# Patient Record
Sex: MALE | Race: WHITE | Employment: OTHER | URBAN - NONMETROPOLITAN AREA
[De-identification: names, ages, dates, MRNs, and addresses within clinical notes are randomized per-mention and may not be internally consistent; named-entity substitution may affect disease eponyms.]

---

## 2017-01-11 ENCOUNTER — TELEPHONE (OUTPATIENT)
Dept: FAMILY MEDICINE CLINIC | Age: 57
End: 2017-01-11

## 2017-01-11 DIAGNOSIS — E55.9 VITAMIN D DEFICIENCY: Primary | ICD-10-CM

## 2017-01-11 RX ORDER — ALLOPURINOL 300 MG/1
300 TABLET ORAL DAILY
Qty: 90 TABLET | Refills: 0 | Status: SHIPPED | OUTPATIENT
Start: 2017-01-11 | End: 2017-04-24 | Stop reason: SDUPTHER

## 2017-04-04 ENCOUNTER — CARE COORDINATION (OUTPATIENT)
Dept: CARE COORDINATION | Age: 57
End: 2017-04-04

## 2017-04-13 ENCOUNTER — CARE COORDINATION (OUTPATIENT)
Dept: CARE COORDINATION | Age: 57
End: 2017-04-13

## 2017-04-24 ENCOUNTER — OFFICE VISIT (OUTPATIENT)
Dept: FAMILY MEDICINE CLINIC | Age: 57
End: 2017-04-24
Payer: MEDICARE

## 2017-04-24 VITALS
HEART RATE: 58 BPM | SYSTOLIC BLOOD PRESSURE: 132 MMHG | OXYGEN SATURATION: 96 % | TEMPERATURE: 98.5 F | RESPIRATION RATE: 16 BRPM | DIASTOLIC BLOOD PRESSURE: 70 MMHG

## 2017-04-24 DIAGNOSIS — E66.01 MORBID OBESITY WITH BMI OF 70 AND OVER, ADULT (HCC): ICD-10-CM

## 2017-04-24 DIAGNOSIS — E55.9 VITAMIN D INSUFFICIENCY: ICD-10-CM

## 2017-04-24 DIAGNOSIS — E53.8 B12 DEFICIENCY: ICD-10-CM

## 2017-04-24 DIAGNOSIS — Z12.5 PROSTATE CANCER SCREENING: ICD-10-CM

## 2017-04-24 DIAGNOSIS — I10 ESSENTIAL HYPERTENSION: ICD-10-CM

## 2017-04-24 DIAGNOSIS — Z23 NEED FOR PROPHYLACTIC VACCINATION AGAINST STREPTOCOCCUS PNEUMONIAE (PNEUMOCOCCUS): ICD-10-CM

## 2017-04-24 DIAGNOSIS — R60.0 BILATERAL LOWER EXTREMITY EDEMA: ICD-10-CM

## 2017-04-24 DIAGNOSIS — E79.0 ELEVATED URIC ACID IN BLOOD: ICD-10-CM

## 2017-04-24 DIAGNOSIS — I10 ESSENTIAL HYPERTENSION: Primary | ICD-10-CM

## 2017-04-24 DIAGNOSIS — Z79.4 TYPE 2 DIABETES MELLITUS WITHOUT COMPLICATION, WITH LONG-TERM CURRENT USE OF INSULIN (HCC): ICD-10-CM

## 2017-04-24 DIAGNOSIS — E11.9 TYPE 2 DIABETES MELLITUS WITHOUT COMPLICATION, WITH LONG-TERM CURRENT USE OF INSULIN (HCC): ICD-10-CM

## 2017-04-24 DIAGNOSIS — K21.9 GASTROESOPHAGEAL REFLUX DISEASE WITHOUT ESOPHAGITIS: ICD-10-CM

## 2017-04-24 DIAGNOSIS — E87.6 HYPOKALEMIA: ICD-10-CM

## 2017-04-24 LAB
ALBUMIN SERPL-MCNC: 3.4 G/DL (ref 3.5–5.2)
ALP BLD-CCNC: 84 U/L (ref 40–130)
ALT SERPL-CCNC: 9 U/L (ref 5–41)
ANION GAP SERPL CALCULATED.3IONS-SCNC: 13 MMOL/L (ref 7–19)
AST SERPL-CCNC: 13 U/L (ref 5–40)
BASOPHILS ABSOLUTE: 0.1 K/UL (ref 0–0.2)
BASOPHILS RELATIVE PERCENT: 0.5 % (ref 0–1)
BILIRUB SERPL-MCNC: 0.5 MG/DL (ref 0.2–1.2)
BILIRUBIN URINE: NEGATIVE
BLOOD, URINE: NEGATIVE
BUN BLDV-MCNC: 25 MG/DL (ref 6–20)
CALCIUM SERPL-MCNC: 8.8 MG/DL (ref 8.6–10)
CHLORIDE BLD-SCNC: 99 MMOL/L (ref 98–111)
CHOLESTEROL, TOTAL: 131 MG/DL (ref 160–199)
CLARITY: CLEAR
CO2: 28 MMOL/L (ref 22–29)
COLOR: YELLOW
CREAT SERPL-MCNC: 1 MG/DL (ref 0.5–1.2)
CREATININE URINE: 109.9 MG/DL (ref 4.2–622)
EOSINOPHILS ABSOLUTE: 0.3 K/UL (ref 0–0.6)
EOSINOPHILS RELATIVE PERCENT: 3.1 % (ref 0–5)
GFR NON-AFRICAN AMERICAN: >60
GLOBULIN: 3.1 G/DL
GLUCOSE BLD-MCNC: 145 MG/DL (ref 74–109)
GLUCOSE URINE: NEGATIVE MG/DL
HBA1C MFR BLD: 7 %
HCT VFR BLD CALC: 44.8 % (ref 42–52)
HDLC SERPL-MCNC: 43 MG/DL (ref 55–121)
HEMOGLOBIN: 13.8 G/DL (ref 14–18)
KETONES, URINE: NEGATIVE MG/DL
LDL CHOLESTEROL CALCULATED: 78 MG/DL
LEUKOCYTE ESTERASE, URINE: NEGATIVE
LYMPHOCYTES ABSOLUTE: 1.1 K/UL (ref 1.1–4.5)
LYMPHOCYTES RELATIVE PERCENT: 11.6 % (ref 20–40)
MCH RBC QN AUTO: 29.1 PG (ref 27–31)
MCHC RBC AUTO-ENTMCNC: 30.8 G/DL (ref 33–37)
MCV RBC AUTO: 94.5 FL (ref 80–94)
MICROALBUMIN UR-MCNC: <1.2 MG/DL (ref 0–19)
MICROALBUMIN/CREAT UR-RTO: NORMAL MG/G
MONOCYTES ABSOLUTE: 0.8 K/UL (ref 0–0.9)
MONOCYTES RELATIVE PERCENT: 7.8 % (ref 0–10)
NEUTROPHILS ABSOLUTE: 7.5 K/UL (ref 1.5–7.5)
NEUTROPHILS RELATIVE PERCENT: 76.3 % (ref 50–65)
NITRITE, URINE: NEGATIVE
PDW BLD-RTO: 14.7 % (ref 11.5–14.5)
PH UA: 5.5
PLATELET # BLD: 209 K/UL (ref 130–400)
PMV BLD AUTO: 11.5 FL (ref 7.4–10.4)
POTASSIUM SERPL-SCNC: 4 MMOL/L (ref 3.5–5)
PROSTATE SPECIFIC ANTIGEN: 4.48 NG/ML (ref 0–4)
PROTEIN UA: NEGATIVE MG/DL
RBC # BLD: 4.74 M/UL (ref 4.7–6.1)
SODIUM BLD-SCNC: 140 MMOL/L (ref 136–145)
SPECIFIC GRAVITY UA: 1.01
T4 FREE: 1.5 NG/ML (ref 0.9–1.7)
TOTAL PROTEIN: 6.5 G/DL (ref 6.6–8.7)
TRIGL SERPL-MCNC: 52 MG/DL (ref 150–199)
TSH SERPL DL<=0.05 MIU/L-ACNC: 3.96 UIU/ML (ref 0.27–4.2)
UROBILINOGEN, URINE: 0.2 E.U./DL
VITAMIN B-12: >2000 PG/ML (ref 211–946)
VITAMIN D 25-HYDROXY: 23.2 NG/ML
WBC # BLD: 9.8 K/UL (ref 4.8–10.8)

## 2017-04-24 PROCEDURE — 99214 OFFICE O/P EST MOD 30 MIN: CPT | Performed by: NURSE PRACTITIONER

## 2017-04-24 RX ORDER — GREEN TEA/HOODIA GORDONII 315-12.5MG
CAPSULE ORAL
Qty: 90 TABLET | Refills: 1 | Status: SHIPPED | OUTPATIENT
Start: 2017-04-24 | End: 2017-08-03 | Stop reason: ALTCHOICE

## 2017-04-24 RX ORDER — PRAVASTATIN SODIUM 20 MG
TABLET ORAL
Qty: 90 TABLET | Refills: 1 | Status: SHIPPED | OUTPATIENT
Start: 2017-04-24 | End: 2017-10-26 | Stop reason: SDUPTHER

## 2017-04-24 RX ORDER — LOSARTAN POTASSIUM 50 MG/1
50 TABLET ORAL DAILY
Qty: 90 TABLET | Refills: 1 | Status: SHIPPED | OUTPATIENT
Start: 2017-04-24 | End: 2017-10-26 | Stop reason: SDUPTHER

## 2017-04-24 RX ORDER — BUMETANIDE 1 MG/1
TABLET ORAL
Qty: 180 TABLET | Refills: 1 | Status: SHIPPED | OUTPATIENT
Start: 2017-04-24 | End: 2017-10-26 | Stop reason: SDUPTHER

## 2017-04-24 RX ORDER — CHOLECALCIFEROL (VITAMIN D3) 125 MCG
500 CAPSULE ORAL DAILY
Qty: 30 TABLET | Refills: 3 | Status: CANCELLED | OUTPATIENT
Start: 2017-04-24

## 2017-04-24 RX ORDER — NEBIVOLOL 10 MG/1
10 TABLET ORAL DAILY
Qty: 90 TABLET | Refills: 1 | Status: SHIPPED | OUTPATIENT
Start: 2017-04-24 | End: 2017-10-26 | Stop reason: SDUPTHER

## 2017-04-24 RX ORDER — OMEPRAZOLE 20 MG/1
20 CAPSULE, DELAYED RELEASE ORAL DAILY
Qty: 90 CAPSULE | Refills: 1 | Status: SHIPPED | OUTPATIENT
Start: 2017-04-24 | End: 2017-10-26 | Stop reason: SDUPTHER

## 2017-04-24 RX ORDER — ALLOPURINOL 300 MG/1
300 TABLET ORAL DAILY
Qty: 90 TABLET | Refills: 1 | Status: SHIPPED | OUTPATIENT
Start: 2017-04-24 | End: 2017-10-26 | Stop reason: SDUPTHER

## 2017-04-24 RX ORDER — POTASSIUM CHLORIDE 20 MEQ/1
TABLET, EXTENDED RELEASE ORAL
Qty: 90 TABLET | Refills: 1 | Status: SHIPPED | OUTPATIENT
Start: 2017-04-24 | End: 2017-10-26 | Stop reason: SDUPTHER

## 2017-04-25 ENCOUNTER — APPOINTMENT (OUTPATIENT)
Dept: WOUND CARE | Facility: HOSPITAL | Age: 57
End: 2017-04-25

## 2017-04-25 PROCEDURE — G0463 HOSPITAL OUTPT CLINIC VISIT: HCPCS

## 2017-04-25 ASSESSMENT — ENCOUNTER SYMPTOMS
SHORTNESS OF BREATH: 0
BLURRED VISION: 0

## 2017-05-02 ENCOUNTER — APPOINTMENT (OUTPATIENT)
Dept: WOUND CARE | Facility: HOSPITAL | Age: 57
End: 2017-05-02

## 2017-05-02 DIAGNOSIS — R97.20 ELEVATED PSA: Primary | ICD-10-CM

## 2017-05-03 DIAGNOSIS — E55.9 VITAMIN D DEFICIENCY: Primary | ICD-10-CM

## 2017-05-03 RX ORDER — ACETAMINOPHEN 160 MG
2000 TABLET,DISINTEGRATING ORAL DAILY
COMMUNITY
Start: 2017-05-03 | End: 2017-08-03 | Stop reason: DRUGHIGH

## 2017-05-09 ENCOUNTER — APPOINTMENT (OUTPATIENT)
Dept: WOUND CARE | Facility: HOSPITAL | Age: 57
End: 2017-05-09

## 2017-05-09 ENCOUNTER — OFFICE VISIT (OUTPATIENT)
Dept: UROLOGY | Age: 57
End: 2017-05-09
Payer: MEDICARE

## 2017-05-09 VITALS
BODY MASS INDEX: 50.62 KG/M2 | HEIGHT: 66 IN | TEMPERATURE: 98.2 F | DIASTOLIC BLOOD PRESSURE: 74 MMHG | WEIGHT: 315 LBS | OXYGEN SATURATION: 90 % | HEART RATE: 94 BPM | SYSTOLIC BLOOD PRESSURE: 132 MMHG

## 2017-05-09 DIAGNOSIS — R97.20 ELEVATED PSA: Primary | ICD-10-CM

## 2017-05-09 LAB
APPEARANCE FLUID: CLEAR
BILIRUBIN, POC: NORMAL
BLOOD URINE, POC: NORMAL
CLARITY, POC: NORMAL
COLOR, POC: NORMAL
GLUCOSE URINE, POC: NORMAL
KETONES, POC: NORMAL
LEUKOCYTE EST, POC: NORMAL
NITRITE, POC: NORMAL
PH, POC: 5.5
PROTEIN, POC: NORMAL
SPECIFIC GRAVITY, POC: 1.01
UROBILINOGEN, POC: 0.2

## 2017-05-09 PROCEDURE — 99213 OFFICE O/P EST LOW 20 MIN: CPT | Performed by: PHYSICIAN ASSISTANT

## 2017-05-09 PROCEDURE — 81002 URINALYSIS NONAUTO W/O SCOPE: CPT | Performed by: PHYSICIAN ASSISTANT

## 2017-05-09 ASSESSMENT — ENCOUNTER SYMPTOMS
EYE REDNESS: 0
WHEEZING: 0
EYE DISCHARGE: 0
NAUSEA: 0
SHORTNESS OF BREATH: 0
HEARTBURN: 0

## 2017-05-10 ENCOUNTER — APPOINTMENT (OUTPATIENT)
Dept: WOUND CARE | Facility: HOSPITAL | Age: 57
End: 2017-05-10

## 2017-05-17 ENCOUNTER — APPOINTMENT (OUTPATIENT)
Dept: WOUND CARE | Facility: HOSPITAL | Age: 57
End: 2017-05-17

## 2017-05-23 ENCOUNTER — APPOINTMENT (OUTPATIENT)
Dept: WOUND CARE | Facility: HOSPITAL | Age: 57
End: 2017-05-23

## 2017-06-09 ENCOUNTER — APPOINTMENT (OUTPATIENT)
Dept: WOUND CARE | Facility: HOSPITAL | Age: 57
End: 2017-06-09

## 2017-06-16 ENCOUNTER — APPOINTMENT (OUTPATIENT)
Dept: WOUND CARE | Facility: HOSPITAL | Age: 57
End: 2017-06-16

## 2017-06-21 ENCOUNTER — APPOINTMENT (OUTPATIENT)
Dept: WOUND CARE | Facility: HOSPITAL | Age: 57
End: 2017-06-21

## 2017-06-27 ENCOUNTER — APPOINTMENT (OUTPATIENT)
Dept: WOUND CARE | Facility: HOSPITAL | Age: 57
End: 2017-06-27

## 2017-06-27 ENCOUNTER — OFFICE VISIT (OUTPATIENT)
Dept: VASCULAR SURGERY | Facility: CLINIC | Age: 57
End: 2017-06-27

## 2017-06-27 VITALS
DIASTOLIC BLOOD PRESSURE: 60 MMHG | SYSTOLIC BLOOD PRESSURE: 120 MMHG | OXYGEN SATURATION: 92 % | BODY MASS INDEX: 46.65 KG/M2 | WEIGHT: 315 LBS | HEIGHT: 69 IN | HEART RATE: 71 BPM

## 2017-06-27 DIAGNOSIS — E11.51 TYPE 2 DIABETES MELLITUS WITH DIABETIC PERIPHERAL ANGIOPATHY WITHOUT GANGRENE, WITH LONG-TERM CURRENT USE OF INSULIN (HCC): ICD-10-CM

## 2017-06-27 DIAGNOSIS — Z79.4 TYPE 2 DIABETES MELLITUS WITH DIABETIC PERIPHERAL ANGIOPATHY WITHOUT GANGRENE, WITH LONG-TERM CURRENT USE OF INSULIN (HCC): ICD-10-CM

## 2017-06-27 DIAGNOSIS — L97.929 VENOUS ULCER OF LEFT LEG (HCC): ICD-10-CM

## 2017-06-27 DIAGNOSIS — I87.2 VENOUS (PERIPHERAL) INSUFFICIENCY: Primary | ICD-10-CM

## 2017-06-27 DIAGNOSIS — I10 ESSENTIAL HYPERTENSION: ICD-10-CM

## 2017-06-27 DIAGNOSIS — I83.029 VENOUS ULCER OF LEFT LEG (HCC): ICD-10-CM

## 2017-06-27 PROCEDURE — 99204 OFFICE O/P NEW MOD 45 MIN: CPT | Performed by: SURGERY

## 2017-06-27 NOTE — PROGRESS NOTES
06/27/2017      Aldo Newton MD  [address] [address]    Jose Landeros  1960    Chief Complaint   Patient presents with   • Establish Care     Arterial and venous def       Dear Aldo Newton MD:      HPI  I had the pleasure of seeing your patient Jose Landeros in the office today.  Thank you kindly for this consultation.  As you recall, Jose Landeros is a 57 y.o.  male who you are currently following for Wounds to his left lower extremity and left toe.  He has had an Unna boot to his left lower extremity and swelling seems to be decreased.  He does have a small wound to his left lateral lower leg.  He also has a small ulceration to his left second toe.  Dr. Newton is following at wound care and wanted him evaluated for any arterial venous disease.    Past Medical History:   Diagnosis Date   • COPD (chronic obstructive pulmonary disease)    • Depression    • Diabetes mellitus    • Hypertension    • Venous insufficiency    • Venous ulcer of right leg        Past Surgical History:   Procedure Laterality Date   • APPENDECTOMY     • FOOT SURGERY     • TESTICLE SURGERY     • TONSILLECTOMY     • TRACHEOSTOMY         Family History   Problem Relation Age of Onset   • Diabetes Mother    • Heart disease Mother    • Hypertension Mother    • Diabetes Father    • Heart disease Father    • Hypertension Father        Social History     Social History   • Marital status: Single     Spouse name: N/A   • Number of children: N/A   • Years of education: N/A     Occupational History   • Not on file.     Social History Main Topics   • Smoking status: Never Smoker   • Smokeless tobacco: Never Used   • Alcohol use No   • Drug use: No   • Sexual activity: Defer     Other Topics Concern   • Not on file     Social History Narrative       Allergies   Allergen Reactions   • Cephalosporins Other (See Comments)     Per d/c summary of 12/17/15, pt possibly had cross-reactivity to a  cephalosporin during his hospitalization   • Neosporin  [Neomycin-Bacitracin Zn-Polymyx]    • Penicillamine    • Silver Sulfadiazine        Prior to Admission medications    Medication Sig Start Date End Date Taking? Authorizing Provider   ACCU-CHEK SOFTCLIX LANCETS lancets For qid testing 11/16/16  Yes Historical Provider, MD   allopurinol (ZYLOPRIM) 300 MG tablet Take 1 tablet by mouth Daily. 11/16/16  Yes Historical Provider, MD   aspirin 81 MG EC tablet Take 1 tablet by mouth Daily. 1/27/16  Yes Historical Provider, MD   bumetanide (BUMEX) 1 MG tablet Take 1-2 tablets by mouth Daily. 11/16/16  Yes Historical Provider, MD   diphenhydrAMINE (BENADRYL) 25 mg capsule Take 1 capsule by mouth Daily. prn 1/27/16  Yes Historical Provider, MD KIRBY RAE-CHROMIUM PO Take 1 tablet by mouth 2 (Two) Times a Day.   Yes Historical Provider, MD   insulin NPH (NOVOLIN N RELION) 100 UNIT/ML injection Inject 30 Units under the skin Daily. After dinner  6/6/16  Yes Historical Provider, MD   insulin regular (NOVOLIN R RELION) 100 UNIT/ML injection Inject 30 Units under the skin. 6/3/16  Yes Historical Provider, MD   losartan (COZAAR) 50 MG tablet Take 1 tablet by mouth Daily. 11/16/16  Yes Historical Provider, MD   nebivolol (BYSTOLIC) 10 MG tablet Take 1 tablet by mouth Daily. 11/16/16  Yes Historical Provider, MD   O2 (OXYGEN) Inhale 2 L Every Night.   Yes Historical Provider, MD   potassium chloride (KLOR-CON) 20 MEQ CR tablet Take 1 tablet by mouth Daily. 11/16/16  Yes Historical Provider, MD   pravastatin (PRAVACHOL) 20 MG tablet Take 1 tablet by mouth Every Night. 11/16/16  Yes Historical Provider, MD   vitamin B-12 (CYANOCOBALAMIN) 500 MCG tablet Take 1 tablet by mouth Every Morning. 1/27/16  Yes Historical Provider, MD   vitamin D (ERGOCALCIFEROL) 05796 UNITS capsule capsule Take 1 capsule by mouth 1 (One) Time Per Week. 7/6/16  Yes Historical Provider, MD   albuterol (PROVENTIL) (2.5 MG/3ML) 0.083% nebulizer  "solution 3 mL Every 6 (Six) Hours. PRN 1/27/16   Historical Provider, MD   albuterol (VENTOLIN HFA) 108 (90 BASE) MCG/ACT inhaler Inhale 2 puffs Every 6 (Six) Hours. prn 1/27/16   Historical Provider, MD   azelastine (ASTELIN) 0.1 % nasal spray 2 sprays into each nostril 2 (Two) Times a Day As Needed. 11/16/16   Historical Provider, MD   fluticasone (FLONASE) 50 MCG/ACT nasal spray 1 spray into each nostril 2 (Two) Times a Day As Needed. 11/16/16   Historical Provider, MD   glucose blood (ACCU-CHEK COMPACT PLUS) test strip 1 applicator. One strip four times a day 11/16/16   Historical Provider, MD   insulin NPH (NOVOLIN N) 100 UNIT/ML injection Inject 20 Units under the skin Every Morning. 4/7/16   Historical Provider, MD       Review of Systems   Constitutional: Negative.    HENT: Negative.    Eyes: Negative.    Respiratory: Negative.    Cardiovascular: Positive for leg swelling.        Wound to left lower leg and left 2nd great toe   Gastrointestinal: Negative.    Endocrine: Negative.    Genitourinary: Negative.    Musculoskeletal: Negative.    Skin: Negative.    Allergic/Immunologic: Negative.    Neurological: Negative.    Hematological: Negative.    Psychiatric/Behavioral: Negative.    All other systems reviewed and are negative.      /60  Pulse 71  Ht 69\" (175.3 cm)  Wt (!) 450 lb (204 kg)  SpO2 92%  BMI 66.45 kg/m2  Physical Exam   Constitutional: He is oriented to person, place, and time. He appears well-developed and well-nourished.   obese   HENT:   Head: Normocephalic and atraumatic.   Eyes: Pupils are equal, round, and reactive to light.   Neck: Normal range of motion. Neck supple.   Cardiovascular: Normal rate, regular rhythm and normal heart sounds.    Pulses:       Dorsalis pedis pulses are 2+ on the left side.   Left: doppler PT  Venous stasis   Pulmonary/Chest: Effort normal and breath sounds normal.   Abdominal: Soft.   Morbidly obese   Musculoskeletal:        Legs:  Motorized wheelchair "        Neurological: He is alert and oriented to person, place, and time.   Skin: Skin is warm and dry.   Psychiatric: He has a normal mood and affect. His behavior is normal. Judgment and thought content normal.   Nursing note and vitals reviewed.      No results found.    Patient Active Problem List   Diagnosis   • Venous insufficiency   • Hypertension   • Diabetes mellitus         ICD-10-CM ICD-9-CM   1. Venous (peripheral) insufficiency I87.2 459.81   2. Venous ulcer of left leg I83.029 454.0   3. Essential hypertension I10 401.9   4. Type 2 diabetes mellitus with diabetic peripheral angiopathy without gangrene, with long-term current use of insulin E11.51 250.70    Z79.4 443.81     V58.67       Plan: After thoroughly evaluating Jose Landeros, I believe the best course of action is to proceed with further testing for venous insufficiency.  I will order a venous valvular insufficiency study and see him back next week.  He does have a palpable dorsalis pedis on the left and dopplerable PT.  He overall his arterial supply seems to be pretty normal.  He likely has some distal small vessel disease related to his diabetes.  He has previously been in Unna boot and will be going to wound care immediately following this appointment.  The patient can continue taking her current medication regimen as previously planned.  This was all discussed in full with complete understanding.    Thank you for allowing me to participate in the care of your patient.  Please do not hesitate with any questions or concerns.  I will keep you aware of any further encounters with Jose Landeros.        Sincerely yours,         NOAH Ramirez MD

## 2017-07-10 ENCOUNTER — APPOINTMENT (OUTPATIENT)
Dept: WOUND CARE | Facility: HOSPITAL | Age: 57
End: 2017-07-10

## 2017-07-10 DIAGNOSIS — E11.9 CONTROLLED TYPE 2 DIABETES MELLITUS WITHOUT COMPLICATION, WITH LONG-TERM CURRENT USE OF INSULIN (HCC): ICD-10-CM

## 2017-07-10 DIAGNOSIS — Z79.4 CONTROLLED TYPE 2 DIABETES MELLITUS WITHOUT COMPLICATION, WITH LONG-TERM CURRENT USE OF INSULIN (HCC): ICD-10-CM

## 2017-07-11 RX ORDER — HUMAN INSULIN 100 [IU]/ML
INJECTION, SOLUTION SUBCUTANEOUS
Qty: 1 VIAL | Refills: 2 | Status: SHIPPED | OUTPATIENT
Start: 2017-07-11 | End: 2019-02-20 | Stop reason: SDUPTHER

## 2017-07-11 RX ORDER — HUMAN INSULIN 100 [IU]/ML
INJECTION, SUSPENSION SUBCUTANEOUS
Qty: 1 VIAL | Refills: 2 | Status: SHIPPED | OUTPATIENT
Start: 2017-07-11 | End: 2019-02-20 | Stop reason: SDUPTHER

## 2017-07-13 ENCOUNTER — APPOINTMENT (OUTPATIENT)
Dept: ULTRASOUND IMAGING | Facility: HOSPITAL | Age: 57
End: 2017-07-13

## 2017-07-14 ENCOUNTER — OFFICE VISIT (OUTPATIENT)
Dept: PODIATRY | Facility: CLINIC | Age: 57
End: 2017-07-14

## 2017-07-14 VITALS
SYSTOLIC BLOOD PRESSURE: 124 MMHG | HEIGHT: 70 IN | HEART RATE: 63 BPM | OXYGEN SATURATION: 96 % | DIASTOLIC BLOOD PRESSURE: 92 MMHG | WEIGHT: 315 LBS | BODY MASS INDEX: 45.1 KG/M2

## 2017-07-14 DIAGNOSIS — M79.672 FOOT PAIN, BILATERAL: ICD-10-CM

## 2017-07-14 DIAGNOSIS — I87.2 VENOUS INSUFFICIENCY: ICD-10-CM

## 2017-07-14 DIAGNOSIS — E11.49 DIABETES MELLITUS TYPE 2 WITH NEUROLOGICAL MANIFESTATIONS (HCC): ICD-10-CM

## 2017-07-14 DIAGNOSIS — E11.51 TYPE 2 DIABETES MELLITUS WITH DIABETIC PERIPHERAL ANGIOPATHY WITHOUT GANGRENE, WITH LONG-TERM CURRENT USE OF INSULIN (HCC): ICD-10-CM

## 2017-07-14 DIAGNOSIS — B35.1 ONYCHOMYCOSIS: Primary | ICD-10-CM

## 2017-07-14 DIAGNOSIS — Z79.4 TYPE 2 DIABETES MELLITUS WITH DIABETIC PERIPHERAL ANGIOPATHY WITHOUT GANGRENE, WITH LONG-TERM CURRENT USE OF INSULIN (HCC): ICD-10-CM

## 2017-07-14 DIAGNOSIS — L97.509 NON-PRESSURE ULCER OF TOE (HCC): ICD-10-CM

## 2017-07-14 DIAGNOSIS — M79.671 FOOT PAIN, BILATERAL: ICD-10-CM

## 2017-07-14 PROCEDURE — 99203 OFFICE O/P NEW LOW 30 MIN: CPT | Performed by: PODIATRIST

## 2017-07-14 PROCEDURE — 11721 DEBRIDE NAIL 6 OR MORE: CPT | Performed by: PODIATRIST

## 2017-07-14 NOTE — PROGRESS NOTES
[facility] - PODIATRY    Today's Date: 07/14/17    Patient Name: Jose Landeros  MRN: [id number]  CSN: [id number]  PCP: Cong Steve MD  Referring Provider: No ref. provider found    SUBJECTIVE     Chief Complaint   Patient presents with   • Diabetes     Patient states he is here for a diabetic foot exam.      HPI: Jose Landeros, a 57 y.o.male, comes to clinic as a(n) new patient presenting for diabetic foot exam and complaining of painful toenails. Patient has h/o COPD,Morbid Obesity, Depression, DM2 with Neuropathy, Venous Insufficiency, Venous leg ulcer, Foot Ulcer, HLD, HTN. He was referred by [facility] for debridement of elongated, thickened toenails. Pt relates he is unable to reach his feet to trim his own nails Patient is IDDM with last stated BG level of 200mg/dl. Admits pain at 3/10 level and described as burning, aching and numbness. Denies any constitutional symptoms. No other pedal complaints at this time.    Past Medical History:   Diagnosis Date   • COPD (chronic obstructive pulmonary disease)    • Depression    • Diabetes mellitus    • Hyperlipidemia    • Hypertension    • Venous insufficiency    • Venous ulcer of right leg      Past Surgical History:   Procedure Laterality Date   • APPENDECTOMY     • FOOT SURGERY     • TESTICLE SURGERY     • TRACHEOSTOMY       Family History   Problem Relation Age of Onset   • Diabetes Mother    • Heart disease Mother    • Hypertension Mother    • Diabetes Father    • Heart disease Father    • Hypertension Father      Social History     Social History   • Marital status: Single     Spouse name: N/A   • Number of children: N/A   • Years of education: N/A     Occupational History   • Not on file.     Social History Main Topics   • Smoking status: Never Smoker   • Smokeless tobacco: Never Used   • Alcohol use No   • Drug use: No   • Sexual activity: Defer     Other Topics Concern   • Not on file     Social History Narrative      Allergies   Allergen Reactions   • Cephalosporins Other (See Comments)     Per d/c summary of 12/17/15, pt possibly had cross-reactivity to a cephalosporin during his hospitalization   • Neosporin  [Neomycin-Bacitracin Zn-Polymyx]    • Penicillamine    • Silver Sulfadiazine      Current Outpatient Prescriptions   Medication Sig Dispense Refill   • ACCU-CHEK SOFTCLIX LANCETS lancets For qid testing     • albuterol (PROVENTIL) (2.5 MG/3ML) 0.083% nebulizer solution 3 mL Every 6 (Six) Hours. PRN     • albuterol (VENTOLIN HFA) 108 (90 BASE) MCG/ACT inhaler Inhale 2 puffs Every 6 (Six) Hours. prn     • allopurinol (ZYLOPRIM) 300 MG tablet Take 1 tablet by mouth Daily.     • aspirin 81 MG EC tablet Take 1 tablet by mouth Daily.     • azelastine (ASTELIN) 0.1 % nasal spray 2 sprays into each nostril 2 (Two) Times a Day As Needed.     • bumetanide (BUMEX) 1 MG tablet Take 1-2 tablets by mouth Daily.     • diphenhydrAMINE (BENADRYL) 25 mg capsule Take 1 capsule by mouth Daily. prn     • fluticasone (FLONASE) 50 MCG/ACT nasal spray 1 spray into each nostril 2 (Two) Times a Day As Needed.     • GARCINIA CAMBOGIA-CHROMIUM PO Take 1 tablet by mouth 2 (Two) Times a Day.     • glucose blood (ACCU-CHEK COMPACT PLUS) test strip 1 applicator. One strip four times a day     • insulin NPH (NOVOLIN N RELION) 100 UNIT/ML injection Inject 30 Units under the skin Daily. After dinner      • insulin NPH (NOVOLIN N) 100 UNIT/ML injection Inject 20 Units under the skin Every Morning.     • insulin regular (NOVOLIN R RELION) 100 UNIT/ML injection Inject 30 Units under the skin.     • losartan (COZAAR) 50 MG tablet Take 1 tablet by mouth Daily.     • nebivolol (BYSTOLIC) 10 MG tablet Take 1 tablet by mouth Daily.     • O2 (OXYGEN) Inhale 2 L As Needed.     • potassium chloride (KLOR-CON) 20 MEQ CR tablet Take 1 tablet by mouth Daily.     • pravastatin (PRAVACHOL) 20 MG tablet Take 1 tablet by mouth Every Night.     • vitamin B-12  (CYANOCOBALAMIN) 500 MCG tablet Take 1 tablet by mouth Every Morning.     • vitamin D (ERGOCALCIFEROL) 51481 UNITS capsule capsule Take 1 capsule by mouth 1 (One) Time Per Week.       No current facility-administered medications for this visit.      Review of Systems   Constitutional: Negative for chills and fever.   HENT: Negative for congestion.    Respiratory: Negative for shortness of breath.    Cardiovascular: Positive for leg swelling. Negative for chest pain.   Gastrointestinal: Negative for constipation, diarrhea, nausea and vomiting.   Skin: Positive for wound.   Neurological: Positive for numbness.       OBJECTIVE     Vitals:    07/14/17 1126   BP: 124/92   Pulse: 63   SpO2: 96%       PHYSICAL EXAM  GEN:   A&Ox3, NAD. Pt presents to clinic in motorized scooter and wearing Casual Shoes. LLE unna boot applied     NEURO:   Protective sensation intact to 10/10 sites Right foot, 10/10 site Left foot using [place] monofilament  Light touch sensation diminished  No Tinel's or [place] sign.    VASC:  Skin temperature Warm to Warm proximal to distal murphy  DP pulses 1/4 Right, 1/4 Left  PT pulses 1/4 Right, 1/4 Left  CFT 4 sec murphy  Pedal hair growth absent  1+ pitting edema noted murphy    MUSC/SKEL:  Muscle Strength Right foot Dorsiflexors 5/5, Plantarflexors 5/5, Evertors 5/5, Invertors 5/5  Muscle Strength Left foot Dorsiflexors 5/5, Plantarflexors 5/5, Evertors 5/5, Invertors 5/5  ROM of the 1st MTP is full without pain or crepitus  ROM of the MTJ is full without pain or crepitus    ROM of the STJ is full without pain or crepitus    ROM of the ankle joint is full without pain or crepitus    POP of nail plates  Rectus foot type   Gait pattern: Normal  No gross pedal musculoskeletal deformities noted.     DERM:  Pedal nails x10 are thickened, elongated and discolored with presence of subunugual debris  Webspaces are Clean, Dry, and Intact  Skin is well hydrated and shiny   Ulcer noted to distal aspect of left  2nd digit- bandaged      RADIOLOGY/NUCLEAR:  No results found.    LABORATORY/CULTURE RESULTS:      PATHOLOGY RESULTS:       ASSESSMENT/PLAN     Jose was seen today for diabetes.    Diagnoses and all orders for this visit:    Onychomycosis    Venous insufficiency    Type 2 diabetes mellitus with diabetic peripheral angiopathy without gangrene, with long-term current use of insulin    Diabetes mellitus type 2 with neurological manifestations    Foot pain, bilateral    Non-pressure ulcer of toe      Comprehensive lower extremity examination and evaluation was performed.  Discussed findings and treatment plan including risks, benefits, and treatment options with patient in detail. Patient agreed with treatment plan.  After verbal consent obtained, nail(s) x10 debrided of length and thickness with nail nipper without incidence, Patient may maintain nails and calluses at home utilizing [facility] of pumice stone between visits as needed, Reviewed at home diabetic foot care   Continue treatment of wounds at [facility]  An After Visit Summary was printed and given to the patient at discharge, including (if requested) any available informative/educational handouts regarding diagnosis, treatment, or medications. All questions were answered to patient/family satisfaction. Should symptoms fail to improve or worsen they agree to call or return to clinic or to go to the Emergency Department. Discussed the importance of following up with any needed screening tests/labs/specialist appointments and any requested follow-up recommended by me today. Importance of maintaining follow-up discussed and patient accepts that missed appointments can delay diagnosis and potentially lead to worsening of conditions.  Return in about 3 months (around 10/14/2017)., or sooner if acute issues arise.    Lab Frequency Next Occurrence   US Venous Doppler Lower Extremity Bilateral (duplex) Once 7/13/2017       This document has been electronically  signed by Vicente Garza DPM on July 14, 2017 12:09 PM

## 2017-07-17 ENCOUNTER — HOSPITAL ENCOUNTER (EMERGENCY)
Facility: HOSPITAL | Age: 57
Discharge: HOME | End: 2017-07-17
Attending: EMERGENCY MEDICINE | Admitting: EMERGENCY MEDICINE

## 2017-07-17 ENCOUNTER — APPOINTMENT (OUTPATIENT)
Dept: WOUND CARE | Facility: HOSPITAL | Age: 57
End: 2017-07-17

## 2017-07-17 ENCOUNTER — APPOINTMENT (OUTPATIENT)
Dept: ULTRASOUND IMAGING | Facility: HOSPITAL | Age: 57
End: 2017-07-17

## 2017-07-17 VITALS
HEART RATE: 61 BPM | DIASTOLIC BLOOD PRESSURE: 58 MMHG | RESPIRATION RATE: 16 BRPM | BODY MASS INDEX: 45.1 KG/M2 | HEIGHT: 70 IN | WEIGHT: 315 LBS | SYSTOLIC BLOOD PRESSURE: 112 MMHG | OXYGEN SATURATION: 96 % | TEMPERATURE: 98.6 F

## 2017-07-17 DIAGNOSIS — I87.2 VENOUS INSUFFICIENCY: Primary | ICD-10-CM

## 2017-07-17 DIAGNOSIS — L03.116 CELLULITIS OF LEFT LEG: ICD-10-CM

## 2017-07-17 LAB
ALBUMIN SERPL-MCNC: 3.7 G/DL (ref 3.5–5)
ALBUMIN/GLOB SERPL: 1.2 G/DL (ref 1.1–2.5)
ALP SERPL-CCNC: 82 U/L (ref 24–120)
ALT SERPL W P-5'-P-CCNC: 29 U/L (ref 0–54)
ANION GAP SERPL CALCULATED.3IONS-SCNC: 9 MMOL/L (ref 4–13)
AST SERPL-CCNC: 19 U/L (ref 7–45)
BASOPHILS # BLD AUTO: 0.04 10*3/MM3 (ref 0–0.2)
BASOPHILS NFR BLD AUTO: 0.3 % (ref 0–2)
BILIRUB SERPL-MCNC: 0.5 MG/DL (ref 0.1–1)
BUN BLD-MCNC: 23 MG/DL (ref 5–21)
BUN/CREAT SERPL: 22.3 (ref 7–25)
CALCIUM SPEC-SCNC: 8.8 MG/DL (ref 8.4–10.4)
CHLORIDE SERPL-SCNC: 102 MMOL/L (ref 98–110)
CO2 SERPL-SCNC: 28 MMOL/L (ref 24–31)
CREAT BLD-MCNC: 1.03 MG/DL (ref 0.5–1.4)
D DIMER PPP FEU-MCNC: 2.34 MG/L (FEU) (ref 0–0.5)
DEPRECATED RDW RBC AUTO: 50 FL (ref 40–54)
EOSINOPHIL # BLD AUTO: 0.31 10*3/MM3 (ref 0–0.7)
EOSINOPHIL NFR BLD AUTO: 2.6 % (ref 0–4)
ERYTHROCYTE [DISTWIDTH] IN BLOOD BY AUTOMATED COUNT: 15 % (ref 12–15)
GFR SERPL CREATININE-BSD FRML MDRD: 74 ML/MIN/1.73
GLOBULIN UR ELPH-MCNC: 3.2 GM/DL
GLUCOSE BLD-MCNC: 225 MG/DL (ref 70–100)
GLUCOSE BLDC GLUCOMTR-MCNC: 202 MG/DL (ref 70–130)
HCT VFR BLD AUTO: 43.9 % (ref 40–52)
HGB BLD-MCNC: 13.6 G/DL (ref 14–18)
IMM GRANULOCYTES # BLD: 0.07 10*3/MM3 (ref 0–0.03)
IMM GRANULOCYTES NFR BLD: 0.6 % (ref 0–5)
LYMPHOCYTES # BLD AUTO: 1.13 10*3/MM3 (ref 0.72–4.86)
LYMPHOCYTES NFR BLD AUTO: 9.4 % (ref 15–45)
MCH RBC QN AUTO: 28.3 PG (ref 28–32)
MCHC RBC AUTO-ENTMCNC: 31 G/DL (ref 33–36)
MCV RBC AUTO: 91.3 FL (ref 82–95)
MONOCYTES # BLD AUTO: 0.85 10*3/MM3 (ref 0.19–1.3)
MONOCYTES NFR BLD AUTO: 7.1 % (ref 4–12)
NEUTROPHILS # BLD AUTO: 9.56 10*3/MM3 (ref 1.87–8.4)
NEUTROPHILS NFR BLD AUTO: 80 % (ref 39–78)
PLATELET # BLD AUTO: 252 10*3/MM3 (ref 130–400)
PMV BLD AUTO: 11.4 FL (ref 6–12)
POTASSIUM BLD-SCNC: 4.3 MMOL/L (ref 3.5–5.3)
PROT SERPL-MCNC: 6.9 G/DL (ref 6.3–8.7)
RBC # BLD AUTO: 4.81 10*6/MM3 (ref 4.8–5.9)
SODIUM BLD-SCNC: 139 MMOL/L (ref 135–145)
WBC NRBC COR # BLD: 11.96 10*3/MM3 (ref 4.8–10.8)

## 2017-07-17 PROCEDURE — 85379 FIBRIN DEGRADATION QUANT: CPT | Performed by: EMERGENCY MEDICINE

## 2017-07-17 PROCEDURE — 99283 EMERGENCY DEPT VISIT LOW MDM: CPT

## 2017-07-17 PROCEDURE — 63710000001 INSULIN REGULAR HUMAN PER 5 UNITS: Performed by: EMERGENCY MEDICINE

## 2017-07-17 PROCEDURE — 85025 COMPLETE CBC W/AUTO DIFF WBC: CPT | Performed by: EMERGENCY MEDICINE

## 2017-07-17 PROCEDURE — 93971 EXTREMITY STUDY: CPT | Performed by: SURGERY

## 2017-07-17 PROCEDURE — G0463 HOSPITAL OUTPT CLINIC VISIT: HCPCS

## 2017-07-17 PROCEDURE — 93971 EXTREMITY STUDY: CPT

## 2017-07-17 PROCEDURE — 80053 COMPREHEN METABOLIC PANEL: CPT | Performed by: EMERGENCY MEDICINE

## 2017-07-17 PROCEDURE — 87040 BLOOD CULTURE FOR BACTERIA: CPT | Performed by: EMERGENCY MEDICINE

## 2017-07-17 PROCEDURE — 96365 THER/PROPH/DIAG IV INF INIT: CPT

## 2017-07-17 PROCEDURE — 25010000002 VANCOMYCIN PER 500 MG: Performed by: EMERGENCY MEDICINE

## 2017-07-17 PROCEDURE — 82962 GLUCOSE BLOOD TEST: CPT

## 2017-07-17 RX ORDER — CIPROFLOXACIN 500 MG/1
500 TABLET, FILM COATED ORAL 2 TIMES DAILY
Qty: 20 TABLET | Refills: 0 | Status: SHIPPED | OUTPATIENT
Start: 2017-07-17 | End: 2018-01-27 | Stop reason: HOSPADM

## 2017-07-17 RX ADMIN — VANCOMYCIN HYDROCHLORIDE 1000 MG: 1 INJECTION, POWDER, LYOPHILIZED, FOR SOLUTION INTRAVENOUS at 17:26

## 2017-07-17 RX ADMIN — INSULIN HUMAN 20 UNITS: 100 INJECTION, SOLUTION PARENTERAL at 18:44

## 2017-07-17 NOTE — ED PROVIDER NOTES
Subjective   Patient is a 57 y.o. male presenting with lower extremity pain.   Lower Extremity Issue   Location:  Leg  Leg location:  L leg and L lower leg  Pain details:     Quality:  Dull    Radiates to:  Does not radiate    Onset quality:  Gradual    Timing:  Constant    Progression:  Worsening  Chronicity:  New  Dislocation: no    Foreign body present:  No foreign bodies  Relieved by:  Nothing  Associated symptoms: no back pain, no decreased ROM, no fatigue, no fever, no itching, no muscle weakness, no numbness, no stiffness and no tingling    Risk factors: no concern for non-accidental trauma and no frequent fractures        Review of Systems   Constitutional: Negative.  Negative for chills, fatigue and fever.   HENT: Negative.  Negative for congestion.    Respiratory: Negative.  Negative for cough, chest tightness and stridor.    Cardiovascular: Negative.  Negative for chest pain.   Gastrointestinal: Negative.  Negative for abdominal distention, abdominal pain, nausea and vomiting.   Endocrine: Negative.    Genitourinary: Negative.  Negative for difficulty urinating and flank pain.   Musculoskeletal: Negative.  Negative for back pain and stiffness.   Skin: Negative.  Negative for color change and itching.   Neurological: Negative.  Negative for dizziness and headaches.   All other systems reviewed and are negative.      Past Medical History:   Diagnosis Date   • COPD (chronic obstructive pulmonary disease)    • Depression    • Diabetes mellitus    • Hyperlipidemia    • Hypertension    • Venous insufficiency    • Venous ulcer of right leg        Allergies   Allergen Reactions   • Cephalosporins Other (See Comments)     Per d/c summary of 12/17/15, pt possibly had cross-reactivity to a cephalosporin during his hospitalization   • Neosporin  [Neomycin-Bacitracin Zn-Polymyx]    • Penicillamine    • Penicillins    • Silver Sulfadiazine        Past Surgical History:   Procedure Laterality Date   • APPENDECTOMY     •  FOOT SURGERY     • TESTICLE SURGERY     • TRACHEOSTOMY         Family History   Problem Relation Age of Onset   • Diabetes Mother    • Heart disease Mother    • Hypertension Mother    • Diabetes Father    • Heart disease Father    • Hypertension Father        Social History     Social History   • Marital status: Single     Spouse name: N/A   • Number of children: N/A   • Years of education: N/A     Social History Main Topics   • Smoking status: Never Smoker   • Smokeless tobacco: Never Used   • Alcohol use No   • Drug use: No   • Sexual activity: Defer     Other Topics Concern   • None     Social History Narrative           Objective   Physical Exam   Constitutional: He is oriented to person, place, and time. He appears well-developed and well-nourished.   HENT:   Head: Normocephalic and atraumatic.   Eyes: Conjunctivae and EOM are normal. Pupils are equal, round, and reactive to light.   Neck: Normal range of motion. Neck supple.   Cardiovascular: Normal rate, regular rhythm, normal heart sounds and intact distal pulses.    Pulmonary/Chest: Effort normal and breath sounds normal.   Abdominal: Soft. Bowel sounds are normal.   Musculoskeletal: Normal range of motion.   lympedema swelling cellulitis and two ulcers on the leg    Neurological: He is alert and oriented to person, place, and time. He has normal reflexes.   Skin: Skin is warm and dry.   Psychiatric: He has a normal mood and affect.   Nursing note and vitals reviewed.      Procedures         ED Course  ED Course   Comment By Time   Turned over to Terry Larson MD 07/17 1819                  [facility]    Final diagnoses:   None            Cameron Larson MD  07/17/17 1819

## 2017-07-22 LAB
BACTERIA SPEC AEROBE CULT: NORMAL
BACTERIA SPEC AEROBE CULT: NORMAL

## 2017-07-25 ENCOUNTER — CARE COORDINATION (OUTPATIENT)
Dept: CARE COORDINATION | Age: 57
End: 2017-07-25

## 2017-07-25 RX ORDER — CIPROFLOXACIN 500 MG/1
500 TABLET, FILM COATED ORAL 2 TIMES DAILY
COMMUNITY
Start: 2017-07-19 | End: 2017-07-28

## 2017-07-31 ENCOUNTER — OFFICE VISIT (OUTPATIENT)
Dept: FAMILY MEDICINE CLINIC | Age: 57
End: 2017-07-31
Payer: MEDICARE

## 2017-07-31 VITALS
OXYGEN SATURATION: 93 % | DIASTOLIC BLOOD PRESSURE: 72 MMHG | HEART RATE: 59 BPM | TEMPERATURE: 98.4 F | RESPIRATION RATE: 16 BRPM | SYSTOLIC BLOOD PRESSURE: 108 MMHG

## 2017-07-31 DIAGNOSIS — R97.20 ELEVATED PSA: ICD-10-CM

## 2017-07-31 DIAGNOSIS — L97.921 ULCER OF LEFT LOWER LEG, LIMITED TO BREAKDOWN OF SKIN (HCC): ICD-10-CM

## 2017-07-31 DIAGNOSIS — F41.9 ANXIETY: ICD-10-CM

## 2017-07-31 DIAGNOSIS — E55.9 VITAMIN D DEFICIENCY: ICD-10-CM

## 2017-07-31 DIAGNOSIS — E11.622: ICD-10-CM

## 2017-07-31 DIAGNOSIS — L97.921 ULCER OF LEFT LOWER LEG, LIMITED TO BREAKDOWN OF SKIN (HCC): Primary | ICD-10-CM

## 2017-07-31 LAB
ALBUMIN SERPL-MCNC: 3.2 G/DL (ref 3.5–5.2)
ALP BLD-CCNC: 75 U/L (ref 40–130)
ALT SERPL-CCNC: 6 U/L (ref 5–41)
ANION GAP SERPL CALCULATED.3IONS-SCNC: 12 MMOL/L (ref 7–19)
AST SERPL-CCNC: 12 U/L (ref 5–40)
BASOPHILS ABSOLUTE: 0.1 K/UL (ref 0–0.2)
BASOPHILS RELATIVE PERCENT: 0.6 % (ref 0–1)
BILIRUB SERPL-MCNC: 0.4 MG/DL (ref 0.2–1.2)
BUN BLDV-MCNC: 24 MG/DL (ref 6–20)
CALCIUM SERPL-MCNC: 9.1 MG/DL (ref 8.6–10)
CHLORIDE BLD-SCNC: 100 MMOL/L (ref 98–111)
CHOLESTEROL, TOTAL: 123 MG/DL (ref 160–199)
CO2: 28 MMOL/L (ref 22–29)
CREAT SERPL-MCNC: 0.9 MG/DL (ref 0.5–1.2)
CREATININE URINE: 112.3 MG/DL (ref 4.2–622)
EOSINOPHILS ABSOLUTE: 0.3 K/UL (ref 0–0.6)
EOSINOPHILS RELATIVE PERCENT: 2.5 % (ref 0–5)
GFR NON-AFRICAN AMERICAN: >60
GLUCOSE BLD-MCNC: 142 MG/DL (ref 74–109)
HBA1C MFR BLD: 8.7 %
HCT VFR BLD CALC: 43.9 % (ref 42–52)
HDLC SERPL-MCNC: 43 MG/DL (ref 55–121)
HEMOGLOBIN: 13.3 G/DL (ref 14–18)
LDL CHOLESTEROL CALCULATED: 71 MG/DL
LYMPHOCYTES ABSOLUTE: 1.2 K/UL (ref 1.1–4.5)
LYMPHOCYTES RELATIVE PERCENT: 12 % (ref 20–40)
MCH RBC QN AUTO: 28.8 PG (ref 27–31)
MCHC RBC AUTO-ENTMCNC: 30.3 G/DL (ref 33–37)
MCV RBC AUTO: 95 FL (ref 80–94)
MICROALBUMIN UR-MCNC: <1.2 MG/DL (ref 0–19)
MICROALBUMIN/CREAT UR-RTO: NORMAL MG/G
MONOCYTES ABSOLUTE: 0.7 K/UL (ref 0–0.9)
MONOCYTES RELATIVE PERCENT: 6.8 % (ref 0–10)
NEUTROPHILS ABSOLUTE: 8.1 K/UL (ref 1.5–7.5)
NEUTROPHILS RELATIVE PERCENT: 77.6 % (ref 50–65)
PDW BLD-RTO: 15.1 % (ref 11.5–14.5)
PLATELET # BLD: 222 K/UL (ref 130–400)
PMV BLD AUTO: 11.6 FL (ref 9.4–12.4)
POTASSIUM SERPL-SCNC: 4.9 MMOL/L (ref 3.5–5)
PROSTATE SPECIFIC ANTIGEN: 5.85 NG/ML (ref 0–4)
RBC # BLD: 4.62 M/UL (ref 4.7–6.1)
SODIUM BLD-SCNC: 140 MMOL/L (ref 136–145)
T4 FREE: 1.4 NG/ML (ref 0.9–1.7)
TOTAL PROTEIN: 6.9 G/DL (ref 6.6–8.7)
TRIGL SERPL-MCNC: 43 MG/DL (ref 150–199)
TSH SERPL DL<=0.05 MIU/L-ACNC: 2.73 UIU/ML (ref 0.27–4.2)
VITAMIN D 25-HYDROXY: 25.3 NG/ML
WBC # BLD: 10.4 K/UL (ref 4.8–10.8)

## 2017-07-31 PROCEDURE — 99214 OFFICE O/P EST MOD 30 MIN: CPT | Performed by: NURSE PRACTITIONER

## 2017-07-31 RX ORDER — DOXYCYCLINE HYCLATE 100 MG
100 TABLET ORAL 2 TIMES DAILY
Qty: 20 TABLET | Refills: 0 | Status: SHIPPED | OUTPATIENT
Start: 2017-07-31 | End: 2017-08-10

## 2017-07-31 RX ORDER — HYDROCODONE BITARTRATE AND ACETAMINOPHEN 7.5; 325 MG/1; MG/1
1 TABLET ORAL EVERY 6 HOURS PRN
Qty: 30 TABLET | Refills: 0 | Status: SHIPPED | OUTPATIENT
Start: 2017-07-31 | End: 2017-08-07

## 2017-08-01 RX ORDER — ERGOCALCIFEROL (VITAMIN D2) 1250 MCG
50000 CAPSULE ORAL WEEKLY
Qty: 4 CAPSULE | Refills: 2 | Status: SHIPPED | OUTPATIENT
Start: 2017-08-01 | End: 2018-01-08 | Stop reason: ALTCHOICE

## 2017-08-03 ENCOUNTER — HOSPITAL ENCOUNTER (OUTPATIENT)
Dept: WOUND CARE | Age: 57
Discharge: HOME | End: 2017-08-03
Payer: MEDICARE

## 2017-08-03 VITALS
TEMPERATURE: 98.1 F | WEIGHT: 315 LBS | HEART RATE: 68 BPM | RESPIRATION RATE: 18 BRPM | HEIGHT: 70 IN | BODY MASS INDEX: 45.1 KG/M2

## 2017-08-03 DIAGNOSIS — L97.222 NON-PRESSURE CHRONIC ULCER OF LEFT CALF WITH FAT LAYER EXPOSED (HCC): Chronic | ICD-10-CM

## 2017-08-03 DIAGNOSIS — I89.0 CHRONIC ACQUIRED LYMPHEDEMA: Chronic | ICD-10-CM

## 2017-08-03 PROCEDURE — 99213 OFFICE O/P EST LOW 20 MIN: CPT

## 2017-08-03 PROCEDURE — 97597 DBRDMT OPN WND 1ST 20 CM/<: CPT | Performed by: SURGERY

## 2017-08-03 PROCEDURE — 97597 DBRDMT OPN WND 1ST 20 CM/<: CPT

## 2017-08-03 PROCEDURE — G0463 HOSPITAL OUTPT CLINIC VISIT: HCPCS

## 2017-08-03 PROCEDURE — 99203 OFFICE O/P NEW LOW 30 MIN: CPT | Performed by: SURGERY

## 2017-08-03 RX ORDER — ACETAMINOPHEN 500 MG
1000 TABLET ORAL EVERY 6 HOURS PRN
COMMUNITY

## 2017-08-03 ASSESSMENT — PAIN DESCRIPTION - PAIN TYPE: TYPE: ACUTE PAIN

## 2017-08-03 ASSESSMENT — PAIN DESCRIPTION - DESCRIPTORS: DESCRIPTORS: DULL

## 2017-08-03 ASSESSMENT — PAIN DESCRIPTION - LOCATION: LOCATION: LEG

## 2017-08-03 ASSESSMENT — PAIN DESCRIPTION - ONSET: ONSET: ON-GOING

## 2017-08-03 ASSESSMENT — PAIN DESCRIPTION - ORIENTATION: ORIENTATION: LEFT

## 2017-08-03 ASSESSMENT — PAIN DESCRIPTION - PROGRESSION: CLINICAL_PROGRESSION: NOT CHANGED

## 2017-08-03 ASSESSMENT — PAIN SCALES - GENERAL: PAINLEVEL_OUTOF10: 2

## 2017-08-03 ASSESSMENT — PAIN DESCRIPTION - FREQUENCY: FREQUENCY: INTERMITTENT

## 2017-08-06 LAB
PROSTATE SPECIFIC ANTIGEN FREE: 0.7 NG/ML
PROSTATE SPECIFIC ANTIGEN PERCENT FREE: 12 %
PROSTATE SPECIFIC ANTIGEN: 5.6 NG/ML (ref 0–4)

## 2017-08-08 ENCOUNTER — HOSPITAL ENCOUNTER (OUTPATIENT)
Dept: WOUND CARE | Age: 57
Discharge: HOME | End: 2017-08-08
Payer: MEDICARE

## 2017-08-08 VITALS
DIASTOLIC BLOOD PRESSURE: 77 MMHG | HEART RATE: 70 BPM | TEMPERATURE: 97.4 F | WEIGHT: 315 LBS | BODY MASS INDEX: 45.1 KG/M2 | HEIGHT: 70 IN | SYSTOLIC BLOOD PRESSURE: 155 MMHG | RESPIRATION RATE: 16 BRPM

## 2017-08-08 DIAGNOSIS — L97.222 NON-PRESSURE CHRONIC ULCER OF LEFT CALF WITH FAT LAYER EXPOSED (HCC): ICD-10-CM

## 2017-08-08 DIAGNOSIS — I89.0 CHRONIC ACQUIRED LYMPHEDEMA: ICD-10-CM

## 2017-08-08 PROCEDURE — 29580 STRAPPING UNNA BOOT: CPT

## 2017-08-08 ASSESSMENT — PAIN SCALES - GENERAL: PAINLEVEL_OUTOF10: 0

## 2017-08-10 ENCOUNTER — HOSPITAL ENCOUNTER (OUTPATIENT)
Dept: WOUND CARE | Age: 57
Discharge: HOME | End: 2017-08-10
Payer: MEDICARE

## 2017-08-10 VITALS
RESPIRATION RATE: 24 BRPM | HEART RATE: 65 BPM | TEMPERATURE: 98.4 F | SYSTOLIC BLOOD PRESSURE: 129 MMHG | DIASTOLIC BLOOD PRESSURE: 73 MMHG | WEIGHT: 315 LBS | BODY MASS INDEX: 45.1 KG/M2 | HEIGHT: 70 IN

## 2017-08-10 DIAGNOSIS — I89.0 CHRONIC ACQUIRED LYMPHEDEMA: ICD-10-CM

## 2017-08-10 DIAGNOSIS — L97.222 NON-PRESSURE CHRONIC ULCER OF LEFT CALF WITH FAT LAYER EXPOSED (HCC): ICD-10-CM

## 2017-08-10 PROCEDURE — 97597 DBRDMT OPN WND 1ST 20 CM/<: CPT | Performed by: SURGERY

## 2017-08-10 PROCEDURE — 97597 DBRDMT OPN WND 1ST 20 CM/<: CPT

## 2017-08-10 ASSESSMENT — PAIN DESCRIPTION - ONSET: ONSET: ON-GOING

## 2017-08-10 ASSESSMENT — PAIN DESCRIPTION - PAIN TYPE: TYPE: ACUTE PAIN

## 2017-08-10 ASSESSMENT — PAIN DESCRIPTION - DESCRIPTORS: DESCRIPTORS: THROBBING

## 2017-08-10 ASSESSMENT — PAIN DESCRIPTION - LOCATION: LOCATION: LEG

## 2017-08-10 ASSESSMENT — PAIN DESCRIPTION - FREQUENCY: FREQUENCY: INTERMITTENT

## 2017-08-10 ASSESSMENT — PAIN DESCRIPTION - PROGRESSION: CLINICAL_PROGRESSION: NOT CHANGED

## 2017-08-10 ASSESSMENT — PAIN DESCRIPTION - ORIENTATION: ORIENTATION: LEFT

## 2017-08-10 ASSESSMENT — PAIN SCALES - GENERAL: PAINLEVEL_OUTOF10: 6

## 2017-08-11 ENCOUNTER — TELEPHONE (OUTPATIENT)
Dept: FAMILY MEDICINE CLINIC | Age: 57
End: 2017-08-11

## 2017-08-11 DIAGNOSIS — E11.9 CONTROLLED TYPE 2 DIABETES MELLITUS WITHOUT COMPLICATION, WITH LONG-TERM CURRENT USE OF INSULIN (HCC): ICD-10-CM

## 2017-08-11 DIAGNOSIS — Z79.4 CONTROLLED TYPE 2 DIABETES MELLITUS WITHOUT COMPLICATION, WITH LONG-TERM CURRENT USE OF INSULIN (HCC): ICD-10-CM

## 2017-08-11 RX ORDER — LANCETS
EACH MISCELLANEOUS
Qty: 100 EACH | Refills: 3 | Status: SHIPPED | OUTPATIENT
Start: 2017-08-11 | End: 2017-08-14 | Stop reason: SDUPTHER

## 2017-08-14 RX ORDER — LANCETS
EACH MISCELLANEOUS
Qty: 100 EACH | Refills: 5 | Status: SHIPPED | OUTPATIENT
Start: 2017-08-14 | End: 2017-08-16

## 2017-08-16 ENCOUNTER — OFFICE VISIT (OUTPATIENT)
Dept: UROLOGY | Age: 57
End: 2017-08-16
Payer: MEDICARE

## 2017-08-16 VITALS
DIASTOLIC BLOOD PRESSURE: 88 MMHG | OXYGEN SATURATION: 92 % | TEMPERATURE: 98.1 F | WEIGHT: 315 LBS | BODY MASS INDEX: 46.65 KG/M2 | SYSTOLIC BLOOD PRESSURE: 138 MMHG | HEIGHT: 69 IN | HEART RATE: 76 BPM

## 2017-08-16 DIAGNOSIS — R97.20 ELEVATED PSA: Primary | ICD-10-CM

## 2017-08-16 LAB
BILIRUBIN, POC: NORMAL
BLOOD URINE, POC: NORMAL
CLARITY, POC: CLEAR
COLOR, POC: YELLOW
GLUCOSE URINE, POC: NORMAL
KETONES, POC: NORMAL
LEUKOCYTE EST, POC: NORMAL
NITRITE, POC: NORMAL
PH, POC: 6
PROTEIN, POC: NORMAL
SPECIFIC GRAVITY, POC: 1.02
UROBILINOGEN, POC: 0.2

## 2017-08-16 PROCEDURE — 81002 URINALYSIS NONAUTO W/O SCOPE: CPT | Performed by: PHYSICIAN ASSISTANT

## 2017-08-16 PROCEDURE — 99213 OFFICE O/P EST LOW 20 MIN: CPT | Performed by: PHYSICIAN ASSISTANT

## 2017-08-16 RX ORDER — CIPROFLOXACIN 500 MG/1
500 TABLET, FILM COATED ORAL 2 TIMES DAILY
Qty: 8 TABLET | Refills: 0 | Status: SHIPPED | OUTPATIENT
Start: 2017-08-16 | End: 2017-08-20

## 2017-08-16 ASSESSMENT — ENCOUNTER SYMPTOMS
NAUSEA: 0
HEARTBURN: 0
WHEEZING: 0
SHORTNESS OF BREATH: 0
EYE REDNESS: 0
EYE DISCHARGE: 0

## 2017-08-17 ENCOUNTER — HOSPITAL ENCOUNTER (OUTPATIENT)
Dept: WOUND CARE | Age: 57
Discharge: HOME | End: 2017-08-17
Payer: MEDICARE

## 2017-08-17 VITALS
WEIGHT: 315 LBS | RESPIRATION RATE: 24 BRPM | DIASTOLIC BLOOD PRESSURE: 69 MMHG | HEART RATE: 66 BPM | TEMPERATURE: 98.9 F | BODY MASS INDEX: 46.65 KG/M2 | SYSTOLIC BLOOD PRESSURE: 131 MMHG | HEIGHT: 69 IN

## 2017-08-17 DIAGNOSIS — L97.222 NON-PRESSURE CHRONIC ULCER OF LEFT CALF WITH FAT LAYER EXPOSED (HCC): ICD-10-CM

## 2017-08-17 DIAGNOSIS — I89.0 CHRONIC ACQUIRED LYMPHEDEMA: ICD-10-CM

## 2017-08-17 PROCEDURE — 97597 DBRDMT OPN WND 1ST 20 CM/<: CPT | Performed by: SURGERY

## 2017-08-17 PROCEDURE — 97597 DBRDMT OPN WND 1ST 20 CM/<: CPT

## 2017-08-17 ASSESSMENT — PAIN DESCRIPTION - PROGRESSION: CLINICAL_PROGRESSION: NOT CHANGED

## 2017-08-17 ASSESSMENT — PAIN SCALES - GENERAL: PAINLEVEL_OUTOF10: 0

## 2017-08-17 ASSESSMENT — PAIN DESCRIPTION - ORIENTATION: ORIENTATION: RIGHT;LEFT

## 2017-08-17 ASSESSMENT — PAIN DESCRIPTION - ONSET: ONSET: ON-GOING

## 2017-08-17 ASSESSMENT — PAIN DESCRIPTION - PAIN TYPE: TYPE: ACUTE PAIN

## 2017-08-17 ASSESSMENT — PAIN DESCRIPTION - LOCATION: LOCATION: LEG

## 2017-08-17 ASSESSMENT — PAIN DESCRIPTION - FREQUENCY: FREQUENCY: INTERMITTENT

## 2017-09-07 ENCOUNTER — HOSPITAL ENCOUNTER (OUTPATIENT)
Dept: WOUND CARE | Age: 57
Discharge: HOME | End: 2017-09-07
Payer: MEDICARE

## 2017-09-07 VITALS
WEIGHT: 315 LBS | DIASTOLIC BLOOD PRESSURE: 73 MMHG | HEIGHT: 69 IN | RESPIRATION RATE: 22 BRPM | HEART RATE: 78 BPM | SYSTOLIC BLOOD PRESSURE: 132 MMHG | BODY MASS INDEX: 46.65 KG/M2 | TEMPERATURE: 97.5 F

## 2017-09-07 DIAGNOSIS — L97.222 NON-PRESSURE CHRONIC ULCER OF LEFT CALF WITH FAT LAYER EXPOSED (HCC): ICD-10-CM

## 2017-09-07 DIAGNOSIS — I89.0 CHRONIC ACQUIRED LYMPHEDEMA: ICD-10-CM

## 2017-09-07 DIAGNOSIS — E66.01 MORBID OBESITY, UNSPECIFIED OBESITY TYPE (HCC): Primary | ICD-10-CM

## 2017-09-07 PROCEDURE — 97597 DBRDMT OPN WND 1ST 20 CM/<: CPT

## 2017-09-07 PROCEDURE — 97597 DBRDMT OPN WND 1ST 20 CM/<: CPT | Performed by: SURGERY

## 2017-09-07 RX ORDER — MINOCYCLINE HYDROCHLORIDE 100 MG/1
100 CAPSULE ORAL 2 TIMES DAILY
Qty: 20 CAPSULE | Refills: 1 | Status: SHIPPED | OUTPATIENT
Start: 2017-09-07 | End: 2017-09-17

## 2017-09-07 ASSESSMENT — PAIN DESCRIPTION - ONSET: ONSET: ON-GOING

## 2017-09-07 ASSESSMENT — PAIN DESCRIPTION - FREQUENCY: FREQUENCY: INTERMITTENT

## 2017-09-07 ASSESSMENT — PAIN DESCRIPTION - ORIENTATION: ORIENTATION: LEFT

## 2017-09-07 ASSESSMENT — PAIN DESCRIPTION - LOCATION: LOCATION: LEG

## 2017-09-07 ASSESSMENT — PAIN DESCRIPTION - PROGRESSION: CLINICAL_PROGRESSION: NOT CHANGED

## 2017-09-07 ASSESSMENT — PAIN DESCRIPTION - PAIN TYPE: TYPE: ACUTE PAIN

## 2017-09-07 ASSESSMENT — PAIN SCALES - GENERAL: PAINLEVEL_OUTOF10: 5

## 2017-09-08 ENCOUNTER — CARE COORDINATION (OUTPATIENT)
Dept: CARE COORDINATION | Age: 57
End: 2017-09-08

## 2017-09-11 ENCOUNTER — TELEPHONE (OUTPATIENT)
Dept: UROLOGY | Age: 57
End: 2017-09-11

## 2017-09-11 NOTE — TELEPHONE ENCOUNTER
Called patient since he no showed his biopsy and he has something else going on and other test that he wants to have done first and  doesn't want the biopsy done yet and does not want to reschedule at this time.

## 2017-09-13 ENCOUNTER — HOSPITAL ENCOUNTER (OUTPATIENT)
Dept: WOUND CARE | Age: 57
Discharge: HOME | End: 2017-09-13
Payer: MEDICARE

## 2017-10-18 ENCOUNTER — HOSPITAL ENCOUNTER (OUTPATIENT)
Dept: WOUND CARE | Age: 57
Discharge: HOME | End: 2017-10-18
Payer: MEDICARE

## 2017-10-20 ENCOUNTER — CARE COORDINATION (OUTPATIENT)
Dept: CARE COORDINATION | Age: 57
End: 2017-10-20

## 2017-10-20 ENCOUNTER — TELEPHONE (OUTPATIENT)
Dept: FAMILY MEDICINE CLINIC | Age: 57
End: 2017-10-20

## 2017-10-20 NOTE — CARE COORDINATION
[facility] received message from LUC Potter that pt was evaluated by home health nurse today. Pt has had URI with fever. He missed his appt on Wed with PCP also. PCP recommended pt come in for evaluation. [facility] contacted pt's sister, Jose J Negron. Notified her of pt's current illness and asked her if she and other sister available to bring pt to appt as his vehicle is not running currently. Jose J Negron agreed they can transport pt. [facility] contacted Mickey Ramachandran - he reports he took Tylenol as instructed by [address] [facility] nurse and his temp is now 97. Pt was able to talk without shortness of air and denied having any SOA. He stated the homecare nurse checked his oxygen and it was 93% today. He denied productive cough and stated he is drinking a lot of water. Also reported his leg bandages are being changed by home nurse several times per week. [facility] instructed pt that he needs to see his PCP for follow up and that his sisters will bring him to appt. He verbalized understanding and agreed to make appt. Future Appointments  Date Time Provider Alon Sharpe   10/25/2017 2:00 PM Sharon Chery, [address] [facility]     [facility] instructed pt to continue his Tylenol as directed and to also take Mucinex, which he has at home. Pt voiced understanding. [facility] called [facility] - requested weekend nurse to make home visit to patient for evaluation secondary to his URI and fever today. Reception voiced understanding, stated they will put Mickey Ramachandran on Sunday visit schedule. [facility] notified Mickey Ramachandran of [address] [facility] [facility] [address] [facility] [address] visit planned for Sunday. Also notified his sister, Hannah Gutierrez that he will have home visit on Sunday and informed her of pt's doctor appt on Wednesday. She stated they will get Mickey Ramachandran to his appt. [facility] notified pt's PCP face to face of all above information. She voiced understanding.

## 2017-10-20 NOTE — TELEPHONE ENCOUNTER
Thu with [facility] called. She went out to see patient today, states he has severe URI, has been running 101-102 fever, diminished lung sounds in lower lobes, and SOA. She is requesting antibiotic for patient.   (He missed appt here Wednesday, and it looks like he had cancelled several appts with wound care.)

## 2017-10-20 NOTE — TELEPHONE ENCOUNTER
With fever and reported SOA, I feel pt needs to be seen asap. Urgent care is an option for today as no one here has any appts available.

## 2017-10-23 ENCOUNTER — CARE COORDINATION (OUTPATIENT)
Dept: CARE COORDINATION | Age: 57
End: 2017-10-23

## 2017-10-25 ENCOUNTER — OFFICE VISIT (OUTPATIENT)
Dept: FAMILY MEDICINE CLINIC | Age: 57
End: 2017-10-25
Payer: MEDICARE

## 2017-10-25 VITALS
RESPIRATION RATE: 20 BRPM | TEMPERATURE: 97.9 F | OXYGEN SATURATION: 96 % | DIASTOLIC BLOOD PRESSURE: 84 MMHG | SYSTOLIC BLOOD PRESSURE: 122 MMHG | HEART RATE: 54 BPM

## 2017-10-25 DIAGNOSIS — Z74.1 ASSISTANCE NEEDED FOR MOBILITY: Primary | ICD-10-CM

## 2017-10-25 DIAGNOSIS — R60.0 BILATERAL LOWER EXTREMITY EDEMA: ICD-10-CM

## 2017-10-25 DIAGNOSIS — E11.9 TYPE 2 DIABETES MELLITUS TREATED WITH INSULIN (HCC): ICD-10-CM

## 2017-10-25 DIAGNOSIS — Z78.9 POOR TOLERANCE FOR AMBULATION: ICD-10-CM

## 2017-10-25 DIAGNOSIS — E11.9 TYPE 2 DIABETES MELLITUS WITHOUT COMPLICATION, WITH LONG-TERM CURRENT USE OF INSULIN (HCC): ICD-10-CM

## 2017-10-25 DIAGNOSIS — Z79.4 TYPE 2 DIABETES MELLITUS WITHOUT COMPLICATION, WITH LONG-TERM CURRENT USE OF INSULIN (HCC): ICD-10-CM

## 2017-10-25 DIAGNOSIS — Z79.4 TYPE 2 DIABETES MELLITUS TREATED WITH INSULIN (HCC): ICD-10-CM

## 2017-10-25 DIAGNOSIS — Z23 NEED FOR INFLUENZA VACCINATION: ICD-10-CM

## 2017-10-25 DIAGNOSIS — E66.01 MORBID OBESITY (HCC): ICD-10-CM

## 2017-10-25 DIAGNOSIS — R50.9 FEVER, UNSPECIFIED FEVER CAUSE: ICD-10-CM

## 2017-10-25 DIAGNOSIS — I10 ESSENTIAL HYPERTENSION: ICD-10-CM

## 2017-10-25 DIAGNOSIS — K21.9 GASTROESOPHAGEAL REFLUX DISEASE WITHOUT ESOPHAGITIS: ICD-10-CM

## 2017-10-25 DIAGNOSIS — E87.6 HYPOKALEMIA: ICD-10-CM

## 2017-10-25 DIAGNOSIS — E79.0 ELEVATED URIC ACID IN BLOOD: ICD-10-CM

## 2017-10-25 DIAGNOSIS — Z23 NEED FOR PNEUMOCOCCAL VACCINATION: ICD-10-CM

## 2017-10-25 LAB
ALBUMIN SERPL-MCNC: 3.4 G/DL (ref 3.5–5.2)
ALP BLD-CCNC: 81 U/L (ref 40–130)
ALT SERPL-CCNC: 8 U/L (ref 5–41)
ANION GAP SERPL CALCULATED.3IONS-SCNC: 12 MMOL/L (ref 7–19)
AST SERPL-CCNC: 13 U/L (ref 5–40)
BACTERIA: ABNORMAL /HPF
BASOPHILS ABSOLUTE: 0.1 K/UL (ref 0–0.2)
BASOPHILS RELATIVE PERCENT: 0.6 % (ref 0–1)
BILIRUB SERPL-MCNC: 0.5 MG/DL (ref 0.2–1.2)
BILIRUBIN URINE: NEGATIVE
BLOOD, URINE: NEGATIVE
BUN BLDV-MCNC: 25 MG/DL (ref 6–20)
CALCIUM SERPL-MCNC: 9.5 MG/DL (ref 8.6–10)
CHLORIDE BLD-SCNC: 100 MMOL/L (ref 98–111)
CHOLESTEROL, TOTAL: 128 MG/DL (ref 160–199)
CLARITY: CLEAR
CO2: 31 MMOL/L (ref 22–29)
COLOR: ABNORMAL
CREAT SERPL-MCNC: 1.1 MG/DL (ref 0.5–1.2)
EOSINOPHILS ABSOLUTE: 0.4 K/UL (ref 0–0.6)
EOSINOPHILS RELATIVE PERCENT: 3.7 % (ref 0–5)
EPITHELIAL CELLS, UA: 0 /HPF (ref 0–5)
GFR NON-AFRICAN AMERICAN: >60
GLUCOSE BLD-MCNC: 107 MG/DL (ref 74–109)
GLUCOSE URINE: NEGATIVE MG/DL
HBA1C MFR BLD: 8.3 %
HCT VFR BLD CALC: 45.5 % (ref 42–52)
HDLC SERPL-MCNC: 34 MG/DL (ref 55–121)
HEMOGLOBIN: 13.9 G/DL (ref 14–18)
HYALINE CASTS: 0 /HPF (ref 0–8)
KETONES, URINE: NEGATIVE MG/DL
LDL CHOLESTEROL CALCULATED: 82 MG/DL
LEUKOCYTE ESTERASE, URINE: ABNORMAL
LYMPHOCYTES ABSOLUTE: 1.3 K/UL (ref 1.1–4.5)
LYMPHOCYTES RELATIVE PERCENT: 11.5 % (ref 20–40)
MCH RBC QN AUTO: 28.7 PG (ref 27–31)
MCHC RBC AUTO-ENTMCNC: 30.5 G/DL (ref 33–37)
MCV RBC AUTO: 93.8 FL (ref 80–94)
MONOCYTES ABSOLUTE: 0.7 K/UL (ref 0–0.9)
MONOCYTES RELATIVE PERCENT: 6.3 % (ref 0–10)
NEUTROPHILS ABSOLUTE: 8.7 K/UL (ref 1.5–7.5)
NEUTROPHILS RELATIVE PERCENT: 76.7 % (ref 50–65)
NITRITE, URINE: NEGATIVE
PDW BLD-RTO: 15.1 % (ref 11.5–14.5)
PH UA: 6
PLATELET # BLD: 216 K/UL (ref 130–400)
PMV BLD AUTO: 11.6 FL (ref 9.4–12.4)
POTASSIUM SERPL-SCNC: 4.8 MMOL/L (ref 3.5–5)
PROTEIN UA: NEGATIVE MG/DL
RBC # BLD: 4.85 M/UL (ref 4.7–6.1)
RBC UA: 1 /HPF (ref 0–4)
S PYO AG THROAT QL: NEGATIVE
SODIUM BLD-SCNC: 143 MMOL/L (ref 136–145)
SPECIFIC GRAVITY UA: 1.01
TOTAL PROTEIN: 6.9 G/DL (ref 6.6–8.7)
TRIGL SERPL-MCNC: 61 MG/DL (ref 0–149)
TSH SERPL DL<=0.05 MIU/L-ACNC: 3.05 UIU/ML (ref 0.27–4.2)
URINE TYPE: ABNORMAL
UROBILINOGEN, URINE: 0.2 E.U./DL
WBC # BLD: 11.4 K/UL (ref 4.8–10.8)
WBC UA: 45 /HPF (ref 0–5)

## 2017-10-25 PROCEDURE — 3045F PR MOST RECENT HEMOGLOBIN A1C LEVEL 7.0-9.0%: CPT | Performed by: NURSE PRACTITIONER

## 2017-10-25 PROCEDURE — 99214 OFFICE O/P EST MOD 30 MIN: CPT | Performed by: NURSE PRACTITIONER

## 2017-10-25 PROCEDURE — 90688 IIV4 VACCINE SPLT 0.5 ML IM: CPT | Performed by: NURSE PRACTITIONER

## 2017-10-25 PROCEDURE — 1036F TOBACCO NON-USER: CPT | Performed by: NURSE PRACTITIONER

## 2017-10-25 PROCEDURE — G8484 FLU IMMUNIZE NO ADMIN: HCPCS | Performed by: NURSE PRACTITIONER

## 2017-10-25 PROCEDURE — 90732 PPSV23 VACC 2 YRS+ SUBQ/IM: CPT | Performed by: NURSE PRACTITIONER

## 2017-10-25 PROCEDURE — 3017F COLORECTAL CA SCREEN DOC REV: CPT | Performed by: NURSE PRACTITIONER

## 2017-10-25 PROCEDURE — G8427 DOCREV CUR MEDS BY ELIG CLIN: HCPCS | Performed by: NURSE PRACTITIONER

## 2017-10-25 PROCEDURE — G0009 ADMIN PNEUMOCOCCAL VACCINE: HCPCS | Performed by: NURSE PRACTITIONER

## 2017-10-25 PROCEDURE — G0008 ADMIN INFLUENZA VIRUS VAC: HCPCS | Performed by: NURSE PRACTITIONER

## 2017-10-25 PROCEDURE — G8417 CALC BMI ABV UP PARAM F/U: HCPCS | Performed by: NURSE PRACTITIONER

## 2017-10-25 ASSESSMENT — ENCOUNTER SYMPTOMS
SHORTNESS OF BREATH: 0
SINUS PAIN: 0
SORE THROAT: 0
TROUBLE SWALLOWING: 0

## 2017-10-25 NOTE — PROGRESS NOTES
Subjective:      Patient ID: Willie Najera is a 59 y.o. male. Chief Complaint   Patient presents with    Fever     x2 days - patient reports having fever last week and home health nurse wanted him to see his pcp    Other     pt needs new power chair       HPI      Pt is here today as his home health nurse has recommended. Pt was running elevated temp at home and states he is now feeling better. Patient denies any coughing. Patient states that today he is feeling better. Patient states that 3 days ago he was having fever. At that time home health did recommend that he have office visit for evaluation. Since that time patient reports symptoms are improving. Pt is seeing urology. He is supposed to be going for bx of prostate. Pt is concerned that he should have further blood testing instead of biopsy. I have reassured pt that protocol is being followed. Patient also reports that he is here today to get a new power chair. We did receive information from his power chair company stating that an exam needs to be done regarding his mobility. Patient is primarily using his motorized scooter within his home. Patient states that he has been walking less and less within the home. He does contribute this to recent illness and leg ulceration. Review of Systems   Constitutional: Negative for fever. HENT: Positive for ear discharge (occasional draining). Negative for sinus pain, sore throat and trouble swallowing. Respiratory: Negative for shortness of breath. Cardiovascular: Positive for leg swelling. Genitourinary: Negative for difficulty urinating.      Past Medical History:   Diagnosis Date    Abrasion     Anxiety     Asthma     Bell's palsy     Cataracts, bilateral     Cellulitis     Diabetes ([address]     Edema extremities     GERD (gastroesophageal reflux disease)     Gout     H/O tracheostomy     2015    H/O urinary retention     Hernia, hiatal     History of panniculitis     History of shingles     Hyperlipemia     Hypertension     [facility]     Non-ST elevated myocardial infarction ([facility]     11/2015    Obesity     Paraphimosis     tx w circumcision 2015    Pulmonary hypertension     Renal failure     Respiratory failure ([facility]     2015    Shoulder pain     Sleep apnea     Testosterone deficiency     Ulcer (HCC)     Ulcer of calf (HCC)     Venous stasis of both lower extremities      Current Outpatient Prescriptions on File Prior to Visit   Medication Sig Dispense Refill    insulin NPH (NOVOLIN N RELION) 100 UNIT/ML injection vial Continue with current dose of 30units in am and 15 units in evening 3 vial 1    Multiple Vitamins-Minerals (SENIOR MULTIVITAMIN PLUS PO) Take 1 tablet by mouth daily      Cholecalciferol (VITAMIN D3) 5000 units CAPS Take 1 capsule by mouth daily      acetaminophen (TYLENOL) 500 MG tablet Take 1,000 mg by mouth every 6 hours as needed for Pain      ergocalciferol (DRISDOL) 73997 units capsule Take 1 capsule by mouth once a week 4 capsule 2    NOVOLIN N RELION 100 UNIT/ML injection vial INJECT 30 UNITS SUB-Q IN THE MORNING 1 vial 2    NOVOLIN R RELION 100 UNIT/ML injection USE AS DIRECTED WITH MEALS (Patient taking differently: USE AS DIRECTED WITH MEALS, BS-100/20=DOSE) 1 vial 2    insulin regular (NOVOLIN R RELION) 100 UNIT/ML injection USE AS DIRECTED WITH MEALS - 30 units at supper time 1 vial 2    azelastine (ASTELIN) 0.1 % nasal spray 2 sprays by Nasal route 2 times daily Use in each nostril as directed 3 Bottle 1    fluticasone (FLONASE) 50 MCG/ACT nasal spray 1 spray by Nasal route daily 3 Bottle 1    Accu-Chek Softclix Lancets MISC For qid testing 100 each 3    OXYGEN Inhale 2 L into the lungs nightly      BiPAP Machine MISC by Does not apply route Currently not using per instructions of Dr. Wale Ivory, [place].       diphenhydrAMINE (BENADRYL) 25 MG capsule Take 1 capsule by mouth nightly as needed for Itching (Patient taking differently: Take 25 mg by mouth every 8 hours ) 30 capsule 3    albuterol sulfate HFA (VENTOLIN HFA) 108 (90 BASE) MCG/ACT inhaler Inhale 2 puffs into the lungs every 6 hours as needed for Wheezing 1 Inhaler 3    albuterol (PROVENTIL) (2.5 MG/3ML) 0.083% nebulizer solution Take 3 mLs by nebulization every 6 hours as needed for Wheezing 120 each 3    Insulin Syringe-Needle U-100 (EASY TOUCH INSULIN SYRINGE) 31G X 5/16\" 1 ML MISC Inject 1 each as directed 4 times daily as needed 100 each 3    aspirin EC 81 MG EC tablet Take 1 tablet by mouth daily 30 tablet 3    vitamin B-12 (CYANOCOBALAMIN) 500 MCG tablet Take 1 tablet by mouth daily (Patient taking differently: Take 5,000 mcg by mouth 2 times daily Pt states he takes 2 tabs twice daily) 30 tablet [address] by Does not apply route Bariatric for home 1 each 0    Misc. Devices (WALKER WHEELS) MISC 1 each by Does not apply route once for 1 dose. HEAVY DUTY walker wheels x2 1 each 0     No current facility-administered medications on file prior to visit. Allergies   Allergen Reactions    Penicillamine Rash    Silvadene [Silver Sulfadiazine] Rash and Other (See Comments)     burning    Aerohist [Chlorpheniramine-Methscop Er]     Cephalosporins Other (See Comments)     Per d/c summary of 12/17/15, pt possibly had cross-reactivity to a cephalosporin during his hospitalization    Neosporin [Neomycin-Polymyxin-Gramicidin]     Penicillins        Objective:   Physical Exam   Constitutional: He is oriented to person, place, and time. He appears well-developed and well-nourished. No distress. Morbid obesity   HENT:   Head: Normocephalic and atraumatic. Right Ear: External ear normal.   Left Ear: External ear normal.   Nose: Nose normal.   Mouth/Throat: Uvula is midline, oropharynx is clear and moist and mucous membranes are normal. No oropharyngeal exudate.    Eyes: Conjunctivae and EOM are normal. Pupils are equal,

## 2017-10-26 RX ORDER — NEBIVOLOL 10 MG/1
10 TABLET ORAL DAILY
Qty: 90 TABLET | Refills: 1 | Status: SHIPPED | OUTPATIENT
Start: 2017-10-26 | End: 2017-11-02 | Stop reason: SDUPTHER

## 2017-10-26 RX ORDER — OMEPRAZOLE 20 MG/1
20 CAPSULE, DELAYED RELEASE ORAL DAILY
Qty: 90 CAPSULE | Refills: 1 | Status: SHIPPED | OUTPATIENT
Start: 2017-10-26 | End: 2018-03-29 | Stop reason: SDUPTHER

## 2017-10-26 RX ORDER — POTASSIUM CHLORIDE 20 MEQ/1
TABLET, EXTENDED RELEASE ORAL
Qty: 90 TABLET | Refills: 1 | Status: SHIPPED | OUTPATIENT
Start: 2017-10-26 | End: 2017-11-02 | Stop reason: SDUPTHER

## 2017-10-26 RX ORDER — BUMETANIDE 1 MG/1
TABLET ORAL
Qty: 180 TABLET | Refills: 1 | Status: SHIPPED | OUTPATIENT
Start: 2017-10-26 | End: 2018-03-29 | Stop reason: SDUPTHER

## 2017-10-26 RX ORDER — ALLOPURINOL 300 MG/1
300 TABLET ORAL DAILY
Qty: 90 TABLET | Refills: 1 | Status: SHIPPED | OUTPATIENT
Start: 2017-10-26 | End: 2018-03-29 | Stop reason: SDUPTHER

## 2017-10-26 RX ORDER — LOSARTAN POTASSIUM 50 MG/1
50 TABLET ORAL DAILY
Qty: 90 TABLET | Refills: 1 | Status: SHIPPED | OUTPATIENT
Start: 2017-10-26 | End: 2018-03-29 | Stop reason: SDUPTHER

## 2017-10-26 RX ORDER — PRAVASTATIN SODIUM 20 MG
TABLET ORAL
Qty: 90 TABLET | Refills: 1 | Status: SHIPPED | OUTPATIENT
Start: 2017-10-26 | End: 2018-03-29 | Stop reason: SDUPTHER

## 2017-10-27 LAB
ORGANISM: ABNORMAL
S PYO THROAT QL CULT: NORMAL
URINE CULTURE, ROUTINE: ABNORMAL
URINE CULTURE, ROUTINE: ABNORMAL

## 2017-11-01 DIAGNOSIS — I10 ESSENTIAL HYPERTENSION: ICD-10-CM

## 2017-11-01 DIAGNOSIS — R60.0 BILATERAL LOWER EXTREMITY EDEMA: ICD-10-CM

## 2017-11-01 DIAGNOSIS — E87.6 HYPOKALEMIA: ICD-10-CM

## 2017-11-02 DIAGNOSIS — I10 ESSENTIAL HYPERTENSION: ICD-10-CM

## 2017-11-02 DIAGNOSIS — R60.0 BILATERAL LOWER EXTREMITY EDEMA: ICD-10-CM

## 2017-11-02 DIAGNOSIS — E11.9 TYPE 2 DIABETES MELLITUS WITHOUT COMPLICATION, WITH LONG-TERM CURRENT USE OF INSULIN (HCC): ICD-10-CM

## 2017-11-02 DIAGNOSIS — Z79.4 TYPE 2 DIABETES MELLITUS WITHOUT COMPLICATION, WITH LONG-TERM CURRENT USE OF INSULIN (HCC): ICD-10-CM

## 2017-11-02 DIAGNOSIS — E87.6 HYPOKALEMIA: ICD-10-CM

## 2017-11-02 RX ORDER — HUMAN INSULIN 100 [IU]/ML
INJECTION, SOLUTION SUBCUTANEOUS
Qty: 1 VIAL | Refills: 2 | Status: SHIPPED | OUTPATIENT
Start: 2017-11-02 | End: 2017-11-06 | Stop reason: SDUPTHER

## 2017-11-02 RX ORDER — NEBIVOLOL 10 MG/1
10 TABLET ORAL DAILY
Qty: 90 TABLET | Refills: 1 | Status: SHIPPED | OUTPATIENT
Start: 2017-11-02 | End: 2018-04-16 | Stop reason: SDUPTHER

## 2017-11-02 RX ORDER — POTASSIUM CHLORIDE 20 MEQ/1
TABLET, EXTENDED RELEASE ORAL
Qty: 90 TABLET | Refills: 1 | Status: SHIPPED | OUTPATIENT
Start: 2017-11-02 | End: 2018-04-16 | Stop reason: SDUPTHER

## 2017-11-03 RX ORDER — POTASSIUM CHLORIDE 20 MEQ/1
TABLET, EXTENDED RELEASE ORAL
Qty: 90 TABLET | Refills: 1 | Status: SHIPPED | OUTPATIENT
Start: 2017-11-03 | End: 2018-03-29 | Stop reason: SDUPTHER

## 2017-11-03 RX ORDER — NEBIVOLOL HYDROCHLORIDE 10 MG/1
TABLET ORAL
Qty: 90 TABLET | Refills: 1 | Status: SHIPPED | OUTPATIENT
Start: 2017-11-03 | End: 2018-07-19 | Stop reason: SDUPTHER

## 2017-11-04 RX ORDER — AZTREONAM 1 G/1
500 INJECTION, POWDER, LYOPHILIZED, FOR SOLUTION INTRAMUSCULAR; INTRAVENOUS EVERY 12 HOURS
Qty: 14 G | Refills: 0 | Status: SHIPPED | OUTPATIENT
Start: 2017-11-04 | End: 2017-12-06 | Stop reason: ALTCHOICE

## 2017-11-06 ENCOUNTER — TELEPHONE (OUTPATIENT)
Dept: FAMILY MEDICINE CLINIC | Age: 57
End: 2017-11-06

## 2017-11-06 DIAGNOSIS — Z79.4 TYPE 2 DIABETES MELLITUS WITHOUT COMPLICATION, WITH LONG-TERM CURRENT USE OF INSULIN (HCC): ICD-10-CM

## 2017-11-06 DIAGNOSIS — E11.9 TYPE 2 DIABETES MELLITUS WITHOUT COMPLICATION, WITH LONG-TERM CURRENT USE OF INSULIN (HCC): ICD-10-CM

## 2017-11-06 LAB
BACTERIA: NEGATIVE /HPF
BILIRUBIN URINE: NEGATIVE
BLOOD, URINE: NEGATIVE
CLARITY: CLEAR
COLOR: YELLOW
EPITHELIAL CELLS, UA: 1 /HPF (ref 0–5)
GLUCOSE URINE: NEGATIVE MG/DL
HYALINE CASTS: 0 /HPF (ref 0–8)
KETONES, URINE: NEGATIVE MG/DL
LEUKOCYTE ESTERASE, URINE: ABNORMAL
NITRITE, URINE: NEGATIVE
PH UA: 5.5
PROTEIN UA: NEGATIVE MG/DL
RBC UA: 0 /HPF (ref 0–4)
SPECIFIC GRAVITY UA: 1.02
UROBILINOGEN, URINE: 0.2 E.U./DL
WBC UA: 6 /HPF (ref 0–5)

## 2017-11-08 ENCOUNTER — TELEPHONE (OUTPATIENT)
Dept: FAMILY MEDICINE CLINIC | Age: 57
End: 2017-11-08

## 2017-11-08 LAB
ORGANISM: ABNORMAL
URINE CULTURE, ROUTINE: ABNORMAL
URINE CULTURE, ROUTINE: ABNORMAL

## 2017-11-09 ENCOUNTER — TELEPHONE (OUTPATIENT)
Dept: FAMILY MEDICINE CLINIC | Age: 57
End: 2017-11-09

## 2017-11-09 NOTE — TELEPHONE ENCOUNTER
[facility] called, patient's copay on Azactam is $134.44. I called patient to verify that he was willing to pay this. He said that he was. They are going to deliver it this afternoon. I have notified his sister, she is going to bring him in tomorrow for first injection, as home health will not administer the first injection in patient's home because he has never had this medication before. I have spoken with Gerry Puri at [facility] regarding further orders for subsequent injections. She expressed understanding.

## 2017-11-09 NOTE — TELEPHONE ENCOUNTER
Left very detailed voice mail for patient, explaining that he needed treatment according to prior c & s, and that sister was willing to provide transportation to the office for first injection, even though he had been adamant that he did not have a way to get here. I also explained that the prescription had been sent to [facility] for home care and once they got it to clear his insurance it would be delivered to him, and they were to call us to set up 1st injection here in the office. He is to call with any questions.

## 2017-11-10 ENCOUNTER — NURSE ONLY (OUTPATIENT)
Dept: FAMILY MEDICINE CLINIC | Age: 57
End: 2017-11-10
Payer: MEDICARE

## 2017-11-10 ENCOUNTER — TELEPHONE (OUTPATIENT)
Dept: FAMILY MEDICINE CLINIC | Age: 57
End: 2017-11-10

## 2017-11-10 VITALS
RESPIRATION RATE: 16 BRPM | SYSTOLIC BLOOD PRESSURE: 134 MMHG | HEART RATE: 80 BPM | OXYGEN SATURATION: 98 % | DIASTOLIC BLOOD PRESSURE: 82 MMHG

## 2017-11-10 DIAGNOSIS — N39.0 URINARY TRACT INFECTION WITHOUT HEMATURIA, SITE UNSPECIFIED: Primary | ICD-10-CM

## 2017-11-10 PROCEDURE — 96372 THER/PROPH/DIAG INJ SC/IM: CPT | Performed by: FAMILY MEDICINE

## 2017-11-10 NOTE — PROGRESS NOTES
Patient given first injection at 2:03 pm in right upper quad gluteus. Rx #[id number] (patient provided medication). I have gone thru the box of supplies that patient received and showed everything to him. Explained that medication needs to be refrigerated. Patient verbalized understanding. Patient given water bottle at his request.    Checked on patient at 2:12 pm and patient reports feeling \"drowsy\". I went to get Ramsey Core and then started vitals. Ash Armando [id number] notified. Cem Barbosa also in room. Patient reported to Ash Armando 81Veronique and I that he took insulin before coming but had not eaten anything. Radha Core checked patient's sugar (182). Patient given pb crackers. 2:26 pm Dayton PERKINS in room and asking patient how he feels. Patient states \"I feel fine but a little loopy like I've had a couple of drinks\". Patient tells Cem Barbosa that on scale of 1-10 his dizziness is 2. Cem Barbosa discussing with patient how process will work with home health. Patient reports to Cem Barbosa and myself that he usually takes Benadryl before getting injections due to his allergies however he slept til almost time to be here and forgot to take it. Patient encouraged to take Benadryl prior to home health coming for injection tomorrow. Patient made aware that if he has any soa, sob, cp he is to go to the er or call 911. Cem Barbosa will call report to [facility]. Patient's sister, Rachel Chavez, is aware of above. 2:43 patient reports improvement on sx and ready to go home. Patient left with sister.

## 2017-11-10 NOTE — TELEPHONE ENCOUNTER
Patient came in today for administration of first azactam injection in office. He did complain of mild dizziness, but had just woken before coming into office and had taken insulin and not eaten anything. Ortega Alvarez checked BG and it was 182. Patient stated on a scale of 0-10 (with 10 being worse dizziness) he was only at a 2. I cautioned patient to make sure he uses walker when out of his scooter for trips to restroom, and I have also reported this to [facility]. They are to stay with patient 30-45 minutes post injection, and will start them at 8 am tomorrow morning - patient would not come in early enough today for them to administer another injection tonight. He stated he usually takes benadryl prior to taking any antibiotic, and he will do this for future injections.

## 2017-11-10 NOTE — TELEPHONE ENCOUNTER
Patient called at 4 pm, 2 hours post injection. States he is doing fine, no dizziness, able to ambulate with walker.

## 2017-11-16 ENCOUNTER — TELEPHONE (OUTPATIENT)
Dept: FAMILY MEDICINE CLINIC | Age: 57
End: 2017-11-16

## 2017-11-16 NOTE — TELEPHONE ENCOUNTER
Pt needs to f/u asap or I will order stool studies if diarrhea persists. I also need a repeat clean catch ua within a couple days of finishing abx.   (home health should be able to assist and provide cleaning wipes for use prior to obtaining sample)

## 2017-11-20 ENCOUNTER — PRE-PROCEDURE TELEPHONE (OUTPATIENT)
Dept: WOUND CARE | Age: 57
End: 2017-11-20

## 2017-11-20 DIAGNOSIS — I89.0 CHRONIC ACQUIRED LYMPHEDEMA: ICD-10-CM

## 2017-11-20 DIAGNOSIS — L97.222 NON-PRESSURE CHRONIC ULCER OF LEFT CALF WITH FAT LAYER EXPOSED (HCC): ICD-10-CM

## 2017-11-24 LAB
BACTERIA: NEGATIVE /HPF
BILIRUBIN URINE: NEGATIVE
BLOOD, URINE: NEGATIVE
CLARITY: CLEAR
COLOR: YELLOW
EPITHELIAL CELLS, UA: 1 /HPF (ref 0–5)
GLUCOSE URINE: NEGATIVE MG/DL
HYALINE CASTS: 1 /HPF (ref 0–8)
KETONES, URINE: NEGATIVE MG/DL
LEUKOCYTE ESTERASE, URINE: ABNORMAL
NITRITE, URINE: NEGATIVE
PH UA: 6
PROTEIN UA: NEGATIVE MG/DL
RBC UA: 1 /HPF (ref 0–4)
SPECIFIC GRAVITY UA: 1.02
UROBILINOGEN, URINE: 0.2 E.U./DL
WBC UA: 5 /HPF (ref 0–5)

## 2017-11-26 LAB — URINE CULTURE, ROUTINE: NORMAL

## 2017-12-06 ENCOUNTER — OFFICE VISIT (OUTPATIENT)
Dept: FAMILY MEDICINE CLINIC | Age: 57
End: 2017-12-06
Payer: MEDICARE

## 2017-12-06 VITALS
OXYGEN SATURATION: 94 % | TEMPERATURE: 97.9 F | HEART RATE: 62 BPM | SYSTOLIC BLOOD PRESSURE: 120 MMHG | DIASTOLIC BLOOD PRESSURE: 64 MMHG | RESPIRATION RATE: 16 BRPM

## 2017-12-06 DIAGNOSIS — E11.9 CONTROLLED TYPE 2 DIABETES MELLITUS WITHOUT COMPLICATION, WITH LONG-TERM CURRENT USE OF INSULIN (HCC): ICD-10-CM

## 2017-12-06 DIAGNOSIS — Z12.5 ENCOUNTER FOR SCREENING FOR MALIGNANT NEOPLASM OF PROSTATE: ICD-10-CM

## 2017-12-06 DIAGNOSIS — Z79.4 CONTROLLED TYPE 2 DIABETES MELLITUS WITHOUT COMPLICATION, WITH LONG-TERM CURRENT USE OF INSULIN (HCC): ICD-10-CM

## 2017-12-06 DIAGNOSIS — L89.90: Primary | ICD-10-CM

## 2017-12-06 DIAGNOSIS — R97.20 ELEVATED PSA: ICD-10-CM

## 2017-12-06 LAB
ANION GAP SERPL CALCULATED.3IONS-SCNC: 11 MMOL/L (ref 7–19)
BUN BLDV-MCNC: 33 MG/DL (ref 6–20)
CALCIUM SERPL-MCNC: 9.1 MG/DL (ref 8.6–10)
CHLORIDE BLD-SCNC: 98 MMOL/L (ref 98–111)
CO2: 31 MMOL/L (ref 22–29)
CREAT SERPL-MCNC: 1 MG/DL (ref 0.5–1.2)
GFR NON-AFRICAN AMERICAN: >60
GLUCOSE BLD-MCNC: 238 MG/DL (ref 74–109)
POTASSIUM SERPL-SCNC: 4.5 MMOL/L (ref 3.5–5)
PROSTATE SPECIFIC ANTIGEN: 4.98 NG/ML (ref 0–4)
SODIUM BLD-SCNC: 140 MMOL/L (ref 136–145)

## 2017-12-06 PROCEDURE — G8484 FLU IMMUNIZE NO ADMIN: HCPCS | Performed by: NURSE PRACTITIONER

## 2017-12-06 PROCEDURE — 3045F PR MOST RECENT HEMOGLOBIN A1C LEVEL 7.0-9.0%: CPT | Performed by: NURSE PRACTITIONER

## 2017-12-06 PROCEDURE — 1036F TOBACCO NON-USER: CPT | Performed by: NURSE PRACTITIONER

## 2017-12-06 PROCEDURE — G8427 DOCREV CUR MEDS BY ELIG CLIN: HCPCS | Performed by: NURSE PRACTITIONER

## 2017-12-06 PROCEDURE — 3017F COLORECTAL CA SCREEN DOC REV: CPT | Performed by: NURSE PRACTITIONER

## 2017-12-06 PROCEDURE — G8417 CALC BMI ABV UP PARAM F/U: HCPCS | Performed by: NURSE PRACTITIONER

## 2017-12-06 PROCEDURE — 99213 OFFICE O/P EST LOW 20 MIN: CPT | Performed by: NURSE PRACTITIONER

## 2017-12-06 RX ORDER — PETROLATUM 42 G/100G
OINTMENT TOPICAL
Qty: 100 G | Refills: 5 | Status: SHIPPED | OUTPATIENT
Start: 2017-12-06 | End: 2018-01-08 | Stop reason: ALTCHOICE

## 2017-12-06 RX ORDER — PETROLATUM 42 G/100G
OINTMENT TOPICAL
Qty: 100 G | Refills: 5 | Status: SHIPPED | OUTPATIENT
Start: 2017-12-06 | End: 2017-12-06 | Stop reason: CLARIF

## 2017-12-06 RX ORDER — LANCETS
EACH MISCELLANEOUS
Qty: 100 EACH | Refills: 3 | Status: SHIPPED | OUTPATIENT
Start: 2017-12-06 | End: 2018-06-01 | Stop reason: SDUPTHER

## 2017-12-06 ASSESSMENT — ENCOUNTER SYMPTOMS: COUGH: 1

## 2017-12-06 NOTE — PROGRESS NOTES
Subjective:      Patient ID: Mariann Scheuermann is a 59 y.o. male. Chief Complaint   Patient presents with    Follow-up     Patient is here today to review his glucose log. HPI    Diabetes Mellitus  Patient presents for follow up of diabetes. Current symptoms include: leg ulcerations. Symptoms have worsened, beginning 3 months ago. Patient denies hypoglycemia . Evaluation to date has included: hemoglobin A1C. Home sugars: BGs range between 200 and 300. Current treatment: Novolin R, Novolin N, . Last dilated eye exam: approx 1year ago. Pt had been seeing Dr. Charlie Burch but no endo visit in well over 1yr    Pt is currently taking Novolin N 30 in am and at lunch pt takes Novolin R 30 and then at evening pt is using Novolin N 30      Pt has had ulceration on right anterior leg. Pt has had wound care and stopped when transportation became difficult. Pt has had home health doing dressing at least twice a week. Pt currently has leg wrapped and foam protector on skin. While here, pt is asking to recheck PSA level that has recently been elevated. He is supposed to be f/u with urology and states he will be soon. Review of Systems   Constitutional: Negative for fever. Respiratory: Positive for cough (intermittent). Skin: Positive for wound (healing RLE).      Past Medical History:   Diagnosis Date    Abrasion     Anxiety     Asthma     Bell's palsy     Cataracts, bilateral     Cellulitis     Diabetes ([address]     Edema extremities     GERD (gastroesophageal reflux disease)     Gout     H/O tracheostomy     2015    H/O urinary retention     Hernia, hiatal     History of panniculitis     History of shingles     Hyperlipemia     Hypertension     Morbid obesity ([address]     Non-ST elevated myocardial infarction ([address]     11/2015    Obesity     Paraphimosis     tx w circumcision 2015    Pulmonary hypertension     Renal failure     Respiratory failure ([address]     2015    Shoulder pain  Sleep apnea     Testosterone deficiency     Ulcer (HCC)     Ulcer of calf (HCC)     Venous stasis of both lower extremities      Current Outpatient Prescriptions on File Prior to Visit   Medication Sig Dispense Refill    insulin regular (NOVOLIN R RELION) 100 UNIT/ML injection Novolin R  30units sub q every evening with dinner 30 mL 1    insulin NPH (NOVOLIN N RELION) 100 UNIT/ML injection vial Continue with current dose of 30units in am and 15 units in evening 30 mL 1    potassium chloride (KLOR-CON M) 20 MEQ extended release tablet TAKE ONE TABLET BY MOUTH ONCE DAILY WHEN TAKING BUMEX 90 tablet 1    BYSTOLIC 10 MG tablet TAKE ONE TABLET BY MOUTH ONCE DAILY IN THE EVENING 90 tablet 1    nebivolol (BYSTOLIC) 10 MG tablet Take 1 tablet by mouth daily At night 90 tablet 1    potassium chloride (KLOR-CON M20) 20 MEQ extended release tablet TAKE ONE TABLET BY MOUTH ONCE DAILY when taking Bumex 90 tablet 1    allopurinol (ZYLOPRIM) 300 MG tablet Take 1 tablet by mouth daily 90 tablet 1    pravastatin (PRAVACHOL) 20 MG tablet TAKE ONE TABLET BY MOUTH ONCE nightly 90 tablet 1    bumetanide (BUMEX) 1 MG tablet 1-2 po every morning as directed 180 tablet 1    losartan (COZAAR) 50 MG tablet Take 1 tablet by mouth daily TAKE ONE TABLET BY MOUTH ONCE DAILY 90 tablet 1    omeprazole (PRILOSEC) 20 MG delayed release capsule Take 1 capsule by mouth Daily 90 capsule 1    camphor-menthol (SARNA) 0.5-0.5 % lotion Apply topically as needed.  1 Bottle 4    Multiple Vitamins-Minerals (SENIOR MULTIVITAMIN PLUS PO) Take 1 tablet by mouth daily      Cholecalciferol (VITAMIN D3) 5000 units CAPS Take 1 capsule by mouth daily      acetaminophen (TYLENOL) 500 MG tablet Take 1,000 mg by mouth every 6 hours as needed for Pain      ergocalciferol (DRISDOL) 75676 units capsule Take 1 capsule by mouth once a week 4 capsule 2    NOVOLIN N RELION 100 UNIT/ML injection vial INJECT 30 UNITS SUB-Q IN THE MORNING 1 vial 2    NOVOLIN R RELION 100 UNIT/ML injection USE AS DIRECTED WITH MEALS (Patient taking differently: USE AS DIRECTED WITH MEALS, BS-100/20=DOSE) 1 vial 2    azelastine (ASTELIN) 0.1 % nasal spray 2 sprays by Nasal route 2 times daily Use in each nostril as directed 3 Bottle 1    fluticasone (FLONASE) 50 MCG/ACT nasal spray 1 spray by Nasal route daily 3 Bottle 1    OXYGEN Inhale 2 L into the lungs nightly      BiPAP Machine MISC by Does not apply route Currently not using per instructions of Dr. Dashawn Harmon [place].  diphenhydrAMINE (BENADRYL) 25 MG capsule Take 1 capsule by mouth nightly as needed for Itching (Patient taking differently: Take 25 mg by mouth every 8 hours ) 30 capsule 3    albuterol sulfate HFA (VENTOLIN HFA) 108 (90 BASE) MCG/ACT inhaler Inhale 2 puffs into the lungs every 6 hours as needed for Wheezing 1 Inhaler 3    albuterol (PROVENTIL) (2.5 MG/3ML) 0.083% nebulizer solution Take 3 mLs by nebulization every 6 hours as needed for Wheezing 120 each 3    Insulin Syringe-Needle U-100 (EASY TOUCH INSULIN SYRINGE) 31G X 5/16\" 1 ML MISC Inject 1 each as directed 4 times daily as needed 100 each 3    aspirin EC 81 MG EC tablet Take 1 tablet by mouth daily 30 tablet 3    vitamin B-12 (CYANOCOBALAMIN) 500 MCG tablet Take 1 tablet by mouth daily (Patient taking differently: Take 5,000 mcg by mouth 2 times daily Pt states he takes 2 tabs twice daily) 30 tablet [address] by Does not apply route Bariatric for home 1 each 0    Misc. Devices (WALKER WHEELS) MISC 1 each by Does not apply route once for 1 dose. HEAVY DUTY walker wheels x2 1 each 0     No current facility-administered medications on file prior to visit.       Allergies   Allergen Reactions    Penicillamine Rash    Silvadene [Silver Sulfadiazine] Rash and Other (See Comments)     burning    Aerohist [Chlorpheniramine-Methscop Er]     Cephalosporins Other (See Comments)     Per d/c summary of 12/17/15, pt possibly had cross-reactivity to a cephalosporin during his hospitalization    Neosporin [Neomycin-Polymyxin-Gramicidin]     Penicillins        Objective:   Physical Exam   Constitutional: He is oriented to person, place, and time. He appears well-developed and well-nourished. Morbid obesity   HENT:   Head: Normocephalic and atraumatic. Eyes: Conjunctivae and EOM are normal. Pupils are equal, round, and reactive to light. Neck: Normal range of motion. Neck supple. No tracheal deviation present. Cardiovascular: Normal rate, regular rhythm and normal heart sounds. Pulmonary/Chest: Effort normal and breath sounds normal.   Neurological: He is alert and oriented to person, place, and time. Skin: Skin is warm and dry. Lesion noted. Psychiatric: He has a normal mood and affect. His speech is normal and behavior is normal. Judgment and thought content normal. His mood appears not anxious. His affect is not angry. Cognition and memory are normal. He does not exhibit a depressed mood. Nursing note and vitals reviewed. /64 (Site: Right Arm, Position: Sitting, Cuff Size: Large Adult)   Pulse 62   Temp 97.9 °F (36.6 °C) (Temporal)   Resp 16   SpO2 94%     Assessment:      1. Healing decubitus ulcer, unspecified ulcer stage  mineral oil-hydrophilic petrolatum (HYDROPHOR) ointment   2. Controlled type 2 diabetes mellitus without complication, with long-term current use of insulin ([facility])  ACCU-CHEK SOFTCLIX LANCETS MISC    Basic Metabolic Panel   3. Elevated PSA  PSA Screening   4. Encounter for screening for malignant neoplasm of prostate   PSA Screening           Plan:    Pt is reporting that he will be working with a friend to become more mobile and temporarily wants to hold hold off on referral to therapy.   Pt is to increase Novolin N to 35 units bid (have bedtime snack with this) and continue mealtime insulin   Pt is to begin checking glucose every am and then immediately prior to meals and then

## 2017-12-08 ENCOUNTER — TELEPHONE (OUTPATIENT)
Dept: FAMILY MEDICINE CLINIC | Age: 57
End: 2017-12-08

## 2017-12-20 ENCOUNTER — OFFICE VISIT (OUTPATIENT)
Dept: FAMILY MEDICINE CLINIC | Age: 57
End: 2017-12-20
Payer: MEDICARE

## 2017-12-20 DIAGNOSIS — I87.8 VENOUS STASIS OF BOTH LOWER EXTREMITIES: ICD-10-CM

## 2017-12-20 DIAGNOSIS — R60.0 EXTREMITY EDEMA: ICD-10-CM

## 2017-12-20 DIAGNOSIS — E66.01 MORBID OBESITY (HCC): Primary | ICD-10-CM

## 2017-12-20 PROCEDURE — G8427 DOCREV CUR MEDS BY ELIG CLIN: HCPCS | Performed by: NURSE PRACTITIONER

## 2017-12-20 PROCEDURE — G8484 FLU IMMUNIZE NO ADMIN: HCPCS | Performed by: NURSE PRACTITIONER

## 2017-12-20 PROCEDURE — 99213 OFFICE O/P EST LOW 20 MIN: CPT | Performed by: NURSE PRACTITIONER

## 2017-12-20 PROCEDURE — G8417 CALC BMI ABV UP PARAM F/U: HCPCS | Performed by: NURSE PRACTITIONER

## 2017-12-20 PROCEDURE — 1036F TOBACCO NON-USER: CPT | Performed by: NURSE PRACTITIONER

## 2017-12-20 PROCEDURE — 3017F COLORECTAL CA SCREEN DOC REV: CPT | Performed by: NURSE PRACTITIONER

## 2017-12-20 ASSESSMENT — ENCOUNTER SYMPTOMS: SHORTNESS OF BREATH: 1

## 2017-12-20 NOTE — PROGRESS NOTES
Testosterone deficiency     Ulcer [facility]     Ulcer of calf (HCC)     Venous stasis of both lower extremities      Current Outpatient Prescriptions on File Prior to Visit   Medication Sig Dispense Refill    mineral oil-hydrophilic petrolatum (HYDROPHOR) ointment Apply topically bid  as needed to soften skin 100 g 5    ACCU-CHEK SOFTCLIX LANCETS MISC For qid testing 100 each 3    insulin regular (NOVOLIN R RELION) 100 UNIT/ML injection Novolin R  30units sub q every evening with dinner 30 mL 1    insulin NPH (NOVOLIN N RELION) 100 UNIT/ML injection vial Continue with current dose of 30units in am and 15 units in evening 30 mL 1    potassium chloride (KLOR-CON M) 20 MEQ extended release tablet TAKE ONE TABLET BY MOUTH ONCE DAILY WHEN TAKING BUMEX 90 tablet 1    BYSTOLIC 10 MG tablet TAKE ONE TABLET BY MOUTH ONCE DAILY IN THE EVENING 90 tablet 1    nebivolol (BYSTOLIC) 10 MG tablet Take 1 tablet by mouth daily At night 90 tablet 1    potassium chloride (KLOR-CON M20) 20 MEQ extended release tablet TAKE ONE TABLET BY MOUTH ONCE DAILY when taking Bumex 90 tablet 1    allopurinol (ZYLOPRIM) 300 MG tablet Take 1 tablet by mouth daily 90 tablet 1    pravastatin (PRAVACHOL) 20 MG tablet TAKE ONE TABLET BY MOUTH ONCE nightly 90 tablet 1    bumetanide (BUMEX) 1 MG tablet 1-2 po every morning as directed 180 tablet 1    losartan (COZAAR) 50 MG tablet Take 1 tablet by mouth daily TAKE ONE TABLET BY MOUTH ONCE DAILY 90 tablet 1    omeprazole (PRILOSEC) 20 MG delayed release capsule Take 1 capsule by mouth Daily 90 capsule 1    camphor-menthol (SARNA) 0.5-0.5 % lotion Apply topically as needed.  1 Bottle 4    Multiple Vitamins-Minerals (SENIOR MULTIVITAMIN PLUS PO) Take 1 tablet by mouth daily      Cholecalciferol (VITAMIN D3) 5000 units CAPS Take 1 capsule by mouth daily      acetaminophen (TYLENOL) 500 MG tablet Take 1,000 mg by mouth every 6 hours as needed for Pain      ergocalciferol (DRISDOL) 52393 units capsule Take 1 capsule by mouth once a week 4 capsule 2    NOVOLIN N RELION 100 UNIT/ML injection vial INJECT 30 UNITS SUB-Q IN THE MORNING 1 vial 2    NOVOLIN R RELION 100 UNIT/ML injection USE AS DIRECTED WITH MEALS (Patient taking differently: USE AS DIRECTED WITH MEALS, BS-100/20=DOSE) 1 vial 2    azelastine (ASTELIN) 0.1 % nasal spray 2 sprays by Nasal route 2 times daily Use in each nostril as directed 3 Bottle 1    fluticasone (FLONASE) 50 MCG/ACT nasal spray 1 spray by Nasal route daily 3 Bottle 1    OXYGEN Inhale 2 L into the lungs nightly      BiPAP Machine MISC by Does not apply route Currently not using per instructions of Dr. Germaine Bull.  diphenhydrAMINE (BENADRYL) 25 MG capsule Take 1 capsule by mouth nightly as needed for Itching (Patient taking differently: Take 25 mg by mouth every 8 hours ) 30 capsule 3    albuterol sulfate HFA (VENTOLIN HFA) 108 (90 BASE) MCG/ACT inhaler Inhale 2 puffs into the lungs every 6 hours as needed for Wheezing 1 Inhaler 3    albuterol (PROVENTIL) (2.5 MG/3ML) 0.083% nebulizer solution Take 3 mLs by nebulization every 6 hours as needed for Wheezing 120 each 3    Insulin Syringe-Needle U-100 (EASY TOUCH INSULIN SYRINGE) 31G X 5/16\" 1 ML MISC Inject 1 each as directed 4 times daily as needed 100 each 3    aspirin EC 81 MG EC tablet Take 1 tablet by mouth daily 30 tablet 3    vitamin B-12 (CYANOCOBALAMIN) 500 MCG tablet Take 1 tablet by mouth daily (Patient taking differently: Take 5,000 mcg by mouth 2 times daily Pt states he takes 2 tabs twice daily) 30 tablet [address] by Does not apply route Bariatric for home 1 each 0    Misc. Devices (WALKER WHEELS) MISC 1 each by Does not apply route once for 1 dose. HEAVY DUTY walker wheels x2 1 each 0     No current facility-administered medications on file prior to visit.       Allergies   Allergen Reactions    Penicillamine Rash    Silvadene [Silver Sulfadiazine] Rash and Other (See Comments)     burning    Aerohist [Chlorpheniramine-Methscop Er]     Cephalosporins Other (See Comments)     Per d/c summary of 12/17/15, pt possibly had cross-reactivity to a cephalosporin during his hospitalization    Neosporin [Neomycin-Polymyxin-Gramicidin]     Penicillins        Objective:   Physical Exam   Constitutional: He is oriented to person, place, and time. He appears well-developed and well-nourished. Morbidly obese   HENT:   Head: Normocephalic and atraumatic. Eyes: Conjunctivae and EOM are normal. Pupils are equal, round, and reactive to light. Neck: Normal range of motion. Neck supple. No tracheal deviation present. Cardiovascular: Normal rate, regular rhythm and normal heart sounds. Pulmonary/Chest: Effort normal and breath sounds normal.   Neurological: He is alert and oriented to person, place, and time. RLE 4/5 strength   Skin: Skin is warm and dry. Leg dressings in place---home health did today   Psychiatric: He has a normal mood and affect. His speech is normal and behavior is normal. Judgment and thought content normal. His mood appears not anxious. His affect is not angry. Cognition and memory are normal. He does not exhibit a depressed mood. Nursing note and vitals reviewed. /74 (Site: Right Arm, Position: Sitting, Cuff Size: Large Adult)   Pulse 52   Temp 97.9 °F (36.6 °C) (Temporal)   Resp 20   Ht 5' 10.5\" (1.791 m)   Wt (!) 524 lb (237.7 kg) Comment: patient unable to weigh  SpO2 97%   BMI 74.12 kg/m²     Assessment:      1. Morbid obesity ([address]  DME Order for Hospital Bed as OP    UNABLE TO FIND   2. Venous stasis of both lower extremities  DME Order for Hospital Bed as OP    UNABLE TO FIND   3.  Extremity edema  DME Order for Hospital Bed as OP    UNABLE TO FIND           Plan:      Fax paper/notes  F/u prn

## 2017-12-21 ENCOUNTER — TELEPHONE (OUTPATIENT)
Dept: FAMILY MEDICINE CLINIC | Age: 57
End: 2017-12-21

## 2017-12-21 VITALS
OXYGEN SATURATION: 97 % | HEART RATE: 52 BPM | HEIGHT: 71 IN | RESPIRATION RATE: 20 BRPM | DIASTOLIC BLOOD PRESSURE: 74 MMHG | TEMPERATURE: 97.9 F | SYSTOLIC BLOOD PRESSURE: 120 MMHG | WEIGHT: 315 LBS | BODY MASS INDEX: 44.1 KG/M2

## 2017-12-21 NOTE — TELEPHONE ENCOUNTER
Form, script for electric power chair, and requested records have been faxed to [facility].

## 2017-12-26 ENCOUNTER — TELEPHONE (OUTPATIENT)
Dept: FAMILY MEDICINE CLINIC | Age: 57
End: 2017-12-26

## 2017-12-26 NOTE — TELEPHONE ENCOUNTER
Patient recalls getting his current bed from [facility] in [place]". I found an order in media that was sent to [facility]. Order for new hospital bed has been faxed to [facility].

## 2017-12-29 DIAGNOSIS — E11.9 TYPE 2 DIABETES MELLITUS WITHOUT COMPLICATION, WITH LONG-TERM CURRENT USE OF INSULIN (HCC): ICD-10-CM

## 2017-12-29 DIAGNOSIS — Z79.4 TYPE 2 DIABETES MELLITUS WITHOUT COMPLICATION, WITH LONG-TERM CURRENT USE OF INSULIN (HCC): ICD-10-CM

## 2017-12-29 RX ORDER — PRAVASTATIN SODIUM 20 MG
TABLET ORAL
Qty: 90 TABLET | Refills: 1 | Status: SHIPPED | OUTPATIENT
Start: 2017-12-29 | End: 2018-01-08 | Stop reason: SDUPTHER

## 2018-01-08 ENCOUNTER — HOSPITAL ENCOUNTER (OUTPATIENT)
Dept: WOUND CARE | Age: 58
Discharge: HOME | End: 2018-01-08
Payer: MEDICARE

## 2018-01-08 VITALS
TEMPERATURE: 97.9 F | HEIGHT: 71 IN | RESPIRATION RATE: 22 BRPM | BODY MASS INDEX: 44.1 KG/M2 | SYSTOLIC BLOOD PRESSURE: 124 MMHG | HEART RATE: 65 BPM | WEIGHT: 315 LBS | DIASTOLIC BLOOD PRESSURE: 67 MMHG

## 2018-01-08 DIAGNOSIS — I87.8 VENOUS STASIS OF BOTH LOWER EXTREMITIES: ICD-10-CM

## 2018-01-08 DIAGNOSIS — L97.222 NON-PRESSURE CHRONIC ULCER OF LEFT CALF WITH FAT LAYER EXPOSED (HCC): Chronic | ICD-10-CM

## 2018-01-08 DIAGNOSIS — L97.222 NON-PRESSURE CHRONIC ULCER OF LEFT CALF WITH FAT LAYER EXPOSED (HCC): Primary | Chronic | ICD-10-CM

## 2018-01-08 DIAGNOSIS — I89.0 CHRONIC ACQUIRED LYMPHEDEMA: Chronic | ICD-10-CM

## 2018-01-08 DIAGNOSIS — I89.8 LYMPHORRHEA: Chronic | ICD-10-CM

## 2018-01-08 PROCEDURE — 97597 DBRDMT OPN WND 1ST 20 CM/<: CPT

## 2018-01-08 PROCEDURE — 97597 DBRDMT OPN WND 1ST 20 CM/<: CPT | Performed by: SURGERY

## 2018-01-08 PROCEDURE — 99213 OFFICE O/P EST LOW 20 MIN: CPT | Performed by: SURGERY

## 2018-01-08 PROCEDURE — 99213 OFFICE O/P EST LOW 20 MIN: CPT

## 2018-01-08 NOTE — PROGRESS NOTES
Ca-wound Assessment Excoriated 1/8/2018  3:52 PM   Non-staged Wound Description Full thickness 1/8/2018  3:52 PM   [place]%Wound Bed 30 1/8/2018  3:52 PM   Red%Wound Bed 60 1/8/2018  3:52 PM   Yellow%Wound Bed 10 1/8/2018  3:52 PM   Black%Wound Bed 0 1/8/2018  3:52 PM   Purple%Wound Bed 0 1/8/2018  3:52 PM   Other%Wound Bed 0 1/8/2018  3:52 PM   Debridement per physician Full thickness 1/8/2018  4:33 PM   Time out Yes 1/8/2018  4:33 PM   Procedural Pain 0 1/8/2018  4:33 PM   Post procedural Pain 0 1/8/2018  4:33 PM   Number of days: 0       Wound 01/08/18 Venous ulcer Leg Right; Lower; Anterior Wound 13, Venous, R. Lower Anterior Leg (Active)   Wound Image    1/8/2018  3:52 PM   Wound Type Wound 1/8/2018  3:52 PM   Wound Venous 1/8/2018  3:52 PM   Wound Cleansed Rinsed/Irrigated with saline 1/8/2018  3:52 PM   Wound Length (cm) 0.9 cm 1/8/2018  4:33 PM   Wound Width (cm) 0.8 cm 1/8/2018  4:33 PM   Wound Depth (cm)  . 1 1/8/2018  4:33 PM   Calculated Wound Size (cm^2) (l*w) 0.72 cm^2 1/8/2018  4:33 PM   Change in Wound Size % (l*w) 0 1/8/2018  4:33 PM   Distance Tunneling (cm) 0 cm 1/8/2018  3:52 PM   Tunneling Position ___ O'Clock 0 1/8/2018  3:52 PM   Undermining Starts ___ O'Clock 0 1/8/2018  3:52 PM   Undermining Ends___ O'Clock 0 1/8/2018  3:52 PM   Undermining Maxium Distance (cm) 0 1/8/2018  3:52 PM   Wound Assessment Red;Slough; Yellow;Drainage 1/8/2018  3:52 PM   Drainage Amount Small 1/8/2018  3:52 PM   Drainage Description Serosanguinous 1/8/2018  3:52 PM   Odor None 1/8/2018  3:52 PM   Margins Attached edges 1/8/2018  3:52 PM   Ca-wound Assessment Hyperpigmented 1/8/2018  3:52 PM   Non-staged Wound Description Full thickness 1/8/2018  3:52 PM   [place]%Wound Bed 0 1/8/2018  3:52 PM   Red%Wound Bed 80 1/8/2018  3:52 PM   Yellow%Wound Bed 20 1/8/2018  3:52 PM   Black%Wound Bed 0 1/8/2018  3:52 PM   Purple%Wound Bed 0 1/8/2018  3:52 PM   Other%Wound Bed 0 1/8/2018  3:52 PM   Debridement per physician Partial

## 2018-01-12 LAB
ANAEROBIC CULTURE: ABNORMAL
GRAM STAIN RESULT: ABNORMAL
ORGANISM: ABNORMAL
ORGANISM: ABNORMAL
WOUND/ABSCESS: ABNORMAL
WOUND/ABSCESS: ABNORMAL

## 2018-01-15 ENCOUNTER — PRE-PROCEDURE TELEPHONE (OUTPATIENT)
Dept: WOUND CARE | Age: 58
End: 2018-01-15

## 2018-01-15 RX ORDER — LEVOFLOXACIN 750 MG/1
750 TABLET ORAL DAILY
Qty: 10 TABLET | Refills: 0 | Status: SHIPPED | OUTPATIENT
Start: 2018-01-15 | End: 2018-01-25

## 2018-01-24 ENCOUNTER — APPOINTMENT (OUTPATIENT)
Dept: GENERAL RADIOLOGY | Facility: HOSPITAL | Age: 58
End: 2018-01-24

## 2018-01-24 ENCOUNTER — HOSPITAL ENCOUNTER (INPATIENT)
Facility: HOSPITAL | Age: 58
LOS: 3 days | Discharge: HOME HEALTH | End: 2018-01-27
Attending: EMERGENCY MEDICINE | Admitting: FAMILY MEDICINE

## 2018-01-24 ENCOUNTER — APPOINTMENT (OUTPATIENT)
Dept: ULTRASOUND IMAGING | Facility: HOSPITAL | Age: 58
End: 2018-01-24

## 2018-01-24 DIAGNOSIS — E11.59 TYPE 2 DIABETES MELLITUS WITH OTHER CIRCULATORY COMPLICATION, WITH LONG-TERM CURRENT USE OF INSULIN (HCC): ICD-10-CM

## 2018-01-24 DIAGNOSIS — I87.2 VENOUS INSUFFICIENCY: ICD-10-CM

## 2018-01-24 DIAGNOSIS — L03.116 CELLULITIS OF LEFT LOWER EXTREMITY: Primary | ICD-10-CM

## 2018-01-24 DIAGNOSIS — I10 ESSENTIAL HYPERTENSION: ICD-10-CM

## 2018-01-24 DIAGNOSIS — Z79.4 TYPE 2 DIABETES MELLITUS WITH OTHER CIRCULATORY COMPLICATION, WITH LONG-TERM CURRENT USE OF INSULIN (HCC): ICD-10-CM

## 2018-01-24 PROBLEM — J44.9 COPD (CHRONIC OBSTRUCTIVE PULMONARY DISEASE) (HCC): Status: ACTIVE | Noted: 2018-01-24

## 2018-01-24 LAB
ALBUMIN SERPL-MCNC: 3.8 G/DL (ref 3.5–5)
ALBUMIN/GLOB SERPL: 1.3 G/DL (ref 1.1–2.5)
ALP SERPL-CCNC: 73 U/L (ref 24–120)
ALT SERPL W P-5'-P-CCNC: 32 U/L (ref 0–54)
ANION GAP SERPL CALCULATED.3IONS-SCNC: 8 MMOL/L (ref 4–13)
APTT PPP: 33.2 SECONDS (ref 24.1–34.8)
AST SERPL-CCNC: 22 U/L (ref 7–45)
BASOPHILS # BLD AUTO: 0.04 10*3/MM3 (ref 0–0.2)
BASOPHILS NFR BLD AUTO: 0.4 % (ref 0–2)
BILIRUB SERPL-MCNC: 0.5 MG/DL (ref 0.1–1)
BUN BLD-MCNC: 29 MG/DL (ref 5–21)
BUN/CREAT SERPL: 23.2 (ref 7–25)
CALCIUM SPEC-SCNC: 8.9 MG/DL (ref 8.4–10.4)
CHLORIDE SERPL-SCNC: 100 MMOL/L (ref 98–110)
CO2 SERPL-SCNC: 35 MMOL/L (ref 24–31)
CREAT BLD-MCNC: 1.25 MG/DL (ref 0.5–1.4)
CRP SERPL-MCNC: 4.37 MG/DL (ref 0–0.99)
D-LACTATE SERPL-SCNC: 1.1 MMOL/L (ref 0.5–2)
DEPRECATED RDW RBC AUTO: 50.2 FL (ref 40–54)
EOSINOPHIL # BLD AUTO: 0.52 10*3/MM3 (ref 0–0.7)
EOSINOPHIL NFR BLD AUTO: 5.4 % (ref 0–4)
ERYTHROCYTE [DISTWIDTH] IN BLOOD BY AUTOMATED COUNT: 15.2 % (ref 12–15)
ERYTHROCYTE [SEDIMENTATION RATE] IN BLOOD: 30 MM/HR (ref 0–15)
GFR SERPL CREATININE-BSD FRML MDRD: 59 ML/MIN/1.73
GLOBULIN UR ELPH-MCNC: 2.9 GM/DL
GLUCOSE BLD-MCNC: 127 MG/DL (ref 70–100)
HCT VFR BLD AUTO: 43.8 % (ref 40–52)
HGB BLD-MCNC: 13.5 G/DL (ref 14–18)
HOLD SPECIMEN: NORMAL
HOLD SPECIMEN: NORMAL
IMM GRANULOCYTES # BLD: 0.09 10*3/MM3 (ref 0–0.03)
IMM GRANULOCYTES NFR BLD: 0.9 % (ref 0–5)
INR PPP: 0.97 (ref 0.91–1.09)
LYMPHOCYTES # BLD AUTO: 0.98 10*3/MM3 (ref 0.72–4.86)
LYMPHOCYTES NFR BLD AUTO: 10.2 % (ref 15–45)
MCH RBC QN AUTO: 28.1 PG (ref 28–32)
MCHC RBC AUTO-ENTMCNC: 30.8 G/DL (ref 33–36)
MCV RBC AUTO: 91.1 FL (ref 82–95)
MONOCYTES # BLD AUTO: 0.71 10*3/MM3 (ref 0.19–1.3)
MONOCYTES NFR BLD AUTO: 7.4 % (ref 4–12)
NEUTROPHILS # BLD AUTO: 7.26 10*3/MM3 (ref 1.87–8.4)
NEUTROPHILS NFR BLD AUTO: 75.7 % (ref 39–78)
NRBC BLD MANUAL-RTO: 0 /100 WBC (ref 0–0)
PLATELET # BLD AUTO: 198 10*3/MM3 (ref 130–400)
PMV BLD AUTO: 11.6 FL (ref 6–12)
POTASSIUM BLD-SCNC: 4.4 MMOL/L (ref 3.5–5.3)
PROT SERPL-MCNC: 6.7 G/DL (ref 6.3–8.7)
PROTHROMBIN TIME: 13.2 SECONDS (ref 11.9–14.6)
RBC # BLD AUTO: 4.81 10*6/MM3 (ref 4.8–5.9)
SODIUM BLD-SCNC: 143 MMOL/L (ref 135–145)
WBC NRBC COR # BLD: 9.6 10*3/MM3 (ref 4.8–10.8)
WHOLE BLOOD HOLD SPECIMEN: NORMAL
WHOLE BLOOD HOLD SPECIMEN: NORMAL

## 2018-01-24 PROCEDURE — 85730 THROMBOPLASTIN TIME PARTIAL: CPT | Performed by: EMERGENCY MEDICINE

## 2018-01-24 PROCEDURE — 80053 COMPREHEN METABOLIC PANEL: CPT | Performed by: EMERGENCY MEDICINE

## 2018-01-24 PROCEDURE — 86140 C-REACTIVE PROTEIN: CPT | Performed by: EMERGENCY MEDICINE

## 2018-01-24 PROCEDURE — 93971 EXTREMITY STUDY: CPT

## 2018-01-24 PROCEDURE — 73590 X-RAY EXAM OF LOWER LEG: CPT

## 2018-01-24 PROCEDURE — 85610 PROTHROMBIN TIME: CPT | Performed by: EMERGENCY MEDICINE

## 2018-01-24 PROCEDURE — 93971 EXTREMITY STUDY: CPT | Performed by: SURGERY

## 2018-01-24 PROCEDURE — 87077 CULTURE AEROBIC IDENTIFY: CPT | Performed by: EMERGENCY MEDICINE

## 2018-01-24 PROCEDURE — 83036 HEMOGLOBIN GLYCOSYLATED A1C: CPT | Performed by: FAMILY MEDICINE

## 2018-01-24 PROCEDURE — 87040 BLOOD CULTURE FOR BACTERIA: CPT | Performed by: EMERGENCY MEDICINE

## 2018-01-24 PROCEDURE — 83605 ASSAY OF LACTIC ACID: CPT | Performed by: EMERGENCY MEDICINE

## 2018-01-24 PROCEDURE — 85025 COMPLETE CBC W/AUTO DIFF WBC: CPT | Performed by: EMERGENCY MEDICINE

## 2018-01-24 PROCEDURE — 99285 EMERGENCY DEPT VISIT HI MDM: CPT

## 2018-01-24 PROCEDURE — 87185 SC STD ENZYME DETCJ PER NZM: CPT | Performed by: EMERGENCY MEDICINE

## 2018-01-24 PROCEDURE — 87186 SC STD MICRODIL/AGAR DIL: CPT | Performed by: EMERGENCY MEDICINE

## 2018-01-24 PROCEDURE — 87070 CULTURE OTHR SPECIMN AEROBIC: CPT | Performed by: EMERGENCY MEDICINE

## 2018-01-24 PROCEDURE — 25010000002 VANCOMYCIN PER 500 MG: Performed by: EMERGENCY MEDICINE

## 2018-01-24 PROCEDURE — 82962 GLUCOSE BLOOD TEST: CPT

## 2018-01-24 PROCEDURE — 25010000002 MORPHINE PER 10 MG: Performed by: EMERGENCY MEDICINE

## 2018-01-24 PROCEDURE — 87205 SMEAR GRAM STAIN: CPT | Performed by: EMERGENCY MEDICINE

## 2018-01-24 PROCEDURE — 85651 RBC SED RATE NONAUTOMATED: CPT | Performed by: EMERGENCY MEDICINE

## 2018-01-24 RX ORDER — ACETAMINOPHEN 325 MG/1
650 TABLET ORAL EVERY 4 HOURS PRN
Status: DISCONTINUED | OUTPATIENT
Start: 2018-01-24 | End: 2018-01-27 | Stop reason: HOSPADM

## 2018-01-24 RX ORDER — SODIUM CHLORIDE 0.9 % (FLUSH) 0.9 %
1-10 SYRINGE (ML) INJECTION AS NEEDED
Status: DISCONTINUED | OUTPATIENT
Start: 2018-01-24 | End: 2018-01-27 | Stop reason: HOSPADM

## 2018-01-24 RX ORDER — ALLOPURINOL 300 MG/1
300 TABLET ORAL DAILY
Status: DISCONTINUED | OUTPATIENT
Start: 2018-01-25 | End: 2018-01-27 | Stop reason: HOSPADM

## 2018-01-24 RX ORDER — DEXTROSE MONOHYDRATE 25 G/50ML
25 INJECTION, SOLUTION INTRAVENOUS
Status: DISCONTINUED | OUTPATIENT
Start: 2018-01-24 | End: 2018-01-27 | Stop reason: HOSPADM

## 2018-01-24 RX ORDER — BUMETANIDE 1 MG/1
1 TABLET ORAL DAILY
Status: DISCONTINUED | OUTPATIENT
Start: 2018-01-25 | End: 2018-01-27 | Stop reason: HOSPADM

## 2018-01-24 RX ORDER — CLINDAMYCIN PHOSPHATE 600 MG/50ML
600 INJECTION INTRAVENOUS EVERY 8 HOURS
Status: DISCONTINUED | OUTPATIENT
Start: 2018-01-24 | End: 2018-01-27 | Stop reason: HOSPADM

## 2018-01-24 RX ORDER — ASPIRIN 81 MG/1
81 TABLET ORAL DAILY
Status: DISCONTINUED | OUTPATIENT
Start: 2018-01-25 | End: 2018-01-27 | Stop reason: HOSPADM

## 2018-01-24 RX ORDER — ALBUTEROL SULFATE 2.5 MG/3ML
2.5 SOLUTION RESPIRATORY (INHALATION) EVERY 6 HOURS PRN
Status: DISCONTINUED | OUTPATIENT
Start: 2018-01-24 | End: 2018-01-27 | Stop reason: HOSPADM

## 2018-01-24 RX ORDER — BISACODYL 5 MG/1
5 TABLET, DELAYED RELEASE ORAL DAILY PRN
Status: DISCONTINUED | OUTPATIENT
Start: 2018-01-24 | End: 2018-01-27 | Stop reason: HOSPADM

## 2018-01-24 RX ORDER — ALUMINA, MAGNESIA, AND SIMETHICONE 2400; 2400; 240 MG/30ML; MG/30ML; MG/30ML
15 SUSPENSION ORAL EVERY 6 HOURS PRN
Status: DISCONTINUED | OUTPATIENT
Start: 2018-01-24 | End: 2018-01-27 | Stop reason: HOSPADM

## 2018-01-24 RX ORDER — MORPHINE SULFATE 4 MG/ML
4 INJECTION, SOLUTION INTRAMUSCULAR; INTRAVENOUS ONCE
Status: COMPLETED | OUTPATIENT
Start: 2018-01-24 | End: 2018-01-24

## 2018-01-24 RX ORDER — HYDROCODONE BITARTRATE AND ACETAMINOPHEN 5; 325 MG/1; MG/1
1 TABLET ORAL EVERY 4 HOURS PRN
Status: DISCONTINUED | OUTPATIENT
Start: 2018-01-24 | End: 2018-01-27 | Stop reason: HOSPADM

## 2018-01-24 RX ORDER — NICOTINE POLACRILEX 4 MG
15 LOZENGE BUCCAL
Status: DISCONTINUED | OUTPATIENT
Start: 2018-01-24 | End: 2018-01-27 | Stop reason: HOSPADM

## 2018-01-24 RX ORDER — ONDANSETRON 2 MG/ML
4 INJECTION INTRAMUSCULAR; INTRAVENOUS EVERY 6 HOURS PRN
Status: DISCONTINUED | OUTPATIENT
Start: 2018-01-24 | End: 2018-01-27 | Stop reason: HOSPADM

## 2018-01-24 RX ORDER — ATORVASTATIN CALCIUM 10 MG/1
10 TABLET, FILM COATED ORAL DAILY
Status: DISCONTINUED | OUTPATIENT
Start: 2018-01-25 | End: 2018-01-27 | Stop reason: HOSPADM

## 2018-01-24 RX ORDER — NEBIVOLOL 5 MG/1
10 TABLET ORAL DAILY
Status: DISCONTINUED | OUTPATIENT
Start: 2018-01-25 | End: 2018-01-27 | Stop reason: HOSPADM

## 2018-01-24 RX ORDER — POTASSIUM CHLORIDE 750 MG/1
20 CAPSULE, EXTENDED RELEASE ORAL DAILY
Status: DISCONTINUED | OUTPATIENT
Start: 2018-01-25 | End: 2018-01-27 | Stop reason: HOSPADM

## 2018-01-24 RX ORDER — BETAMETHASONE DIPROPIONATE 0.05 %
OINTMENT (GRAM) TOPICAL EVERY 12 HOURS SCHEDULED
Status: COMPLETED | OUTPATIENT
Start: 2018-01-24 | End: 2018-01-26

## 2018-01-24 RX ORDER — HYDROCODONE BITARTRATE AND ACETAMINOPHEN 10; 325 MG/1; MG/1
1 TABLET ORAL EVERY 4 HOURS PRN
Status: DISCONTINUED | OUTPATIENT
Start: 2018-01-24 | End: 2018-01-27 | Stop reason: HOSPADM

## 2018-01-24 RX ORDER — LOSARTAN POTASSIUM 50 MG/1
50 TABLET ORAL DAILY
Status: DISCONTINUED | OUTPATIENT
Start: 2018-01-25 | End: 2018-01-27 | Stop reason: HOSPADM

## 2018-01-24 RX ADMIN — ACETAMINOPHEN 650 MG: 325 TABLET, FILM COATED ORAL at 23:36

## 2018-01-24 RX ADMIN — MORPHINE SULFATE 2 MG: 4 INJECTION INTRAVENOUS at 17:39

## 2018-01-24 RX ADMIN — VANCOMYCIN HYDROCHLORIDE 2000 MG: 1 INJECTION, POWDER, LYOPHILIZED, FOR SOLUTION INTRAVENOUS at 16:24

## 2018-01-25 ENCOUNTER — PRE-PROCEDURE TELEPHONE (OUTPATIENT)
Dept: WOUND CARE | Age: 58
End: 2018-01-25

## 2018-01-25 LAB
ANION GAP SERPL CALCULATED.3IONS-SCNC: 7 MMOL/L (ref 4–13)
BUN BLD-MCNC: 26 MG/DL (ref 5–21)
BUN/CREAT SERPL: 21.7 (ref 7–25)
CALCIUM SPEC-SCNC: 8.3 MG/DL (ref 8.4–10.4)
CHLORIDE SERPL-SCNC: 104 MMOL/L (ref 98–110)
CO2 SERPL-SCNC: 32 MMOL/L (ref 24–31)
CREAT BLD-MCNC: 1.2 MG/DL (ref 0.5–1.4)
DEPRECATED RDW RBC AUTO: 49.3 FL (ref 40–54)
ERYTHROCYTE [DISTWIDTH] IN BLOOD BY AUTOMATED COUNT: 15.1 % (ref 12–15)
GFR SERPL CREATININE-BSD FRML MDRD: 62 ML/MIN/1.73
GLUCOSE BLD-MCNC: 156 MG/DL (ref 70–100)
GLUCOSE BLDC GLUCOMTR-MCNC: 130 MG/DL (ref 70–130)
GLUCOSE BLDC GLUCOMTR-MCNC: 148 MG/DL (ref 70–130)
GLUCOSE BLDC GLUCOMTR-MCNC: 184 MG/DL (ref 70–130)
GLUCOSE BLDC GLUCOMTR-MCNC: 185 MG/DL (ref 70–130)
GLUCOSE BLDC GLUCOMTR-MCNC: 195 MG/DL (ref 70–130)
GLUCOSE BLDC GLUCOMTR-MCNC: 197 MG/DL (ref 70–130)
HBA1C MFR BLD: 8.1 %
HCT VFR BLD AUTO: 40.8 % (ref 40–52)
HGB BLD-MCNC: 12.7 G/DL (ref 14–18)
MCH RBC QN AUTO: 27.9 PG (ref 28–32)
MCHC RBC AUTO-ENTMCNC: 31.1 G/DL (ref 33–36)
MCV RBC AUTO: 89.7 FL (ref 82–95)
PLATELET # BLD AUTO: 187 10*3/MM3 (ref 130–400)
PMV BLD AUTO: 11.7 FL (ref 6–12)
POTASSIUM BLD-SCNC: 4.1 MMOL/L (ref 3.5–5.3)
RBC # BLD AUTO: 4.55 10*6/MM3 (ref 4.8–5.9)
SODIUM BLD-SCNC: 143 MMOL/L (ref 135–145)
WBC NRBC COR # BLD: 10.01 10*3/MM3 (ref 4.8–10.8)

## 2018-01-25 PROCEDURE — 25010000002 ENOXAPARIN PER 10 MG: Performed by: FAMILY MEDICINE

## 2018-01-25 PROCEDURE — 87205 SMEAR GRAM STAIN: CPT | Performed by: NURSE PRACTITIONER

## 2018-01-25 PROCEDURE — 82962 GLUCOSE BLOOD TEST: CPT

## 2018-01-25 PROCEDURE — 87070 CULTURE OTHR SPECIMN AEROBIC: CPT | Performed by: NURSE PRACTITIONER

## 2018-01-25 PROCEDURE — 80048 BASIC METABOLIC PNL TOTAL CA: CPT | Performed by: FAMILY MEDICINE

## 2018-01-25 PROCEDURE — 63710000001 INSULIN LISPRO (HUMAN) PER 5 UNITS: Performed by: FAMILY MEDICINE

## 2018-01-25 PROCEDURE — 87186 SC STD MICRODIL/AGAR DIL: CPT | Performed by: NURSE PRACTITIONER

## 2018-01-25 PROCEDURE — 85027 COMPLETE CBC AUTOMATED: CPT | Performed by: FAMILY MEDICINE

## 2018-01-25 PROCEDURE — 94799 UNLISTED PULMONARY SVC/PX: CPT

## 2018-01-25 PROCEDURE — 63710000001 INSULIN LISPRO (HUMAN) PER 5 UNITS: Performed by: INTERNAL MEDICINE

## 2018-01-25 PROCEDURE — 87077 CULTURE AEROBIC IDENTIFY: CPT | Performed by: NURSE PRACTITIONER

## 2018-01-25 PROCEDURE — 63710000001 INSULIN ISOPHANE HUMAN PER 5 UNITS: Performed by: FAMILY MEDICINE

## 2018-01-25 PROCEDURE — 94660 CPAP INITIATION&MGMT: CPT

## 2018-01-25 RX ADMIN — BETAMETHASONE DIPROPIONATE: 0.5 OINTMENT TOPICAL at 01:53

## 2018-01-25 RX ADMIN — ALLOPURINOL 300 MG: 300 TABLET ORAL at 08:46

## 2018-01-25 RX ADMIN — BETAMETHASONE DIPROPIONATE: 0.5 OINTMENT TOPICAL at 21:30

## 2018-01-25 RX ADMIN — INSULIN LISPRO 4 UNITS: 100 INJECTION, SOLUTION INTRAVENOUS; SUBCUTANEOUS at 01:52

## 2018-01-25 RX ADMIN — ATORVASTATIN CALCIUM 10 MG: 10 TABLET, FILM COATED ORAL at 08:45

## 2018-01-25 RX ADMIN — ACETAMINOPHEN 650 MG: 325 TABLET, FILM COATED ORAL at 21:30

## 2018-01-25 RX ADMIN — ACETAMINOPHEN 650 MG: 325 TABLET, FILM COATED ORAL at 08:58

## 2018-01-25 RX ADMIN — INSULIN LISPRO 4 UNITS: 100 INJECTION, SOLUTION INTRAVENOUS; SUBCUTANEOUS at 21:36

## 2018-01-25 RX ADMIN — ACETAMINOPHEN 650 MG: 325 TABLET, FILM COATED ORAL at 17:27

## 2018-01-25 RX ADMIN — CLINDAMYCIN PHOSPHATE 600 MG: 12 INJECTION, SOLUTION INTRAVENOUS at 17:27

## 2018-01-25 RX ADMIN — NEBIVOLOL HYDROCHLORIDE 10 MG: 5 TABLET ORAL at 08:45

## 2018-01-25 RX ADMIN — INSULIN HUMAN 30 UNITS: 100 INJECTION, SUSPENSION SUBCUTANEOUS at 17:27

## 2018-01-25 RX ADMIN — INSULIN LISPRO 4 UNITS: 100 INJECTION, SOLUTION INTRAVENOUS; SUBCUTANEOUS at 11:53

## 2018-01-25 RX ADMIN — LOSARTAN POTASSIUM 50 MG: 50 TABLET ORAL at 08:45

## 2018-01-25 RX ADMIN — BETAMETHASONE DIPROPIONATE: 0.5 OINTMENT TOPICAL at 08:46

## 2018-01-25 RX ADMIN — CLINDAMYCIN PHOSPHATE 600 MG: 12 INJECTION, SOLUTION INTRAVENOUS at 01:15

## 2018-01-25 RX ADMIN — Medication 81 MG: at 08:44

## 2018-01-25 RX ADMIN — POTASSIUM CHLORIDE 20 MEQ: 750 CAPSULE, EXTENDED RELEASE ORAL at 08:44

## 2018-01-25 RX ADMIN — BUMETANIDE 1 MG: 1 TABLET ORAL at 08:45

## 2018-01-25 RX ADMIN — CLINDAMYCIN PHOSPHATE 600 MG: 12 INJECTION, SOLUTION INTRAVENOUS at 08:46

## 2018-01-25 RX ADMIN — INSULIN HUMAN 20 UNITS: 100 INJECTION, SUSPENSION SUBCUTANEOUS at 07:58

## 2018-01-26 LAB
ANION GAP SERPL CALCULATED.3IONS-SCNC: 5 MMOL/L (ref 4–13)
BACTERIA SPEC AEROBE CULT: ABNORMAL
BACTERIA SPEC AEROBE CULT: ABNORMAL
BUN BLD-MCNC: 22 MG/DL (ref 5–21)
BUN/CREAT SERPL: 19.8 (ref 7–25)
CALCIUM SPEC-SCNC: 8.9 MG/DL (ref 8.4–10.4)
CHLORIDE SERPL-SCNC: 102 MMOL/L (ref 98–110)
CO2 SERPL-SCNC: 36 MMOL/L (ref 24–31)
CREAT BLD-MCNC: 1.11 MG/DL (ref 0.5–1.4)
DEPRECATED RDW RBC AUTO: 50.4 FL (ref 40–54)
ERYTHROCYTE [DISTWIDTH] IN BLOOD BY AUTOMATED COUNT: 15 % (ref 12–15)
GFR SERPL CREATININE-BSD FRML MDRD: 68 ML/MIN/1.73
GLUCOSE BLD-MCNC: 168 MG/DL (ref 70–100)
GLUCOSE BLDC GLUCOMTR-MCNC: 122 MG/DL (ref 70–130)
GLUCOSE BLDC GLUCOMTR-MCNC: 155 MG/DL (ref 70–130)
GLUCOSE BLDC GLUCOMTR-MCNC: 160 MG/DL (ref 70–130)
GLUCOSE BLDC GLUCOMTR-MCNC: 201 MG/DL (ref 70–130)
GRAM STN SPEC: ABNORMAL
GRAM STN SPEC: ABNORMAL
HCT VFR BLD AUTO: 42.2 % (ref 40–52)
HGB BLD-MCNC: 12.8 G/DL (ref 14–18)
MCH RBC QN AUTO: 27.9 PG (ref 28–32)
MCHC RBC AUTO-ENTMCNC: 30.3 G/DL (ref 33–36)
MCV RBC AUTO: 92.1 FL (ref 82–95)
PLATELET # BLD AUTO: 184 10*3/MM3 (ref 130–400)
PMV BLD AUTO: 11.2 FL (ref 6–12)
POTASSIUM BLD-SCNC: 4.4 MMOL/L (ref 3.5–5.3)
RBC # BLD AUTO: 4.58 10*6/MM3 (ref 4.8–5.9)
SODIUM BLD-SCNC: 143 MMOL/L (ref 135–145)
WBC NRBC COR # BLD: 9.17 10*3/MM3 (ref 4.8–10.8)

## 2018-01-26 PROCEDURE — 82962 GLUCOSE BLOOD TEST: CPT

## 2018-01-26 PROCEDURE — 97161 PT EVAL LOW COMPLEX 20 MIN: CPT

## 2018-01-26 PROCEDURE — 80048 BASIC METABOLIC PNL TOTAL CA: CPT | Performed by: NURSE PRACTITIONER

## 2018-01-26 PROCEDURE — 63710000001 INSULIN LISPRO (HUMAN) PER 5 UNITS: Performed by: FAMILY MEDICINE

## 2018-01-26 PROCEDURE — G8978 MOBILITY CURRENT STATUS: HCPCS | Performed by: PHYSICAL THERAPIST

## 2018-01-26 PROCEDURE — 85027 COMPLETE CBC AUTOMATED: CPT | Performed by: NURSE PRACTITIONER

## 2018-01-26 PROCEDURE — G8979 MOBILITY GOAL STATUS: HCPCS | Performed by: PHYSICAL THERAPIST

## 2018-01-26 PROCEDURE — G8980 MOBILITY D/C STATUS: HCPCS | Performed by: PHYSICAL THERAPIST

## 2018-01-26 RX ADMIN — POTASSIUM CHLORIDE 20 MEQ: 750 CAPSULE, EXTENDED RELEASE ORAL at 08:49

## 2018-01-26 RX ADMIN — Medication 81 MG: at 08:49

## 2018-01-26 RX ADMIN — CLINDAMYCIN PHOSPHATE 600 MG: 12 INJECTION, SOLUTION INTRAVENOUS at 00:20

## 2018-01-26 RX ADMIN — INSULIN LISPRO 8 UNITS: 100 INJECTION, SOLUTION INTRAVENOUS; SUBCUTANEOUS at 20:53

## 2018-01-26 RX ADMIN — ALLOPURINOL 300 MG: 300 TABLET ORAL at 08:49

## 2018-01-26 RX ADMIN — CLINDAMYCIN PHOSPHATE 600 MG: 12 INJECTION, SOLUTION INTRAVENOUS at 17:39

## 2018-01-26 RX ADMIN — BUMETANIDE 1 MG: 1 TABLET ORAL at 08:49

## 2018-01-26 RX ADMIN — INSULIN LISPRO 4 UNITS: 100 INJECTION, SOLUTION INTRAVENOUS; SUBCUTANEOUS at 11:39

## 2018-01-26 RX ADMIN — INSULIN HUMAN 20 UNITS: 100 INJECTION, SUSPENSION SUBCUTANEOUS at 07:51

## 2018-01-26 RX ADMIN — NEBIVOLOL HYDROCHLORIDE 10 MG: 5 TABLET ORAL at 08:49

## 2018-01-26 RX ADMIN — INSULIN HUMAN 30 UNITS: 100 INJECTION, SUSPENSION SUBCUTANEOUS at 17:30

## 2018-01-26 RX ADMIN — ATORVASTATIN CALCIUM 10 MG: 10 TABLET, FILM COATED ORAL at 08:49

## 2018-01-26 RX ADMIN — LOSARTAN POTASSIUM 50 MG: 50 TABLET ORAL at 08:49

## 2018-01-26 RX ADMIN — INSULIN LISPRO 4 UNITS: 100 INJECTION, SOLUTION INTRAVENOUS; SUBCUTANEOUS at 17:35

## 2018-01-26 RX ADMIN — BETAMETHASONE DIPROPIONATE: 0.5 OINTMENT TOPICAL at 08:49

## 2018-01-26 RX ADMIN — CLINDAMYCIN PHOSPHATE 600 MG: 12 INJECTION, SOLUTION INTRAVENOUS at 08:49

## 2018-01-26 RX ADMIN — ACETAMINOPHEN 650 MG: 325 TABLET, FILM COATED ORAL at 22:35

## 2018-01-27 VITALS
DIASTOLIC BLOOD PRESSURE: 70 MMHG | HEART RATE: 70 BPM | TEMPERATURE: 96.9 F | BODY MASS INDEX: 46.65 KG/M2 | HEIGHT: 69 IN | OXYGEN SATURATION: 92 % | SYSTOLIC BLOOD PRESSURE: 124 MMHG | RESPIRATION RATE: 18 BRPM | WEIGHT: 315 LBS

## 2018-01-27 LAB
ANION GAP SERPL CALCULATED.3IONS-SCNC: 9 MMOL/L (ref 4–13)
BACTERIA SPEC AEROBE CULT: ABNORMAL
BACTERIA SPEC AEROBE CULT: ABNORMAL
BASOPHILS # BLD AUTO: 0.04 10*3/MM3 (ref 0–0.2)
BASOPHILS NFR BLD AUTO: 0.4 % (ref 0–2)
BUN BLD-MCNC: 23 MG/DL (ref 5–21)
BUN/CREAT SERPL: 21.7 (ref 7–25)
CALCIUM SPEC-SCNC: 8.6 MG/DL (ref 8.4–10.4)
CHLORIDE SERPL-SCNC: 100 MMOL/L (ref 98–110)
CO2 SERPL-SCNC: 35 MMOL/L (ref 24–31)
CREAT BLD-MCNC: 1.06 MG/DL (ref 0.5–1.4)
DEPRECATED RDW RBC AUTO: 50.8 FL (ref 40–54)
EOSINOPHIL # BLD AUTO: 0.57 10*3/MM3 (ref 0–0.7)
EOSINOPHIL NFR BLD AUTO: 6 % (ref 0–4)
ERYTHROCYTE [DISTWIDTH] IN BLOOD BY AUTOMATED COUNT: 14.9 % (ref 12–15)
GFR SERPL CREATININE-BSD FRML MDRD: 72 ML/MIN/1.73
GLUCOSE BLD-MCNC: 175 MG/DL (ref 70–100)
GLUCOSE BLDC GLUCOMTR-MCNC: 145 MG/DL (ref 70–130)
GLUCOSE BLDC GLUCOMTR-MCNC: 156 MG/DL (ref 70–130)
GLUCOSE BLDC GLUCOMTR-MCNC: 171 MG/DL (ref 70–130)
GRAM STN SPEC: ABNORMAL
HCT VFR BLD AUTO: 42.3 % (ref 40–52)
HGB BLD-MCNC: 12.7 G/DL (ref 14–18)
IMM GRANULOCYTES # BLD: 0.03 10*3/MM3 (ref 0–0.03)
IMM GRANULOCYTES NFR BLD: 0.3 % (ref 0–5)
LYMPHOCYTES # BLD AUTO: 1.29 10*3/MM3 (ref 0.72–4.86)
LYMPHOCYTES NFR BLD AUTO: 13.6 % (ref 15–45)
MCH RBC QN AUTO: 27.9 PG (ref 28–32)
MCHC RBC AUTO-ENTMCNC: 30 G/DL (ref 33–36)
MCV RBC AUTO: 92.8 FL (ref 82–95)
MONOCYTES # BLD AUTO: 0.73 10*3/MM3 (ref 0.19–1.3)
MONOCYTES NFR BLD AUTO: 7.7 % (ref 4–12)
NEUTROPHILS # BLD AUTO: 6.82 10*3/MM3 (ref 1.87–8.4)
NEUTROPHILS NFR BLD AUTO: 72 % (ref 39–78)
NRBC BLD MANUAL-RTO: 0 /100 WBC (ref 0–0)
PLATELET # BLD AUTO: 197 10*3/MM3 (ref 130–400)
PMV BLD AUTO: 11.3 FL (ref 6–12)
POTASSIUM BLD-SCNC: 4.5 MMOL/L (ref 3.5–5.3)
RBC # BLD AUTO: 4.56 10*6/MM3 (ref 4.8–5.9)
SODIUM BLD-SCNC: 144 MMOL/L (ref 135–145)
WBC NRBC COR # BLD: 9.48 10*3/MM3 (ref 4.8–10.8)

## 2018-01-27 PROCEDURE — 82962 GLUCOSE BLOOD TEST: CPT

## 2018-01-27 PROCEDURE — 25010000002 ENOXAPARIN PER 10 MG: Performed by: FAMILY MEDICINE

## 2018-01-27 PROCEDURE — 80048 BASIC METABOLIC PNL TOTAL CA: CPT | Performed by: NURSE PRACTITIONER

## 2018-01-27 PROCEDURE — 63710000001 INSULIN LISPRO (HUMAN) PER 5 UNITS: Performed by: FAMILY MEDICINE

## 2018-01-27 PROCEDURE — 85025 COMPLETE CBC W/AUTO DIFF WBC: CPT | Performed by: NURSE PRACTITIONER

## 2018-01-27 RX ADMIN — ACETAMINOPHEN 650 MG: 325 TABLET, FILM COATED ORAL at 08:49

## 2018-01-27 RX ADMIN — INSULIN HUMAN 20 UNITS: 100 INJECTION, SUSPENSION SUBCUTANEOUS at 08:36

## 2018-01-27 RX ADMIN — NEBIVOLOL HYDROCHLORIDE 10 MG: 5 TABLET ORAL at 08:42

## 2018-01-27 RX ADMIN — Medication 81 MG: at 08:36

## 2018-01-27 RX ADMIN — CLINDAMYCIN PHOSPHATE 600 MG: 12 INJECTION, SOLUTION INTRAVENOUS at 08:37

## 2018-01-27 RX ADMIN — BUMETANIDE 1 MG: 1 TABLET ORAL at 08:36

## 2018-01-27 RX ADMIN — CLINDAMYCIN PHOSPHATE 600 MG: 12 INJECTION, SOLUTION INTRAVENOUS at 00:20

## 2018-01-27 RX ADMIN — ALLOPURINOL 300 MG: 300 TABLET ORAL at 08:36

## 2018-01-27 RX ADMIN — ENOXAPARIN SODIUM 40 MG: 40 INJECTION SUBCUTANEOUS at 08:36

## 2018-01-27 RX ADMIN — INSULIN LISPRO 4 UNITS: 100 INJECTION, SOLUTION INTRAVENOUS; SUBCUTANEOUS at 17:16

## 2018-01-27 RX ADMIN — ATORVASTATIN CALCIUM 10 MG: 10 TABLET, FILM COATED ORAL at 08:36

## 2018-01-27 RX ADMIN — POTASSIUM CHLORIDE 20 MEQ: 750 CAPSULE, EXTENDED RELEASE ORAL at 08:36

## 2018-01-27 RX ADMIN — CLINDAMYCIN PHOSPHATE 600 MG: 12 INJECTION, SOLUTION INTRAVENOUS at 16:33

## 2018-01-27 RX ADMIN — ACETAMINOPHEN 650 MG: 325 TABLET, FILM COATED ORAL at 03:44

## 2018-01-27 RX ADMIN — LOSARTAN POTASSIUM 50 MG: 50 TABLET ORAL at 08:42

## 2018-01-27 RX ADMIN — INSULIN HUMAN 30 UNITS: 100 INJECTION, SUSPENSION SUBCUTANEOUS at 17:16

## 2018-01-27 RX ADMIN — INSULIN LISPRO 4 UNITS: 100 INJECTION, SOLUTION INTRAVENOUS; SUBCUTANEOUS at 12:16

## 2018-01-29 LAB
BACTERIA SPEC AEROBE CULT: NORMAL
BACTERIA SPEC AEROBE CULT: NORMAL

## 2018-02-08 ENCOUNTER — TELEPHONE (OUTPATIENT)
Dept: FAMILY MEDICINE CLINIC | Age: 58
End: 2018-02-08

## 2018-02-08 NOTE — TELEPHONE ENCOUNTER
I was out of office yesterday and received a text that I read late last night. It is a personal text from Eliu Muhammad with [facility] stating the following-\"I'm concerned regarding pt's legs again. They are back to being red and weeping and tender to touch. Clindamycin in hospital worked wonders. Can we get that ordered for him again. He only had 4 days of it in the hospital. I think a longer dose would hopefully get him over this. \"    Please contact Thu and find out if pt is still seeing wound care. He needs them. I can certainly approve Clindamycin but he ultimately needs wound care and to adhere to f/u and treatment plans they set forth. Thanks.
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
## 2018-02-09 RX ORDER — CLINDAMYCIN HYDROCHLORIDE 300 MG/1
300 CAPSULE ORAL 3 TIMES DAILY
Qty: 30 CAPSULE | Refills: 0 | OUTPATIENT
Start: 2018-02-09 | End: 2018-02-09 | Stop reason: SDUPTHER

## 2018-02-09 RX ORDER — CLINDAMYCIN HYDROCHLORIDE 300 MG/1
300 CAPSULE ORAL 3 TIMES DAILY
Qty: 30 CAPSULE | Refills: 0 | Status: SHIPPED | OUTPATIENT
Start: 2018-02-09 | End: 2018-03-14 | Stop reason: SDUPTHER

## 2018-03-13 ENCOUNTER — TELEPHONE (OUTPATIENT)
Dept: FAMILY MEDICINE CLINIC | Age: 58
End: 2018-03-13

## 2018-03-14 RX ORDER — CLINDAMYCIN HYDROCHLORIDE 300 MG/1
300 CAPSULE ORAL 3 TIMES DAILY
Qty: 30 CAPSULE | Refills: 0 | Status: SHIPPED | OUTPATIENT
Start: 2018-03-14 | End: 2018-03-24

## 2018-03-29 ENCOUNTER — TELEPHONE (OUTPATIENT)
Dept: FAMILY MEDICINE CLINIC | Age: 58
End: 2018-03-29

## 2018-03-29 DIAGNOSIS — I10 ESSENTIAL HYPERTENSION: ICD-10-CM

## 2018-03-29 DIAGNOSIS — R60.0 BILATERAL LOWER EXTREMITY EDEMA: ICD-10-CM

## 2018-03-29 DIAGNOSIS — Z79.4 TYPE 2 DIABETES MELLITUS WITHOUT COMPLICATION, WITH LONG-TERM CURRENT USE OF INSULIN (HCC): ICD-10-CM

## 2018-03-29 DIAGNOSIS — E79.0 ELEVATED URIC ACID IN BLOOD: ICD-10-CM

## 2018-03-29 DIAGNOSIS — E87.6 HYPOKALEMIA: ICD-10-CM

## 2018-03-29 DIAGNOSIS — K21.9 GASTROESOPHAGEAL REFLUX DISEASE WITHOUT ESOPHAGITIS: ICD-10-CM

## 2018-03-29 DIAGNOSIS — E11.9 TYPE 2 DIABETES MELLITUS WITHOUT COMPLICATION, WITH LONG-TERM CURRENT USE OF INSULIN (HCC): ICD-10-CM

## 2018-03-29 RX ORDER — BUMETANIDE 1 MG/1
TABLET ORAL
Qty: 180 TABLET | Refills: 0 | Status: SHIPPED | OUTPATIENT
Start: 2018-03-29 | End: 2018-07-19 | Stop reason: SDUPTHER

## 2018-03-29 RX ORDER — OMEPRAZOLE 20 MG/1
20 CAPSULE, DELAYED RELEASE ORAL DAILY
Qty: 90 CAPSULE | Refills: 0 | Status: SHIPPED | OUTPATIENT
Start: 2018-03-29 | End: 2018-07-19 | Stop reason: SDUPTHER

## 2018-03-29 RX ORDER — PRAVASTATIN SODIUM 20 MG
TABLET ORAL
Qty: 90 TABLET | Refills: 0 | Status: SHIPPED | OUTPATIENT
Start: 2018-03-29 | End: 2018-07-19 | Stop reason: SDUPTHER

## 2018-03-29 RX ORDER — LOSARTAN POTASSIUM 50 MG/1
50 TABLET ORAL DAILY
Qty: 90 TABLET | Refills: 0 | Status: SHIPPED | OUTPATIENT
Start: 2018-03-29 | End: 2018-07-19 | Stop reason: SDUPTHER

## 2018-03-29 RX ORDER — POTASSIUM CHLORIDE 20 MEQ/1
20 TABLET, EXTENDED RELEASE ORAL DAILY
Qty: 90 TABLET | Refills: 0 | Status: SHIPPED | OUTPATIENT
Start: 2018-03-29 | End: 2018-07-19 | Stop reason: SDUPTHER

## 2018-03-29 RX ORDER — ALLOPURINOL 300 MG/1
300 TABLET ORAL DAILY
Qty: 90 TABLET | Refills: 0 | Status: SHIPPED | OUTPATIENT
Start: 2018-03-29 | End: 2018-07-19 | Stop reason: SDUPTHER

## 2018-04-15 LAB
GRAM STAIN RESULT: ABNORMAL
ORGANISM: ABNORMAL
WOUND/ABSCESS: ABNORMAL

## 2018-04-16 ENCOUNTER — OFFICE VISIT (OUTPATIENT)
Dept: FAMILY MEDICINE CLINIC | Age: 58
End: 2018-04-16
Payer: MEDICARE

## 2018-04-16 VITALS
DIASTOLIC BLOOD PRESSURE: 72 MMHG | HEART RATE: 63 BPM | OXYGEN SATURATION: 94 % | TEMPERATURE: 97.5 F | RESPIRATION RATE: 18 BRPM | SYSTOLIC BLOOD PRESSURE: 130 MMHG

## 2018-04-16 DIAGNOSIS — Z12.5 SCREENING PSA (PROSTATE SPECIFIC ANTIGEN): ICD-10-CM

## 2018-04-16 DIAGNOSIS — Z79.4 TYPE 2 DIABETES MELLITUS TREATED WITH INSULIN (HCC): ICD-10-CM

## 2018-04-16 DIAGNOSIS — L97.521 CHRONIC ULCER OF LEFT FOOT LIMITED TO BREAKDOWN OF SKIN (HCC): ICD-10-CM

## 2018-04-16 DIAGNOSIS — E11.9 TYPE 2 DIABETES MELLITUS TREATED WITH INSULIN (HCC): ICD-10-CM

## 2018-04-16 DIAGNOSIS — E66.01 OBESITY, MORBID (MORE THAN 100 LBS OVER IDEAL WEIGHT OR BMI > 40) (HCC): Chronic | ICD-10-CM

## 2018-04-16 DIAGNOSIS — L97.922 CHRONIC ULCER OF LEFT LEG WITH FAT LAYER EXPOSED (HCC): Primary | ICD-10-CM

## 2018-04-16 DIAGNOSIS — R97.20 ELEVATED PSA: ICD-10-CM

## 2018-04-16 LAB
ALBUMIN SERPL-MCNC: 3.8 G/DL (ref 3.5–5.2)
ALP BLD-CCNC: 85 U/L (ref 40–130)
ALT SERPL-CCNC: 9 U/L (ref 5–41)
ANION GAP SERPL CALCULATED.3IONS-SCNC: 14 MMOL/L (ref 7–19)
AST SERPL-CCNC: 11 U/L (ref 5–40)
BASOPHILS ABSOLUTE: 0.1 K/UL (ref 0–0.2)
BASOPHILS RELATIVE PERCENT: 0.6 % (ref 0–1)
BILIRUB SERPL-MCNC: 0.5 MG/DL (ref 0.2–1.2)
BILIRUBIN URINE: NEGATIVE
BLOOD, URINE: NEGATIVE
BUN BLDV-MCNC: 25 MG/DL (ref 6–20)
CALCIUM SERPL-MCNC: 9.6 MG/DL (ref 8.6–10)
CHLORIDE BLD-SCNC: 99 MMOL/L (ref 98–111)
CHOLESTEROL, TOTAL: 130 MG/DL (ref 160–199)
CLARITY: CLEAR
CO2: 31 MMOL/L (ref 22–29)
COLOR: YELLOW
CREAT SERPL-MCNC: 1 MG/DL (ref 0.5–1.2)
EOSINOPHILS ABSOLUTE: 0.3 K/UL (ref 0–0.6)
EOSINOPHILS RELATIVE PERCENT: 3.1 % (ref 0–5)
GFR NON-AFRICAN AMERICAN: >60
GLUCOSE BLD-MCNC: 197 MG/DL (ref 74–109)
GLUCOSE URINE: NEGATIVE MG/DL
HBA1C MFR BLD: 7.9 %
HCT VFR BLD CALC: 47.1 % (ref 42–52)
HDLC SERPL-MCNC: 49 MG/DL (ref 55–121)
HEMOGLOBIN: 14.2 G/DL (ref 14–18)
KETONES, URINE: NEGATIVE MG/DL
LDL CHOLESTEROL CALCULATED: 70 MG/DL
LEUKOCYTE ESTERASE, URINE: NEGATIVE
LYMPHOCYTES ABSOLUTE: 1.3 K/UL (ref 1.1–4.5)
LYMPHOCYTES RELATIVE PERCENT: 13.3 % (ref 20–40)
MCH RBC QN AUTO: 28.1 PG (ref 27–31)
MCHC RBC AUTO-ENTMCNC: 30.1 G/DL (ref 33–37)
MCV RBC AUTO: 93.1 FL (ref 80–94)
MONOCYTES ABSOLUTE: 0.6 K/UL (ref 0–0.9)
MONOCYTES RELATIVE PERCENT: 6.1 % (ref 0–10)
NEUTROPHILS ABSOLUTE: 7.3 K/UL (ref 1.5–7.5)
NEUTROPHILS RELATIVE PERCENT: 76.2 % (ref 50–65)
NITRITE, URINE: NEGATIVE
PDW BLD-RTO: 15.7 % (ref 11.5–14.5)
PH UA: 7
PLATELET # BLD: 205 K/UL (ref 130–400)
PMV BLD AUTO: 12 FL (ref 9.4–12.4)
POTASSIUM SERPL-SCNC: 5.3 MMOL/L (ref 3.5–5)
PROSTATE SPECIFIC ANTIGEN: 4.86 NG/ML (ref 0–4)
PROTEIN UA: NEGATIVE MG/DL
RBC # BLD: 5.06 M/UL (ref 4.7–6.1)
SODIUM BLD-SCNC: 144 MMOL/L (ref 136–145)
SPECIFIC GRAVITY UA: 1.02
TOTAL PROTEIN: 6.9 G/DL (ref 6.6–8.7)
TRIGL SERPL-MCNC: 54 MG/DL (ref 0–149)
URINE REFLEX TO CULTURE: NORMAL
UROBILINOGEN, URINE: 0.2 E.U./DL
WBC # BLD: 9.6 K/UL (ref 4.8–10.8)

## 2018-04-16 PROCEDURE — 3045F PR MOST RECENT HEMOGLOBIN A1C LEVEL 7.0-9.0%: CPT | Performed by: NURSE PRACTITIONER

## 2018-04-16 PROCEDURE — 99214 OFFICE O/P EST MOD 30 MIN: CPT | Performed by: NURSE PRACTITIONER

## 2018-04-16 PROCEDURE — 3017F COLORECTAL CA SCREEN DOC REV: CPT | Performed by: NURSE PRACTITIONER

## 2018-04-16 PROCEDURE — 1036F TOBACCO NON-USER: CPT | Performed by: NURSE PRACTITIONER

## 2018-04-16 PROCEDURE — G8417 CALC BMI ABV UP PARAM F/U: HCPCS | Performed by: NURSE PRACTITIONER

## 2018-04-16 PROCEDURE — G8427 DOCREV CUR MEDS BY ELIG CLIN: HCPCS | Performed by: NURSE PRACTITIONER

## 2018-04-16 PROCEDURE — 2022F DILAT RTA XM EVC RTNOPTHY: CPT | Performed by: NURSE PRACTITIONER

## 2018-04-16 RX ORDER — DOXYCYCLINE 100 MG/1
CAPSULE ORAL
Qty: 28 CAPSULE | Refills: 0 | Status: SHIPPED | OUTPATIENT
Start: 2018-04-16 | End: 2019-02-20 | Stop reason: ALTCHOICE

## 2018-04-16 RX ORDER — CLINDAMYCIN HYDROCHLORIDE 300 MG/1
300 CAPSULE ORAL 3 TIMES DAILY
Qty: 30 CAPSULE | Refills: 0 | Status: SHIPPED | OUTPATIENT
Start: 2018-04-16 | End: 2018-04-26

## 2018-04-17 ENCOUNTER — TELEPHONE (OUTPATIENT)
Dept: FAMILY MEDICINE CLINIC | Age: 58
End: 2018-04-17

## 2018-04-19 ENCOUNTER — HOSPITAL ENCOUNTER (OUTPATIENT)
Dept: GENERAL RADIOLOGY | Age: 58
Discharge: HOME | End: 2018-04-19
Payer: MEDICARE

## 2018-04-19 ENCOUNTER — HOSPITAL ENCOUNTER (OUTPATIENT)
Dept: WOUND CARE | Age: 58
Discharge: HOME | End: 2018-04-19
Payer: MEDICARE

## 2018-04-19 VITALS
HEIGHT: 69 IN | RESPIRATION RATE: 18 BRPM | HEART RATE: 78 BPM | TEMPERATURE: 97.5 F | WEIGHT: 315 LBS | DIASTOLIC BLOOD PRESSURE: 78 MMHG | SYSTOLIC BLOOD PRESSURE: 112 MMHG | BODY MASS INDEX: 46.65 KG/M2

## 2018-04-19 DIAGNOSIS — L97.222 NON-PRESSURE CHRONIC ULCER OF LEFT CALF WITH FAT LAYER EXPOSED (HCC): Chronic | ICD-10-CM

## 2018-04-19 DIAGNOSIS — Z79.4 TYPE 2 DIABETES MELLITUS WITHOUT COMPLICATION, WITH LONG-TERM CURRENT USE OF INSULIN (HCC): Primary | ICD-10-CM

## 2018-04-19 DIAGNOSIS — L98.499 TYPE 2 DIABETES MELLITUS WITH OTHER SKIN ULCER, UNSPECIFIED LONG TERM INSULIN USE STATUS: ICD-10-CM

## 2018-04-19 DIAGNOSIS — E11.9 TYPE 2 DIABETES MELLITUS WITHOUT COMPLICATION, WITH LONG-TERM CURRENT USE OF INSULIN (HCC): Primary | ICD-10-CM

## 2018-04-19 DIAGNOSIS — E11.622 TYPE 2 DIABETES MELLITUS WITH OTHER SKIN ULCER, UNSPECIFIED LONG TERM INSULIN USE STATUS: ICD-10-CM

## 2018-04-19 PROCEDURE — 99214 OFFICE O/P EST MOD 30 MIN: CPT | Performed by: NURSE PRACTITIONER

## 2018-04-19 PROCEDURE — 99214 OFFICE O/P EST MOD 30 MIN: CPT

## 2018-04-19 PROCEDURE — 73620 X-RAY EXAM OF FOOT: CPT

## 2018-04-19 ASSESSMENT — ENCOUNTER SYMPTOMS: COUGH: 0

## 2018-04-25 DIAGNOSIS — R97.20 ELEVATED PSA: Primary | ICD-10-CM

## 2018-04-30 LAB
ANION GAP SERPL CALCULATED.3IONS-SCNC: 12 MMOL/L (ref 7–19)
BUN BLDV-MCNC: 29 MG/DL (ref 6–20)
CALCIUM SERPL-MCNC: 9.1 MG/DL (ref 8.6–10)
CHLORIDE BLD-SCNC: 102 MMOL/L (ref 98–111)
CO2: 32 MMOL/L (ref 22–29)
CREAT SERPL-MCNC: 1.1 MG/DL (ref 0.5–1.2)
GFR NON-AFRICAN AMERICAN: >60
GLUCOSE BLD-MCNC: 161 MG/DL (ref 74–109)
POTASSIUM SERPL-SCNC: 5.1 MMOL/L (ref 3.5–5)
SODIUM BLD-SCNC: 146 MMOL/L (ref 136–145)

## 2018-05-01 ENCOUNTER — TELEPHONE (OUTPATIENT)
Dept: FAMILY MEDICINE CLINIC | Age: 58
End: 2018-05-01

## 2018-05-04 ENCOUNTER — TELEPHONE (OUTPATIENT)
Dept: FAMILY MEDICINE CLINIC | Age: 58
End: 2018-05-04

## 2018-05-10 ENCOUNTER — HOSPITAL ENCOUNTER (OUTPATIENT)
Dept: WOUND CARE | Age: 58
Discharge: HOME | End: 2018-05-10
Payer: MEDICARE

## 2018-05-10 ENCOUNTER — HOSPITAL ENCOUNTER (OUTPATIENT)
Dept: NON INVASIVE DIAGNOSTICS | Age: 58
Discharge: HOME | End: 2018-05-10
Payer: MEDICARE

## 2018-05-10 ENCOUNTER — TELEPHONE (OUTPATIENT)
Dept: FAMILY MEDICINE CLINIC | Age: 58
End: 2018-05-10

## 2018-05-10 VITALS
WEIGHT: 315 LBS | DIASTOLIC BLOOD PRESSURE: 73 MMHG | RESPIRATION RATE: 16 BRPM | HEART RATE: 62 BPM | TEMPERATURE: 97.8 F | SYSTOLIC BLOOD PRESSURE: 159 MMHG | BODY MASS INDEX: 46.65 KG/M2 | HEIGHT: 69 IN

## 2018-05-10 DIAGNOSIS — L02.416 CELLULITIS AND ABSCESS OF LEFT LOWER EXTREMITY: ICD-10-CM

## 2018-05-10 DIAGNOSIS — L03.116 CELLULITIS AND ABSCESS OF LEFT LOWER EXTREMITY: ICD-10-CM

## 2018-05-10 PROCEDURE — 93923 UPR/LXTR ART STDY 3+ LVLS: CPT

## 2018-05-10 PROCEDURE — 99214 OFFICE O/P EST MOD 30 MIN: CPT

## 2018-05-10 PROCEDURE — 99214 OFFICE O/P EST MOD 30 MIN: CPT | Performed by: NURSE PRACTITIONER

## 2018-05-15 NOTE — PROGRESS NOTES
Mr. Landeros is 57 y.o. male    CHIEF COMPLAINT: I am here for elevated PSA. My PSA is     HPI  This is a 57-year-old white male that is sent to me via [facility] for an elevated PSA.  I am asked to see him with a presumed prostate abnormality.  He has an elevated PSA with the severity of 4.6 ng/mL.  This is slightly decreased from 4.98 in December 2017.  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     The following portions of the patient's history were reviewed and updated as appropriate: allergies, current medications, past family history, past medical history, past social history, past surgical history and problem list.      Review of Systems   Constitutional: Negative for appetite change and fever.   HENT: Negative for hearing loss and sore throat.    Eyes: Negative for pain and redness.   Respiratory: Negative for cough and shortness of breath.    Cardiovascular: Negative for chest pain and leg swelling.   Gastrointestinal: Negative for anal bleeding, nausea and vomiting.   Endocrine: Negative for cold intolerance and heat intolerance.   Genitourinary: Negative for difficulty urinating, dysuria, flank pain, frequency, hematuria and urgency.   Musculoskeletal: Negative for joint swelling and myalgias.   Skin: Negative for color change and rash.   Allergic/Immunologic: Negative for immunocompromised state.   Neurological: Negative for dizziness and speech difficulty.   Hematological: Negative for adenopathy. Does not bruise/bleed easily.   Psychiatric/Behavioral: Negative for dysphoric mood and suicidal ideas.           Current Outpatient Prescriptions:   •  ACCU-CHEK SOFTCLIX LANCETS lancets, For qid testing, Disp: , Rfl:   •  albuterol (VENTOLIN HFA) 108 (90 BASE) MCG/ACT inhaler, Inhale 2 puffs Every 6 (Six) Hours. prn, Disp: , Rfl:   •   allopurinol (ZYLOPRIM) 300 MG tablet, Take 1 tablet by mouth Daily., Disp: , Rfl:   •  APPLE CIDER VINEGAR PO, Take  by mouth., Disp: , Rfl:   •  aspirin 81 MG EC tablet, Take 1 tablet by mouth Daily., Disp: , Rfl:   •  bumetanide (BUMEX) 1 MG tablet, Take 1-2 tablets by mouth Daily., Disp: , Rfl:   •  diphenhydrAMINE (BENADRYL) 25 mg capsule, Take 1 capsule by mouth 2 (Two) Times a Day. prn, Disp: , Rfl:   •  GARCINIA CAMBOGIA-CHROMIUM PO, Take 1 tablet by mouth 2 (Two) Times a Day., Disp: , Rfl:   •  glucose blood (ACCU-CHEK COMPACT PLUS) test strip, 1 applicator. One strip four times a day, Disp: , Rfl:   •  insulin NPH (NOVOLIN N RELION) 100 UNIT/ML injection, Inject 30 Units under the skin Daily. After dinner , Disp: , Rfl:   •  insulin NPH (NOVOLIN N) 100 UNIT/ML injection, Inject 20 Units under the skin Every Morning., Disp: , Rfl:   •  insulin regular (NOVOLIN R RELION) 100 UNIT/ML injection, Inject 30 Units under the skin., Disp: , Rfl:   •  losartan (COZAAR) 50 MG tablet, Take 1 tablet by mouth Daily., Disp: , Rfl:   •  nebivolol (BYSTOLIC) 10 MG tablet, Take 1 tablet by mouth Daily., Disp: , Rfl:   •  potassium chloride (KLOR-CON) 20 MEQ CR tablet, Take 1 tablet by mouth Daily., Disp: , Rfl:   •  pravastatin (PRAVACHOL) 20 MG tablet, Take 1 tablet by mouth Every Night., Disp: , Rfl:   •  vitamin B-12 (CYANOCOBALAMIN) 500 MCG tablet, Take 1 tablet by mouth 2 (Two) Times a Day., Disp: , Rfl:   •  vitamin D (ERGOCALCIFEROL) 42033 UNITS capsule capsule, Take 1 capsule by mouth Daily., Disp: , Rfl:   •  albuterol (PROVENTIL) (2.5 MG/3ML) 0.083% nebulizer solution, 3 mL Every 6 (Six) Hours. PRN, Disp: , Rfl:   •  azelastine (ASTELIN) 0.1 % nasal spray, 2 sprays into each nostril 2 (Two) Times a Day As Needed., Disp: , Rfl:   •  fluticasone (FLONASE) 50 MCG/ACT nasal spray, 1 spray into each nostril 2 (Two) Times a Day As Needed., Disp: , Rfl:   •  O2 (OXYGEN), Inhale 2 L As Needed., Disp: , Rfl:     Past  "Medical History:   Diagnosis Date   • COPD (chronic obstructive pulmonary disease)    • Depression    • Diabetes mellitus    • Hyperlipidemia    • Hypertension    • Venous insufficiency    • Venous ulcer of right leg        Past Surgical History:   Procedure Laterality Date   • APPENDECTOMY     • FOOT SURGERY     • TESTICLE SURGERY     • TRACHEOSTOMY         Social History     Social History   • Marital status: Single     Social History Main Topics   • Smoking status: Never Smoker   • Smokeless tobacco: Never Used   • Alcohol use No   • Drug use: No   • Sexual activity: Defer     Other Topics Concern   • Not on file       Family History   Problem Relation Age of Onset   • Diabetes Mother    • Heart disease Mother    • Hypertension Mother    • Hyperlipidemia Mother    • COPD Mother    • Diabetes Father    • Heart disease Father    • Hypertension Father    • No Known Problems Sister    • No Known Problems Brother    • No Known Problems Sister    • No Known Problems Sister    • No Known Problems Sister          Ht 175.3 cm (69\")   Wt (!) 227 kg (500 lb)   BMI 73.84 kg/m²       Physical Exam  Constitutional: Morbidly obese; No apparent distress; Vital reviewed as above  Psychiatric: Appropriate affect; Alert and oriented  Eyes: Unremarkable  Musculoskeletal: He is unable to walk but apparently can stand.   GI: Abdomen is essentially impossible to examine due to his pannus.   Respiratory: Labored breathing just from moving around in the room.   Skin: No pallor or diaphoresis  Lymphatic: No adenopathy neck    Data  Results for orders placed or performed in visit on 05/17/18   POC Urinalysis Dipstick, Automated   Result Value Ref Range    Color Yellow Yellow, Straw, Dark Yellow, Elyssa    Clarity, UA Clear Clear    Glucose, UA Negative Negative, 1000 mg/dL (3+) mg/dL    Bilirubin Negative Negative    Ketones, UA Negative Negative    Specific Gravity  1.020 1.005 - 1.030    Blood, UA Negative Negative    pH, Urine 6.0 5.0 " - 8.0    Protein, POC Negative Negative mg/dL    Urobilinogen, UA Normal Normal    Leukocytes Negative Negative    Nitrite, UA Negative Negative         Assessment and Plan  Diagnoses and all orders for this visit:    Elevated prostate specific antigen (PSA)  -     POC Urinalysis Dipstick, Automated    Morbid obesity with BMI of 70 and over, adult    #1.  This is a very challenging situation.  I do not think I have an ultrasound probe that could reach his prostate based on trying to examine him today.  Furthermore, it is highly doubtful that a 57-year-old with a BMI of 70 has a 10 year life expectancy.  I was very osbaldo with him about this.  He would not be a surgical candidate.  He be at high risk for radiation complications.  If he desires further evaluation of this after our discussion today, I would suggest that he be referred to [place] or [facility] in [place]. I am unable to provide him local evaluation or management of this issue.           Killian Orellana MD  05/17/18  11:54 AM

## 2018-05-16 ENCOUNTER — TELEPHONE (OUTPATIENT)
Dept: FAMILY MEDICINE CLINIC | Age: 58
End: 2018-05-16

## 2018-05-16 DIAGNOSIS — I87.8 VENOUS STASIS OF BOTH LOWER EXTREMITIES: Chronic | ICD-10-CM

## 2018-05-16 DIAGNOSIS — E66.01 OBESITY, MORBID (MORE THAN 100 LBS OVER IDEAL WEIGHT OR BMI > 40) (HCC): Primary | Chronic | ICD-10-CM

## 2018-05-17 ENCOUNTER — OFFICE VISIT (OUTPATIENT)
Dept: UROLOGY | Facility: CLINIC | Age: 58
End: 2018-05-17

## 2018-05-17 VITALS — BODY MASS INDEX: 46.65 KG/M2 | WEIGHT: 315 LBS | HEIGHT: 69 IN

## 2018-05-17 DIAGNOSIS — R97.20 ELEVATED PROSTATE SPECIFIC ANTIGEN (PSA): Primary | ICD-10-CM

## 2018-05-17 DIAGNOSIS — E66.01 MORBID OBESITY WITH BMI OF 70 AND OVER, ADULT (HCC): ICD-10-CM

## 2018-05-17 LAB
BILIRUB BLD-MCNC: NEGATIVE MG/DL
CLARITY, POC: CLEAR
COLOR UR: YELLOW
GLUCOSE UR STRIP-MCNC: NEGATIVE MG/DL
KETONES UR QL: NEGATIVE
LEUKOCYTE EST, POC: NEGATIVE
NITRITE UR-MCNC: NEGATIVE MG/ML
PH UR: 6 [PH] (ref 5–8)
PROT UR STRIP-MCNC: NEGATIVE MG/DL
RBC # UR STRIP: NEGATIVE /UL
SP GR UR: 1.02 (ref 1–1.03)
UROBILINOGEN UR QL: NORMAL

## 2018-05-17 PROCEDURE — 99203 OFFICE O/P NEW LOW 30 MIN: CPT | Performed by: UROLOGY

## 2018-05-17 PROCEDURE — 81003 URINALYSIS AUTO W/O SCOPE: CPT | Performed by: UROLOGY

## 2018-05-17 NOTE — PATIENT INSTRUCTIONS

## 2018-05-22 ENCOUNTER — TELEPHONE (OUTPATIENT)
Dept: FAMILY MEDICINE CLINIC | Age: 58
End: 2018-05-22

## 2018-06-01 DIAGNOSIS — Z79.4 CONTROLLED TYPE 2 DIABETES MELLITUS WITHOUT COMPLICATION, WITH LONG-TERM CURRENT USE OF INSULIN (HCC): ICD-10-CM

## 2018-06-01 DIAGNOSIS — E11.9 CONTROLLED TYPE 2 DIABETES MELLITUS WITHOUT COMPLICATION, WITH LONG-TERM CURRENT USE OF INSULIN (HCC): ICD-10-CM

## 2018-06-01 DIAGNOSIS — Z79.4 TYPE 2 DIABETES MELLITUS WITHOUT COMPLICATION, WITH LONG-TERM CURRENT USE OF INSULIN (HCC): ICD-10-CM

## 2018-06-01 DIAGNOSIS — E11.9 TYPE 2 DIABETES MELLITUS WITHOUT COMPLICATION, WITH LONG-TERM CURRENT USE OF INSULIN (HCC): ICD-10-CM

## 2018-06-01 RX ORDER — LANCETS
EACH MISCELLANEOUS
Qty: 100 EACH | Refills: 3 | Status: SHIPPED | OUTPATIENT
Start: 2018-06-01 | End: 2018-07-19 | Stop reason: SDUPTHER

## 2018-06-01 RX ORDER — BLOOD SUGAR DIAGNOSTIC, DRUM
STRIP MISCELLANEOUS
Qty: 102 STRIP | Refills: 5 | Status: SHIPPED | OUTPATIENT
Start: 2018-06-01 | End: 2018-07-19 | Stop reason: SDUPTHER

## 2018-06-01 RX ORDER — HUMAN INSULIN 100 [IU]/ML
INJECTION, SOLUTION SUBCUTANEOUS
Qty: 1 VIAL | Refills: 1 | Status: SHIPPED | OUTPATIENT
Start: 2018-06-01 | End: 2018-07-19 | Stop reason: SDUPTHER

## 2018-07-19 ENCOUNTER — OFFICE VISIT (OUTPATIENT)
Dept: FAMILY MEDICINE CLINIC | Age: 58
End: 2018-07-19
Payer: MEDICARE

## 2018-07-19 VITALS
WEIGHT: 315 LBS | TEMPERATURE: 98.4 F | BODY MASS INDEX: 45.1 KG/M2 | OXYGEN SATURATION: 96 % | RESPIRATION RATE: 16 BRPM | SYSTOLIC BLOOD PRESSURE: 138 MMHG | HEART RATE: 80 BPM | DIASTOLIC BLOOD PRESSURE: 82 MMHG | HEIGHT: 70 IN

## 2018-07-19 DIAGNOSIS — S81.802D WOUND OF LEFT LOWER EXTREMITY, SUBSEQUENT ENCOUNTER: ICD-10-CM

## 2018-07-19 DIAGNOSIS — E87.6 HYPOKALEMIA: ICD-10-CM

## 2018-07-19 DIAGNOSIS — Z79.4 TYPE 2 DIABETES MELLITUS TREATED WITH INSULIN (HCC): ICD-10-CM

## 2018-07-19 DIAGNOSIS — E79.0 ELEVATED URIC ACID IN BLOOD: ICD-10-CM

## 2018-07-19 DIAGNOSIS — Z12.5 SCREENING PSA (PROSTATE SPECIFIC ANTIGEN): ICD-10-CM

## 2018-07-19 DIAGNOSIS — R97.20 ELEVATED PSA: ICD-10-CM

## 2018-07-19 DIAGNOSIS — K21.9 GASTROESOPHAGEAL REFLUX DISEASE WITHOUT ESOPHAGITIS: ICD-10-CM

## 2018-07-19 DIAGNOSIS — E11.9 TYPE 2 DIABETES MELLITUS TREATED WITH INSULIN (HCC): ICD-10-CM

## 2018-07-19 DIAGNOSIS — R60.0 BILATERAL LOWER EXTREMITY EDEMA: ICD-10-CM

## 2018-07-19 DIAGNOSIS — I10 ESSENTIAL HYPERTENSION: ICD-10-CM

## 2018-07-19 DIAGNOSIS — Z79.4 TYPE 2 DIABETES MELLITUS TREATED WITH INSULIN (HCC): Primary | ICD-10-CM

## 2018-07-19 DIAGNOSIS — E11.9 TYPE 2 DIABETES MELLITUS TREATED WITH INSULIN (HCC): Primary | ICD-10-CM

## 2018-07-19 LAB
ALBUMIN SERPL-MCNC: 3.6 G/DL (ref 3.5–5.2)
ALP BLD-CCNC: 74 U/L (ref 40–130)
ALT SERPL-CCNC: 10 U/L (ref 5–41)
ANION GAP SERPL CALCULATED.3IONS-SCNC: 11 MMOL/L (ref 7–19)
AST SERPL-CCNC: 11 U/L (ref 5–40)
BILIRUB SERPL-MCNC: 0.5 MG/DL (ref 0.2–1.2)
BUN BLDV-MCNC: 20 MG/DL (ref 8–23)
CALCIUM SERPL-MCNC: 9.2 MG/DL (ref 8.8–10.2)
CHLORIDE BLD-SCNC: 102 MMOL/L (ref 98–111)
CO2: 31 MMOL/L (ref 22–29)
CREAT SERPL-MCNC: 1 MG/DL (ref 0.5–1.2)
GFR NON-AFRICAN AMERICAN: >60
GLUCOSE BLD-MCNC: 170 MG/DL (ref 74–109)
HBA1C MFR BLD: 8.2 % (ref 4–6)
POTASSIUM SERPL-SCNC: 4.3 MMOL/L (ref 3.5–5)
PROSTATE SPECIFIC ANTIGEN: 4.92 NG/ML (ref 0–4)
SODIUM BLD-SCNC: 144 MMOL/L (ref 136–145)
TOTAL PROTEIN: 6.7 G/DL (ref 6.6–8.7)

## 2018-07-19 PROCEDURE — G8427 DOCREV CUR MEDS BY ELIG CLIN: HCPCS | Performed by: NURSE PRACTITIONER

## 2018-07-19 PROCEDURE — G8417 CALC BMI ABV UP PARAM F/U: HCPCS | Performed by: NURSE PRACTITIONER

## 2018-07-19 PROCEDURE — 1036F TOBACCO NON-USER: CPT | Performed by: NURSE PRACTITIONER

## 2018-07-19 PROCEDURE — 3045F PR MOST RECENT HEMOGLOBIN A1C LEVEL 7.0-9.0%: CPT | Performed by: NURSE PRACTITIONER

## 2018-07-19 PROCEDURE — 99213 OFFICE O/P EST LOW 20 MIN: CPT | Performed by: NURSE PRACTITIONER

## 2018-07-19 PROCEDURE — 3017F COLORECTAL CA SCREEN DOC REV: CPT | Performed by: NURSE PRACTITIONER

## 2018-07-19 PROCEDURE — 2022F DILAT RTA XM EVC RTNOPTHY: CPT | Performed by: NURSE PRACTITIONER

## 2018-07-19 RX ORDER — ALLOPURINOL 300 MG/1
300 TABLET ORAL DAILY
Qty: 90 TABLET | Refills: 1 | Status: SHIPPED | OUTPATIENT
Start: 2018-07-19 | End: 2019-02-20 | Stop reason: SDUPTHER

## 2018-07-19 RX ORDER — OMEPRAZOLE 20 MG/1
20 CAPSULE, DELAYED RELEASE ORAL DAILY
Qty: 90 CAPSULE | Refills: 1 | Status: SHIPPED | OUTPATIENT
Start: 2018-07-19 | End: 2019-02-20 | Stop reason: SDUPTHER

## 2018-07-19 RX ORDER — PRAVASTATIN SODIUM 20 MG
TABLET ORAL
Qty: 90 TABLET | Refills: 3 | Status: SHIPPED | OUTPATIENT
Start: 2018-07-19 | End: 2019-01-28 | Stop reason: SDUPTHER

## 2018-07-19 RX ORDER — BUMETANIDE 1 MG/1
TABLET ORAL
Qty: 270 TABLET | Refills: 0 | Status: SHIPPED | OUTPATIENT
Start: 2018-07-19 | End: 2019-02-20

## 2018-07-19 RX ORDER — NEBIVOLOL 10 MG/1
10 TABLET ORAL DAILY
Qty: 90 TABLET | Refills: 1 | Status: SHIPPED | OUTPATIENT
Start: 2018-07-19 | End: 2019-02-20 | Stop reason: SDUPTHER

## 2018-07-19 RX ORDER — POTASSIUM CHLORIDE 20 MEQ/1
20 TABLET, EXTENDED RELEASE ORAL DAILY
Qty: 90 TABLET | Refills: 0 | Status: SHIPPED | OUTPATIENT
Start: 2018-07-19 | End: 2019-01-28 | Stop reason: SDUPTHER

## 2018-07-19 RX ORDER — LANCETS
EACH MISCELLANEOUS
Qty: 100 EACH | Refills: 3 | Status: SHIPPED | OUTPATIENT
Start: 2018-07-19 | End: 2019-02-20 | Stop reason: SDUPTHER

## 2018-07-19 RX ORDER — LOSARTAN POTASSIUM 50 MG/1
50 TABLET ORAL DAILY
Qty: 90 TABLET | Refills: 1 | Status: SHIPPED | OUTPATIENT
Start: 2018-07-19 | End: 2019-02-20 | Stop reason: SDUPTHER

## 2018-07-19 ASSESSMENT — PATIENT HEALTH QUESTIONNAIRE - PHQ9
1. LITTLE INTEREST OR PLEASURE IN DOING THINGS: 1
SUM OF ALL RESPONSES TO PHQ9 QUESTIONS 1 & 2: 1
2. FEELING DOWN, DEPRESSED OR HOPELESS: 0
SUM OF ALL RESPONSES TO PHQ QUESTIONS 1-9: 1

## 2018-07-19 NOTE — PROGRESS NOTES
SUBJECTIVE:    Patient ID: Kerri Pearce is a 59 y.o. male. Kerri Pearce is here today for Diabetes (needs refills); Hyperlipidemia; and Leg Swelling (States bumex is not helping and wants it changed)  . HPI:   HPI         A1c 7.9% in April 2018. Pt is hoping levels are improved on next lab test (ordered)  Fasting 130-180s and some 230 has been the highest depending upon food. No low glucose episodes. In questioning if pt is continuing to see wound care, pt reports that he is not seeing wound care and will not be seeing wound care. Pt is aware that I have stated I cannot continue to take care of his leg wound, he has been referred to wound care. Pt states that he has been continuing dry wrap at home. He refused wound care f/u but today is saying that he will see someone with wound care again. Fluid to legs  Onset years ago but over the last several months pt feels that the Bumex 2mg daily is not helping fluid any longer. Pt does use power chair and legs are dependent most of time. Patient is also stating that he did see urology as recommended, based upon elevated PSA. However, patient states that at his last evaluation he was in his words told he was too fat to worry about the elevated PSA level that that should be the least of his worries and that he would have to go to a larger urology clinic to be taking care of. Follow-up patient is in office today he is requesting to have any and all medication that is coming today for refill taking care of.     Past Medical History:   Diagnosis Date    Abrasion     Anxiety     Asthma     Bell's palsy     Cataracts, bilateral     Cellulitis     Diabetes ([address]     Edema extremities     GERD (gastroesophageal reflux disease)     Gout     H/O tracheostomy     2015    H/O urinary retention     Hernia, hiatal     History of panniculitis     History of shingles     Hyperlipemia     Hypertension     Morbid obesity (HCC)     Non-ST elevated myocardial infarction [facility])     11/2015    Obesity     Paraphimosis     tx w circumcision 2015    Pulmonary hypertension     Renal failure     Respiratory failure ([facility] [address]     2015    Shoulder pain     Sleep apnea     Testosterone deficiency     Ulcer (HCC)     Ulcer of calf (HCC)     Venous stasis of both lower extremities      Prior to Visit Medications    Medication Sig Taking?  Authorizing Provider   insulin regular (NOVOLIN R RELION) 100 UNIT/ML injection 30 units every evening with dinner (sub-q) Yes DURAN Kwan   ACCU-CHEK SOFTCLIX LANCETS MISC Use one lancet to check glucose 4 times daily  Dx: E11.9 Yes DURAN Kwan   blood glucose test strips (ACCU-CHEK COMPACT PLUS) strip USE ONE STRIP TO CHECK GLUCOSE 4 TIMES DAILY Yes DURAN Kwan   allopurinol (ZYLOPRIM) 300 MG tablet Take 1 tablet by mouth daily Yes DURAN Kwan   losartan (COZAAR) 50 MG tablet Take 1 tablet by mouth daily TAKE ONE TABLET BY MOUTH ONCE DAILY Yes DURAN Kwan   omeprazole (PRILOSEC) 20 MG delayed release capsule Take 1 capsule by mouth Daily Yes DURAN Kwan   potassium chloride (KLOR-CON M) 20 MEQ extended release tablet Take 1 tablet by mouth daily When taking bumex Yes DURAN Kwan   pravastatin (PRAVACHOL) 20 MG tablet TAKE ONE TABLET BY MOUTH ONCE nightly Yes DURAN Kwan   nebivolol (BYSTOLIC) 10 MG tablet Take 1 tablet by mouth daily Yes DURAN Kwan   bumetanide (BUMEX) 1 MG tablet 2-3 po every am for fluid Yes Raifa Brink, [address] by Does not apply route Hospital bed sized to fit pt height and wt  Pt is requesting bed 6inches longer than previously supplied Yes DURAN Kwan   doxycycline monohydrate (MONODOX) 100 MG capsule 1 po bid x 14d Yes DURAN Kwan   APPLE CIDER VINEGAR PO Take 15 mLs by mouth daily Pt mixes 1 tablespoon in glass orangejuicedaily Yes Historical Provider, MD   UNABLE TO FIND Take 2,500 mg by mouth 2 times daily  Yes Historical Provider, MD   UNABLE TO FIND Hoveround electric power chair  Dx-morbid obesity, venous stasis, extremity edema Yes DURAN Kwan   Multiple Vitamins-Minerals (SENIOR MULTIVITAMIN PLUS PO) Take 1 tablet by mouth daily Yes Historical Provider, MD   Cholecalciferol (VITAMIN D3) 5000 units CAPS Take 1 capsule by mouth daily Yes Historical Provider, MD   acetaminophen (TYLENOL) 500 MG tablet Take 1,000 mg by mouth every 6 hours as needed for Pain Yes Historical Provider, MD   NOVOLIN N RELION 100 UNIT/ML injection vial INJECT 30 UNITS SUB-Q IN THE MORNING  Patient taking differently: INJECT 15 UNITS SUB-Q IN THE lunch Yes DURAN Kwan   NOVOLIN R RELION 100 UNIT/ML injection USE AS DIRECTED WITH MEALS  Patient taking differently: USE AS DIRECTED WITH MEALS, BS-100/20=DOSE Yes DURAN Kwan   azelastine (ASTELIN) 0.1 % nasal spray 2 sprays by Nasal route 2 times daily Use in each nostril as directed Yes DURAN Kwan   fluticasone (FLONASE) 50 MCG/ACT nasal spray 1 spray by Nasal route daily Yes DURAN Kwan   OXYGEN Inhale 2 L into the lungs nightly Yes Historical Provider, MD   BiPAP Machine MISC by Does not apply route Currently not using per instructions of Dr. Yue Ortiz, [place].  Yes Historical Provider, MD   diphenhydrAMINE (BENADRYL) 25 MG capsule Take 1 capsule by mouth nightly as needed for Itching  Patient taking differently: Take 25 mg by mouth every 8 hours  Yes DURAN Kwan   albuterol sulfate HFA (VENTOLIN HFA) 108 (90 BASE) MCG/ACT inhaler Inhale 2 puffs into the lungs every 6 hours as needed for Wheezing Yes DURAN Kwan   albuterol (PROVENTIL) (2.5 MG/3ML) 0.083% nebulizer solution Take 3 mLs by nebulization every 6 hours as needed for Wheezing Yes DURAN Kwan   Insulin Syringe-Needle U-100 (EASY TOUCH INSULIN SYRINGE) 31G X 5/16\" 1 ML MISC Inject 1 each as directed 4 times daily as needed Yes DURAN Kwan   aspirin EC 81 MG EC tablet Take 1 tablet by mouth daily Yes DURAN Kwan   vitamin B-12 (CYANOCOBALAMIN) 500 MCG tablet Take 1 tablet by mouth daily  Patient taking differently: Take 5,000 mcg by mouth 2 times daily Pt states he takes 2 tabs twice daily Yes Rafia Brink, [address] by Does not apply route Bariatric for home Yes DURAN Small   Misc. Devices (WALKER WHEELS) MISC 1 each by Does not apply route once for 1 dose. HEAVY DUTY walker wheels x2  DURAN Kwan     Allergies   Allergen Reactions    Penicillamine Rash    Silvadene [Silver Sulfadiazine] Rash and Other (See Comments)     burning    Aerohist [Chlorpheniramine-Methscop Er]     Cephalosporins Other (See Comments)     Per d/c summary of 12/17/15, pt possibly had cross-reactivity to a cephalosporin during his hospitalization    Neosporin [Neomycin-Polymyxin-Gramicidin]     Penicillins      Past Surgical History:   Procedure Laterality Date    APPENDECTOMY      PT THINKS AS A CHILD, REMOVED     COLONOSCOPY      ENDOSCOPY, COLON, DIAGNOSTIC      FOOT SURGERY Left LONG AGO 1980'S    REMOVED FOREIGN BODY AND CLOSED WOUND    FOOT SURGERY Right     REMOVED FOREIGN BODY NEEDLE    MOUTH SURGERY      REMOVE TEETH    TESTICLE SURGERY  10/1970    TO HELP TESTICLE DROP,     TRACHEOSTOMY TUBE PLACEMENT      AND REMOVA;     Family History   Problem Relation Age of Onset    Asthma Mother     Diabetes Mother     Hypertension Mother     Heart Disease Mother     Diabetes Father     Hypertension Father     Heart Disease Father      Social History     Social History    Marital status: Single     Spouse name: N/A    Number of children: N/A    Years of education: N/A     Occupational History    Not on file.      Social History Main Topics    Smoking status: Never Smoker    Smokeless tobacco: Never Used    Alcohol use No    Drug use: No    Sexual activity: Not Currently     Other Topics Concern    Not on file     Social History Narrative    No narrative on file       Review of

## 2018-07-20 ASSESSMENT — ENCOUNTER SYMPTOMS
CONSTIPATION: 0
DIARRHEA: 0

## 2018-07-27 ENCOUNTER — TELEPHONE (OUTPATIENT)
Dept: FAMILY MEDICINE CLINIC | Age: 58
End: 2018-07-27

## 2019-01-17 DIAGNOSIS — R60.0 BILATERAL LOWER EXTREMITY EDEMA: ICD-10-CM

## 2019-01-23 RX ORDER — BUMETANIDE 1 MG/1
TABLET ORAL
Qty: 60 TABLET | Refills: 0 | Status: SHIPPED | OUTPATIENT
Start: 2019-01-23 | End: 2019-02-20

## 2019-01-25 DIAGNOSIS — E87.6 HYPOKALEMIA: ICD-10-CM

## 2019-01-25 DIAGNOSIS — E11.9 TYPE 2 DIABETES MELLITUS TREATED WITH INSULIN (HCC): ICD-10-CM

## 2019-01-25 DIAGNOSIS — Z79.4 TYPE 2 DIABETES MELLITUS TREATED WITH INSULIN (HCC): ICD-10-CM

## 2019-01-25 DIAGNOSIS — E79.0 ELEVATED URIC ACID IN BLOOD: ICD-10-CM

## 2019-01-25 DIAGNOSIS — I10 ESSENTIAL HYPERTENSION: ICD-10-CM

## 2019-01-25 DIAGNOSIS — R60.0 BILATERAL LOWER EXTREMITY EDEMA: ICD-10-CM

## 2019-01-28 RX ORDER — PRAVASTATIN SODIUM 20 MG
TABLET ORAL
Qty: 30 TABLET | Refills: 0 | Status: SHIPPED | OUTPATIENT
Start: 2019-01-28 | End: 2019-02-20 | Stop reason: SDUPTHER

## 2019-01-28 RX ORDER — LOSARTAN POTASSIUM 50 MG/1
TABLET ORAL
Qty: 30 TABLET | Refills: 0 | Status: SHIPPED | OUTPATIENT
Start: 2019-01-28 | End: 2019-02-20

## 2019-01-28 RX ORDER — ALLOPURINOL 300 MG/1
TABLET ORAL
Qty: 30 TABLET | Refills: 0 | Status: SHIPPED | OUTPATIENT
Start: 2019-01-28 | End: 2019-02-20

## 2019-01-28 RX ORDER — POTASSIUM CHLORIDE 20 MEQ/1
20 TABLET, EXTENDED RELEASE ORAL DAILY
Qty: 90 TABLET | Refills: 0 | Status: SHIPPED | OUTPATIENT
Start: 2019-01-28 | End: 2019-02-20 | Stop reason: SDUPTHER

## 2019-01-29 ENCOUNTER — TELEPHONE (OUTPATIENT)
Dept: FAMILY MEDICINE CLINIC | Age: 59
End: 2019-01-29

## 2019-01-29 DIAGNOSIS — Z79.4 TYPE 2 DIABETES MELLITUS TREATED WITH INSULIN (HCC): ICD-10-CM

## 2019-01-29 DIAGNOSIS — E55.9 VITAMIN D DEFICIENCY: ICD-10-CM

## 2019-01-29 DIAGNOSIS — F41.9 ANXIETY: Primary | ICD-10-CM

## 2019-01-29 DIAGNOSIS — R97.20 ELEVATED PSA: ICD-10-CM

## 2019-01-29 DIAGNOSIS — I10 ESSENTIAL HYPERTENSION: ICD-10-CM

## 2019-01-29 DIAGNOSIS — Z12.5 PROSTATE CANCER SCREENING: ICD-10-CM

## 2019-01-29 DIAGNOSIS — E11.9 TYPE 2 DIABETES MELLITUS TREATED WITH INSULIN (HCC): ICD-10-CM

## 2019-02-12 ENCOUNTER — TELEPHONE (OUTPATIENT)
Dept: FAMILY MEDICINE CLINIC | Age: 59
End: 2019-02-12

## 2019-02-20 ENCOUNTER — OFFICE VISIT (OUTPATIENT)
Dept: FAMILY MEDICINE CLINIC | Age: 59
End: 2019-02-20
Payer: MEDICARE

## 2019-02-20 VITALS
OXYGEN SATURATION: 99 % | WEIGHT: 315 LBS | HEIGHT: 70 IN | DIASTOLIC BLOOD PRESSURE: 68 MMHG | TEMPERATURE: 98.9 F | HEART RATE: 60 BPM | RESPIRATION RATE: 20 BRPM | SYSTOLIC BLOOD PRESSURE: 116 MMHG | BODY MASS INDEX: 45.1 KG/M2

## 2019-02-20 DIAGNOSIS — R60.0 BILATERAL LOWER EXTREMITY EDEMA: ICD-10-CM

## 2019-02-20 DIAGNOSIS — E55.9 VITAMIN D DEFICIENCY: ICD-10-CM

## 2019-02-20 DIAGNOSIS — Z79.4 TYPE 2 DIABETES MELLITUS TREATED WITH INSULIN (HCC): ICD-10-CM

## 2019-02-20 DIAGNOSIS — E66.01 MORBID OBESITY (HCC): ICD-10-CM

## 2019-02-20 DIAGNOSIS — K21.9 GASTROESOPHAGEAL REFLUX DISEASE WITHOUT ESOPHAGITIS: ICD-10-CM

## 2019-02-20 DIAGNOSIS — E11.9 TYPE 2 DIABETES MELLITUS TREATED WITH INSULIN (HCC): ICD-10-CM

## 2019-02-20 DIAGNOSIS — Z79.4 TYPE 2 DIABETES MELLITUS TREATED WITH INSULIN (HCC): Primary | ICD-10-CM

## 2019-02-20 DIAGNOSIS — I10 ESSENTIAL HYPERTENSION: ICD-10-CM

## 2019-02-20 DIAGNOSIS — E11.9 TYPE 2 DIABETES MELLITUS TREATED WITH INSULIN (HCC): Primary | ICD-10-CM

## 2019-02-20 DIAGNOSIS — Z12.5 PROSTATE CANCER SCREENING: ICD-10-CM

## 2019-02-20 DIAGNOSIS — E79.0 ELEVATED URIC ACID IN BLOOD: ICD-10-CM

## 2019-02-20 DIAGNOSIS — E87.6 HYPOKALEMIA: ICD-10-CM

## 2019-02-20 DIAGNOSIS — F41.9 ANXIETY: ICD-10-CM

## 2019-02-20 LAB
ALBUMIN SERPL-MCNC: 3.7 G/DL (ref 3.5–5.2)
ALP BLD-CCNC: 80 U/L (ref 40–130)
ALT SERPL-CCNC: 12 U/L (ref 5–41)
ANION GAP SERPL CALCULATED.3IONS-SCNC: 12 MMOL/L (ref 7–19)
AST SERPL-CCNC: 11 U/L (ref 5–40)
BASOPHILS ABSOLUTE: 0.1 K/UL (ref 0–0.2)
BASOPHILS RELATIVE PERCENT: 0.6 % (ref 0–1)
BILIRUB SERPL-MCNC: 0.3 MG/DL (ref 0.2–1.2)
BILIRUBIN URINE: NEGATIVE
BLOOD, URINE: NEGATIVE
BUN BLDV-MCNC: 34 MG/DL (ref 8–23)
CALCIUM SERPL-MCNC: 9 MG/DL (ref 8.8–10.2)
CHLORIDE BLD-SCNC: 100 MMOL/L (ref 98–111)
CHOLESTEROL, TOTAL: 106 MG/DL (ref 160–199)
CLARITY: CLEAR
CO2: 29 MMOL/L (ref 22–29)
COLOR: YELLOW
CREAT SERPL-MCNC: 1.1 MG/DL (ref 0.5–1.2)
CREATININE URINE: 86.5 MG/DL (ref 4.2–622)
EOSINOPHILS ABSOLUTE: 0.3 K/UL (ref 0–0.6)
EOSINOPHILS RELATIVE PERCENT: 3.2 % (ref 0–5)
GFR NON-AFRICAN AMERICAN: >60
GLUCOSE BLD-MCNC: 187 MG/DL (ref 74–109)
GLUCOSE URINE: NEGATIVE MG/DL
HBA1C MFR BLD: 8 % (ref 4–6)
HCT VFR BLD CALC: 48.1 % (ref 42–52)
HDLC SERPL-MCNC: 42 MG/DL (ref 55–121)
HEMOGLOBIN: 13.9 G/DL (ref 14–18)
KETONES, URINE: NEGATIVE MG/DL
LDL CHOLESTEROL CALCULATED: 55 MG/DL
LEUKOCYTE ESTERASE, URINE: NEGATIVE
LYMPHOCYTES ABSOLUTE: 1 K/UL (ref 1.1–4.5)
LYMPHOCYTES RELATIVE PERCENT: 11 % (ref 20–40)
MCH RBC QN AUTO: 27.3 PG (ref 27–31)
MCHC RBC AUTO-ENTMCNC: 28.9 G/DL (ref 33–37)
MCV RBC AUTO: 94.5 FL (ref 80–94)
MICROALBUMIN UR-MCNC: <1.2 MG/DL (ref 0–19)
MICROALBUMIN/CREAT UR-RTO: NORMAL MG/G
MONOCYTES ABSOLUTE: 0.7 K/UL (ref 0–0.9)
MONOCYTES RELATIVE PERCENT: 7.5 % (ref 0–10)
NEUTROPHILS ABSOLUTE: 6.8 K/UL (ref 1.5–7.5)
NEUTROPHILS RELATIVE PERCENT: 77.4 % (ref 50–65)
NITRITE, URINE: NEGATIVE
PDW BLD-RTO: 16.4 % (ref 11.5–14.5)
PH UA: 6
PLATELET # BLD: 200 K/UL (ref 130–400)
PMV BLD AUTO: 11.7 FL (ref 9.4–12.4)
POTASSIUM SERPL-SCNC: 5 MMOL/L (ref 3.5–5)
PROSTATE SPECIFIC ANTIGEN: 5.14 NG/ML (ref 0–4)
PROTEIN UA: NEGATIVE MG/DL
RBC # BLD: 5.09 M/UL (ref 4.7–6.1)
SODIUM BLD-SCNC: 141 MMOL/L (ref 136–145)
SPECIFIC GRAVITY UA: 1.02
T4 FREE: 1.4 NG/DL (ref 0.9–1.7)
TOTAL PROTEIN: 6.8 G/DL (ref 6.6–8.7)
TRIGL SERPL-MCNC: 47 MG/DL (ref 0–149)
TSH SERPL DL<=0.05 MIU/L-ACNC: 4.87 UIU/ML (ref 0.27–4.2)
URINE REFLEX TO CULTURE: NORMAL
UROBILINOGEN, URINE: 0.2 E.U./DL
VITAMIN D 25-HYDROXY: 60.1 NG/ML
WBC # BLD: 8.8 K/UL (ref 4.8–10.8)

## 2019-02-20 PROCEDURE — 3045F PR MOST RECENT HEMOGLOBIN A1C LEVEL 7.0-9.0%: CPT | Performed by: NURSE PRACTITIONER

## 2019-02-20 PROCEDURE — 3017F COLORECTAL CA SCREEN DOC REV: CPT | Performed by: NURSE PRACTITIONER

## 2019-02-20 PROCEDURE — 2022F DILAT RTA XM EVC RTNOPTHY: CPT | Performed by: NURSE PRACTITIONER

## 2019-02-20 PROCEDURE — G8484 FLU IMMUNIZE NO ADMIN: HCPCS | Performed by: NURSE PRACTITIONER

## 2019-02-20 PROCEDURE — 99214 OFFICE O/P EST MOD 30 MIN: CPT | Performed by: NURSE PRACTITIONER

## 2019-02-20 PROCEDURE — 1036F TOBACCO NON-USER: CPT | Performed by: NURSE PRACTITIONER

## 2019-02-20 PROCEDURE — G8417 CALC BMI ABV UP PARAM F/U: HCPCS | Performed by: NURSE PRACTITIONER

## 2019-02-20 PROCEDURE — G8427 DOCREV CUR MEDS BY ELIG CLIN: HCPCS | Performed by: NURSE PRACTITIONER

## 2019-02-20 RX ORDER — OMEPRAZOLE 20 MG/1
20 CAPSULE, DELAYED RELEASE ORAL DAILY
Qty: 90 CAPSULE | Refills: 1 | Status: SHIPPED | OUTPATIENT
Start: 2019-02-20

## 2019-02-20 RX ORDER — NEBIVOLOL 10 MG/1
10 TABLET ORAL DAILY
Qty: 90 TABLET | Refills: 1 | Status: SHIPPED | OUTPATIENT
Start: 2019-02-20

## 2019-02-20 RX ORDER — POTASSIUM CHLORIDE 20 MEQ/1
20 TABLET, EXTENDED RELEASE ORAL DAILY
Qty: 90 TABLET | Refills: 1 | Status: SHIPPED | OUTPATIENT
Start: 2019-02-20

## 2019-02-20 RX ORDER — LANCETS
EACH MISCELLANEOUS
Qty: 100 EACH | Refills: 3 | Status: SHIPPED | OUTPATIENT
Start: 2019-02-20

## 2019-02-20 RX ORDER — PRAVASTATIN SODIUM 20 MG
TABLET ORAL
Qty: 90 TABLET | Refills: 1 | Status: SHIPPED | OUTPATIENT
Start: 2019-02-20

## 2019-02-20 RX ORDER — LOSARTAN POTASSIUM 50 MG/1
50 TABLET ORAL DAILY
Qty: 90 TABLET | Refills: 1 | Status: SHIPPED | OUTPATIENT
Start: 2019-02-20 | End: 2019-03-19

## 2019-02-20 RX ORDER — ALLOPURINOL 300 MG/1
300 TABLET ORAL DAILY
Qty: 90 TABLET | Refills: 1 | Status: SHIPPED | OUTPATIENT
Start: 2019-02-20

## 2019-02-22 ENCOUNTER — TELEPHONE (OUTPATIENT)
Dept: FAMILY MEDICINE CLINIC | Age: 59
End: 2019-02-22

## 2019-02-25 ENCOUNTER — TELEPHONE (OUTPATIENT)
Dept: FAMILY MEDICINE CLINIC | Age: 59
End: 2019-02-25

## 2019-02-25 ASSESSMENT — ENCOUNTER SYMPTOMS: COUGH: 0

## 2019-02-26 ENCOUNTER — TELEPHONE (OUTPATIENT)
Dept: FAMILY MEDICINE CLINIC | Age: 59
End: 2019-02-26

## 2019-03-06 ENCOUNTER — TELEPHONE (OUTPATIENT)
Dept: FAMILY MEDICINE CLINIC | Age: 59
End: 2019-03-06

## 2019-03-08 DIAGNOSIS — R60.0 BILATERAL LOWER EXTREMITY EDEMA: ICD-10-CM

## 2019-03-08 RX ORDER — BUMETANIDE 1 MG/1
TABLET ORAL
Qty: 180 TABLET | Refills: 1 | Status: SHIPPED | OUTPATIENT
Start: 2019-03-08

## 2019-03-11 ENCOUNTER — TELEPHONE (OUTPATIENT)
Dept: FAMILY MEDICINE CLINIC | Age: 59
End: 2019-03-11

## 2019-03-12 ENCOUNTER — TELEPHONE (OUTPATIENT)
Dept: FAMILY MEDICINE CLINIC | Age: 59
End: 2019-03-12

## 2019-03-12 RX ORDER — DOXYCYCLINE HYCLATE 100 MG
100 TABLET ORAL 2 TIMES DAILY
Qty: 28 TABLET | Refills: 0 | Status: SHIPPED | OUTPATIENT
Start: 2019-03-12 | End: 2019-03-26

## 2019-03-15 LAB
GRAM STAIN RESULT: ABNORMAL
ORGANISM: ABNORMAL
WOUND/ABSCESS: ABNORMAL

## 2019-03-18 ENCOUNTER — TELEPHONE (OUTPATIENT)
Dept: FAMILY MEDICINE CLINIC | Age: 59
End: 2019-03-18

## 2019-03-19 RX ORDER — OLMESARTAN MEDOXOMIL 20 MG/1
20 TABLET ORAL DAILY
Qty: 30 TABLET | Refills: 1 | Status: SHIPPED | OUTPATIENT
Start: 2019-03-19

## 2019-03-20 ENCOUNTER — TELEPHONE (OUTPATIENT)
Dept: FAMILY MEDICINE CLINIC | Age: 59
End: 2019-03-20

## 2019-03-20 NOTE — TELEPHONE ENCOUNTER
Belen Márquez went out to check patient's BP today after receiving orders to check 3 x weekly with new Benicar. Patient has not even started the Benicar, it is still at the pharmacy, after he called upset wanting BP medication changed. They have discontinued orders to check BP 3 times a week. She said the drainage is still terrible from his leg and has a very foul odor. She advised him to see his PCP.

## 2019-03-21 NOTE — TELEPHONE ENCOUNTER
Patient is still refusing wound care and states that reason is that they made it worse every time he has gone. Patient states the only thing that he is willing to do is to go to [place] [id number] and than they send him to [facility] wound care for follow up appointments.

## 2019-03-21 NOTE — TELEPHONE ENCOUNTER
Is he continuing to refuse wound care? I have greatly enjoyed caring for this pt, but if he will no be evaluated with wound care, his risk continues to increase for further complications. I cannot continue to provide holistic care but neglect his legs. I am at a loss on this. Please place call to pt and see if he is now willing to be evaluated by wound care--any surrounding counties or out of town. Anywhere he will consent to go to wound care, I will enter order.

## 2019-03-22 NOTE — TELEPHONE ENCOUNTER
With the reports that I am hearing from home health, this needs to be a priority consideration for pt if this is the only plan he will consider.

## 2019-03-25 ENCOUNTER — TELEPHONE (OUTPATIENT)
Dept: FAMILY MEDICINE CLINIC | Age: 59
End: 2019-03-25

## 2019-03-25 NOTE — TELEPHONE ENCOUNTER
Patient advised to go to [facility] ER for evaluation of legs as per Rafia's recommendation. Patient states that he will call his family and get a ride. He said he was taking the antibiotics and doubted that it was taking care of his infection even though he said that his legs looked better. Patient states that he was unhappy with his care since he never got the results of the wound culture and than said he better shut up and the conversation ended. Patient voiced no further questions and understanding.  CORIN HARTLEY

## 2019-03-25 NOTE — TELEPHONE ENCOUNTER
Clarisse with [facility] called, patient's initial orders for wound care had lapsed, she has recertified it for 3 X weekly as long as insurance will allow.

## 2019-03-25 NOTE — TELEPHONE ENCOUNTER
Clarisse with [facility] went out for visit today. She states patient has started Benicar, BP was 128/84 this am before meds. He is still non compliant with wound dressing changes unless they are there to do it, and they can only go 3 days a week. She said there is still a lot of drainage and it needs to be cared for daily. She said there is a very foul odor coming from his leg, she does not believe doxycycline it working at all. She has, once again, advised patient to see PCP, he has transportation information now because  supplied him with it. She said the neglect on the days they are not there is not helping situation at all.

## 2019-03-25 NOTE — TELEPHONE ENCOUNTER
Deonte Hughes,  with [facility] [place]. went out and gave patient multiple resources on transportation. He told her he did not need further services so she saw him once and discharged him.

## 2019-03-27 NOTE — TELEPHONE ENCOUNTER
Did you inquire of who did the culture he is speaking of? I did not order this at his last visit. However, now that I know he had it, I have found it in his chart. He has grown multiple bacteria and is allergic to several treatments. Ultimately, pt needs wound care. I could have pt admitted to obtain IV meds---or with home health--but pt would be receiving inadequate care. He needs wound care asap. Please let me know who ordered the culture. Bernarda, once you review this, you may want to speak with Ramin Montes De Oca (before both of you call and make things more confusing) as she has been in communication with pt/home health.

## 2019-03-29 NOTE — TELEPHONE ENCOUNTER
I have notified May Lutz. She will need you to sign off on discharge letter once you are back in the office.

## 2019-04-04 NOTE — TELEPHONE ENCOUNTER
I am hoping pt has gone to [facility]--the only location he was accepting to be seen and the location he mentioned himself. I have spoken to  regarding pt's safety and need for a different provider to see pt as I feel I am no longer effectively communicating with pt.

## 2019-04-05 NOTE — TELEPHONE ENCOUNTER
I do not know if he has gone or not, home health has not called in a few days, so he may have ran out of visits with him. When I talked to Patricia Conn about it in your absence, I believe she had a plan, so you may want to talk to her about it.

## 2019-04-10 ENCOUNTER — TELEPHONE (OUTPATIENT)
Dept: FAMILY MEDICINE CLINIC | Age: 59
End: 2019-04-10

## 2019-04-12 NOTE — TELEPHONE ENCOUNTER
I have spoken with Altaf Small regarding change of provider who may be able to communicate more effectively. Pt has been advised that he must see wound care (repeatedly) and has most recently agreed to see Joseline Wu and needs to do this as he said this was only place he would go. I will be happy to enter Joseline Wu wound care referral but my understanding was that he would go to [facility].

## 2019-04-12 NOTE — TELEPHONE ENCOUNTER
Phone call placed to patient regarding below. Patient states that he never went to [facility] ER after last phone call advising him of the need to go due to condition of leg wounds. Patient states that transportation is an issue and that he asked his brother and other family members to take him and he wasn't able to get a ride. Patient states that his wounds look better than what they have been and that the nurse tells him that his legs look better. Patient was urged to go to [facility] and he agreed again. Patient was told that I would call him on Monday to check if he had gone to [facility].

## 2019-04-15 ENCOUNTER — TELEPHONE (OUTPATIENT)
Dept: FAMILY MEDICINE CLINIC | Age: 59
End: 2019-04-15

## 2019-04-15 NOTE — TELEPHONE ENCOUNTER
Pt has not adhered to recommended plan. Last telephone encounter is noted to show pt spoke with Jos Dasilva and Lukas [place] was discussed again. Pt will not see any local wound care and and must be evaluated by wound care. I cannot recert home health as they have reported non-compliance with dressing changes as well. I have spoken with Eliseo Bryan, , regarding need for transfer of care/or discharge as pt is non-adherent to plan and is risking his life doing so. He may respond better to a different provider, as I am failing to make progress with/for pt.

## 2019-04-15 NOTE — TELEPHONE ENCOUNTER
Clarisse with [facility] called, it is time for them to discharge patient or recert patient.

## 2019-05-09 LAB — HBA1C MFR BLD: 7.7 % (ref 4–6)

## 2019-08-23 LAB — POTASSIUM SERPL-SCNC: 5 MMOL/L (ref 3.5–5)

## 2019-08-28 LAB — POTASSIUM SERPL-SCNC: 4.9 MMOL/L (ref 3.5–5)

## 2019-08-30 LAB
HBA1C MFR BLD: 7.6 % (ref 4–6)
POTASSIUM SERPL-SCNC: 4.6 MMOL/L (ref 3.5–5)

## 2019-10-01 LAB
ANAEROBIC CULTURE: ABNORMAL
GRAM STAIN RESULT: ABNORMAL
ORGANISM: ABNORMAL
WOUND/ABSCESS: ABNORMAL

## 2020-01-27 LAB
ALBUMIN SERPL-MCNC: 3.9 G/DL (ref 3.5–5.2)
ALP BLD-CCNC: 98 U/L (ref 40–130)
ALT SERPL-CCNC: 12 U/L (ref 5–41)
ANION GAP SERPL CALCULATED.3IONS-SCNC: 15 MMOL/L (ref 7–19)
AST SERPL-CCNC: 15 U/L (ref 5–40)
BILIRUB SERPL-MCNC: 0.5 MG/DL (ref 0.2–1.2)
BUN BLDV-MCNC: 25 MG/DL (ref 8–23)
CALCIUM SERPL-MCNC: 9.3 MG/DL (ref 8.8–10.2)
CHLORIDE BLD-SCNC: 98 MMOL/L (ref 98–111)
CO2: 28 MMOL/L (ref 22–29)
CREAT SERPL-MCNC: 1.1 MG/DL (ref 0.5–1.2)
GFR NON-AFRICAN AMERICAN: >60
GLUCOSE BLD-MCNC: 157 MG/DL (ref 74–109)
POTASSIUM SERPL-SCNC: 5.4 MMOL/L (ref 3.5–5)
SODIUM BLD-SCNC: 141 MMOL/L (ref 136–145)
TOTAL PROTEIN: 6.9 G/DL (ref 6.6–8.7)

## 2020-01-30 LAB
ALBUMIN SERPL-MCNC: 3.5 G/DL (ref 3.5–5.2)
ALP BLD-CCNC: 96 U/L (ref 40–130)
ALT SERPL-CCNC: 11 U/L (ref 5–41)
ANION GAP SERPL CALCULATED.3IONS-SCNC: 15 MMOL/L (ref 7–19)
AST SERPL-CCNC: 16 U/L (ref 5–40)
BILIRUB SERPL-MCNC: 0.3 MG/DL (ref 0.2–1.2)
BUN BLDV-MCNC: 22 MG/DL (ref 8–23)
CALCIUM SERPL-MCNC: 9.3 MG/DL (ref 8.8–10.2)
CHLORIDE BLD-SCNC: 97 MMOL/L (ref 98–111)
CO2: 27 MMOL/L (ref 22–29)
CREAT SERPL-MCNC: 1 MG/DL (ref 0.5–1.2)
GFR NON-AFRICAN AMERICAN: >60
GLUCOSE BLD-MCNC: 202 MG/DL (ref 74–109)
POTASSIUM SERPL-SCNC: 4.8 MMOL/L (ref 3.5–5)
SODIUM BLD-SCNC: 139 MMOL/L (ref 136–145)
TOTAL PROTEIN: 7.3 G/DL (ref 6.6–8.7)

## 2020-02-02 LAB
ANAEROBIC CULTURE: ABNORMAL
GRAM STAIN RESULT: ABNORMAL
ORGANISM: ABNORMAL
WOUND/ABSCESS: ABNORMAL

## 2020-05-24 LAB
ANAEROBIC CULTURE: ABNORMAL
GRAM STAIN RESULT: ABNORMAL
ORGANISM: ABNORMAL
WOUND/ABSCESS: ABNORMAL

## 2020-06-08 LAB — HBA1C MFR BLD: 7.9 % (ref 4–6)

## 2020-10-05 LAB
ALBUMIN SERPL-MCNC: 3.3 G/DL (ref 3.5–5.2)
ALP BLD-CCNC: 91 U/L (ref 40–130)
ALT SERPL-CCNC: 10 U/L (ref 5–41)
ANION GAP SERPL CALCULATED.3IONS-SCNC: 10 MMOL/L (ref 7–19)
AST SERPL-CCNC: 15 U/L (ref 5–40)
BASOPHILS ABSOLUTE: 0.1 K/UL (ref 0–0.2)
BASOPHILS RELATIVE PERCENT: 0.8 % (ref 0–1)
BILIRUB SERPL-MCNC: 0.4 MG/DL (ref 0.2–1.2)
BUN BLDV-MCNC: 27 MG/DL (ref 8–23)
CALCIUM SERPL-MCNC: 9.2 MG/DL (ref 8.8–10.2)
CHLORIDE BLD-SCNC: 102 MMOL/L (ref 98–111)
CHOLESTEROL, TOTAL: 124 MG/DL (ref 160–199)
CO2: 28 MMOL/L (ref 22–29)
CREAT SERPL-MCNC: 1.1 MG/DL (ref 0.5–1.2)
EOSINOPHILS ABSOLUTE: 0.3 K/UL (ref 0–0.6)
EOSINOPHILS RELATIVE PERCENT: 3.6 % (ref 0–5)
GFR AFRICAN AMERICAN: >59
GFR NON-AFRICAN AMERICAN: >60
GLUCOSE BLD-MCNC: 124 MG/DL (ref 74–109)
HBA1C MFR BLD: 7.9 % (ref 4–6)
HCT VFR BLD CALC: 49.6 % (ref 42–52)
HDLC SERPL-MCNC: 39 MG/DL (ref 55–121)
HEMOGLOBIN: 14.7 G/DL (ref 14–18)
IMMATURE GRANULOCYTES #: 0 K/UL
LDL CHOLESTEROL CALCULATED: 72 MG/DL
LYMPHOCYTES ABSOLUTE: 1.5 K/UL (ref 1.1–4.5)
LYMPHOCYTES RELATIVE PERCENT: 16 % (ref 20–40)
MCH RBC QN AUTO: 28.7 PG (ref 27–31)
MCHC RBC AUTO-ENTMCNC: 29.6 G/DL (ref 33–37)
MCV RBC AUTO: 96.9 FL (ref 80–94)
MONOCYTES ABSOLUTE: 0.8 K/UL (ref 0–0.9)
MONOCYTES RELATIVE PERCENT: 8 % (ref 0–10)
NEUTROPHILS ABSOLUTE: 6.8 K/UL (ref 1.5–7.5)
NEUTROPHILS RELATIVE PERCENT: 71.3 % (ref 50–65)
PDW BLD-RTO: 14.8 % (ref 11.5–14.5)
PLATELET # BLD: 193 K/UL (ref 130–400)
PMV BLD AUTO: 12 FL (ref 9.4–12.4)
POTASSIUM SERPL-SCNC: 4.9 MMOL/L (ref 3.5–5)
RBC # BLD: 5.12 M/UL (ref 4.7–6.1)
SODIUM BLD-SCNC: 140 MMOL/L (ref 136–145)
TOTAL PROTEIN: 6.8 G/DL (ref 6.6–8.7)
TRIGL SERPL-MCNC: 67 MG/DL (ref 0–149)
TSH SERPL DL<=0.05 MIU/L-ACNC: 4.96 UIU/ML (ref 0.27–4.2)
WBC # BLD: 9.6 K/UL (ref 4.8–10.8)

## 2020-10-16 LAB
BACTERIA: ABNORMAL /HPF
BILIRUBIN URINE: NEGATIVE
BLOOD, URINE: ABNORMAL
CLARITY: ABNORMAL
COLOR: YELLOW
EPITHELIAL CELLS, UA: 1 /HPF (ref 0–5)
GLUCOSE URINE: NEGATIVE MG/DL
HYALINE CASTS: 3 /HPF (ref 0–8)
KETONES, URINE: ABNORMAL MG/DL
LEUKOCYTE ESTERASE, URINE: ABNORMAL
NITRITE, URINE: NEGATIVE
PH UA: 7 (ref 5–8)
PROTEIN UA: 30 MG/DL
RBC UA: 9 /HPF (ref 0–4)
SPECIFIC GRAVITY UA: 1.02 (ref 1–1.03)
UROBILINOGEN, URINE: 1 E.U./DL
WBC UA: 537 /HPF (ref 0–5)

## 2020-10-19 LAB
ORGANISM: ABNORMAL
URINE CULTURE, ROUTINE: ABNORMAL
URINE CULTURE, ROUTINE: ABNORMAL

## 2020-10-23 LAB
ANAEROBIC CULTURE: ABNORMAL
GRAM STAIN RESULT: ABNORMAL
ORGANISM: ABNORMAL
WOUND/ABSCESS: ABNORMAL

## 2021-10-27 ENCOUNTER — APPOINTMENT (OUTPATIENT)
Dept: GENERAL RADIOLOGY | Facility: HOSPITAL | Age: 61
End: 2021-10-27

## 2021-10-27 ENCOUNTER — HOSPITAL ENCOUNTER (INPATIENT)
Facility: HOSPITAL | Age: 61
LOS: 7 days | Discharge: HOME HEALTH | End: 2021-11-03
Attending: EMERGENCY MEDICINE | Admitting: FAMILY MEDICINE

## 2021-10-27 DIAGNOSIS — Z74.09 IMPAIRED MOBILITY: ICD-10-CM

## 2021-10-27 DIAGNOSIS — G47.33 OBSTRUCTIVE SLEEP APNEA: ICD-10-CM

## 2021-10-27 DIAGNOSIS — E66.01 OBESITY, MORBID, BMI 50 OR HIGHER (HCC): ICD-10-CM

## 2021-10-27 DIAGNOSIS — E66.2 OBESITY HYPOVENTILATION SYNDROME (HCC): ICD-10-CM

## 2021-10-27 DIAGNOSIS — R09.02 HYPOXIA: ICD-10-CM

## 2021-10-27 DIAGNOSIS — L03.119 CELLULITIS OF LOWER EXTREMITY, UNSPECIFIED LATERALITY: Primary | ICD-10-CM

## 2021-10-27 DIAGNOSIS — Z78.9 DECREASED ACTIVITIES OF DAILY LIVING (ADL): ICD-10-CM

## 2021-10-27 LAB
ALBUMIN SERPL-MCNC: 3.3 G/DL (ref 3.5–5.2)
ALBUMIN/GLOB SERPL: 0.9 G/DL
ALP SERPL-CCNC: 84 U/L (ref 39–117)
ALT SERPL W P-5'-P-CCNC: 33 U/L (ref 1–41)
ANION GAP SERPL CALCULATED.3IONS-SCNC: 8 MMOL/L (ref 5–15)
APTT PPP: 30.9 SECONDS (ref 24.1–35)
ARTERIAL PATENCY WRIST A: POSITIVE
AST SERPL-CCNC: 31 U/L (ref 1–40)
ATMOSPHERIC PRESS: 742 MMHG
BASE EXCESS BLDA CALC-SCNC: 1.1 MMOL/L (ref 0–2)
BASOPHILS # BLD AUTO: 0.05 10*3/MM3 (ref 0–0.2)
BASOPHILS NFR BLD AUTO: 0.4 % (ref 0–1.5)
BDY SITE: ABNORMAL
BILIRUB SERPL-MCNC: 0.3 MG/DL (ref 0–1.2)
BODY TEMPERATURE: 37 C
BUN SERPL-MCNC: 26 MG/DL (ref 8–23)
BUN/CREAT SERPL: 24.1 (ref 7–25)
CALCIUM SPEC-SCNC: 9.1 MG/DL (ref 8.6–10.5)
CHLORIDE SERPL-SCNC: 102 MMOL/L (ref 98–107)
CK SERPL-CCNC: 140 U/L (ref 20–200)
CO2 SERPL-SCNC: 30 MMOL/L (ref 22–29)
CREAT SERPL-MCNC: 1.08 MG/DL (ref 0.76–1.27)
CRP SERPL-MCNC: 12.34 MG/DL (ref 0–0.5)
D-LACTATE SERPL-SCNC: 2.6 MMOL/L (ref 0.5–2)
DEPRECATED RDW RBC AUTO: 48.8 FL (ref 37–54)
EOSINOPHIL # BLD AUTO: 0.17 10*3/MM3 (ref 0–0.4)
EOSINOPHIL NFR BLD AUTO: 1.4 % (ref 0.3–6.2)
ERYTHROCYTE [DISTWIDTH] IN BLOOD BY AUTOMATED COUNT: 14.8 % (ref 12.3–15.4)
ERYTHROCYTE [SEDIMENTATION RATE] IN BLOOD: 65 MM/HR (ref 0–20)
GAS FLOW AIRWAY: 3 LPM
GFR SERPL CREATININE-BSD FRML MDRD: 69 ML/MIN/1.73
GLOBULIN UR ELPH-MCNC: 3.6 GM/DL
GLUCOSE SERPL-MCNC: 209 MG/DL (ref 65–99)
HCO3 BLDA-SCNC: 26.8 MMOL/L (ref 20–26)
HCT VFR BLD AUTO: 47.6 % (ref 37.5–51)
HGB BLD-MCNC: 14.8 G/DL (ref 13–17.7)
IMM GRANULOCYTES # BLD AUTO: 0.18 10*3/MM3 (ref 0–0.05)
IMM GRANULOCYTES NFR BLD AUTO: 1.5 % (ref 0–0.5)
INR PPP: 1.04 (ref 0.91–1.09)
LIPASE SERPL-CCNC: 28 U/L (ref 13–60)
LYMPHOCYTES # BLD AUTO: 0.75 10*3/MM3 (ref 0.7–3.1)
LYMPHOCYTES NFR BLD AUTO: 6.2 % (ref 19.6–45.3)
Lab: ABNORMAL
MCH RBC QN AUTO: 27.9 PG (ref 26.6–33)
MCHC RBC AUTO-ENTMCNC: 31.1 G/DL (ref 31.5–35.7)
MCV RBC AUTO: 89.8 FL (ref 79–97)
MODALITY: ABNORMAL
MONOCYTES # BLD AUTO: 0.91 10*3/MM3 (ref 0.1–0.9)
MONOCYTES NFR BLD AUTO: 7.5 % (ref 5–12)
NEUTROPHILS NFR BLD AUTO: 10.01 10*3/MM3 (ref 1.7–7)
NEUTROPHILS NFR BLD AUTO: 83 % (ref 42.7–76)
NRBC BLD AUTO-RTO: 0 /100 WBC (ref 0–0.2)
NT-PROBNP SERPL-MCNC: 620.8 PG/ML (ref 0–900)
PCO2 BLDA: 45.8 MM HG (ref 35–45)
PCO2 TEMP ADJ BLD: 45.8 MM HG (ref 35–45)
PH BLDA: 7.38 PH UNITS (ref 7.35–7.45)
PH, TEMP CORRECTED: 7.38 PH UNITS (ref 7.35–7.45)
PLATELET # BLD AUTO: 230 10*3/MM3 (ref 140–450)
PMV BLD AUTO: 12 FL (ref 6–12)
PO2 BLDA: 119 MM HG (ref 83–108)
PO2 TEMP ADJ BLD: 119 MM HG (ref 83–108)
POTASSIUM SERPL-SCNC: 4.4 MMOL/L (ref 3.5–5.2)
PROCALCITONIN SERPL-MCNC: 0.42 NG/ML (ref 0–0.25)
PROT SERPL-MCNC: 6.9 G/DL (ref 6–8.5)
PROTHROMBIN TIME: 13.2 SECONDS (ref 11.9–14.6)
RBC # BLD AUTO: 5.3 10*6/MM3 (ref 4.14–5.8)
SAO2 % BLDCOA: 98.8 % (ref 94–99)
SARS-COV-2 RNA PNL SPEC NAA+PROBE: NOT DETECTED
SODIUM SERPL-SCNC: 140 MMOL/L (ref 136–145)
TROPONIN T SERPL-MCNC: <0.01 NG/ML (ref 0–0.03)
VENTILATOR MODE: ABNORMAL
WBC # BLD AUTO: 12.07 10*3/MM3 (ref 3.4–10.8)

## 2021-10-27 PROCEDURE — 87635 SARS-COV-2 COVID-19 AMP PRB: CPT | Performed by: EMERGENCY MEDICINE

## 2021-10-27 PROCEDURE — 85730 THROMBOPLASTIN TIME PARTIAL: CPT | Performed by: EMERGENCY MEDICINE

## 2021-10-27 PROCEDURE — 83735 ASSAY OF MAGNESIUM: CPT | Performed by: INTERNAL MEDICINE

## 2021-10-27 PROCEDURE — 71045 X-RAY EXAM CHEST 1 VIEW: CPT

## 2021-10-27 PROCEDURE — 84145 PROCALCITONIN (PCT): CPT | Performed by: EMERGENCY MEDICINE

## 2021-10-27 PROCEDURE — 25010000002 VANCOMYCIN 1 G RECONSTITUTED SOLUTION 1 EACH VIAL: Performed by: EMERGENCY MEDICINE

## 2021-10-27 PROCEDURE — 83690 ASSAY OF LIPASE: CPT | Performed by: EMERGENCY MEDICINE

## 2021-10-27 PROCEDURE — 82550 ASSAY OF CK (CPK): CPT | Performed by: EMERGENCY MEDICINE

## 2021-10-27 PROCEDURE — 80053 COMPREHEN METABOLIC PANEL: CPT | Performed by: EMERGENCY MEDICINE

## 2021-10-27 PROCEDURE — 83605 ASSAY OF LACTIC ACID: CPT | Performed by: EMERGENCY MEDICINE

## 2021-10-27 PROCEDURE — 85025 COMPLETE CBC W/AUTO DIFF WBC: CPT | Performed by: EMERGENCY MEDICINE

## 2021-10-27 PROCEDURE — 93010 ELECTROCARDIOGRAM REPORT: CPT | Performed by: INTERNAL MEDICINE

## 2021-10-27 PROCEDURE — 86140 C-REACTIVE PROTEIN: CPT | Performed by: EMERGENCY MEDICINE

## 2021-10-27 PROCEDURE — 85652 RBC SED RATE AUTOMATED: CPT | Performed by: EMERGENCY MEDICINE

## 2021-10-27 PROCEDURE — 84484 ASSAY OF TROPONIN QUANT: CPT | Performed by: EMERGENCY MEDICINE

## 2021-10-27 PROCEDURE — 83036 HEMOGLOBIN GLYCOSYLATED A1C: CPT | Performed by: INTERNAL MEDICINE

## 2021-10-27 PROCEDURE — 83880 ASSAY OF NATRIURETIC PEPTIDE: CPT | Performed by: EMERGENCY MEDICINE

## 2021-10-27 PROCEDURE — 87040 BLOOD CULTURE FOR BACTERIA: CPT | Performed by: EMERGENCY MEDICINE

## 2021-10-27 PROCEDURE — 93005 ELECTROCARDIOGRAM TRACING: CPT | Performed by: EMERGENCY MEDICINE

## 2021-10-27 PROCEDURE — 73590 X-RAY EXAM OF LOWER LEG: CPT

## 2021-10-27 PROCEDURE — 85610 PROTHROMBIN TIME: CPT | Performed by: EMERGENCY MEDICINE

## 2021-10-27 PROCEDURE — 99285 EMERGENCY DEPT VISIT HI MDM: CPT

## 2021-10-27 PROCEDURE — 36600 WITHDRAWAL OF ARTERIAL BLOOD: CPT

## 2021-10-27 PROCEDURE — 82803 BLOOD GASES ANY COMBINATION: CPT

## 2021-10-27 RX ADMIN — VANCOMYCIN HYDROCHLORIDE 3000 MG: 1 INJECTION, POWDER, LYOPHILIZED, FOR SOLUTION INTRAVENOUS at 22:27

## 2021-10-27 RX ADMIN — SODIUM CHLORIDE 2 G: 9 INJECTION, SOLUTION INTRAVENOUS at 22:27

## 2021-10-27 RX ADMIN — METRONIDAZOLE 500 MG: 500 INJECTION, SOLUTION INTRAVENOUS at 22:26

## 2021-10-28 PROBLEM — N39.0 UTI (URINARY TRACT INFECTION), BACTERIAL: Status: ACTIVE | Noted: 2021-10-28

## 2021-10-28 PROBLEM — E66.01 OBESITY, MORBID, BMI 50 OR HIGHER: Status: ACTIVE | Noted: 2021-10-28

## 2021-10-28 PROBLEM — A49.9 UTI (URINARY TRACT INFECTION), BACTERIAL: Status: ACTIVE | Noted: 2021-10-28

## 2021-10-28 LAB
ALBUMIN SERPL-MCNC: 3.2 G/DL (ref 3.5–5.2)
ALBUMIN/GLOB SERPL: 1 G/DL
ALP SERPL-CCNC: 84 U/L (ref 39–117)
ALT SERPL W P-5'-P-CCNC: 29 U/L (ref 1–41)
ANION GAP SERPL CALCULATED.3IONS-SCNC: 12 MMOL/L (ref 5–15)
AST SERPL-CCNC: 29 U/L (ref 1–40)
BACTERIA UR QL AUTO: ABNORMAL /HPF
BASOPHILS # BLD AUTO: 0.06 10*3/MM3 (ref 0–0.2)
BASOPHILS NFR BLD AUTO: 0.5 % (ref 0–1.5)
BILIRUB SERPL-MCNC: 0.3 MG/DL (ref 0–1.2)
BILIRUB UR QL STRIP: ABNORMAL
BUN SERPL-MCNC: 28 MG/DL (ref 8–23)
BUN/CREAT SERPL: 26.9 (ref 7–25)
CALCIUM SPEC-SCNC: 8.6 MG/DL (ref 8.6–10.5)
CHLORIDE SERPL-SCNC: 102 MMOL/L (ref 98–107)
CLARITY UR: ABNORMAL
CO2 SERPL-SCNC: 26 MMOL/L (ref 22–29)
COLOR UR: ABNORMAL
CREAT SERPL-MCNC: 1.04 MG/DL (ref 0.76–1.27)
CRP SERPL-MCNC: 12.9 MG/DL (ref 0–0.5)
D-LACTATE SERPL-SCNC: 1.3 MMOL/L (ref 0.5–2)
DEPRECATED RDW RBC AUTO: 49.1 FL (ref 37–54)
EOSINOPHIL # BLD AUTO: 0.18 10*3/MM3 (ref 0–0.4)
EOSINOPHIL NFR BLD AUTO: 1.4 % (ref 0.3–6.2)
ERYTHROCYTE [DISTWIDTH] IN BLOOD BY AUTOMATED COUNT: 14.9 % (ref 12.3–15.4)
ERYTHROCYTE [SEDIMENTATION RATE] IN BLOOD: 75 MM/HR (ref 0–20)
GFR SERPL CREATININE-BSD FRML MDRD: 72 ML/MIN/1.73
GLOBULIN UR ELPH-MCNC: 3.2 GM/DL
GLUCOSE BLDC GLUCOMTR-MCNC: 138 MG/DL (ref 70–130)
GLUCOSE BLDC GLUCOMTR-MCNC: 167 MG/DL (ref 70–130)
GLUCOSE BLDC GLUCOMTR-MCNC: 174 MG/DL (ref 70–130)
GLUCOSE BLDC GLUCOMTR-MCNC: 175 MG/DL (ref 70–130)
GLUCOSE SERPL-MCNC: 190 MG/DL (ref 65–99)
GLUCOSE UR STRIP-MCNC: NEGATIVE MG/DL
HBA1C MFR BLD: 8.2 % (ref 4.8–5.6)
HCT VFR BLD AUTO: 44.5 % (ref 37.5–51)
HGB BLD-MCNC: 13.7 G/DL (ref 13–17.7)
HGB UR QL STRIP.AUTO: NEGATIVE
HYALINE CASTS UR QL AUTO: ABNORMAL /LPF
IMM GRANULOCYTES # BLD AUTO: 0.19 10*3/MM3 (ref 0–0.05)
IMM GRANULOCYTES NFR BLD AUTO: 1.5 % (ref 0–0.5)
KETONES UR QL STRIP: ABNORMAL
LEUKOCYTE ESTERASE UR QL STRIP.AUTO: ABNORMAL
LYMPHOCYTES # BLD AUTO: 0.8 10*3/MM3 (ref 0.7–3.1)
LYMPHOCYTES NFR BLD AUTO: 6.2 % (ref 19.6–45.3)
MAGNESIUM SERPL-MCNC: 2.2 MG/DL (ref 1.6–2.4)
MCH RBC QN AUTO: 27.7 PG (ref 26.6–33)
MCHC RBC AUTO-ENTMCNC: 30.8 G/DL (ref 31.5–35.7)
MCV RBC AUTO: 90.1 FL (ref 79–97)
MONOCYTES # BLD AUTO: 1.25 10*3/MM3 (ref 0.1–0.9)
MONOCYTES NFR BLD AUTO: 9.7 % (ref 5–12)
NEUTROPHILS NFR BLD AUTO: 10.47 10*3/MM3 (ref 1.7–7)
NEUTROPHILS NFR BLD AUTO: 80.7 % (ref 42.7–76)
NITRITE UR QL STRIP: POSITIVE
NRBC BLD AUTO-RTO: 0 /100 WBC (ref 0–0.2)
PH UR STRIP.AUTO: <=5 [PH] (ref 5–8)
PLATELET # BLD AUTO: 234 10*3/MM3 (ref 140–450)
PMV BLD AUTO: 11.6 FL (ref 6–12)
POTASSIUM SERPL-SCNC: 3.8 MMOL/L (ref 3.5–5.2)
PROT SERPL-MCNC: 6.4 G/DL (ref 6–8.5)
PROT UR QL STRIP: ABNORMAL
QT INTERVAL: 404 MS
QTC INTERVAL: 439 MS
RBC # BLD AUTO: 4.94 10*6/MM3 (ref 4.14–5.8)
RBC # UR: ABNORMAL /HPF
REF LAB TEST METHOD: ABNORMAL
SODIUM SERPL-SCNC: 140 MMOL/L (ref 136–145)
SP GR UR STRIP: 1.03 (ref 1–1.03)
SQUAMOUS #/AREA URNS HPF: ABNORMAL /HPF
UROBILINOGEN UR QL STRIP: ABNORMAL
WBC # BLD AUTO: 12.95 10*3/MM3 (ref 3.4–10.8)
WBC UR QL AUTO: ABNORMAL /HPF

## 2021-10-28 PROCEDURE — 87086 URINE CULTURE/COLONY COUNT: CPT | Performed by: EMERGENCY MEDICINE

## 2021-10-28 PROCEDURE — 0097U HC BIOFIRE FILMARRAY GI PANEL: CPT | Performed by: INTERNAL MEDICINE

## 2021-10-28 PROCEDURE — 82962 GLUCOSE BLOOD TEST: CPT

## 2021-10-28 PROCEDURE — 25010000002 ENOXAPARIN PER 10 MG: Performed by: INTERNAL MEDICINE

## 2021-10-28 PROCEDURE — 81001 URINALYSIS AUTO W/SCOPE: CPT | Performed by: EMERGENCY MEDICINE

## 2021-10-28 PROCEDURE — 86140 C-REACTIVE PROTEIN: CPT | Performed by: INTERNAL MEDICINE

## 2021-10-28 PROCEDURE — 63710000001 INSULIN REGULAR HUMAN PER 5 UNITS: Performed by: INTERNAL MEDICINE

## 2021-10-28 PROCEDURE — 83605 ASSAY OF LACTIC ACID: CPT | Performed by: EMERGENCY MEDICINE

## 2021-10-28 PROCEDURE — 85652 RBC SED RATE AUTOMATED: CPT | Performed by: INTERNAL MEDICINE

## 2021-10-28 PROCEDURE — 63710000001 DIPHENHYDRAMINE PER 50 MG: Performed by: INTERNAL MEDICINE

## 2021-10-28 PROCEDURE — 63710000001 INSULIN LISPRO (HUMAN) PER 5 UNITS: Performed by: INTERNAL MEDICINE

## 2021-10-28 PROCEDURE — 63710000001 DIPHENHYDRAMINE PER 50 MG: Performed by: NURSE PRACTITIONER

## 2021-10-28 PROCEDURE — 25010000002 VANCOMYCIN 10 G RECONSTITUTED SOLUTION: Performed by: INTERNAL MEDICINE

## 2021-10-28 PROCEDURE — 80053 COMPREHEN METABOLIC PANEL: CPT | Performed by: INTERNAL MEDICINE

## 2021-10-28 PROCEDURE — 63710000001 INSULIN ISOPHANE HUMAN PER 5 UNITS: Performed by: INTERNAL MEDICINE

## 2021-10-28 PROCEDURE — 36415 COLL VENOUS BLD VENIPUNCTURE: CPT | Performed by: EMERGENCY MEDICINE

## 2021-10-28 PROCEDURE — 85025 COMPLETE CBC W/AUTO DIFF WBC: CPT | Performed by: INTERNAL MEDICINE

## 2021-10-28 RX ORDER — DIPHENHYDRAMINE HCL 25 MG
25 CAPSULE ORAL 2 TIMES DAILY
Status: DISCONTINUED | OUTPATIENT
Start: 2021-10-28 | End: 2021-10-28

## 2021-10-28 RX ORDER — NICOTINE POLACRILEX 4 MG
15 LOZENGE BUCCAL
Status: DISCONTINUED | OUTPATIENT
Start: 2021-10-28 | End: 2021-11-03 | Stop reason: HOSPADM

## 2021-10-28 RX ORDER — LOSARTAN POTASSIUM 50 MG/1
50 TABLET ORAL DAILY
Status: DISCONTINUED | OUTPATIENT
Start: 2021-10-28 | End: 2021-10-28

## 2021-10-28 RX ORDER — NEBIVOLOL 5 MG/1
5 TABLET ORAL DAILY
Status: DISCONTINUED | OUTPATIENT
Start: 2021-10-29 | End: 2021-11-03 | Stop reason: HOSPADM

## 2021-10-28 RX ORDER — CHOLECALCIFEROL (VITAMIN D3) 125 MCG
500 CAPSULE ORAL 2 TIMES DAILY
Status: DISCONTINUED | OUTPATIENT
Start: 2021-10-28 | End: 2021-11-03 | Stop reason: HOSPADM

## 2021-10-28 RX ORDER — ACETAMINOPHEN 650 MG/1
650 SUPPOSITORY RECTAL EVERY 4 HOURS PRN
Status: DISCONTINUED | OUTPATIENT
Start: 2021-10-28 | End: 2021-11-03 | Stop reason: HOSPADM

## 2021-10-28 RX ORDER — ONDANSETRON 2 MG/ML
4 INJECTION INTRAMUSCULAR; INTRAVENOUS EVERY 6 HOURS PRN
Status: DISCONTINUED | OUTPATIENT
Start: 2021-10-28 | End: 2021-11-03 | Stop reason: HOSPADM

## 2021-10-28 RX ORDER — SACCHAROMYCES BOULARDII 250 MG
250 CAPSULE ORAL 2 TIMES DAILY
Status: DISCONTINUED | OUTPATIENT
Start: 2021-10-28 | End: 2021-10-29

## 2021-10-28 RX ORDER — POTASSIUM CHLORIDE 750 MG/1
20 CAPSULE, EXTENDED RELEASE ORAL DAILY
Status: DISCONTINUED | OUTPATIENT
Start: 2021-10-28 | End: 2021-11-03 | Stop reason: HOSPADM

## 2021-10-28 RX ORDER — ASPIRIN 81 MG/1
81 TABLET ORAL DAILY
Status: DISCONTINUED | OUTPATIENT
Start: 2021-10-28 | End: 2021-11-03 | Stop reason: HOSPADM

## 2021-10-28 RX ORDER — ALBUTEROL SULFATE 2.5 MG/3ML
2.5 SOLUTION RESPIRATORY (INHALATION) EVERY 6 HOURS PRN
Status: DISCONTINUED | OUTPATIENT
Start: 2021-10-28 | End: 2021-11-03 | Stop reason: HOSPADM

## 2021-10-28 RX ORDER — AZELASTINE 1 MG/ML
2 SPRAY, METERED NASAL 2 TIMES DAILY PRN
Status: DISCONTINUED | OUTPATIENT
Start: 2021-10-28 | End: 2021-10-29

## 2021-10-28 RX ORDER — DIPHENHYDRAMINE HCL 25 MG
25 CAPSULE ORAL 2 TIMES DAILY PRN
Status: DISCONTINUED | OUTPATIENT
Start: 2021-10-28 | End: 2021-11-03 | Stop reason: HOSPADM

## 2021-10-28 RX ORDER — NYSTATIN 100000 U/G
1 CREAM TOPICAL EVERY 12 HOURS SCHEDULED
Status: DISCONTINUED | OUTPATIENT
Start: 2021-10-28 | End: 2021-11-03 | Stop reason: HOSPADM

## 2021-10-28 RX ORDER — FLUTICASONE PROPIONATE 50 MCG
1 SPRAY, SUSPENSION (ML) NASAL 2 TIMES DAILY PRN
Status: DISCONTINUED | OUTPATIENT
Start: 2021-10-28 | End: 2021-11-01

## 2021-10-28 RX ORDER — NEBIVOLOL 5 MG/1
10 TABLET ORAL DAILY
Status: DISCONTINUED | OUTPATIENT
Start: 2021-10-28 | End: 2021-10-28

## 2021-10-28 RX ORDER — ACETAMINOPHEN 325 MG/1
650 TABLET ORAL EVERY 4 HOURS PRN
Status: DISCONTINUED | OUTPATIENT
Start: 2021-10-28 | End: 2021-11-03 | Stop reason: HOSPADM

## 2021-10-28 RX ORDER — SODIUM CHLORIDE 0.9 % (FLUSH) 0.9 %
10 SYRINGE (ML) INJECTION AS NEEDED
Status: DISCONTINUED | OUTPATIENT
Start: 2021-10-28 | End: 2021-11-03 | Stop reason: HOSPADM

## 2021-10-28 RX ORDER — BUMETANIDE 1 MG/1
2 TABLET ORAL DAILY
Status: DISCONTINUED | OUTPATIENT
Start: 2021-10-28 | End: 2021-11-03 | Stop reason: HOSPADM

## 2021-10-28 RX ORDER — ACETAMINOPHEN 160 MG/5ML
650 SOLUTION ORAL EVERY 4 HOURS PRN
Status: DISCONTINUED | OUTPATIENT
Start: 2021-10-28 | End: 2021-11-03 | Stop reason: HOSPADM

## 2021-10-28 RX ORDER — PRAVASTATIN SODIUM 20 MG
20 TABLET ORAL NIGHTLY
Status: DISCONTINUED | OUTPATIENT
Start: 2021-10-28 | End: 2021-11-03 | Stop reason: HOSPADM

## 2021-10-28 RX ORDER — ALLOPURINOL 300 MG/1
300 TABLET ORAL DAILY
Status: DISCONTINUED | OUTPATIENT
Start: 2021-10-28 | End: 2021-11-03 | Stop reason: HOSPADM

## 2021-10-28 RX ORDER — ONDANSETRON 4 MG/1
4 TABLET, FILM COATED ORAL EVERY 6 HOURS PRN
Status: DISCONTINUED | OUTPATIENT
Start: 2021-10-28 | End: 2021-11-03 | Stop reason: HOSPADM

## 2021-10-28 RX ORDER — LOSARTAN POTASSIUM 50 MG/1
25 TABLET ORAL DAILY
Status: DISCONTINUED | OUTPATIENT
Start: 2021-10-29 | End: 2021-11-03 | Stop reason: HOSPADM

## 2021-10-28 RX ORDER — DEXTROSE MONOHYDRATE 25 G/50ML
25 INJECTION, SOLUTION INTRAVENOUS
Status: DISCONTINUED | OUTPATIENT
Start: 2021-10-28 | End: 2021-11-03 | Stop reason: HOSPADM

## 2021-10-28 RX ORDER — SODIUM CHLORIDE 0.9 % (FLUSH) 0.9 %
10 SYRINGE (ML) INJECTION EVERY 12 HOURS SCHEDULED
Status: DISCONTINUED | OUTPATIENT
Start: 2021-10-28 | End: 2021-11-03 | Stop reason: HOSPADM

## 2021-10-28 RX ADMIN — DIPHENHYDRAMINE HYDROCHLORIDE 25 MG: 25 CAPSULE ORAL at 22:17

## 2021-10-28 RX ADMIN — SODIUM CHLORIDE 1 G: 9 INJECTION, SOLUTION INTRAVENOUS at 22:17

## 2021-10-28 RX ADMIN — Medication 10 ML: at 22:18

## 2021-10-28 RX ADMIN — Medication 250 MG: at 22:17

## 2021-10-28 RX ADMIN — INSULIN LISPRO 4 UNITS: 100 INJECTION, SOLUTION INTRAVENOUS; SUBCUTANEOUS at 08:38

## 2021-10-28 RX ADMIN — Medication 10 ML: at 08:35

## 2021-10-28 RX ADMIN — Medication 500 MCG: at 22:17

## 2021-10-28 RX ADMIN — Medication 500 MCG: at 08:35

## 2021-10-28 RX ADMIN — ASPIRIN 81 MG: 81 TABLET, COATED ORAL at 08:35

## 2021-10-28 RX ADMIN — METRONIDAZOLE 500 MG: 500 INJECTION, SOLUTION INTRAVENOUS at 06:11

## 2021-10-28 RX ADMIN — SODIUM CHLORIDE 1 G: 9 INJECTION, SOLUTION INTRAVENOUS at 15:48

## 2021-10-28 RX ADMIN — DIPHENHYDRAMINE HYDROCHLORIDE 25 MG: 25 CAPSULE ORAL at 08:35

## 2021-10-28 RX ADMIN — VANCOMYCIN HYDROCHLORIDE 1500 MG: 10 INJECTION, POWDER, LYOPHILIZED, FOR SOLUTION INTRAVENOUS at 23:09

## 2021-10-28 RX ADMIN — INSULIN LISPRO 4 UNITS: 100 INJECTION, SOLUTION INTRAVENOUS; SUBCUTANEOUS at 11:10

## 2021-10-28 RX ADMIN — BUMETANIDE 2 MG: 1 TABLET ORAL at 08:35

## 2021-10-28 RX ADMIN — ALLOPURINOL 300 MG: 300 TABLET ORAL at 08:35

## 2021-10-28 RX ADMIN — VANCOMYCIN HYDROCHLORIDE 1500 MG: 10 INJECTION, POWDER, LYOPHILIZED, FOR SOLUTION INTRAVENOUS at 11:09

## 2021-10-28 RX ADMIN — ENOXAPARIN SODIUM 40 MG: 40 INJECTION SUBCUTANEOUS at 08:35

## 2021-10-28 RX ADMIN — ENOXAPARIN SODIUM 40 MG: 40 INJECTION SUBCUTANEOUS at 22:17

## 2021-10-28 RX ADMIN — INSULIN HUMAN 20 UNITS: 100 INJECTION, SOLUTION PARENTERAL at 17:45

## 2021-10-28 RX ADMIN — SODIUM CHLORIDE 1 G: 9 INJECTION, SOLUTION INTRAVENOUS at 05:28

## 2021-10-28 RX ADMIN — PRAVASTATIN SODIUM 20 MG: 20 TABLET ORAL at 22:17

## 2021-10-28 RX ADMIN — NYSTATIN 1 APPLICATION: 100000 CREAM TOPICAL at 15:49

## 2021-10-28 RX ADMIN — NYSTATIN 1 APPLICATION: 100000 CREAM TOPICAL at 22:16

## 2021-10-28 RX ADMIN — POTASSIUM CHLORIDE 20 MEQ: 750 CAPSULE, EXTENDED RELEASE ORAL at 08:35

## 2021-10-28 RX ADMIN — HUMAN INSULIN 30 UNITS: 100 INJECTION, SUSPENSION SUBCUTANEOUS at 11:10

## 2021-10-28 RX ADMIN — LOSARTAN POTASSIUM 50 MG: 50 TABLET, FILM COATED ORAL at 08:35

## 2021-10-28 RX ADMIN — INSULIN HUMAN 20 UNITS: 100 INJECTION, SOLUTION PARENTERAL at 09:09

## 2021-10-28 RX ADMIN — NEBIVOLOL HYDROCHLORIDE 10 MG: 5 TABLET ORAL at 08:35

## 2021-10-29 LAB
ANION GAP SERPL CALCULATED.3IONS-SCNC: 11 MMOL/L (ref 5–15)
BACTERIA SPEC AEROBE CULT: NORMAL
BUN SERPL-MCNC: 29 MG/DL (ref 8–23)
BUN/CREAT SERPL: 30.2 (ref 7–25)
CALCIUM SPEC-SCNC: 8.6 MG/DL (ref 8.6–10.5)
CHLORIDE SERPL-SCNC: 107 MMOL/L (ref 98–107)
CO2 SERPL-SCNC: 25 MMOL/L (ref 22–29)
CREAT SERPL-MCNC: 0.96 MG/DL (ref 0.76–1.27)
DEPRECATED RDW RBC AUTO: 50.3 FL (ref 37–54)
ERYTHROCYTE [DISTWIDTH] IN BLOOD BY AUTOMATED COUNT: 14.8 % (ref 12.3–15.4)
GFR SERPL CREATININE-BSD FRML MDRD: 79 ML/MIN/1.73
GLUCOSE BLDC GLUCOMTR-MCNC: 158 MG/DL (ref 70–130)
GLUCOSE BLDC GLUCOMTR-MCNC: 160 MG/DL (ref 70–130)
GLUCOSE BLDC GLUCOMTR-MCNC: 171 MG/DL (ref 70–130)
GLUCOSE BLDC GLUCOMTR-MCNC: 190 MG/DL (ref 70–130)
GLUCOSE SERPL-MCNC: 163 MG/DL (ref 65–99)
HCT VFR BLD AUTO: 41.6 % (ref 37.5–51)
HGB BLD-MCNC: 12.9 G/DL (ref 13–17.7)
MCH RBC QN AUTO: 28.8 PG (ref 26.6–33)
MCHC RBC AUTO-ENTMCNC: 31 G/DL (ref 31.5–35.7)
MCV RBC AUTO: 92.9 FL (ref 79–97)
PLATELET # BLD AUTO: 222 10*3/MM3 (ref 140–450)
PMV BLD AUTO: 11.4 FL (ref 6–12)
POTASSIUM SERPL-SCNC: 4.3 MMOL/L (ref 3.5–5.2)
RBC # BLD AUTO: 4.48 10*6/MM3 (ref 4.14–5.8)
SODIUM SERPL-SCNC: 143 MMOL/L (ref 136–145)
VANCOMYCIN TROUGH SERPL-MCNC: 18.4 MCG/ML (ref 5–20)
VANCOMYCIN TROUGH SERPL-MCNC: 25.1 MCG/ML (ref 5–20)
WBC # BLD AUTO: 9.23 10*3/MM3 (ref 3.4–10.8)

## 2021-10-29 PROCEDURE — 94799 UNLISTED PULMONARY SVC/PX: CPT

## 2021-10-29 PROCEDURE — 80202 ASSAY OF VANCOMYCIN: CPT | Performed by: INTERNAL MEDICINE

## 2021-10-29 PROCEDURE — 63710000001 INSULIN REGULAR HUMAN PER 5 UNITS: Performed by: INTERNAL MEDICINE

## 2021-10-29 PROCEDURE — 97167 OT EVAL HIGH COMPLEX 60 MIN: CPT

## 2021-10-29 PROCEDURE — 25010000002 ENOXAPARIN PER 10 MG: Performed by: INTERNAL MEDICINE

## 2021-10-29 PROCEDURE — 82962 GLUCOSE BLOOD TEST: CPT

## 2021-10-29 PROCEDURE — 80048 BASIC METABOLIC PNL TOTAL CA: CPT | Performed by: NURSE PRACTITIONER

## 2021-10-29 PROCEDURE — 97161 PT EVAL LOW COMPLEX 20 MIN: CPT | Performed by: PHYSICAL THERAPIST

## 2021-10-29 PROCEDURE — 63710000001 INSULIN ISOPHANE HUMAN PER 5 UNITS: Performed by: INTERNAL MEDICINE

## 2021-10-29 PROCEDURE — 85027 COMPLETE CBC AUTOMATED: CPT | Performed by: NURSE PRACTITIONER

## 2021-10-29 PROCEDURE — 94640 AIRWAY INHALATION TREATMENT: CPT

## 2021-10-29 PROCEDURE — 97535 SELF CARE MNGMENT TRAINING: CPT

## 2021-10-29 PROCEDURE — 63710000001 INSULIN LISPRO (HUMAN) PER 5 UNITS: Performed by: INTERNAL MEDICINE

## 2021-10-29 RX ORDER — GUAIFENESIN 600 MG/1
1200 TABLET, EXTENDED RELEASE ORAL EVERY 12 HOURS SCHEDULED
Status: DISCONTINUED | OUTPATIENT
Start: 2021-10-29 | End: 2021-11-03 | Stop reason: HOSPADM

## 2021-10-29 RX ORDER — OLMESARTAN MEDOXOMIL 20 MG/1
20 TABLET ORAL DAILY
COMMUNITY
End: 2022-04-17 | Stop reason: SINTOL

## 2021-10-29 RX ORDER — SACCHAROMYCES BOULARDII 250 MG
500 CAPSULE ORAL 2 TIMES DAILY
Status: DISCONTINUED | OUTPATIENT
Start: 2021-10-29 | End: 2021-11-03 | Stop reason: HOSPADM

## 2021-10-29 RX ORDER — IPRATROPIUM BROMIDE AND ALBUTEROL SULFATE 2.5; .5 MG/3ML; MG/3ML
3 SOLUTION RESPIRATORY (INHALATION)
Status: DISCONTINUED | OUTPATIENT
Start: 2021-10-29 | End: 2021-11-03 | Stop reason: HOSPADM

## 2021-10-29 RX ADMIN — ENOXAPARIN SODIUM 40 MG: 40 INJECTION SUBCUTANEOUS at 09:24

## 2021-10-29 RX ADMIN — PRAVASTATIN SODIUM 20 MG: 20 TABLET ORAL at 22:02

## 2021-10-29 RX ADMIN — ALLOPURINOL 300 MG: 300 TABLET ORAL at 09:23

## 2021-10-29 RX ADMIN — INSULIN HUMAN 20 UNITS: 100 INJECTION, SOLUTION PARENTERAL at 18:17

## 2021-10-29 RX ADMIN — SODIUM CHLORIDE 1 G: 9 INJECTION, SOLUTION INTRAVENOUS at 22:02

## 2021-10-29 RX ADMIN — SODIUM CHLORIDE 1 G: 9 INJECTION, SOLUTION INTRAVENOUS at 05:47

## 2021-10-29 RX ADMIN — IPRATROPIUM BROMIDE AND ALBUTEROL SULFATE 3 ML: 2.5; .5 SOLUTION RESPIRATORY (INHALATION) at 20:02

## 2021-10-29 RX ADMIN — INSULIN HUMAN 20 UNITS: 100 INJECTION, SOLUTION PARENTERAL at 09:34

## 2021-10-29 RX ADMIN — NYSTATIN 1 APPLICATION: 100000 CREAM TOPICAL at 22:58

## 2021-10-29 RX ADMIN — NYSTATIN 1 APPLICATION: 100000 CREAM TOPICAL at 09:28

## 2021-10-29 RX ADMIN — POTASSIUM CHLORIDE 20 MEQ: 750 CAPSULE, EXTENDED RELEASE ORAL at 09:22

## 2021-10-29 RX ADMIN — Medication 10 ML: at 09:24

## 2021-10-29 RX ADMIN — ASPIRIN 81 MG: 81 TABLET, COATED ORAL at 09:23

## 2021-10-29 RX ADMIN — Medication 500 MG: at 22:01

## 2021-10-29 RX ADMIN — Medication 500 MCG: at 22:02

## 2021-10-29 RX ADMIN — ENOXAPARIN SODIUM 40 MG: 40 INJECTION SUBCUTANEOUS at 22:58

## 2021-10-29 RX ADMIN — Medication 500 MCG: at 09:23

## 2021-10-29 RX ADMIN — LOSARTAN POTASSIUM 25 MG: 50 TABLET, FILM COATED ORAL at 09:23

## 2021-10-29 RX ADMIN — INSULIN LISPRO 4 UNITS: 100 INJECTION, SOLUTION INTRAVENOUS; SUBCUTANEOUS at 09:34

## 2021-10-29 RX ADMIN — HUMAN INSULIN 30 UNITS: 100 INJECTION, SUSPENSION SUBCUTANEOUS at 13:18

## 2021-10-29 RX ADMIN — NEBIVOLOL HYDROCHLORIDE 5 MG: 5 TABLET ORAL at 09:23

## 2021-10-29 RX ADMIN — Medication 500 MG: at 09:23

## 2021-10-29 RX ADMIN — SODIUM CHLORIDE 1 G: 9 INJECTION, SOLUTION INTRAVENOUS at 15:47

## 2021-10-29 RX ADMIN — INSULIN LISPRO 4 UNITS: 100 INJECTION, SOLUTION INTRAVENOUS; SUBCUTANEOUS at 13:28

## 2021-10-29 RX ADMIN — GUAIFENESIN 1200 MG: 600 TABLET, EXTENDED RELEASE ORAL at 13:19

## 2021-10-29 RX ADMIN — GUAIFENESIN 1200 MG: 600 TABLET, EXTENDED RELEASE ORAL at 22:02

## 2021-10-29 RX ADMIN — IPRATROPIUM BROMIDE AND ALBUTEROL SULFATE 3 ML: 2.5; .5 SOLUTION RESPIRATORY (INHALATION) at 15:54

## 2021-10-29 RX ADMIN — BUMETANIDE 2 MG: 1 TABLET ORAL at 09:23

## 2021-10-29 RX ADMIN — INSULIN LISPRO 4 UNITS: 100 INJECTION, SOLUTION INTRAVENOUS; SUBCUTANEOUS at 18:17

## 2021-10-29 RX ADMIN — Medication 10 ML: at 22:03

## 2021-10-30 LAB
ANION GAP SERPL CALCULATED.3IONS-SCNC: 5 MMOL/L (ref 5–15)
BUN SERPL-MCNC: 25 MG/DL (ref 8–23)
BUN/CREAT SERPL: 27.8 (ref 7–25)
CALCIUM SPEC-SCNC: 8.9 MG/DL (ref 8.6–10.5)
CHLORIDE SERPL-SCNC: 108 MMOL/L (ref 98–107)
CO2 SERPL-SCNC: 30 MMOL/L (ref 22–29)
CREAT SERPL-MCNC: 0.9 MG/DL (ref 0.76–1.27)
DEPRECATED RDW RBC AUTO: 51.2 FL (ref 37–54)
ERYTHROCYTE [DISTWIDTH] IN BLOOD BY AUTOMATED COUNT: 14.9 % (ref 12.3–15.4)
GFR SERPL CREATININE-BSD FRML MDRD: 85 ML/MIN/1.73
GLUCOSE BLDC GLUCOMTR-MCNC: 156 MG/DL (ref 70–130)
GLUCOSE BLDC GLUCOMTR-MCNC: 185 MG/DL (ref 70–130)
GLUCOSE BLDC GLUCOMTR-MCNC: 190 MG/DL (ref 70–130)
GLUCOSE SERPL-MCNC: 166 MG/DL (ref 65–99)
HCT VFR BLD AUTO: 44 % (ref 37.5–51)
HGB BLD-MCNC: 12.9 G/DL (ref 13–17.7)
MCH RBC QN AUTO: 27.7 PG (ref 26.6–33)
MCHC RBC AUTO-ENTMCNC: 29.3 G/DL (ref 31.5–35.7)
MCV RBC AUTO: 94.4 FL (ref 79–97)
PLATELET # BLD AUTO: 252 10*3/MM3 (ref 140–450)
PMV BLD AUTO: 11.2 FL (ref 6–12)
POTASSIUM SERPL-SCNC: 4.1 MMOL/L (ref 3.5–5.2)
RBC # BLD AUTO: 4.66 10*6/MM3 (ref 4.14–5.8)
SODIUM SERPL-SCNC: 143 MMOL/L (ref 136–145)
VANCOMYCIN SERPL-MCNC: 13.6 MCG/ML (ref 5–40)
WBC # BLD AUTO: 7.76 10*3/MM3 (ref 3.4–10.8)

## 2021-10-30 PROCEDURE — 63710000001 DIPHENHYDRAMINE PER 50 MG: Performed by: NURSE PRACTITIONER

## 2021-10-30 PROCEDURE — 25010000002 ENOXAPARIN PER 10 MG: Performed by: INTERNAL MEDICINE

## 2021-10-30 PROCEDURE — 80202 ASSAY OF VANCOMYCIN: CPT | Performed by: FAMILY MEDICINE

## 2021-10-30 PROCEDURE — 80048 BASIC METABOLIC PNL TOTAL CA: CPT | Performed by: NURSE PRACTITIONER

## 2021-10-30 PROCEDURE — 63710000001 INSULIN LISPRO (HUMAN) PER 5 UNITS: Performed by: INTERNAL MEDICINE

## 2021-10-30 PROCEDURE — 94799 UNLISTED PULMONARY SVC/PX: CPT

## 2021-10-30 PROCEDURE — 85027 COMPLETE CBC AUTOMATED: CPT | Performed by: NURSE PRACTITIONER

## 2021-10-30 PROCEDURE — 97530 THERAPEUTIC ACTIVITIES: CPT

## 2021-10-30 PROCEDURE — 63710000001 INSULIN ISOPHANE HUMAN PER 5 UNITS: Performed by: INTERNAL MEDICINE

## 2021-10-30 PROCEDURE — 25010000002 VANCOMYCIN 1 G RECONSTITUTED SOLUTION 1 EACH VIAL: Performed by: FAMILY MEDICINE

## 2021-10-30 PROCEDURE — 63710000001 INSULIN REGULAR HUMAN PER 5 UNITS: Performed by: INTERNAL MEDICINE

## 2021-10-30 PROCEDURE — 82962 GLUCOSE BLOOD TEST: CPT

## 2021-10-30 PROCEDURE — 97116 GAIT TRAINING THERAPY: CPT

## 2021-10-30 RX ADMIN — IPRATROPIUM BROMIDE AND ALBUTEROL SULFATE 3 ML: 2.5; .5 SOLUTION RESPIRATORY (INHALATION) at 06:53

## 2021-10-30 RX ADMIN — LOSARTAN POTASSIUM 25 MG: 50 TABLET, FILM COATED ORAL at 10:47

## 2021-10-30 RX ADMIN — BUMETANIDE 2 MG: 1 TABLET ORAL at 10:47

## 2021-10-30 RX ADMIN — Medication 500 MG: at 20:20

## 2021-10-30 RX ADMIN — VANCOMYCIN HYDROCHLORIDE 1000 MG: 1 INJECTION, POWDER, LYOPHILIZED, FOR SOLUTION INTRAVENOUS at 10:52

## 2021-10-30 RX ADMIN — INSULIN LISPRO 4 UNITS: 100 INJECTION, SOLUTION INTRAVENOUS; SUBCUTANEOUS at 12:07

## 2021-10-30 RX ADMIN — IPRATROPIUM BROMIDE AND ALBUTEROL SULFATE 3 ML: 2.5; .5 SOLUTION RESPIRATORY (INHALATION) at 19:06

## 2021-10-30 RX ADMIN — DIPHENHYDRAMINE HYDROCHLORIDE 25 MG: 25 CAPSULE ORAL at 20:20

## 2021-10-30 RX ADMIN — NEBIVOLOL HYDROCHLORIDE 5 MG: 5 TABLET ORAL at 10:46

## 2021-10-30 RX ADMIN — SODIUM CHLORIDE 1 G: 9 INJECTION, SOLUTION INTRAVENOUS at 15:31

## 2021-10-30 RX ADMIN — IPRATROPIUM BROMIDE AND ALBUTEROL SULFATE 3 ML: 2.5; .5 SOLUTION RESPIRATORY (INHALATION) at 10:12

## 2021-10-30 RX ADMIN — FLUTICASONE PROPIONATE 1 SPRAY: 50 SPRAY, METERED NASAL at 21:57

## 2021-10-30 RX ADMIN — SODIUM CHLORIDE 1 G: 9 INJECTION, SOLUTION INTRAVENOUS at 05:41

## 2021-10-30 RX ADMIN — ENOXAPARIN SODIUM 40 MG: 40 INJECTION SUBCUTANEOUS at 10:45

## 2021-10-30 RX ADMIN — NYSTATIN 1 APPLICATION: 100000 CREAM TOPICAL at 20:20

## 2021-10-30 RX ADMIN — Medication 500 MG: at 10:46

## 2021-10-30 RX ADMIN — ENOXAPARIN SODIUM 40 MG: 40 INJECTION SUBCUTANEOUS at 20:21

## 2021-10-30 RX ADMIN — GUAIFENESIN 1200 MG: 600 TABLET, EXTENDED RELEASE ORAL at 20:20

## 2021-10-30 RX ADMIN — ASPIRIN 81 MG: 81 TABLET, COATED ORAL at 10:46

## 2021-10-30 RX ADMIN — INSULIN LISPRO 4 UNITS: 100 INJECTION, SOLUTION INTRAVENOUS; SUBCUTANEOUS at 10:45

## 2021-10-30 RX ADMIN — NYSTATIN 1 APPLICATION: 100000 CREAM TOPICAL at 10:47

## 2021-10-30 RX ADMIN — IPRATROPIUM BROMIDE AND ALBUTEROL SULFATE 3 ML: 2.5; .5 SOLUTION RESPIRATORY (INHALATION) at 14:27

## 2021-10-30 RX ADMIN — ALLOPURINOL 300 MG: 300 TABLET ORAL at 10:46

## 2021-10-30 RX ADMIN — POTASSIUM CHLORIDE 20 MEQ: 750 CAPSULE, EXTENDED RELEASE ORAL at 10:47

## 2021-10-30 RX ADMIN — ALBUTEROL SULFATE 2.5 MG: 2.5 SOLUTION RESPIRATORY (INHALATION) at 22:57

## 2021-10-30 RX ADMIN — Medication 500 MCG: at 20:20

## 2021-10-30 RX ADMIN — INSULIN LISPRO 4 UNITS: 100 INJECTION, SOLUTION INTRAVENOUS; SUBCUTANEOUS at 17:36

## 2021-10-30 RX ADMIN — GUAIFENESIN 1200 MG: 600 TABLET, EXTENDED RELEASE ORAL at 10:46

## 2021-10-30 RX ADMIN — Medication 500 MCG: at 10:46

## 2021-10-30 RX ADMIN — INSULIN HUMAN 20 UNITS: 100 INJECTION, SOLUTION PARENTERAL at 10:47

## 2021-10-30 RX ADMIN — HUMAN INSULIN 30 UNITS: 100 INJECTION, SUSPENSION SUBCUTANEOUS at 12:07

## 2021-10-30 RX ADMIN — Medication 10 ML: at 10:45

## 2021-10-30 RX ADMIN — SODIUM CHLORIDE 1 G: 9 INJECTION, SOLUTION INTRAVENOUS at 21:57

## 2021-10-30 RX ADMIN — PRAVASTATIN SODIUM 20 MG: 20 TABLET ORAL at 20:20

## 2021-10-31 LAB
ALBUMIN SERPL-MCNC: 3 G/DL (ref 3.5–5.2)
ALBUMIN/GLOB SERPL: 1 G/DL
ALP SERPL-CCNC: 83 U/L (ref 39–117)
ALT SERPL W P-5'-P-CCNC: 20 U/L (ref 1–41)
ANION GAP SERPL CALCULATED.3IONS-SCNC: 8 MMOL/L (ref 5–15)
AST SERPL-CCNC: 21 U/L (ref 1–40)
BASOPHILS # BLD AUTO: 0.08 10*3/MM3 (ref 0–0.2)
BASOPHILS NFR BLD AUTO: 1 % (ref 0–1.5)
BILIRUB SERPL-MCNC: 0.3 MG/DL (ref 0–1.2)
BUN SERPL-MCNC: 25 MG/DL (ref 8–23)
BUN/CREAT SERPL: 28.1 (ref 7–25)
CALCIUM SPEC-SCNC: 8.9 MG/DL (ref 8.6–10.5)
CHLORIDE SERPL-SCNC: 104 MMOL/L (ref 98–107)
CO2 SERPL-SCNC: 29 MMOL/L (ref 22–29)
CREAT SERPL-MCNC: 0.89 MG/DL (ref 0.76–1.27)
DEPRECATED RDW RBC AUTO: 50.4 FL (ref 37–54)
EOSINOPHIL # BLD AUTO: 0.44 10*3/MM3 (ref 0–0.4)
EOSINOPHIL NFR BLD AUTO: 5.3 % (ref 0.3–6.2)
ERYTHROCYTE [DISTWIDTH] IN BLOOD BY AUTOMATED COUNT: 14.7 % (ref 12.3–15.4)
GFR SERPL CREATININE-BSD FRML MDRD: 86 ML/MIN/1.73
GLOBULIN UR ELPH-MCNC: 3.1 GM/DL
GLUCOSE BLDC GLUCOMTR-MCNC: 158 MG/DL (ref 70–130)
GLUCOSE BLDC GLUCOMTR-MCNC: 160 MG/DL (ref 70–130)
GLUCOSE BLDC GLUCOMTR-MCNC: 166 MG/DL (ref 70–130)
GLUCOSE BLDC GLUCOMTR-MCNC: 182 MG/DL (ref 70–130)
GLUCOSE SERPL-MCNC: 181 MG/DL (ref 65–99)
HCT VFR BLD AUTO: 42.7 % (ref 37.5–51)
HGB BLD-MCNC: 12.7 G/DL (ref 13–17.7)
IMM GRANULOCYTES # BLD AUTO: 0.22 10*3/MM3 (ref 0–0.05)
IMM GRANULOCYTES NFR BLD AUTO: 2.6 % (ref 0–0.5)
LYMPHOCYTES # BLD AUTO: 1.03 10*3/MM3 (ref 0.7–3.1)
LYMPHOCYTES NFR BLD AUTO: 12.3 % (ref 19.6–45.3)
MCH RBC QN AUTO: 27.6 PG (ref 26.6–33)
MCHC RBC AUTO-ENTMCNC: 29.7 G/DL (ref 31.5–35.7)
MCV RBC AUTO: 92.8 FL (ref 79–97)
MONOCYTES # BLD AUTO: 0.76 10*3/MM3 (ref 0.1–0.9)
MONOCYTES NFR BLD AUTO: 9.1 % (ref 5–12)
NEUTROPHILS NFR BLD AUTO: 5.85 10*3/MM3 (ref 1.7–7)
NEUTROPHILS NFR BLD AUTO: 69.7 % (ref 42.7–76)
NRBC BLD AUTO-RTO: 0 /100 WBC (ref 0–0.2)
PLATELET # BLD AUTO: 273 10*3/MM3 (ref 140–450)
PMV BLD AUTO: 11 FL (ref 6–12)
POTASSIUM SERPL-SCNC: 4.1 MMOL/L (ref 3.5–5.2)
PROT SERPL-MCNC: 6.1 G/DL (ref 6–8.5)
RBC # BLD AUTO: 4.6 10*6/MM3 (ref 4.14–5.8)
SODIUM SERPL-SCNC: 141 MMOL/L (ref 136–145)
WBC # BLD AUTO: 8.38 10*3/MM3 (ref 3.4–10.8)

## 2021-10-31 PROCEDURE — 94799 UNLISTED PULMONARY SVC/PX: CPT

## 2021-10-31 PROCEDURE — 80053 COMPREHEN METABOLIC PANEL: CPT | Performed by: FAMILY MEDICINE

## 2021-10-31 PROCEDURE — 63710000001 INSULIN ISOPHANE HUMAN PER 5 UNITS: Performed by: INTERNAL MEDICINE

## 2021-10-31 PROCEDURE — 63710000001 INSULIN LISPRO (HUMAN) PER 5 UNITS: Performed by: INTERNAL MEDICINE

## 2021-10-31 PROCEDURE — 97116 GAIT TRAINING THERAPY: CPT

## 2021-10-31 PROCEDURE — 82962 GLUCOSE BLOOD TEST: CPT

## 2021-10-31 PROCEDURE — 63710000001 DIPHENHYDRAMINE PER 50 MG: Performed by: NURSE PRACTITIONER

## 2021-10-31 PROCEDURE — 85025 COMPLETE CBC W/AUTO DIFF WBC: CPT | Performed by: FAMILY MEDICINE

## 2021-10-31 PROCEDURE — 97535 SELF CARE MNGMENT TRAINING: CPT

## 2021-10-31 PROCEDURE — 63710000001 INSULIN REGULAR HUMAN PER 5 UNITS: Performed by: INTERNAL MEDICINE

## 2021-10-31 PROCEDURE — 25010000002 ENOXAPARIN PER 10 MG: Performed by: INTERNAL MEDICINE

## 2021-10-31 RX ADMIN — Medication 500 MCG: at 08:50

## 2021-10-31 RX ADMIN — IPRATROPIUM BROMIDE AND ALBUTEROL SULFATE 3 ML: 2.5; .5 SOLUTION RESPIRATORY (INHALATION) at 06:27

## 2021-10-31 RX ADMIN — NYSTATIN 1 APPLICATION: 100000 CREAM TOPICAL at 20:03

## 2021-10-31 RX ADMIN — NYSTATIN 1 APPLICATION: 100000 CREAM TOPICAL at 08:50

## 2021-10-31 RX ADMIN — PRAVASTATIN SODIUM 20 MG: 20 TABLET ORAL at 20:03

## 2021-10-31 RX ADMIN — FLUTICASONE PROPIONATE 1 SPRAY: 50 SPRAY, METERED NASAL at 08:50

## 2021-10-31 RX ADMIN — ASPIRIN 81 MG: 81 TABLET, COATED ORAL at 08:50

## 2021-10-31 RX ADMIN — GUAIFENESIN 1200 MG: 600 TABLET, EXTENDED RELEASE ORAL at 08:49

## 2021-10-31 RX ADMIN — INSULIN LISPRO 4 UNITS: 100 INJECTION, SOLUTION INTRAVENOUS; SUBCUTANEOUS at 17:31

## 2021-10-31 RX ADMIN — LOSARTAN POTASSIUM 25 MG: 50 TABLET, FILM COATED ORAL at 08:49

## 2021-10-31 RX ADMIN — IPRATROPIUM BROMIDE AND ALBUTEROL SULFATE 3 ML: 2.5; .5 SOLUTION RESPIRATORY (INHALATION) at 20:10

## 2021-10-31 RX ADMIN — SODIUM CHLORIDE 1 G: 9 INJECTION, SOLUTION INTRAVENOUS at 23:43

## 2021-10-31 RX ADMIN — SODIUM CHLORIDE 1 G: 9 INJECTION, SOLUTION INTRAVENOUS at 14:39

## 2021-10-31 RX ADMIN — HUMAN INSULIN 30 UNITS: 100 INJECTION, SUSPENSION SUBCUTANEOUS at 11:51

## 2021-10-31 RX ADMIN — Medication 500 MCG: at 20:03

## 2021-10-31 RX ADMIN — POTASSIUM CHLORIDE 20 MEQ: 750 CAPSULE, EXTENDED RELEASE ORAL at 08:50

## 2021-10-31 RX ADMIN — Medication 500 MG: at 20:02

## 2021-10-31 RX ADMIN — IPRATROPIUM BROMIDE AND ALBUTEROL SULFATE 3 ML: 2.5; .5 SOLUTION RESPIRATORY (INHALATION) at 10:25

## 2021-10-31 RX ADMIN — NEBIVOLOL HYDROCHLORIDE 5 MG: 5 TABLET ORAL at 08:50

## 2021-10-31 RX ADMIN — ENOXAPARIN SODIUM 40 MG: 40 INJECTION SUBCUTANEOUS at 08:50

## 2021-10-31 RX ADMIN — INSULIN HUMAN 20 UNITS: 100 INJECTION, SOLUTION PARENTERAL at 09:00

## 2021-10-31 RX ADMIN — BUMETANIDE 2 MG: 1 TABLET ORAL at 08:50

## 2021-10-31 RX ADMIN — SODIUM CHLORIDE 1 G: 9 INJECTION, SOLUTION INTRAVENOUS at 06:31

## 2021-10-31 RX ADMIN — ENOXAPARIN SODIUM 40 MG: 40 INJECTION SUBCUTANEOUS at 20:03

## 2021-10-31 RX ADMIN — INSULIN LISPRO 4 UNITS: 100 INJECTION, SOLUTION INTRAVENOUS; SUBCUTANEOUS at 11:51

## 2021-10-31 RX ADMIN — DIPHENHYDRAMINE HYDROCHLORIDE 25 MG: 25 CAPSULE ORAL at 20:03

## 2021-10-31 RX ADMIN — Medication 10 ML: at 08:50

## 2021-10-31 RX ADMIN — GUAIFENESIN 1200 MG: 600 TABLET, EXTENDED RELEASE ORAL at 20:02

## 2021-10-31 RX ADMIN — Medication 10 ML: at 20:03

## 2021-10-31 RX ADMIN — Medication 500 MG: at 08:50

## 2021-10-31 RX ADMIN — IPRATROPIUM BROMIDE AND ALBUTEROL SULFATE 3 ML: 2.5; .5 SOLUTION RESPIRATORY (INHALATION) at 14:27

## 2021-10-31 RX ADMIN — FLUTICASONE PROPIONATE 1 SPRAY: 50 SPRAY, METERED NASAL at 20:04

## 2021-10-31 RX ADMIN — INSULIN LISPRO 4 UNITS: 100 INJECTION, SOLUTION INTRAVENOUS; SUBCUTANEOUS at 09:00

## 2021-10-31 RX ADMIN — INSULIN HUMAN 20 UNITS: 100 INJECTION, SOLUTION PARENTERAL at 17:31

## 2021-10-31 RX ADMIN — ALLOPURINOL 300 MG: 300 TABLET ORAL at 08:50

## 2021-11-01 LAB
ANION GAP SERPL CALCULATED.3IONS-SCNC: 7 MMOL/L (ref 5–15)
BACTERIA SPEC AEROBE CULT: NORMAL
BACTERIA SPEC AEROBE CULT: NORMAL
BUN SERPL-MCNC: 24 MG/DL (ref 8–23)
BUN/CREAT SERPL: 25.5 (ref 7–25)
CALCIUM SPEC-SCNC: 9.2 MG/DL (ref 8.6–10.5)
CHLORIDE SERPL-SCNC: 105 MMOL/L (ref 98–107)
CO2 SERPL-SCNC: 32 MMOL/L (ref 22–29)
CREAT SERPL-MCNC: 0.94 MG/DL (ref 0.76–1.27)
DEPRECATED RDW RBC AUTO: 50.1 FL (ref 37–54)
ERYTHROCYTE [DISTWIDTH] IN BLOOD BY AUTOMATED COUNT: 14.9 % (ref 12.3–15.4)
GFR SERPL CREATININE-BSD FRML MDRD: 81 ML/MIN/1.73
GLUCOSE BLDC GLUCOMTR-MCNC: 153 MG/DL (ref 70–130)
GLUCOSE BLDC GLUCOMTR-MCNC: 159 MG/DL (ref 70–130)
GLUCOSE BLDC GLUCOMTR-MCNC: 165 MG/DL (ref 70–130)
GLUCOSE BLDC GLUCOMTR-MCNC: 178 MG/DL (ref 70–130)
GLUCOSE SERPL-MCNC: 195 MG/DL (ref 65–99)
HCT VFR BLD AUTO: 41.8 % (ref 37.5–51)
HGB BLD-MCNC: 13.7 G/DL (ref 13–17.7)
MCH RBC QN AUTO: 30.4 PG (ref 26.6–33)
MCHC RBC AUTO-ENTMCNC: 32.8 G/DL (ref 31.5–35.7)
MCV RBC AUTO: 92.7 FL (ref 79–97)
PLATELET # BLD AUTO: 258 10*3/MM3 (ref 140–450)
PMV BLD AUTO: 11.2 FL (ref 6–12)
POTASSIUM SERPL-SCNC: 4.1 MMOL/L (ref 3.5–5.2)
RBC # BLD AUTO: 4.51 10*6/MM3 (ref 4.14–5.8)
SODIUM SERPL-SCNC: 144 MMOL/L (ref 136–145)
WBC # BLD AUTO: 7.03 10*3/MM3 (ref 3.4–10.8)

## 2021-11-01 PROCEDURE — 99221 1ST HOSP IP/OBS SF/LOW 40: CPT | Performed by: NURSE PRACTITIONER

## 2021-11-01 PROCEDURE — 97535 SELF CARE MNGMENT TRAINING: CPT

## 2021-11-01 PROCEDURE — 63710000001 INSULIN LISPRO (HUMAN) PER 5 UNITS: Performed by: INTERNAL MEDICINE

## 2021-11-01 PROCEDURE — 94799 UNLISTED PULMONARY SVC/PX: CPT

## 2021-11-01 PROCEDURE — 80048 BASIC METABOLIC PNL TOTAL CA: CPT | Performed by: FAMILY MEDICINE

## 2021-11-01 PROCEDURE — 97116 GAIT TRAINING THERAPY: CPT

## 2021-11-01 PROCEDURE — 85027 COMPLETE CBC AUTOMATED: CPT | Performed by: FAMILY MEDICINE

## 2021-11-01 PROCEDURE — 63710000001 INSULIN REGULAR HUMAN PER 5 UNITS: Performed by: INTERNAL MEDICINE

## 2021-11-01 PROCEDURE — 82962 GLUCOSE BLOOD TEST: CPT

## 2021-11-01 PROCEDURE — 97530 THERAPEUTIC ACTIVITIES: CPT

## 2021-11-01 PROCEDURE — 25010000002 ENOXAPARIN PER 10 MG: Performed by: INTERNAL MEDICINE

## 2021-11-01 PROCEDURE — 63710000001 INSULIN ISOPHANE HUMAN PER 5 UNITS: Performed by: INTERNAL MEDICINE

## 2021-11-01 PROCEDURE — 94762 N-INVAS EAR/PLS OXIMTRY CONT: CPT

## 2021-11-01 RX ORDER — DOXYCYCLINE 100 MG/1
100 CAPSULE ORAL 2 TIMES DAILY
Qty: 10 CAPSULE | Refills: 0 | Status: CANCELLED | OUTPATIENT
Start: 2021-11-01 | End: 2021-11-06

## 2021-11-01 RX ORDER — AMMONIUM LACTATE 12 G/100G
1 CREAM TOPICAL EVERY 12 HOURS
Status: DISCONTINUED | OUTPATIENT
Start: 2021-11-01 | End: 2021-11-02

## 2021-11-01 RX ORDER — ECHINACEA PURPUREA EXTRACT 125 MG
2 TABLET ORAL AS NEEDED
Status: DISCONTINUED | OUTPATIENT
Start: 2021-11-01 | End: 2021-11-03 | Stop reason: HOSPADM

## 2021-11-01 RX ORDER — FLUTICASONE PROPIONATE 50 MCG
1 SPRAY, SUSPENSION (ML) NASAL 2 TIMES DAILY
Status: DISCONTINUED | OUTPATIENT
Start: 2021-11-01 | End: 2021-11-03 | Stop reason: HOSPADM

## 2021-11-01 RX ORDER — DOXYCYCLINE 100 MG/1
100 TABLET ORAL EVERY 12 HOURS SCHEDULED
Status: DISCONTINUED | OUTPATIENT
Start: 2021-11-01 | End: 2021-11-03 | Stop reason: HOSPADM

## 2021-11-01 RX ORDER — CETIRIZINE HYDROCHLORIDE 10 MG/1
5 TABLET ORAL DAILY
Status: DISCONTINUED | OUTPATIENT
Start: 2021-11-01 | End: 2021-11-03 | Stop reason: HOSPADM

## 2021-11-01 RX ADMIN — INSULIN LISPRO 4 UNITS: 100 INJECTION, SOLUTION INTRAVENOUS; SUBCUTANEOUS at 18:29

## 2021-11-01 RX ADMIN — FLUTICASONE PROPIONATE 1 SPRAY: 50 SPRAY, METERED NASAL at 08:52

## 2021-11-01 RX ADMIN — INSULIN LISPRO 4 UNITS: 100 INJECTION, SOLUTION INTRAVENOUS; SUBCUTANEOUS at 08:52

## 2021-11-01 RX ADMIN — Medication 1 SPRAY: at 22:15

## 2021-11-01 RX ADMIN — INSULIN LISPRO 4 UNITS: 100 INJECTION, SOLUTION INTRAVENOUS; SUBCUTANEOUS at 11:37

## 2021-11-01 RX ADMIN — IPRATROPIUM BROMIDE AND ALBUTEROL SULFATE 3 ML: 2.5; .5 SOLUTION RESPIRATORY (INHALATION) at 21:26

## 2021-11-01 RX ADMIN — IPRATROPIUM BROMIDE AND ALBUTEROL SULFATE 3 ML: 2.5; .5 SOLUTION RESPIRATORY (INHALATION) at 10:52

## 2021-11-01 RX ADMIN — ALLOPURINOL 300 MG: 300 TABLET ORAL at 08:51

## 2021-11-01 RX ADMIN — IPRATROPIUM BROMIDE AND ALBUTEROL SULFATE 3 ML: 2.5; .5 SOLUTION RESPIRATORY (INHALATION) at 07:25

## 2021-11-01 RX ADMIN — BUMETANIDE 2 MG: 1 TABLET ORAL at 08:51

## 2021-11-01 RX ADMIN — ASPIRIN 81 MG: 81 TABLET, COATED ORAL at 08:51

## 2021-11-01 RX ADMIN — GUAIFENESIN 1200 MG: 600 TABLET, EXTENDED RELEASE ORAL at 22:14

## 2021-11-01 RX ADMIN — DOXYCYCLINE 100 MG: 100 TABLET, FILM COATED ORAL at 22:14

## 2021-11-01 RX ADMIN — CETIRIZINE HYDROCHLORIDE 5 MG: 10 TABLET ORAL at 14:36

## 2021-11-01 RX ADMIN — GUAIFENESIN 1200 MG: 600 TABLET, EXTENDED RELEASE ORAL at 08:52

## 2021-11-01 RX ADMIN — Medication 500 MG: at 08:51

## 2021-11-01 RX ADMIN — POTASSIUM CHLORIDE 20 MEQ: 750 CAPSULE, EXTENDED RELEASE ORAL at 08:51

## 2021-11-01 RX ADMIN — DOXYCYCLINE 100 MG: 100 TABLET, FILM COATED ORAL at 14:36

## 2021-11-01 RX ADMIN — NEBIVOLOL HYDROCHLORIDE 5 MG: 5 TABLET ORAL at 08:51

## 2021-11-01 RX ADMIN — Medication 500 MCG: at 08:51

## 2021-11-01 RX ADMIN — ENOXAPARIN SODIUM 40 MG: 40 INJECTION SUBCUTANEOUS at 22:13

## 2021-11-01 RX ADMIN — LOSARTAN POTASSIUM 25 MG: 50 TABLET, FILM COATED ORAL at 08:51

## 2021-11-01 RX ADMIN — HUMAN INSULIN 30 UNITS: 100 INJECTION, SUSPENSION SUBCUTANEOUS at 11:37

## 2021-11-01 RX ADMIN — NYSTATIN 1 APPLICATION: 100000 CREAM TOPICAL at 08:52

## 2021-11-01 RX ADMIN — Medication 10 ML: at 22:14

## 2021-11-01 RX ADMIN — INSULIN HUMAN 20 UNITS: 100 INJECTION, SOLUTION PARENTERAL at 08:52

## 2021-11-01 RX ADMIN — Medication 10 ML: at 08:52

## 2021-11-01 RX ADMIN — AMMONIUM LACTATE 1 APPLICATION: 17 CREAM TOPICAL at 14:36

## 2021-11-01 RX ADMIN — Medication 500 MG: at 22:13

## 2021-11-01 RX ADMIN — NYSTATIN 1 APPLICATION: 100000 CREAM TOPICAL at 22:14

## 2021-11-01 RX ADMIN — PRAVASTATIN SODIUM 20 MG: 20 TABLET ORAL at 22:13

## 2021-11-01 RX ADMIN — IPRATROPIUM BROMIDE AND ALBUTEROL SULFATE 3 ML: 2.5; .5 SOLUTION RESPIRATORY (INHALATION) at 14:42

## 2021-11-01 RX ADMIN — Medication 500 MCG: at 22:13

## 2021-11-01 RX ADMIN — INSULIN HUMAN 20 UNITS: 100 INJECTION, SOLUTION PARENTERAL at 18:29

## 2021-11-01 RX ADMIN — SODIUM CHLORIDE 1 G: 9 INJECTION, SOLUTION INTRAVENOUS at 06:07

## 2021-11-01 RX ADMIN — ENOXAPARIN SODIUM 40 MG: 40 INJECTION SUBCUTANEOUS at 08:52

## 2021-11-01 NOTE — CONSULTS
[facility]  INPATIENT WOUND CONSULTATION    Today's Date: 11/01/21    Patient Name: Jose Landeros  MRN: [id number]  [facility]: [id number]  PCP: Cong Steve MD  Referring Provider: Justin Lerma MD  Attending Provider: Ravi Hardy MD  Length of Stay: 5    SUBJECTIVE   Chief Complaint: Wounds of bilateral lower extremities and left buttock     HPI: Jose Landeros, a 61 y.o.male, presents with a past medical history of diabetes mellitus, COPD and venous insufficiency.  A full past medical history is listed below.  Patient presented to the emergency department on 10/27 with a 2-day history of diarrhea.  Patient had been laying in bed having diarrhea multiple times a day.  Patient explained he had gout and was unable to get out of bed so he was using diapers at that time.  In the ED, his legs were wrapped with dressings adhered to his legs.  Patient stated the dressings were applied a week before.  Inpatient wound care is consulted due to wounds of bilateral lower extremities and left buttock.  Patient has venous stasis ulcer of the right lower leg and a stage 3 pressure injury of the left buttock.        Visit Dx:    ICD-10-CM ICD-9-CM   1. Cellulitis of lower extremity, unspecified laterality  L03.119 682.6   2. Obesity hypoventilation syndrome (HCC)  E66.2 278.03   3. Hypoxia  R09.02 799.02   4. Impaired mobility  Z74.09 799.89   5. Decreased activities of daily living (ADL)  Z78.9 V49.89     Patient Active Problem List   Diagnosis   • Venous insufficiency   • Hypertension   • Diabetes mellitus (HCC)   • Cellulitis of left lower extremity   • COPD (chronic obstructive pulmonary disease) ([facility])   • Cellulitis of lower extremity   • UTI (urinary tract infection), bacterial   • Obesity, morbid, BMI 50 or higher ([facility])       History:   Past Medical History:   Diagnosis Date   • COPD (chronic obstructive pulmonary disease) ([facility])    • Depression    • Diabetes mellitus ([facility])    •  Hyperlipidemia    • Hypertension    • Venous insufficiency    • Venous ulcer of right leg (HCC)      Past Surgical History:   Procedure Laterality Date   • APPENDECTOMY     • FOOT SURGERY     • TESTICLE SURGERY     • TRACHEOSTOMY       Social History     Socioeconomic History   • Marital status: Single   Tobacco Use   • Smoking status: Never Smoker   • Smokeless tobacco: Never Used   Substance and Sexual Activity   • Alcohol use: No   • Drug use: No   • Sexual activity: Defer     Family History   Problem Relation Age of Onset   • Diabetes Mother    • Heart disease Mother    • Hypertension Mother    • Hyperlipidemia Mother    • COPD Mother    • Diabetes Father    • Heart disease Father    • Hypertension Father    • No Known Problems Sister    • No Known Problems Brother    • No Known Problems Sister    • No Known Problems Sister    • No Known Problems Sister        Allergies:  Allergies   Allergen Reactions   • Penicillins Hives and Itching   • Cephalosporins Other (See Comments)     Per d/c summary of 12/17/15, pt possibly had cross-reactivity to a cephalosporin during his hospitalization   • Hemp Seed Oil Hives   • Neosporin  [Neomycin-Bacitracin Zn-Polymyx]    • Penicillamine    • Silver Sulfadiazine        Medications:    Current Facility-Administered Medications:   •  acetaminophen (TYLENOL) tablet 650 mg, 650 mg, Oral, Q4H PRN **OR** acetaminophen (TYLENOL) 160 MG/5ML solution 650 mg, 650 mg, Oral, Q4H PRN **OR** acetaminophen (TYLENOL) suppository 650 mg, 650 mg, Rectal, Q4H PRN, Justin Lerma MD  •  albuterol (PROVENTIL) nebulizer solution 0.083% 2.5 mg/3mL, 2.5 mg, Nebulization, Q6H PRN, Justin Lerma MD, 2.5 mg at 10/30/21 5667  •  allopurinol (ZYLOPRIM) tablet 300 mg, 300 mg, Oral, Daily, Justin Lerma MD, 300 mg at 11/01/21 0851  •  aspirin EC tablet 81 mg, 81 mg, Oral, Daily, Justin Lerma MD, 81 mg at 11/01/21 0851  •  aztreonam (AZACTAM) 1 g in sodium chloride 0.9 % 100 mL IVPB-VTB,  1 g, Intravenous, Q8H, Justin Lerma MD, 1 g at 11/01/21 0607  •  bumetanide (BUMEX) tablet 2 mg, 2 mg, Oral, Daily, Justin Lerma MD, 2 mg at 11/01/21 0851  •  dextrose (D50W) (25 g/50 mL) IV injection 25 g, 25 g, Intravenous, Q15 Min PRN, Justin Lerma MD  •  dextrose (GLUTOSE) oral gel 15 g, 15 g, Oral, Q15 Min PRN, Justin Lerma MD  •  diphenhydrAMINE (BENADRYL) capsule 25 mg, 25 mg, Oral, BID PRN, Ethel Gayle APRN, 25 mg at 10/31/21 2003  •  enoxaparin (LOVENOX) syringe 40 mg, 40 mg, Subcutaneous, Q12H, Justin Lerma MD, 40 mg at 11/01/21 0852  •  fluticasone (FLONASE) 50 MCG/ACT nasal spray 1 spray, 1 spray, Nasal, BID PRN, Justin Lerma MD, 1 spray at 11/01/21 0852  •  glucagon (human recombinant) (GLUCAGEN DIAGNOSTIC) injection 1 mg, 1 mg, Subcutaneous, Q15 Min PRN, Justin Lerma MD  •  guaiFENesin (MUCINEX) 12 hr tablet 1,200 mg, 1,200 mg, Oral, Q12H, Ethel Gayle APRN, 1,200 mg at 11/01/21 0852  •  insulin lispro (humaLOG) injection 0-24 Units, 0-24 Units, Subcutaneous, TID AC, Justin Lerma MD, 4 Units at 11/01/21 1137  •  insulin NPH (humuLIN N,novoLIN N) injection 30 Units, 30 Units, Subcutaneous, Daily With Lunch, Justin Lerma MD, 30 Units at 11/01/21 1137  •  insulin regular (humuLIN R,novoLIN R) injection 20 Units - SEE INSTRUCTIONS, 20 Units, Subcutaneous, BID With Meals, Justin Lerma MD, 20 Units at 11/01/21 0852  •  ipratropium-albuterol (DUO-NEB) nebulizer solution 3 mL, 3 mL, Nebulization, 4x Daily - RT, Woeltz, Ethel K, APRN, 3 mL at 11/01/21 1052  •  losartan (COZAAR) tablet 25 mg, 25 mg, Oral, Daily, Woeltz, Ethel K, APRN, 25 mg at 11/01/21 0851  •  nebivolol (BYSTOLIC) tablet 5 mg, 5 mg, Oral, Daily, Woeltz, Ethel K, APRN, 5 mg at 11/01/21 0851  •  nystatin (MYCOSTATIN) 437480 UNIT/GM cream 1 application, 1 application, Topical, Q12H, Woeltz, Ethel K, APRN, 1 application at 11/01/21 0852  •  O2 (OXYGEN), 2 L/min,  Inhalation, PRN, Justin Lerma MD  •  ondansetron (ZOFRAN) tablet 4 mg, 4 mg, Oral, Q6H PRN **OR** ondansetron (ZOFRAN) injection 4 mg, 4 mg, Intravenous, Q6H PRN, Justin Lerma MD  •  potassium chloride (MICRO-K) CR capsule 20 mEq, 20 mEq, Oral, Daily, Justin Lerma MD, 20 mEq at 11/01/21 0851  •  pravastatin (PRAVACHOL) tablet 20 mg, 20 mg, Oral, Nightly, Justin Lerma MD, 20 mg at 10/31/21 2003  •  saccharomyces boulardii (FLORASTOR) capsule 500 mg, 500 mg, Oral, BID, Ethel Gayle APRN, 500 mg at 11/01/21 0851  •  sodium chloride 0.9 % flush 10 mL, 10 mL, Intravenous, Q12H, Justin Lerma MD, 10 mL at 11/01/21 0852  •  sodium chloride 0.9 % flush 10 mL, 10 mL, Intravenous, PRN, Justin Lerma MD  •  vitamin B-12 (CYANOCOBALAMIN) tablet 500 mcg, 500 mcg, Oral, BID, Justin Lerma MD, 500 mcg at 11/01/21 0851    Review of Systems:  Review of Systems   Constitutional: Positive for fatigue. Negative for chills and fever.   HENT: Negative for rhinorrhea and sore throat.    Respiratory: Negative for cough and shortness of breath.    Cardiovascular: Negative for chest pain and palpitations.   Gastrointestinal: Negative for diarrhea, nausea and vomiting.   Genitourinary: Negative for frequency and urgency.   Musculoskeletal: Positive for gait problem and myalgias.   Skin: Positive for color change and wound.   Neurological: Positive for weakness. Negative for dizziness.   Psychiatric/Behavioral: Negative for agitation, behavioral problems and confusion.         OBJECTIVE     Vitals:    11/01/21 1113   BP: 145/59   Pulse: 64   Resp: 20   Temp: 99.5 °F (37.5 °C)   SpO2: 92%       PHYSICAL EXAM  Physical Exam  Vitals and nursing note reviewed.   Constitutional:       General: He is awake.      Appearance: He is morbidly obese.      Comments: Body mass index is 84.19 kg/m².    HENT:      Head: Normocephalic and atraumatic.   Eyes:      General: Lids are normal. Gaze aligned  appropriately.   Cardiovascular:      Rate and Rhythm: Normal rate and regular rhythm.   Pulmonary:      Effort: Pulmonary effort is normal. No respiratory distress.   Abdominal:      General: Abdomen is protuberant. There is no distension.   Musculoskeletal:      Cervical back: No edema or erythema.   Skin:     General: Skin is warm and dry.      Findings: Erythema and wound present.      Comments: Venous stasis ulcers present of bilateral lower extremities.  Wound of right leg measures 4.3cm x 2.2cm x 0.1cm.  The wound base is red and moist with edges irregular and attached to wound bed.  The periwound area is erythemic and edematous.    Wound of the right foot measures 2.7cm x 3.8cm x 0.1cm.  The wound base is red and moist with irregular edges attached to the wound base.  Periwound area is red and edematous.  There is a moderate amount of serosanguinous drainage present of both wounds.    Patient also has a skin tear at the thoracic spine.    Stage 3 pressure injury present of the right buttock.  Wound base is red and moist with slough present.  Edges are irregular and attached to wound base.  Periwound area is erythemic and there is a small amount of serosanguinous drainage from this wound.    Neurological:      Mental Status: He is alert and oriented to person, place, and time.   Psychiatric:         Attention and Perception: Attention normal.         Behavior: Behavior is cooperative.         Pictures taken by nursing staff                           Results Review:  Lab Results (last 48 hours)     Procedure Component Value Units Date/Time    POC Glucose Once [[id number]]  (Abnormal) Collected: 11/01/21 1059    Specimen: Blood Updated: 11/01/21 1110     Glucose 178 mg/dL      Comment: : [id number] Yumiko CalleyMeter ID: [id number]       Basic Metabolic Panel [[id number]]  (Abnormal) Collected: 11/01/21 0902    Specimen: Blood Updated: 11/01/21 0939     Glucose 195 mg/dL      BUN 24 mg/dL      Creatinine 0.94  mg/dL      Sodium 144 mmol/L      Potassium 4.1 mmol/L      Chloride 105 mmol/L      CO2 32.0 mmol/L      Calcium 9.2 mg/dL      eGFR Non African Amer 81 mL/min/1.73      BUN/Creatinine Ratio 25.5     Anion Gap 7.0 mmol/L     Narrative:      GFR Normal >60  Chronic Kidney Disease <60  Kidney Failure <15      CBC (No Diff) [[id number]]  (Normal) Collected: 11/01/21 0902    Specimen: Blood Updated: 11/01/21 0927     WBC 7.03 10*3/mm3      RBC 4.51 10*6/mm3      Hemoglobin 13.7 g/dL      Hematocrit 41.8 %      MCV 92.7 fL      MCH 30.4 pg      MCHC 32.8 g/dL      RDW 14.9 %      RDW-SD 50.1 fl      MPV 11.2 fL      Platelets 258 10*3/mm3     POC Glucose Once [[id number]]  (Abnormal) Collected: 11/01/21 0815    Specimen: Blood Updated: 11/01/21 0826     Glucose 165 mg/dL      Comment: : [id number] Yumiko CalleyMeter ID: [id number]       Blood Culture - Blood, Arm, Right [[id number]]  (Normal) Collected: 10/27/21 2140    Specimen: Blood from Arm, Right Updated: 10/31/21 2215     Blood Culture No growth at 4 days    Blood Culture - Blood, Arm, Right [[id number]]  (Normal) Collected: 10/27/21 2135    Specimen: Blood from Arm, Right Updated: 10/31/21 2215     Blood Culture No growth at 4 days    POC Glucose Once [[id number]]  (Abnormal) Collected: 10/31/21 1953    Specimen: Blood Updated: 10/31/21 2007     Glucose 182 mg/dL      Comment: : [id number] Tri LatoshaMeter ID: [id number]       POC Glucose Once [[id number]]  (Abnormal) Collected: 10/31/21 1647    Specimen: Blood Updated: 10/31/21 1658     Glucose 166 mg/dL      Comment: : [id number] Yumiko CalleyMeter ID: [id number]       POC Glucose Once [[id number]]  (Abnormal) Collected: 10/31/21 1135    Specimen: Blood Updated: 10/31/21 1146     Glucose 158 mg/dL      Comment: : [id number] Stephan CharidaMeter ID: [id number]       POC Glucose Once [[id number]]  (Abnormal) Collected: 10/31/21 0851    Specimen: Blood Updated: 10/31/21 0902     Glucose 160 mg/dL       Comment: : [id number] Yumiko PickettMeter ID: [id number]       Comprehensive Metabolic Panel [[id number]]  (Abnormal) Collected: 10/31/21 0627    Specimen: Blood Updated: 10/31/21 0712     Glucose 181 mg/dL      BUN 25 mg/dL      Creatinine 0.89 mg/dL      Sodium 141 mmol/L      Potassium 4.1 mmol/L      Chloride 104 mmol/L      CO2 29.0 mmol/L      Calcium 8.9 mg/dL      Total Protein 6.1 g/dL      Albumin 3.00 g/dL      ALT (SGPT) 20 U/L      AST (SGOT) 21 U/L      Alkaline Phosphatase 83 U/L      Total Bilirubin 0.3 mg/dL      eGFR Non African Amer 86 mL/min/1.73      Globulin 3.1 gm/dL      A/G Ratio 1.0 g/dL      BUN/Creatinine Ratio 28.1     Anion Gap 8.0 mmol/L     Narrative:      GFR Normal >60  Chronic Kidney Disease <60  Kidney Failure <15      CBC & Differential [[id number]]  (Abnormal) Collected: 10/31/21 0627    Specimen: Blood Updated: 10/31/21 0703    Narrative:      The following orders were created for panel order CBC & Differential.  Procedure                               Abnormality         Status                     ---------                               -----------         ------                     CBC Auto Differential[[id number]]        Abnormal            Final result                 Please view results for these tests on the individual orders.    CBC Auto Differential [[id number]]  (Abnormal) Collected: 10/31/21 0627    Specimen: Blood Updated: 10/31/21 0703     WBC 8.38 10*3/mm3      RBC 4.60 10*6/mm3      Hemoglobin 12.7 g/dL      Hematocrit 42.7 %      MCV 92.8 fL      MCH 27.6 pg      MCHC 29.7 g/dL      RDW 14.7 %      RDW-SD 50.4 fl      MPV 11.0 fL      Platelets 273 10*3/mm3      Neutrophil % 69.7 %      Lymphocyte % 12.3 %      Monocyte % 9.1 %      Eosinophil % 5.3 %      Basophil % 1.0 %      Immature Grans % 2.6 %      Neutrophils, Absolute 5.85 10*3/mm3      Lymphocytes, Absolute 1.03 10*3/mm3      Monocytes, Absolute 0.76 10*3/mm3      Eosinophils, Absolute 0.44 10*3/mm3       Basophils, Absolute 0.08 10*3/mm3      Immature Grans, Absolute 0.22 10*3/mm3      nRBC 0.0 /100 WBC     Narrative:      ckd    POC Glucose Once [[id number]]  (Abnormal) Collected: 10/30/21 1919    Specimen: Blood Updated: 10/30/21 1943     Glucose 190 mg/dL      Comment: : [id number] Vaughn KristaMeter ID: [id number]       POC Glucose Once [[id number]]  (Abnormal) Collected: 10/30/21 1658    Specimen: Blood Updated: 10/30/21 1709     Glucose 156 mg/dL      Comment: : [id number] John SebastianMeter ID: [id number]           Imaging Results (Last 72 Hours)     ** No results found for the last 72 hours. **             ASSESSMENT/PLAN       Examination and evaluation of wound(s) was performed.    DIAGNOSIS:   Venous stasis ulcer of calf of right lower leg with fat layer exposed  Venous stasis ulcer of dorsal aspect of right foot with fat layer exposed  Venous stasis dermatitis of bilateral lower extremities  Diabetes mellitus  Venous insufficiency   Lichenification of bilateral lower extremities  Impaired mobility and ADLs  Pressure injury of left buttock, stage 3  Morbid obesity - Body mass index is 84.19 kg/m².       PLAN:     Start     Ordered    11/01/21 1257  Wound Care  Every 12 Hours        Question Answer Comment   Wound Locations Bilateral lower legs    Wound Care Instructions Clean wounds with NS.  Apply therahoney and Vaseline gauze.  Wrap with Kerlix and secure with Ace wrap.  Ace wrap with light compression from toes to knee.  Change dressing every 12 hours.  May add borderless optifoam over wounds if drainage increases.    Cleanse Normal Saline    Intervention Thera Honey    Intervention Vaseline Gauze    Dressing: Silicone Border Dressing    Securement Ace Wrap        11/01/21 1256    11/01/21 1257  Elevate Extremity  Continuous        Comments: Elevate bilateral lower extremities above heart level when possible   Question:  Elevate Extremity  Answer:  BLE    11/01/21 1257    Unscheduled  Wound  Care  As Needed      Question Answer Comment   Wound Locations Left buttock    Wound Care Instructions Clean wounds with NS.  Apply therahoney to wound and cover with silicone border dressing.  Change dressing every 12 hours.    Cleanse Normal Saline    Intervention Thera Honey    Dressing: Silicone Border Dressing        11/01/21 1257         ammonium lactate (AMLACTIN) cream 1 application   [[id number]]  Order Details  Ordered Dose: 1 application Route: Topical Frequency: Every 12 Hours   Admin Dose: 1 application      Scheduled Start Date/Time: 11/01/21 1445 End Date/Time: --       Admin Instructions:   Apply to Left lower leg every 12 hours.                   Discussed findings and treatment plan including risks, benefits, and treatment options with patient in detail. Patient agreed with treatment plan.      This document has been electronically signed by NOAH Mann on 11/1/2021 12:42 CDT

## 2021-11-01 NOTE — PLAN OF CARE
Goal Outcome Evaluation:              Outcome Summary: RD nutrition follow-up completed.  Pt's po intake unknown at this time, however flowsheet indicates that he is consuming his supplements.  Will continue to monitor po intake, and follow for further d/c needs

## 2021-11-01 NOTE — CASE MANAGEMENT/SOCIAL WORK
Continued Stay Note   [place]     Patient Name: Jose Landeros  MRN: [id number]  Today's Date: 11/1/2021    Admit Date: 10/27/2021     Discharge Plan     Row Name 11/01/21 1259       Plan    Plan Comments Pt is not wanting any referrals made to [facility]. Pt prefers to go home with HH when medically stable. Will continue to follow.               Discharge Codes    No documentation.                     ELISSA Velasquez

## 2021-11-01 NOTE — PLAN OF CARE
Goal Outcome Evaluation:              Outcome Summary: Aox4. VSS. Cpap with 3L in use while sleeping. Pt coughed up a small amount of blood this shift, MD notified. Pt has been ambulating to the bathroom with RW and standby assist, tolerating well.Encouraged ambulation. Pt has been assisting in turning and repositioning himself with wedges. Dressing to BLE changed and CDI. Coccyx is red blanchable with small open area, awaiting wound care. Tele NSR/sinus layo. Lovenox. Appears to be resting well this shift. Safety maintained. Call light within reach.

## 2021-11-01 NOTE — PROGRESS NOTES
[facility] Medicine Services  INPATIENT PROGRESS NOTE    Length of Stay: 5  Date of Admission: 10/27/2021  Primary Care Physician: Cong Steve MD    Subjective   Chief Complaint: Follow-up  HPI   Patient sitting up in a chair.  He states he feels okay today.  He continues to refuse rehab placement.  He states he is waiting on a battery for his power chair, before he will be ready to be discharged home.  He denies any shortness of breath or chest pain.  He is currently on room air at the time of my evaluation with oxygen ranging from 90 to 94%.  He has complained of nasal congestion and states he is coughing up bloody sputum.  He denies abdominal pain, nausea, or vomiting.    Review of Systems   All pertinent negatives and positives are as above. All other systems have been reviewed and are negative unless otherwise stated.     Objective    Temp:  [97.8 °F (36.6 °C)-99.5 °F (37.5 °C)] 99.5 °F (37.5 °C)  Heart Rate:  [59-74] 64  Resp:  [16-20] 20  BP: (141-155)/(56-79) 145/59  Physical Exam  Vitals and nursing note reviewed.   Constitutional:       General: He is not in acute distress.     Appearance: He is obese. He is not ill-appearing (chronically).   HENT:      Head: Normocephalic and atraumatic.   Cardiovascular:      Rate and Rhythm: Normal rate and regular rhythm.      Pulses: Normal pulses.      Heart sounds: Normal heart sounds.      Comments: Sinus bradycardia-sinus 59-73  Pulmonary:      Effort: Pulmonary effort is normal.      Breath sounds: No wheezing, rhonchi or rales.      Comments: Diminished breath sounds, congested cough.  Abdominal:      General: Bowel sounds are normal. There is no distension.      Palpations: Abdomen is soft.      Tenderness: There is no abdominal tenderness.   Musculoskeletal:         General: Tenderness present.      Cervical back: Normal range of motion and neck supple. No tenderness.   Skin:     General: Skin is warm.       Findings: Erythema present.     Comments: Bilateral lower extremities with chronic venous skin changes.  Wound to the right anterior calf.  Severe excoriation to the pannus.   Neurological:      General: No focal deficit present.      Mental Status: He is alert and oriented to person, place, and time.   Psychiatric:         Mood and Affect: Mood normal.         Behavior: Behavior normal.         Thought Content: Thought content normal.         Judgment: Judgment normal.     Results Review:  I have reviewed the labs, radiology results, and diagnostic studies.    Laboratory Data:   Results from last 7 days   Lab Units 11/01/21  0902 10/31/21  0627 10/30/21  0639   WBC 10*3/mm3 7.03 8.38 7.76   HEMOGLOBIN g/dL 13.7 12.7* 12.9*   HEMATOCRIT % 41.8 42.7 44.0   PLATELETS 10*3/mm3 258 273 252     Results from last 7 days   Lab Units 11/01/21  0902 10/31/21  0627 10/30/21  0639 10/29/21  0710 10/28/21  0444 10/27/21  2222 10/27/21  2222   SODIUM mmol/L 144 141 143   < > 140   < > 140   POTASSIUM mmol/L 4.1 4.1 4.1   < > 3.8   < > 4.4   CHLORIDE mmol/L 105 104 108*   < > 102   < > 102   CO2 mmol/L 32.0* 29.0 30.0*   < > 26.0   < > 30.0*   BUN mg/dL 24* 25* 25*   < > 28*   < > 26*   CREATININE mg/dL 0.94 0.89 0.90   < > 1.04   < > 1.08   CALCIUM mg/dL 9.2 8.9 8.9   < > 8.6   < > 9.1   BILIRUBIN mg/dL  --  0.3  --   --  0.3  --  0.3   ALK PHOS U/L  --  83  --   --  84  --  84   ALT (SGPT) U/L  --  20  --   --  29  --  33   AST (SGOT) U/L  --  21  --   --  29  --  31   GLUCOSE mg/dL 195* 181* 166*   < > 190*   < > 209*    < > = values in this interval not displayed.     I have reviewed the patient current medications.     Assessment/Plan     Active Hospital Problems    Diagnosis    • UTI (urinary tract infection), bacterial    • Obesity, morbid, BMI 50 or higher (HCC)    • Cellulitis of lower extremity    • COPD (chronic obstructive pulmonary disease) (HCC)    • Hypertension    • Diabetes mellitus (HCC)    • Venous  insufficiency      Plan:  1.  Patient presented to the emergency department 10/27/2021 with complaints of diarrhea.  The patient was found to have dressings on his lower extremities on arrival to the emergency department which were adhered to his legs.  Upon removal, he was found to have several wounds with erythema and drainage.  2.  The patient's GI panel returned positive for Salmonella.  He has been monitored off of Flagyl which was started on admission.  Symptoms have much improved.  Continue probiotic.  3.  Patient received vancomycin and Azactam for bilateral lower extremity cellulitis, now on Azactam monotherapy.  Blood culture show no growth thus far.  Bilateral tibia/fibula x-rays negative.  Will switch to oral doxycycline to complete 10 days of therapy.  4.  Wound care APRN has evaluated.  Patient states he will not be able to tolerate Unna boots as he was told he was allergic in the past.  Orders for Thera honey application.  5.  Patient was on room air during the time of my evaluation, does not typically wear oxygen at home.  He has been using 3 L on CPAP nightly only while hospitalized, does not typically use this at home.  Will conduct overnight pulse oximetry study on room air with CPAP to assess for nocturnal oxygen needs at home.  6.  Continue DuoNebs and Mucinex.  Add Flonase and Zyrtec for complaints of nasal congestion and hemoptysis.  May also use [place] nasal spray as needed.  H&H is stable.  Encourage use of flutter valve.  7.  Continue PT/OT.  Patient refuses skilled nursing facility placement, will order home health at time of discharge.   following.  I do have concerns about the patient being able to care for himself at home.  8.  Lab holiday in a.m.  9.  Lovenox for DVT prophylaxis.    Discharge Planning: I expect the patient to be discharged to home with home health in 1-2 days.    Electronically signed by NOAH Jimenez, 11/1/2021, 13:19 CDT.

## 2021-11-01 NOTE — THERAPY TREATMENT NOTE
Acute Care - Occupational Therapy Treatment Note  [facility]     Patient Name: Jose Landeros  : 1960  MRN: [id number]  Today's Date: 2021  Onset of Illness/Injury or Date of Surgery: 10/28/21  Date of Referral to OT: 10/28/21  Referring Physician: Kranthi Horn MD    Admit Date: 10/27/2021       ICD-10-CM ICD-9-CM   1. Cellulitis of lower extremity, unspecified laterality  L03.119 682.6   2. Obesity hypoventilation syndrome (HCC)  E66.2 278.03   3. Hypoxia  R09.02 799.02   4. Impaired mobility  Z74.09 799.89   5. Decreased activities of daily living (ADL)  Z78.9 V49.89     Patient Active Problem List   Diagnosis   • Venous insufficiency   • Hypertension   • Diabetes mellitus (HCC)   • Cellulitis of left lower extremity   • COPD (chronic obstructive pulmonary disease) (HCC)   • Cellulitis of lower extremity   • UTI (urinary tract infection), bacterial   • Obesity, morbid, BMI 50 or higher (HCC)     Past Medical History:   Diagnosis Date   • COPD (chronic obstructive pulmonary disease) (HCC)    • Depression    • Diabetes mellitus (HCC)    • Hyperlipidemia    • Hypertension    • Venous insufficiency    • Venous ulcer of right leg (HCC)      Past Surgical History:   Procedure Laterality Date   • APPENDECTOMY     • FOOT SURGERY     • TESTICLE SURGERY     • TRACHEOSTOMY           OT ASSESSMENT FLOWSHEET (last 12 hours)     OT Evaluation and Treatment     Row Name 21 1308                   OT Time and Intention    Document Type therapy note (daily note)  -AC (r) PF (t) AC (c)        Mode of Treatment occupational therapy  -AC (r) PF (t) AC (c)        Patient Effort good  -AC (r) PF (t) AC (c)                  General Information    Existing Precautions/Restrictions fall; oxygen therapy device and L/min  3 L O2  -AC (r) PF (t) AC (c)                  Cognition    Orientation Status (Cognition) oriented x 4  -AC (r) PF (t) AC (c)                  Pain Assessment    Additional Documentation Pain Scale:  Numbers Pre/Post-Treatment (Group)  -AC (r) PF (t) AC (c)                  Pain Scale: Numbers Pre/Post-Treatment    Pretreatment Pain Rating 0/10 - no pain  -AC (r) PF (t) AC (c)        Posttreatment Pain Rating 0/10 - no pain  -AC (r) PF (t) AC (c)                  Bed Mobility    Bed Mobility rolling left; rolling right; sit-supine  -AC (r) PF (t) AC (c)        Rolling Left [place] (Bed Mobility) contact guard; verbal cues  -AC (r) PF (t) AC (c)        Rolling Right [place] (Bed Mobility) contact guard; verbal cues  -AC (r) PF (t) AC (c)        Sit-Supine [place] (Bed Mobility) contact guard; verbal cues  -AC (r) PF (t) AC (c)        Assistive Device (Bed Mobility) bed rails; draw sheet; head of bed elevated; overhead trapeze  -AC (r) PF (t) AC (c)                  Functional Mobility    Functional Mobility- Ind. Level standby assist  -AC (r) PF (t) AC (c)        Functional Mobility- Device rolling walker  front wheeled walker  -AC (r) PF (t) AC (c)        Functional Mobility- Comment Pt ambulated to the BR with SBA and with assistance from front wheeled walker  -AC (r) PF (t) AC (c)                  Transfer Assessment/Treatment    Transfers sit-stand transfer; stand-sit transfer; toilet transfer  -AC (r) PF (t) AC (c)                  Transfers    Sit-Stand [place] (Transfers) verbal cues; standby assist  -AC (r) PF (t) AC (c)        Stand-Sit [place] (Transfers) verbal cues; standby assist  -AC (r) PF (t) AC (c)        [place] Level (Toilet Transfer) standby assist  -AC (r) PF (t) AC (c)        Assistive Device (Toilet Transfer) commode, bedside with drop arms; grab bars/safety frame; walker, front-wheeled  -AC (r) PF (t) AC (c)                  Sit-Stand Transfer    Assistive Device (Sit-Stand Transfers) walker, front-wheeled  -AC (r) PF (t) AC (c)                  Stand-Sit Transfer    Assistive Device (Stand-Sit Transfers) walker, front-wheeled  -AC (r) PF (t) AC (c)                   Toilet Transfer    Type (Toilet Transfer) stand-sit; sit-stand  -AC (r) PF (t) AC (c)                  Activities of Daily Living    BADL Assessment/Intervention bathing; upper body dressing; lower body dressing; grooming; toileting  -AC (r) PF (t) AC (c)                  Bathing Assessment/Intervention    [place] Level (Bathing) maximum assist (25% patient effort); perineal area; distal lower extremities/feet; dependent (less than 25% patient effort)  Dependent for washing back  -AC (r) PF (t) AC (c)        Assistive Devices (Bathing) bariatric equipment  -AC (r) PF (t) AC (c)        Position (Bathing) supported sitting  -AC (r) PF (t) AC (c)        Comment (Bathing) OTS assisted in cleaning senthil area and panus. Powder applied to panus areas  -AC (r) PF (t) AC (c)                  Upper Body Dressing Assessment/Training    [place] Level (Upper Body Dressing) upper body dressing skills; doff; don; front opening garment; minimum assist (75% patient effort)  -AC (r) PF (t) AC (c)        Position (Upper Body Dressing) supported sitting  -AC (r) PF (t) AC (c)                  Lower Body Dressing Assessment/Training    [place] Level (Lower Body Dressing) lower body dressing skills; doff; don; socks; dependent (less than 25% patient effort)  -AC (r) PF (t) AC (c)        Position (Lower Body Dressing) supported sitting  -AC (r) PF (t) AC (c)                  Grooming Assessment/Training    [place] Level (Grooming) grooming skills; hair care, combing/brushing; oral care regimen; set up; standby assist  -AC (r) PF (t) AC (c)        Assistive Devices (Grooming) electric/power toothbrush  -AC (r) PF (t) AC (c)        Position (Grooming) supported sitting  -AC (r) PF (t) AC (c)                  Toileting Assessment/Training    [place] Level (Toileting) toileting skills; perform perineal hygiene; dependent (less than 25% patient effort)  -AC (r) PF (t) AC (c)        Assistive Devices (Toileting)  commode; grab bar/safety frame  -AC (r) PF (t) AC (c)        Position (Toileting) supported sitting  -AC (r) PF (t) AC (c)                  Wound 10/28/21 0208 Left anterior greater trochanter    Wound - Properties Group Placement Date: 10/28/21  -LF Placement Time: 0208  -LF Side: Left  -LF Orientation: anterior  -LF Location: greater trochanter  -LF        Retired Wound - Properties Group Date first assessed: 10/28/21  -LF Time first assessed: 0208  -LF Side: Left  -LF Location: greater trochanter  -LF                  Wound 10/28/21 0233 Right lower leg    Wound - Properties Group Placement Date: 10/28/21  -LF Placement Time: 0233  -LF Side: Right  -LF Orientation: lower  -LF Location: leg  -LF        Retired Wound - Properties Group Date first assessed: 10/28/21  -LF Time first assessed: 0233  -LF Side: Right  -LF Location: leg  -LF                  Wound 10/28/21 0234 Left lower leg    Wound - Properties Group Placement Date: 10/28/21  -LF Placement Time: 0234  -LF Side: Left  -LF Orientation: lower  -LF Location: leg  -LF        Retired Wound - Properties Group Date first assessed: 10/28/21  -LF Time first assessed: 0234  -LF Side: Left  -LF Location: leg  -LF                  Wound 10/28/21 0100 gluteal    Wound - Properties Group Placement Date: 10/28/21  -ST Placement Time: 0100  -ST Present on Hospital Admission: Y  -ST Location: gluteal  -ST        Retired Wound - Properties Group Date first assessed: 10/28/21  -ST Time first assessed: 0100  -ST Present on Hospital Admission: Y  -ST Location: gluteal  -ST                  Wound 10/28/21 1452 thoracic spine Skin Tear    Wound - Properties Group Placement Date: 10/28/21  -CW Placement Time: 1452  -CW Location: thoracic spine  -CW Primary Wound Type: Skin tear  -CW        Retired Wound - Properties Group Date first assessed: 10/28/21  -CW Time first assessed: 1452  -CW Location: thoracic spine  -CW Primary Wound Type: Skin tear  -CW                  Plan of  Care Review    Plan of Care Reviewed With patient  -AC (r) PF (t) AC (c)        Progress improving  -AC (r) PF (t) AC (c)        Outcome Summary OT tx completed. Pt sitting up in chair upon arrival. Pt oriented x4. Pt c/o 0/10 pain.  Pt currently on 3L O2 nc with O2 sats remaining in the high 90's. Pt performed sit to stand with SBA and with assistance of front wheeled walker. Pt ambulated to the  from chair with CGA. Pt performed toileting while on BSC and was dependent for senthil care. Pt perfromed UB bathing with Min A to wash back. Pt was dependent for LB bathing due to body habitus. Pt performed grooming skills (brushing hair) and oral care with set up and SBA while supported sitting. Pt performed all bed mobility with CGA. Pt demos increased transfer ability and ADL task tolerance. Continue OT POC.  -AC (r) PF (t) AC (c)                  Vital Signs    Pre SpO2 (%) 96  -AC (r) PF (t) AC (c)        O2 Delivery Pre Treatment nasal cannula  3 L O2 nc  -AC (r) PF (t) AC (c)        Intra SpO2 (%) 89  -AC (r) PF (t) AC (c)        O2 Delivery Intra Treatment nasal cannula  3 L O2 nc  -AC (r) PF (t) AC (c)        Post SpO2 (%) 96  -AC (r) PF (t) AC (c)        O2 Delivery Post Treatment nasal cannula  3 L O2 nc  -AC (r) PF (t) AC (c)                  Bed Mobility Goal 1 (OT)    Activity/Assistive Device (Bed Mobility Goal 1, OT) bed mobility activities, all; rolling to left; rolling to right; sit to supine; supine to sit  -AC (r) PF (t) AC (c)        [place] Level/Cues Needed (Bed Mobility Goal 1, OT) moderate assist (50-74% patient effort)  -AC (r) PF (t) AC (c)        Time Frame (Bed Mobility Goal 1, OT) long term goal (LTG); 10 days  -AC (r) PF (t) AC (c)        Progress/Outcomes (Bed Mobility Goal 1, OT) goal met  -AC (r) PF (t) AC (c)                  Transfer Goal 1 (OT)    Activity/Assistive Device (Transfer Goal 1, OT) sit-to-stand/stand-to-sit; toilet; shower chair  -AC (r) PF (t) AC (c)         [place] Level/Cues Needed (Transfer Goal 1, OT) moderate assist (50-74% patient effort)  -AC (r) PF (t) AC (c)        Time Frame (Transfer Goal 1, OT) long term goal (LTG); 10 days  -AC (r) PF (t) AC (c)        Progress/Outcome (Transfer Goal 1, OT) goal met  -AC (r) PF (t) AC (c)                  Positioning and Restraints    Pre-Treatment Position sitting in chair/recliner  -AC (r) PF (t) AC (c)        Post Treatment Position bed  -AC (r) PF (t) AC (c)        In Bed notified nsg; call light within reach; encouraged to call for assist; side rails up x3  -AC (r) PF (t) AC (c)                  Therapy Plan Review/Discharge Plan (OT)    Therapy Plan Review (OT) patient  -AC (r) PF (t) AC (c)        Anticipated Discharge Disposition (OT) sub acute care setting  -AC (r) PF (t) AC (c)              User Key  (r) = Recorded By, (t) = Taken By, (c) = Cosigned By    Initials Name Effective Dates    Patricio Billingsley, OTR/L, CNT 04/09/19 -     Xuan Bullock RN 06/16/21 -     Sally Naranjo RN 07/28/20 -     Nieves Maya RN 07/30/21 -     PF Dejuan Wen Jr., OT Student 08/04/21 -                  Occupational Therapy Education                 Title: PT OT SLP Therapies (In Progress)     Topic: Occupational Therapy (Done)     Point: ADL training (Done)     Description:   Instruct learner(s) on proper safety adaptation and remediation techniques during self care or transfers.   Instruct in proper use of assistive devices.              Learning Progress Summary           Patient Acceptance, E,TB, VU by PF at 11/1/2021 1507    Comment: OT POC, transfer training, Bed mobility, D/C planning    Acceptance, E,TB, VU by PF at 10/29/2021 1400    Comment: OT POC, bed mobility, safety awareness, d/c planning                   Point: Precautions (Done)     Description:   Instruct learner(s) on prescribed precautions during self-care and functional transfers.              Learning Progress Summary           Patient  Acceptance, E,TB, VU by PF at 11/1/2021 1507    Comment: OT POC, transfer training, Bed mobility, D/C planning    Acceptance, E,TB, VU by PF at 10/29/2021 1400    Comment: OT POC, bed mobility, safety awareness, d/c planning                   Point: Body mechanics (Done)     Description:   Instruct learner(s) on proper positioning and spine alignment during self-care, functional mobility activities and/or exercises.              Learning Progress Summary           Patient Acceptance, E,TB, VU by PF at 11/1/2021 1507    Comment: OT POC, transfer training, Bed mobility, D/C planning    Acceptance, E,TB, VU by PF at 10/29/2021 1400    Comment: OT POC, bed mobility, safety awareness, d/c planning                               User Key     Initials Effective Dates Name Provider Type Discipline     08/04/21 -  Dejuan Wen Jr., OT Student OT Student OT                  OT Recommendation and Plan  Planned Therapy Interventions (OT): activity tolerance training, adaptive equipment training, BADL retraining, functional balance retraining, occupation/activity based interventions, patient/caregiver education/training, transfer/mobility retraining  Therapy Frequency (OT): 5 times/wk  Plan of Care Review  Plan of Care Reviewed With: patient  Progress: improving  Outcome Summary: OT tx completed. Pt sitting up in chair upon arrival. Pt oriented x4. Pt c/o 0/10 pain.  Pt currently on 3L O2 nc with O2 sats remaining in the high 90's. Pt performed sit to stand with SBA and with assistance of front wheeled walker. Pt ambulated to the BR from chair with CGA. Pt performed toileting while on BSC and was dependent for senthil care. Pt perfromed UB bathing with Min A to wash back. Pt was dependent for LB bathing due to body habitus. Pt performed grooming skills (brushing hair) and oral care with set up and SBA while supported sitting. Pt performed all bed mobility with CGA. Pt demos increased transfer ability and ADL task tolerance.  Continue OT POC.  Plan of Care Reviewed With: patient  Outcome Summary: OT tx completed. Pt sitting up in chair upon arrival. Pt oriented x4. Pt c/o 0/10 pain.  Pt currently on 3L O2 nc with O2 sats remaining in the high 90's. Pt performed sit to stand with SBA and with assistance of front wheeled walker. Pt ambulated to the  from chair with CGA. Pt performed toileting while on BSC and was dependent for senthil care. Pt perfromed UB bathing with Min A to wash back. Pt was dependent for LB bathing due to body habitus. Pt performed grooming skills (brushing hair) and oral care with set up and SBA while supported sitting. Pt performed all bed mobility with CGA. Pt demos increased transfer ability and ADL task tolerance. Continue OT POC.     Outcome Measures     Row Name 11/01/21 1308 10/31/21 1200          How much help from another is currently needed...    Putting on and taking off regular lower body clothing? 1  -AC (r) PF (t) AC (c) 2  -TS     Bathing (including washing, rinsing, and drying) 2  -AC (r) PF (t) AC (c) 2  -TS     Toileting (which includes using toilet bed pan or urinal) 3  -AC (r) PF (t) AC (c) 3  -TS     Putting on and taking off regular upper body clothing 2  -AC (r) PF (t) AC (c) 2  -TS     Taking care of personal grooming (such as brushing teeth) 3  -AC (r) PF (t) AC (c) 3  -TS     Eating meals 4  -AC (r) PF (t) AC (c) 4  -TS     AM-PAC 6 Clicks Score (OT) 15  -AC (r) PF (t) 16  -TS            Functional Assessment    Outcome Measure Options AM-PAC 6 Clicks Daily Activity (OT)  -AC (r) PF (t) AC (c) --           User Key  (r) = Recorded By, (t) = Taken By, (c) = Cosigned By    Initials Name Provider Type    AC Patricio Pradhan, OTR/L, CNT Occupational Therapist    Cammy Zhang COTA Occupational Therapy Assistant    PF Dejuan Wen Jr., OT Student OT Student                Time Calculation:    Time Calculation- OT     Row Name 11/01/21 1507 11/01/21 1339          Time Calculation- OT     OT Start Time 1308  -AC (r) PF (t) AC (c) --     OT Stop Time 1425  -AC (r) PF (t) AC (c) --     OT Time Calculation (min) 77 min  -AC (r) PF (t) --     Total Timed Code Minutes- OT 77 minute(s)  -AC (r) PF (t) AC (c) --     OT Received On 11/01/21  -AC (r) PF (t) AC (c) --            Timed Charges    48879 - Gait Training Minutes  -- 20  -NW            Total Minutes    Timed Charges Total Minutes -- 20  -NW      Total Minutes -- 20  -NW           User Key  (r) = Recorded By, (t) = Taken By, (c) = Cosigned By    Initials Name Provider Type    Patricio Billingsley, OTR/L, CNT Occupational Therapist    NW Becky Sharpe, PTA Physical Therapy Assistant    PF Dejuan Del Rosario Jr., OT Student OT Student                       DEJUAN DEL ROSARIO, OT Student  11/1/2021

## 2021-11-01 NOTE — PLAN OF CARE
Goal Outcome Evaluation:  Plan of Care Reviewed With: patient        Progress: improving  Outcome Summary: OT tx completed. Pt sitting up in chair upon arrival. Pt oriented x4. Pt c/o 0/10 pain.  Pt currently on 3L O2 nc with O2 sats remaining in the high 90's. Pt performed sit to stand with SBA and with assistance of front wheeled walker. Pt ambulated to the  from chair with CGA. Pt performed toileting while on BSC and was dependent for senthil care. Pt perfromed UB bathing with Min A to wash back. Pt was dependent for LB bathing due to body habitus. Pt performed grooming skills (brushing hair) and oral care with set up and SBA while supported sitting. Pt performed all bed mobility with CGA. Pt demos increased transfer ability and ADL task tolerance. Continue OT POC.

## 2021-11-01 NOTE — PLAN OF CARE
Goal Outcome Evaluation:  Plan of Care Reviewed With: patient        Progress: no change  Outcome Summary: Bed mobility extra time w/ using OHF, elevated HOB cga x1. Sat EOB pt c/o dizziness but quickly subsided. sit-stand sba Pt amb to BR 8' then amb 8' back sba cues for safety. Pt c/o LLE weakness and unable to amb any farther

## 2021-11-02 ENCOUNTER — APPOINTMENT (OUTPATIENT)
Dept: GENERAL RADIOLOGY | Facility: HOSPITAL | Age: 61
End: 2021-11-02

## 2021-11-02 LAB
GLUCOSE BLDC GLUCOMTR-MCNC: 150 MG/DL (ref 70–130)
GLUCOSE BLDC GLUCOMTR-MCNC: 161 MG/DL (ref 70–130)
GLUCOSE BLDC GLUCOMTR-MCNC: 169 MG/DL (ref 70–130)

## 2021-11-02 PROCEDURE — 63710000001 INSULIN ISOPHANE HUMAN PER 5 UNITS: Performed by: INTERNAL MEDICINE

## 2021-11-02 PROCEDURE — 97535 SELF CARE MNGMENT TRAINING: CPT

## 2021-11-02 PROCEDURE — 97116 GAIT TRAINING THERAPY: CPT

## 2021-11-02 PROCEDURE — 71045 X-RAY EXAM CHEST 1 VIEW: CPT

## 2021-11-02 PROCEDURE — 82962 GLUCOSE BLOOD TEST: CPT

## 2021-11-02 PROCEDURE — 94799 UNLISTED PULMONARY SVC/PX: CPT

## 2021-11-02 PROCEDURE — 97168 OT RE-EVAL EST PLAN CARE: CPT

## 2021-11-02 PROCEDURE — 63710000001 INSULIN LISPRO (HUMAN) PER 5 UNITS: Performed by: INTERNAL MEDICINE

## 2021-11-02 PROCEDURE — 97530 THERAPEUTIC ACTIVITIES: CPT

## 2021-11-02 PROCEDURE — 25010000002 ENOXAPARIN PER 10 MG: Performed by: INTERNAL MEDICINE

## 2021-11-02 PROCEDURE — 63710000001 INSULIN REGULAR HUMAN PER 5 UNITS: Performed by: INTERNAL MEDICINE

## 2021-11-02 RX ORDER — AMMONIUM LACTATE 12 G/100G
1 CREAM TOPICAL EVERY 12 HOURS
Status: DISCONTINUED | OUTPATIENT
Start: 2021-11-02 | End: 2021-11-03 | Stop reason: HOSPADM

## 2021-11-02 RX ADMIN — ASPIRIN 81 MG: 81 TABLET, COATED ORAL at 09:15

## 2021-11-02 RX ADMIN — Medication 500 MG: at 20:47

## 2021-11-02 RX ADMIN — INSULIN HUMAN 20 UNITS: 100 INJECTION, SOLUTION PARENTERAL at 09:42

## 2021-11-02 RX ADMIN — NEBIVOLOL HYDROCHLORIDE 5 MG: 5 TABLET ORAL at 09:15

## 2021-11-02 RX ADMIN — IPRATROPIUM BROMIDE AND ALBUTEROL SULFATE 3 ML: 2.5; .5 SOLUTION RESPIRATORY (INHALATION) at 14:22

## 2021-11-02 RX ADMIN — IPRATROPIUM BROMIDE AND ALBUTEROL SULFATE 3 ML: 2.5; .5 SOLUTION RESPIRATORY (INHALATION) at 06:45

## 2021-11-02 RX ADMIN — ALLOPURINOL 300 MG: 300 TABLET ORAL at 09:15

## 2021-11-02 RX ADMIN — DOXYCYCLINE 100 MG: 100 TABLET, FILM COATED ORAL at 20:47

## 2021-11-02 RX ADMIN — DOXYCYCLINE 100 MG: 100 TABLET, FILM COATED ORAL at 09:15

## 2021-11-02 RX ADMIN — PRAVASTATIN SODIUM 20 MG: 20 TABLET ORAL at 20:47

## 2021-11-02 RX ADMIN — NYSTATIN 1 APPLICATION: 100000 CREAM TOPICAL at 20:48

## 2021-11-02 RX ADMIN — AMMONIUM LACTATE 1 APPLICATION: 17 CREAM TOPICAL at 20:48

## 2021-11-02 RX ADMIN — IPRATROPIUM BROMIDE AND ALBUTEROL SULFATE 3 ML: 2.5; .5 SOLUTION RESPIRATORY (INHALATION) at 20:30

## 2021-11-02 RX ADMIN — Medication 1 SPRAY: at 09:17

## 2021-11-02 RX ADMIN — GUAIFENESIN 1200 MG: 600 TABLET, EXTENDED RELEASE ORAL at 09:15

## 2021-11-02 RX ADMIN — Medication 10 ML: at 09:13

## 2021-11-02 RX ADMIN — Medication 500 MG: at 09:15

## 2021-11-02 RX ADMIN — INSULIN LISPRO 4 UNITS: 100 INJECTION, SOLUTION INTRAVENOUS; SUBCUTANEOUS at 17:58

## 2021-11-02 RX ADMIN — Medication 500 MCG: at 09:16

## 2021-11-02 RX ADMIN — CETIRIZINE HYDROCHLORIDE 5 MG: 10 TABLET ORAL at 09:16

## 2021-11-02 RX ADMIN — ENOXAPARIN SODIUM 40 MG: 40 INJECTION SUBCUTANEOUS at 09:12

## 2021-11-02 RX ADMIN — INSULIN LISPRO 4 UNITS: 100 INJECTION, SOLUTION INTRAVENOUS; SUBCUTANEOUS at 09:13

## 2021-11-02 RX ADMIN — Medication 1 SPRAY: at 20:48

## 2021-11-02 RX ADMIN — INSULIN LISPRO 4 UNITS: 100 INJECTION, SOLUTION INTRAVENOUS; SUBCUTANEOUS at 12:10

## 2021-11-02 RX ADMIN — HUMAN INSULIN 30 UNITS: 100 INJECTION, SUSPENSION SUBCUTANEOUS at 12:10

## 2021-11-02 RX ADMIN — ENOXAPARIN SODIUM 40 MG: 40 INJECTION SUBCUTANEOUS at 20:47

## 2021-11-02 RX ADMIN — BUMETANIDE 2 MG: 1 TABLET ORAL at 09:15

## 2021-11-02 RX ADMIN — NYSTATIN 1 APPLICATION: 100000 CREAM TOPICAL at 09:20

## 2021-11-02 RX ADMIN — IPRATROPIUM BROMIDE AND ALBUTEROL SULFATE 3 ML: 2.5; .5 SOLUTION RESPIRATORY (INHALATION) at 09:53

## 2021-11-02 RX ADMIN — AMMONIUM LACTATE 1 APPLICATION: 17 CREAM TOPICAL at 09:15

## 2021-11-02 RX ADMIN — POTASSIUM CHLORIDE 20 MEQ: 750 CAPSULE, EXTENDED RELEASE ORAL at 09:15

## 2021-11-02 RX ADMIN — LOSARTAN POTASSIUM 25 MG: 50 TABLET, FILM COATED ORAL at 09:15

## 2021-11-02 RX ADMIN — GUAIFENESIN 1200 MG: 600 TABLET, EXTENDED RELEASE ORAL at 20:47

## 2021-11-02 RX ADMIN — Medication 10 ML: at 20:49

## 2021-11-02 RX ADMIN — Medication 500 MCG: at 20:47

## 2021-11-02 RX ADMIN — INSULIN HUMAN 20 UNITS: 100 INJECTION, SOLUTION PARENTERAL at 17:58

## 2021-11-02 NOTE — THERAPY RE-EVALUATION
Acute Care - Occupational Therapy Re-Evaluation  [facility]     Patient Name: Jose Landeros  : 1960  MRN: [id number]  Today's Date: 2021  Onset of Illness/Injury or Date of Surgery: 10/28/21  Date of Referral to OT: 10/28/21  Referring Physician: Ravi Hardy MD    Admit Date: 10/27/2021       ICD-10-CM ICD-9-CM   1. Cellulitis of lower extremity, unspecified laterality  L03.119 682.6   2. Obesity hypoventilation syndrome (HCC)  E66.2 278.03   3. Hypoxia  R09.02 799.02   4. Impaired mobility  Z74.09 799.89   5. Decreased activities of daily living (ADL)  Z78.9 V49.89     Patient Active Problem List   Diagnosis   • Venous insufficiency   • Hypertension   • Diabetes mellitus (HCC)   • Cellulitis of left lower extremity   • COPD (chronic obstructive pulmonary disease) (HCC)   • Cellulitis of lower extremity   • UTI (urinary tract infection), bacterial   • Obesity, morbid, BMI 50 or higher (HCC)     Past Medical History:   Diagnosis Date   • COPD (chronic obstructive pulmonary disease) (HCC)    • Depression    • Diabetes mellitus (HCC)    • Hyperlipidemia    • Hypertension    • Venous insufficiency    • Venous ulcer of right leg (HCC)      Past Surgical History:   Procedure Laterality Date   • APPENDECTOMY     • FOOT SURGERY     • TESTICLE SURGERY     • TRACHEOSTOMY           OT ASSESSMENT FLOWSHEET (last 12 hours)     OT Evaluation and Treatment     Row Name 21 1335                   OT Time and Intention    Subjective Information complains of; weakness  -AC (r) PF (t)        Document Type progress note/recertification  -AC (r) PF (t)        Mode of Treatment occupational therapy  -AC (r) PF (t)        Patient Effort good  -AC (r) PF (t)                  General Information    Patient Profile Reviewed yes  -AC (r) PF (t)        Onset of Illness/Injury or Date of Surgery 10/28/21  -AC (r) PF (t)        Referring Physician Ravi Hardy MD  -AC (r) PF (t)        Prior Level of  Function independent:; all household mobility; ADL's; gait; transfer; bed mobility  -AC (r) PF (t)        Equipment Currently Used at Home bipap/ cpap; grab bar; oxygen; shower chair; walker, standard  -AC (r) PF (t)        Pertinent History of Current Functional Problem presents with diarrhea that has lasted for 2 days, he has been bed bound for the last week, chronic LE wounds, cellulitis, morbid obesity, type 2 diabetes, venous insufficiency  -AC (r) PF (t)        Existing Precautions/Restrictions fall; oxygen therapy device and L/min  4 l o2 NC  -AC (r) PF (t)        Barriers to Rehab medically complex  -AC (r) PF (t)                  Living Environment    Current Living Arrangements home/apartment/condo  -AC (r) PF (t)        Home Accessibility wheelchair accessible  -AC (r) PF (t)        Lives With alone  -AC (r) PF (t)                  Sensory    Additional Documentation Sensory Assessment (Somatosensory) (Group)  -AC (r) PF (t)                  Sensory Assessment (Somatosensory)    Sensory Assessment (Somatosensory) bilateral UE; sensation intact  -AC (r) PF (t)        Bilateral UE Sensory Assessment deep pressure awareness; light touch awareness; light touch localization; proprioception; intact  -AC (r) PF (t)                  Cognition    Orientation Status (Cognition) oriented x 4  -AC (r) PF (t)                  Pain Assessment    Additional Documentation Pain Scale: Numbers Pre/Post-Treatment (Group)  -AC (r) PF (t)                  Pain Scale: Numbers Pre/Post-Treatment    Pretreatment Pain Rating 0/10 - no pain  -AC (r) PF (t)        Posttreatment Pain Rating 0/10 - no pain  -AC (r) PF (t)                  Range of Motion Comprehensive    Comment, General Range of Motion BUE AROM WFL  -AC (r) PF (t)                  Strength Comprehensive (MMT)    Comment, General Manual Muscle Testing (MMT) Assessment BUE strength 5/5  -AC (r) PF (t)                  Bed Mobility    Bed Mobility sit-supine; rolling  left; rolling right; scooting/bridging  -AC (r) PF (t)        Rolling Left [place] (Bed Mobility) modified independence  -AC (r) PF (t)        Rolling Right [place] (Bed Mobility) modified independence  -AC (r) PF (t)        Scooting/Bridging [place] (Bed Mobility) contact guard  -AC (r) PF (t)        Sit-Supine [place] (Bed Mobility) standby assist; verbal cues  -AC (r) PF (t)        Assistive Device (Bed Mobility) bed rails; overhead trapeze  -AC (r) PF (t)                  Functional Mobility    Functional Mobility- Ind. Level standby assist  -AC (r) PF (t)        Functional Mobility- Device rolling walker  front wheeled walker  -AC (r) PF (t)        Functional Mobility- Comment Pt ambulated to the BR with SBA and with the use of a front wheeled walker  -AC (r) PF (t)                  Transfer Assessment/Treatment    Transfers sit-stand transfer; stand-sit transfer; toilet transfer  -AC (r) PF (t)                  Transfers    Sit-Stand [place] (Transfers) standby assist; verbal cues  -AC (r) PF (t)        Stand-Sit [place] (Transfers) standby assist; verbal cues  -AC (r) PF (t)        [place] Level (Toilet Transfer) standby assist  -AC (r) PF (t)        Assistive Device (Toilet Transfer) commode; grab bars/safety frame; walker, front-wheeled  -AC (r) PF (t)                  Sit-Stand Transfer    Assistive Device (Sit-Stand Transfers) walker, front-wheeled  -AC (r) PF (t)                  Stand-Sit Transfer    Assistive Device (Stand-Sit Transfers) walker, front-wheeled  -AC (r) PF (t)                  Toilet Transfer    Type (Toilet Transfer) stand-sit; sit-stand  -AC (r) PF (t)                  Safety Issues, Functional Mobility    Safety Issues Affecting Function (Mobility) friction/shear risk; insight into deficits/self-awareness  -AC (r) PF (t)        Impairments Affecting Function (Mobility) endurance/activity tolerance; shortness of breath; strength  body habitus  -AC (r)  PF (t)                  Balance    Balance Assessment sitting static balance; standing static balance; standing dynamic balance  -AC (r) PF (t)        Static Sitting Balance WFL; supported; sitting, edge of bed  -AC (r) PF (t)        Static Standing Balance WFL; supported; standing  -AC (r) PF (t)        Dynamic Standing Balance WFL; supported; standing  -AC (r) PF (t)                  Activities of Daily Living    BADL Assessment/Intervention grooming; toileting  -AC (r) PF (t)                  Grooming Assessment/Training    [place] Level (Grooming) wash face, hands; oral care regimen  -AC (r) PF (t)        Assistive Devices (Grooming) electric/power toothbrush  -AC (r) PF (t)        Position (Grooming) supported sitting  -AC (r) PF (t)                  Toileting Assessment/Training    [place] Level (Toileting) toileting skills; adjust/manage clothing; perform perineal hygiene; dependent (less than 25% patient effort)  -AC (r) PF (t)        Assistive Devices (Toileting) commode; grab bar/safety frame  -AC (r) PF (t)        Position (Toileting) supported sitting  -AC (r) PF (t)                  BADL Safety/Performance    Impairments, BADL Safety/Performance endurance/activity tolerance; shortness of breath; strength  body habitus  -AC (r) PF (t)                  Wound 10/28/21 0208 Left anterior greater trochanter    Wound - Properties Group Placement Date: 10/28/21  -LF Placement Time: 0208  -LF Side: Left  -LF Orientation: anterior  -LF Location: greater trochanter  -LF        Retired Wound - Properties Group Date first assessed: 10/28/21  -LF Time first assessed: 0208  -LF Side: Left  -LF Location: greater trochanter  -LF                  Wound 10/28/21 0233 Right lower leg    Wound - Properties Group Placement Date: 10/28/21  -LF Placement Time: 0233  -LF Side: Right  -LF Orientation: lower  -LF Location: leg  -LF        Retired Wound - Properties Group Date first assessed: 10/28/21  -LF Time first  assessed: 0233  -LF Side: Right  -LF Location: leg  -LF                  Wound 10/28/21 0234 Left lower leg    Wound - Properties Group Placement Date: 10/28/21  -LF Placement Time: 0234 -LF Side: Left  -LF Orientation: lower  -LF Location: leg  -LF        Retired Wound - Properties Group Date first assessed: 10/28/21  -LF Time first assessed: 0234  -LF Side: Left  -LF Location: leg  -LF                  Wound 10/28/21 0100 gluteal    Wound - Properties Group Placement Date: 10/28/21  -ST Placement Time: 0100  -ST Present on Hospital Admission: Y  -ST Location: gluteal  -ST        Retired Wound - Properties Group Date first assessed: 10/28/21  -ST Time first assessed: 0100  -ST Present on Hospital Admission: Y  -ST Location: gluteal  -ST                  Wound 10/28/21 1452 thoracic spine Skin Tear    Wound - Properties Group Placement Date: 10/28/21  -CW Placement Time: 1452  -CW Location: thoracic spine  -CW Primary Wound Type: Skin tear  -CW        Retired Wound - Properties Group Date first assessed: 10/28/21  -CW Time first assessed: 1452  -CW Location: thoracic spine  -CW Primary Wound Type: Skin tear  -CW                  Plan of Care Review    Plan of Care Reviewed With patient  -AC (r) PF (t)        Progress improving  -AC (r) PF (t)        Outcome Summary OT tx completed. Pt sitting up in chair upon arrival. Pt oriented x4. Pt c/o 0/10 pain. Pt performed sit to stand from chair with SBA and with assistance of front wheeled walker. Pt ambulated to the BR with SBA. Pt performed toilet transfer with SBA but was dependent for senthil care. Pt performed gromming activity (washing face and hands) and oral care with set up and SBA while sink side sitting. Pt transferred back to bed with SBA. Pt performed bed mobility with Mod I. Pt will benefit from continued OT services in order to increase activity tolerance, AE application, and TB bathing. D/C recommendation to home with assistance and home health.  -AC (r) PF  (t)                  Vital Signs    Pre SpO2 (%) 96  -AC (r) PF (t)        O2 Delivery Pre Treatment nasal cannula  4 L O2 nc  -AC (r) PF (t)        Intra SpO2 (%) 90  -AC (r) PF (t)        O2 Delivery Intra Treatment nasal cannula  4 L o2 nc  -AC (r) PF (t)        Post SpO2 (%) 96  -AC (r) PF (t)        O2 Delivery Post Treatment nasal cannula  -AC (r) PF (t)                  OT Goals    Bathing Goal Selection (OT) bathing, OT goal 1  -AC (r) PF (t)        Toileting Goal Selection (OT) toileting, OT goal 1  -AC (r) PF (t)        Activity Tolerance Goal Selection (OT) activity tolerance, OT goal 1  -AC (r) PF (t)                  Bathing Goal 1 (OT)    Activity/Device (Bathing Goal 1, OT) bathing skills, all; upper body bathing; lower body bathing; bariatric equipment; grab bar, tub/shower; hand-held shower spray hose; long-handled sponge; reacher  -AC (r) PF (t)        [place] Level/Cues Needed (Bathing Goal 1, OT) modified independence; verbal cues required  -AC (r) PF (t)        Time Frame (Bathing Goal 1, OT) long term goal (LTG); 10 days  -AC (r) PF (t)        Progress/Outcomes (Bathing Goal 1, OT) goal ongoing  -AC (r) PF (t)                  Toileting Goal 1 (OT)    Activity/Device (Toileting Goal 1, OT) toileting skills, all; adjust/manage clothing; perform perineal hygiene; bariatric equipment; grab bar/safety frame  -AC (r) PF (t)        [place] Level/Cues Needed (Toileting Goal 1, OT) moderate assist (50-74% patient effort)  -AC (r) PF (t)        Time Frame (Toileting Goal 1, OT) long term goal (LTG); 10 days  -AC (r) PF (t)        Progress/Outcome (Toileting Goal 1, OT) goal ongoing  -AC (r) PF (t)                   Activity Tolerance Goal 1 (OT)    Activity Tolerance Goal 1 (OT) Pt will perform standing ADL with no more than 1 rest break to increase ADL independence  -AC (r) PF (t)        Activity Level (Endurance Goal 1, OT) 5 min activity  -AC (r) PF (t)        Time Frame (Activity Tolerance  Goal 1, OT) long term goal (LTG); 10 days  -AC (r) PF (t)        Progress/Outcome (Activity Tolerance Goal 1, OT) goal ongoing  -AC (r) PF (t) AC (c)                  Positioning and Restraints    Pre-Treatment Position sitting in chair/recliner  -AC (r) PF (t)        Post Treatment Position bed  -AC (r) PF (t)        In Bed supine; call light within reach; encouraged to call for assist; side rails up x2  -AC (r) PF (t)                  Therapy Assessment/Plan (OT)    Date of Referral to OT 10/28/21  -AC (r) PF (t)        OT Diagnosis Decreased ADL  -AC (r) PF (t)        Rehab Potential (OT) good, to achieve stated therapy goals  -AC (r) PF (t)        Criteria for Skilled Therapeutic Interventions Met (OT) yes; meets criteria; skilled treatment is necessary  -AC (r) PF (t)        Therapy Frequency (OT) 5 times/wk  -AC (r) PF (t)        Predicted Duration of Therapy Intervention (OT) 10 days  -AC (r) PF (t)        Planned Therapy Interventions (OT) activity tolerance training; adaptive equipment training; BADL retraining; occupation/activity based interventions; functional balance retraining; patient/caregiver education/training; strengthening exercise; transfer/mobility retraining  -AC (r) PF (t)                  Therapy Plan Review/Discharge Plan (OT)    Therapy Plan Review (OT) patient  -AC (r) PF (t)        Anticipated Discharge Disposition (OT) home with assist; home with home health  -AC (r) PF (t)              User Key  (r) = Recorded By, (t) = Taken By, (c) = Cosigned By    Initials Name Effective Dates    Patricio Billingsley, OTR/L, CNT 04/09/19 -     Xuan Bullock RN 06/16/21 -     Sally Naranjo RN 07/28/20 -     Nieves Maya RN 07/30/21 -     PF Dejuan Wen Jr., OT Student 08/04/21 -                  Occupational Therapy Education                 Title: PT OT SLP Therapies (In Progress)     Topic: Occupational Therapy (Done)     Point: ADL training (Done)     Description:   Instruct  learner(s) on proper safety adaptation and remediation techniques during self care or transfers.   Instruct in proper use of assistive devices.              Learning Progress Summary           Patient Acceptance, E,TB, VU by PF at 11/2/2021 1433    Comment: OT POC, Transfer training, BADL training, AE training, Environmental safety, D/C planning    Acceptance, E,TB, VU by PF at 11/1/2021 1507    Comment: OT POC, transfer training, Bed mobility, D/C planning    Acceptance, E,TB, VU by PF at 10/29/2021 1400    Comment: OT POC, bed mobility, safety awareness, d/c planning                   Point: Precautions (Done)     Description:   Instruct learner(s) on prescribed precautions during self-care and functional transfers.              Learning Progress Summary           Patient Acceptance, E,TB, VU by PF at 11/2/2021 1433    Comment: OT POC, Transfer training, BADL training, AE training, Environmental safety, D/C planning    Acceptance, E,TB, VU by PF at 11/1/2021 1507    Comment: OT POC, transfer training, Bed mobility, D/C planning    Acceptance, E,TB, VU by PF at 10/29/2021 1400    Comment: OT POC, bed mobility, safety awareness, d/c planning                   Point: Body mechanics (Done)     Description:   Instruct learner(s) on proper positioning and spine alignment during self-care, functional mobility activities and/or exercises.              Learning Progress Summary           Patient Acceptance, E,TB, VU by PF at 11/2/2021 1433    Comment: OT POC, Transfer training, BADL training, AE training, Environmental safety, D/C planning    Acceptance, E,TB, VU by PF at 11/1/2021 1507    Comment: OT POC, transfer training, Bed mobility, D/C planning    Acceptance, E,TB, VU by PF at 10/29/2021 1400    Comment: OT POC, bed mobility, safety awareness, d/c planning                               User Key     Initials Effective Dates Name Provider Type Discipline     08/04/21 -  Dejuan Wen Jr., OT Student OT Student OT                   OT Recommendation and Plan  Planned Therapy Interventions (OT): activity tolerance training, adaptive equipment training, BADL retraining, occupation/activity based interventions, functional balance retraining, patient/caregiver education/training, strengthening exercise, transfer/mobility retraining  Therapy Frequency (OT): 5 times/wk  Plan of Care Review  Plan of Care Reviewed With: patient  Progress: improving  Outcome Summary: OT tx completed. Pt sitting up in chair upon arrival. Pt oriented x4. Pt c/o 0/10 pain. Pt performed sit to stand from chair with SBA and with assistance of front wheeled walker. Pt ambulated to the BR with SBA. Pt performed toilet transfer with SBA but was dependent for senthil care. Pt performed gromming activity (washing face and hands) and oral care with set up and SBA while sink side sitting. Pt transferred back to bed with SBA. Pt performed bed mobility with Mod I. Pt will benefit from continued OT services in order to increase activity tolerance, AE application, and TB bathing. D/C recommendation to home with assistance and home health.  Plan of Care Reviewed With: patient  Outcome Summary: OT tx completed. Pt sitting up in chair upon arrival. Pt oriented x4. Pt c/o 0/10 pain. Pt performed sit to stand from chair with SBA and with assistance of front wheeled walker. Pt ambulated to the BR with SBA. Pt performed toilet transfer with SBA but was dependent for senthil care. Pt performed gromming activity (washing face and hands) and oral care with set up and SBA while sink side sitting. Pt transferred back to bed with SBA. Pt performed bed mobility with Mod I. Pt will benefit from continued OT services in order to increase activity tolerance, AE application, and TB bathing. D/C recommendation to home with assistance and home health.     Outcome Measures     Row Name 11/02/21 1335 11/01/21 1308 10/31/21 1200       How much help from another is currently needed...    Putting  on and taking off regular lower body clothing? 2  -AC (r) PF (t) AC (c) 1  -AC (r) PF (t) AC (c) 2  -TS    Bathing (including washing, rinsing, and drying) 2  -AC (r) PF (t) AC (c) 2  -AC (r) PF (t) AC (c) 2  -TS    Toileting (which includes using toilet bed pan or urinal) 1  -AC (r) PF (t) AC (c) 3  -AC (r) PF (t) AC (c) 3  -TS    Putting on and taking off regular upper body clothing 2  -AC (r) PF (t) AC (c) 2  -AC (r) PF (t) AC (c) 2  -TS    Taking care of personal grooming (such as brushing teeth) 3  -AC (r) PF (t) AC (c) 3  -AC (r) PF (t) AC (c) 3  -TS    Eating meals 4  -AC (r) PF (t) AC (c) 4  -AC (r) PF (t) AC (c) 4  -TS    AM-PAC 6 Clicks Score (OT) 14  -AC (r) PF (t) 15  -AC (r) PF (t) 16  -TS       Functional Assessment    Outcome Measure Options AM-PAC 6 Clicks Daily Activity (OT)  -AC (r) PF (t) AC (c) AM-PAC 6 Clicks Daily Activity (OT)  -AC (r) PF (t) AC (c) --          User Key  (r) = Recorded By, (t) = Taken By, (c) = Cosigned By    Initials Name Provider Type    AC Patricio Pradhan N, OTR/L, CNT Occupational Therapist    TS Cammy Ang COTA Occupational Therapy Assistant    Dejuan Martinez Jr., OT Student OT Student                Time Calculation:    Time Calculation- OT     Row Name 11/02/21 1433 11/02/21 1334          Time Calculation- OT    OT Start Time 1335  -AC (r) PF (t) AC (c) --     OT Stop Time 1420  -AC (r) PF (t) AC (c) --     OT Time Calculation (min) 45 min  -AC (r) PF (t) --     OT Received On 11/02/21  -AC (r) PF (t) AC (c) --     OT Goal Re-Cert Due Date 11/12/21  -AC (r) PF (t) AC (c) --            Timed Charges    80628 - Gait Training Minutes  -- 30  -AH            Total Minutes    Timed Charges Total Minutes -- 30  -AH      Total Minutes -- 30  -AH           User Key  (r) = Recorded By, (t) = Taken By, (c) = Cosigned By    Initials Name Provider Type    Patricio Billingsley, OTR/L, CNT Occupational Therapist    Agatha Omalley, PTA Physical Therapy Assistant    PF  Dejuan Del Rosario Jr., OT Student OT Student                       DEJUAN DEL ROSARIO, OT Student  11/2/2021

## 2021-11-02 NOTE — PLAN OF CARE
Goal Outcome Evaluation:  Plan of Care Reviewed With: patient        Progress: no change  Outcome Summary: pt trans to EOB sba with extra time and HOB elevated, sit-stand sba, pt amb 10 feet x 2 rwx sba, 02 @ 4L sat 90%, pt trans to chair sba

## 2021-11-02 NOTE — PLAN OF CARE
Goal Outcome Evaluation:  Plan of Care Reviewed With: patient        Progress: no change  Outcome Summary: VSS, except for O2 now at 4l. No change in nv/neuro this shift. Drsg changed per order. Assist w/repositioning prn. Up w/1 assist to br w/wx, using external cath while in bed r/t urgency. No c/o pain voiced. Safety maintained.

## 2021-11-02 NOTE — PLAN OF CARE
Goal Outcome Evaluation:           Progress: improving   Pt alert and oriented x4. VSS. no c/o pain. [place]. PPP. Asa for VTE. . Tolerating reg diet. Wound care completed. Voiding via purwick. BS monitoring.Call light within reach. Safety maintained.

## 2021-11-02 NOTE — PROGRESS NOTES
[facility] Medicine Services  INPATIENT PROGRESS NOTE    Length of Stay: 6  Date of Admission: 10/27/2021  Primary Care Physician: Cong Steve MD    Subjective   Chief Complaint: Follow-up hemoptysis  HPI   Patient resting in bed.  He is on 4 L nasal cannula upon my entering the room, oxygen saturation 98%.  He denies any shortness of breath, states he is still coughing up quite a bit of sputum.  Sputum is still blood-tinged.  He denies any chest pain.  He feels his nasal congestion is slightly better today.  He denies abdominal pain, nausea, or vomiting.  He had a bowel movement this morning, states it was solid and diarrhea has resolved.    Review of Systems   All pertinent negatives and positives are as above. All other systems have been reviewed and are negative unless otherwise stated.     Objective    Temp:  [97.8 °F (36.6 °C)-98.7 °F (37.1 °C)] 98.7 °F (37.1 °C)  Heart Rate:  [57-74] 57  Resp:  [18-20] 18  BP: (113-145)/(55-70) 128/57  Physical Exam  Vitals and nursing note reviewed.   Constitutional:       General: He is not in acute distress.     Appearance: He is obese. He is not ill-appearing (chronically).   HENT:      Head: Normocephalic and atraumatic.   Cardiovascular:      Rate and Rhythm: Normal rate and regular rhythm.      Pulses: Normal pulses.      Heart sounds: Normal heart sounds.      Comments: Sinus 60-75  Pulmonary:      Effort: Pulmonary effort is normal.      Breath sounds: No wheezing, rhonchi or rales.      Comments: Diminished breath sounds, congested cough.  Abdominal:      General: Bowel sounds are normal. There is no distension.      Palpations: Abdomen is soft.      Tenderness: There is no abdominal tenderness.   Musculoskeletal:         General: Tenderness present.      Cervical back: Normal range of motion and neck supple. No tenderness.   Skin:     General: Skin is warm.      Findings: Erythema present.     Comments: Bilateral lower  extremities with chronic venous skin changes.  Wound to the right anterior calf.  Severe excoriation to the pannus.   Neurological:      General: No focal deficit present.      Mental Status: He is alert and oriented to person, place, and time.   Psychiatric:         Mood and Affect: Mood normal.         Behavior: Behavior normal.         Thought Content: Thought content normal.         Judgment: Judgment normal.      Results Review:  I have reviewed the labs, radiology results, and diagnostic studies.    Laboratory Data:   Results from last 7 days   Lab Units 11/01/21  0902 10/31/21  0627 10/30/21  0639   WBC 10*3/mm3 7.03 8.38 7.76   HEMOGLOBIN g/dL 13.7 12.7* 12.9*   HEMATOCRIT % 41.8 42.7 44.0   PLATELETS 10*3/mm3 258 273 252     Results from last 7 days   Lab Units 11/01/21  0902 10/31/21  0627 10/30/21  0639 10/29/21  0710 10/28/21  0444 10/27/21  2222 10/27/21  2222   SODIUM mmol/L 144 141 143   < > 140   < > 140   POTASSIUM mmol/L 4.1 4.1 4.1   < > 3.8   < > 4.4   CHLORIDE mmol/L 105 104 108*   < > 102   < > 102   CO2 mmol/L 32.0* 29.0 30.0*   < > 26.0   < > 30.0*   BUN mg/dL 24* 25* 25*   < > 28*   < > 26*   CREATININE mg/dL 0.94 0.89 0.90   < > 1.04   < > 1.08   CALCIUM mg/dL 9.2 8.9 8.9   < > 8.6   < > 9.1   BILIRUBIN mg/dL  --  0.3  --   --  0.3  --  0.3   ALK PHOS U/L  --  83  --   --  84  --  84   ALT (SGPT) U/L  --  20  --   --  29  --  33   AST (SGOT) U/L  --  21  --   --  29  --  31   GLUCOSE mg/dL 195* 181* 166*   < > 190*   < > 209*    < > = values in this interval not displayed.     I have reviewed the patient current medications.     Assessment/Plan     Active Hospital Problems    Diagnosis    • UTI (urinary tract infection), bacterial    • Obesity, morbid, BMI 50 or higher (HCC)    • Cellulitis of lower extremity    • COPD (chronic obstructive pulmonary disease) (HCC)    • Hypertension    • Diabetes mellitus (HCC)    • Venous insufficiency      Plan:  1.  Patient presented to the emergency  department 10/27/2021 with complaints of diarrhea.  The patient was found to have dressings on his lower extremities on arrival to the emergency department which were adhered to his legs.  Upon removal, he was found to have several wounds with erythema and drainage.  2.  The patient's GI panel returned positive for Salmonella.  He has been monitored off of Flagyl which was started on admission.  Symptoms have much improved.  Continue probiotic.  3.  Patient received vancomycin and Azactam for bilateral lower extremity cellulitis, switched to oral Doxycycline yesterday with plans to complete 10 days of therapy.  Bilateral tibia/fibula x-rays on admission negative.  4.  Wound care APRN has evaluated.  Patient states he will not be able to tolerate Unna boots as he was told he was allergic in the past.  Orders for Thera honey application.  5.  Overnight pulse oximetry last night, started on room air with CPAP.  The patient had to have oxygen added to CPAP and titrated upward throughout the night due to hypoxia.  Study was ended on 3 L.  Even on 3 L, he did have a desaturation event of greater than 10 minutes.  Would recommend a formal repeat sleep study as an outpatient to adjust settings.  Would plan to discharge home with 3 L bled into CPAP for now.  6.  Patient continues to require oxygen during the day, which he does not wear at home.  Highly suspect obesity hypoventilation syndrome.  Will repeat chest x-ray today due to continued complaints of hemoptysis and continued oxygen requirement.  7.  Continue DuoNebs and Mucinex.  Continue Flonase, Zyrtec, and Ocean nasal spray.  Oxygen is humidified.  H&H stable as of yesterday.  Encourage use of flutter valve.  8.  Continue PT/OT.  Patient refuses skilled nursing facility placement, will order home health at time of discharge.   following.  Patient states he will need a new hospital bed ordered at time of discharge.  He believes that he will have the  battery for his power chair tomorrow so he will be able to go home.  9.  Reassess H&H in a.m. with continued hemoptysis.    Discharge Planning: I expect the patient to be discharged to home with home health likely tomorrow.    Electronically signed by NOAH Jimenez, 11/2/2021, 11:48 CDT.

## 2021-11-02 NOTE — PLAN OF CARE
Goal Outcome Evaluation:  Plan of Care Reviewed With: patient        Progress: improving  Outcome Summary: OT tx completed. Pt sitting up in chair upon arrival. Pt oriented x4. Pt c/o 0/10 pain. Pt performed sit to stand from chair with SBA and with assistance of front wheeled walker. Pt ambulated to the BR with SBA. Pt performed toilet transfer with SBA but was dependent for senthil care. Pt performed gromming activity (washing face and hands) and oral care with set up and SBA while sink side sitting. Pt transferred back to bed with SBA. Pt performed bed mobility with Mod I. Pt will benefit from continued OT services in order to increase activity tolerance, AE application, and TB bathing. D/C recommendation to home with assistance and home health.

## 2021-11-02 NOTE — THERAPY TREATMENT NOTE
Acute Care - Physical Therapy Treatment Note  [facility]     Patient Name: Jose Landeros  : 1960  MRN: [id number]  Today's Date: 2021   Onset of Illness/Injury or Date of Surgery: 10/28/21  Visit Dx:     ICD-10-CM ICD-9-CM   1. Cellulitis of lower extremity, unspecified laterality  L03.119 682.6   2. Obesity hypoventilation syndrome (HCC)  E66.2 278.03   3. Hypoxia  R09.02 799.02   4. Impaired mobility  Z74.09 799.89   5. Decreased activities of daily living (ADL)  Z78.9 V49.89     Patient Active Problem List   Diagnosis   • Venous insufficiency   • Hypertension   • Diabetes mellitus ([facility])   • Cellulitis of left lower extremity   • COPD (chronic obstructive pulmonary disease) ([facility])   • Cellulitis of lower extremity   • UTI (urinary tract infection), bacterial   • Obesity, morbid, BMI 50 or higher ([facility])     Past Medical History:   Diagnosis Date   • COPD (chronic obstructive pulmonary disease) ([facility])    • Depression    • Diabetes mellitus ([facility])    • Hyperlipidemia    • Hypertension    • Venous insufficiency    • Venous ulcer of right leg ([facility])      Past Surgical History:   Procedure Laterality Date   • APPENDECTOMY     • FOOT SURGERY     • TESTICLE SURGERY     • TRACHEOSTOMY       PT Assessment (last 12 hours)     PT Evaluation and Treatment     Row Name 21 1042 21 1024       Physical Therapy Time and Intention    Subjective Information complains of  - complains of; weakness  -    Document Type -- therapy note (daily note)  -    Mode of Treatment -- physical therapy  -    Patient Effort -- good  -    Row Name 21 1024          General Information    Existing Precautions/Restrictions fall; oxygen therapy device and L/min  monitor o2 doesn't wear at home  -     Row Name 21 1024          Pain Scale: Word Pre/Post-Treatment    Pain: Word Scale, Pretreatment 2 - mild pain  -     Posttreatment Pain Rating 4 - moderate pain  -     Pain Location - Side Left  -     Pain  Location - Orientation lower  -     Pain Location extremity  -     Pain Intervention(s) Repositioned; Ambulation/increased activity  -[facility] Name 11/02/21 1024          Bed Mobility    Supine-Sit [place] (Bed Mobility) verbal cues; contact guard; standby assist  -     Sit-Supine [place] (Bed Mobility) --  chair  -     Assistive Device (Bed Mobility) bed rails; overhead trapeze  -[facility] Name 11/02/21 1024          Transfers    Sit-Stand [place] (Transfers) verbal cues; standby assist  -     Stand-Sit [place] (Transfers) verbal cues; supervision  -     Row Name 11/02/21 1024          Sit-Stand Transfer    Assistive Device (Sit-Stand Transfers) walker, front-wheeled  -[facility] Name 11/02/21 1024          Stand-Sit Transfer    Assistive Device (Stand-Sit Transfers) walker, front-wheeled  -[facility] Name 11/02/21 1024          Gait/Stairs (Locomotion)    [place] Level (Gait) verbal cues; standby assist  -     Assistive Device (Gait) walker, front-wheeled  -     Distance in Feet (Gait) 10 x 2  -AH     Pattern (Gait) step-to  -     Deviations/Abnormal Patterns (Gait) ilene decreased; base of support, wide  -AH     Bilateral Gait Deviations heel strike decreased; forward flexed posture  -[facility] Name 11/02/21 1024          Safety Issues, Functional Mobility    Impairments Affecting Function (Mobility) strength  -AH     Row Name             Wound 10/28/21 0208 Left anterior greater trochanter    Wound - Properties Group Placement Date: 10/28/21  -LF Placement Time: 0208  -LF Side: Left  -LF Orientation: anterior  -LF Location: greater trochanter  -LF     Retired Wound - Properties Group Date first assessed: 10/28/21  -LF Time first assessed: 0208  -LF Side: Left  -LF Location: greater trochanter  -LF     Row Name             Wound 10/28/21 0233 Right lower leg    Wound - Properties Group Placement Date: 10/28/21  -LF Placement Time: 0233  -LF Side: Right  -LF  Orientation: lower  -LF Location: leg  -LF     Retired Wound - Properties Group Date first assessed: 10/28/21  -LF Time first assessed: 0233  -LF Side: Right  -LF Location: leg  -LF     Row Name             Wound 10/28/21 0234 Left lower leg    Wound - Properties Group Placement Date: 10/28/21  -LF Placement Time: 0234  -LF Side: Left  -LF Orientation: lower  -LF Location: leg  -LF     Retired Wound - Properties Group Date first assessed: 10/28/21  -LF Time first assessed: 0234  -LF Side: Left  -LF Location: leg  -LF     Row Name             Wound 10/28/21 0100 gluteal    Wound - Properties Group Placement Date: 10/28/21  -ST Placement Time: 0100  -ST Present on Hospital Admission: Y  -ST Location: gluteal  -ST     Retired Wound - Properties Group Date first assessed: 10/28/21  -ST Time first assessed: 0100  -ST Present on Hospital Admission: Y  -ST Location: gluteal  -ST     Row Name             Wound 10/28/21 1452 thoracic spine Skin Tear    Wound - Properties Group Placement Date: 10/28/21  -CW Placement Time: 1452 -CW Location: thoracic spine  -CW Primary Wound Type: Skin tear  -CW     Retired Wound - Properties Group Date first assessed: 10/28/21  -CW Time first assessed: 1452 -CW Location: thoracic spine  -CW Primary Wound Type: Skin tear  -CW     Row Name 11/02/21 1024          Plan of Care Review    Plan of Care Reviewed With patient  -AH     Progress no change  -AH     Outcome Summary pt trans to EOB sba with extra time and HOB elevated, sit-stand sba, pt amb 10 feet x 2 rwx sba, 02 @ 4L sat 90%, pt trans to chair sba  -AH     Row Name 11/02/21 1024          Vital Signs    Post SpO2 (%) 90  -AH     O2 Delivery Post Treatment nasal cannula  4L  -AH     Row Name 11/02/21 1024          Positioning and Restraints    Pre-Treatment Position in bed  -     Post Treatment Position chair  -AH     In Chair notified nsg; reclined; call light within reach; encouraged to call for assist  -           User Key  (r) =  Recorded By, (t) = Taken By, (c) = Cosigned By    Initials Name Provider Type     Agatha Mendes, ANDRES Physical Therapy Assistant    Xuan Bullock RN Registered Nurse    Sally Naranjo RN Registered Nurse    Nieves Maya, RN Registered Nurse              Physical Therapy Education                 Title: PT OT SLP Therapies (In Progress)     Topic: Physical Therapy (In Progress)     Point: Mobility training (Done)     Learning Progress Summary           Patient Acceptance, E, VU by  at 10/29/2021 1121    Comment: PT role in care, bed mobility                   Point: Home exercise program (Not Started)     Learner Progress:  Not documented in this visit.          Point: Body mechanics (Done)     Learning Progress Summary           Patient Acceptance, E,TB, VU by  at 11/2/2021 1334    Comment: deep breathing    Acceptance, E, VU by  at 10/29/2021 1121    Comment: PT role in care, bed mobility                   Point: Precautions (Done)     Learning Progress Summary           Patient Acceptance, E, VU by  at 10/29/2021 1121    Comment: PT role in care, bed mobility                               User Key     Initials Effective Dates Name Provider Type [facility] 06/16/21 -  Agatha Mendes PTA Physical Therapy Assistant PT     08/18/21 -  Roya Marie, CORDELL Student PT Student PT              PT Recommendation and Plan     Plan of Care Reviewed With: patient  Progress: no change  Outcome Summary: pt trans to EOB sba with extra time and HOB elevated, sit-stand sba, pt amb 10 feet x 2 rwx sba, 02 @ 4L sat 90%, pt trans to chair sba   Outcome Measures     Row Name 11/01/21 1308 10/31/21 1200          How much help from another is currently needed...    Putting on and taking off regular lower body clothing? 1  -AC (r) PF (t) AC (c) 2  -TS     Bathing (including washing, rinsing, and drying) 2  -AC (r) PF (t) AC (c) 2  -TS     Toileting (which includes using toilet bed pan or urinal) 3  -AC  (r) PF (t) AC (c) 3  -TS     Putting on and taking off regular upper body clothing 2  -AC (r) PF (t) AC (c) 2  -TS     Taking care of personal grooming (such as brushing teeth) 3  -AC (r) PF (t) AC (c) 3  -TS     Eating meals 4  -AC (r) PF (t) AC (c) 4  -TS     AM-PAC 6 Clicks Score (OT) 15  -AC (r) PF (t) 16  -TS            Functional Assessment    Outcome Measure Options AM-PAC 6 Clicks Daily Activity (OT)  -AC (r) PF (t) AC (c) --           User Key  (r) = Recorded By, (t) = Taken By, (c) = Cosigned By    Initials Name Provider Type     Patricio Pradhan, OTR/L, CNT Occupational Therapist    Cammy Zhang COTA Occupational Therapy Assistant    PF Dejuan Wen Jr., OT Student OT Student                 Time Calculation:    PT Charges     Row Name 11/02/21 1334             Time Calculation    Start Time 1024  -AH      Stop Time 1110  -      Time Calculation (min) 46 min  -AH      PT Received On 11/02/21  -              Time Calculation- PT    Total Timed Code Minutes- PT 45 minute(s)  -AH              Timed Charges    05783 - Gait Training Minutes  30  -AH      12428 - PT Therapeutic Activity Minutes 15  -AH              Total Minutes    Timed Charges Total Minutes 45  -AH       Total Minutes 45  -AH            User Key  (r) = Recorded By, (t) = Taken By, (c) = Cosigned By    Initials Name Provider Type     Agatha Mendes PTA Physical Therapy Assistant              Therapy Charges for Today     Code Description Service Date Service Provider Modifiers Qty    13531333350 HC GAIT TRAINING EA 15 MIN 11/2/2021 Agatha Mendes PTA GP 2    42907005666 HC PT THERAPEUTIC ACT EA 15 MIN 11/2/2021 Agatha Mendes, ANDRES GP 1          PT G-Codes  Outcome Measure Options: AM-PAC 6 Clicks Daily Activity (OT)  AM-PAC 6 Clicks Score (PT): 11  AM-PAC 6 Clicks Score (OT): 15    Agatha Mendes PTA  11/2/2021

## 2021-11-03 ENCOUNTER — READMISSION MANAGEMENT (OUTPATIENT)
Dept: CALL CENTER | Facility: HOSPITAL | Age: 61
End: 2021-11-03

## 2021-11-03 ENCOUNTER — HOME HEALTH ADMISSION (OUTPATIENT)
Dept: HOME HEALTH SERVICES | Facility: HOME HEALTHCARE | Age: 61
End: 2021-11-03

## 2021-11-03 VITALS
BODY MASS INDEX: 46.65 KG/M2 | TEMPERATURE: 98.4 F | RESPIRATION RATE: 20 BRPM | WEIGHT: 315 LBS | SYSTOLIC BLOOD PRESSURE: 122 MMHG | DIASTOLIC BLOOD PRESSURE: 57 MMHG | HEIGHT: 69 IN | HEART RATE: 70 BPM | OXYGEN SATURATION: 96 %

## 2021-11-03 PROBLEM — G47.33 OBSTRUCTIVE SLEEP APNEA: Status: ACTIVE | Noted: 2021-11-03

## 2021-11-03 PROBLEM — E66.2 OBESITY WITH ALVEOLAR HYPOVENTILATION AND BODY MASS INDEX (BMI) OF 40 OR GREATER: Status: ACTIVE | Noted: 2021-11-03

## 2021-11-03 PROBLEM — R09.02 HYPOXIA: Status: ACTIVE | Noted: 2021-11-03

## 2021-11-03 LAB
GLUCOSE BLDC GLUCOMTR-MCNC: 189 MG/DL (ref 70–130)
GLUCOSE BLDC GLUCOMTR-MCNC: 203 MG/DL (ref 70–130)
HCT VFR BLD AUTO: 43.1 % (ref 37.5–51)
HGB BLD-MCNC: 12.7 G/DL (ref 13–17.7)

## 2021-11-03 PROCEDURE — 63710000001 INSULIN LISPRO (HUMAN) PER 5 UNITS: Performed by: INTERNAL MEDICINE

## 2021-11-03 PROCEDURE — 94799 UNLISTED PULMONARY SVC/PX: CPT

## 2021-11-03 PROCEDURE — 25010000002 ENOXAPARIN PER 10 MG: Performed by: INTERNAL MEDICINE

## 2021-11-03 PROCEDURE — 85018 HEMOGLOBIN: CPT | Performed by: NURSE PRACTITIONER

## 2021-11-03 PROCEDURE — 82962 GLUCOSE BLOOD TEST: CPT

## 2021-11-03 PROCEDURE — 63710000001 INSULIN REGULAR HUMAN PER 5 UNITS: Performed by: INTERNAL MEDICINE

## 2021-11-03 PROCEDURE — 97535 SELF CARE MNGMENT TRAINING: CPT

## 2021-11-03 PROCEDURE — 94618 PULMONARY STRESS TESTING: CPT

## 2021-11-03 PROCEDURE — 85014 HEMATOCRIT: CPT | Performed by: NURSE PRACTITIONER

## 2021-11-03 PROCEDURE — 63710000001 INSULIN ISOPHANE HUMAN PER 5 UNITS: Performed by: INTERNAL MEDICINE

## 2021-11-03 RX ORDER — NYSTATIN 100000 U/G
1 CREAM TOPICAL EVERY 12 HOURS SCHEDULED
Start: 2021-11-03

## 2021-11-03 RX ORDER — DOXYCYCLINE 100 MG/1
100 CAPSULE ORAL EVERY 12 HOURS SCHEDULED
Qty: 5 CAPSULE | Refills: 0 | Status: SHIPPED | OUTPATIENT
Start: 2021-11-03 | End: 2021-11-06

## 2021-11-03 RX ORDER — SACCHAROMYCES BOULARDII 250 MG
500 CAPSULE ORAL 2 TIMES DAILY
Qty: 56 CAPSULE | Refills: 0 | Status: SHIPPED | OUTPATIENT
Start: 2021-11-03 | End: 2021-11-17

## 2021-11-03 RX ORDER — CETIRIZINE HYDROCHLORIDE 5 MG/1
5 TABLET ORAL DAILY
Start: 2021-11-04

## 2021-11-03 RX ORDER — AMMONIUM LACTATE 12 G/100G
1 CREAM TOPICAL EVERY 12 HOURS
Start: 2021-11-03

## 2021-11-03 RX ORDER — GUAIFENESIN 600 MG/1
1200 TABLET, EXTENDED RELEASE ORAL EVERY 12 HOURS SCHEDULED
Qty: 20 TABLET | Refills: 0 | Status: SHIPPED | OUTPATIENT
Start: 2021-11-03 | End: 2021-11-08

## 2021-11-03 RX ADMIN — ALLOPURINOL 300 MG: 300 TABLET ORAL at 08:52

## 2021-11-03 RX ADMIN — Medication 1 SPRAY: at 08:55

## 2021-11-03 RX ADMIN — INSULIN LISPRO 8 UNITS: 100 INJECTION, SOLUTION INTRAVENOUS; SUBCUTANEOUS at 09:21

## 2021-11-03 RX ADMIN — BUMETANIDE 2 MG: 1 TABLET ORAL at 08:53

## 2021-11-03 RX ADMIN — GUAIFENESIN 1200 MG: 600 TABLET, EXTENDED RELEASE ORAL at 08:53

## 2021-11-03 RX ADMIN — DOXYCYCLINE 100 MG: 100 TABLET, FILM COATED ORAL at 08:52

## 2021-11-03 RX ADMIN — Medication 10 ML: at 08:55

## 2021-11-03 RX ADMIN — ENOXAPARIN SODIUM 40 MG: 40 INJECTION SUBCUTANEOUS at 09:20

## 2021-11-03 RX ADMIN — Medication 500 MG: at 08:52

## 2021-11-03 RX ADMIN — INSULIN LISPRO 4 UNITS: 100 INJECTION, SOLUTION INTRAVENOUS; SUBCUTANEOUS at 12:37

## 2021-11-03 RX ADMIN — NYSTATIN 1 APPLICATION: 100000 CREAM TOPICAL at 08:54

## 2021-11-03 RX ADMIN — AMMONIUM LACTATE 1 APPLICATION: 17 CREAM TOPICAL at 08:56

## 2021-11-03 RX ADMIN — IPRATROPIUM BROMIDE AND ALBUTEROL SULFATE 3 ML: 2.5; .5 SOLUTION RESPIRATORY (INHALATION) at 10:04

## 2021-11-03 RX ADMIN — IPRATROPIUM BROMIDE AND ALBUTEROL SULFATE 3 ML: 2.5; .5 SOLUTION RESPIRATORY (INHALATION) at 06:45

## 2021-11-03 RX ADMIN — Medication 500 MCG: at 08:52

## 2021-11-03 RX ADMIN — CETIRIZINE HYDROCHLORIDE 5 MG: 10 TABLET ORAL at 08:52

## 2021-11-03 RX ADMIN — POTASSIUM CHLORIDE 20 MEQ: 750 CAPSULE, EXTENDED RELEASE ORAL at 08:52

## 2021-11-03 RX ADMIN — LOSARTAN POTASSIUM 25 MG: 50 TABLET, FILM COATED ORAL at 08:59

## 2021-11-03 RX ADMIN — INSULIN HUMAN 20 UNITS: 100 INJECTION, SOLUTION PARENTERAL at 09:21

## 2021-11-03 RX ADMIN — HUMAN INSULIN 30 UNITS: 100 INJECTION, SUSPENSION SUBCUTANEOUS at 12:36

## 2021-11-03 RX ADMIN — NEBIVOLOL HYDROCHLORIDE 5 MG: 5 TABLET ORAL at 08:52

## 2021-11-03 RX ADMIN — ASPIRIN 81 MG: 81 TABLET, COATED ORAL at 08:52

## 2021-11-03 RX ADMIN — IPRATROPIUM BROMIDE AND ALBUTEROL SULFATE 3 ML: 2.5; .5 SOLUTION RESPIRATORY (INHALATION) at 14:06

## 2021-11-03 NOTE — CASE MANAGEMENT/SOCIAL WORK
Continued Stay Note  [facility]     Patient Name: Jose Landeros  MRN: [id number]  Today's Date: 11/3/2021    Admit Date: 10/27/2021     Discharge Plan     Row Name 11/03/21 1046       Plan    Final Discharge Disposition Code 06 - home with home health care    Final Note Received  Orders and pt is agreeable to [facility]. Notified Kathrine with [facility] [phone]    Row Name 11/03/21 1037       Plan    Final Discharge Disposition Code 01 - home or self-care    Final Note Pt is being dcd home today. Received orders for hospital bed, home oxygen and bariatric WC. Spoke to pt and he is agreeable to use [employer] for DME/O2. Notified Akosua with [employer] ([phone]) and faxed orders. DME/O2 will be delivered to pt home. Pt will dc home via [facility] ([phone]). If [facility] cannot transport pt home, pt is agreeable to [facility]               Discharge Codes    No documentation.               Expected Discharge Date and Time     Expected Discharge Date Expected Discharge Time    Nov 3, 2021             ELISSA Velasquez

## 2021-11-03 NOTE — PLAN OF CARE
Goal Outcome Evaluation:  Plan of Care Reviewed With: patient        Progress: improving  Outcome Summary: Pt on O2 in BR, seated on commode. Pt ambulates with SBA with RW, LOU and pt discussed O2 line management during ambulation at home. LOU added hook to place slack of O2 chord. Pt and LOU discussed purchase of bidet attatchment for toilet to assist with hygiene along with skin care routine post shower to assist with decreased chance for skin breakdown. Pt is discharging today home with HH. Continue OT POC

## 2021-11-03 NOTE — CASE MANAGEMENT/SOCIAL WORK
Continued Stay Note  [facility]     Patient Name: Jose Landeros  MRN: [id number]  Today's Date: 11/3/2021    Admit Date: 10/27/2021     Discharge Plan     Row Name 11/03/21 1253       Plan    Final Note Found out that pt already receives home oxygen from [facility]. Spoke to Sarah Beth with [facility] to confirm that pt is on 4L continous at home and has all supplies needed. [facility] does not have a bariatric bed in stock. Notified Elian at [facility] that DME is still needed. Elian states he will contact pt about delivery of bed but bariatric WC will have to be ordered.    Row Name 11/03/21 1046       Plan    Final Discharge Disposition Code 06 - home with home health care    Final Note Received  Orders and pt is agreeable to [facility]. Notified Kathrine with [facility] [phone]    Row Name 11/03/21 1037       Plan    Final Discharge Disposition Code 01 - home or self-care    Final Note Pt is being dcd home today. Received orders for hospital bed, home oxygen and bariatric WC. Spoke to pt and he is agreeable to use [facility] for DME/O2. Notified Akosua with [facility] ([phone]) and faxed orders. DME/O2 will be delivered to pt home. Pt will dc home via [facility] ([phone]). If [facility] cannot transport pt home, pt is agreeable to [facility]               Discharge Codes    No documentation.               Expected Discharge Date and Time     Expected Discharge Date Expected Discharge Time    Nov 3, 2021             ELISSA Velasquez

## 2021-11-03 NOTE — THERAPY TREATMENT NOTE
Acute Care - Occupational Therapy Treatment Note  [facility]     Patient Name: Jose Landeros  : 1960  MRN: [id number]  Today's Date: 11/3/2021  Onset of Illness/Injury or Date of Surgery: 10/28/21  Date of Referral to OT: 10/28/21  Referring Physician: Ravi Hardy MD    Admit Date: 10/27/2021       ICD-10-CM ICD-9-CM   1. Cellulitis of lower extremity, unspecified laterality  L03.119 682.6   2. Obesity hypoventilation syndrome (HCC)  E66.2 278.03   3. Hypoxia  R09.02 799.02   4. Impaired mobility  Z74.09 799.89   5. Decreased activities of daily living (ADL)  Z78.9 V49.89   6. Obstructive sleep apnea  G47.33 327.23   7. Obesity, morbid, BMI 50 or higher (HCC)  E66.01 278.01     Patient Active Problem List   Diagnosis   • Venous insufficiency   • Hypertension   • Diabetes mellitus (HCC)   • Cellulitis of left lower extremity   • COPD (chronic obstructive pulmonary disease) (HCC)   • Cellulitis of lower extremity   • UTI (urinary tract infection), bacterial   • Obesity, morbid, BMI 50 or higher (HCC)   • Obstructive sleep apnea   • Obesity with alveolar hypoventilation and body mass index (BMI) of 40 or greater (HCC)   • Hypoxia     Past Medical History:   Diagnosis Date   • COPD (chronic obstructive pulmonary disease) (HCC)    • Depression    • Diabetes mellitus (HCC)    • Hyperlipidemia    • Hypertension    • Venous insufficiency    • Venous ulcer of right leg (HCC)      Past Surgical History:   Procedure Laterality Date   • APPENDECTOMY     • FOOT SURGERY     • TESTICLE SURGERY     • TRACHEOSTOMY           OT ASSESSMENT FLOWSHEET (last 12 hours)     OT Evaluation and Treatment     Row Name 21 1327                   OT Time and Intention    Subjective Information complains of; fatigue  has been up since 530 this moring  -TS        Document Type therapy note (daily note)  -TS        Mode of Treatment occupational therapy  -TS        Patient Effort good  -TS                  General  Information    Existing Precautions/Restrictions fall; oxygen therapy device and L/min  -TS                  Cognition    Personal Safety Interventions fall prevention program maintained; nonskid shoes/slippers when out of bed  -TS                  Pain Scale: Numbers Pre/Post-Treatment    Pretreatment Pain Rating 0/10 - no pain  -TS        Posttreatment Pain Rating 0/10 - no pain  -TS                  Functional Mobility    Functional Mobility- Ind. Level standby assist  -TS        Functional Mobility- Device rolling walker  -TS        Functional Mobility- Comment in BR to recliner, pt expressed concern about ambulating with RW and O2 chord, LOU attatched hook to pt home RW to allow O2 chord to be gathered and placed on hook when there is slack, pt does not have to attempt to reach O2 line on floor. Pt and LOU discussed use of hook with O2 line, pt does verbalize understanding  -TS                  Transfer Assessment/Treatment    Transfers sit-stand transfer; stand-sit transfer; toilet transfer  -TS                  Transfers    Sit-Stand [place] (Transfers) standby assist  -TS        Stand-Sit [place] (Transfers) standby assist  -TS        [place]in Level (Toilet Transfer) supervision  -TS        Assistive Device (Toilet Transfer) commode; grab bars/safety frame  -TS                  Sit-Stand Transfer    Assistive Device (Sit-Stand Transfers) walker, front-wheeled  -TS                  Stand-Sit Transfer    Assistive Device (Stand-Sit Transfers) walker, front-wheeled  -TS                  Toilet Transfer    Type (Toilet Transfer) sit-stand; stand-sit  -TS                  Activities of Daily Living    BADL Assessment/Intervention toileting  -TS                  Bathing Assessment/Intervention    Comment (Bathing) Pt and LOU discussed drying technique for between folds post shower including laying on bed and placing hand towels to absorb exess water. LOU also suggested powder in folds after skin  was clean and dry. Pt agreed to trial plan.  -TS                  Lower Body Dressing Assessment/Training    Comment (Lower Body Dressing) per pt he has a flexible sock aid but it is worn out, LOU discussed addition of second flexible sock aid  -TS                  Toileting Assessment/Training    [place]oy Level (Toileting) toileting skills; independent  -TS        Assistive Devices (Toileting) commode  -TS        Position (Toileting) unsupported sitting  -TS        Comment (Toileting) Pt is able to urinate on toilet with sucess. Pt and LOU discussed purchase of bidet attachment for home toilet to assist with hygiene post bowel movement.  -TS                  Wound 10/28/21 0208 Left anterior greater trochanter    Wound - Properties Group Placement Date: 10/28/21  -LF Placement Time: 0208  -LF Side: Left  -LF Orientation: anterior  -LF Location: greater trochanter  -LF        Retired Wound - Properties Group Date first assessed: 10/28/21  -LF Time first assessed: 0208  -LF Side: Left  -LF Location: greater trochanter  -LF                  Wound 10/28/21 0233 Right lower leg    Wound - Properties Group Placement Date: 10/28/21  -LF Placement Time: 0233  -LF Side: Right  -LF Orientation: lower  -LF Location: leg  -LF        Retired Wound - Properties Group Date first assessed: 10/28/21  -LF Time first assessed: 0233  -LF Side: Right  -LF Location: leg  -LF                  Wound 10/28/21 0234 Left lower leg    Wound - Properties Group Placement Date: 10/28/21  -LF Placement Time: 0234  -LF Side: Left  -LF Orientation: lower  -LF Location: leg  -LF        Retired Wound - Properties Group Date first assessed: 10/28/21  -LF Time first assessed: 0234  -LF Side: Left  -LF Location: leg  -LF                  Wound 10/28/21 0100 gluteal    Wound - Properties Group Placement Date: 10/28/21  -ST Placement Time: 0100  -ST Present on Hospital Admission: Y  -ST Location: gluteal  -ST        Retired Wound - Properties Group  Date first assessed: 10/28/21  -ST Time first assessed: 0100 -ST Present on Hospital Admission: Y  -ST Location: gluteal  -ST                  Wound 10/28/21 1452 thoracic spine Skin Tear    Wound - Properties Group Placement Date: 10/28/21  -CW Placement Time: 1452 -CW Location: thoracic spine  -CW Primary Wound Type: Skin tear  -CW        Retired Wound - Properties Group Date first assessed: 10/28/21  -CW Time first assessed: 1452  -CW Location: thoracic spine  -CW Primary Wound Type: Skin tear  -CW                  Plan of Care Review    Plan of Care Reviewed With patient  -TS        Progress improving  -TS        Outcome Summary Pt on O2 in BR, seated on commode. Pt ambulates with SBA with RW, LOU and pt discussed O2 line management during ambulation at home. LOU added hook to place slack of O2 chord. Pt and LOU discussed purchase of bidet attatchment for toilet to assist with hygiene along with skin care routine post shower to assist with decreased chance for skin breakdown. Pt is discharging today home with HH. Continue OT POC  -TS                  Positioning and Restraints    Pre-Treatment Position bathroom  -TS        Post Treatment Position chair  -TS        In Chair sitting; call light within reach; encouraged to call for assist  -TS              User Key  (r) = Recorded By, (t) = Taken By, (c) = Cosigned By    Initials Name Effective Dates    TS Cammy Ang, LOU 06/16/21 -     ST Xuan Bartlett RN 06/16/21 -     LF Sally Jaeger RN 07/28/20 -     CW Nieves Calderon RN 07/30/21 -                  Occupational Therapy Education                 Title: PT OT SLP Therapies (In Progress)     Topic: Occupational Therapy (Done)     Point: ADL training (Done)     Description:   Instruct learner(s) on proper safety adaptation and remediation techniques during self care or transfers.   Instruct in proper use of assistive devices.              Learning Progress Summary           Patient Acceptance,  E,TB, VU by PF at 11/2/2021 1433    Comment: OT POC, Transfer training, BADL training, AE training, Environmental safety, D/C planning    Acceptance, E,TB, VU by PF at 11/1/2021 1507    Comment: OT POC, transfer training, Bed mobility, D/C planning    Acceptance, E,TB, VU by PF at 10/29/2021 1400    Comment: OT POC, bed mobility, safety awareness, d/c planning                   Point: Precautions (Done)     Description:   Instruct learner(s) on prescribed precautions during self-care and functional transfers.              Learning Progress Summary           Patient Acceptance, E,TB, VU by PF at 11/2/2021 1433    Comment: OT POC, Transfer training, BADL training, AE training, Environmental safety, D/C planning    Acceptance, E,TB, VU by PF at 11/1/2021 1507    Comment: OT POC, transfer training, Bed mobility, D/C planning    Acceptance, E,TB, VU by PF at 10/29/2021 1400    Comment: OT POC, bed mobility, safety awareness, d/c planning                   Point: Body mechanics (Done)     Description:   Instruct learner(s) on proper positioning and spine alignment during self-care, functional mobility activities and/or exercises.              Learning Progress Summary           Patient Acceptance, E,TB, VU by PF at 11/2/2021 1433    Comment: OT POC, Transfer training, BADL training, AE training, Environmental safety, D/C planning    Acceptance, E,TB, VU by PF at 11/1/2021 1507    Comment: OT POC, transfer training, Bed mobility, D/C planning    Acceptance, E,TB, VU by PF at 10/29/2021 1400    Comment: OT POC, bed mobility, safety awareness, d/c planning                               User Key     Initials Effective Dates Name Provider Type Discipline     08/04/21 -  Dejuan Wen Jr., OT Student OT Student OT                  OT Recommendation and Plan     Plan of Care Review  Plan of Care Reviewed With: patient  Progress: improving  Outcome Summary: Pt on O2 in BR, seated on commode. Pt ambulates with SBA with RW,  LOU and pt discussed O2 line management during ambulation at home. LOU added hook to place slack of O2 chord. Pt and LOU discussed purchase of bidet attatchment for toilet to assist with hygiene along with skin care routine post shower to assist with decreased chance for skin breakdown. Pt is discharging today home with HH. Continue OT POC  Plan of Care Reviewed With: patient  Outcome Summary: Pt on O2 in BR, seated on commode. Pt ambulates with SBA with RW, LOU and pt discussed O2 line management during ambulation at home. LOU added hook to place slack of O2 chord. Pt and LOU discussed purchase of bidet attatchment for toilet to assist with hygiene along with skin care routine post shower to assist with decreased chance for skin breakdown. Pt is discharging today home with HH. Continue OT POC     Outcome Measures     Row Name 11/03/21 1400 11/02/21 1335 11/01/21 1308       How much help from another is currently needed...    Putting on and taking off regular lower body clothing? 2  -TS 2  -AC (r) PF (t) AC (c) 1  -AC (r) PF (t) AC (c)    Bathing (including washing, rinsing, and drying) 2  -TS 2  -AC (r) PF (t) AC (c) 2  -AC (r) PF (t) AC (c)    Toileting (which includes using toilet bed pan or urinal) 3  -TS 1  -AC (r) PF (t) AC (c) 3  -AC (r) PF (t) AC (c)    Putting on and taking off regular upper body clothing 3  -TS 2  -AC (r) PF (t) AC (c) 2  -AC (r) PF (t) AC (c)    Taking care of personal grooming (such as brushing teeth) 3  -TS 3  -AC (r) PF (t) AC (c) 3  -AC (r) PF (t) AC (c)    Eating meals 4  -TS 4  -AC (r) PF (t) AC (c) 4  -AC (r) PF (t) AC (c)    AM-PAC 6 Clicks Score (OT) 17  -TS 14  -AC (r) PF (t) 15  -AC (r) PF (t)       Functional Assessment    Outcome Measure Options -- AM-PAC 6 Clicks Daily Activity (OT)  -AC (r) PF (t) AC (c) AM-PAC 6 Clicks Daily Activity (OT)  -AC (r) PF (t) AC (c)          User Key  (r) = Recorded By, (t) = Taken By, (c) = Cosigned By    Initials Name Provider Type     Patricio Billingsley, OTR/L, CNT Occupational Therapist    TS Cammy Ang COTA Occupational Therapy Assistant    PF Dejuan Wen Jr., OT Student OT Student                Time Calculation:    Time Calculation- OT     Row Name 11/03/21 1451             Time Calculation- OT    OT Start Time 1327  -TS      OT Stop Time 1440  -TS      OT Time Calculation (min) 73 min  -TS      Total Timed Code Minutes- OT 73 minute(s)  -TS      OT Received On 11/03/21  -TS              Timed Charges    35970 - OT Self Care/Mgmt Minutes 73  -TS              Total Minutes    Timed Charges Total Minutes 73  -TS       Total Minutes 73  -TS            User Key  (r) = Recorded By, (t) = Taken By, (c) = Cosigned By    Initials Name Provider Type    TS Cammy Ang COTA Occupational Therapy Assistant              Therapy Charges for Today     Code Description Service Date Service Provider Modifiers Qty    11040239931 HC OT SELF CARE/MGMT/TRAIN EA 15 MIN 11/3/2021 Cammy Ang COTA GO 5               Cammy BRODY. DASHA Ang  11/3/2021

## 2021-11-03 NOTE — PLAN OF CARE
Goal Outcome Evaluation:  Plan of Care Reviewed With: (P) patient        Progress: (P) no change  Outcome Summary: (P) VSS. NV status slight improvement, less drainage and weeping then previous shift. Dressing changed per order. No complaints of pain, assist with bed mobility prn maintained.

## 2021-11-03 NOTE — PROGRESS NOTES
Exercise Oximetry    Patient Name:Jose Landeros   MRN: [id number]   Date: 11/03/21             ROOM AIR BASELINE   SpO2% 87   Heart Rate    Blood Pressure      EXERCISE ON ROOM AIR SpO2% EXERCISE ON O2 @ 4 LPM SpO2%   1 MINUTE  1 MINUTE      92   2 MINUTES  2 MINUTES      92   3 MINUTES  3 MINUTES    4 MINUTES  4 MINUTES    5 MINUTES  5 MINUTES    6 MINUTES  6 MINUTES               Distance Walked   Distance Walked      40 feet   Dyspnea (Elaina Scale)    Dyspnea (Elaina Scale)   Fatigue (Elaina Scale)   Fatigue (Elaina Scale)   SpO2% Post Exercise   SpO2% Post Exercise      94   HR Post Exercise   HR Post Exercise   Time to Recovery   Time to Recovery   Less than one minute     Comments: Pt sat dropped to 87% on room air while resting in bed.  Placed pt on oxygen and it took 4 lpm nc to hold above 90%.  Pt stated he would only walk to restroom then back to chair.  Pt stayed at 92% on 4 lpm nc while walking.  SERVANDO Kirby notified.

## 2021-11-03 NOTE — DISCHARGE SUMMARY
[facility] Medicine Services  DISCHARGE SUMMARY       Date of Admission: 10/27/2021  Date of Discharge:  11/3/2021  Primary Care Physician: Cong Steve MD    Presenting Problem/History of Present Illness:  Diarrhea    Final Discharge Diagnoses:  Active Hospital Problems    Diagnosis    • Obstructive sleep apnea    • Obesity with alveolar hypoventilation and body mass index (BMI) of 40 or greater ([facility])    • Hypoxia    • UTI (urinary tract infection), bacterial    • Obesity, morbid, BMI 50 or higher ([facility])    • Cellulitis of lower extremity    • COPD (chronic obstructive pulmonary disease) ([facility])    • Hypertension    • Diabetes mellitus ([facility])    • Venous insufficiency      Consults: None    Procedures Performed: None    Pertinent Test Results:   Lab Results (all)     Procedure Component Value Units Date/Time    POC Glucose Once [[id number]]  (Abnormal) Collected: 11/03/21 0858    Specimen: Blood Updated: 11/03/21 0909     Glucose 203 mg/dL      Comment: : [id number] Kofi AprilMetcharli ID: [id number]       Hemoglobin & Hematocrit, Blood [[id number]]  (Abnormal) Collected: 11/03/21 0625    Specimen: Blood Updated: 11/03/21 0645     Hemoglobin 12.7 g/dL      Hematocrit 43.1 %     POC Glucose Once [[id number]]  (Abnormal) Collected: 11/02/21 1649    Specimen: Blood Updated: 11/02/21 1700     Glucose 150 mg/dL      Comment: : [id number] Carlos EmmaMeter ID: [id number]       POC Glucose Once [[id number]]  (Abnormal) Collected: 11/02/21 1143    Specimen: Blood Updated: 11/02/21 1154     Glucose 169 mg/dL      Comment: : [id number] Carlos EmmaMeter ID: [id number]       POC Glucose Once [[id number]]  (Abnormal) Collected: 11/02/21 0833    Specimen: Blood Updated: 11/02/21 0844     Glucose 161 mg/dL      Comment: : [id number] Kofi AprilMetcharli ID: [id number]       POC Glucose Once [[id number]]  (Abnormal) Collected: 11/01/21 2237    Specimen: Blood Updated: 11/01/21 2248     Glucose  153 mg/dL      Comment: : [id number] Coleen LisaMeter ID: [id number]       Blood Culture - Blood, Arm, Right [[id number]]  (Normal) Collected: 10/27/21 2140    Specimen: Blood from Arm, Right Updated: 11/01/21 2215     Blood Culture No growth at 5 days    Blood Culture - Blood, Arm, Right [[id number]]  (Normal) Collected: 10/27/21 2135    Specimen: Blood from Arm, Right Updated: 11/01/21 2215     Blood Culture No growth at 5 days    POC Glucose Once [[id number]]  (Abnormal) Collected: 11/01/21 1736    Specimen: Blood Updated: 11/01/21 1749     Glucose 159 mg/dL      Comment: : [id number] Tyson MorganMeter ID: [id number]       POC Glucose Once [[id number]]  (Abnormal) Collected: 11/01/21 1059    Specimen: Blood Updated: 11/01/21 1110     Glucose 178 mg/dL      Comment: : [id number] [employer] CalleyMeter ID: [id number]       Basic Metabolic Panel [[id number]]  (Abnormal) Collected: 11/01/21 0902    Specimen: Blood Updated: 11/01/21 0939     Glucose 195 mg/dL      BUN 24 mg/dL      Creatinine 0.94 mg/dL      Sodium 144 mmol/L      Potassium 4.1 mmol/L      Chloride 105 mmol/L      CO2 32.0 mmol/L      Calcium 9.2 mg/dL      eGFR Non African Amer 81 mL/min/1.73      BUN/Creatinine Ratio 25.5     Anion Gap 7.0 mmol/L     CBC (No Diff) [[id number]]  (Normal) Collected: 11/01/21 0902    Specimen: Blood Updated: 11/01/21 0927     WBC 7.03 10*3/mm3      RBC 4.51 10*6/mm3      Hemoglobin 13.7 g/dL      Hematocrit 41.8 %      MCV 92.7 fL      MCH 30.4 pg      MCHC 32.8 g/dL      RDW 14.9 %      RDW-SD 50.1 fl      MPV 11.2 fL      Platelets 258 10*3/mm3     POC Glucose Once [[id number]]  (Abnormal) Collected: 11/01/21 0815    Specimen: Blood Updated: 11/01/21 0826     Glucose 165 mg/dL      Comment: : [id number] Yumiko CalleyMeter ID: [id number]       POC Glucose Once [[id number]]  (Abnormal) Collected: 10/31/21 1953    Specimen: Blood Updated: 10/31/21 2007     Glucose 182 mg/dL      Comment: : [id number] Tri  YanaaMeter ID: [id number]       POC Glucose Once [[id number]]  (Abnormal) Collected: 10/31/21 1647    Specimen: Blood Updated: 10/31/21 1658     Glucose 166 mg/dL      Comment: : [id number] Yumiko CalleyMeter ID: [id number]       POC Glucose Once [[id number]]  (Abnormal) Collected: 10/31/21 1135    Specimen: Blood Updated: 10/31/21 1146     Glucose 158 mg/dL      Comment: : [id number] Stephan CharidaMeter ID: [id number]       POC Glucose Once [[id number]]  (Abnormal) Collected: 10/31/21 0851    Specimen: Blood Updated: 10/31/21 0902     Glucose 160 mg/dL      Comment: : [id number] Yumiko CalleyMeter ID: [id number]       Comprehensive Metabolic Panel [[id number]]  (Abnormal) Collected: 10/31/21 0627    Specimen: Blood Updated: 10/31/21 0712     Glucose 181 mg/dL      BUN 25 mg/dL      Creatinine 0.89 mg/dL      Sodium 141 mmol/L      Potassium 4.1 mmol/L      Chloride 104 mmol/L      CO2 29.0 mmol/L      Calcium 8.9 mg/dL      Total Protein 6.1 g/dL      Albumin 3.00 g/dL      ALT (SGPT) 20 U/L      AST (SGOT) 21 U/L      Alkaline Phosphatase 83 U/L      Total Bilirubin 0.3 mg/dL      eGFR Non African Amer 86 mL/min/1.73      Globulin 3.1 gm/dL      A/G Ratio 1.0 g/dL      BUN/Creatinine Ratio 28.1     Anion Gap 8.0 mmol/L     CBC Auto Differential [[id number]]  (Abnormal) Collected: 10/31/21 0627    Specimen: Blood Updated: 10/31/21 0703     WBC 8.38 10*3/mm3      RBC 4.60 10*6/mm3      Hemoglobin 12.7 g/dL      Hematocrit 42.7 %      MCV 92.8 fL      MCH 27.6 pg      MCHC 29.7 g/dL      RDW 14.7 %      RDW-SD 50.4 fl      MPV 11.0 fL      Platelets 273 10*3/mm3      Neutrophil % 69.7 %      Lymphocyte % 12.3 %      Monocyte % 9.1 %      Eosinophil % 5.3 %      Basophil % 1.0 %      Immature Grans % 2.6 %      Neutrophils, Absolute 5.85 10*3/mm3      Lymphocytes, Absolute 1.03 10*3/mm3      Monocytes, Absolute 0.76 10*3/mm3      Eosinophils, Absolute 0.44 10*3/mm3      Basophils, Absolute 0.08 10*3/mm3       Immature Grans, Absolute 0.22 10*3/mm3      nRBC 0.0 /100 WBC     Narrative:      ckd    POC Glucose Once [[id number]]  (Abnormal) Collected: 10/30/21 1919    Specimen: Blood Updated: 10/30/21 1943     Glucose 190 mg/dL      Comment: : [id number] Vaughn KristaMeter ID: [id number]       POC Glucose Once [[id number]]  (Abnormal) Collected: 10/30/21 1658    Specimen: Blood Updated: 10/30/21 1709     Glucose 156 mg/dL      Comment: : [id number] John SebastianMeter ID: [id number]       POC Glucose Once [[id number]]  (Abnormal) Collected: 10/30/21 1128    Specimen: Blood Updated: 10/30/21 1145     Glucose 185 mg/dL      Comment: : [id number] John SebastianMeter ID: [id number]       Basic Metabolic Panel [[id number]]  (Abnormal) Collected: 10/30/21 0639    Specimen: Blood Updated: 10/30/21 0714     Glucose 166 mg/dL      BUN 25 mg/dL      Creatinine 0.90 mg/dL      Sodium 143 mmol/L      Potassium 4.1 mmol/L      Chloride 108 mmol/L      CO2 30.0 mmol/L      Calcium 8.9 mg/dL      eGFR Non African Amer 85 mL/min/1.73      BUN/Creatinine Ratio 27.8     Anion Gap 5.0 mmol/L     Vancomycin, Random [[id number]]  (Normal) Collected: 10/30/21 0639    Specimen: Blood Updated: 10/30/21 0713     Vancomycin Random 13.60 mcg/mL     CBC (No Diff) [[id number]]  (Abnormal) Collected: 10/30/21 0639    Specimen: Blood Updated: 10/30/21 0708     WBC 7.76 10*3/mm3      RBC 4.66 10*6/mm3      Hemoglobin 12.9 g/dL      Hematocrit 44.0 %      MCV 94.4 fL      MCH 27.7 pg      MCHC 29.3 g/dL      RDW 14.9 %      RDW-SD 51.2 fl      MPV 11.2 fL      Platelets 252 10*3/mm3     POC Glucose Once [[id number]]  (Abnormal) Collected: 10/29/21 2153    Specimen: Blood Updated: 10/29/21 2204     Glucose 160 mg/dL      Comment: : [id number] Mil LoriMeter ID: [id number]       Vancomycin, Trough [[id number]]  (Normal) Collected: 10/29/21 1942    Specimen: Blood Updated: 10/29/21 2043     Vancomycin Trough 18.40 mcg/mL     POC Glucose Once  [[id number]]  (Abnormal) Collected: 10/29/21 1812    Specimen: Blood Updated: 10/29/21 1823     Glucose 190 mg/dL      Comment: : [id number] Bynum AshleyMeter ID: [id number]       POC Glucose Once [[id number]]  (Abnormal) Collected: 10/29/21 1317    Specimen: Blood Updated: 10/29/21 1328     Glucose 171 mg/dL      Comment: : [id number] Bynum AshleyMeter ID: [id number]       Vancomycin, Trough Please collect 30-60 minutes prior to dose due 10/29/21 at 1100 [[id number]]  (Abnormal) Collected: 10/29/21 1020    Specimen: Blood Updated: 10/29/21 1220     Vancomycin Trough 25.10 mcg/mL     Urine Culture - Urine, Urine, Clean Catch [[id number]] Collected: 10/28/21 0113    Specimen: Urine, Clean Catch Updated: 10/29/21 1214     Urine Culture 25,000 CFU/mL Mixed Conrad Isolated    Narrative:      Specimen contains mixed organisms of questionable pathogenicity which indicates contamination with commensal conrad.  Further identification is unlikely to provide clinically useful information.  Suggest recollection.    POC Glucose Once [[id number]]  (Abnormal) Collected: 10/29/21 0921    Specimen: Blood Updated: 10/29/21 0943     Glucose 158 mg/dL      Comment: : [id number] Bynum AshleyMeter ID: [id number]       Basic Metabolic Panel [[id number]]  (Abnormal) Collected: 10/29/21 0710    Specimen: Blood Updated: 10/29/21 0755     Glucose 163 mg/dL      BUN 29 mg/dL      Creatinine 0.96 mg/dL      Sodium 143 mmol/L      Potassium 4.3 mmol/L      Comment: Slight hemolysis detected by analyzer. Results may be affected.        Chloride 107 mmol/L      CO2 25.0 mmol/L      Calcium 8.6 mg/dL      eGFR Non African Amer 79 mL/min/1.73      BUN/Creatinine Ratio 30.2     Anion Gap 11.0 mmol/L     CBC (No Diff) [[id number]]  (Abnormal) Collected: 10/29/21 0710    Specimen: Blood Updated: 10/29/21 0729     WBC 9.23 10*3/mm3      RBC 4.48 10*6/mm3      Hemoglobin 12.9 g/dL      Hematocrit 41.6 %      MCV 92.9 fL      MCH 28.8 pg      MCHC 31.0  g/dL      RDW 14.8 %      RDW-SD 50.3 fl      MPV 11.4 fL      Platelets 222 10*3/mm3     POC Glucose Once [[id number]]  (Abnormal) Collected: 10/28/21 2117    Specimen: Blood Updated: 10/28/21 2127     Glucose 174 mg/dL      Comment: : [id number] Mil LoriMeter ID: [id number]       Gastrointestinal Panel, PCR - Stool, Per Rectum [[id number]]  (Abnormal) Collected: 10/28/21 1432    Specimen: Stool from Per Rectum Updated: 10/28/21 1715     Campylobacter Not Detected     Plesiomonas shigelloides Not Detected     Salmonella Detected     Vibrio Not Detected     Vibrio cholerae Not Detected     Yersinia enterocolitica Not Detected     Enteroaggregative E. coli (EAEC) Not Detected     Enteropathogenic E. coli (EPEC) Not Detected     Enterotoxigenic E. coli (ETEC) lt/st Not Detected     Shiga-like toxin-producing E. coli (STEC) stx1/stx2 Not Detected     Shigella/Enteroinvasive E. coli (EIEC) Not Detected     Cryptosporidium Not Detected     Cyclospora cayetanensis Not Detected     Entamoeba histolytica Not Detected     Giardia lamblia Not Detected     Adenovirus F40/41 Not Detected     Astrovirus Not Detected     Norovirus GI/GII Not Detected     Rotavirus A Not Detected     Sapovirus (I, II, IV or V) Not Detected    POC Glucose Once [[id number]]  (Abnormal) Collected: 10/28/21 1650    Specimen: Blood Updated: 10/28/21 1701     Glucose 138 mg/dL      Comment: : [id number] Yumiko CalleyMeter ID: [id number]       POC Glucose Once [[id number]]  (Abnormal) Collected: 10/28/21 1051    Specimen: Blood Updated: 10/28/21 1102     Glucose 175 mg/dL      Comment: : [id number] Yumiko CalleyMeter ID: [id number]       POC Glucose Once [[id number]]  (Abnormal) Collected: 10/28/21 0818    Specimen: Blood Updated: 10/28/21 0829     Glucose 167 mg/dL      Comment: : [id number] Yumiko CalleyMeter ID: [id number]       Sedimentation Rate [[id number]]  (Abnormal) Collected: 10/28/21 0444    Specimen: Blood Updated: 10/28/21 0616      Sed Rate 75 mm/hr     Comprehensive Metabolic Panel [[id number]]  (Abnormal) Collected: 10/28/21 0444    Specimen: Blood Updated: 10/28/21 0555     Glucose 190 mg/dL      BUN 28 mg/dL      Creatinine 1.04 mg/dL      Sodium 140 mmol/L      Potassium 3.8 mmol/L      Chloride 102 mmol/L      CO2 26.0 mmol/L      Calcium 8.6 mg/dL      Total Protein 6.4 g/dL      Albumin 3.20 g/dL      ALT (SGPT) 29 U/L      AST (SGOT) 29 U/L      Alkaline Phosphatase 84 U/L      Total Bilirubin 0.3 mg/dL      eGFR Non African Amer 72 mL/min/1.73      Globulin 3.2 gm/dL      A/G Ratio 1.0 g/dL      BUN/Creatinine Ratio 26.9     Anion Gap 12.0 mmol/L     C-reactive Protein [[id number]]  (Abnormal) Collected: 10/28/21 0444    Specimen: Blood Updated: 10/28/21 0555     C-Reactive Protein 12.90 mg/dL     CBC Auto Differential [[id number]]  (Abnormal) Collected: 10/28/21 0444    Specimen: Blood Updated: 10/28/21 0546     WBC 12.95 10*3/mm3      RBC 4.94 10*6/mm3      Hemoglobin 13.7 g/dL      Hematocrit 44.5 %      MCV 90.1 fL      MCH 27.7 pg      MCHC 30.8 g/dL      RDW 14.9 %      RDW-SD 49.1 fl      MPV 11.6 fL      Platelets 234 10*3/mm3      Neutrophil % 80.7 %      Lymphocyte % 6.2 %      Monocyte % 9.7 %      Eosinophil % 1.4 %      Basophil % 0.5 %      Immature Grans % 1.5 %      Neutrophils, Absolute 10.47 10*3/mm3      Lymphocytes, Absolute 0.80 10*3/mm3      Monocytes, Absolute 1.25 10*3/mm3      Eosinophils, Absolute 0.18 10*3/mm3      Basophils, Absolute 0.06 10*3/mm3      Immature Grans, Absolute 0.19 10*3/mm3      nRBC 0.0 /100 WBC     Hemoglobin A1c [[id number]]  (Abnormal) Collected: 10/27/21 2140    Specimen: Blood Updated: 10/28/21 0525     Hemoglobin A1C 8.20 %     Magnesium [[id number]]  (Normal) Collected: 10/27/21 2222    Specimen: Blood Updated: 10/28/21 0518     Magnesium 2.2 mg/dL     Urinalysis, Microscopic Only - Urine, Clean Catch [[id number]]  (Abnormal) Collected: 10/28/21 0113    Specimen: Urine, Clean Catch  Updated: 10/28/21 0200     RBC, UA 0-2 /HPF      WBC, UA Too Numerous to Count /HPF      Bacteria, UA 3+ /HPF      Squamous Epithelial Cells, UA 0-2 /HPF      Hyaline Casts, UA None Seen /LPF      Methodology Manual Light Microscopy    Urinalysis With Culture If Indicated - Urine, Clean Catch [[id number]]  (Abnormal) Collected: 10/28/21 0113    Specimen: Urine, Clean Catch Updated: 10/28/21 0148     Color, UA Dark Yellow     Appearance, UA Cloudy     pH, UA <=5.0     Specific Gravity, UA 1.027     Glucose, UA Negative     Ketones, UA Trace     Bilirubin, UA Small (1+)     Blood, UA Negative     Protein, UA 30 mg/dL (1+)     Leuk Esterase, UA Moderate (2+)     Nitrite, UA Positive     Urobilinogen, UA 1.0 E.U./dL    STAT Lactic Acid, Reflex [[id number]]  (Normal) Collected: 10/28/21 0035    Specimen: Blood Updated: 10/28/21 0103     Lactate 1.3 mmol/L     Procalcitonin [[id number]]  (Abnormal) Collected: 10/27/21 2222    Specimen: Blood Updated: 10/27/21 2256     Procalcitonin 0.42 ng/mL     Narrative:      Comprehensive Metabolic Panel [[id number]]  (Abnormal) Collected: 10/27/21 2222    Specimen: Blood Updated: 10/27/21 2253     Glucose 209 mg/dL      BUN 26 mg/dL      Creatinine 1.08 mg/dL      Sodium 140 mmol/L      Potassium 4.4 mmol/L      Chloride 102 mmol/L      CO2 30.0 mmol/L      Calcium 9.1 mg/dL      Total Protein 6.9 g/dL      Albumin 3.30 g/dL      ALT (SGPT) 33 U/L      AST (SGOT) 31 U/L      Alkaline Phosphatase 84 U/L      Total Bilirubin 0.3 mg/dL      eGFR Non African Amer 69 mL/min/1.73      Globulin 3.6 gm/dL      A/G Ratio 0.9 g/dL      BUN/Creatinine Ratio 24.1     Anion Gap 8.0 mmol/L     C-reactive Protein [[id number]]  (Abnormal) Collected: 10/27/21 2222    Specimen: Blood Updated: 10/27/21 2253     C-Reactive Protein 12.34 mg/dL     Lipase [[id number]]  (Normal) Collected: 10/27/21 2222    Specimen: Blood Updated: 10/27/21 2253     Lipase 28 U/L     CK [[id number]]  (Normal) Collected: 10/27/21  2222    Specimen: Blood Updated: 10/27/21 2253     Creatine Kinase 140 U/L     Troponin [[id number]]  (Normal) Collected: 10/27/21 2222    Specimen: Blood Updated: 10/27/21 2253     Troponin T <0.010 ng/mL     BNP [[id number]]  (Normal) Collected: 10/27/21 2222    Specimen: Blood Updated: 10/27/21 2253     proBNP 620.8 pg/mL     COVID-19,[facility] IN-HOUSE,Nasal Swab No Transport Media 3-4 HR TAT - Swab, Nasal Cavity [[id number]]  (Normal) Collected: 10/27/21 2141    Specimen: Swab from Nasal Cavity Updated: 10/27/21 2227     COVID19 Not Detected    Lactic Acid, Plasma [[id number]]  (Abnormal) Collected: 10/27/21 2140    Specimen: Blood Updated: 10/27/21 2213     Lactate 2.6 mmol/L     Protime-INR [[id number]]  (Normal) Collected: 10/27/21 2140    Specimen: Blood Updated: 10/27/21 2202     Protime 13.2 Seconds      INR 1.04    aPTT [[id number]]  (Normal) Collected: 10/27/21 2140    Specimen: Blood Updated: 10/27/21 2202     PTT 30.9 seconds     Sedimentation Rate [[id number]]  (Abnormal) Collected: 10/27/21 2140    Specimen: Blood Updated: 10/27/21 2202     Sed Rate 65 mm/hr     CBC Auto Differential [[id number]]  (Abnormal) Collected: 10/27/21 2140    Specimen: Blood Updated: 10/27/21 2153     WBC 12.07 10*3/mm3      RBC 5.30 10*6/mm3      Hemoglobin 14.8 g/dL      Hematocrit 47.6 %      MCV 89.8 fL      MCH 27.9 pg      MCHC 31.1 g/dL      RDW 14.8 %      RDW-SD 48.8 fl      MPV 12.0 fL      Platelets 230 10*3/mm3      Neutrophil % 83.0 %      Lymphocyte % 6.2 %      Monocyte % 7.5 %      Eosinophil % 1.4 %      Basophil % 0.4 %      Immature Grans % 1.5 %      Neutrophils, Absolute 10.01 10*3/mm3      Lymphocytes, Absolute 0.75 10*3/mm3      Monocytes, Absolute 0.91 10*3/mm3      Eosinophils, Absolute 0.17 10*3/mm3      Basophils, Absolute 0.05 10*3/mm3      Immature Grans, Absolute 0.18 10*3/mm3      nRBC 0.0 /100 WBC     Blood Gas, Arterial - [[id number]]  (Abnormal) Collected: 10/27/21 3322    Specimen: Arterial Blood  Updated: 10/27/21 2150     Site Right Radial     Fran's Test Positive     pH, Arterial 7.376 pH units      pCO2, Arterial 45.8 mm Hg      Comment: 83 Value above reference range        pO2, Arterial 119.0 mm Hg      Comment: 83 Value above reference range        HCO3, Arterial 26.8 mmol/L      Comment: 83 Value above reference range        Base Excess, Arterial 1.1 mmol/L      O2 Saturation, Arterial 98.8 %      Temperature 37.0 C      Barometric Pressure for Blood Gas 742 mmHg      Modality Nasal Cannula     Flow Rate 3.0 lpm      Ventilator Mode NA     Collected by [id number]     Comment: Meter: [id number]     :  [id number]        pCO2, Temperature Corrected 45.8 mm Hg      pH, Temp Corrected 7.376 pH Units      pO2, Temperature Corrected 119 mm Hg         Imaging Results (All)     Procedure Component Value Units Date/Time    XR Chest 1 View [[id number]] Collected: 11/02/21 1300     Updated: 11/02/21 1305    Narrative:      EXAMINATION: XR CHEST 1 VW-     11/2/2021 12:25 PM CDT     HISTORY: hemoptysis, continued oxygen requirement; L03.119-Cellulitis of  unspecified part of limb; E66.2-Morbid (severe) obesity with alveolar  hypoventilation; R09.02-Hypoxemia; Z74.09-Other reduced mobility;  Z78.9-Other specified health status     1 view chest x-ray.     Magnified heart size.     Diffuse prominence of lung markings and central vascular structures is  again seen.     No pneumothorax.     Summary:  1. The appearance of central vascular prominence and prominence of lung  markings is again seen. Chronic or recurrent failure changes suspected.  This report was finalized on 11/02/2021 13:02 by Dr. Christopher Jin MD.    XR Tibia Fibula 2 View Left [[id number]] Collected: 10/28/21 0754     Updated: 10/28/21 0758    Narrative:      EXAM: XR TIBIA FIBULA 2 VW LEFT-     INDICATION: cellulitis, rule out gas     COMPARISON: None available.     FINDINGS:     Soft tissues appear diffusely edematous. No soft tissue gas  identified.  No acute fracture or dislocation. No suspicious bone lesion. A few  dystrophic soft tissue calcifications are noted. Degenerative change in  the knee and ankle joints noted. Calcaneal enthesopathic change.       Impression:         No acute osseous findings.  This report was finalized on 10/28/2021 07:55 by Dr. Juan Luis Kaiser MD.    XR Tibia Fibula 2 View Right [[id number]] Collected: 10/28/21 0752     Updated: 10/28/21 0757    Narrative:      EXAM: XR TIBIA FIBULA 2 VW RIGHT-     INDICATION: Cellulitis, rule out soft tissue gas     COMPARISON: None available.     FINDINGS:     Soft tissues appear edematous. No soft tissue gas identified. Scattered  dystrophic calcifications are noted. No acute fracture or dislocation.  No suspicious bone lesion. Degenerative change in the knee and ankle  joints. Enthesopathic change at the Achilles calcaneal insertion.       Impression:         No acute osseous finding.     This report was finalized on 10/28/2021 07:54 by Dr. Juan Luis Kaiser MD.    XR Chest 1 View [[id number]] Collected: 10/27/21 2136     Updated: 10/27/21 2140    Narrative:      EXAMINATION: XR CHEST 1 VW-. 10/27/2021 9:36 PM CDT     CHEST, ONE VIEW:     HISTORY: Generalized weakness     COMPARISON: 10/23/2015 chest radiography     A single frontal chest radiograph was obtained.     FINDINGS:     The heart is generous. Bronchovascular interstitial markings are  diffusely prominent observed similarly on the previous examination. Mild  pulmonary venous hypertension not excluded. Correlate with patient  presentation.     The bony structures are intact.                                     Impression:      1. Cardiomegaly with central bronchovascular interstitial prominence.  Mild pulmonary venous hypertension considered.     This report was finalized on 10/27/2021 21:37 by Dr. Marc Perez MD.        History of Present Illness on Day of Discharge: Patient sitting up in a chair.  He states he is still having  some bloody nasal drainage and coughing up some bloody sputum.  He states he is not used to having air running in his home, and feels this may have something to do with that.  He again refuses skilled nursing facility placement despite encouragement.    Hospital Course:  Mr. Landeros is a 61-year-old  male who follows Dr. Cong Alcala for primary care.  He has a medical history significant for morbid obesity, diabetes mellitus type 2, chronic obstructive pulmonary disease, hypertension, hyperlipidemia, and venous insufficiency.  The patient presented to the [facility] emergency department on 10/27/2021 with a chief complaint of diarrhea.  The patient was found to have dressings on his lower extremities on arrival which were adhered to his legs.  Upon removal, he was found to have several wounds with erythema and drainage.  The patient's CRP, procalcitonin, white blood cell count, and lactate were all elevated on admission.  Urinalysis was positive for nitrites, leukocytes, too numerous to count white blood cells, and 3+ bacteria.  The patient was admitted to the hospitalist service for further evaluation and management.    The patient was placed on vancomycin and Azactam for lower extremity cellulitis.  He has been switched to oral doxycycline with plans to complete 10 days of therapy.  Bilateral tibia/fibula x-rays on admission were negative.  The wound care APRN has evaluated and has ordered wound care with [employer] honey application.  The patient states he will not be able to care for his wounds himself at home, but does have people who will be able to help him along with home health.    The patient's GI panel returned positive for Salmonella.  He was placed on IV Flagyl on admission which was quickly discontinued and he has been monitored off of antibiotic therapy.  His symptoms have much improved.  He was placed on a probiotic.    The patient has required oxygen throughout hospitalization,  "which he does not utilize at home.  A chest x-ray showed cardiomegaly with central bronchovascular interstitial prominence, likely felt to be chronic changes.  A walking oximetry conducted today reveals the patient will require 4 L of oxygen throughout the day, and he will also require 4 L of oxygen bled into his CPAP at night according to overnight pulse oximetry.  We will make a referral to sleep medicine at time of discharge for repeat sleep study to see if settings need to be changed on CPAP.  Highly suspect obesity hypoventilation syndrome.  The patient has complained of hemoptysis and bloody nasal drainage as well, likely felt to be from oxygen use.  His H&H has remained stable.    The patient was evaluated by physical and Occupational Therapy who recommended skilled nursing facility for further rehabilitation efforts.  The patient continues to refuse placement despite encouragement.  He will be ordered home health at time of discharge for skilled nursing, physical therapy, Occupational Therapy.  We have also ordered a new hospital bed.  Overall, the patient is hemodynamically stable and appropriate to discharge home with home health services today.  He will need to follow-up with his primary care provider in 1 week.    Condition on Discharge: Medically stable    Physical Exam on Discharge:  /57 (BP Location: Left arm, Patient Position: Sitting)   Pulse 59   Temp 98.4 °F (36.9 °C) (Oral)   Resp 20   Ht 175.3 cm (69\")   Wt (!) 259 kg (570 lb 1.6 oz)   SpO2 94%   BMI 84.19 kg/m²   Physical Exam  Vitals and nursing note reviewed.   Constitutional:       General: He is not in acute distress.     Appearance: He is obese. He is not ill-appearing (chronically).   HENT:      Head: Normocephalic and atraumatic.   Cardiovascular:      Rate and Rhythm: Normal rate and regular rhythm.      Pulses: Normal pulses.      Heart sounds: Normal heart sounds.      Comments: Sinus 70's  Pulmonary:      Effort: " Pulmonary effort is normal.      Breath sounds: No wheezing, rhonchi or rales.      Comments: Diminished breath sounds, congested cough.  Abdominal:      General: Bowel sounds are normal. There is no distension.      Palpations: Abdomen is soft.      Tenderness: There is no abdominal tenderness.   Musculoskeletal:         General: Tenderness present.      Cervical back: Normal range of motion and neck supple. No tenderness.   Skin:     General: Skin is warm.      Findings: Erythema present.     Comments: Bilateral lower extremities with chronic venous skin changes.  Wound to the right anterior calf.  Severe excoriation to the pannus.   Neurological:      General: No focal deficit present.      Mental Status: He is alert and oriented to person, place, and time.   Psychiatric:         Mood and Affect: Mood normal.         Behavior: Behavior normal.         Thought Content: Thought content normal.         Judgment: Judgment normal.     Discharge Disposition:  Home-Health Care [facility] [place]    Discharge Medications:     Discharge Medications      New Medications      Instructions Start Date   ammonium lactate 12 % cream  Commonly known as: AMLACTIN   1 application, Topical, Every 12 Hours      cetirizine 5 MG tablet  Commonly known as: zyrTEC   5 mg, Oral, Daily   Start Date: November 4, 2021     doxycycline 100 MG capsule  Commonly known as: MONODOX   100 mg, Oral, Every 12 Hours Scheduled      guaiFENesin 600 MG 12 hr tablet  Commonly known as: MUCINEX   1,200 mg, Oral, Every 12 Hours Scheduled      nystatin 918208 UNIT/GM cream  Commonly known as: MYCOSTATIN   1 application, Topical, Every 12 Hours Scheduled      saccharomyces boulardii 250 MG capsule  Commonly known as: FLORASTOR   500 mg, Oral, 2 Times Daily         Continue These Medications      Instructions Start Date   Accu-Chek Compact Plus test strip  Generic drug: glucose blood   1 applicator, One strip four times a day      Accu-Chek Softclix Lancets lancets   For  qid testing      albuterol (2.5 MG/3ML) 0.083% nebulizer solution  Commonly known as: PROVENTIL   3 mL, Every 6 Hours, PRN      Ventolin  (90 Base) MCG/ACT inhaler  Generic drug: albuterol sulfate HFA   2 puffs, Inhalation, Every 6 Hours, prn      allopurinol 300 MG tablet  Commonly known as: ZYLOPRIM   1 tablet, Oral, Daily      APPLE CIDER VINEGAR PO   1 capsule, Oral, Daily, Buys OTC at [facility] and takes with orange juice      aspirin 81 MG EC tablet   1 tablet, Oral, Daily      bumetanide 1 MG tablet  Commonly known as: BUMEX   1-2 tablets, Oral, Daily, Last week      Bystolic 20 MG tablet  Generic drug: nebivolol   1 tablet, Oral, Daily      diphenhydrAMINE 25 mg capsule  Commonly known as: BENADRYL   1 capsule, Oral, 2 Times Daily, prn      Flonase 50 MCG/ACT nasal spray  Generic drug: fluticasone   1 spray, Nasal, 2 Times Daily PRN      KLOR-CON 20 MEQ CR tablet  Generic drug: potassium chloride   1 tablet, Oral, Daily      NovoLIN N 100 UNIT/ML injection  Generic drug: insulin NPH   30 Units, Subcutaneous, Daily, Pt checks BS and he said he injects 15-30 units at lunch time depending on his BG      NovoLIN R ReliOn 100 UNIT/ML injection  Generic drug: insulin regular   30 Units, Subcutaneous, Pt injects 10-30 units twice daily with breakfast and dinner depending on his bg      O2  Commonly known as: OXYGEN   2 L, Inhalation, As Needed      olmesartan 20 MG tablet  Commonly known as: BENICAR   20 mg, Oral, Daily      pravastatin 20 MG tablet  Commonly known as: PRAVACHOL   1 tablet, Oral, Nightly      vitamin B-12 500 MCG tablet  Commonly known as: CYANOCOBALAMIN   1 tablet, Oral, 2 Times Daily           Discharge Diet:   Diet Instructions     Diet: Regular, Consistent Carbohydrate, Cardiac      Discharge Diet:  Regular  Consistent Carbohydrate  Cardiac           Activity at Discharge:   Activity Instructions     Activity as Tolerated          Discharge Care Plan/Instructions:    1.  Return for any acute  or worsening symptoms.  2.  Home health for skilled nursing, physical therapy, Occupational Therapy.  3.  Qualification for continuous oxygen therapy at 4 L.  He will also need 4 L bled into CPAP.  4.  Outpatient referral to sleep medicine for sleep study.    Follow-up Appointments:   1.  Primary care provider in 1 week.    Test Results Pending at Discharge: None    Electronically signed by NOAH Jimenez, 11/3/2021, 09:52 CDT.    Time: 45 minutes

## 2021-11-04 ENCOUNTER — HOME CARE VISIT (OUTPATIENT)
Dept: HOME HEALTH SERVICES | Facility: CLINIC | Age: 61
End: 2021-11-04

## 2021-11-04 PROCEDURE — G0299 HHS/HOSPICE OF RN EA 15 MIN: HCPCS

## 2021-11-04 NOTE — THERAPY DISCHARGE NOTE
Acute Care - Physical Therapy Discharge Summary  [facility]       Patient Name: Jose Landeros  : 1960  MRN: [id number]    Today's Date: 2021  Onset of Illness/Injury or Date of Surgery: 10/28/21       Referring Physician: Ravi Hardy MD      Admit Date: 10/27/2021      PT Recommendation and Plan    Visit Dx:    ICD-10-CM ICD-9-CM   1. Cellulitis of lower extremity, unspecified laterality  L03.119 682.6   2. Obesity hypoventilation syndrome (HCC)  E66.2 278.03   3. Hypoxia  R09.02 799.02   4. Impaired mobility  Z74.09 799.89   5. Decreased activities of daily living (ADL)  Z78.9 V49.89   6. Obstructive sleep apnea  G47.33 327.23   7. Obesity, morbid, BMI 50 or higher (HCC)  E66.01 278.01        Outcome Measures     Row Name 21 1400 21 1335 21 1308       How much help from another is currently needed...    Putting on and taking off regular lower body clothing? 2  -TS 2  -AC (r) PF (t) AC (c) 1  -AC (r) PF (t) AC (c)    Bathing (including washing, rinsing, and drying) 2  -TS 2  -AC (r) PF (t) AC (c) 2  -AC (r) PF (t) AC (c)    Toileting (which includes using toilet bed pan or urinal) 3  -TS 1  -AC (r) PF (t) AC (c) 3  -AC (r) PF (t) AC (c)    Putting on and taking off regular upper body clothing 3  -TS 2  -AC (r) PF (t) AC (c) 2  -AC (r) PF (t) AC (c)    Taking care of personal grooming (such as brushing teeth) 3  -TS 3  -AC (r) PF (t) AC (c) 3  -AC (r) PF (t) AC (c)    Eating meals 4  -TS 4  -AC (r) PF (t) AC (c) 4  -AC (r) PF (t) AC (c)    AM-PAC 6 Clicks Score (OT) 17  -TS 14  -AC (r) PF (t) 15  -AC (r) PF (t)       Functional Assessment    Outcome Measure Options -- AM-PAC 6 Clicks Daily Activity (OT)  -AC (r) PF (t) AC (c) AM-PAC 6 Clicks Daily Activity (OT)  -AC (r) PF (t) AC (c)          User Key  (r) = Recorded By, (t) = Taken By, (c) = Cosigned By    Initials Name Provider Type    Patricio Billingsley, OTR/L, CNT Occupational Therapist    TS Cammy Ang,  DASHA Occupational Therapy Assistant    Dejuan Martinez Jr., OT Student OT Student                     PT Rehab Goals     Row Name 11/04/21 1146             Bed Mobility Goal 1 (PT)    Activity/Assistive Device (Bed Mobility Goal 1, PT) bed mobility activities, all  -NW      [place] Level/Cues Needed (Bed Mobility Goal 1, PT) minimum assist (75% or more patient effort)  -NW      Time Frame (Bed Mobility Goal 1, PT) long term goal (LTG)  -NW      Progress/Outcomes (Bed Mobility Goal 1, PT) goal met  -NW              Transfer Goal 1 (PT)    Activity/Assistive Device (Transfer Goal 1, PT) sit-to-stand/stand-to-sit; bed-to-chair/chair-to-bed; walker, rolling  -NW      [place] Level/Cues Needed (Transfer Goal 1, PT) minimum assist (75% or more patient effort)  -NW      Time Frame (Transfer Goal 1, PT) long term goal (LTG)  -NW      Progress/Outcome (Transfer Goal 1, PT) goal met  -NW              Gait Training Goal 1 (PT)    Activity/Assistive Device (Gait Training Goal 1, PT) gait (walking locomotion); assistive device use; decrease fall risk; improve balance and speed; increase endurance/gait distance  -NW      [place] Level (Gait Training Goal 1, PT) minimum assist (75% or more patient effort)  -NW      Distance (Gait Training Goal 1, PT) 20ft  -NW      Time Frame (Gait Training Goal 1, PT) long term goal (LTG)  not met  -NW            User Key  (r) = Recorded By, (t) = Taken By, (c) = Cosigned By    Initials Name Provider Type Discipline    NW Becky Sharpe PTA Physical Therapy Assistant PT                    PT Discharge Summary  Anticipated Discharge Disposition (PT): home with home health  Reason for Discharge: Discharge from facility  Outcomes Achieved: Refer to plan of care for updates on goals achieved  Discharge Destination: Home with home health      Becky Sharpe PTA   11/4/2021

## 2021-11-04 NOTE — OUTREACH NOTE
Prep Survey      Responses   [facility] facility patient discharged from? [place]   Is LACE score < 7 ? No   Emergency Room discharge w/ pulse ox? No   Eligibility Readm Mgmt   Discharge diagnosis Obstructive sleep apneaObesity with alveolar hypoventilation and body mass index (BMI) of 40 or greater (HCC)   Does the patient have one of the following disease processes/diagnoses(primary or secondary)? Other   Does the patient have Home health ordered? Yes   What is the Home health agency?  [facility]   Is there a DME ordered? Yes   What DME was ordered? legacy o2   Prep survey completed? Yes          Mary Carter RN

## 2021-11-04 NOTE — THERAPY DISCHARGE NOTE
Acute Care - Occupational Therapy Discharge Summary  [facility]     Patient Name: Jose Landeros  : 1960  MRN: [id number]    Today's Date: 2021  Onset of Illness/Injury or Date of Surgery: 10/28/21    Date of Referral to OT: 10/28/21  Referring Physician: Ravi Hardy MD      Admit Date: 10/27/2021        OT Recommendation and Plan    Visit Dx:    ICD-10-CM ICD-9-CM   1. Cellulitis of lower extremity, unspecified laterality  L03.119 682.6   2. Obesity hypoventilation syndrome (HCC)  E66.2 278.03   3. Hypoxia  R09.02 799.02   4. Impaired mobility  Z74.09 799.89   5. Decreased activities of daily living (ADL)  Z78.9 V49.89   6. Obstructive sleep apnea  G47.33 327.23   7. Obesity, morbid, BMI 50 or higher (HCC)  E66.01 278.01                OT Rehab Goals     Row Name 21 0700             Bed Mobility Goal 1 (OT)    Activity/Assistive Device (Bed Mobility Goal 1, OT) bed mobility activities, all; rolling to left; rolling to right; sit to supine; supine to sit  -TS      [place] Level/Cues Needed (Bed Mobility Goal 1, OT) moderate assist (50-74% patient effort)  -TS      Time Frame (Bed Mobility Goal 1, OT) long term goal (LTG); 10 days  -TS      Progress/Outcomes (Bed Mobility Goal 1, OT) goal met  -TS              Transfer Goal 1 (OT)    Activity/Assistive Device (Transfer Goal 1, OT) sit-to-stand/stand-to-sit; toilet; shower chair  -TS      [place] Level/Cues Needed (Transfer Goal 1, OT) moderate assist (50-74% patient effort)  -TS      Time Frame (Transfer Goal 1, OT) long term goal (LTG); 10 days  -TS      Progress/Outcome (Transfer Goal 1, OT) goal met  -TS              Bathing Goal 1 (OT)    Activity/Device (Bathing Goal 1, OT) bathing skills, all; upper body bathing; lower body bathing; bariatric equipment; grab bar, tub/shower; hand-held shower spray hose; long-handled sponge; reacher  -TS      [place] Level/Cues Needed (Bathing Goal 1, OT) modified independence;  verbal cues required  -TS      Time Frame (Bathing Goal 1, OT) long term goal (LTG); 10 days  -TS      Progress/Outcomes (Bathing Goal 1, OT) goal not met  -TS              Toileting Goal 1 (OT)    Activity/Device (Toileting Goal 1, OT) toileting skills, all; adjust/manage clothing; perform perineal hygiene; bariatric equipment; grab bar/safety frame  -TS      [place] Level/Cues Needed (Toileting Goal 1, OT) moderate assist (50-74% patient effort)  -TS      Time Frame (Toileting Goal 1, OT) long term goal (LTG); 10 days  -TS      Progress/Outcome (Toileting Goal 1, OT) goal not met  -TS               Activity Tolerance Goal 1 (OT)    Activity Tolerance Goal 1 (OT) Pt will perform standing ADL with no more than 1 rest break to increase ADL independence  -TS      Activity Level (Endurance Goal 1, OT) 5 min activity  -TS      Time Frame (Activity Tolerance Goal 1, OT) long term goal (LTG); 10 days  -TS      Progress/Outcome (Activity Tolerance Goal 1, OT) goal not met  -TS            User Key  (r) = Recorded By, (t) = Taken By, (c) = Cosigned By    Initials Name Provider Type Discipline    TS Cammy Ang COTA Occupational Therapy Assistant OT                 Outcome Measures     Row Name 11/03/21 1400 11/02/21 1335 11/01/21 1308       How much help from another is currently needed...    Putting on and taking off regular lower body clothing? 2  -TS 2  -AC (r) PF (t) AC (c) 1  -AC (r) PF (t) AC (c)    Bathing (including washing, rinsing, and drying) 2  -TS 2  -AC (r) PF (t) AC (c) 2  -AC (r) PF (t) AC (c)    Toileting (which includes using toilet bed pan or urinal) 3  -TS 1  -AC (r) PF (t) AC (c) 3  -AC (r) PF (t) AC (c)    Putting on and taking off regular upper body clothing 3  -TS 2  -AC (r) PF (t) AC (c) 2  -AC (r) PF (t) AC (c)    Taking care of personal grooming (such as brushing teeth) 3  -TS 3  -AC (r) PF (t) AC (c) 3  -AC (r) PF (t) AC (c)    Eating meals 4  -TS 4  -AC (r) PF (t) AC (c) 4  -AC (r)  PF (t) AC (c)    AM-PAC 6 Clicks Score (OT) 17  -TS 14  -AC (r) PF (t) 15  -AC (r) PF (t)       Functional Assessment    Outcome Measure Options -- AM-PAC 6 Clicks Daily Activity (OT)  -AC (r) PF (t) AC (c) AM-PAC 6 Clicks Daily Activity (OT)  -AC (r) PF (t) AC (c)          User Key  (r) = Recorded By, (t) = Taken By, (c) = Cosigned By    Initials Name Provider Type    AC Patricio Pradhan, OTR/L, CNT Occupational Therapist    Cammy Zhang COTA Occupational Therapy Assistant    PF Dejuan Wen Jr., OT Student OT Student                Timed Therapy Charges  Total Units: 5    Charges  Total Units: 5    Procedure Name Documented Minutes Units Code    HC OT SELF CARE/MGMT/TRAIN EA 15 MIN 73  5    15058 (CPT®)               Documented Minutes  Total Minutes: 73    Therapy Provided Minutes    73775 - OT Self Care/Mgmt Minutes 73                Therapy Charges for Today     Code Description Service Date Service Provider Modifiers Qty    [id number] HC OT SELF CARE/MGMT/TRAIN EA 15 MIN 11/3/2021 Cammy Ang COTA GO 5          OT Discharge Summary  Anticipated Discharge Disposition (OT): home with assist  Reason for Discharge: Discharge from facility  Outcomes Achieved: Refer to plan of care for updates on goals achieved  Discharge Destination: Home with assist, Home with home health      DASHA Vargas  11/4/2021

## 2021-11-05 ENCOUNTER — READMISSION MANAGEMENT (OUTPATIENT)
Dept: CALL CENTER | Facility: HOSPITAL | Age: 61
End: 2021-11-05

## 2021-11-05 ENCOUNTER — HOME CARE VISIT (OUTPATIENT)
Dept: HOME HEALTH SERVICES | Facility: HOME HEALTHCARE | Age: 61
End: 2021-11-05

## 2021-11-05 VITALS
SYSTOLIC BLOOD PRESSURE: 130 MMHG | TEMPERATURE: 97.2 F | OXYGEN SATURATION: 87 % | DIASTOLIC BLOOD PRESSURE: 78 MMHG | RESPIRATION RATE: 22 BRPM | HEART RATE: 78 BPM

## 2021-11-05 NOTE — HOME HEALTH
PLAN FOR NEXT VISIT:    FOCUS OF CARE/SKILL NEEDED:  SUMMARY OF CARE:  TEACHING/INTERVENTIONS:  SHORT TERM GOALS:  LONG TERM GOALS:  PATIENT STATED GOAL:  HOME SAFETY ISSUES:  D/C PLAN:  PHYSICIAN CONTACT:  INSURANCE CHANGES:  MEDICATION KNOWLEDGE:  Have there been any changes to patient medications?  Is pt aware of purpose of each medication?  Is pt aware of side effects of each medication?  Has the pt had any adverse side effects to medications?

## 2021-11-05 NOTE — OUTREACH NOTE
Medical Week 1 Survey      Responses   [facility] patient discharged from? [place]   Does the patient have one of the following disease processes/diagnoses(primary or secondary)? Other   Week 1 attempt successful? Yes   Call start time 1618   Call end time 1639   Discharge diagnosis Obstructive sleep apneaObesity with alveolar hypoventilation and body mass index (BMI) of 40 or greater ([facility])   Meds reviewed with patient/caregiver? Yes   Is the patient having any side effects they believe may be caused by any medication additions or changes? No   Does the patient have all medications ordered at discharge? Yes   Is the patient taking all medications as directed (includes completed medication regime)? Yes   Medication comments Encouraged to complete antibiotics as ordered   Comments regarding appointments Encouraged to schedule appts per AVS   Does the patient have a primary care provider?  Yes   Does the patient have an appointment with their PCP within 7 days of discharge? No   What is preventing the patient from scheduling follow up appointments within 7 days of discharge? Haven't had time   Nursing Interventions Advised patient to make appointment   Has the patient kept scheduled appointments due by today? N/A   What is the Home health agency?  [facility]----visited 11/4/21   Has home health visited the patient within 72 hours of discharge? Yes   Home health comments Pt reports he was attempting to contact  today regarding a visit--visit was made yesterday.  Offered to help but pt angrily denied assistance   What DME was ordered? legacy o2   Did the patient receive a copy of their discharge instructions? Yes   Nursing interventions Reviewed instructions with patient   Is the patient/caregiver able to teach back the hierarchy of who to call/visit for symptoms/problems? PCP, Specialist, Home health nurse, Urgent Care, ED, 911 Yes   Week 1 call completed? Yes   Wrap up additional comments Pt denied any need for  "assistance at time of call. This RN inquired about dressing changes and he reports  RN completed yesterday and he was awaiting a call from  regarding extra assistance. This RN inquired if he was alone and he became very upset---denied need for assistance and told this RN to \"leave me alone.\" This RN called [facility] and requested to return call to patient to discuss his concerns as he reported he was awaiting a call. hecked immediately.          Zoya Henson RN  "

## 2021-11-08 ENCOUNTER — HOME CARE VISIT (OUTPATIENT)
Dept: HOME HEALTH SERVICES | Facility: HOME HEALTHCARE | Age: 61
End: 2021-11-08

## 2021-11-08 ENCOUNTER — HOME CARE VISIT (OUTPATIENT)
Dept: HOME HEALTH SERVICES | Facility: CLINIC | Age: 61
End: 2021-11-08

## 2021-11-08 PROCEDURE — G0299 HHS/HOSPICE OF RN EA 15 MIN: HCPCS

## 2021-11-09 ENCOUNTER — TELEPHONE (OUTPATIENT)
Dept: FAMILY MEDICINE CLINIC | Age: 61
End: 2021-11-09

## 2021-11-09 ENCOUNTER — HOME CARE VISIT (OUTPATIENT)
Dept: HOME HEALTH SERVICES | Facility: CLINIC | Age: 61
End: 2021-11-09

## 2021-11-09 PROCEDURE — G0151 HHCP-SERV OF PT,EA 15 MIN: HCPCS

## 2021-11-09 NOTE — TELEPHONE ENCOUNTER
[facility] called and states that the patient was discharged home and they wanted to know if [facility] would follow his care.  Please advise

## 2021-11-10 ENCOUNTER — HOME CARE VISIT (OUTPATIENT)
Dept: HOME HEALTH SERVICES | Facility: CLINIC | Age: 61
End: 2021-11-10

## 2021-11-10 VITALS
TEMPERATURE: 97.4 F | SYSTOLIC BLOOD PRESSURE: 110 MMHG | DIASTOLIC BLOOD PRESSURE: 50 MMHG | HEART RATE: 60 BPM | OXYGEN SATURATION: 90 % | RESPIRATION RATE: 18 BRPM

## 2021-11-10 VITALS
RESPIRATION RATE: 18 BRPM | OXYGEN SATURATION: 92 % | TEMPERATURE: 96.3 F | HEART RATE: 66 BPM | DIASTOLIC BLOOD PRESSURE: 80 MMHG | SYSTOLIC BLOOD PRESSURE: 132 MMHG

## 2021-11-10 LAB
ADV 40+41 DNA STL QL NAA+NON-PROBE: NOT DETECTED
ASTRO TYP 1-8 RNA STL QL NAA+NON-PROBE: NOT DETECTED
C CAYETANENSIS DNA STL QL NAA+NON-PROBE: NOT DETECTED
C COLI+JEJ+UPSA DNA STL QL NAA+NON-PROBE: NOT DETECTED
CRYPTOSP DNA STL QL NAA+NON-PROBE: NOT DETECTED
E HISTOLYT DNA STL QL NAA+NON-PROBE: NOT DETECTED
EAEC PAA PLAS AGGR+AATA ST NAA+NON-PRB: NOT DETECTED
EC STX1+STX2 GENES STL QL NAA+NON-PROBE: NOT DETECTED
EPEC EAE GENE STL QL NAA+NON-PROBE: NOT DETECTED
ETEC LTA+ST1A+ST1B TOX ST NAA+NON-PROBE: NOT DETECTED
G LAMBLIA DNA STL QL NAA+NON-PROBE: NOT DETECTED
NOROVIRUS GI+II RNA STL QL NAA+NON-PROBE: NOT DETECTED
P SHIGELLOIDES DNA STL QL NAA+NON-PROBE: NOT DETECTED
RVA RNA STL QL NAA+NON-PROBE: NOT DETECTED
S ENT+BONG DNA STL QL NAA+NON-PROBE: DETECTED
SAPO I+II+IV+V RNA STL QL NAA+NON-PROBE: NOT DETECTED
SHIGELLA SP+EIEC IPAH ST NAA+NON-PROBE: NOT DETECTED
V CHOL+PARA+VUL DNA STL QL NAA+NON-PROBE: NOT DETECTED
V CHOLERAE DNA STL QL NAA+NON-PROBE: NOT DETECTED
Y ENTEROCOL DNA STL QL NAA+NON-PROBE: NOT DETECTED

## 2021-11-10 PROCEDURE — G0300 HHS/HOSPICE OF LPN EA 15 MIN: HCPCS

## 2021-11-10 NOTE — HOME HEALTH
62 yo male Primary Dx: Encounter for change or removal of nonsurgical wound dressing . Pt reports LE ulcers /diarrhea with  hospitalization 10-29 thru 11-3 -21       Pmhx ; DM with neuropathy - obesity   PLOF : household amb rw short distances - community prn with ewc and A   disability 2007 per DM / obesity   Home environment : lives alone / family friends A - ramp entry   DME needs : na   Homebound status assessment : Pt presents with altered gait , balance , strength and endurance per dx

## 2021-11-11 VITALS
SYSTOLIC BLOOD PRESSURE: 138 MMHG | RESPIRATION RATE: 20 BRPM | DIASTOLIC BLOOD PRESSURE: 84 MMHG | BODY MASS INDEX: 79.74 KG/M2 | WEIGHT: 315 LBS | TEMPERATURE: 97.4 F | OXYGEN SATURATION: 90 % | HEART RATE: 57 BPM

## 2021-11-11 NOTE — HOME HEALTH
SN cleansed wounds to lower legs with normal saline and patted dry with gauze. No infection s/s noted to wounds. Medihoney applied to wound beds. ABD applied to wounds and wrapped with kerlex. Secured with tape. Pt tolerated well. SN instructed pt to change dressings twice daily. Pt v/u.  Pt states he has a friend that will be changing his dressings when hh does not come.

## 2021-11-12 ENCOUNTER — HOME CARE VISIT (OUTPATIENT)
Dept: HOME HEALTH SERVICES | Facility: CLINIC | Age: 61
End: 2021-11-12

## 2021-11-12 VITALS
OXYGEN SATURATION: 90 % | HEART RATE: 62 BPM | DIASTOLIC BLOOD PRESSURE: 82 MMHG | SYSTOLIC BLOOD PRESSURE: 130 MMHG | TEMPERATURE: 97.6 F | RESPIRATION RATE: 20 BRPM

## 2021-11-12 PROCEDURE — G0300 HHS/HOSPICE OF LPN EA 15 MIN: HCPCS

## 2021-11-12 NOTE — CASE COMMUNICATION
Patient missed a HHPT visit from [facility] on 11/12/21     Reason: Pt declined visit until insurance has approved PT      For your records only.   As per home health protocol, MD must be notified of missed/cancelled visits; therefore the prescribed frequency was not met.

## 2021-11-12 NOTE — TELEPHONE ENCOUNTER
Patient is no longer under the care of [facility]. Patient states that he sees Dr. Ricardo Torres at [facility]. Message sent to that provider.

## 2021-11-12 NOTE — HOME HEALTH
Pt completed course of doxycycline 100mg on 11/8/21 for infection of skin. Pt cont. probiotic 250mg, two cap. two times a day x 14 days.

## 2021-11-15 ENCOUNTER — READMISSION MANAGEMENT (OUTPATIENT)
Dept: CALL CENTER | Facility: HOSPITAL | Age: 61
End: 2021-11-15

## 2021-11-15 ENCOUNTER — HOME CARE VISIT (OUTPATIENT)
Dept: HOME HEALTH SERVICES | Facility: CLINIC | Age: 61
End: 2021-11-15

## 2021-11-15 VITALS
TEMPERATURE: 97.5 F | RESPIRATION RATE: 20 BRPM | HEART RATE: 56 BPM | OXYGEN SATURATION: 94 % | DIASTOLIC BLOOD PRESSURE: 86 MMHG | SYSTOLIC BLOOD PRESSURE: 142 MMHG

## 2021-11-15 PROCEDURE — G0299 HHS/HOSPICE OF RN EA 15 MIN: HCPCS

## 2021-11-15 NOTE — OUTREACH NOTE
Medical Week 2 Survey      Responses   [facility] [place] patient discharged from? [place]   Does the patient have one of the following disease processes/diagnoses(primary or secondary)? Other   Week 2 attempt successful? No   Unsuccessful attempts Attempt 1          Dalia Louis RN

## 2021-11-16 ENCOUNTER — HOME CARE VISIT (OUTPATIENT)
Dept: HOME HEALTH SERVICES | Facility: CLINIC | Age: 61
End: 2021-11-16

## 2021-11-16 NOTE — HOME HEALTH
PLAN FOR NEXT VISIT:WOUND CARE TO LLE  FOCUS OF CARE/SKILL NEEDED:SN SERVICES FOR WOUND CARE TO LLE.PATIENT REFUSES TO LET SN ASSESS RLE AND BUTTOCK THIS VISIT, STATES THEY ARE HEALED  TEACHING/INTERVENTIONS:SN ENFORCED BID DRESSING CHANGES, HIGH PROTEIN DIET TO AID IN WOUND HEALING, MONITOR DAILY BG READINGS AND TO REPORT ANY ABNORMAL FINDINGS TO SN/MD.PATIENT V/U UTILIZING TEACHBACK METHOD.  HOME SAFETY ISSUES:PATIENT IS OBESE, .LIMITED MOBILITY  D/C PLAN:WHEN LLE HAS HEALED AND/OR PATIENT HAS MET ALL GOALS AND UNDERTANDS DAILY CARE  PHYSICIAN CONTACT:YES PATIENT WOULD LIKE TO RECEIVE FLU AND COVID VACCINE  INSURANCE CHANGES:NO  MEDICATION KNOWLEDGE:  Have there been any changes to patient medications?NO  Is pt aware of purpose of each medication?YES  Is pt aware of side effects of each medication?YES  Has the pt had any adverse side effects to medications?NO

## 2021-11-17 ENCOUNTER — HOME CARE VISIT (OUTPATIENT)
Dept: HOME HEALTH SERVICES | Facility: CLINIC | Age: 61
End: 2021-11-17

## 2021-11-17 VITALS
OXYGEN SATURATION: 93 % | HEART RATE: 58 BPM | DIASTOLIC BLOOD PRESSURE: 82 MMHG | SYSTOLIC BLOOD PRESSURE: 132 MMHG | TEMPERATURE: 97.9 F

## 2021-11-17 PROCEDURE — G0299 HHS/HOSPICE OF RN EA 15 MIN: HCPCS

## 2021-11-17 NOTE — CASE COMMUNICATION
Patient missed a  PT visit from [facility] on 11/16/21    Reason: No insurance approval as of today.  Pt does not want to be responsible for bill.       For your records only.   As per home health protocol, MD must be notified of missed/cancelled visits; therefore the prescribed frequency was not met.

## 2021-11-17 NOTE — HOME HEALTH
PLAN FOR NEXT VISIT:wound care to lle  FOCUS OF CARE/SKILL NEEDED:patient requires sn for assessment of bid wound care provided by friends.patient v/u the need for bid dressing changes to aid in healing.patient will be discharged when wound has healed.  TEACHING/INTERVENTIONS:patient encouraged to wash lower extremites daily and to apply lotion to skin.patient agrees to bid lower extremitiy dressing changes utilizing teachback method.  HOME SAFETY ISSUES:small dog in home  D/C PLAN:when goals have been met  PHYSICIAN CONTACT:contacted morning of 11/15/21 with no response as of 11/17.  INSURANCE CHANGES:no  MEDICATION KNOWLEDGE:  Have there been any changes to patient medications?no  Is pt aware of purpose of each medication?yes  Is pt aware of side effects of each medication?yes  Has the pt had any adverse side effects to medications?no

## 2021-11-18 ENCOUNTER — READMISSION MANAGEMENT (OUTPATIENT)
Dept: CALL CENTER | Facility: HOSPITAL | Age: 61
End: 2021-11-18

## 2021-11-18 ENCOUNTER — HOME CARE VISIT (OUTPATIENT)
Dept: HOME HEALTH SERVICES | Facility: HOME HEALTHCARE | Age: 61
End: 2021-11-18

## 2021-11-18 NOTE — OUTREACH NOTE
Medical Week 2 Survey      Responses   [facility] patient discharged from? [place]   Does the patient have one of the following disease processes/diagnoses(primary or secondary)? Other   Week 2 attempt successful? Yes   Call start time 1642   Discharge diagnosis Obstructive sleep apneaObesity with alveolar hypoventilation and body mass index (BMI) of 40 or greater ([facility])   Call end time 1645   Meds reviewed with patient/caregiver? Yes   Is the patient having any side effects they believe may be caused by any medication additions or changes? No   Does the patient have all medications ordered at discharge? Yes   Is the patient taking all medications as directed (includes completed medication regime)? Yes   Medication comments Has completed Antibiotics   Does the patient have a primary care provider?  Yes   Comments regarding PCP States he does not intend to see his PCP   Has the patient kept scheduled appointments due by today? N/A   Has home health visited the patient within 72 hours of discharge? Yes   Home health comments Hh has been coming to house   Did the patient receive a copy of their discharge instructions? Yes   What is the patient's perception of their health status since discharge? Improving   Is the patient/caregiver able to teach back the hierarchy of who to call/visit for symptoms/problems? PCP, Specialist, Home health nurse, Urgent Care, ED, 911 Yes   Week 2 Call Completed? Yes   Wrap up additional comments States he is better does not intend to see his PCP. HH is coming for dressing changes.           Peace Hogan RN

## 2021-11-19 ENCOUNTER — HOME CARE VISIT (OUTPATIENT)
Dept: HOME HEALTH SERVICES | Facility: HOME HEALTHCARE | Age: 61
End: 2021-11-19

## 2021-11-19 ENCOUNTER — HOME CARE VISIT (OUTPATIENT)
Dept: HOME HEALTH SERVICES | Facility: CLINIC | Age: 61
End: 2021-11-19

## 2021-11-19 PROCEDURE — G0299 HHS/HOSPICE OF RN EA 15 MIN: HCPCS

## 2021-11-22 ENCOUNTER — HOME CARE VISIT (OUTPATIENT)
Dept: HOME HEALTH SERVICES | Facility: CLINIC | Age: 61
End: 2021-11-22

## 2021-11-22 VITALS
TEMPERATURE: 97.5 F | HEART RATE: 53 BPM | RESPIRATION RATE: 20 BRPM | DIASTOLIC BLOOD PRESSURE: 82 MMHG | SYSTOLIC BLOOD PRESSURE: 132 MMHG | OXYGEN SATURATION: 94 %

## 2021-11-22 PROCEDURE — G0299 HHS/HOSPICE OF RN EA 15 MIN: HCPCS

## 2021-11-23 ENCOUNTER — HOME CARE VISIT (OUTPATIENT)
Dept: HOME HEALTH SERVICES | Facility: CLINIC | Age: 61
End: 2021-11-23

## 2021-11-23 VITALS
SYSTOLIC BLOOD PRESSURE: 128 MMHG | RESPIRATION RATE: 18 BRPM | HEART RATE: 58 BPM | DIASTOLIC BLOOD PRESSURE: 62 MMHG | OXYGEN SATURATION: 95 % | TEMPERATURE: 97.2 F

## 2021-11-23 NOTE — HOME HEALTH
PLAN FOR NEXT VISIT:WOUND CARE TO RLE,CPA, DAILY BG READINGS AND COMPLIANCE WITH DIET  FOCUS OF CARE/SKILL NEEDED:SN NEEDED FOR REINFORCEMENT OF DIABETES AND COMPLIANCE WITH DISEASE PROCESS, WOUND CARE TO RLE WITH DRESSING CHANGES BID,CPA  TEACHING/INTERVENTIONS:SN EDUCATED PATIENT TO HAVE DRESSING CHANGES TO RLE BID AND DAILY LOG OF BG READINGS AND WHEN TO REPORT ANY ABNOMRAL READINGS TO SN/MD. PATIENT V/U UTILIZING TEACHBACK METHOD  HOME SAFETY ISSUES:SMALL DOG IN HOME  D/C PLAN:WHEN PATIENT HAS MET ALL GOALS  PHYSICIAN CONTACT:YES , PATIENT IS TO RECEIVE FIRST MODERNA VACCINE THIS VISIT  INSURANCE CHANGES:NO  MEDICATION KNOWLEDGE:  Have there been any changes to patient medications?NO  Is pt aware of purpose of each medication?YES  Is pt aware of side effects of each medication?YES  Has the pt had any adverse side effects to medications?NO

## 2021-11-24 ENCOUNTER — HOME CARE VISIT (OUTPATIENT)
Dept: HOME HEALTH SERVICES | Facility: CLINIC | Age: 61
End: 2021-11-24

## 2021-11-24 PROCEDURE — G0300 HHS/HOSPICE OF LPN EA 15 MIN: HCPCS

## 2021-11-25 VITALS
TEMPERATURE: 97.2 F | SYSTOLIC BLOOD PRESSURE: 144 MMHG | DIASTOLIC BLOOD PRESSURE: 78 MMHG | OXYGEN SATURATION: 95 % | RESPIRATION RATE: 20 BRPM | HEART RATE: 53 BPM

## 2021-11-26 ENCOUNTER — HOME CARE VISIT (OUTPATIENT)
Dept: HOME HEALTH SERVICES | Facility: CLINIC | Age: 61
End: 2021-11-26

## 2021-11-26 VITALS
TEMPERATURE: 97.4 F | HEART RATE: 62 BPM | RESPIRATION RATE: 18 BRPM | SYSTOLIC BLOOD PRESSURE: 132 MMHG | DIASTOLIC BLOOD PRESSURE: 72 MMHG | OXYGEN SATURATION: 94 %

## 2021-11-26 PROCEDURE — G0299 HHS/HOSPICE OF RN EA 15 MIN: HCPCS

## 2021-11-26 NOTE — HOME HEALTH
PLAN FOR NEXT VISIT:  FOCUS OF CARE/SKILL NEEDED:  TEACHING/INTERVENTIONS:  HOME SAFETY ISSUES:  D/C PLAN:  PHYSICIAN CONTACT:  INSURANCE CHANGES:  MEDICATION KNOWLEDGE:  Have there been any changes to patient medications?  Is pt aware of purpose of each medication?  Is pt aware of side effects of each medication?  Has the pt had any adverse side effects to medications?

## 2021-11-29 ENCOUNTER — READMISSION MANAGEMENT (OUTPATIENT)
Dept: CALL CENTER | Facility: HOSPITAL | Age: 61
End: 2021-11-29

## 2021-11-29 ENCOUNTER — HOME CARE VISIT (OUTPATIENT)
Dept: HOME HEALTH SERVICES | Facility: CLINIC | Age: 61
End: 2021-11-29

## 2021-11-29 PROCEDURE — G0299 HHS/HOSPICE OF RN EA 15 MIN: HCPCS

## 2021-11-29 NOTE — OUTREACH NOTE
Medical Week 3 Survey      Responses   [facility] patient discharged from? [place]   Does the patient have one of the following disease processes/diagnoses(primary or secondary)? Other   Week 3 attempt successful? No   Unsuccessful attempts Attempt 1          Marlene Berkowitz RN

## 2021-12-01 ENCOUNTER — HOME CARE VISIT (OUTPATIENT)
Dept: HOME HEALTH SERVICES | Facility: CLINIC | Age: 61
End: 2021-12-01

## 2021-12-01 VITALS
OXYGEN SATURATION: 95 % | RESPIRATION RATE: 18 BRPM | SYSTOLIC BLOOD PRESSURE: 130 MMHG | HEART RATE: 53 BPM | TEMPERATURE: 97.5 F | DIASTOLIC BLOOD PRESSURE: 80 MMHG

## 2021-12-01 PROCEDURE — G0299 HHS/HOSPICE OF RN EA 15 MIN: HCPCS

## 2021-12-02 ENCOUNTER — READMISSION MANAGEMENT (OUTPATIENT)
Dept: CALL CENTER | Facility: HOSPITAL | Age: 61
End: 2021-12-02

## 2021-12-02 NOTE — OUTREACH NOTE
Medical Week 3 Survey      Responses   [facility] patient discharged from? [place]   Does the patient have one of the following disease processes/diagnoses(primary or secondary)? Other   Week 3 attempt successful? No   Unsuccessful attempts Attempt 2          Natty Nielsen RN

## 2021-12-03 ENCOUNTER — HOME CARE VISIT (OUTPATIENT)
Dept: HOME HEALTH SERVICES | Facility: CLINIC | Age: 61
End: 2021-12-03

## 2021-12-03 VITALS
RESPIRATION RATE: 20 BRPM | SYSTOLIC BLOOD PRESSURE: 118 MMHG | DIASTOLIC BLOOD PRESSURE: 76 MMHG | HEART RATE: 62 BPM | OXYGEN SATURATION: 92 % | TEMPERATURE: 98 F

## 2021-12-03 PROCEDURE — G0299 HHS/HOSPICE OF RN EA 15 MIN: HCPCS
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
## 2021-12-03 NOTE — HOME HEALTH
`PLAN FOR NEXT VISIT: WOUND CARE TO RLE, CPA, BG READINGS, FLU VACCINE  FOCUS OF CARE/SKILL NEEDED:SN SERVICES NEED FOR WOUND CARE TO RLE.  TEACHING/INTERVENTIONS:SN EDUCATED PATIENT ON BID WASHING LOWER EXTREMITIES AND PRN IF DRESSINGS BECOME SOILED.PATIENT V/U UTULIZING TEACHBACK METHOD  HOME SAFETY ISSUES:SMALL DOG IN HOME  D/C PLAN:WHEN PATIENT HAS MET ALL GOALS  PHYSICIAN CONTACT:NO  INSURANCE CHANGES:NO  MEDICATION KNOWLEDGE:  Have there been any changes to patient medications?NO  Is pt aware of purpose of each medication?YES  Is pt aware of side effects of each medication?YES  Has the pt had any adverse side effects to medications?NO

## 2021-12-06 ENCOUNTER — HOME CARE VISIT (OUTPATIENT)
Dept: HOME HEALTH SERVICES | Facility: CLINIC | Age: 61
End: 2021-12-06

## 2021-12-06 PROCEDURE — G0299 HHS/HOSPICE OF RN EA 15 MIN: HCPCS

## 2021-12-07 VITALS
SYSTOLIC BLOOD PRESSURE: 122 MMHG | RESPIRATION RATE: 18 BRPM | DIASTOLIC BLOOD PRESSURE: 72 MMHG | WEIGHT: 315 LBS | BODY MASS INDEX: 84.32 KG/M2 | HEART RATE: 60 BPM | TEMPERATURE: 97.5 F | OXYGEN SATURATION: 94 %

## 2021-12-07 NOTE — HOME HEALTH
PLAN FOR NEXT VISIT:WOUND CARE TO RLE  FOCUS OF CARE/SKILL NEEDED:SN SERVICES NEEDED FOR WOUND CARE AND OXYGEN SATURATION MANAGEMENT  TEACHING/INTERVENTIONS:  HOME SAFETY ISSUES:SMALL DOG IN HOME  D/C PLAN:WHEN ALL GOALS HAVE BEEN MET  PHYSICIAN CONTACT:NO  INSURANCE CHANGES:NO  MEDICATION KNOWLEDGE:  Have there been any changes to patient medications?YES  Is pt aware of purpose of each medication?YES  Is pt aware of side effects of each medication?YES  Has the pt had any adverse side effects to medications?NO

## 2021-12-08 ENCOUNTER — HOME CARE VISIT (OUTPATIENT)
Dept: HOME HEALTH SERVICES | Facility: CLINIC | Age: 61
End: 2021-12-08

## 2021-12-08 VITALS
RESPIRATION RATE: 20 BRPM | DIASTOLIC BLOOD PRESSURE: 76 MMHG | HEART RATE: 53 BPM | TEMPERATURE: 97.3 F | SYSTOLIC BLOOD PRESSURE: 118 MMHG | OXYGEN SATURATION: 95 %

## 2021-12-08 PROCEDURE — G0299 HHS/HOSPICE OF RN EA 15 MIN: HCPCS
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
## 2021-12-10 ENCOUNTER — HOME CARE VISIT (OUTPATIENT)
Dept: HOME HEALTH SERVICES | Facility: CLINIC | Age: 61
End: 2021-12-10

## 2021-12-10 VITALS
DIASTOLIC BLOOD PRESSURE: 72 MMHG | RESPIRATION RATE: 16 BRPM | HEART RATE: 58 BPM | SYSTOLIC BLOOD PRESSURE: 152 MMHG | OXYGEN SATURATION: 93 % | TEMPERATURE: 98 F

## 2021-12-10 PROCEDURE — G0299 HHS/HOSPICE OF RN EA 15 MIN: HCPCS

## 2021-12-10 NOTE — HOME HEALTH
No recent falls and no insurance changes. Pt/cg had no further questions or concerns regarding the pt's medications or plan of care. A&O X 4. No c/o pain. VSS. I cleansed both of the pt's legs and the pt's rt leg wound with soap and water. I placed a tubular wrap on the pt's left calf. I applied vaseline gauze to the pt's wound bed on the rt leg, applied guaze and then rolled gauze, and the secured the dressing with a tubular wrap. No S&S of infection. I educated the pt on his new abx, cipro. Pt v/u. I educate the pt on performing pulmonary hygiene multiple times daily. Pt v/u. The pt stated that he is unable to reach down and change his dressing on his rt leg, tubular wraps, and socks. The pt stated that he is aware of the importance of performing daily diabetic foot care. I called [facility] office and spoke to their  and notified them of the pt's edema hindering the pt's wound healing, and to see if there is anything else that can be done for the pt. The pt refuses to go to wound care due to past experiences.

## 2021-12-12 ENCOUNTER — HOME CARE VISIT (OUTPATIENT)
Dept: HOME HEALTH SERVICES | Facility: HOME HEALTHCARE | Age: 61
End: 2021-12-12

## 2021-12-13 ENCOUNTER — HOME CARE VISIT (OUTPATIENT)
Dept: HOME HEALTH SERVICES | Facility: CLINIC | Age: 61
End: 2021-12-13

## 2021-12-13 ENCOUNTER — HOME CARE VISIT (OUTPATIENT)
Dept: HOME HEALTH SERVICES | Facility: HOME HEALTHCARE | Age: 61
End: 2021-12-13

## 2021-12-13 PROCEDURE — G0299 HHS/HOSPICE OF RN EA 15 MIN: HCPCS
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
## 2021-12-14 VITALS
HEART RATE: 58 BPM | RESPIRATION RATE: 18 BRPM | SYSTOLIC BLOOD PRESSURE: 122 MMHG | DIASTOLIC BLOOD PRESSURE: 70 MMHG | TEMPERATURE: 97.4 F | OXYGEN SATURATION: 94 %

## 2021-12-14 NOTE — HOME HEALTH
PLAN FOR NEXT VISIT:WOUND CARE TO LLE, ASSESS RIGHT LEG FOR WEEPING  FOCUS OF CARE/SKILL NEEDED:SN SERVICES FOR PATIENT EDUCATION ON POOR WOUND HEALING R/T DIABETES.  TEACHING/INTERVENTIONS:SN EDUCATED PATIENT ON WOUND CARE AND THE IMPORTANCE OF CHANGING DRESSINGS DAILY.  HOME SAFETY ISSUES:SMALL DOG IN HOME  D/C PLAN:WHEN PATIENT HAS MET ALL GOALS  PHYSICIAN CONTACT:NO  INSURANCE CHANGES:NO  MEDICATION KNOWLEDGE:  Have there been any changes to patient medications?NO  Is pt aware of purpose of each medication?YES  Is pt aware of side effects of each medication?YES  Has the pt had any adverse side effects to medications?NO

## 2021-12-15 ENCOUNTER — HOME CARE VISIT (OUTPATIENT)
Dept: HOME HEALTH SERVICES | Facility: CLINIC | Age: 61
End: 2021-12-15

## 2021-12-15 PROCEDURE — G0299 HHS/HOSPICE OF RN EA 15 MIN: HCPCS

## 2021-12-17 ENCOUNTER — HOME CARE VISIT (OUTPATIENT)
Dept: HOME HEALTH SERVICES | Facility: CLINIC | Age: 61
End: 2021-12-17

## 2021-12-17 ENCOUNTER — HOME CARE VISIT (OUTPATIENT)
Dept: HOME HEALTH SERVICES | Facility: HOME HEALTHCARE | Age: 61
End: 2021-12-17

## 2021-12-17 VITALS
DIASTOLIC BLOOD PRESSURE: 80 MMHG | OXYGEN SATURATION: 93 % | SYSTOLIC BLOOD PRESSURE: 130 MMHG | RESPIRATION RATE: 18 BRPM | TEMPERATURE: 97.3 F | HEART RATE: 59 BPM

## 2021-12-17 PROCEDURE — G0299 HHS/HOSPICE OF RN EA 15 MIN: HCPCS

## 2021-12-17 NOTE — HOME HEALTH
PLAN FOR NEXT VISIT:WOUND CARE TO RLE  FOCUS OF CARE/SKILL NEEDED:SN SERVICES NEEDED FOR EDUCATION REGARDING DAILY DRESSINGS CHANGES TO RLE AND DAILY RECORDINGS OF BLOOD SUGARS.PATIENT IS NOTIFY SN/MD FOR ANY S&S OF INFECTION OR EPISODES OF HYPO/HYPERGLYCEMIA.  TEACHING/INTERVENTIONS:SN EDUCATED PATIENT ON KEEPING LOWER EXTREMITIES ELEVATED TO DECREASE SWELLING AND TO HAVE CAREGIVER CHANGE DRESSINGS WHEN SN NOT IN HOME.PATIENT V/U UTILIZING TEACHBACK METHOD  HOME SAFETY ISSUES:SMALL DOG IN HOME  D/C PLAN:WHEN ALL GOALS HAVE BEEN MET  PHYSICIAN CONTACT:  INSURANCE CHANGES:NO  MEDICATION KNOWLEDGE:  Have there been any changes to patient medications?PATIENT HASE ORAL ABX FOR BLADDER INFECTION  Is pt aware of purpose of each medication?YES  Is pt aware of side effects of each medication?YES  Has the pt had any adverse side effects to medications?NO

## 2021-12-18 VITALS
TEMPERATURE: 97.4 F | SYSTOLIC BLOOD PRESSURE: 124 MMHG | DIASTOLIC BLOOD PRESSURE: 64 MMHG | OXYGEN SATURATION: 94 % | HEART RATE: 53 BPM | RESPIRATION RATE: 18 BRPM
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
## 2021-12-20 ENCOUNTER — HOME CARE VISIT (OUTPATIENT)
Dept: HOME HEALTH SERVICES | Facility: CLINIC | Age: 61
End: 2021-12-20

## 2021-12-20 VITALS
RESPIRATION RATE: 20 BRPM | DIASTOLIC BLOOD PRESSURE: 74 MMHG | SYSTOLIC BLOOD PRESSURE: 122 MMHG | HEART RATE: 59 BPM | TEMPERATURE: 97.7 F | OXYGEN SATURATION: 94 %

## 2021-12-20 PROCEDURE — G0299 HHS/HOSPICE OF RN EA 15 MIN: HCPCS

## 2021-12-21 NOTE — HOME HEALTH
PLAN FOR NEXT VISIT:WOUND CARE TO LLE  FOCUS OF CARE/SKILL NEEDED:SN SERVICES NEEDED FOR WOUND CARE TO RLE, EDUCATION ON DAILY DRESSING CHANGES  TEACHING/INTERVENTIONS:SN EDUCATED PATIENT ON THE IMPORTANCE OF KEEPING LOWER EXTREMITIES ELEVATED TO DECREASE EDEMA.PATIENT V/U UTILIZING TEACH BACK METHOD.  HOME SAFETY ISSUES:SMALL DOG IN HOME, OBESITY  D/C PLAN:HEN ALL GOALS HAVE BEEN MET  PHYSICIAN CONTACT:YES  INSURANCE CHANGES:NO  MEDICATION KNOWLEDGE:  Have there been any changes to patient medications?NO  Is pt aware of purpose of each medication?YES  Is pt aware of side effects of each medication?YES  Has the pt had any adverse side effects to medications?NO

## 2021-12-22 ENCOUNTER — HOME CARE VISIT (OUTPATIENT)
Dept: HOME HEALTH SERVICES | Facility: CLINIC | Age: 61
End: 2021-12-22

## 2021-12-22 PROCEDURE — G0299 HHS/HOSPICE OF RN EA 15 MIN: HCPCS

## 2021-12-23 VITALS
OXYGEN SATURATION: 95 % | SYSTOLIC BLOOD PRESSURE: 134 MMHG | TEMPERATURE: 97.4 F | HEART RATE: 58 BPM | RESPIRATION RATE: 18 BRPM | DIASTOLIC BLOOD PRESSURE: 84 MMHG

## 2021-12-23 NOTE — HOME HEALTH
PLAN FOR NEXT VISIT:WOUND CARE TO RLE  FOCUS OF CARE/SKILL NEEDED:SN SERVICES NEEDED FOR ASSESSMENT OF WOUND AND WOUND CARE  TEACHING/INTERVENTIONS:SN EDUCATED PATIENT ON DAILY DRESSING CHANGES BY CAREGIVER WHEN SN NOT IN HOME.PATIENT V/U UTILIZING TEACH BACK METHOD  HOME SAFETY ISSUES:SMALL DOG IN HOME  D/C PLAN:WHEN ALL GOALS HAVE BEEN MET  PHYSICIAN CONTACT:NO  INSURANCE CHANGES:NO  MEDICATION KNOWLEDGE:  Have there been any changes to patient medications?NO  Is pt aware of purpose of each medication?YES  Is pt aware of side effects of each medication?YES  Has the pt had any adverse side effects to medications?NO

## 2021-12-24 ENCOUNTER — HOME CARE VISIT (OUTPATIENT)
Dept: HOME HEALTH SERVICES | Facility: CLINIC | Age: 61
End: 2021-12-24

## 2021-12-24 PROCEDURE — G0299 HHS/HOSPICE OF RN EA 15 MIN: HCPCS

## 2021-12-26 VITALS
HEART RATE: 50 BPM | OXYGEN SATURATION: 95 % | SYSTOLIC BLOOD PRESSURE: 132 MMHG | TEMPERATURE: 97.8 F | RESPIRATION RATE: 18 BRPM | DIASTOLIC BLOOD PRESSURE: 82 MMHG

## 2021-12-26 NOTE — HOME HEALTH
PLAN FOR NEXT VISIT: WOUND CARE TO RLE  FOCUS OF CARE/SKILL NEEDED:SN SERVICES NEEDED FOR WOUND CARE TO RLE  TEACHING/INTERVENTIONS:SN EDUCATED PATIENT TO HAVE CAREGIVER CHANGE DRESSINGS WHEN SN NOT IN HOME. PATIENT V/U UTILIZING TEACH BACK METHOD  HOME SAFETY ISSUES:SMALL DOG IN HOME  D/C PLAN:WHEN ALL GOALS HAVE BEEN MET  PHYSICIAN CONTACT:NO  INSURANCE CHANGES:NO  MEDICATION KNOWLEDGE:  Have there been any changes to patient medications?NO  Is pt aware of purpose of each medication?YES  Is pt aware of side effects of each medication?YES  Has the pt had any adverse side effects to medications?NO

## 2021-12-27 ENCOUNTER — HOME CARE VISIT (OUTPATIENT)
Dept: HOME HEALTH SERVICES | Facility: CLINIC | Age: 61
End: 2021-12-27

## 2021-12-27 VITALS
SYSTOLIC BLOOD PRESSURE: 115 MMHG | OXYGEN SATURATION: 92 % | HEART RATE: 54 BPM | RESPIRATION RATE: 18 BRPM | TEMPERATURE: 97.7 F | DIASTOLIC BLOOD PRESSURE: 82 MMHG

## 2021-12-27 PROCEDURE — G0299 HHS/HOSPICE OF RN EA 15 MIN: HCPCS

## 2021-12-28 NOTE — HOME HEALTH
PLAN FOR NEXT VISIT: WOUND CARE TO RLE, CPA  FOCUS OF CARE/SKILL NEEDED:SN SERVICES NEEDED FOR WOUND CARE TO RLE AND CAREGIVER TO CHANGE WHEN SN NOT IN HOME  TEACHING/INTERVENTIONS:SN EDUCATED PATIENT TO HAVE CAREGIVER CHANGE DRESSINGS IF BECOME SOILED, KEEP DAILY LOG OF BG READINGS, FOLLOW DIABETIC LEG AND FOOT CARE.PATIENT V/U UTILIZING TEACH BCAK METHOD  HOME SAFETY ISSUES:SMALL DOG IN HOME  D/C PLAN:WHEN ALL GOALS HAVE BEEN MET  PHYSICIAN CONTACT:NO  INSURANCE CHANGES:PATIENT STATES AS OF NEW YEAR HE WILL NOT BE COVERED FOR [facility] SERVICES.  MEDICATION KNOWLEDGE:  Have there been any changes to patient medications?NO  Is pt aware of purpose of each medication?YES  Is pt aware of side effects of each medication?YES  Has the pt had any adverse side effects to medications?NO

## 2021-12-29 ENCOUNTER — HOME CARE VISIT (OUTPATIENT)
Dept: HOME HEALTH SERVICES | Facility: CLINIC | Age: 61
End: 2021-12-29

## 2021-12-29 VITALS
DIASTOLIC BLOOD PRESSURE: 82 MMHG | HEART RATE: 51 BPM | RESPIRATION RATE: 20 BRPM | TEMPERATURE: 97.9 F | OXYGEN SATURATION: 95 % | SYSTOLIC BLOOD PRESSURE: 132 MMHG

## 2021-12-29 PROCEDURE — G0299 HHS/HOSPICE OF RN EA 15 MIN: HCPCS

## 2021-12-31 ENCOUNTER — HOME CARE VISIT (OUTPATIENT)
Dept: HOME HEALTH SERVICES | Facility: CLINIC | Age: 61
End: 2021-12-31

## 2021-12-31 VITALS
OXYGEN SATURATION: 93 % | HEART RATE: 55 BPM | TEMPERATURE: 97.8 F | RESPIRATION RATE: 20 BRPM | SYSTOLIC BLOOD PRESSURE: 138 MMHG | DIASTOLIC BLOOD PRESSURE: 82 MMHG

## 2021-12-31 PROCEDURE — G0299 HHS/HOSPICE OF RN EA 15 MIN: HCPCS

## 2022-01-03 ENCOUNTER — HOME CARE VISIT (OUTPATIENT)
Dept: HOME HEALTH SERVICES | Facility: CLINIC | Age: 62
End: 2022-01-03

## 2022-01-03 ENCOUNTER — HOME CARE VISIT (OUTPATIENT)
Dept: HOME HEALTH SERVICES | Facility: HOME HEALTHCARE | Age: 62
End: 2022-01-03

## 2022-01-03 VITALS
RESPIRATION RATE: 18 BRPM | DIASTOLIC BLOOD PRESSURE: 70 MMHG | OXYGEN SATURATION: 94 % | TEMPERATURE: 98.5 F | HEART RATE: 56 BPM | SYSTOLIC BLOOD PRESSURE: 148 MMHG

## 2022-01-03 PROCEDURE — G0299 HHS/HOSPICE OF RN EA 15 MIN: HCPCS

## 2022-01-04 NOTE — HOME HEALTH
No recent falls, no insurance changes, and no recent medication changes. There was no need to contact the MD. Pt/cg had no further questions or concerns regarding the pt's medications or plan of care. A&O X 4. No c/o pain. VSS. I cleansed both of the pt's legs with soap and water, rinsed them, and then patted them dry. I applied lotion to both of the pt's legs. The pt's left leg has been weeping serous fluid. I applied abd pads to the pt's left leg and then wrapped it in coflex. Pt tolerated this well. No S&S of infection. I educated the pt on pressure injury precautions, medications, S&S of infection, infection prevention, diabetic foot care, wound care, and pain management via teachback.

## 2022-01-05 ENCOUNTER — HOME CARE VISIT (OUTPATIENT)
Dept: HOME HEALTH SERVICES | Facility: HOME HEALTHCARE | Age: 62
End: 2022-01-05

## 2022-01-07 ENCOUNTER — HOME CARE VISIT (OUTPATIENT)
Dept: HOME HEALTH SERVICES | Facility: CLINIC | Age: 62
End: 2022-01-07

## 2022-01-07 VITALS
TEMPERATURE: 97.5 F | DIASTOLIC BLOOD PRESSURE: 62 MMHG | HEART RATE: 55 BPM | SYSTOLIC BLOOD PRESSURE: 162 MMHG | OXYGEN SATURATION: 94 % | RESPIRATION RATE: 18 BRPM

## 2022-01-07 PROCEDURE — G0299 HHS/HOSPICE OF RN EA 15 MIN: HCPCS
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
## 2022-01-10 ENCOUNTER — HOME CARE VISIT (OUTPATIENT)
Dept: HOME HEALTH SERVICES | Facility: CLINIC | Age: 62
End: 2022-01-10

## 2022-01-10 VITALS
HEART RATE: 56 BPM | TEMPERATURE: 98.1 F | RESPIRATION RATE: 18 BRPM | SYSTOLIC BLOOD PRESSURE: 116 MMHG | DIASTOLIC BLOOD PRESSURE: 62 MMHG | OXYGEN SATURATION: 94 %

## 2022-01-10 PROCEDURE — G0299 HHS/HOSPICE OF RN EA 15 MIN: HCPCS

## 2022-01-12 ENCOUNTER — HOME CARE VISIT (OUTPATIENT)
Dept: HOME HEALTH SERVICES | Facility: CLINIC | Age: 62
End: 2022-01-12

## 2022-01-12 VITALS
RESPIRATION RATE: 20 BRPM | OXYGEN SATURATION: 93 % | SYSTOLIC BLOOD PRESSURE: 144 MMHG | HEART RATE: 52 BPM | DIASTOLIC BLOOD PRESSURE: 92 MMHG | TEMPERATURE: 97.6 F

## 2022-01-12 PROCEDURE — G0300 HHS/HOSPICE OF LPN EA 15 MIN: HCPCS

## 2022-01-12 NOTE — HOME HEALTH
PLAN FOR NEXT VISIT:WOUND CARE TO RLE.ASSESS LLE  FOCUS OF CARE/SKILL NEEDED:SN SERVICES NEEDED FOR WOUND CARE TO RLE.ASSESS LLE  TEACHING/INTERVENTIONS:SN EDUCATED PATIENT ON KEEPING LOWER EXTREMTIIES ELEVATED TO REDUCE EDEMA  HOME SAFETY ISSUES:SMALL DOG IN HOME  D/C PLAN:WHEN PATIENT HAS MET ALL GOALS  PHYSICIAN CONTACT:NO  INSURANCE CHANGES:NO  MEDICATION KNOWLEDGE:  Have there been any changes to patient medications?NO  Is pt aware of purpose of each medication?YES  Is pt aware of side effects of each medication?YES  Has the pt had any adverse side effects to medications?NO

## 2022-01-14 ENCOUNTER — HOME CARE VISIT (OUTPATIENT)
Dept: HOME HEALTH SERVICES | Facility: CLINIC | Age: 62
End: 2022-01-14

## 2022-01-14 VITALS
OXYGEN SATURATION: 93 % | HEART RATE: 58 BPM | RESPIRATION RATE: 18 BRPM | TEMPERATURE: 97.6 F | SYSTOLIC BLOOD PRESSURE: 118 MMHG | DIASTOLIC BLOOD PRESSURE: 74 MMHG

## 2022-01-14 PROCEDURE — G0299 HHS/HOSPICE OF RN EA 15 MIN: HCPCS

## 2022-01-17 ENCOUNTER — HOME CARE VISIT (OUTPATIENT)
Dept: HOME HEALTH SERVICES | Facility: CLINIC | Age: 62
End: 2022-01-17

## 2022-01-17 PROCEDURE — G0299 HHS/HOSPICE OF RN EA 15 MIN: HCPCS

## 2022-01-18 VITALS
OXYGEN SATURATION: 92 % | DIASTOLIC BLOOD PRESSURE: 62 MMHG | SYSTOLIC BLOOD PRESSURE: 122 MMHG | HEART RATE: 57 BPM | TEMPERATURE: 97.8 F | RESPIRATION RATE: 20 BRPM

## 2022-01-19 ENCOUNTER — HOME CARE VISIT (OUTPATIENT)
Dept: HOME HEALTH SERVICES | Facility: CLINIC | Age: 62
End: 2022-01-19

## 2022-01-19 VITALS
OXYGEN SATURATION: 90 % | SYSTOLIC BLOOD PRESSURE: 140 MMHG | DIASTOLIC BLOOD PRESSURE: 80 MMHG | RESPIRATION RATE: 20 BRPM | TEMPERATURE: 98.8 F | HEART RATE: 68 BPM

## 2022-01-19 PROCEDURE — G0299 HHS/HOSPICE OF RN EA 15 MIN: HCPCS
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
## 2022-01-20 NOTE — HOME HEALTH
Patient a/0 x 4 up adlib in motorized w/c. Bilateral lower limbs edematous. L lower leg was cleansed with soap & water dried with moisturizing cream. Areas leg red with flaky skin washing did remove it. Leg dried covered with ABD pad wrapped with gauze then webnit to keep in place.Denied pain no falls no new meds no insurance change.

## 2022-01-21 ENCOUNTER — HOME CARE VISIT (OUTPATIENT)
Dept: HOME HEALTH SERVICES | Facility: CLINIC | Age: 62
End: 2022-01-21

## 2022-01-21 PROCEDURE — G0299 HHS/HOSPICE OF RN EA 15 MIN: HCPCS

## 2022-01-22 VITALS — RESPIRATION RATE: 18 BRPM | OXYGEN SATURATION: 91 % | TEMPERATURE: 97.8 F | HEART RATE: 57 BPM

## 2022-01-24 ENCOUNTER — HOME CARE VISIT (OUTPATIENT)
Dept: HOME HEALTH SERVICES | Facility: CLINIC | Age: 62
End: 2022-01-24

## 2022-01-24 PROCEDURE — G0300 HHS/HOSPICE OF LPN EA 15 MIN: HCPCS

## 2022-01-25 VITALS
HEART RATE: 56 BPM | TEMPERATURE: 98.4 F | SYSTOLIC BLOOD PRESSURE: 124 MMHG | DIASTOLIC BLOOD PRESSURE: 78 MMHG | RESPIRATION RATE: 18 BRPM | OXYGEN SATURATION: 91 %

## 2022-01-25 NOTE — HOME HEALTH
Wound care completed to LLE per order. Instructed pt on s/s of infection and when to notify HH or MD. PT v/u of teaching via teach back method.    Next SNV: Complete wound care to LLE.

## 2022-01-26 ENCOUNTER — HOME CARE VISIT (OUTPATIENT)
Dept: HOME HEALTH SERVICES | Facility: CLINIC | Age: 62
End: 2022-01-26

## 2022-01-26 PROCEDURE — G0299 HHS/HOSPICE OF RN EA 15 MIN: HCPCS

## 2022-01-27 VITALS
RESPIRATION RATE: 18 BRPM | OXYGEN SATURATION: 94 % | SYSTOLIC BLOOD PRESSURE: 118 MMHG | DIASTOLIC BLOOD PRESSURE: 66 MMHG | TEMPERATURE: 97.6 F | HEART RATE: 50 BPM

## 2022-01-27 NOTE — HOME HEALTH
PLAN FOR NEXT VISIT: ASSESS RLE FOR WEEPING, S&S OF INFECTION. RLE VERY WET AND SKIN NEEDS TO HAVE AIR.PATIENT HAS EXTREMLEY SENSITIVE SKIN.SN APPLIED VASELINE TO LLE, ABD PAD, WRAPPED WITH KERLEX AND COBAN.

## 2022-01-29 ENCOUNTER — HOME CARE VISIT (OUTPATIENT)
Dept: HOME HEALTH SERVICES | Facility: CLINIC | Age: 62
End: 2022-01-29

## 2022-01-29 PROCEDURE — G0300 HHS/HOSPICE OF LPN EA 15 MIN: HCPCS

## 2022-01-30 VITALS
HEART RATE: 54 BPM | OXYGEN SATURATION: 94 % | DIASTOLIC BLOOD PRESSURE: 78 MMHG | TEMPERATURE: 98.6 F | SYSTOLIC BLOOD PRESSURE: 130 MMHG | RESPIRATION RATE: 20 BRPM

## 2022-01-31 ENCOUNTER — HOME CARE VISIT (OUTPATIENT)
Dept: HOME HEALTH SERVICES | Facility: CLINIC | Age: 62
End: 2022-01-31

## 2022-01-31 VITALS
SYSTOLIC BLOOD PRESSURE: 142 MMHG | DIASTOLIC BLOOD PRESSURE: 85 MMHG | HEART RATE: 63 BPM | OXYGEN SATURATION: 94 % | RESPIRATION RATE: 18 BRPM | TEMPERATURE: 98.1 F

## 2022-01-31 PROCEDURE — G0299 HHS/HOSPICE OF RN EA 15 MIN: HCPCS

## 2022-02-02 ENCOUNTER — HOME CARE VISIT (OUTPATIENT)
Dept: HOME HEALTH SERVICES | Facility: HOME HEALTHCARE | Age: 62
End: 2022-02-02

## 2022-02-02 ENCOUNTER — HOME CARE VISIT (OUTPATIENT)
Dept: HOME HEALTH SERVICES | Facility: CLINIC | Age: 62
End: 2022-02-02

## 2022-02-02 PROCEDURE — G0299 HHS/HOSPICE OF RN EA 15 MIN: HCPCS

## 2022-02-03 VITALS
SYSTOLIC BLOOD PRESSURE: 120 MMHG | OXYGEN SATURATION: 95 % | HEART RATE: 60 BPM | DIASTOLIC BLOOD PRESSURE: 80 MMHG | RESPIRATION RATE: 18 BRPM | TEMPERATURE: 97.4 F

## 2022-02-03 NOTE — HOME HEALTH
PLAN FOR NEXT VISIT: ASSESS LLE FOR DRYING, WASH WITH PEROXIDE, RINSE THOROUGHLY, APPLY VASELINE, WRAP LEG WITH TUBULAR WRAP.  FOCUS OF CARE/SKILL NEEDED:SN SERVICES NEEDED FOR LLE  TEACHING/INTERVENTIONS:SN EDUCATED PATIENT ON KEEPING LLE DRY, REPORT ANY S&S OF INFECTION SN.MD.PATIENT V/U UTILIZING TEACHBACK METHOD  HOME SAFETY ISSUES:SMALL DOG IN HOME, MORBID OBESITY  D/C PLAN:WHEN PATIENT HAS MET ALL GOALS  PHYSICIAN CONTACT:NO  INSURANCE CHANGES:NO  MEDICATION KNOWLEDGE:  Have there been any changes to patient medications?NO  Is pt aware of purpose of each medication?YES  Is pt aware of side effects of each medication?YES  Has the pt had any adverse side effects to medications?NO

## 2022-02-04 ENCOUNTER — HOME CARE VISIT (OUTPATIENT)
Dept: HOME HEALTH SERVICES | Facility: HOME HEALTHCARE | Age: 62
End: 2022-02-04

## 2022-02-04 ENCOUNTER — HOME CARE VISIT (OUTPATIENT)
Dept: HOME HEALTH SERVICES | Facility: CLINIC | Age: 62
End: 2022-02-04

## 2022-02-07 ENCOUNTER — HOME CARE VISIT (OUTPATIENT)
Dept: HOME HEALTH SERVICES | Facility: CLINIC | Age: 62
End: 2022-02-07

## 2022-02-07 PROCEDURE — G0299 HHS/HOSPICE OF RN EA 15 MIN: HCPCS

## 2022-02-08 VITALS
RESPIRATION RATE: 18 BRPM | TEMPERATURE: 98.2 F | SYSTOLIC BLOOD PRESSURE: 120 MMHG | HEART RATE: 58 BPM | OXYGEN SATURATION: 95 % | DIASTOLIC BLOOD PRESSURE: 82 MMHG

## 2022-02-08 NOTE — HOME HEALTH
PLAN FOR NEXT VISIT: ASSESS LLE FOR SKIN HEALING.  FOCUS OF CARE/SKILL NEEDED:SN SERVICES NEEDED FOR ASSESSMENT OF LOWER EXTREMITIES AND SKIN HEALING TO LLE  TEACHING/INTERVENTIONS:SN EDUCATED PATIENT TO CONTINUE TO CHECK DAILY BG READINGS AND TO REPORT ANY ABNORMAL FINDINGS TO SN/MD.AS WELL AS ANY NEW SKIN PROBLEMS TO LOWER EXTREMITIES.[facility] DOES HAVE ABRASION TO RLE.WILL FOLLOW.PATIENT V/U UTILIZING TEACHBACK METHOD  HOME SAFETY ISSUES:SMALL DOG IN HOME  D/C PLAN:WHEN PATIENT HAS MET ALL GOALS  PHYSICIAN CONTACT:NO  INSURANCE CHANGES:NO  MEDICATION KNOWLEDGE:  Have there been any changes to patient medications?NO  Is pt aware of purpose of each medication?YES  Is pt aware of side effects of each medication?YES  Has the pt had any adverse side effects to medications?NO

## 2022-02-09 ENCOUNTER — HOME CARE VISIT (OUTPATIENT)
Dept: HOME HEALTH SERVICES | Facility: CLINIC | Age: 62
End: 2022-02-09

## 2022-02-09 PROCEDURE — G0299 HHS/HOSPICE OF RN EA 15 MIN: HCPCS

## 2022-02-10 VITALS
TEMPERATURE: 98.2 F | DIASTOLIC BLOOD PRESSURE: 88 MMHG | SYSTOLIC BLOOD PRESSURE: 140 MMHG | HEART RATE: 59 BPM | OXYGEN SATURATION: 95 % | RESPIRATION RATE: 18 BRPM
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
## 2022-02-11 ENCOUNTER — HOME CARE VISIT (OUTPATIENT)
Dept: HOME HEALTH SERVICES | Facility: CLINIC | Age: 62
End: 2022-02-11

## 2022-02-11 VITALS
OXYGEN SATURATION: 95 % | RESPIRATION RATE: 16 BRPM | DIASTOLIC BLOOD PRESSURE: 76 MMHG | TEMPERATURE: 97.5 F | SYSTOLIC BLOOD PRESSURE: 130 MMHG | HEART RATE: 52 BPM

## 2022-02-11 PROCEDURE — G0299 HHS/HOSPICE OF RN EA 15 MIN: HCPCS

## 2022-02-12 NOTE — HOME HEALTH
No recent falls, no insurance changes, and no recent medication changes. There was no need to contact the MD. Pt/cg had no further questions or concerns regarding the pt's medications or plan of care. A&O X 4. No c/o pain. VSS. Wound care performed. No S&S of infection

## 2022-02-14 ENCOUNTER — HOME CARE VISIT (OUTPATIENT)
Dept: HOME HEALTH SERVICES | Facility: CLINIC | Age: 62
End: 2022-02-14

## 2022-02-14 VITALS
HEART RATE: 62 BPM | RESPIRATION RATE: 16 BRPM | DIASTOLIC BLOOD PRESSURE: 70 MMHG | TEMPERATURE: 98.1 F | OXYGEN SATURATION: 94 % | SYSTOLIC BLOOD PRESSURE: 146 MMHG

## 2022-02-14 PROCEDURE — G0299 HHS/HOSPICE OF RN EA 15 MIN: HCPCS

## 2022-02-15 NOTE — HOME HEALTH
No recent falls, no insurance changes, and no recent medication changes. There was no need to contact the MD. Pt/cg had no further questions or concerns regarding the pt's medications or plan of care. A&O X 4. No c/o pain. VSS. Wound care performed. No S&S of infection.

## 2022-02-16 ENCOUNTER — HOME CARE VISIT (OUTPATIENT)
Dept: HOME HEALTH SERVICES | Facility: CLINIC | Age: 62
End: 2022-02-16

## 2022-02-16 VITALS
DIASTOLIC BLOOD PRESSURE: 72 MMHG | HEART RATE: 55 BPM | RESPIRATION RATE: 18 BRPM | OXYGEN SATURATION: 93 % | TEMPERATURE: 98.4 F | SYSTOLIC BLOOD PRESSURE: 128 MMHG

## 2022-02-16 PROCEDURE — G0300 HHS/HOSPICE OF LPN EA 15 MIN: HCPCS

## 2022-02-16 NOTE — HOME HEALTH
FOCUS OF CARE/SKILLED NEED: Alteration to pulominary status r/t obesity    TEACHING/INTERVENTIONS: well balanced diet low carbs, high protein    PROGRESS TOWARD GOALS:    PHYSICIAN CONTACT: none    FALLS SINCE LAST VISIT?   none    MEDICATION CHANGES SINCE LAST VISIT? none    PLAN FOR NEXT VISIT: ongoing teaching on alteration to pulmonary status r/t obesity

## 2022-02-18 ENCOUNTER — HOME CARE VISIT (OUTPATIENT)
Dept: HOME HEALTH SERVICES | Facility: CLINIC | Age: 62
End: 2022-02-18

## 2022-02-18 PROCEDURE — G0299 HHS/HOSPICE OF RN EA 15 MIN: HCPCS

## 2022-02-20 VITALS
OXYGEN SATURATION: 89 % | HEART RATE: 58 BPM | RESPIRATION RATE: 16 BRPM | DIASTOLIC BLOOD PRESSURE: 84 MMHG | TEMPERATURE: 97.1 F | SYSTOLIC BLOOD PRESSURE: 140 MMHG

## 2022-02-21 ENCOUNTER — HOME CARE VISIT (OUTPATIENT)
Dept: HOME HEALTH SERVICES | Facility: CLINIC | Age: 62
End: 2022-02-21

## 2022-02-21 VITALS
SYSTOLIC BLOOD PRESSURE: 152 MMHG | HEART RATE: 58 BPM | TEMPERATURE: 97.1 F | OXYGEN SATURATION: 90 % | DIASTOLIC BLOOD PRESSURE: 74 MMHG | RESPIRATION RATE: 18 BRPM

## 2022-02-21 PROCEDURE — G0299 HHS/HOSPICE OF RN EA 15 MIN: HCPCS

## 2022-02-22 NOTE — HOME HEALTH
Wound care completed this visit. Pt tolerated well.  A&O X 4. No c/o pain. VSS. No medication changes, falls, or insurance changes. No need to contact the MD.   Plan for next visit: Wound care,

## 2022-02-23 ENCOUNTER — HOME CARE VISIT (OUTPATIENT)
Dept: HOME HEALTH SERVICES | Facility: CLINIC | Age: 62
End: 2022-02-23

## 2022-02-23 PROCEDURE — G0300 HHS/HOSPICE OF LPN EA 15 MIN: HCPCS

## 2022-02-25 ENCOUNTER — HOME CARE VISIT (OUTPATIENT)
Dept: HOME HEALTH SERVICES | Facility: CLINIC | Age: 62
End: 2022-02-25

## 2022-02-25 PROCEDURE — G0300 HHS/HOSPICE OF LPN EA 15 MIN: HCPCS

## 2022-02-25 NOTE — HOME HEALTH
FOCUS OF CARE/SKILLED NEED: wound care, s/s of infection,     TEACHING/INTERVENTIONS: wound care, s/s of infection    PROGRESS TOWARD GOALS:    PHYSICIAN CONTACT: none    FALLS SINCE LAST VISIT?  none    MEDICATION CHANGES SINCE LAST VISIT?  none    PLAN FOR NEXT VISIT:  wound care, s/s of infection  Wound care consisted of removing old bandage, rinsing left lwr leg with warm water several times, patted dry. Per patients direction: applied a thin layer of vaseline, covered with kerlex and coban. Patient tolerated well.

## 2022-02-25 NOTE — HOME HEALTH
FOCUS OF CARE/SKILLED NEED: wound care, s/s of infection    TEACHING/INTERVENTIONS: wound care, s/s of infection    PROGRESS TOWARD GOALS: wound show no s/s of infection    PHYSICIAN CONTACT: none    FALLS SINCE LAST VISIT? none    MEDICATION CHANGES SINCE LAST VISIT?  none    PLAN FOR NEXT VISIT:  wound care, s/s of infection

## 2022-02-28 ENCOUNTER — HOME CARE VISIT (OUTPATIENT)
Dept: HOME HEALTH SERVICES | Facility: HOME HEALTHCARE | Age: 62
End: 2022-02-28

## 2022-02-28 ENCOUNTER — HOME CARE VISIT (OUTPATIENT)
Dept: HOME HEALTH SERVICES | Facility: CLINIC | Age: 62
End: 2022-02-28

## 2022-02-28 VITALS
OXYGEN SATURATION: 94 % | RESPIRATION RATE: 20 BRPM | HEART RATE: 56 BPM | TEMPERATURE: 97.6 F | SYSTOLIC BLOOD PRESSURE: 132 MMHG | DIASTOLIC BLOOD PRESSURE: 70 MMHG

## 2022-02-28 VITALS
TEMPERATURE: 97.2 F | RESPIRATION RATE: 20 BRPM | OXYGEN SATURATION: 97 % | DIASTOLIC BLOOD PRESSURE: 72 MMHG | HEART RATE: 56 BPM | SYSTOLIC BLOOD PRESSURE: 124 MMHG

## 2022-02-28 PROCEDURE — G0300 HHS/HOSPICE OF LPN EA 15 MIN: HCPCS

## 2022-02-28 NOTE — CASE COMMUNICATION
Patient missed a nursing visit from [facility] on 1/5/22    Reason: patient request      For your records only.   As per home health protocol, MD must be notified of missed/cancelled visits; therefore the prescribed frequency was not met.

## 2022-02-28 NOTE — HOME HEALTH
FOCUS OF CARE/SKILLED NEED: wound care, s/s infection    TEACHING/INTERVENTIONS: wound care, s/s infection    PROGRESS TOWARD GOALS: left lateral side of lower leg is beet red and extremely tender to pressure/touch.     PHYSICIAN CONTACT: none    MEDICATION CHANGES SINCE LAST VISIT?  none    PLAN FOR NEXT VISIT:  wound care, s/s infection

## 2022-03-02 ENCOUNTER — HOME CARE VISIT (OUTPATIENT)
Dept: HOME HEALTH SERVICES | Facility: CLINIC | Age: 62
End: 2022-03-02

## 2022-03-02 VITALS
TEMPERATURE: 97.8 F | HEART RATE: 60 BPM | DIASTOLIC BLOOD PRESSURE: 74 MMHG | OXYGEN SATURATION: 97 % | RESPIRATION RATE: 18 BRPM | SYSTOLIC BLOOD PRESSURE: 122 MMHG

## 2022-03-02 PROCEDURE — G0299 HHS/HOSPICE OF RN EA 15 MIN: HCPCS

## 2022-03-03 NOTE — HOME HEALTH
FOCUSED CARE/SKILL NEED: RECERT FOR CONTINUED WOUND CARE TO LLE. PT GOALS MET FALL RISK, MEDICATIONS, PREVENTION OF PRESSURE INJURIES, DIABETIC FOOT CARE. NO FALLS REPORTED.    PLAN FOR NEXT VISIT: WOUND CARE TO LLE.

## 2022-03-04 ENCOUNTER — HOME CARE VISIT (OUTPATIENT)
Dept: HOME HEALTH SERVICES | Facility: CLINIC | Age: 62
End: 2022-03-04

## 2022-03-04 PROCEDURE — G0300 HHS/HOSPICE OF LPN EA 15 MIN: HCPCS

## 2022-03-07 ENCOUNTER — HOME CARE VISIT (OUTPATIENT)
Dept: HOME HEALTH SERVICES | Facility: CLINIC | Age: 62
End: 2022-03-07

## 2022-03-07 VITALS
SYSTOLIC BLOOD PRESSURE: 122 MMHG | HEART RATE: 59 BPM | DIASTOLIC BLOOD PRESSURE: 68 MMHG | TEMPERATURE: 97.6 F | RESPIRATION RATE: 20 BRPM | OXYGEN SATURATION: 93 %

## 2022-03-07 VITALS
TEMPERATURE: 97.6 F | RESPIRATION RATE: 18 BRPM | SYSTOLIC BLOOD PRESSURE: 122 MMHG | DIASTOLIC BLOOD PRESSURE: 64 MMHG | HEART RATE: 58 BPM | OXYGEN SATURATION: 94 %

## 2022-03-07 PROCEDURE — G0300 HHS/HOSPICE OF LPN EA 15 MIN: HCPCS

## 2022-03-07 NOTE — HOME HEALTH
FOCUS OF CARE/SKILLED NEED: wound care, s/s of infection    TEACHING/INTERVENTIONS: wound care, s/s of infection    PROGRESS TOWARD GOALS: none    PHYSICIAN CONTACT: yes, new wound care orders    FALLS SINCE LAST VISIT?  none    MEDICATION CHANGES SINCE LAST VISIT?  none    PLAN FOR NEXT VISIT:  wound care, s/s of infection

## 2022-03-07 NOTE — HOME HEALTH
FOCUS OF CARE/SKILLED NEED: wound care, s/s of infection    TEACHING/INTERVENTIONS: wound care, s/s of infection    PROGRESS TOWARD GOALS: wound is healing without s/s of infection    PHYSICIAN CONTACT: none    FALLS SINCE LAST VISIT?  none    MEDICATION CHANGES SINCE LAST VISIT?  none    PLAN FOR NEXT VISIT:  wound care, s/s of infection    Gently washed/patted patient's left lower leg with washcloth filled with Cetaphil suds, rinsed with clean warm water, patted dry. Applied vaseline with multiple 4x4 gauze, applied optilock and abd pad. Secured with Tubigrip from knee to toes of foot. Patient tolerated well.

## 2022-03-09 ENCOUNTER — HOME CARE VISIT (OUTPATIENT)
Dept: HOME HEALTH SERVICES | Facility: CLINIC | Age: 62
End: 2022-03-09

## 2022-03-09 VITALS
SYSTOLIC BLOOD PRESSURE: 116 MMHG | TEMPERATURE: 97.8 F | OXYGEN SATURATION: 94 % | HEART RATE: 54 BPM | DIASTOLIC BLOOD PRESSURE: 62 MMHG | RESPIRATION RATE: 20 BRPM

## 2022-03-09 PROCEDURE — G0300 HHS/HOSPICE OF LPN EA 15 MIN: HCPCS

## 2022-03-11 ENCOUNTER — HOME CARE VISIT (OUTPATIENT)
Dept: HOME HEALTH SERVICES | Facility: HOME HEALTHCARE | Age: 62
End: 2022-03-11

## 2022-03-11 PROCEDURE — G0300 HHS/HOSPICE OF LPN EA 15 MIN: HCPCS

## 2022-03-13 VITALS
SYSTOLIC BLOOD PRESSURE: 122 MMHG | OXYGEN SATURATION: 95 % | DIASTOLIC BLOOD PRESSURE: 74 MMHG | RESPIRATION RATE: 18 BRPM | HEART RATE: 60 BPM | TEMPERATURE: 97 F

## 2022-03-13 NOTE — HOME HEALTH
FOCUS OF CARE/SKILLED NEED: wound care, s/s of infection, hypo/hyperglycemia, diabetic footcare    TEACHING/INTERVENTIONS: wound care, s/s of infection, hypo/hyperglycemia, diabetic footcare    PROGRESS TOWARD GOALS: wounds are healing without s/s of infection    PHYSICIAN CONTACT: none    FALLS SINCE LAST VISIT?  none    MEDICATION CHANGES SINCE LAST VISIT?  none    PLAN FOR NEXT VISIT:    wound , s/s of infection    Patient does not go to wound care.  Patient is to schedule a visit with PCP before the end of April.

## 2022-03-14 ENCOUNTER — HOME CARE VISIT (OUTPATIENT)
Dept: HOME HEALTH SERVICES | Facility: HOME HEALTHCARE | Age: 62
End: 2022-03-14

## 2022-03-14 PROCEDURE — G0300 HHS/HOSPICE OF LPN EA 15 MIN: HCPCS

## 2022-03-16 ENCOUNTER — HOME CARE VISIT (OUTPATIENT)
Dept: HOME HEALTH SERVICES | Facility: CLINIC | Age: 62
End: 2022-03-16

## 2022-03-16 VITALS
DIASTOLIC BLOOD PRESSURE: 66 MMHG | RESPIRATION RATE: 18 BRPM | OXYGEN SATURATION: 93 % | TEMPERATURE: 98.2 F | SYSTOLIC BLOOD PRESSURE: 122 MMHG | HEART RATE: 78 BPM

## 2022-03-16 PROCEDURE — G0300 HHS/HOSPICE OF LPN EA 15 MIN: HCPCS

## 2022-03-16 NOTE — HOME HEALTH
FOCUS OF CARE/SKILLED NEED: wound care, s/s of infection, assess murphy lwr extr/feet, DM foot care    TEACHING/INTERVENTIONS: wound care, s/s of infection, DM foot care    PROGRESS TOWARD GOALS: wound is healing wihtout s/s of infeciton, no new lesions noted    PHYSICIAN CONTACT: none    FALLS SINCE LAST VISIT?  none    MEDICATION CHANGES SINCE LAST VISIT?  none    PLAN FOR NEXT VISIT: assess murphy lwr extr/feet, wound care, s/s of infection

## 2022-03-18 ENCOUNTER — HOME CARE VISIT (OUTPATIENT)
Dept: HOME HEALTH SERVICES | Facility: HOME HEALTHCARE | Age: 62
End: 2022-03-18

## 2022-03-18 PROCEDURE — G0300 HHS/HOSPICE OF LPN EA 15 MIN: HCPCS

## 2022-03-20 VITALS
TEMPERATURE: 98.1 F | OXYGEN SATURATION: 94 % | DIASTOLIC BLOOD PRESSURE: 66 MMHG | RESPIRATION RATE: 20 BRPM | HEART RATE: 74 BPM | SYSTOLIC BLOOD PRESSURE: 120 MMHG

## 2022-03-20 NOTE — HOME HEALTH
FOCUS OF CARE/SKILLED NEED: wound care, s/s of infection, DM daily feet care    TEACHING/INTERVENTIONS: wound care, s/s of infection, DM daily deet care    PROGRESS TOWARD GOALS: wounds are healing without s/s of infection    PHYSICIAN CONTACT: yes    FALLS SINCE LAST VISIT?  none    MEDICATION CHANGES SINCE LAST VISIT?  none    PLAN FOR NEXT VISIT: wound care, s/s of infection, DM daily feet care

## 2022-03-21 ENCOUNTER — HOME CARE VISIT (OUTPATIENT)
Dept: HOME HEALTH SERVICES | Facility: HOME HEALTHCARE | Age: 62
End: 2022-03-21

## 2022-03-21 VITALS
SYSTOLIC BLOOD PRESSURE: 128 MMHG | TEMPERATURE: 97.4 F | DIASTOLIC BLOOD PRESSURE: 70 MMHG | OXYGEN SATURATION: 95 % | RESPIRATION RATE: 20 BRPM | HEART RATE: 57 BPM

## 2022-03-21 PROCEDURE — G0300 HHS/HOSPICE OF LPN EA 15 MIN: HCPCS

## 2022-03-23 ENCOUNTER — HOME CARE VISIT (OUTPATIENT)
Dept: HOME HEALTH SERVICES | Facility: HOME HEALTHCARE | Age: 62
End: 2022-03-23

## 2022-03-25 ENCOUNTER — HOME CARE VISIT (OUTPATIENT)
Dept: HOME HEALTH SERVICES | Facility: HOME HEALTHCARE | Age: 62
End: 2022-03-25

## 2022-03-25 ENCOUNTER — LAB REQUISITION (OUTPATIENT)
Dept: LAB | Facility: HOSPITAL | Age: 62
End: 2022-03-25

## 2022-03-25 DIAGNOSIS — Z00.00 ENCOUNTER FOR GENERAL ADULT MEDICAL EXAMINATION WITHOUT ABNORMAL FINDINGS: ICD-10-CM

## 2022-03-25 LAB
ALBUMIN SERPL-MCNC: 3.3 G/DL (ref 3.5–5.2)
ALBUMIN/GLOB SERPL: 0.8 G/DL
ALP SERPL-CCNC: 93 U/L (ref 39–117)
ALT SERPL W P-5'-P-CCNC: 18 U/L (ref 1–41)
ANION GAP SERPL CALCULATED.3IONS-SCNC: 9 MMOL/L (ref 5–15)
AST SERPL-CCNC: 23 U/L (ref 1–40)
BASOPHILS # BLD AUTO: 0.06 10*3/MM3 (ref 0–0.2)
BASOPHILS NFR BLD AUTO: 0.8 % (ref 0–1.5)
BILIRUB SERPL-MCNC: 0.4 MG/DL (ref 0–1.2)
BUN SERPL-MCNC: 21 MG/DL (ref 8–23)
BUN/CREAT SERPL: 18.8 (ref 7–25)
CALCIUM SPEC-SCNC: 9.2 MG/DL (ref 8.6–10.5)
CHLORIDE SERPL-SCNC: 105 MMOL/L (ref 98–107)
CHOLEST SERPL-MCNC: 123 MG/DL (ref 0–200)
CO2 SERPL-SCNC: 27 MMOL/L (ref 22–29)
CREAT SERPL-MCNC: 1.12 MG/DL (ref 0.76–1.27)
DEPRECATED RDW RBC AUTO: 52.2 FL (ref 37–54)
EGFRCR SERPLBLD CKD-EPI 2021: 73.8 ML/MIN/1.73
EOSINOPHIL # BLD AUTO: 0.46 10*3/MM3 (ref 0–0.4)
EOSINOPHIL NFR BLD AUTO: 5.8 % (ref 0.3–6.2)
ERYTHROCYTE [DISTWIDTH] IN BLOOD BY AUTOMATED COUNT: 15.6 % (ref 12.3–15.4)
GLOBULIN UR ELPH-MCNC: 4 GM/DL
GLUCOSE SERPL-MCNC: 146 MG/DL (ref 65–99)
HBA1C MFR BLD: 7.5 % (ref 4.8–5.6)
HCT VFR BLD AUTO: 46 % (ref 37.5–51)
HDLC SERPL-MCNC: 40 MG/DL (ref 40–60)
HGB BLD-MCNC: 13.6 G/DL (ref 13–17.7)
IMM GRANULOCYTES # BLD AUTO: 0.04 10*3/MM3 (ref 0–0.05)
IMM GRANULOCYTES NFR BLD AUTO: 0.5 % (ref 0–0.5)
LDLC SERPL CALC-MCNC: 69 MG/DL (ref 0–100)
LDLC/HDLC SERPL: 1.74 {RATIO}
LYMPHOCYTES # BLD AUTO: 1.27 10*3/MM3 (ref 0.7–3.1)
LYMPHOCYTES NFR BLD AUTO: 16 % (ref 19.6–45.3)
MCH RBC QN AUTO: 27.2 PG (ref 26.6–33)
MCHC RBC AUTO-ENTMCNC: 29.6 G/DL (ref 31.5–35.7)
MCV RBC AUTO: 92 FL (ref 79–97)
MONOCYTES # BLD AUTO: 0.55 10*3/MM3 (ref 0.1–0.9)
MONOCYTES NFR BLD AUTO: 6.9 % (ref 5–12)
NEUTROPHILS NFR BLD AUTO: 5.54 10*3/MM3 (ref 1.7–7)
NEUTROPHILS NFR BLD AUTO: 70 % (ref 42.7–76)
NRBC BLD AUTO-RTO: 0 /100 WBC (ref 0–0.2)
NT-PROBNP SERPL-MCNC: 257.5 PG/ML (ref 0–900)
PLATELET # BLD AUTO: 260 10*3/MM3 (ref 140–450)
PMV BLD AUTO: 11.7 FL (ref 6–12)
POTASSIUM SERPL-SCNC: 4.9 MMOL/L (ref 3.5–5.2)
PROT SERPL-MCNC: 7.3 G/DL (ref 6–8.5)
RBC # BLD AUTO: 5 10*6/MM3 (ref 4.14–5.8)
SODIUM SERPL-SCNC: 141 MMOL/L (ref 136–145)
TRIGL SERPL-MCNC: 67 MG/DL (ref 0–150)
TSH SERPL DL<=0.05 MIU/L-ACNC: 3.05 UIU/ML (ref 0.27–4.2)
VLDLC SERPL-MCNC: 14 MG/DL (ref 5–40)
WBC NRBC COR # BLD: 7.92 10*3/MM3 (ref 3.4–10.8)

## 2022-03-25 PROCEDURE — 80053 COMPREHEN METABOLIC PANEL: CPT | Performed by: NURSE PRACTITIONER

## 2022-03-25 PROCEDURE — G0300 HHS/HOSPICE OF LPN EA 15 MIN: HCPCS

## 2022-03-25 PROCEDURE — 83880 ASSAY OF NATRIURETIC PEPTIDE: CPT | Performed by: NURSE PRACTITIONER

## 2022-03-25 PROCEDURE — 80061 LIPID PANEL: CPT | Performed by: NURSE PRACTITIONER

## 2022-03-25 PROCEDURE — 82043 UR ALBUMIN QUANTITATIVE: CPT | Performed by: NURSE PRACTITIONER

## 2022-03-25 PROCEDURE — 84443 ASSAY THYROID STIM HORMONE: CPT | Performed by: NURSE PRACTITIONER

## 2022-03-25 PROCEDURE — 85025 COMPLETE CBC W/AUTO DIFF WBC: CPT | Performed by: NURSE PRACTITIONER

## 2022-03-25 PROCEDURE — 83036 HEMOGLOBIN GLYCOSYLATED A1C: CPT | Performed by: NURSE PRACTITIONER

## 2022-03-26 LAB — ALBUMIN UR-MCNC: <1.2 MG/DL

## 2022-03-27 VITALS
HEART RATE: 86 BPM | TEMPERATURE: 98.2 F | SYSTOLIC BLOOD PRESSURE: 118 MMHG | OXYGEN SATURATION: 95 % | RESPIRATION RATE: 18 BRPM | DIASTOLIC BLOOD PRESSURE: 62 MMHG

## 2022-03-28 ENCOUNTER — HOME CARE VISIT (OUTPATIENT)
Dept: HOME HEALTH SERVICES | Facility: CLINIC | Age: 62
End: 2022-03-28

## 2022-03-28 VITALS
TEMPERATURE: 98.4 F | SYSTOLIC BLOOD PRESSURE: 118 MMHG | RESPIRATION RATE: 20 BRPM | OXYGEN SATURATION: 93 % | HEART RATE: 57 BPM | DIASTOLIC BLOOD PRESSURE: 58 MMHG

## 2022-03-28 PROCEDURE — G0300 HHS/HOSPICE OF LPN EA 15 MIN: HCPCS

## 2022-03-29 NOTE — HOME HEALTH
FOCUS OF CARE/SKILLED NEED: wound care, s/s of infection    TEACHING/INTERVENTIONS: wound care, s/s of infection    PROGRESS TOWARD GOALS: wound is without s/s of infection    PHYSICIAN CONTACT: none    FALLS SINCE LAST VISIT?  none    MEDICATION CHANGES SINCE LAST VISIT?  none    PLAN FOR NEXT VISIT:  wound care, s/s of infection

## 2022-03-30 ENCOUNTER — HOME CARE VISIT (OUTPATIENT)
Dept: HOME HEALTH SERVICES | Facility: CLINIC | Age: 62
End: 2022-03-30

## 2022-04-01 ENCOUNTER — HOME CARE VISIT (OUTPATIENT)
Dept: HOME HEALTH SERVICES | Facility: CLINIC | Age: 62
End: 2022-04-01

## 2022-04-01 PROCEDURE — G0300 HHS/HOSPICE OF LPN EA 15 MIN: HCPCS

## 2022-04-04 ENCOUNTER — HOME CARE VISIT (OUTPATIENT)
Dept: HOME HEALTH SERVICES | Facility: CLINIC | Age: 62
End: 2022-04-04

## 2022-04-04 VITALS
HEART RATE: 54 BPM | TEMPERATURE: 97.8 F | SYSTOLIC BLOOD PRESSURE: 138 MMHG | OXYGEN SATURATION: 93 % | RESPIRATION RATE: 20 BRPM | DIASTOLIC BLOOD PRESSURE: 80 MMHG

## 2022-04-04 PROCEDURE — G0299 HHS/HOSPICE OF RN EA 15 MIN: HCPCS

## 2022-04-06 ENCOUNTER — HOME CARE VISIT (OUTPATIENT)
Dept: HOME HEALTH SERVICES | Facility: HOME HEALTHCARE | Age: 62
End: 2022-04-06

## 2022-04-06 VITALS
DIASTOLIC BLOOD PRESSURE: 60 MMHG | OXYGEN SATURATION: 94 % | TEMPERATURE: 98.2 F | RESPIRATION RATE: 18 BRPM | SYSTOLIC BLOOD PRESSURE: 118 MMHG | HEART RATE: 58 BPM

## 2022-04-06 VITALS
HEART RATE: 56 BPM | TEMPERATURE: 97.5 F | OXYGEN SATURATION: 94 % | RESPIRATION RATE: 18 BRPM | SYSTOLIC BLOOD PRESSURE: 135 MMHG | DIASTOLIC BLOOD PRESSURE: 78 MMHG

## 2022-04-06 PROCEDURE — G0300 HHS/HOSPICE OF LPN EA 15 MIN: HCPCS

## 2022-04-06 NOTE — HOME HEALTH
FOCUS OF CARE/SKILLED NEED: wound care, s/s of infection, assessment of murphy lwr extr/feet    TEACHING/INTERVENTIONS: wound care, s/s of infection, assessment of murphy lwr extr/feet    PROGRESS TOWARD GOALS: wound is healing without s/s of infection    PHYSICIAN CONTACT: none    FALLS SINCE LAST VISIT? none    MEDICATION CHANGES SINCE LAST VISIT?  none    PLAN FOR NEXT VISIT: wound care, s/s of infection, assessment of murphy lwr extr/feet

## 2022-04-08 ENCOUNTER — HOME CARE VISIT (OUTPATIENT)
Dept: HOME HEALTH SERVICES | Facility: CLINIC | Age: 62
End: 2022-04-08

## 2022-04-08 PROCEDURE — G0299 HHS/HOSPICE OF RN EA 15 MIN: HCPCS

## 2022-04-10 VITALS
SYSTOLIC BLOOD PRESSURE: 132 MMHG | OXYGEN SATURATION: 92 % | HEART RATE: 51 BPM | DIASTOLIC BLOOD PRESSURE: 82 MMHG | RESPIRATION RATE: 18 BRPM | TEMPERATURE: 97.3 F

## 2022-04-11 ENCOUNTER — HOME CARE VISIT (OUTPATIENT)
Dept: HOME HEALTH SERVICES | Facility: CLINIC | Age: 62
End: 2022-04-11

## 2022-04-11 PROCEDURE — G0300 HHS/HOSPICE OF LPN EA 15 MIN: HCPCS

## 2022-04-13 ENCOUNTER — HOME CARE VISIT (OUTPATIENT)
Dept: HOME HEALTH SERVICES | Facility: HOME HEALTHCARE | Age: 62
End: 2022-04-13

## 2022-04-13 PROCEDURE — G0300 HHS/HOSPICE OF LPN EA 15 MIN: HCPCS

## 2022-04-15 ENCOUNTER — HOME CARE VISIT (OUTPATIENT)
Dept: HOME HEALTH SERVICES | Facility: HOME HEALTHCARE | Age: 62
End: 2022-04-15

## 2022-04-15 PROCEDURE — G0300 HHS/HOSPICE OF LPN EA 15 MIN: HCPCS

## 2022-04-18 ENCOUNTER — HOME CARE VISIT (OUTPATIENT)
Dept: HOME HEALTH SERVICES | Facility: HOME HEALTHCARE | Age: 62
End: 2022-04-18

## 2022-04-18 VITALS
OXYGEN SATURATION: 92 % | HEART RATE: 54 BPM | SYSTOLIC BLOOD PRESSURE: 118 MMHG | DIASTOLIC BLOOD PRESSURE: 56 MMHG | OXYGEN SATURATION: 94 % | DIASTOLIC BLOOD PRESSURE: 54 MMHG | SYSTOLIC BLOOD PRESSURE: 112 MMHG | TEMPERATURE: 97.7 F | OXYGEN SATURATION: 93 % | RESPIRATION RATE: 18 BRPM | SYSTOLIC BLOOD PRESSURE: 110 MMHG | RESPIRATION RATE: 18 BRPM | RESPIRATION RATE: 18 BRPM | HEART RATE: 59 BPM | TEMPERATURE: 97.7 F | DIASTOLIC BLOOD PRESSURE: 54 MMHG | HEART RATE: 54 BPM | TEMPERATURE: 97.9 F

## 2022-04-18 PROCEDURE — G0300 HHS/HOSPICE OF LPN EA 15 MIN: HCPCS

## 2022-04-18 NOTE — HOME HEALTH
FOCUS OF CARE/SKILLED NEED: Assess murphy lwr extr/feet, wound care, s/s of infection    TEACHING/INTERVENTIONS: wound care, s/s of infection    PROGRESS TOWARD GOALS: wound shows no s/s of infection    PHYSICIAN CONTACT: none    FALLS SINCE LAST VISIT? none    MEDICATION CHANGES SINCE LAST VISIT? Lisinopril 10 mg, stop Benicar and Bystolic    PLAN FOR NEXT VISIT: Assess murphy lwr extr/feet, wound care, s/s of infection, assess BP fo stability with meidcation change.

## 2022-04-18 NOTE — HOME HEALTH
FOCUS OF CARE/SKILLED NEED:  Assess murphy lwr extr/feet, wound care, s/s of infection, assess/monitor BP and pulse (began new BP med on Monday)    TEACHING/INTERVENTIONS: wound care, s/s of infection    PROGRESS TOWARD GOALS: wound shows no s/s of infection    PHYSICIAN CONTACT: none    FALLS SINCE LAST VISIT?  none    MEDICATION CHANGES SINCE LAST VISIT?  none    PLAN FOR NEXT VISIT: assess murphy lwr extr/feet, wound care, s/s of infection, continue to monitor BP and pulse r/t new BP med started on Monday

## 2022-04-19 NOTE — HOME HEALTH
FOCUS OF CARE/SKILLED NEED: Assess murphy lwr extr/feet, wound care, s/s of infection    TEACHING/INTERVENTIONS: wound care, s/s of infection    PROGRESS TOWARD GOALS: wound is healing without s/s of infection    PHYSICIAN CONTACT: none    FALLS SINCE LAST VISIT?  none    MEDICATION CHANGES SINCE LAST VISIT?  none    PLAN FOR NEXT VISIT: Assess murphy lwr ext, wound care, s/s of infection

## 2022-04-20 ENCOUNTER — HOME CARE VISIT (OUTPATIENT)
Dept: HOME HEALTH SERVICES | Facility: HOME HEALTHCARE | Age: 62
End: 2022-04-20

## 2022-04-23 ENCOUNTER — HOME CARE VISIT (OUTPATIENT)
Dept: HOME HEALTH SERVICES | Facility: HOME HEALTHCARE | Age: 62
End: 2022-04-23

## 2022-04-23 PROCEDURE — G0300 HHS/HOSPICE OF LPN EA 15 MIN: HCPCS

## 2022-04-25 ENCOUNTER — HOME CARE VISIT (OUTPATIENT)
Dept: HOME HEALTH SERVICES | Facility: CLINIC | Age: 62
End: 2022-04-25

## 2022-04-25 ENCOUNTER — LAB REQUISITION (OUTPATIENT)
Dept: LAB | Facility: HOSPITAL | Age: 62
End: 2022-04-25

## 2022-04-25 VITALS
OXYGEN SATURATION: 94 % | RESPIRATION RATE: 16 BRPM | DIASTOLIC BLOOD PRESSURE: 70 MMHG | TEMPERATURE: 98 F | HEART RATE: 63 BPM | SYSTOLIC BLOOD PRESSURE: 152 MMHG

## 2022-04-25 DIAGNOSIS — Z00.00 ENCOUNTER FOR GENERAL ADULT MEDICAL EXAMINATION WITHOUT ABNORMAL FINDINGS: ICD-10-CM

## 2022-04-25 PROCEDURE — 84153 ASSAY OF PSA TOTAL: CPT | Performed by: NURSE PRACTITIONER

## 2022-04-25 PROCEDURE — G0299 HHS/HOSPICE OF RN EA 15 MIN: HCPCS

## 2022-04-26 LAB — PSA SERPL-MCNC: 7.72 NG/ML (ref 0–4)

## 2022-04-26 NOTE — HOME HEALTH
FOCUS OF CARE/SKILLED NEED: Wound care    TEACHING/INTERVENTIONS: A&O X 4. Pt c/o pain in his left leg. VSS. Wound care performed to LLL. Pt tolerated well.    PROGRESS TOWARD GOALS: Ongoing, progressing    PHYSICIAN CONTACT: No need.    INSURANCE CHANGES? No    FALLS SINCE LAST VISIT? No    MEDICATION CHANGES SINCE LAST VISIT? No    PLAN FOR NEXT VISIT: WOund care

## 2022-04-27 ENCOUNTER — HOME CARE VISIT (OUTPATIENT)
Dept: HOME HEALTH SERVICES | Facility: CLINIC | Age: 62
End: 2022-04-27

## 2022-04-27 VITALS
OXYGEN SATURATION: 91 % | RESPIRATION RATE: 16 BRPM | TEMPERATURE: 98.8 F | DIASTOLIC BLOOD PRESSURE: 70 MMHG | SYSTOLIC BLOOD PRESSURE: 146 MMHG | HEART RATE: 75 BPM

## 2022-04-27 PROCEDURE — G0299 HHS/HOSPICE OF RN EA 15 MIN: HCPCS

## 2022-04-27 NOTE — HOME HEALTH
FOCUS OF CARE/SKILLED NEED: Wound care   TEACHING/INTERVENTIONS: A&O X 4. No c/o pain this visit. VSS. Wound care performed to LLL. Pt tolerated well.   PROGRESS TOWARD GOALS: Ongoing, progressing   PHYSICIAN CONTACT: No need.   INSURANCE CHANGES? No   FALLS SINCE LAST VISIT? No   MEDICATION CHANGES SINCE LAST VISIT? No   PLAN FOR NEXT VISIT: Wound care  PRE-SCREENED FOR COVID? Yes, No S/S of COVID. No recent exposure.

## 2022-04-29 ENCOUNTER — HOME CARE VISIT (OUTPATIENT)
Dept: HOME HEALTH SERVICES | Facility: CLINIC | Age: 62
End: 2022-04-29

## 2022-04-29 VITALS
TEMPERATURE: 98.8 F | DIASTOLIC BLOOD PRESSURE: 74 MMHG | RESPIRATION RATE: 16 BRPM | OXYGEN SATURATION: 94 % | HEART RATE: 72 BPM | SYSTOLIC BLOOD PRESSURE: 156 MMHG

## 2022-04-29 PROCEDURE — G0299 HHS/HOSPICE OF RN EA 15 MIN: HCPCS

## 2022-04-29 NOTE — HOME HEALTH
FOCUS OF CARE/SKILLED NEED: Wound care, Recert  TEACHING/INTERVENTIONS: Pt/cg agreeable to homecare recertification under the care of NOAH Manzanares. Medication reconciliation completed and no issues found. Pt had no further questions regarding medication and/or plan of care. A&O X 4. No c/o pain this visit. VSS. Wound care performed to LLL. Pt tolerated well.   PROGRESS TOWARD GOALS: Ongoing, progressing   PHYSICIAN CONTACT: No need.   INSURANCE CHANGES? No   FALLS SINCE LAST VISIT? No   MEDICATION CHANGES SINCE LAST VISIT? No   PLAN FOR NEXT VISIT: Wound care   PRE-SCREENED FOR COVID? Yes, No S/S of COVID. No recent exposure.

## 2022-04-29 NOTE — CASE COMMUNICATION
NEEDS TO BE     60 Day Summary    Home Health need continues for: wound care to left lower leg    Primary diagnoses/co-morbidities/recent procedures in past 60 days that impact current episode: venous insufficiency, diabetes    Current level of functional ability: up with walker at times, pt uses electric wheelchair most of the time    Homebound status and living arrangements: Fatigue, weakness, wound complications, limited endura nce, and fall risk    Goals accomplished and/or measurable progress toward unmet goals in past 60 days: Left leg contiues to look the same, pt refuses to go to wound care    Focus of care for next 60 days for each discipline ordered: Wound care to left lower leg    Skin integrity/wound status: Weeping edema in left lower leg, skin is macerated    Code status: CPR    Most recent fall risk: 5    Estimated date when home care services will  end: when left leg is healed

## 2022-04-29 NOTE — HOME HEALTH
60 Day Summary    Home Health need continues for: wound care to left lower leg    Primary diagnoses/co-morbidities/recent procedures in past 60 days that impact current episode: venous insufficiency, diabetes    Current level of functional ability: up with walker at times, pt uses electric wheelchair most of the time    Homebound status and living arrangements: Fatigue, weakness, wound complications, limited endurance, and fall risk    Goals accomplished and/or measurable progress toward unmet goals in past 60 days: Left leg contiues to look the same, pt refuses to go to wound care    Focus of care for next 60 days for each discipline ordered: Wound care to left lower leg    Skin integrity/wound status: Weeping edema in left lower leg, skin is macerated    Code status: CPR    Most recent fall risk: 5    Estimated date when home care services will end: when left leg is healed

## 2022-05-02 ENCOUNTER — HOME CARE VISIT (OUTPATIENT)
Dept: HOME HEALTH SERVICES | Facility: CLINIC | Age: 62
End: 2022-05-02

## 2022-05-02 VITALS
DIASTOLIC BLOOD PRESSURE: 80 MMHG | SYSTOLIC BLOOD PRESSURE: 116 MMHG | TEMPERATURE: 98.3 F | OXYGEN SATURATION: 95 % | RESPIRATION RATE: 16 BRPM

## 2022-05-02 PROCEDURE — G0299 HHS/HOSPICE OF RN EA 15 MIN: HCPCS

## 2022-05-02 NOTE — HOME HEALTH
"FOCUS OF CARE/SKILLED NEED: Wound care   TEACHING/INTERVENTIONS: A&O X 4. No c/o pain this visit. HR elevated in the 120s-130s this visit, all other VSS. No chest pain. Pt stated he felt \"drunk-like.\" Wound care performed to LLL. Pt tolerated well.   PROGRESS TOWARD GOALS: Ongoing, progressing   PHYSICIAN CONTACT: I contacted the office of NOAH Manzanares and notified them of the pt's elevated HR and him feeling \"drunk-like.\". It was suggested for the pt to continue monitoring and to go to the ER if he becomes symptomatic. The office stated they would follow-up with the pt in 2 days. I notified the office as well that the pt is going to try and have someone come change his leg dressing the days SN is not present due to the large amount of drainage the pt has. The office stated that it was okay, and that it was also okay to use abd pads as long as the pt tolerated it.   INSURANCE CHANGES? No   FALLS SINCE LAST VISIT? No   MEDICATION CHANGES SINCE LAST VISIT? No   PLAN FOR NEXT VISIT: Wound care   PRE-SCREENED FOR COVID? Yes, No S/S of COVID. No recent exposure."

## 2022-05-04 ENCOUNTER — HOME CARE VISIT (OUTPATIENT)
Dept: HOME HEALTH SERVICES | Facility: CLINIC | Age: 62
End: 2022-05-04

## 2022-05-04 VITALS
OXYGEN SATURATION: 92 % | DIASTOLIC BLOOD PRESSURE: 76 MMHG | TEMPERATURE: 98.1 F | RESPIRATION RATE: 16 BRPM | HEART RATE: 56 BPM | SYSTOLIC BLOOD PRESSURE: 118 MMHG

## 2022-05-04 PROCEDURE — G0299 HHS/HOSPICE OF RN EA 15 MIN: HCPCS

## 2022-05-05 ENCOUNTER — HOME CARE VISIT (OUTPATIENT)
Dept: HOME HEALTH SERVICES | Facility: HOME HEALTHCARE | Age: 62
End: 2022-05-05

## 2022-05-05 NOTE — HOME HEALTH
"Pt has refused multiple times to go to wound care. I spoke with the pt on 5/2 and he said he is going to try to have someone come change his dressing on the days SN doesn't come. However no one came on 5/3 when they said they would. Pt stated that someone should be coming on 5/5 to change, and that no one came on 5/3 due to communication issues. Pt stated he would like to see if changing the dressing daily helps his wound and if it doesn't help he said he would consider going to wound care. I emphasized to the pt that his wound isn't healing and we need to try different things. The pt stated he is allergic to silver and he cannot use unna boots (pt said unna boots are what \"messed up\" his legs in the first place). I was considering getting an order for a 2 layer wrap dressing, but the pt's leg weeps so much that it would need to be changed daily and if it was only changed 3X weekly the abd pad/optilock would macerate the skin more due to the large amount of drainage. SN can use optilock, but with the large amount of drainage, it hasn't been helping the pt's legs when the dressing is only changed 3X weekly. Optilock would probably be more beneficial if the pt's dressings are changed daily, but at this time we cannot count on another cg doing this on the days SN isn't present. Pt has lymphedema pumps per a conversation with the pt months ago, but they don't fit. The pt's left leg is macerated and is weeping in most places, it is very difficult to get an accurate measurement of this area. However, I did try to measure all of the affected areas on his leg. Length - 30 cm. Width - 35 cm (circumferential). Depth - 0 cm.    FOCUS OF CARE/SKILLED NEED: Wound care   TEACHING/INTERVENTIONS: A&O X 4. No c/o pain this visit. VSS. Wound care performed to LLL. Pt tolerated well.   PROGRESS TOWARD GOALS: Ongoing, progressing   PHYSICIAN CONTACT: No need.  INSURANCE CHANGES? No   FALLS SINCE LAST VISIT? No   MEDICATION CHANGES " SINCE LAST VISIT? No   PLAN FOR NEXT VISIT: Wound care   PRE-SCREENED FOR COVID? Yes, No S/S of COVID. No recent exposure.

## 2022-05-05 NOTE — CASE COMMUNICATION
Spoke with Mr. Landeros at 1300 today, regarding appointment tomorrow afternooon.  Mr. Landeros explained that a friend of his Eliza, her sister Adriane is an RN and is coming on the days that SN isn't coming.  I asked if she will know how to do the dressing change, and would be good if we could be present to assist.    Mr. Landeros stated specific step by step instructions on how the wound care will be done and he can explain it to Phyllis monroy.  Notified CM Lucila Masters RN.  Will see patient tomorrow after 1500.    Aminata Duong, APRN

## 2022-05-06 ENCOUNTER — HOME CARE VISIT (OUTPATIENT)
Dept: HOME HEALTH SERVICES | Facility: CLINIC | Age: 62
End: 2022-05-06

## 2022-05-06 PROCEDURE — G0299 HHS/HOSPICE OF RN EA 15 MIN: HCPCS

## 2022-05-09 ENCOUNTER — HOME CARE VISIT (OUTPATIENT)
Dept: HOME HEALTH SERVICES | Facility: CLINIC | Age: 62
End: 2022-05-09

## 2022-05-09 ENCOUNTER — HOME CARE VISIT (OUTPATIENT)
Dept: HOME HEALTH SERVICES | Facility: HOME HEALTHCARE | Age: 62
End: 2022-05-09

## 2022-05-09 PROCEDURE — G0299 HHS/HOSPICE OF RN EA 15 MIN: HCPCS

## 2022-05-09 NOTE — CASE COMMUNICATION
Last Friday, May 6th, 2022 at [facility] with patient, agreed to try Optifoam and compression wraps.  Telephone call to Rosa Tompkins's APRN office and spoke with her assistant, Anneliese, obtained order for Applying optifoam and compression wraps and will apply today.  Also informed of PRN visit tomorrow to evaluate the dressing, due to copious amounts of drainage from LLE.  Telephone call to Aminata Lewis, to inform of PRN visit for tomorrow - for aut horization from insurance company and she confirmed and I will complete the order for PRN visit and Aminata stated she will let Rosa know and will send for authorization for the visit.  Marycarmen added patient to my schedule for tomorrow.

## 2022-05-10 ENCOUNTER — HOME CARE VISIT (OUTPATIENT)
Dept: HOME HEALTH SERVICES | Facility: HOME HEALTHCARE | Age: 62
End: 2022-05-10

## 2022-05-10 ENCOUNTER — HOME CARE VISIT (OUTPATIENT)
Dept: HOME HEALTH SERVICES | Facility: CLINIC | Age: 62
End: 2022-05-10

## 2022-05-10 VITALS
OXYGEN SATURATION: 95 % | HEART RATE: 64 BPM | DIASTOLIC BLOOD PRESSURE: 80 MMHG | SYSTOLIC BLOOD PRESSURE: 126 MMHG | RESPIRATION RATE: 20 BRPM | SYSTOLIC BLOOD PRESSURE: 118 MMHG | OXYGEN SATURATION: 96 % | TEMPERATURE: 98.2 F | TEMPERATURE: 98.2 F | DIASTOLIC BLOOD PRESSURE: 64 MMHG | HEART RATE: 74 BPM | RESPIRATION RATE: 18 BRPM

## 2022-05-10 PROCEDURE — G0299 HHS/HOSPICE OF RN EA 15 MIN: HCPCS

## 2022-05-10 NOTE — HOME HEALTH
"Vito states dressing was changed on Saturday by his friend Adriane who is a nurse, however no dressing change completed on Sunday.  Dressing saturated with serous drainage and skin macerated all around entire LLE - patient complained of burning sensation at the back of the calf.  In addition to maceration of the entire LLE, the surface of the skin appears to have more surface open areas compared to Friday, with the patient stating his pain level is elevated as well since Friday 6/10 burning sensation.  Difficult to measure.  Appears to have five open areas, with the borders difficult to measure.  Multiple pictures taken during SNV.  Dressing change completed, after cleaning with soap and water, applied 6 Optiflex 4x4 foam dressing to all open areas.  Secured with two layer compression wrap starting at the pedal area of the foot up to just below the knee.  Prior to applying , capillary refill less than 3 seconds, after a few minutes due to 2 plus non pitting edema -  able to palpate the pedal and tibial pulses.  After dressing applied, assess capillary refill and popliteal pulse, WNL.    Patient does have Lymphedema Boots in the home and has had for two years, I found them on the floor in a corner behind a chair, and found one boot, patient is unsure of where the other one is.  Patient states he doesn't wear since no one ever showed me how to use them.  Towards the end of the visit, he then said that several of [facility] [place] operated by [facility], have showed him how to apply the boots, buts needs assistance to remove the boots, and will call his neighbor for assistance.  Patient has not been wearing them.    Discussed with patient that we have been coming out for sometime to see him for wound care, and healing is not progressing.  That we need further intervention, in addition to Home care, need to see a wound specialist or consider Lymphedema Clinic.  Patient became slightly irritated and raised his voice, \"I don't have " "a way to get there\" and proceeded to review again the sequence of events at the [facility] Clinic.  Also his primary care provider NOAH Manzanares, comes to see him once a month at his home. Patient states that has access to PAX transportation when i asked - and he went into how difficult it was to use and that is why Rosa comes to see him.  I explained there are also other contributing factors:  legs in a dependent position, not having a person he can rely on to come on the days that we are not here with the dressing changes that were being completed, (reason trying different dressings),  nutrition and managment of his Diabetes (patient states blood sugar was 178, same measurement at last SNV).      Impression:  Patient has multifactoral issues that will continue to contribute to his lack of progressing with wound healing - Diabetes, Nutrition, Morbid Obesity, LLE in dependent position, and not complying with suggestions from Home Health - Wound Care Center or Lymphedema Clinic.  On my way out of the home - Noted hospital bed in front room, no sheets on the bed with multiple items on the bed.  At the next visit I will discuss with patient how he is sleeping if in the chair or the bed.    TEACHING/INTERVENTIONS: See above    PROGRESS TOWARD GOALS: Ongoing/Not progressing    PHYSICIAN CONTACT: Telephone call to Primary Provider, NOAH Manzanares - spoke with Anneliese HINKLE and verbal order obtained for dressing change after cleaning with soap and water, pat dry, apply Optiflex Foam and compression wrap.  Obtained order to complete a PRN visit for Tuesday tomorrow to evaluate dressing and skin on LLE.  Telephone call to Aminata Lewis, and informed of above.  Aminata indicated to submit the order and she will send to insurance for approval.    Researched the patient's Chart in EPIC to determine if there is a SIERRA measurement available.  Unable to find.  Discussed with Agatha, Clinical Manager best approach since there is no " recent measurements.  Instead of a four layer compression wrap, will apply a 2 layer compression wrap.      INSURANCE CHANGES?  No Insurance Changes    FALLS SINCE LAST VISIT?  No falls since last visit    MEDICATION CHANGES SINCE LAST VISIT?  No Medication Changes    PLAN FOR NEXT VISIT:  Tuesday 5/10/22 to evaluate Dressing and status of LLE, Diabetes.  Contact with  Katy and update report to Agatha Clinical Manager  Will need to obain more optifoam from the office    COVID SCREENING:  Denies exposure or symptoms of COVID    Telephone call made to  Katy and left a message to discuss assessment of patient.  Left call back number.  Missed return call from Katy, returned call and left a message.

## 2022-05-11 VITALS
SYSTOLIC BLOOD PRESSURE: 110 MMHG | RESPIRATION RATE: 18 BRPM | DIASTOLIC BLOOD PRESSURE: 60 MMHG | TEMPERATURE: 98.2 F | OXYGEN SATURATION: 96 % | HEART RATE: 68 BPM

## 2022-05-11 NOTE — HOME HEALTH
Discussed with patient conversation with the Wound RN at the [facility] I had earlier today on the phone.  Need to have Ankle Brachial Index measured for evaluation of circulation in lower extremties, since has been two or more years ago, when he received his Lymphedema Boots. Patient states he will go and have the test done.    Discussed further regarding the Lymphedema Clinic, and patient is pondering and will continue to encourage.  Asked patient if he can ambulate, since when I see patient he is in his motorized wheel chair.  Patient states he can and is steady.  Has a large hospital bed in his bedroom, that he sleeps in nightly with bilateral lower extremities elevated.  Per patient the hospital bed in the front living area - was delivered last November 2021 after hospital discharge and he doesn't need it.  There is a sticker on the hospital bed, with a phone number - instructed patient to call and request that they  the bed, most likely is being charged monthly for a bed he is not using or needs.  Patient verbalized understanding and agreed to call.    Came today to evaluate the dressing applied yesterday.  There was no drainage noted on the [place] - outlayer and the first layer minimal drainage noted.  The Optifoam pieces were moist, however the skin around the foam/periwound was not moist.  Patient is agreeable to trying to keep the dressing on for two days, and I will revisit patient on Thursday this week.  Patient has an RN friend that is willing to learn, and he will have her meet me here on Thursday afternoon at 130.  I will discuss further with Agatha to determine if appropriate .        FOCUS OF CARE/SKILLED NEED:   Wound care to LLE, Diabetes Management, Nutrition, Hydration, lymphedema boots    TEACHING/INTERVENTIONS: Diabetic Management.  While in the home patient checked his blood sugar - 219 2 hours postprandial.  Instructed patient to please keep a food log and blood sugar log for today and  tomorrow, and will review on Thursday SNV> Showed the patient how to apply the Lymphedema boot to his LLE, and set for one cycle that is 31 minutes.  Patient demonstrated how he can take it off using his reacher.  Patient is to use once a day initially.    PROGRESS TOWARD GOALS:  Ongoing    PHYSICIAN CONTACT: NOAH Manzanares is out of the office this week.  Provider to be notified regarding [employer] recommendations from Monday converstation.    INSURANCE CHANGES?No insurance changes    FALLS SINCE LAST VISIT?  No insurance changes    MEDICATION CHANGES SINCE LAST VISIT?  No changes    PLAN FOR NEXT VISIT:  Next SNV - Thursday 5/12/22 to change dressing and determine if can go every other day for wound care.  Check if patient called the DME company to  the hospital bed in the front living area that he is not using.    COVID SCREENING:  Patient denies exposure or symptoms of covid

## 2022-05-12 ENCOUNTER — HOME CARE VISIT (OUTPATIENT)
Dept: HOME HEALTH SERVICES | Facility: CLINIC | Age: 62
End: 2022-05-12

## 2022-05-12 VITALS
HEART RATE: 88 BPM | RESPIRATION RATE: 20 BRPM | TEMPERATURE: 98.2 F | OXYGEN SATURATION: 97 % | SYSTOLIC BLOOD PRESSURE: 126 MMHG | DIASTOLIC BLOOD PRESSURE: 76 MMHG

## 2022-05-12 PROCEDURE — G0299 HHS/HOSPICE OF RN EA 15 MIN: HCPCS

## 2022-05-12 NOTE — HOME HEALTH
"FOCUS OF CARE/SKILLED NEED:       Patient aware of what time coming to see him today, had agreed at the last visit that I could come earlier at 1330, due to having a staff meeting todayat 3 pm. Also texted patient when I was on my way, as we had agreed on.  When I arrived the door was not open and called rufino and he stated he forgot I was coming and had to wait 20 minutes for the patient to get out of bed and open the door.   Asked patient how it went with the dressing placed on Tuesday afternoon, patient states \"not good\"    Patient states that at the last visit Tuesday 5/10/22 - i applied the lymphedema boots and stated that when he took it off, his dressing was soaking wet. Asked patient if he called the office or try to call me so we could come out and address.   Patient did not call the office or writter to inform so we could assist.  Reminded patient what we discussed on Tuesday, that in order for us to evaluate how welll this dressing choice would work, that he needs to call us if there are problems with the dressing or the dressing is saturated.    Patient Also agreed at the last SNV that Adriane the nurse that helps him with dressing changes would be present at our visit today, and she is not present.    Patient regressed and stated that the only thing that works on his legs is vaseline and stockinette and air. and \"everyone wants to try something different then what I want\"    Asked patient if he recalls our conversations about trying this wound care choice from last Friday,and  last Monday - where he gave me permission try this. Patient stated \"Yes\" - and reminded him of one of the his rights as a patient is to have input and be part of his care and indicate if he would perfer not do something in his care.  Patient verbalized with a \"Yes\".    Since the patient did not call when the 2 layer compression wrap was saturated, I am concerned that it may do more harm than good and while in the home today, " "applied the vaseline with abd pads and stockingette, patient agreed for this dressing today.  Patient states he has caregivers that will come to the home tomorrow Friday 5/13 and over the weekend, since this was my third SNV for this week.    Discussed with patient regarding the SIERRA diagnostic test, if he is still  willing to go, patient stated he does not want to go and have this measurement done. I explained what the test entails and patient stated \"I know what works on my legs, no dressing but vaseline and air\".   I explained to patient that if the SIERRA is not done, wouldn't be able to try the four compression layer dressing, and patient states that \"that is fine\".      Discussed as well that SN coming to his home only - without Wound Care Center Or Lymphedema Clinic or willingess to have diagnostic tests completed and being non-compliant with not notifying home health agency of changes with dressing or wound; or his caregivers not coming on the days we are not there - will be difficult to help him heal his LLE.  Reindorced with him that if he would like his LLE to heal, we need his help.  Patient verbalized that he wishes people would listen to him, and do what he wants - vaseline and air.  Add Stockinette when goes outside.    Discussed with patient that I will inform Lucila, ,  and Clinical Manager Agatha of the above and update the Represenative Katy at [facility]/insurance company.  Patient verbalized understanding.  I will also include his Primary Provider NOAH Manzanares    Wound care performed:  After removing wraps took pictures of the patient's legs.  Cleaned with soap and water, pat dry after patient showed off, applied vaseline to abd pads and periowound, and added stockingette to secure.  Patient tolerated well.      Patient did not keep a log of food and drink, but did make a change in eating a hamburger without the bun, with lettuce and tomato.  His blood sugar was 157 today, much " improved since Tuesday.      TEACHING/INTERVENTIONS: Diabetes, Nutrition, Hydration    PROGRESS TOWARD GOALS:  Not progressing due to non compliance    PHYSICIAN CONTACT: No    INSURANCE CHANGES?  No    FALLS SINCE LAST VISIT?  No     MEDICATION CHANGES SINCE LAST VISIT?  No    PLAN FOR NEXT VISIT:  Dressing Change without the two layer compression/resume the previous dressing with vaseline on ABD Pad after cleaning with soap and water and stockingette.  Follow up on nutrition/and blood sugars    COVID SCREENING:  Patient denies exposure or symptom.

## 2022-05-12 NOTE — CASE COMMUNICATION
[facility] - informed of status of wounds.  Agatha sent last week and this weeks notes to Katy.  Discussed with Katy there are multiple issues with this patient - dependent positioning of lower extremities, distrust the patient has with wound care centers due to past experiences, morbid obesity, uncontrolled diabetes mellitus type 2, nutrition deficits and non compliance.  Explained to Katy that due to th e copious amounts of drainage and type of dressing and patient's resistent to see wound care center, that home health is limited in determining appropriate dressing to apply to the patient's left lower extremity (LLE).  Discussed with Agatha Clinical Manager, that the current dressing is increasing maceration, since the dressing saturates with drainage. Recommend  the following:  Cleanse legs with soap and water or wound cleanser, charlene nt rinses off in his shower and then pat dry. Drainage is actively weeping from multiple area of LLE.  Apply optifoam, apply two layer wrap.  Until Ankle Brachial Index (SIERRA)  measurement obtained, will see how the patient will tolerate the two wrap.    NOAH Tompkins, PCP - telephoen and spoke with assistant and obtained order to apply optifoam and 2 layer compression system.    Katy at [facility] recommends the following:    Blood cul tures of the LLE, recommended silver  impregnated dressing, however informed her that he is allergic to silver.  Applying zinc oxide to the periowound.  Once SIERRA index completed, depending on the result will determine if can tolerate and if appropriate for a 4 layer compression wrap. Patient to go to Lymphedema clinci or wound care center, explained that the patitanvir thas limited resources for transportation, and his own desire to use the  [facility] transporation.    Will continue to follow up with Katy.  Will notify PCP of the recommendations.    Thank you,  Aminata Duong, NOAH

## 2022-05-12 NOTE — CASE COMMUNICATION
"FOCUS OF CARE/SKILLED NEED:       Patient aware of what time coming to see him today, had agreed at the last visit that I could come earlier at 1330, due to having a staff meeting todayat 3 pm. Also texted patient when I was on my way, as we had agreed on.  When I arrived the door was not open and called rufino and he stated he forgot I was coming and had to wait 20 minutes for the patient to get out of bed and open the door.   Asked  patient how it went with the dressing placed on Tuesday afternoon, patient states \"not good\"    Patient states that at the last visit Tuesday 5/10/22 - i applied the lymphedema boots and stated that when he took it off, his dressing was soaking wet. Asked patient if he called the office or try to call me so we could come out and address.   Patient did not call the office or writter to inform so we could assist.  Reminded patient what we  discussed on Tuesday, that in order for us to evaluate how welll this dressing choice would work, that he needs to call us if there are problems with the dressing or the dressing is saturated.    Patient Also agreed at the last SNV that Adriane the nurse that helps him with dressing changes would be present at our visit today, and she is not present.    Patient regressed and stated that the only thing that works on his legs is vaselin e and stockinette and air. and \"everyone wants to try something different then what I want\"    Asked patient if he recalls our conversations about trying this wound care choice from last Friday,and  last Monday - where he gave me permission try this. Patient stated \"Yes\" - and reminded him of one of the his rights as a patient is to have input and be part of his care and indicate if he would perfer not do something in his care.  Patient  verbalized with a \"Yes\".    Since the patient did not call when the 2 layer compression wrap was saturated, I am concerned that it may do more harm than good and while in the home " "today, applied the vaseline with abd pads and stockingette, patient agreed for this dressing today.  Patient states he has caregivers that will come to the home tomorrow Friday 5/13 and over the weekend, since this was my third SNV for this week.    Discussed  with patient regarding the SIERRA diagnostic test, if he is still  willing to go, patient stated he does not want to go and have this measurement done. I explained what the test entails and patient stated \"I know what works on my legs, no dressing but vaseline and air\".   I explained to patient that if the SIERRA is not done, wouldn't be able to try the four compression layer dressing, and patient states that \"that is fine\".      Discussed a s well that SN coming to his home only - without Wound Care Center Or Lymphedema Clinic or willingess to have diagnostic tests completed and being non-compliant with not notifying home health agency of changes with dressing or wound; or his caregivers not coming on the days we are not there - will be difficult to help him heal his LLE.  Reindorced with him that if he would like his LLE to heal, we need his help.  Patient verbalized that  he wishes people would listen to him, and do what he wants - vaseline and air.  Add Stockinette when goes outside.    Discussed with patient that I will inform Lucila, ,  and Clinical Manager Agatha of the above and update the Represenative Katy at [employer]/insurance company.  Patient verbalized understanding.  I will also include his Primary Provider NOAH Manzanares    Wound care performed:  After removing wraps took pic tures of the patient's legs.  Cleaned with soap and water, pat dry after patient showed off, applied vaseline to abd pads and periowound, and added stockingette to secure.  Patient tolerated well.      Patient did not keep a log of food and drink, but did make a change in eating a hamburger without the bun, with lettuce and tomato.  His blood sugar was 157 today, " much improved since Tuesday.

## 2022-05-13 ENCOUNTER — HOME CARE VISIT (OUTPATIENT)
Dept: HOME HEALTH SERVICES | Facility: HOME HEALTHCARE | Age: 62
End: 2022-05-13

## 2022-05-14 NOTE — CASE COMMUNICATION
On 22 after skilled nursing visit, telephone call to the [employer] Representative, Katy,  to provide and update, left a message to please return call.  On 22 Received telephone call from Katy [employer] Representative.  Informed of all of the details from the 22 visit and Case Communication.    Katy will discuss further with colleagues as to the best approach for this patient.  Katy called again later in the day and after  discussion with colleague, suggested the followin. Obtain an order for wound cultures  2.  Suggest trying Triad, apply to all areas of the LLE, periwound and open areas, cover with a foam dressing, such as Optifoam, wrap with Kerlex from pedal of foot to below the knee and secure with tubular .  If Triad is not available consider Hydromera Blue only on the open areas and Zinc Oxide on the periwound, cover with foam, kerlex and  tubular  to secure.  3.  Have caregivers meet at a visit to demonstrate to them how to perform the dressing change and then a second visit for the caregivers to demonstrate performing the dressing change.  4.  Once that is completed, decrease visits to once a week and once the LLE has healed discharge services.  5.  Have patient use the lymphaedma boots prior to dressing changes daily.    When I spoke to NOAH Manzanares office charbel chase earlier in the week, that Rosa will be out of the office until next week.    Please contact Rosa ZAMORA on Monday May 16th for the orders above.    Thank you,    NOAH Parra  [facility]

## 2022-05-16 ENCOUNTER — HOME CARE VISIT (OUTPATIENT)
Dept: HOME HEALTH SERVICES | Facility: CLINIC | Age: 62
End: 2022-05-16

## 2022-05-16 PROCEDURE — G0299 HHS/HOSPICE OF RN EA 15 MIN: HCPCS

## 2022-05-18 ENCOUNTER — HOME CARE VISIT (OUTPATIENT)
Dept: HOME HEALTH SERVICES | Facility: CLINIC | Age: 62
End: 2022-05-18

## 2022-05-18 VITALS
TEMPERATURE: 97.8 F | RESPIRATION RATE: 18 BRPM | DIASTOLIC BLOOD PRESSURE: 86 MMHG | HEART RATE: 50 BPM | SYSTOLIC BLOOD PRESSURE: 142 MMHG | OXYGEN SATURATION: 93 %

## 2022-05-18 PROCEDURE — G0299 HHS/HOSPICE OF RN EA 15 MIN: HCPCS

## 2022-05-18 NOTE — HOME HEALTH
"I had Sidra iCsse, RN with me during this visit with Mr. Landeros. I am currently her preceptor while she is in orientation.    We entered the pt's home and greeted the pt. The pt was getting his water ready for the wound care we were to perform. The pt stated \"I was a guinea pig last week....they tried something else on my leg.\" I informed the pt about how we have been in contact with his insurance and his insurance is wanting skilled nursing to try different wound care orders to see if it helps his leg. I informed the pt that if we don't try anything new and his leg doesn't show signs of healing then insurance could refuse him further visits and we would have to discharge. The pt was visibly frustrated. The pt stated \"I have told you guys Vaseline and tubular  is the only thing that works for me, and I am tired of you guys trying new things on me.\" I reminded the pt that we have used Vaseline and tubular  only on multiple occasions and there were no signs of improvement. The pt yelled, \"you guys haven't given it enough time to work!\" I asked the pt if he had been using the lymphedema pumps. The pt stated that when he used them his dressing got soiled and needed to be changed. So he hasn't used them since. I encouraged the pt to use the pumps on the days SN is scheduled so that SN can change his dressings after. Pt v/u. I explained to the pt that insurance has recommended trying Triad or hydrofera blue. Upon looking at the ingredients for triad, it was noted that there was zinc oxide in it. The pt stated, \"I am allergic to zinc.\" I then asked the pt if he had ever tried hydrofera blue or hydrofera blue ready and showed him a picture. The pt stated, \"no.\" I asked the pt if we could try hydrofera blue for his wound. The pt yelled, \"if you guys would just listen to me, Vaseline and tubular  is the only thing that will work!\" The pt then brought up one of my first encounters with him in December. The pt " "claimed that his legs are in the condition they are because of me. The pt yelled, \"I never wanted you to come back, this was your fault.\" I explained to the pt that I followed wound care orders. I asked the pt if he would like someone else to see him to make him more comfortable. The pt stated, \"yes.\" The pt, visibly frustrated, then said, \"I don't want to try anything new on my legs. I will only use Vaseline and tubular . My legs were so much better before you guys came along. I don't care if insurance denies my visits. I will get my own caregivers to come and change my dressing.\" I asked the pt if he would like us to stop seeing him and discharge him. The pt stated, \"yes, that's fine.\" I then asked if the pt would like to have a couple weeks or so to find himself some caregivers before we discharge him. The pt stated, \"yes.\" I then went forward with the rest of my visit completing his vital signs, assessment, and wound care to his LLE. Wound pictures and measurements were obtained. I called and confirmed with NOAH Manzanares office that it was okay if I only used Vaseline and Tubigrip on the pt's leg and I was told yes that it was okay.    After looking at the pt's left leg today and comparing it to a picture I took back in February. The pt's left leg looks like it has gotten worse overall.     FOCUS OF CARE/SKILLED NEED: Wound care   TEACHING/INTERVENTIONS: A&O X 4. No c/o pain this visit. VSS. Wound care performed to LLL. Pt tolerated well.   PROGRESS TOWARD GOALS: Ongoing, progressing   PHYSICIAN CONTACT: I called the office of NOAH Manzanares, spoke to ALMAZ Sarmiento, and obtained an order stating it was okay for SN to cleanse LLE with soap and water, rinse, pat dry, apply Vaseline, and apply Tubigrip due to the pt's refusal for me to do any other type of wound care.  INSURANCE CHANGES? No   FALLS SINCE LAST VISIT? No   MEDICATION CHANGES SINCE LAST VISIT? No   PLAN FOR NEXT VISIT: Wound care   PRE-SCREENED " FOR COVID? Yes, No S/S of COVID. No recent exposure.

## 2022-05-19 VITALS
OXYGEN SATURATION: 95 % | HEART RATE: 62 BPM | TEMPERATURE: 97.4 F | SYSTOLIC BLOOD PRESSURE: 130 MMHG | DIASTOLIC BLOOD PRESSURE: 88 MMHG | RESPIRATION RATE: 22 BRPM

## 2022-05-19 NOTE — HOME HEALTH
FOCUS OF CARE/SKILLED NEED:  Wound care and pictures  TEACHING/INTERVENTIONS: Wound care  PROGRESS TOWARD GOALS: Ongoing  PHYSICIAN CONTACT: No  INSURANCE CHANGES? No  FALLS SINCE LAST VISIT? No  MEDICATION CHANGES? No  PLAN FOR NEXT VISIT: Wound care

## 2022-05-23 ENCOUNTER — HOME CARE VISIT (OUTPATIENT)
Dept: HOME HEALTH SERVICES | Facility: CLINIC | Age: 62
End: 2022-05-23

## 2022-05-24 NOTE — HOME HEALTH
Pt refused in person D/C visit. During last SN visit, pt refused to have any other wound care be performed to his rt leg besides putting vaseline and tubular . This is not a skill for SN.

## 2022-06-28 ENCOUNTER — HOME HEALTH ADMISSION (OUTPATIENT)
Dept: HOME HEALTH SERVICES | Facility: HOME HEALTHCARE | Age: 62
End: 2022-06-28

## 2022-10-03 LAB
GRAM STAIN RESULT: ABNORMAL
ORGANISM: ABNORMAL
WOUND/ABSCESS: ABNORMAL

## 2022-10-04 LAB
GRAM STAIN RESULT: ABNORMAL
ORGANISM: ABNORMAL
WOUND/ABSCESS: ABNORMAL

## 2022-11-08 LAB
ALBUMIN SERPL-MCNC: 3.8 G/DL (ref 3.5–5.2)
ALP BLD-CCNC: 95 U/L (ref 40–130)
ALT SERPL-CCNC: 10 U/L (ref 5–41)
ANION GAP SERPL CALCULATED.3IONS-SCNC: 11 MMOL/L (ref 7–19)
AST SERPL-CCNC: 14 U/L (ref 5–40)
BASOPHILS ABSOLUTE: 0.1 K/UL (ref 0–0.2)
BASOPHILS RELATIVE PERCENT: 0.7 % (ref 0–1)
BILIRUB SERPL-MCNC: 0.7 MG/DL (ref 0.2–1.2)
BUN BLDV-MCNC: 18 MG/DL (ref 8–23)
C DIFF TOXIN/ANTIGEN: NORMAL
CALCIUM SERPL-MCNC: 9.2 MG/DL (ref 8.8–10.2)
CHLORIDE BLD-SCNC: 103 MMOL/L (ref 98–111)
CHOLESTEROL, TOTAL: 124 MG/DL (ref 160–199)
CO2: 25 MMOL/L (ref 22–29)
CREAT SERPL-MCNC: 0.9 MG/DL (ref 0.5–1.2)
EOSINOPHILS ABSOLUTE: 0.3 K/UL (ref 0–0.6)
EOSINOPHILS RELATIVE PERCENT: 3.9 % (ref 0–5)
GFR SERPL CREATININE-BSD FRML MDRD: >60 ML/MIN/{1.73_M2}
GLUCOSE BLD-MCNC: 164 MG/DL (ref 74–109)
HBA1C MFR BLD: 7.1 % (ref 4–6)
HCT VFR BLD CALC: 49.2 % (ref 42–52)
HDLC SERPL-MCNC: 44 MG/DL (ref 55–121)
HEMOGLOBIN: 15.3 G/DL (ref 14–18)
IMMATURE GRANULOCYTES #: 0 K/UL
LDL CHOLESTEROL CALCULATED: 66 MG/DL
LYMPHOCYTES ABSOLUTE: 1.4 K/UL (ref 1.1–4.5)
LYMPHOCYTES RELATIVE PERCENT: 15.7 % (ref 20–40)
MCH RBC QN AUTO: 28.4 PG (ref 27–31)
MCHC RBC AUTO-ENTMCNC: 31.1 G/DL (ref 33–37)
MCV RBC AUTO: 91.4 FL (ref 80–94)
MONOCYTES ABSOLUTE: 0.6 K/UL (ref 0–0.9)
MONOCYTES RELATIVE PERCENT: 6.6 % (ref 0–10)
NEUTROPHILS ABSOLUTE: 6.3 K/UL (ref 1.5–7.5)
NEUTROPHILS RELATIVE PERCENT: 72.6 % (ref 50–65)
PDW BLD-RTO: 14.7 % (ref 11.5–14.5)
PLATELET # BLD: 177 K/UL (ref 130–400)
PMV BLD AUTO: 12.2 FL (ref 9.4–12.4)
POTASSIUM SERPL-SCNC: 4.2 MMOL/L (ref 3.5–5)
RBC # BLD: 5.38 M/UL (ref 4.7–6.1)
SODIUM BLD-SCNC: 139 MMOL/L (ref 136–145)
TOTAL PROTEIN: 6.9 G/DL (ref 6.6–8.7)
TRIGL SERPL-MCNC: 71 MG/DL (ref 0–149)
TSH SERPL DL<=0.05 MIU/L-ACNC: 2.17 UIU/ML (ref 0.27–4.2)
WBC # BLD: 8.7 K/UL (ref 4.8–10.8)

## 2023-08-20 LAB
BACTERIA SPEC ANAEROBE+AEROBE CULT: ABNORMAL
GRAM STN SPEC: ABNORMAL
GRAM STN SPEC: ABNORMAL
ORGANISM: ABNORMAL

## 2023-08-22 LAB
BACTERIA SPEC ANAEROBE+AEROBE CULT: ABNORMAL
GRAM STN SPEC: ABNORMAL
ORGANISM: ABNORMAL

## 2024-01-15 LAB
ALBUMIN SERPL-MCNC: 3.9 G/DL (ref 3.5–5.2)
ALP SERPL-CCNC: 89 U/L (ref 40–130)
ALT SERPL-CCNC: 8 U/L (ref 5–41)
ANION GAP SERPL CALCULATED.3IONS-SCNC: 8 MMOL/L (ref 7–19)
AST SERPL-CCNC: 14 U/L (ref 5–40)
BASOPHILS # BLD: 0.1 K/UL (ref 0–0.2)
BASOPHILS NFR BLD: 0.9 % (ref 0–1)
BILIRUB SERPL-MCNC: 0.6 MG/DL (ref 0.2–1.2)
BUN SERPL-MCNC: 18 MG/DL (ref 8–23)
CALCIUM SERPL-MCNC: 9.4 MG/DL (ref 8.8–10.2)
CHLORIDE SERPL-SCNC: 102 MMOL/L (ref 98–111)
CHOLEST SERPL-MCNC: 122 MG/DL (ref 160–199)
CO2 SERPL-SCNC: 30 MMOL/L (ref 22–29)
CREAT SERPL-MCNC: 0.9 MG/DL (ref 0.5–1.2)
EOSINOPHIL # BLD: 0.3 K/UL (ref 0–0.6)
EOSINOPHIL NFR BLD: 3.9 % (ref 0–5)
ERYTHROCYTE [DISTWIDTH] IN BLOOD BY AUTOMATED COUNT: 14.6 % (ref 11.5–14.5)
GLUCOSE SERPL-MCNC: 172 MG/DL (ref 74–109)
HBA1C MFR BLD: 7.1 % (ref 4–6)
HCT VFR BLD AUTO: 49.8 % (ref 42–52)
HDLC SERPL-MCNC: 45 MG/DL (ref 55–121)
HGB BLD-MCNC: 15.6 G/DL (ref 14–18)
IMM GRANULOCYTES # BLD: 0 K/UL
LDLC SERPL CALC-MCNC: 64 MG/DL
LYMPHOCYTES # BLD: 1.3 K/UL (ref 1.1–4.5)
LYMPHOCYTES NFR BLD: 16.3 % (ref 20–40)
MAGNESIUM SERPL-MCNC: 2.1 MG/DL (ref 1.6–2.4)
MCH RBC QN AUTO: 29.5 PG (ref 27–31)
MCHC RBC AUTO-ENTMCNC: 31.3 G/DL (ref 33–37)
MCV RBC AUTO: 94.3 FL (ref 80–94)
MONOCYTES # BLD: 0.5 K/UL (ref 0–0.9)
MONOCYTES NFR BLD: 7 % (ref 0–10)
NEUTROPHILS # BLD: 5.5 K/UL (ref 1.5–7.5)
NEUTS SEG NFR BLD: 71.6 % (ref 50–65)
PLATELET # BLD AUTO: 171 K/UL (ref 130–400)
PMV BLD AUTO: 12.4 FL (ref 9.4–12.4)
POTASSIUM SERPL-SCNC: 4.1 MMOL/L (ref 3.5–5)
PROT SERPL-MCNC: 7.4 G/DL (ref 6.6–8.7)
RBC # BLD AUTO: 5.28 M/UL (ref 4.7–6.1)
SODIUM SERPL-SCNC: 140 MMOL/L (ref 136–145)
TRIGL SERPL-MCNC: 66 MG/DL (ref 0–149)
TSH SERPL DL<=0.005 MIU/L-ACNC: 2.69 UIU/ML (ref 0.27–4.2)
WBC # BLD AUTO: 7.7 K/UL (ref 4.8–10.8)

## 2024-06-05 ENCOUNTER — APPOINTMENT (OUTPATIENT)
Dept: GENERAL RADIOLOGY | Facility: HOSPITAL | Age: 64
End: 2024-06-05
Payer: MEDICARE

## 2024-06-05 ENCOUNTER — HOSPITAL ENCOUNTER (INPATIENT)
Facility: HOSPITAL | Age: 64
LOS: 2 days | Discharge: HOME | End: 2024-06-07
Attending: FAMILY MEDICINE | Admitting: FAMILY MEDICINE
Payer: MEDICARE

## 2024-06-05 DIAGNOSIS — J96.11 CHRONIC HYPOXIC RESPIRATORY FAILURE, ON HOME OXYGEN THERAPY: ICD-10-CM

## 2024-06-05 DIAGNOSIS — R09.02 HYPOXIA: ICD-10-CM

## 2024-06-05 DIAGNOSIS — Z86.31 HISTORY OF DIABETIC ULCER OF FOOT: ICD-10-CM

## 2024-06-05 DIAGNOSIS — T14.8XXA WOUND INFECTION: Primary | ICD-10-CM

## 2024-06-05 DIAGNOSIS — R13.10 DYSPHAGIA, UNSPECIFIED TYPE: ICD-10-CM

## 2024-06-05 DIAGNOSIS — I51.7 CARDIOMEGALY: ICD-10-CM

## 2024-06-05 DIAGNOSIS — Z99.81 CHRONIC HYPOXIC RESPIRATORY FAILURE, ON HOME OXYGEN THERAPY: ICD-10-CM

## 2024-06-05 DIAGNOSIS — L08.9 WOUND INFECTION: Primary | ICD-10-CM

## 2024-06-05 DIAGNOSIS — R79.89 ELEVATED BRAIN NATRIURETIC PEPTIDE (BNP) LEVEL: ICD-10-CM

## 2024-06-05 LAB
ALBUMIN SERPL-MCNC: 3.7 G/DL (ref 3.5–5.2)
ALBUMIN/GLOB SERPL: 1.3 G/DL
ALP SERPL-CCNC: 84 U/L (ref 39–117)
ALT SERPL W P-5'-P-CCNC: 12 U/L (ref 1–41)
ANION GAP SERPL CALCULATED.3IONS-SCNC: 13 MMOL/L (ref 5–15)
ARTERIAL PATENCY WRIST A: POSITIVE
AST SERPL-CCNC: 14 U/L (ref 1–40)
ATMOSPHERIC PRESS: 745 MMHG
BASE EXCESS BLDA CALC-SCNC: 4 MMOL/L (ref 0–2)
BASOPHILS # BLD AUTO: 0.04 10*3/MM3 (ref 0–0.2)
BASOPHILS NFR BLD AUTO: 0.5 % (ref 0–1.5)
BDY SITE: ABNORMAL
BILIRUB SERPL-MCNC: 0.8 MG/DL (ref 0–1.2)
BODY TEMPERATURE: 37
BUN SERPL-MCNC: 18 MG/DL (ref 8–23)
BUN/CREAT SERPL: 19.4 (ref 7–25)
CALCIUM SPEC-SCNC: 9 MG/DL (ref 8.6–10.5)
CHLORIDE SERPL-SCNC: 102 MMOL/L (ref 98–107)
CO2 SERPL-SCNC: 26 MMOL/L (ref 22–29)
CREAT SERPL-MCNC: 0.93 MG/DL (ref 0.76–1.27)
CRP SERPL-MCNC: 1.39 MG/DL (ref 0–0.5)
D-LACTATE SERPL-SCNC: 2 MMOL/L (ref 0.5–2)
DEPRECATED RDW RBC AUTO: 52.8 FL (ref 37–54)
EGFRCR SERPLBLD CKD-EPI 2021: 91.1 ML/MIN/1.73
EOSINOPHIL # BLD AUTO: 0.23 10*3/MM3 (ref 0–0.4)
EOSINOPHIL NFR BLD AUTO: 2.9 % (ref 0.3–6.2)
ERYTHROCYTE [DISTWIDTH] IN BLOOD BY AUTOMATED COUNT: 15.5 % (ref 12.3–15.4)
ERYTHROCYTE [SEDIMENTATION RATE] IN BLOOD: 38 MM/HR (ref 0–20)
GAS FLOW AIRWAY: 4 LPM
GLOBULIN UR ELPH-MCNC: 2.8 GM/DL
GLUCOSE BLDC GLUCOMTR-MCNC: 182 MG/DL (ref 70–130)
GLUCOSE SERPL-MCNC: 166 MG/DL (ref 65–99)
HCO3 BLDA-SCNC: 31.1 MMOL/L (ref 20–26)
HCT VFR BLD AUTO: 44.6 % (ref 37.5–51)
HGB BLD-MCNC: 14 G/DL (ref 13–17.7)
IMM GRANULOCYTES # BLD AUTO: 0.03 10*3/MM3 (ref 0–0.05)
IMM GRANULOCYTES NFR BLD AUTO: 0.4 % (ref 0–0.5)
LYMPHOCYTES # BLD AUTO: 1.12 10*3/MM3 (ref 0.7–3.1)
LYMPHOCYTES NFR BLD AUTO: 14.2 % (ref 19.6–45.3)
Lab: ABNORMAL
MCH RBC QN AUTO: 29.4 PG (ref 26.6–33)
MCHC RBC AUTO-ENTMCNC: 31.4 G/DL (ref 31.5–35.7)
MCV RBC AUTO: 93.5 FL (ref 79–97)
MODALITY: ABNORMAL
MONOCYTES # BLD AUTO: 0.56 10*3/MM3 (ref 0.1–0.9)
MONOCYTES NFR BLD AUTO: 7.1 % (ref 5–12)
MRSA DNA SPEC QL NAA+PROBE: ABNORMAL
NEUTROPHILS NFR BLD AUTO: 5.89 10*3/MM3 (ref 1.7–7)
NEUTROPHILS NFR BLD AUTO: 74.9 % (ref 42.7–76)
NRBC BLD AUTO-RTO: 0 /100 WBC (ref 0–0.2)
NT-PROBNP SERPL-MCNC: 2167 PG/ML (ref 0–900)
PCO2 BLDA: 55.3 MM HG (ref 35–45)
PCO2 TEMP ADJ BLD: 55.3 MM HG (ref 35–45)
PH BLDA: 7.36 PH UNITS (ref 7.35–7.45)
PH, TEMP CORRECTED: 7.36 PH UNITS (ref 7.35–7.45)
PLATELET # BLD AUTO: 142 10*3/MM3 (ref 140–450)
PMV BLD AUTO: 12.4 FL (ref 6–12)
PO2 BLDA: 78.6 MM HG (ref 83–108)
PO2 TEMP ADJ BLD: 78.6 MM HG (ref 83–108)
POTASSIUM SERPL-SCNC: 3.7 MMOL/L (ref 3.5–5.2)
PROCALCITONIN SERPL-MCNC: 0.06 NG/ML (ref 0–0.25)
PROT SERPL-MCNC: 6.5 G/DL (ref 6–8.5)
RBC # BLD AUTO: 4.77 10*6/MM3 (ref 4.14–5.8)
SAO2 % BLDCOA: 95.3 % (ref 94–99)
SODIUM SERPL-SCNC: 141 MMOL/L (ref 136–145)
VENTILATOR MODE: ABNORMAL
WBC NRBC COR # BLD AUTO: 7.87 10*3/MM3 (ref 3.4–10.8)

## 2024-06-05 PROCEDURE — 99222 1ST HOSP IP/OBS MODERATE 55: CPT | Performed by: INTERNAL MEDICINE

## 2024-06-05 PROCEDURE — 83605 ASSAY OF LACTIC ACID: CPT

## 2024-06-05 PROCEDURE — 71045 X-RAY EXAM CHEST 1 VIEW: CPT

## 2024-06-05 PROCEDURE — 80053 COMPREHEN METABOLIC PANEL: CPT

## 2024-06-05 PROCEDURE — 87641 MR-STAPH DNA AMP PROBE: CPT | Performed by: FAMILY MEDICINE

## 2024-06-05 PROCEDURE — 73630 X-RAY EXAM OF FOOT: CPT

## 2024-06-05 PROCEDURE — 25810000003 SODIUM CHLORIDE 0.9 % SOLUTION 500 ML FLEX CONT

## 2024-06-05 PROCEDURE — 36600 WITHDRAWAL OF ARTERIAL BLOOD: CPT

## 2024-06-05 PROCEDURE — 25010000002 CEFTRIAXONE PER 250 MG: Performed by: FAMILY MEDICINE

## 2024-06-05 PROCEDURE — 25010000002 FUROSEMIDE PER 20 MG

## 2024-06-05 PROCEDURE — 87040 BLOOD CULTURE FOR BACTERIA: CPT

## 2024-06-05 PROCEDURE — 94640 AIRWAY INHALATION TREATMENT: CPT

## 2024-06-05 PROCEDURE — 82803 BLOOD GASES ANY COMBINATION: CPT

## 2024-06-05 PROCEDURE — 94799 UNLISTED PULMONARY SVC/PX: CPT

## 2024-06-05 PROCEDURE — 83880 ASSAY OF NATRIURETIC PEPTIDE: CPT

## 2024-06-05 PROCEDURE — 84145 PROCALCITONIN (PCT): CPT

## 2024-06-05 PROCEDURE — 36415 COLL VENOUS BLD VENIPUNCTURE: CPT

## 2024-06-05 PROCEDURE — 82948 REAGENT STRIP/BLOOD GLUCOSE: CPT

## 2024-06-05 PROCEDURE — 85025 COMPLETE CBC W/AUTO DIFF WBC: CPT

## 2024-06-05 PROCEDURE — 25010000002 VANCOMYCIN 1 G RECONSTITUTED SOLUTION 1 EACH VIAL

## 2024-06-05 PROCEDURE — 63710000001 INSULIN LISPRO (HUMAN) PER 5 UNITS: Performed by: FAMILY MEDICINE

## 2024-06-05 PROCEDURE — 25010000002 ENOXAPARIN PER 10 MG: Performed by: FAMILY MEDICINE

## 2024-06-05 PROCEDURE — 99285 EMERGENCY DEPT VISIT HI MDM: CPT

## 2024-06-05 PROCEDURE — 85652 RBC SED RATE AUTOMATED: CPT

## 2024-06-05 PROCEDURE — 94660 CPAP INITIATION&MGMT: CPT

## 2024-06-05 PROCEDURE — 86140 C-REACTIVE PROTEIN: CPT

## 2024-06-05 RX ORDER — SODIUM CHLORIDE 9 MG/ML
40 INJECTION, SOLUTION INTRAVENOUS AS NEEDED
Status: DISCONTINUED | OUTPATIENT
Start: 2024-06-05 | End: 2024-06-07 | Stop reason: HOSPADM

## 2024-06-05 RX ORDER — ENOXAPARIN SODIUM 100 MG/ML
60 INJECTION SUBCUTANEOUS EVERY 12 HOURS
Status: DISCONTINUED | OUTPATIENT
Start: 2024-06-05 | End: 2024-06-07 | Stop reason: HOSPADM

## 2024-06-05 RX ORDER — INSULIN LISPRO 100 [IU]/ML
3-14 INJECTION, SOLUTION INTRAVENOUS; SUBCUTANEOUS
Status: DISCONTINUED | OUTPATIENT
Start: 2024-06-05 | End: 2024-06-07 | Stop reason: HOSPADM

## 2024-06-05 RX ORDER — NICOTINE POLACRILEX 4 MG
15 LOZENGE BUCCAL
Status: DISCONTINUED | OUTPATIENT
Start: 2024-06-05 | End: 2024-06-07 | Stop reason: HOSPADM

## 2024-06-05 RX ORDER — DEXTROSE MONOHYDRATE 25 G/50ML
25 INJECTION, SOLUTION INTRAVENOUS
Status: DISCONTINUED | OUTPATIENT
Start: 2024-06-05 | End: 2024-06-07 | Stop reason: HOSPADM

## 2024-06-05 RX ORDER — AMOXICILLIN 250 MG
2 CAPSULE ORAL 2 TIMES DAILY PRN
Status: DISCONTINUED | OUTPATIENT
Start: 2024-06-05 | End: 2024-06-07 | Stop reason: HOSPADM

## 2024-06-05 RX ORDER — FUROSEMIDE 10 MG/ML
80 INJECTION INTRAMUSCULAR; INTRAVENOUS ONCE
Status: COMPLETED | OUTPATIENT
Start: 2024-06-05 | End: 2024-06-05

## 2024-06-05 RX ORDER — BISACODYL 5 MG/1
5 TABLET, DELAYED RELEASE ORAL DAILY PRN
Status: DISCONTINUED | OUTPATIENT
Start: 2024-06-05 | End: 2024-06-07 | Stop reason: HOSPADM

## 2024-06-05 RX ORDER — SODIUM CHLORIDE 0.9 % (FLUSH) 0.9 %
10 SYRINGE (ML) INJECTION AS NEEDED
Status: DISCONTINUED | OUTPATIENT
Start: 2024-06-05 | End: 2024-06-07 | Stop reason: HOSPADM

## 2024-06-05 RX ORDER — SODIUM CHLORIDE 0.9 % (FLUSH) 0.9 %
10 SYRINGE (ML) INJECTION EVERY 12 HOURS SCHEDULED
Status: DISCONTINUED | OUTPATIENT
Start: 2024-06-05 | End: 2024-06-07 | Stop reason: HOSPADM

## 2024-06-05 RX ORDER — ACETAMINOPHEN 325 MG/1
650 TABLET ORAL EVERY 4 HOURS PRN
Status: DISCONTINUED | OUTPATIENT
Start: 2024-06-05 | End: 2024-06-07 | Stop reason: HOSPADM

## 2024-06-05 RX ORDER — ONDANSETRON 4 MG/1
4 TABLET, ORALLY DISINTEGRATING ORAL EVERY 6 HOURS PRN
Status: DISCONTINUED | OUTPATIENT
Start: 2024-06-05 | End: 2024-06-07 | Stop reason: HOSPADM

## 2024-06-05 RX ORDER — FLUTICASONE PROPIONATE 50 MCG
2 SPRAY, SUSPENSION (ML) NASAL DAILY
Status: DISCONTINUED | OUTPATIENT
Start: 2024-06-06 | End: 2024-06-07 | Stop reason: HOSPADM

## 2024-06-05 RX ORDER — VANCOMYCIN/0.9 % SOD CHLORIDE 1.5G/250ML
1500 PLASTIC BAG, INJECTION (ML) INTRAVENOUS EVERY 12 HOURS
Status: DISCONTINUED | OUTPATIENT
Start: 2024-06-06 | End: 2024-06-07

## 2024-06-05 RX ORDER — CETIRIZINE HYDROCHLORIDE 10 MG/1
10 TABLET ORAL DAILY
Status: DISCONTINUED | OUTPATIENT
Start: 2024-06-06 | End: 2024-06-07 | Stop reason: HOSPADM

## 2024-06-05 RX ORDER — BUDESONIDE 0.5 MG/2ML
0.5 INHALANT ORAL
Status: DISCONTINUED | OUTPATIENT
Start: 2024-06-05 | End: 2024-06-07 | Stop reason: HOSPADM

## 2024-06-05 RX ORDER — POLYETHYLENE GLYCOL 3350 17 G/17G
17 POWDER, FOR SOLUTION ORAL DAILY PRN
Status: DISCONTINUED | OUTPATIENT
Start: 2024-06-05 | End: 2024-06-07 | Stop reason: HOSPADM

## 2024-06-05 RX ORDER — ALUMINA, MAGNESIA, AND SIMETHICONE 2400; 2400; 240 MG/30ML; MG/30ML; MG/30ML
15 SUSPENSION ORAL EVERY 6 HOURS PRN
Status: DISCONTINUED | OUTPATIENT
Start: 2024-06-05 | End: 2024-06-07 | Stop reason: HOSPADM

## 2024-06-05 RX ORDER — BISACODYL 10 MG
10 SUPPOSITORY, RECTAL RECTAL DAILY PRN
Status: DISCONTINUED | OUTPATIENT
Start: 2024-06-05 | End: 2024-06-07 | Stop reason: HOSPADM

## 2024-06-05 RX ORDER — IBUPROFEN 600 MG/1
1 TABLET ORAL
Status: DISCONTINUED | OUTPATIENT
Start: 2024-06-05 | End: 2024-06-07 | Stop reason: HOSPADM

## 2024-06-05 RX ADMIN — Medication 10 ML: at 20:54

## 2024-06-05 RX ADMIN — INSULIN LISPRO 3 UNITS: 100 INJECTION, SOLUTION INTRAVENOUS; SUBCUTANEOUS at 21:05

## 2024-06-05 RX ADMIN — VANCOMYCIN HYDROCHLORIDE 3000 MG: 1 INJECTION, POWDER, LYOPHILIZED, FOR SOLUTION INTRAVENOUS at 15:48

## 2024-06-05 RX ADMIN — IPRATROPIUM BROMIDE 0.5 MG: 0.5 SOLUTION RESPIRATORY (INHALATION) at 22:30

## 2024-06-05 RX ADMIN — FUROSEMIDE 80 MG: 10 INJECTION, SOLUTION INTRAMUSCULAR; INTRAVENOUS at 15:32

## 2024-06-05 RX ADMIN — ENOXAPARIN SODIUM 60 MG: 100 INJECTION SUBCUTANEOUS at 20:55

## 2024-06-05 RX ADMIN — CEFTRIAXONE SODIUM 2000 MG: 2 INJECTION, POWDER, FOR SOLUTION INTRAMUSCULAR; INTRAVENOUS at 20:56

## 2024-06-05 RX ADMIN — BUDESONIDE 0.5 MG: 0.5 SUSPENSION RESPIRATORY (INHALATION) at 22:30

## 2024-06-05 NOTE — ED NOTES
Wound cleansed and irrigated with normal saline. Pictures of wound obtained for patient chart. Pulses doppler ed.  Assist per ER tech Rory. Patient tolerated well. Clean bandage applied.

## 2024-06-05 NOTE — ED NOTES
Nursing report ED to floor  Jose Landeros  63 y.o.  male    HPI:   Chief Complaint   Patient presents with    Wound Infection       Admitting doctor:   Kuldeep Rothman DO    Consulting provider(s):  Consults       No orders found from 5/7/2024 to 6/6/2024.             Admitting diagnosis:   The primary encounter diagnosis was Wound infection. Diagnoses of History of diabetic ulcer of foot, Hypoxia, Elevated brain natriuretic peptide (BNP) level, and Cardiomegaly were also pertinent to this visit.    Code status:   Current Code Status       Date Active Code Status Order ID Comments User Context       6/5/2024 1551 CPR (Attempt to Resuscitate) [id number]  Kuldeep Rothman DO ED        Question Answer    Code Status (Patient has no pulse and is not breathing) CPR (Attempt to Resuscitate)    Medical Interventions (Patient has pulse or is breathing) Full Support    Level Of Support Discussed With Patient                    Allergies:   Penicillins, Cephalosporins, Hemp seed oil, Neosporin  [neomycin-bacitracin zn-polymyx], Penicillamine, and Silver sulfadiazine    Intake and Output  No intake or output data in the 24 hours ending 06/05/24 1755    Weight:       06/05/24  1411   Weight: (!) 254 kg (560 lb)       Most recent vitals:   Vitals:    06/05/24 1504 06/05/24 1532 06/05/24 1701 06/05/24 1702   BP:  (!) 149/102 156/86    Pulse: 106 103 107 99   Resp:       Temp:       TempSrc:       SpO2: 90%   95%   Weight:       Height:         Oxygen Therapy: .    Active LDAs/IV Access:   Lines, Drains & Airways       Active LDAs       Name Placement date Placement time Site Days    Peripheral IV 06/05/24 1530 Right Antecubital 06/05/24  1530  Antecubital  less than 1    External Urinary Catheter 06/05/24  1500  --  less than 1                    Labs (abnormal labs have a star):   Labs Reviewed   COMPREHENSIVE METABOLIC PANEL - Abnormal; Notable for the following components:       Result Value    Glucose 166 (*)      All other components within normal limits    Narrative:     GFR Normal >60  Chronic Kidney Disease <60  Kidney Failure <15     C-REACTIVE PROTEIN - Abnormal; Notable for the following components:    C-Reactive Protein 1.39 (*)     All other components within normal limits   SEDIMENTATION RATE - Abnormal; Notable for the following components:    Sed Rate 38 (*)     All other components within normal limits   CBC WITH AUTO DIFFERENTIAL - Abnormal; Notable for the following components:    MCHC 31.4 (*)     RDW 15.5 (*)     MPV 12.4 (*)     Lymphocyte % 14.2 (*)     All other components within normal limits   BNP (IN-HOUSE) - Abnormal; Notable for the following components:    proBNP 2,167.0 (*)     All other components within normal limits    Narrative:     This assay is used as an aid in the diagnosis of individuals suspected of having heart failure. It can be used as an aid in the diagnosis of acute decompensated heart failure (ADHF) in patients presenting with signs and symptoms of ADHF to the emergency department (ED). In addition, NT-proBNP of <300 pg/mL indicates ADHF is not likely.    Age Range Result Interpretation  NT-proBNP Concentration (pg/mL:      <50             Positive            >450                   Gray                 300-450                    Negative             <300    50-75           Positive            >900                  Gray                300-900                  Negative            <300      >75             Positive            >1800                  Gray                300-1800                  Negative            <300   LACTIC ACID, PLASMA - Normal   PROCALCITONIN - Normal    Narrative:     As a Marker for Sepsis (Non-Neonates):    1. <0.5 ng/mL represents a low risk of severe sepsis and/or septic shock.  2. >2 ng/mL represents a high risk of severe sepsis and/or septic shock.    As a Marker for Lower Respiratory Tract Infections that require antibiotic therapy:    PCT on Admission     "Antibiotic Therapy       6-12 Hrs later    >0.5                Strongly Recommended  >0.25 - <0.5        Recommended   0.1 - 0.25          Discouraged              Remeasure/reassess PCT  <0.1                Strongly Discouraged     Remeasure/reassess PCT    As 28 day mortality risk marker: \"Change in Procalcitonin Result\" (>80% or <=80%) if Day 0 (or Day 1) and Day 4 values are available. Refer to [email or web address]    Change in PCT <=80%  A decrease of PCT levels below or equal to 80% defines a positive change in PCT test result representing a higher risk for 28-day all-cause mortality of patients diagnosed with severe sepsis for septic shock.    Change in PCT >80%  A decrease of PCT levels of more than 80% defines a negative change in PCT result representing a lower risk for 28-day all-cause mortality of patients diagnosed with severe sepsis or septic shock.      BLOOD CULTURE   BLOOD CULTURE   CBC AND DIFFERENTIAL    Narrative:     The following orders were created for panel order CBC & Differential.  Procedure                               Abnormality         Status                     ---------                               -----------         ------                     CBC Auto Differential[[id number]]        Abnormal            Final result                 Please view results for these tests on the individual orders.       Meds given in ED:   Medications   vancomycin (VANCOCIN) 3,000 mg in sodium chloride 0.9 % 500 mL IVPB (3,000 mg Intravenous New Bag 6/5/24 1548)   furosemide (LASIX) injection 80 mg (80 mg Intravenous Given 6/5/24 1532)           NIH Stroke Scale:       Isolation/Infection(s):  No active isolations   VRE     COVID Testing  Collected .  Resulted .    Nursing report ED to floor:  Mental status: .  Ambulatory status: .  Precautions: .    ED nurse phone extentsion- ..    "

## 2024-06-05 NOTE — ED PROVIDER NOTES
Subjective   History of Present Illness  Patient is a 63-year-old male who presents emergency department for wound infection.  Patient was seen at primary care provider's office this morning and was sent to our emergency department for further evaluation.  Patient has a wound present to his lateral aspect of left foot.  Reports that this has been there for 2 years.  On exam, patient does have several maggots present.  Patient denies any recent fevers.  Patient reports that home health no longer comes to his house due to insurance issues.         Review of Systems   Skin:  Positive for wound.   All other systems reviewed and are negative.      Past Medical History:   Diagnosis Date    COPD (chronic obstructive pulmonary disease)     Depression     Diabetes mellitus     Hyperlipidemia     Hypertension     Venous insufficiency     Venous ulcer of right leg        Allergies   Allergen Reactions    Penicillins Hives and Itching    Cephalosporins Other (See Comments)     Per d/c summary of 12/17/15, pt possibly had cross-reactivity to a cephalosporin during his hospitalization    Hemp Seed Oil Hives    Neosporin  [Neomycin-Bacitracin Zn-Polymyx]     Penicillamine     Silver Sulfadiazine        Past Surgical History:   Procedure Laterality Date    APPENDECTOMY      FOOT SURGERY      TESTICLE SURGERY      TRACHEOSTOMY         Family History   Problem Relation Age of Onset    Diabetes Mother     Heart disease Mother     Hypertension Mother     Hyperlipidemia Mother     COPD Mother     Diabetes Father     Heart disease Father     Hypertension Father     No Known Problems Sister     No Known Problems Brother     No Known Problems Sister     No Known Problems Sister     No Known Problems Sister        Social History     Socioeconomic History    Marital status: Single   Tobacco Use    Smoking status: Never    Smokeless tobacco: Never   Substance and Sexual Activity    Alcohol use: No    Drug use: No    Sexual activity: Defer            Objective   Physical Exam  Vitals and nursing note reviewed.   Constitutional:       General: He is not in acute distress.     Appearance: Normal appearance. He is obese. He is not ill-appearing or toxic-appearing.   HENT:      Head: Normocephalic.   Cardiovascular:      Rate and Rhythm: Normal rate and regular rhythm.      Pulses: Normal pulses.      Heart sounds: Normal heart sounds.   Pulmonary:      Effort: Pulmonary effort is normal.      Breath sounds: Normal breath sounds.   Abdominal:      General: Abdomen is flat. Bowel sounds are normal. There is no distension.      Palpations: Abdomen is soft.      Tenderness: There is no abdominal tenderness.   Musculoskeletal:         General: Normal range of motion.      Cervical back: Normal range of motion and neck supple.        Feet:    Feet:      Right foot:      Skin integrity: Dry skin present.      Left foot:      Skin integrity: Ulcer, erythema and dry skin present.      Comments: Open wound present to lateral aspect of left foot.  Maggots present.  Skin:     General: Skin is warm and dry.      Findings: Erythema present.   Neurological:      General: No focal deficit present.      Mental Status: He is alert and oriented to person, place, and time. Mental status is at baseline.   Psychiatric:         Mood and Affect: Mood normal.         Behavior: Behavior normal.         Thought Content: Thought content normal.         Judgment: Judgment normal.         Procedures           ED Course  ED Course as of 06/05/24 1535   Wed Jun 05, 2024   1531 Labs have been reviewed.  CBC with no leukocytosis, stable H&H.  Sed rate and CRP mildly elevated.  Lactic acid normal.  Procalcitonin normal.  Blood cultures pending.  CMP with glucose 166, otherwise unremarkable.  BNP elevated at 2167.  Chest x-ray reviewed Cardiomegaly with pulmonary vascular congestion. An element of interstitial edema suspected. This appears to be a chronic finding in this patient.  X-ray  left foot revealed Soft tissue wound along the lateral aspect of the midfoot and hindfoot without definite destructive bony changes. Diffuse soft tissue swelling and edema as discussed above. [KR]   1532 Patient was found to be hypoxic of 85% on room air on arrival.  He was placed on 2 L of oxygen which she does not wear at home.  His BNP is elevated at 2167.  Chest x-ray is revealing cardiomegaly with pulmonary vascular congestion and possible interstitial edema.  He has been given IV Lasix.  According to our records, patient has not had an echocardiogram since 2015. [KR]   1533 Nursing staff has cleaned wound and placed a dressing.  IV vancomycin ordered. [KR]   1533 Case has been discussed with on-call hospitalist, Dr. Cannon.  He has agreed to accept this patient for further management.  Patient will be admitted under Dr. Pierce's service.  Patient is in agreement to plan of care. [KR]      ED Course User Index  [KR] Rubi Sevilla APRN                                             Medical Decision Making  Problems Addressed:  Cardiomegaly: complicated acute illness or injury  Elevated brain natriuretic peptide (BNP) level: complicated acute illness or injury  History of diabetic ulcer of foot: complicated acute illness or injury  Hypoxia: complicated acute illness or injury  Wound infection: complicated acute illness or injury    Amount and/or Complexity of Data Reviewed  Labs: ordered.  Radiology: ordered.    Risk  Prescription drug management.  Decision regarding hospitalization.        Final diagnoses:   Wound infection   History of diabetic ulcer of foot   Hypoxia   Elevated brain natriuretic peptide (BNP) level   Cardiomegaly       ED Disposition  ED Disposition       ED Disposition   Decision to Admit    Condition   --    Comment   Level of Care: Med/Surg [1]   Diagnosis: Wound infection [347542]   Admitting Physician: MARTHA PIERCE [[id number]]   Certification: I Certify That Inpatient Hospital Services  Are Medically Necessary For Greater Than 2 Midnights                 No follow-up provider specified.       Medication List      No changes were made to your prescriptions during this visit.            Rubi Sevilla, APRN  06/05/24 1532

## 2024-06-06 LAB
ARTERIAL PATENCY WRIST A: POSITIVE
ATMOSPHERIC PRESS: 746 MMHG
BASE EXCESS BLDA CALC-SCNC: 6.3 MMOL/L (ref 0–2)
BDY SITE: ABNORMAL
BODY TEMPERATURE: 37
CPAP: 18 CMH2O
GAS FLOW AIRWAY: 4 LPM
GLUCOSE BLDC GLUCOMTR-MCNC: 137 MG/DL (ref 70–130)
GLUCOSE BLDC GLUCOMTR-MCNC: 156 MG/DL (ref 70–130)
GLUCOSE BLDC GLUCOMTR-MCNC: 177 MG/DL (ref 70–130)
GLUCOSE BLDC GLUCOMTR-MCNC: 188 MG/DL (ref 70–130)
HCO3 BLDA-SCNC: 31.9 MMOL/L (ref 20–26)
Lab: ABNORMAL
MODALITY: ABNORMAL
PCO2 BLDA: 48.5 MM HG (ref 35–45)
PCO2 TEMP ADJ BLD: 48.5 MM HG (ref 35–45)
PH BLDA: 7.43 PH UNITS (ref 7.35–7.45)
PH, TEMP CORRECTED: 7.43 PH UNITS (ref 7.35–7.45)
PO2 BLDA: 70.8 MM HG (ref 83–108)
PO2 TEMP ADJ BLD: 70.8 MM HG (ref 83–108)
PSV: 4 CMH2O
SAO2 % BLDCOA: 93.7 % (ref 94–99)
VENTILATOR MODE: ABNORMAL

## 2024-06-06 PROCEDURE — 25010000002 ENOXAPARIN PER 10 MG: Performed by: FAMILY MEDICINE

## 2024-06-06 PROCEDURE — 87186 SC STD MICRODIL/AGAR DIL: CPT | Performed by: FAMILY MEDICINE

## 2024-06-06 PROCEDURE — 82948 REAGENT STRIP/BLOOD GLUCOSE: CPT

## 2024-06-06 PROCEDURE — 25010000002 VANCOMYCIN 10 G RECONSTITUTED SOLUTION: Performed by: FAMILY MEDICINE

## 2024-06-06 PROCEDURE — 94799 UNLISTED PULMONARY SVC/PX: CPT

## 2024-06-06 PROCEDURE — 25810000003 SODIUM CHLORIDE 0.9 % SOLUTION: Performed by: FAMILY MEDICINE

## 2024-06-06 PROCEDURE — 87205 SMEAR GRAM STAIN: CPT | Performed by: FAMILY MEDICINE

## 2024-06-06 PROCEDURE — 87147 CULTURE TYPE IMMUNOLOGIC: CPT | Performed by: FAMILY MEDICINE

## 2024-06-06 PROCEDURE — 97166 OT EVAL MOD COMPLEX 45 MIN: CPT

## 2024-06-06 PROCEDURE — 87077 CULTURE AEROBIC IDENTIFY: CPT | Performed by: FAMILY MEDICINE

## 2024-06-06 PROCEDURE — 92610 EVALUATE SWALLOWING FUNCTION: CPT

## 2024-06-06 PROCEDURE — 99231 SBSQ HOSP IP/OBS SF/LOW 25: CPT | Performed by: NURSE PRACTITIONER

## 2024-06-06 PROCEDURE — 25010000002 CEFTRIAXONE PER 250 MG: Performed by: FAMILY MEDICINE

## 2024-06-06 PROCEDURE — 99233 SBSQ HOSP IP/OBS HIGH 50: CPT | Performed by: INTERNAL MEDICINE

## 2024-06-06 PROCEDURE — 63710000001 INSULIN LISPRO (HUMAN) PER 5 UNITS: Performed by: FAMILY MEDICINE

## 2024-06-06 PROCEDURE — 87070 CULTURE OTHR SPECIMN AEROBIC: CPT | Performed by: FAMILY MEDICINE

## 2024-06-06 PROCEDURE — 63710000001 INSULIN GLARGINE PER 5 UNITS: Performed by: FAMILY MEDICINE

## 2024-06-06 PROCEDURE — 36600 WITHDRAWAL OF ARTERIAL BLOOD: CPT

## 2024-06-06 PROCEDURE — 82803 BLOOD GASES ANY COMBINATION: CPT

## 2024-06-06 PROCEDURE — 94664 DEMO&/EVAL PT USE INHALER: CPT

## 2024-06-06 RX ORDER — OMEPRAZOLE 20 MG/1
20 CAPSULE, DELAYED RELEASE ORAL DAILY
COMMUNITY

## 2024-06-06 RX ORDER — GUAIFENESIN 600 MG/1
1200 TABLET, EXTENDED RELEASE ORAL 2 TIMES DAILY
Status: DISCONTINUED | OUTPATIENT
Start: 2024-06-06 | End: 2024-06-07 | Stop reason: HOSPADM

## 2024-06-06 RX ADMIN — INSULIN LISPRO 3 UNITS: 100 INJECTION, SOLUTION INTRAVENOUS; SUBCUTANEOUS at 12:08

## 2024-06-06 RX ADMIN — Medication 10 ML: at 21:38

## 2024-06-06 RX ADMIN — IPRATROPIUM BROMIDE 0.5 MG: 0.5 SOLUTION RESPIRATORY (INHALATION) at 20:32

## 2024-06-06 RX ADMIN — IPRATROPIUM BROMIDE 0.5 MG: 0.5 SOLUTION RESPIRATORY (INHALATION) at 14:15

## 2024-06-06 RX ADMIN — INSULIN GLARGINE 30 UNITS: 100 INJECTION, SOLUTION SUBCUTANEOUS at 08:16

## 2024-06-06 RX ADMIN — INSULIN LISPRO 3 UNITS: 100 INJECTION, SOLUTION INTRAVENOUS; SUBCUTANEOUS at 17:49

## 2024-06-06 RX ADMIN — ENOXAPARIN SODIUM 60 MG: 100 INJECTION SUBCUTANEOUS at 08:16

## 2024-06-06 RX ADMIN — SODIUM CHLORIDE 1500 MG: 9 INJECTION, SOLUTION INTRAVENOUS at 17:49

## 2024-06-06 RX ADMIN — ENOXAPARIN SODIUM 60 MG: 100 INJECTION SUBCUTANEOUS at 21:38

## 2024-06-06 RX ADMIN — IPRATROPIUM BROMIDE 0.5 MG: 0.5 SOLUTION RESPIRATORY (INHALATION) at 06:33

## 2024-06-06 RX ADMIN — INSULIN LISPRO 3 UNITS: 100 INJECTION, SOLUTION INTRAVENOUS; SUBCUTANEOUS at 21:54

## 2024-06-06 RX ADMIN — Medication 1 APPLICATION: at 21:54

## 2024-06-06 RX ADMIN — BUDESONIDE 0.5 MG: 0.5 SUSPENSION RESPIRATORY (INHALATION) at 20:32

## 2024-06-06 RX ADMIN — GUAIFENESIN 1200 MG: 600 TABLET, EXTENDED RELEASE ORAL at 21:38

## 2024-06-06 RX ADMIN — SODIUM CHLORIDE 1500 MG: 9 INJECTION, SOLUTION INTRAVENOUS at 06:01

## 2024-06-06 RX ADMIN — IPRATROPIUM BROMIDE 0.5 MG: 0.5 SOLUTION RESPIRATORY (INHALATION) at 10:20

## 2024-06-06 RX ADMIN — GUAIFENESIN 1200 MG: 600 TABLET, EXTENDED RELEASE ORAL at 11:16

## 2024-06-06 RX ADMIN — Medication 10 ML: at 08:16

## 2024-06-06 RX ADMIN — CEFTRIAXONE SODIUM 2000 MG: 2 INJECTION, POWDER, FOR SOLUTION INTRAMUSCULAR; INTRAVENOUS at 21:38

## 2024-06-06 RX ADMIN — CETIRIZINE HYDROCHLORIDE 10 MG: 10 TABLET ORAL at 08:17

## 2024-06-06 RX ADMIN — BUDESONIDE 0.5 MG: 0.5 SUSPENSION RESPIRATORY (INHALATION) at 06:33

## 2024-06-06 NOTE — PAYOR COMM NOTE
" Jose Birmingham (63 y.o. Male)   [id number]     Admit 6/5    [facility] phone   fax          Date of Birth   1960    Social Security Number       Address   [address] [place], [place] [address]    Home Phone   [phone]    MRN   [id number]       [employer]ist   [facility]    Marital Status   Single                            Admission Date   6/5/24    Admission Type   Emergency    Admitting Provider   Kuldeep Rothman DO    Attending Provider   Kuldeep Rothman DO    Department, Room/Bed   [facility] 3C, 374/1       Discharge Date       Discharge Disposition       Discharge Destination                                 Attending Provider: Kuldeep Rothman DO    Allergies: Penicillins, Cephalosporins, Hemp Seed Oil, Neosporin  [Neomycin-bacitracin Zn-polymyx], Penicillamine, Silver Sulfadiazine    Isolation: Contact   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), 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)   Code Status: CPR    Ht: 175.3 cm (69\")   Wt: 259 kg (572 lb)    Admission Cmt: None   Principal Problem: Wound infection [T14.8XXA,L08.9]                   Active Insurance as of 6/5/2024       Primary Coverage       Payor Plan Insurance Group Employer/Plan Group    HUMANA MEDICARE REPLACEMENT HUMANA MED ADV HMO [id number]       Payor Plan Address Payor Plan Phone Number Payor Plan Fax Number Effective Dates    [address] [address] [phone]  6/1/2024 - None Entered    [facility] [id number]         Subscriber Name Subscriber Birth Date Member ID       JOSE BIRMINGHAM 1960 [id number]                     Emergency Contacts        (Rel.) Home Phone Work Phone Mobile Phone    Ktay Khan (Sister) [phone] -- --                 History & Physical        Kuldeep Rothman DO at 06/05/24 2000              [facility]  HISTORY AND PHYSICAL    Date of Admission: 6/5/2024  Primary Care Physician: Provider, " "No Known    Subjective   Primary Historian: The patient    Chief Complaint: Left foot wound    History of Present Illness    This 63-year-old male presents to the emergency department with a chief complaint of a nonhealing wound on his left foot.  The wound is chronic having been present for well over 2 years.  On examination, the emergency department APRN noted that several maggots were present in the wound.  The patient apparently was seen at his PCPs office this morning and was sent to the emergency department for evaluation.  The patient notes that he has previously been under the care of home health nursing for wound care but a few weeks ago he apparently \"lost insurance coverage\" and home wound care ceased.  He has noted worsening of the wound since that time.  At the time of evaluation in the emergency department, the patient was noted to have an oxygen saturation of 85% however the patient suffers from chronic respiratory failure secondary to obesity hypoventilation syndrome and obstructive sleep apnea.  Occasional periods of desaturation would be expected.  Currently, the patient is on 2 L per nasal cannula with saturations in the high 90s.  Chest x-ray revealed cardiomegaly with pulmonary vascular congestion with what appears to be chronic interstitial edema which is to be expected in a patient with super morbid obesity and with chronic hypoventilation and obstructive sleep apnea.  BNP is also slightly elevated at 2100 which also is to be expected in a person with chronic hypoxia and RV strain secondary to pulmonary hypertension induced by ELYSSA/OHS.  Workup in the emergency department reveals a glucose of 166, lactate and procalcitonin within normal limits.  Sed rate elevated at 38 and CRP 1.39.  White blood cell count within normal limits.  proBNP chronically elevated at 2167.  X-ray of the right foot shows soft tissue wound along the lateral aspect of the midfoot with no destructive bony changes " noted.    Review of Systems   Constitutional: Negative.    HENT: Negative.     Eyes: Negative.    Respiratory: Negative.     Cardiovascular: Negative.    Gastrointestinal: Negative.    Endocrine: Negative.    Genitourinary: Negative.    Musculoskeletal:  Positive for gait problem.   Skin:  Positive for color change and wound.   Allergic/Immunologic: Negative.    Neurological: Negative.    Hematological: Negative.    Psychiatric/Behavioral: Negative.        Otherwise complete ROS reviewed and negative except as mentioned in the HPI.    Past Medical History:   Past Medical History:   Diagnosis Date    COPD (chronic obstructive pulmonary disease)     Depression     Diabetes mellitus     Hyperlipidemia     Hypertension     Venous insufficiency     Venous ulcer of right leg      Past Surgical History:  Past Surgical History:   Procedure Laterality Date    APPENDECTOMY      FOOT SURGERY      TESTICLE SURGERY      TRACHEOSTOMY       Social History:  reports that he has never smoked. He has never used smokeless tobacco. He reports that he does not drink alcohol and does not use drugs.    Family History: family history includes COPD in his mother; Diabetes in his father and mother; Heart disease in his father and mother; Hyperlipidemia in his mother; Hypertension in his father and mother; No Known Problems in his brother, sister, sister, sister, and sister.       Allergies:  Allergies   Allergen Reactions    Penicillins Hives and Itching    Cephalosporins Other (See Comments)     Per d/c summary of 12/17/15, pt possibly had cross-reactivity to a cephalosporin during his hospitalization    Hemp Seed Oil Hives    Neosporin  [Neomycin-Bacitracin Zn-Polymyx]     Penicillamine     Silver Sulfadiazine        Medications:  Prior to Admission medications    Medication Sig Start Date End Date Taking? Authorizing Provider   albuterol (PROVENTIL) (2.5 MG/3ML) 0.083% nebulizer solution 3 mL Every 6 (Six) Hours. PRN 1/27/16   Provider,  MD Surya   albuterol (VENTOLIN HFA) 108 (90 BASE) MCG/ACT inhaler Inhale 2 puffs Every 6 (Six) Hours. prn 1/27/16   Surya Patten MD   allopurinol (ZYLOPRIM) 300 MG tablet Take 1 tablet by mouth Daily. 11/16/16   Surya Patten MD   ammonium lactate (AMLACTIN) 12 % cream Apply 1 application topically to the appropriate area as directed Every 12 (Twelve) Hours. 11/3/21   Ethel Gayle APRN   APPLE CIDER VINEGAR PO Take 1 capsule by mouth Daily. Buys OTC at [facility] and takes with orange juice    Surya Patten MD   aspirin 81 MG EC tablet Take 1 tablet by mouth Daily. 1/27/16   Surya Patten MD   bumetanide (BUMEX) 1 MG tablet Take 1-2 tablets by mouth Daily. Last week 11/16/16   Surya Patten MD   cetirizine (zyrTEC) 5 MG tablet Take 1 tablet by mouth Daily.  Patient not taking: Reported on 4/29/2022 11/4/21   Ethel Gayle APRN   diphenhydrAMINE (BENADRYL) 25 mg capsule Take 1 capsule by mouth 2 (Two) Times a Day. prn 1/27/16   Surya Patten MD   fluticasone (FLONASE) 50 MCG/ACT nasal spray 1 spray into each nostril 2 (Two) Times a Day As Needed. 11/16/16   Surya Patten MD   Ginger, Zingiber officinalis, (GINGER PO) Take 2 each by mouth Daily. 12/19/21   Rosa Tompkins APRN   insulin regular (NOVOLIN R RELION) 100 UNIT/ML injection Inject 30 Units under the skin into the appropriate area as directed. Pt injects 10-30 units 3 TIMES daily with breakfast and dinner depending on his bg 6/3/16   Surya Patten MD   lisinopril (PRINIVIL,ZESTRIL) 10 MG tablet Take 10 mg by mouth Daily. 4/11/22   Rosa Tompkins APRN   nebivolol (Bystolic) 20 MG tablet Take 20 mg by mouth Daily. 5/3/22   Rosa Tompkins APRN   nystatin (MYCOSTATIN) 558278 UNIT/GM cream Apply 1 application topically to the appropriate area as directed Every 12 (Twelve) Hours.  Patient not taking: Reported on 4/29/2022 11/3/21   Ethel Gayle APRN   O2 (OXYGEN) Inhale 2 L As Needed.     "Surya Patten MD   olmesartan (BENICAR) 20 MG tablet Take 20 mg by mouth Daily. 5/4/22   Rosa Tompkins APRN   potassium chloride (KLOR-CON) 20 MEQ CR tablet Take 1 tablet by mouth Daily. 11/16/16   Surya Patten MD   pravastatin (PRAVACHOL) 20 MG tablet Take 1 tablet by mouth Every Night. 11/16/16   Surya Patten MD   Turmeric (QC Tumeric Complex) 500 MG capsule Take 2 each by mouth Daily. 12/19/21   Rosa Tompkins APRN   vitamin B-12 (CYANOCOBALAMIN) 500 MCG tablet Take 1 tablet by mouth 2 (Two) Times a Day. 1/27/16   Surya Patten MD     I have utilized all available immediate resources to obtain, update, or review the patient's current medications (including all prescriptions, over-the-counter products, herbals, cannabis/cannabidiol products, and vitamin/mineral/dietary (nutritional) supplements).    Objective     Vital Signs: BP (!) 145/105 (BP Location: Right arm, Patient Position: Lying)   Pulse 111   Temp 97.5 °F (36.4 °C) (Oral)   Resp 18   Ht 175.3 cm (69\")   Wt (!) 254 kg (560 lb)   SpO2 96%   BMI 82.70 kg/m²   Physical Exam  Constitutional:       General: He is not in acute distress.     Appearance: He is well-developed. He is morbidly obese.      Interventions: Nasal cannula in place.   HENT:      Head: Normocephalic and atraumatic.      Right Ear: External ear normal.      Left Ear: External ear normal.      Nose: Nose normal.      Mouth/Throat:      Mouth: Mucous membranes are moist.      Pharynx: Oropharynx is clear.   Eyes:      General: No scleral icterus.     Extraocular Movements: Extraocular movements intact.      Conjunctiva/sclera: Conjunctivae normal.      Pupils: Pupils are equal, round, and reactive to light.   Cardiovascular:      Rate and Rhythm: Regular rhythm. Tachycardia present.      Pulses: Normal pulses.      Heart sounds: Normal heart sounds. No murmur heard.  Pulmonary:      Effort: Pulmonary effort is normal. No respiratory distress.      Breath " "sounds: Normal breath sounds.   Abdominal:      General: Abdomen is protuberant. Bowel sounds are normal.      Palpations: Abdomen is soft. There is no mass.      Tenderness: There is no abdominal tenderness.      Comments: Large abdominal pannus   Musculoskeletal:         General: No tenderness. Normal range of motion.      Right lower leg: Edema present.      Left lower leg: Edema present.   Skin:     General: Skin is warm and dry.      Findings: Erythema (Mild, chronic hyperpigmented skin changes.) and rash present. Rash is scaling.      Comments: Chronic stasis dermatitis with flaking, scaling skin BLE.  Kerlix wrap left foot.  Wound documentation in photos below.   Neurological:      General: No focal deficit present.      Mental Status: He is alert and oriented to person, place, and time. Mental status is at baseline.      Cranial Nerves: No cranial nerve deficit.   Psychiatric:         Mood and Affect: Mood normal.         Judgment: Judgment normal.           File Link           Results Reviewed:  Lab Results (last 24 hours)       Procedure Component Value Units Date/Time    Procalcitonin [[id number]]  (Normal) Collected: 06/05/24 0422    Specimen: Blood Updated: 06/05/24 1509     Procalcitonin 0.06 ng/mL     Narrative:      As a Marker for Sepsis (Non-Neonates):    1. <0.5 ng/mL represents a low risk of severe sepsis and/or septic shock.  2. >2 ng/mL represents a high risk of severe sepsis and/or septic shock.    As a Marker for Lower Respiratory Tract Infections that require antibiotic therapy:    PCT on Admission    Antibiotic Therapy       6-12 Hrs later    >0.5                Strongly Recommended  >0.25 - <0.5        Recommended   0.1 - 0.25          Discouraged              Remeasure/reassess PCT  <0.1                Strongly Discouraged     Remeasure/reassess PCT    As 28 day mortality risk marker: \"Change in Procalcitonin Result\" (>80% or <=80%) if Day 0 (or Day 1) and Day 4 values are available. Refer " to http://www.[facility]-pct-calculator.com    Change in PCT <=80%  A decrease of PCT levels below or equal to 80% defines a positive change in PCT test result representing a higher risk for 28-day all-cause mortality of patients diagnosed with severe sepsis for septic shock.    Change in PCT >80%  A decrease of PCT levels of more than 80% defines a negative change in PCT result representing a lower risk for 28-day all-cause mortality of patients diagnosed with severe sepsis or septic shock.       Comprehensive Metabolic Panel [[id number]]  (Abnormal) Collected: 06/05/24 0422    Specimen: Blood Updated: 06/05/24 1503     Glucose 166 mg/dL      BUN 18 mg/dL      Creatinine 0.93 mg/dL      Sodium 141 mmol/L      Potassium 3.7 mmol/L      Chloride 102 mmol/L      CO2 26.0 mmol/L      Calcium 9.0 mg/dL      Total Protein 6.5 g/dL      Albumin 3.7 g/dL      ALT (SGPT) 12 U/L      AST (SGOT) 14 U/L      Alkaline Phosphatase 84 U/L      Total Bilirubin 0.8 mg/dL      Globulin 2.8 gm/dL      A/G Ratio 1.3 g/dL      BUN/Creatinine Ratio 19.4     Anion Gap 13.0 mmol/L      eGFR 91.1 mL/min/1.73     Narrative:      GFR Normal >60  Chronic Kidney Disease <60  Kidney Failure <15      C-reactive Protein [[id number]]  (Abnormal) Collected: 06/05/24 0422    Specimen: Blood Updated: 06/05/24 1503     C-Reactive Protein 1.39 mg/dL     Lactic Acid, Plasma [[id number]]  (Normal) Collected: 06/05/24 0422    Specimen: Blood Updated: 06/05/24 1501     Lactate 2.0 mmol/L     BNP [[id number]]  (Abnormal) Collected: 06/05/24 0422    Specimen: Blood Updated: 06/05/24 1500     proBNP 2,167.0 pg/mL     Narrative:      This assay is used as an aid in the diagnosis of individuals suspected of having heart failure. It can be used as an aid in the diagnosis of acute decompensated heart failure (ADHF) in patients presenting with signs and symptoms of ADHF to the emergency department (ED). In addition, NT-proBNP of <300 pg/mL indicates ADHF is not  likely.    Age Range Result Interpretation  NT-proBNP Concentration (pg/mL:      <50             Positive            >450                   Gray                 300-450                    Negative             <300    50-75           Positive            >900                  Gray                300-900                  Negative            <300      >75             Positive            >1800                  Gray                300-1800                  Negative            <300    Sedimentation Rate [[id number]]  (Abnormal) Collected: 06/05/24 0422    Specimen: Blood Updated: 06/05/24 1454     Sed Rate 38 mm/hr     Blood Culture - Blood, Arm, Left [[id number]] Collected: 06/05/24 1432    Specimen: Blood from Arm, Left Updated: 06/05/24 1450    Blood Culture - Blood, Arm, Right [[id number]] Collected: 06/05/24 0422    Specimen: Blood from Arm, Right Updated: 06/05/24 1449    CBC & Differential [[id number]]  (Abnormal) Collected: 06/05/24 0422    Specimen: Blood Updated: 06/05/24 1442    Narrative:      The following orders were created for panel order CBC & Differential.  Procedure                               Abnormality         Status                     ---------                               -----------         ------                     CBC Auto Differential[[id number]]        Abnormal            Final result                 Please view results for these tests on the individual orders.    CBC Auto Differential [[id number]]  (Abnormal) Collected: 06/05/24 0422    Specimen: Blood Updated: 06/05/24 1442     WBC 7.87 10*3/mm3      RBC 4.77 10*6/mm3      Hemoglobin 14.0 g/dL      Hematocrit 44.6 %      MCV 93.5 fL      MCH 29.4 pg      MCHC 31.4 g/dL      RDW 15.5 %      RDW-SD 52.8 fl      MPV 12.4 fL      Platelets 142 10*3/mm3      Neutrophil % 74.9 %      Lymphocyte % 14.2 %      Monocyte % 7.1 %      Eosinophil % 2.9 %      Basophil % 0.5 %      Immature Grans % 0.4 %      Neutrophils, Absolute 5.89 10*3/mm3       Lymphocytes, Absolute 1.12 10*3/mm3      Monocytes, Absolute 0.56 10*3/mm3      Eosinophils, Absolute 0.23 10*3/mm3      Basophils, Absolute 0.04 10*3/mm3      Immature Grans, Absolute 0.03 10*3/mm3      nRBC 0.0 /100 WBC           Imaging Results (Last 24 Hours)       Procedure Component Value Units Date/Time    XR Foot 3+ View Left [[id number]] Collected: 06/05/24 1511     Updated: 06/05/24 1517    Narrative:      EXAMINATION: XR FOOT 3+ VW LEFT-  6/5/2024 3:11 PM     HISTORY: wound. Per the technologist report, the patient was  uncooperative during the examination, limiting the images.     COMPARISON: None      TECHNIQUE:  Frontal, lateral and oblique radiographs of the LEFT foot were provided  for review.     FINDINGS:  Posterior Achilles enthesophyte. Mild edema in Kager's fat pad. Plantar  calcaneal spur noted. Mild diffuse soft tissue swelling and edema,  nonspecific. Soft tissue wound along the lateral aspect of the LEFT  foot. Mild arthritis at the first MTP joint. I don't see any definite  destructive bone changes to suggest acute osteomyelitis on today's  examination. No definite acute fracture. Vascular calcifications are  seen. I do suspect some arthritis at the TMT joints but it is mild.  Hammertoe deformities of the second through fifth digits.          Impression:         1.  Soft tissue wound along the lateral aspect of the midfoot and  hindfoot without definite destructive bony changes.     2.  Diffuse soft tissue swelling and edema as discussed above.     This report was signed and finalized on 6/5/2024 3:14 PM by Dr. Ranjit Norris MD.       XR Chest 1 View [[id number]] Collected: 06/05/24 1453     Updated: 06/05/24 1457    Narrative:      EXAMINATION: XR CHEST 1 VW-  6/5/2024 2:53 PM     HISTORY: Hypoxic on arrival.     FINDINGS: Today's exam is compared to previous study of 11/2/2021. There  is cardiomegaly with vascular redistribution suggesting pulmonary venous  hypertension with interstitial  edema. No acute infiltrate. No  significant effusion is present.       Impression:      1.. Cardiomegaly with pulmonary vascular congestion. An element of  interstitial edema suspected. This appears to be a chronic finding in  this patient.     This report was signed and finalized on 6/5/2024 2:54 PM by Dr. Karl Loving MD.             I have personally reviewed and interpreted the radiology studies and ECG obtained at time of admission.     Assessment / Plan   Assessment:   Active Hospital Problems    Diagnosis     **Wound infection     Chronic hypoxic respiratory failure, on home oxygen therapy     Obstructive sleep apnea     Obesity hypoventilation syndrome     Morbid obesity with BMI of 70 and over, adult     COPD (chronic obstructive pulmonary disease)     Diabetes mellitus        Treatment Plan  Admit to the medical surgical floor  Wound culture  Vancomycin dosed per pharmacy  Rocephin 2 g IV every 24 hours  Pulmonology consultation regarding chronic respiratory failure  Podiatry consultation regarding chronic left foot ulcer  Plan to establish a wound care plan with podiatry and wound care  Continue empiric IV antibiotics with transition to oral antibiotics after culture has been received  Discharge 2-3 days    Medical Decision Making  Number and Complexity of problems:   1 acute, high complexity problem  5+ chronic moderate complexity problems    Differential Diagnosis: None    Conditions and Status        Condition is unchanged.     [facility] Data  External documents reviewed: Care Everywhere documentation  Cardiac tracing (EKG, telemetry) interpretation: See HPI  Radiology interpretation: See HPI  Labs reviewed: See HPI  Any tests that were considered but not ordered: None     Decision rules/scores evaluated (example [id number], Wells, etc): None     Discussed with: The patient     Care Planning  Shared decision making: The patient, pulmonology, podiatry, wound care  Code status and discussions: Full  code    Disposition  Social Determinants of Health that impact treatment or disposition: Morbid obesity  Estimated length of stay is 2-3 days.     I confirmed that the patient's advanced care plan is present, code status is documented, and a surrogate decision maker is listed in the patient's medical record.     The patient's surrogate decision maker is his sister.     The patient was seen and examined by me on 6/5/2024 at 1930.    Electronically signed by Kuldeep Rothman DO, 06/05/24, 20:00 CDT.                Electronically signed by Kuldeep Rothman DO at 06/05/24 2017          Emergency Department Notes        Daniel Palafox, RN at 06/05/24 1754          Nursing report ED to floor  Jose Landeros  63 y.o.  male    HPI:   Chief Complaint   Patient presents with    Wound Infection       Admitting doctor:   Kuldeep Rothman DO    Consulting provider(s):  Consults       No orders found from 5/7/2024 to 6/6/2024.             Admitting diagnosis:   The primary encounter diagnosis was Wound infection. Diagnoses of History of diabetic ulcer of foot, Hypoxia, Elevated brain natriuretic peptide (BNP) level, and Cardiomegaly were also pertinent to this visit.    Code status:   Current Code Status       Date Active Code Status Order ID Comments User Context       6/5/2024 1551 CPR (Attempt to Resuscitate) [id number]  Kuldeep Rothman DO ED        Question Answer    Code Status (Patient has no pulse and is not breathing) CPR (Attempt to Resuscitate)    Medical Interventions (Patient has pulse or is breathing) Full Support    Level Of Support Discussed With Patient                    Allergies:   Penicillins, Cephalosporins, Hemp seed oil, Neosporin  [neomycin-bacitracin zn-polymyx], Penicillamine, and Silver sulfadiazine    Intake and Output  No intake or output data in the 24 hours ending 06/05/24 1755    Weight:       06/05/24  1411   Weight: (!) 254 kg (560 lb)       Most recent vitals:   Vitals:     06/05/24 1504 06/05/24 1532 06/05/24 1701 06/05/24 1702   BP:  (!) 149/102 156/86    Pulse: 106 103 107 99   Resp:       Temp:       TempSrc:       SpO2: 90%   95%   Weight:       Height:         Oxygen Therapy: .    Active LDAs/IV Access:   Lines, Drains & Airways       Active LDAs       Name Placement date Placement time Site Days    Peripheral IV 06/05/24 1530 Right Antecubital 06/05/24  1530  Antecubital  less than 1    External Urinary Catheter 06/05/24  1500  --  less than 1                    Labs (abnormal labs have a star):   Labs Reviewed   COMPREHENSIVE METABOLIC PANEL - Abnormal; Notable for the following components:       Result Value    Glucose 166 (*)     All other components within normal limits    Narrative:     GFR Normal >60  Chronic Kidney Disease <60  Kidney Failure <15     C-REACTIVE PROTEIN - Abnormal; Notable for the following components:    C-Reactive Protein 1.39 (*)     All other components within normal limits   SEDIMENTATION RATE - Abnormal; Notable for the following components:    Sed Rate 38 (*)     All other components within normal limits   CBC WITH AUTO DIFFERENTIAL - Abnormal; Notable for the following components:    MCHC 31.4 (*)     RDW 15.5 (*)     MPV 12.4 (*)     Lymphocyte % 14.2 (*)     All other components within normal limits   BNP (IN-HOUSE) - Abnormal; Notable for the following components:    proBNP 2,167.0 (*)     All other components within normal limits    Narrative:     This assay is used as an aid in the diagnosis of individuals suspected of having heart failure. It can be used as an aid in the diagnosis of acute decompensated heart failure (ADHF) in patients presenting with signs and symptoms of ADHF to the emergency department (ED). In addition, NT-proBNP of <300 pg/mL indicates ADHF is not likely.    Age Range Result Interpretation  NT-proBNP Concentration (pg/mL:      <50             Positive            >450                   Gray                 300-450          "           Negative             <300    50-75           Positive            >900                  Gray                300-900                  Negative            <300      >75             Positive            >1800                  Gray                300-1800                  Negative            <300   LACTIC ACID, PLASMA - Normal   PROCALCITONIN - Normal    Narrative:     As a Marker for Sepsis (Non-Neonates):    1. <0.5 ng/mL represents a low risk of severe sepsis and/or septic shock.  2. >2 ng/mL represents a high risk of severe sepsis and/or septic shock.    As a Marker for Lower Respiratory Tract Infections that require antibiotic therapy:    PCT on Admission    Antibiotic Therapy       6-12 Hrs later    >0.5                Strongly Recommended  >0.25 - <0.5        Recommended   0.1 - 0.25          Discouraged              Remeasure/reassess PCT  <0.1                Strongly Discouraged     Remeasure/reassess PCT    As 28 day mortality risk marker: \"Change in Procalcitonin Result\" (>80% or <=80%) if Day 0 (or Day 1) and Day 4 values are available. Refer to http://www.[facility] [place] [facility] to the [facility] Genie[facility]ct[facility]calculator.com    Change in PCT <=80%  A decrease of PCT levels below or equal to 80% defines a positive change in PCT test result representing a higher risk for 28-day all-cause mortality of patients diagnosed with severe sepsis for septic shock.    Change in PCT >80%  A decrease of PCT levels of more than 80% defines a negative change in PCT result representing a lower risk for 28-day all-cause mortality of patients diagnosed with severe sepsis or septic shock.      BLOOD CULTURE   BLOOD CULTURE   CBC AND DIFFERENTIAL    Narrative:     The following orders were created for panel order CBC & Differential.  Procedure                               Abnormality         Status                     ---------                               -----------         ------                     CBC Auto Differential[[id number]]        Abnormal            " Final result                 Please view results for these tests on the individual orders.       Meds given in ED:   Medications   vancomycin (VANCOCIN) 3,000 mg in sodium chloride 0.9 % 500 mL IVPB (3,000 mg Intravenous New Bag 6/5/24 1548)   furosemide (LASIX) injection 80 mg (80 mg Intravenous Given 6/5/24 1532)           NIH Stroke Scale:       Isolation/Infection(s):  No active isolations   VRE     COVID Testing  Collected .  Resulted .    Nursing report ED to floor:  Mental status: .  Ambulatory status: .  Precautions: .    ED nurse phone extentsion- ..      Electronically signed by Daniel Palafox RN at 06/05/24 1755       Daniel Palafox RN at 06/05/24 1545          Wound cleansed and irrigated with normal saline. Pictures of wound obtained for patient chart. Pulses doppler ed.  Assist per ER tech Rory. Patient tolerated well. Clean bandage applied.     Electronically signed by Daniel Palafox RN at 06/05/24 1557       Rubi Sevilla APRN at 06/05/24 1430          Subjective   History of Present Illness  Patient is a 63-year-old male who presents emergency department for wound infection.  Patient was seen at primary care provider's office this morning and was sent to our emergency department for further evaluation.  Patient has a wound present to his lateral aspect of left foot.  Reports that this has been there for 2 years.  On exam, patient does have several maggots present.  Patient denies any recent fevers.  Patient reports that home health no longer comes to his house due to insurance issues.         Review of Systems   Skin:  Positive for wound.   All other systems reviewed and are negative.      Past Medical History:   Diagnosis Date    COPD (chronic obstructive pulmonary disease)     Depression     Diabetes mellitus     Hyperlipidemia     Hypertension     Venous insufficiency     Venous ulcer of right leg        Allergies   Allergen Reactions    Penicillins Hives and Itching    Cephalosporins  Other (See Comments)     Per d/c summary of 12/17/15, pt possibly had cross-reactivity to a cephalosporin during his hospitalization    Hemp Seed Oil Hives    Neosporin  [Neomycin-Bacitracin Zn-Polymyx]     Penicillamine     Silver Sulfadiazine        Past Surgical History:   Procedure Laterality Date    APPENDECTOMY      FOOT SURGERY      TESTICLE SURGERY      TRACHEOSTOMY         Family History   Problem Relation Age of Onset    Diabetes Mother     Heart disease Mother     Hypertension Mother     Hyperlipidemia Mother     COPD Mother     Diabetes Father     Heart disease Father     Hypertension Father     No Known Problems Sister     No Known Problems Brother     No Known Problems Sister     No Known Problems Sister     No Known Problems Sister        Social History     Socioeconomic History    Marital status: Single   Tobacco Use    Smoking status: Never    Smokeless tobacco: Never   Substance and Sexual Activity    Alcohol use: No    Drug use: No    Sexual activity: Defer           Objective   Physical Exam  Vitals and nursing note reviewed.   Constitutional:       General: He is not in acute distress.     Appearance: Normal appearance. He is obese. He is not ill-appearing or toxic-appearing.   HENT:      Head: Normocephalic.   Cardiovascular:      Rate and Rhythm: Normal rate and regular rhythm.      Pulses: Normal pulses.      Heart sounds: Normal heart sounds.   Pulmonary:      Effort: Pulmonary effort is normal.      Breath sounds: Normal breath sounds.   Abdominal:      General: Abdomen is flat. Bowel sounds are normal. There is no distension.      Palpations: Abdomen is soft.      Tenderness: There is no abdominal tenderness.   Musculoskeletal:         General: Normal range of motion.      Cervical back: Normal range of motion and neck supple.        Feet:    Feet:      Right foot:      Skin integrity: Dry skin present.      Left foot:      Skin integrity: Ulcer, erythema and dry skin present.       Comments: Open wound present to lateral aspect of left foot.  Maggots present.  Skin:     General: Skin is warm and dry.      Findings: Erythema present.   Neurological:      General: No focal deficit present.      Mental Status: He is alert and oriented to person, place, and time. Mental status is at baseline.   Psychiatric:         Mood and Affect: Mood normal.         Behavior: Behavior normal.         Thought Content: Thought content normal.         Judgment: Judgment normal.         Procedures          ED Course  ED Course as of 06/05/24 1535   Wed Jun 05, 2024   1531 Labs have been reviewed.  CBC with no leukocytosis, stable H&H.  Sed rate and CRP mildly elevated.  Lactic acid normal.  Procalcitonin normal.  Blood cultures pending.  CMP with glucose 166, otherwise unremarkable.  BNP elevated at 2167.  Chest x-ray reviewed Cardiomegaly with pulmonary vascular congestion. An element of interstitial edema suspected. This appears to be a chronic finding in this patient.  X-ray left foot revealed Soft tissue wound along the lateral aspect of the midfoot and hindfoot without definite destructive bony changes. Diffuse soft tissue swelling and edema as discussed above. [KR]   1532 Patient was found to be hypoxic of 85% on room air on arrival.  He was placed on 2 L of oxygen which she does not wear at home.  His BNP is elevated at 2167.  Chest x-ray is revealing cardiomegaly with pulmonary vascular congestion and possible interstitial edema.  He has been given IV Lasix.  According to our records, patient has not had an echocardiogram since 2015. [KR]   1533 Nursing staff has cleaned wound and placed a dressing.  IV vancomycin ordered. [KR]   1533 Case has been discussed with on-call hospitalist, Dr. Cannon.  He has agreed to accept this patient for further management.  Patient will be admitted under Dr. Rothman's service.  Patient is in agreement to plan of care. [KR]      ED Course User Index  [KR] Rubi Sevilla, APRN                                              Medical Decision Making  Problems Addressed:  Cardiomegaly: complicated acute illness or injury  Elevated brain natriuretic peptide (BNP) level: complicated acute illness or injury  History of diabetic ulcer of foot: complicated acute illness or injury  Hypoxia: complicated acute illness or injury  Wound infection: complicated acute illness or injury    Amount and/or Complexity of Data Reviewed  Labs: ordered.  Radiology: ordered.    Risk  Prescription drug management.  Decision regarding hospitalization.        Final diagnoses:   Wound infection   History of diabetic ulcer of foot   Hypoxia   Elevated brain natriuretic peptide (BNP) level   Cardiomegaly       ED Disposition  ED Disposition       ED Disposition   Decision to Admit    Condition   --    Comment   Level of Care: Med/Surg [1]   Diagnosis: Wound infection [092884]   Admitting Physician: MARTHA PIERCE [[id number]]   Certification: I Certify That Inpatient Hospital Services Are Medically Necessary For Greater Than 2 Midnights                 No follow-up provider specified.       Medication List      No changes were made to your prescriptions during this visit.            Rubi Sevilla APRN  06/05/24 1536      Electronically signed by Rubi Sevilla APRN at 06/05/24 1536       Vital Signs (last day)       Date/Time Temp Temp src Pulse Resp BP Patient Position SpO2    06/06/24 1156 97.4 (36.3) Oral 88 19 135/83 Lying 91    06/06/24 1025 -- -- 94 18 -- -- 96    06/06/24 1020 -- -- 89 18 -- -- 93    06/06/24 0745 98.4 (36.9) Oral 96 18 133/88 Lying 96    06/06/24 0644 -- -- 99 18 -- -- 92    06/06/24 0640 -- -- 100 18 -- -- 99    06/06/24 0633 -- -- 89 18 -- -- 94    06/06/24 0236 97.8 (36.6) Oral 89 18 151/91 Lying 96    06/05/24 2316 -- -- 107 -- -- -- 97    06/05/24 2300 98 (36.7) Oral 99 18 141/88 Lying 93    06/05/24 2237 -- -- 104 -- -- -- 98    06/05/24 2230 -- -- 102 18 -- -- 96    06/05/24 1955 97.6 (36.4)  Oral 110 18 152/98 Lying 96    06/05/24 1849 97.5 (36.4) Oral 111 18 145/105 Lying 96    06/05/24 1702 -- -- 99 -- -- -- 95    06/05/24 1701 -- -- 107 -- 156/86 -- --    06/05/24 1532 -- -- 103 -- 149/102 -- --    06/05/24 1504 -- -- 106 -- -- -- 90    06/05/24 1501 -- -- 108 -- 120/97 -- --    06/05/24 1411 97.7 (36.5) Oral 110 23 167/100 -- 85          Current Facility-Administered Medications   Medication Dose Route Frequency Provider Last Rate Last Admin    acetaminophen (TYLENOL) tablet 650 mg  650 mg Oral Q4H PRN Kuldeep Rothman DO        aluminum-magnesium hydroxide-simethicone (MAALOX MAX) 400-400-40 MG/5ML suspension 15 mL  15 mL Oral Q6H PRN Kuldeep Rothman DO        sennosides-docusate (PERICOLACE) 8.6-50 MG per tablet 2 tablet  2 tablet Oral BID PRN Kuldeep Rothman DO        And    polyethylene glycol (MIRALAX) packet 17 g  17 g Oral Daily PRN Kuldeep Rothman DO        And    bisacodyl (DULCOLAX) EC tablet 5 mg  5 mg Oral Daily PRN Kuldeep Rothman DO        And    bisacodyl (DULCOLAX) suppository 10 mg  10 mg Rectal Daily PRN Kuldeep Rothman DO        budesonide (PULMICORT) nebulizer solution 0.5 mg  0.5 mg Nebulization BID - RT Zak Kelly MD   0.5 mg at 06/06/24 0633    cefTRIAXone (ROCEPHIN) 2,000 mg in sodium chloride 0.9 % 100 mL MBP  2,000 mg Intravenous Q24H Kuldeep Rothman  mL/hr at 06/05/24 2056 2,000 mg at 06/05/24 2056    cetirizine (zyrTEC) tablet 10 mg  10 mg Oral Daily Zak Kelly MD   10 mg at 06/06/24 0817    dextrose (D50W) (25 g/50 mL) IV injection 25 g  25 g Intravenous Q15 Min PRN Kuldeep Rothman,         dextrose (GLUTOSE) oral gel 15 g  15 g Oral Q15 Min PRN Kuldeep Rothman,         Enoxaparin Sodium (LOVENOX) syringe 60 mg  60 mg Subcutaneous Q12H Kuldeep Rothman,    60 mg at 06/06/24 0816    fluticasone (FLONASE) 50 MCG/ACT nasal spray 2 spray  2 spray Each Nare Daily Zak Kelly MD        glucagon  (GLUCAGEN) injection 1 mg  1 mg Intramuscular Q15 Min PRN Kuldeep Rothman DO        guaiFENesin (MUCINEX) 12 hr tablet 1,200 mg  1,200 mg Oral BID Emma Beth APRN   1,200 mg at 06/06/24 1116    insulin glargine (LANTUS, SEMGLEE) injection 30 Units  30 Units Subcutaneous Daily Kuldeep Rothman DO   30 Units at 06/06/24 0816    Insulin Lispro (humaLOG) injection 3-14 Units  3-14 Units Subcutaneous 4x Daily AC & at Bedtime Kuldeep Rothman DO   3 Units at 06/06/24 1208    ipratropium (ATROVENT) nebulizer solution 0.5 mg  0.5 mg Nebulization 4x Daily - RT Zak Kelly MD   0.5 mg at 06/06/24 1020    ondansetron ODT (ZOFRAN-ODT) disintegrating tablet 4 mg  4 mg Oral Q6H PRN Kuldeep Rothman DO        sodium chloride 0.9 % flush 10 mL  10 mL Intravenous Q12H Kuldeep Rothman DO   10 mL at 06/06/24 0816    sodium chloride 0.9 % flush 10 mL  10 mL Intravenous PRN Kuldeep Rothman DO        sodium chloride 0.9 % infusion 40 mL  40 mL Intravenous PRN Kuldeep Rothman DO        vancomycin IVPB 1500 mg in 0.9% NaCl (Premix) 500 mL  1,500 mg Intravenous Q12H Kuldeep Rothman .3 mL/hr at 06/06/24 0601 1,500 mg at 06/06/24 0601        Physician Progress Notes (last 24 hours)        Emma Beth APRN at 06/06/24 0836              [facility] [place] [facility]GR[facility] - [facility]    Patient: Jose Landeros  1960   MR# [id number]   Acct# [id number]  06/06/24   08:37 CDT  Referring Provider: Kuldeep Rothman DO    Chief Complaint: Shortness of breath    Interval history: Afebrile.  Saturation 95 on home PAP.  He tried the hospital PAP but felt it was very uncomfortable and hurt his chest.  ABGs this morning on home device appears sufficient.  His cough is less productive.  Continue bronchodilators and incentive.  Added Mucinex.  He feels he is breathing better.    Meds:  budesonide, 0.5 mg, Nebulization, BID - RT  cefTRIAXone, 2,000 mg, Intravenous,  Q24H  cetirizine, 10 mg, Oral, Daily  enoxaparin, 60 mg, Subcutaneous, Q12H  fluticasone, 2 spray, Each Nare, Daily  insulin glargine, 30 Units, Subcutaneous, Daily  insulin lispro, 3-14 Units, Subcutaneous, 4x Daily AC & at Bedtime  ipratropium, 0.5 mg, Nebulization, 4x Daily - RT  sodium chloride, 10 mL, Intravenous, Q12H  vancomycin, 1,500 mg, Intravenous, Q12H           Physical Exam:  SpO2 Percentage    06/06/24 0640 06/06/24 0644 06/06/24 0745   SpO2: 99% 92% 96%     Body mass index is 84.47 kg/m².   Temp:  [97.5 °F (36.4 °C)-98.4 °F (36.9 °C)] 98.4 °F (36.9 °C)  Heart Rate:  [] 96  Resp:  [18-23] 18  BP: (120-167)/() 133/88  Intake/Output Summary (Last 24 hours) at 6/6/2024 0837  Last data filed at 6/6/2024 0625  Gross per 24 hour   Intake --   Output 3800 ml   Net -3800 ml     Physical Exam  Vitals and nursing note reviewed.   Constitutional:       General: He is not in acute distress.     Appearance: He is obese. He is ill-appearing.      Comments: Home CPAP  HENT:      Nose: Congestion present. No rhinorrhea.      Mouth/Throat:      Mouth: Mucous membranes are dry.   Eyes:      General: No scleral icterus.        Right eye: No discharge.         Left eye: No discharge.      Conjunctiva/sclera: Conjunctivae normal.      Pupils: Pupils are equal, round, and reactive to light.   Neck:      Vascular: No carotid bruit.      Comments: Old tracheostomy scar noted in the front of the neck  Cardiovascular:      Rate and Rhythm: Tachycardia present.      Pulses: Normal pulses.      Heart sounds: Normal heart sounds. No murmur heard.     No friction rub. No gallop.   Pulmonary:      Effort: Pulmonary effort is normal. No respiratory distress.      Breath sounds: No stridor.  Rales and rhonchi  Chest:      Chest wall: No tenderness.   Abdominal:      General: Abdomen is flat. Bowel sounds are normal. There is no distension.   Genitourinary:     Comments: Not examined  Musculoskeletal:         General: No  swelling, tenderness, deformity or signs of injury. Normal range of motion.      Cervical back: Normal range of motion and neck supple. No rigidity or tenderness.      Right lower leg: Edema present.      Left lower leg: Edema present.   Lymphadenopathy:      Cervical: No cervical adenopathy.   Skin:     General: Skin is warm and dry.      Capillary Refill: Capillary refill takes less than 2 seconds.      Coloration: Skin is not jaundiced or pale.      Findings: Bruising present. No erythema, lesion or rash.      Comments: Chronic venous stasis noted in both lower extremities   Neurological:      General: No focal deficit present.      Mental Status: He is alert and oriented to person, place, and time. Mental status is at baseline.      Cranial Nerves: No cranial nerve deficit.      Sensory: No sensory deficit.      Motor: Weakness present.      Deep Tendon Reflexes: Reflexes normal.   Psychiatric:         Mood and Affect: Mood normal.         Thought Content: Thought content normal.     Electronically signed by NOAH Keenan, 2024, 08:37 CDT      Physician Substantive Portion: Medical Decision Making to follow:      Electronically signed by Emma Beth APRN at 24 0839          Consult Notes (last 24 hours)        Zak Kelly MD at 24 2118        Consult Orders    1. Inpatient Pulmonology Consult [[id number]] ordered by Kuldeep Rothman DO at 24 1551                       [facility] [place] [facility]    24, 21:18 CDT  Patient Care Team:  Provider, No Known as PCP - General  Name: Jose Landeros  : 1960  MRN: [id number]  Contact Serial Number [id number]    Chief complaint: Acute on chronic hypercapnic and hypoxic respiratory failure, left foot wound, super morbid obesity with BMI of 84.47, obstructive sleep apnea on CPAP, obesity apnea syndrome, dependence on supplemental oxygen, history of tracheostomy in the past, congestive diastolic  heart failure    HPI:  We have been consulted by Kuldeep Rothman DO to see this 63 y.o. male.  Patient was seen as inpatient pulmonary consult in the medical surgical floor and Dr. Rothman discussed the case with me.    Patient has super morbid obesity with BMI of 84.47 and he weighs 572 pounds.  He has a established diagnosis of obstructive sleep apnea which was apparently managed by Dr. Kenny.  He is on home CPAP more nasal mask and according to the patient his last sleep study was done in 2016.  He is homebound and has got very poor mobility.  He has a history of chronic heart problems and had history of heart failure in the past.  According to the patient he was once this hospital in 2015 and needed to be transferred to the [facility] for a suspected acute cardiac event but later was noted to have volume overload and congestive heart failure.  He needed intubation and mechanical ventilation and eventually needed tracheostomy at that time which was later removed.  He was not on home oxygen.  Today he presents with a emergency room with worsening leg wound and some shortness of breath tiredness and lethargy and was noted to have acute on chronic hypercapnic and hypoxic respiratory failure.  He has known diabetes mellitus type 2 he was getting wound care by the home health care team but currently they were not coming due to some vague reasons as mentioned in Dr. Rothman's note.  He was noted to be hypoxic on arrival and his oxygen saturation improved after getting 4 L of oxygen.  Arterial blood gas shows CO2 retention and chronic respiratory failure.    Patient is a non-smoker and reportedly has a history of COPD or asthma and he was on albuterol nebulizer and rescue inhaler but to my knowledge he was not on any long-term inhalers he had a thorough workup done.  The chest x-ray shows cardiomegaly and volume overload he was given Lasix.  X-ray of the right foot shows soft tissue wound no gas shadow noted.   No destructive bony lesions noted.  Patient told me he is vaccinated for COVID and did not have any COVID infection and recent hospitalizations and ER visit.  He lives at home on his own.  He had no recent pulmonary function test done.  The time of my visit patient was on 4 L of oxygen and is oxygenating in the mid 90s.  He appears to be comfortable he also has some allergy problems and nasal drainage and postnasal drip with congestion and cough.  He had bilateral lymphedema venous stasis noted with chronic skin changes and left-sided foot wound with dressing noted.    Past Medical History:   has a past medical history of COPD (chronic obstructive pulmonary disease), Depression, Diabetes mellitus, Hyperlipidemia, Hypertension, Venous insufficiency, and Venous ulcer of right leg.   has a past surgical history that includes Tracheostomy tube placement; Testicle surgery; Foot surgery; and Appendectomy.  Allergies   Allergen Reactions    Penicillins Hives and Itching    Cephalosporins Other (See Comments)     Per d/c summary of 12/17/15, pt possibly had cross-reactivity to a cephalosporin during his hospitalization    Hemp Seed Oil Hives    Neosporin  [Neomycin-Bacitracin Zn-Polymyx]     Penicillamine     Silver Sulfadiazine      Medications:  cefTRIAXone, 2,000 mg, Intravenous, Q24H  enoxaparin, 60 mg, Subcutaneous, Q12H  [START ON 6/6/2024] insulin glargine, 30 Units, Subcutaneous, Daily  insulin lispro, 3-14 Units, Subcutaneous, 4x Daily AC & at Bedtime  sodium chloride, 10 mL, Intravenous, Q12H  [START ON 6/6/2024] vancomycin, 1,500 mg, Intravenous, Q12H         Family History:  Family History   Problem Relation Age of Onset    Diabetes Mother     Heart disease Mother     Hypertension Mother     Hyperlipidemia Mother     COPD Mother     Diabetes Father     Heart disease Father     Hypertension Father     No Known Problems Sister     No Known Problems Brother     No Known Problems Sister     No Known Problems Sister      No Known Problems Sister      Social History:   reports that he has never smoked. He has never used smokeless tobacco. He reports that he does not drink alcohol and does not use drugs.  Review of Systems:  Review of Systems   Constitutional:  Positive for fatigue and unexpected weight change.   HENT:  Positive for congestion, postnasal drip and sinus pressure.    Eyes: Negative.    Respiratory:  Positive for cough, chest tightness and stridor.    Cardiovascular:  Positive for leg swelling. Negative for chest pain.   Gastrointestinal: Negative.    Endocrine: Negative.    Genitourinary: Negative.    Musculoskeletal:  Negative for arthralgias and back pain.   Skin:  Positive for wound.        Left foot wound.  Chronic skin changes and stasis dermatitis noted in both lower extremities.   Allergic/Immunologic: Positive for environmental allergies.   Neurological: Negative.    Hematological: Negative.    Psychiatric/Behavioral: Negative.        Physical Exam:  Temp:  [97.5 °F (36.4 °C)-97.7 °F (36.5 °C)] 97.5 °F (36.4 °C)  Heart Rate:  [] 111  Resp:  [18-23] 18  BP: (120-167)/() 145/105  Intake/Output Summary (Last 24 hours) at 6/5/2024 2118  Last data filed at 6/5/2024 2000  Gross per 24 hour   Intake --   Output 900 ml   Net -900 ml         06/05/24  1411 06/05/24 2000   Weight: (!) 254 kg (560 lb) (!) 259 kg (572 lb)     SpO2 Percentage    06/05/24 1504 06/05/24 1702 06/05/24 1849   SpO2: 90% 95% 96%     Body mass index is 84.47 kg/m².   Physical Exam  Vitals and nursing note reviewed.   Constitutional:       General: He is not in acute distress.     Appearance: He is obese. He is ill-appearing.      Comments: Middle-aged  male has super morbid obesity alert and awake but tired and exhausted   HENT:      Head: Normocephalic and atraumatic.      Right Ear: Tympanic membrane normal. There is no impacted cerumen.      Left Ear: Tympanic membrane normal. There is no impacted cerumen.      Nose:  Congestion present. No rhinorrhea.      Mouth/Throat:      Mouth: Mucous membranes are dry.      Pharynx: Oropharynx is clear. No oropharyngeal exudate or posterior oropharyngeal erythema.      Comments: Narrow upper airway noted Mallampati class IV  Eyes:      General: No scleral icterus.        Right eye: No discharge.         Left eye: No discharge.      Conjunctiva/sclera: Conjunctivae normal.      Pupils: Pupils are equal, round, and reactive to light.   Neck:      Vascular: No carotid bruit.      Comments: Old tracheostomy scar noted in the front of the neck  Cardiovascular:      Rate and Rhythm: Tachycardia present.      Pulses: Normal pulses.      Heart sounds: Normal heart sounds. No murmur heard.     No friction rub. No gallop.   Pulmonary:      Effort: Pulmonary effort is normal. No respiratory distress.      Breath sounds: No stridor. Wheezing and rales present. No rhonchi.      Comments: Decreased breath sound both lungs at the bases  Chest:      Chest wall: No tenderness.   Abdominal:      General: Abdomen is flat. Bowel sounds are normal. There is no distension.      Palpations: There is no mass.      Tenderness: There is no abdominal tenderness. There is no guarding or rebound.      Hernia: No hernia is present.   Genitourinary:     Comments: Not examined  Musculoskeletal:         General: No swelling, tenderness, deformity or signs of injury. Normal range of motion.      Cervical back: Normal range of motion and neck supple. No rigidity or tenderness.      Right lower leg: Edema present.      Left lower leg: Edema present.   Lymphadenopathy:      Cervical: No cervical adenopathy.   Skin:     General: Skin is warm and dry.      Capillary Refill: Capillary refill takes less than 2 seconds.      Coloration: Skin is not jaundiced or pale.      Findings: Bruising present. No erythema, lesion or rash.      Comments: Chronic venous stasis noted in both lower extremities   Neurological:      General: No  focal deficit present.      Mental Status: He is alert and oriented to person, place, and time. Mental status is at baseline.      Cranial Nerves: No cranial nerve deficit.      Sensory: No sensory deficit.      Motor: Weakness present.      Deep Tendon Reflexes: Reflexes normal.   Psychiatric:         Mood and Affect: Mood normal.         Thought Content: Thought content normal.       Result Review  Results from last 7 days   Lab Units 06/05/24  0422   WBC 10*3/mm3 7.87   HEMOGLOBIN g/dL 14.0   PLATELETS 10*3/mm3 142     Results from last 7 days   Lab Units 06/05/24  0422   SODIUM mmol/L 141   POTASSIUM mmol/L 3.7   CO2 mmol/L 26.0   BUN mg/dL 18   CREATININE mg/dL 0.93   GLUCOSE mg/dL 166*     Results from last 7 days   Lab Units 06/05/24  2042   PH, ARTERIAL pH units 7.358   PCO2, ARTERIAL mm Hg 55.3*   PO2 ART mm Hg 78.6*     Microbiology Results (last 10 days)       ** No results found for the last 240 hours. **          Recent radiology:   Imaging Results (Last 72 Hours)       Procedure Component Value Units Date/Time    XR Foot 3+ View Left [[id number]] Collected: 06/05/24 1511     Updated: 06/05/24 1517    Narrative:      EXAMINATION: XR FOOT 3+ VW LEFT-  6/5/2024 3:11 PM     HISTORY: wound. Per the technologist report, the patient was  uncooperative during the examination, limiting the images.     COMPARISON: None      TECHNIQUE:  Frontal, lateral and oblique radiographs of the LEFT foot were provided  for review.     FINDINGS:  Posterior Achilles enthesophyte. Mild edema in Kager's fat pad. Plantar  calcaneal spur noted. Mild diffuse soft tissue swelling and edema,  nonspecific. Soft tissue wound along the lateral aspect of the LEFT  foot. Mild arthritis at the first MTP joint. I don't see any definite  destructive bone changes to suggest acute osteomyelitis on today's  examination. No definite acute fracture. Vascular calcifications are  seen. I do suspect some arthritis at the TMT joints but it is  mild.  Hammertoe deformities of the second through fifth digits.          Impression:         1.  Soft tissue wound along the lateral aspect of the midfoot and  hindfoot without definite destructive bony changes.     2.  Diffuse soft tissue swelling and edema as discussed above.     This report was signed and finalized on 6/5/2024 3:14 PM by Dr. Ranjit Norris MD.       XR Chest 1 View [[id number]] Collected: 06/05/24 1453     Updated: 06/05/24 1457    Narrative:      EXAMINATION: XR CHEST 1 VW-  6/5/2024 2:53 PM     HISTORY: Hypoxic on arrival.     FINDINGS: Today's exam is compared to previous study of 11/2/2021. There  is cardiomegaly with vascular redistribution suggesting pulmonary venous  hypertension with interstitial edema. No acute infiltrate. No  significant effusion is present.       Impression:      1.. Cardiomegaly with pulmonary vascular congestion. An element of  interstitial edema suspected. This appears to be a chronic finding in  this patient.     This report was signed and finalized on 6/5/2024 2:54 PM by Dr. Karl Loving MD.             Personal review of imaging : CXR shows cardiomegaly bilateral pulm infiltrate more on the right side pulmonary edema suspected.  Other test results (not lab or imaging):  Reviewed.  Abnormal blood glucose.  Abnormal arterial blood gas with hypercapnia and hypoxemia  Independent review of ekg: Done  Problem List as identified by Epic (may contain historical, inactive problems)    Wound infection    Diabetes mellitus    COPD (chronic obstructive pulmonary disease)    Morbid obesity with BMI of 70 and over, adult    Obstructive sleep apnea    Obesity hypoventilation syndrome    Chronic hypoxic respiratory failure, on home oxygen therapy    Pulmonary Assessment:    Acute on chronic hypercapnic and hypoxic respiratory failure  Chronic respiratory failure currently oxygen dependent  Super morbid obesity with BMI more than 84  Severe obstructive sleep apnea on CPAP  treatment likely inadequate  Obesity hypoventilation syndrome  COPD apparently non-smoker  History of congestive diastolic heart failure and fluid overload  History of tracheostomy placement and ventilator dependence in the past  Bilateral lymphedema and venous stasis  Hypertension  Hyperlipidemia  Diabetes mellitus type 2  Allergic rhinitis  Left foot wound appears chronic    Recommend/plan:   Patient presented with a left leg wound she was also noted to have acute on chronic hypercapnic and hypoxic respiratory failure  He will use hospital BiPAP.  He has a CPAP machine at bedside which appears to be old and I doubt it is working properly and he also has a very small nasal mask which is probably not working very well.  I will keep the initial BiPAP settings 16/8 with a rate of 20 and FiO2 35% to 40% keep oxygen saturation more than 92%.  Devyn ABG after the overnight BiPAP and then decide about the BiPAP settings  We may change the BiPAP settings to CPAP settings and he may even qualify for home trilogy if he has chronic CO2 retention  Patient was seen and followed by Dr. Kenny and had the last sleep study done almost 9 years ago  He has a documented history of COPD but apparently patient denied any history of tobacco use.  He will need outpatient pulmonary function test and currently I will start him on Pulmicort and Atrovent nebulizer  Albuterol will be avoided due to tachycardia.  In addition to the obstructive lung disease he probably has significant restrictive lung dysfunction due to his body habitus.  Patient has nasal allergy and will be started on fluticasone nasal spray and Zyrtec  Plan for outpatient PFT and sleep study when he is more stable.  Continue blood pressure and glycemic control per hospitalist team  He was already given Lasix for pulmonary edema.  Echocardiogram will be needed and cardiac workup will be also needed.  He will be encouraged to do incentive spirometer to improve pulmonary  compliance.  Wound care will be following the left leg wound.  He has bilateral lymphedema and left leg wound which is bandaged  DVT and stress ulcer prophylaxis and pain and anxiety control.  Wound care.  Echocardiogram will be ordered  Physical activity as tolerated.  Nutritional support.  Repeat labs and imaging studies from time to time  Arterial blood gas ordered for tomorrow morning.  Care plan discussed with Dr. Rothman  CODE STATUS: Full.  Overall prognosis: Guarded.  We appreciate the consult and we will follow  Time spent in seeing this patient as inpatient pulmonary consult was 45 minutes    Thank you for this consult.  We will follow along.    Electronically signed by     Zak Kelly MD,  Pulmonologist/Intensivist   06/05/24, 9:18 PM CDT.        Electronically signed by Zak Kelly MD at 06/05/24 3065

## 2024-06-06 NOTE — CONSULTS
[facility] - PODIATRY    Today's Date: 06/06/24     Patient Name: Jose Landeros  MRN: [id number]  [facility]: [id number]  PCP: Provider, No Known  Referring Provider: No ref. provider found  Attending Provider: Kuldeep Rothman DO  Length of Stay: 1    SUBJECTIVE   Chief Complaint: Left foot wound    HPI: Jose Landeros, a 63 y.o.male, who presented to the emergency department for infected wound.  Patient reports he was seen at his primary care yesterday and they noted maggots in his wound.  He was then sent to the emergency department for evaluation.  Patient reports he has had this wound for over 2 years.  Reports he has been having friends care for his wound because he is unable to reach the area.  He reports he lost coverage from home health approximately 3 weeks ago.  Patient has been established with [facility] and [facility] wound care previously.  He reports he is allergic to all wound care dressings including Unna boots, all compression wraps, silver alginate, Medihoney, antibiotic ointments.  He reports he can only have Tubigrip and gauze to wound.  Patient is wheelchair-bound and has significant edema to bilateral feet.  Patient is unsure at this time if he would be willing to follow-up with wound care due to previous history with other providers at wound care facilities.  Denies any constitutional symptoms. No other pedal complaints at this time.    Past Medical History:   Diagnosis Date    COPD (chronic obstructive pulmonary disease)     Depression     Diabetes mellitus     Hyperlipidemia     Hypertension     Venous insufficiency     Venous ulcer of right leg      Past Surgical History:   Procedure Laterality Date    APPENDECTOMY      FOOT SURGERY      TESTICLE SURGERY      TRACHEOSTOMY       Family History   Problem Relation Age of Onset    Diabetes Mother     Heart disease Mother     Hypertension Mother     Hyperlipidemia Mother     COPD Mother     Diabetes Father     Heart disease Father      Hypertension Father     No Known Problems Sister     No Known Problems Brother     No Known Problems Sister     No Known Problems Sister     No Known Problems Sister      Social History     Socioeconomic History    Marital status: Single   Tobacco Use    Smoking status: Never    Smokeless tobacco: Never   Vaping Use    Vaping status: Never Used   Substance and Sexual Activity    Alcohol use: No    Drug use: No    Sexual activity: Defer     Allergies   Allergen Reactions    Penicillins Hives and Itching    Cephalosporins Other (See Comments)     Per d/c summary of 12/17/15, pt possibly had cross-reactivity to a cephalosporin during his hospitalization    Hemp Seed Oil Hives    Neosporin  [Neomycin-Bacitracin Zn-Polymyx]     Penicillamine     Silver Sulfadiazine      Current Facility-Administered Medications   Medication Dose Route Frequency Provider Last Rate Last Admin    acetaminophen (TYLENOL) tablet 650 mg  650 mg Oral Q4H PRN Kuldeep Rothman DO        aluminum-magnesium hydroxide-simethicone (MAALOX MAX) 400-400-40 MG/5ML suspension 15 mL  15 mL Oral Q6H PRN Kuldeep Rothman DO        sennosides-docusate (PERICOLACE) 8.6-50 MG per tablet 2 tablet  2 tablet Oral BID PRN Kuldeep Rothman DO        And    polyethylene glycol (MIRALAX) packet 17 g  17 g Oral Daily PRN Kuldeep Rothman DO        And    bisacodyl (DULCOLAX) EC tablet 5 mg  5 mg Oral Daily PRN Kuldeep Rothman DO        And    bisacodyl (DULCOLAX) suppository 10 mg  10 mg Rectal Daily PRN Kuldeep Rothman DO        budesonide (PULMICORT) nebulizer solution 0.5 mg  0.5 mg Nebulization BID - RT Zak Kelly MD   0.5 mg at 06/06/24 0633    cefTRIAXone (ROCEPHIN) 2,000 mg in sodium chloride 0.9 % 100 mL MBP  2,000 mg Intravenous Q24H Kuldeep Rothman  mL/hr at 06/05/24 2056 2,000 mg at 06/05/24 2056    cetirizine (zyrTEC) tablet 10 mg  10 mg Oral Daily Zak Kelly MD   10 mg at 06/06/24 0817    dextrose  (D50W) (25 g/50 mL) IV injection 25 g  25 g Intravenous Q15 Min PRN Kuldeep Rothman DO        dextrose (GLUTOSE) oral gel 15 g  15 g Oral Q15 Min PRN Kuldeep Rothman DO        Enoxaparin Sodium (LOVENOX) syringe 60 mg  60 mg Subcutaneous Q12H Kuldeep Rothman DO   60 mg at 06/06/24 0816    fluticasone (FLONASE) 50 MCG/ACT nasal spray 2 spray  2 spray Each Nare Daily Zak Kelly MD        glucagon (GLUCAGEN) injection 1 mg  1 mg Intramuscular Q15 Min PRN Kuldeep Rothman DO        guaiFENesin (MUCINEX) 12 hr tablet 1,200 mg  1,200 mg Oral BID Emma Beth APRN   1,200 mg at 06/06/24 1116    insulin glargine (LANTUS, SEMGLEE) injection 30 Units  30 Units Subcutaneous Daily Kuldeep Rothman DO   30 Units at 06/06/24 0816    Insulin Lispro (humaLOG) injection 3-14 Units  3-14 Units Subcutaneous 4x Daily AC & at Bedtime Kuldeep Rothman DO   3 Units at 06/06/24 1208    ipratropium (ATROVENT) nebulizer solution 0.5 mg  0.5 mg Nebulization 4x Daily - RT Zak Kelly MD   0.5 mg at 06/06/24 1020    ondansetron ODT (ZOFRAN-ODT) disintegrating tablet 4 mg  4 mg Oral Q6H PRN Kuldeep Rothman DO        sodium chloride 0.9 % flush 10 mL  10 mL Intravenous Q12H Kuldeep Rothman DO   10 mL at 06/06/24 0816    sodium chloride 0.9 % flush 10 mL  10 mL Intravenous PRN Kuldeep Rothman DO        sodium chloride 0.9 % infusion 40 mL  40 mL Intravenous PRN Kuldeep Rothman DO        vancomycin IVPB 1500 mg in 0.9% NaCl (Premix) 500 mL  1,500 mg Intravenous Q12H Kuldeep Rothman .3 mL/hr at 06/06/24 0601 1,500 mg at 06/06/24 0601     Review of Systems   Constitutional:  Negative for activity change.   HENT:  Negative for congestion.    Respiratory:  Negative for apnea and shortness of breath.    Cardiovascular:  Positive for leg swelling.   Gastrointestinal:  Negative for abdominal pain.   Musculoskeletal:  Positive for arthralgias. Negative for back pain.    Neurological:  Positive for numbness.   Psychiatric/Behavioral:  Negative for agitation, behavioral problems and confusion.        OBJECTIVE     Vitals:    24 1156   BP: 135/83   Pulse: 88   Resp: 19   Temp: 97.4 °F (36.3 °C)   SpO2: 91%       PHYSICAL EXAM  GEN:   Pt presents in hospital bed. Accompanied by none.     Foot/Ankle Exam    GENERAL  Appearance:  obese  Orientation:  AAOx3  Affect:  appropriate  Assistance:  wheelchair    VASCULAR     Right Foot Vascularity   Dorsalis pedis:  2+  Posterior tibial:  2+  Skin temperature:  warm  Edema gradin+ and non-pitting  CFT:  < 3 seconds  Pedal hair growth:  Absent  Varicosities:  none     Left Foot Vascularity   Dorsalis pedis:  2+  Posterior tibial:  2+  Skin temperature:  warm  Edema gradin+ and pitting  CFT:  < 3 seconds  Pedal hair growth:  Absent  Varicosities:  none     NEUROLOGIC     Right Foot Neurologic   Light touch sensation: diminished  Vibratory sensation: diminished  Hot/Cold sensation: diminished     Left Foot Neurologic   Light touch sensation: diminished  Vibratory sensation: diminished  Hot/Cold sensation:  diminished    MUSCULOSKELETAL     Right Foot Musculoskeletal   Ecchymosis:  none  Tenderness:  toenail problem       Left Foot Musculoskeletal   Ecchymosis:  none  Tenderness:  toenail problem    MUSCLE STRENGTH     Right Foot Muscle Strength   Foot dorsiflexion:  4+  Foot plantar flexion:  4+  Foot inversion:  4+  Foot eversion:  4+     Left Foot Muscle Strength   Foot dorsiflexion:  4+  Foot plantar flexion:  4+  Foot inversion:  4+  Foot eversion:  4+    DERMATOLOGIC        Left Foot Dermatologic   Skin  Positive for wound.      Left foot additional comments: Wound to lateral foot- slough to wound bed, scant amount of drainage.  No signs of infection.   See photos attached.          RADIOLOGY/NUCLEAR:  XR Foot 3+ View Left    Result Date: 2024  Narrative: EXAMINATION: XR FOOT 3+ VW LEFT-  2024 3:11 PM  HISTORY:  wound. Per the technologist report, the patient was uncooperative during the examination, limiting the images.  COMPARISON: None  TECHNIQUE: Frontal, lateral and oblique radiographs of the LEFT foot were provided for review.  FINDINGS: Posterior Achilles enthesophyte. Mild edema in Kager's fat pad. Plantar calcaneal spur noted. Mild diffuse soft tissue swelling and edema, nonspecific. Soft tissue wound along the lateral aspect of the LEFT foot. Mild arthritis at the first MTP joint. I don't see any definite destructive bone changes to suggest acute osteomyelitis on today's examination. No definite acute fracture. Vascular calcifications are seen. I do suspect some arthritis at the TMT joints but it is mild. Hammertoe deformities of the second through fifth digits.       Impression:  1.  Soft tissue wound along the lateral aspect of the midfoot and hindfoot without definite destructive bony changes.  2.  Diffuse soft tissue swelling and edema as discussed above.  This report was signed and finalized on 6/5/2024 3:14 PM by Dr. Ranjit Norris MD.      XR Chest 1 View    Result Date: 6/5/2024  Narrative: EXAMINATION: XR CHEST 1 VW-  6/5/2024 2:53 PM  HISTORY: Hypoxic on arrival.  FINDINGS: Today's exam is compared to previous study of 11/2/2021. There is cardiomegaly with vascular redistribution suggesting pulmonary venous hypertension with interstitial edema. No acute infiltrate. No significant effusion is present.      Impression: 1.. Cardiomegaly with pulmonary vascular congestion. An element of interstitial edema suspected. This appears to be a chronic finding in this patient.  This report was signed and finalized on 6/5/2024 2:54 PM by Dr. Karl Loving MD.       LABORATORY/CULTURE RESULTS:  Results from last 7 days   Lab Units 06/05/24  0422   WBC 10*3/mm3 7.87   HEMOGLOBIN g/dL 14.0   HEMATOCRIT % 44.6   PLATELETS 10*3/mm3 142     Results from last 7 days   Lab Units 06/05/24  0422   SODIUM mmol/L 141    POTASSIUM mmol/L 3.7   CHLORIDE mmol/L 102   CO2 mmol/L 26.0   BUN mg/dL 18   CREATININE mg/dL 0.93   CALCIUM mg/dL 9.0   BILIRUBIN mg/dL 0.8   ALK PHOS U/L 84   ALT (SGPT) U/L 12   AST (SGOT) U/L 14   GLUCOSE mg/dL 166*         Microbiology Results (last 10 days)       Procedure Component Value - Date/Time    Wound Culture - Wound, Foot, Left [[id number]] Collected: 06/06/24 0303    Lab Status: Preliminary result Specimen: Wound from Foot, Left Updated: 06/06/24 1236     Gram Stain No WBCs or organisms seen    MRSA Screen, PCR (Inpatient) - Swab, Nares [[id number]]  (Abnormal) Collected: 06/05/24 2054    Lab Status: Final result Specimen: Swab from Nares Updated: 06/05/24 2213     MRSA PCR MRSA Detected    Narrative:      The negative predictive value of this diagnostic test is high and should only be used to consider de-escalating anti-MRSA therapy. A positive result may indicate colonization with MRSA and must be correlated clinically.            PATHOLOGY RESULTS:       ASSESSMENT/PLAN   Left foot wound- stable  COPD  Diabetes Mellitus    Continue wound care orders - adaptic to wound bed, gauze, kerlix wrap BID. No compression at this time due to patient's report of an allergy to all compression.    Follow up with outpatient wound care upon discharge.  Patient will need assistance with wound care at home due to inability to reach wound.  He reports he will try to have his friends continue to come do regular dressing changes.     Thank you for the consult. We will continue to follow patient while he is admitted.            This document has been electronically signed by NOAH Wilson on June 6, 2024 13:09 CDT

## 2024-06-06 NOTE — CASE MANAGEMENT/SOCIAL WORK
Discharge Planning Assessment  [facility]     Patient Name: Jose Landeros  MRN: [id number]  Today's Date: 6/6/2024    Admit Date: 6/5/2024        Discharge Needs Assessment       Row Name 06/06/24 1543       Living Environment    People in Home alone    Current Living Arrangements home    Potentially Unsafe Housing Conditions none    Primary Care Provided by self    Provides Primary Care For no one    Quality of Family Relationships unable to assess       Resource/Environmental Concerns    Resource/Environmental Concerns environmental;reliable transportation       Transition Planning    Patient/Family Anticipates Transition to home    Patient/Family Anticipated Services at Transition home health care    Transportation Anticipated family or friend will provide       Discharge Needs Assessment    Readmission Within the Last 30 Days no previous admission in last 30 days    Equipment Currently Used at Home cpap;bath bench;wheelchair, motorized    Concerns to be Addressed compliance issue;care coordination/care conferences    Anticipated Changes Related to Illness inability to care for self    Current Discharge Risk lives alone;chronically ill;lack of support system/caregiver    Discharge Coordination/Progress Attempted to speak with pt about d/c needs. He was able to say he lives alone and wants to go home at d/c. However, he could not stay awake during the conversation. Noted that pt has had home health before. Was able to verify he has had [facility] a couple years ago and was noncompliant and did not want to try different wound care treatments that were offered. Most recently he had [facility]. Insurance stopped paying on 5/3 but they did make two courtesy visits after, with the last one being 5/29. They noted that pt was noncompliant and unable to do his own wound care. It is unknown if insurance stopped paying due to noncompliance or if there is another reason. Will need to speak with pt again when he is more  awake.                   Discharge Plan    No documentation.                 Continued Care and Services - Admitted Since 6/5/2024    No active coordination exists for this encounter.          Demographic Summary    No documentation.                  Functional Status    No documentation.                  Psychosocial    No documentation.                  Abuse/Neglect    No documentation.                  Legal    No documentation.                  Substance Abuse    No documentation.                  Patient Forms    No documentation.                     CLAY Seo

## 2024-06-06 NOTE — PLAN OF CARE
Goal Outcome Evaluation:  Plan of Care Reviewed With: patient        Progress: no change  Outcome Evaluation: Pt A&O X4. Lm score <18; pt refusing protective foam dressings on bottom and heels, explained to pt that he is at risk for pressure injuries. Turned q2. 4L O2 NC; sats WNL. Wears home CPAP at night. Voiding; purewick in place. IV abx infusing per orders; IID otherwise. ACHS accuchecks. VSS safety maintained.

## 2024-06-06 NOTE — THERAPY EVALUATION
Patient Name: Jose Landeros  : 1960    MRN: [id number]                              Today's Date: 2024       Admit Date: 2024    Visit Dx:     ICD-10-CM ICD-9-CM   1. Wound infection  T14.8XXA 958.3    L08.9    2. History of diabetic ulcer of foot  Z86.31 V12.29   3. Hypoxia  R09.02 799.02   4. Elevated brain natriuretic peptide (BNP) level  R79.89 790.99   5. Cardiomegaly  I51.7 429.3   6. Dysphagia, unspecified type  R13.10 787.20     Patient Active Problem List   Diagnosis    Venous insufficiency    Hypertension    Diabetes mellitus    Cellulitis of left lower extremity    COPD (chronic obstructive pulmonary disease)    Cellulitis of lower extremity    UTI (urinary tract infection), bacterial    Morbid obesity with BMI of 70 and over, adult    Obstructive sleep apnea    Obesity hypoventilation syndrome    Hypoxia    Wound infection    Chronic hypoxic respiratory failure, on home oxygen therapy     Past Medical History:   Diagnosis Date    COPD (chronic obstructive pulmonary disease)     Depression     Diabetes mellitus     Hyperlipidemia     Hypertension     Venous insufficiency     Venous ulcer of right leg      Past Surgical History:   Procedure Laterality Date    APPENDECTOMY      FOOT SURGERY      TESTICLE SURGERY      TRACHEOSTOMY        General Information       Row Name 24 1333          OT Time and Intention    Document Type evaluation  cc: LLE foot wound. Dx: Infected wound  -LS     Mode of Treatment occupational therapy  -LS       Row Name 24 2570          General Information    Patient Profile Reviewed yes  -LS     Prior Level of Function independent:;ADL's;all household mobility;w/c or scooter  -LS     Existing Precautions/Restrictions fall  -LS     Barriers to Rehab physical barrier;previous functional deficit  -LS       Row Name 24 4482          Occupational Profile    Environmental Supports and Barriers (Occupational Profile) Walk in shower with shower chair and  grab bars. Elevated toilet with grab bars. Hospital bed. Other AD/DME: power chair, rwx  -       Row Name 06/06/24 1333          Living Environment    People in Home alone  -       Row Name 06/06/24 1333          Home Main Entrance    Number of Stairs, Main Entrance none  ramp  -       Row Name 06/06/24 1333          Stairs Within Home, Primary    Number of Stairs, Within Home, Primary none  -       Row Name 06/06/24 1333          Cognition    Orientation Status (Cognition) oriented x 4  -       Row Name 06/06/24 1333          Safety Issues, Functional Mobility    Safety Issues Affecting Function (Mobility) safety precaution awareness;safety precautions follow-through/compliance  -     Impairments Affecting Function (Mobility) endurance/activity tolerance;strength;shortness of breath;balance  -               User Key  (r) = Recorded By, (t) = Taken By, (c) = Cosigned By      Initials Name Provider Type     Shania Thayer OTR/L Occupational Therapist                     Mobility/ADL's       Row Name 06/06/24 1333          Bed Mobility    Bed Mobility rolling left;rolling right;sit-supine  -     Rolling Left [place] (Bed Mobility) standby assist  -LS     Rolling Right [place] (Bed Mobility) standby assist  -LS     Sit-Supine [place] (Bed Mobility) maximum assist (25% patient effort);2 person assist  -     Assistive Device (Bed Mobility) bed rails;head of bed elevated  -       Row Name 06/06/24 1333          Activities of Daily Living    BADL Assessment/Intervention upper body dressing;lower body dressing;toileting  -       Row Name 06/06/24 1333          Upper Body Dressing Assessment/Training    [place] Level (Upper Body Dressing) upper body dressing skills;standby assist  -     Position (Upper Body Dressing) edge of bed sitting  -       Row Name 06/06/24 1333          Lower Body Dressing Assessment/Training    [place] Level (Lower Body Dressing) lower body  dressing skills;dependent (less than 25% patient effort)  -LS     Position (Lower Body Dressing) supine  -LS       Row Name 06/06/24 1333          Toileting Assessment/Training    [place] Level (Toileting) toileting skills;dependent (less than 25% patient effort)  -LS     Position (Toileting) supine  -LS               User Key  (r) = Recorded By, (t) = Taken By, (c) = Cosigned By      Initials Name Provider Type    Shania Jordan OTR/L Occupational Therapist                   Obj/Interventions       Row Name 06/06/24 1333          Sensory Assessment (Somatosensory)    Sensory Assessment (Somatosensory) UE sensation intact  -LS       Row Name 06/06/24 1333          Vision Assessment/Intervention    Visual Impairment/Limitations WFL  -LS       Row Name 06/06/24 1333          Range of Motion Comprehensive    General Range of Motion bilateral upper extremity ROM WFL  -[facility] Name 06/06/24 1333          Strength Comprehensive (MMT)    Comment, General Manual Muscle Testing (MMT) Assessment BUE strength grossly 4+/5  -LS       Row Name 06/06/24 1333          Balance    Balance Assessment sitting static balance;sitting dynamic balance  -LS     Static Sitting Balance standby assist  -LS     Dynamic Sitting Balance contact guard  -LS     Position, Sitting Balance sitting edge of bed;supported  -LS               User Key  (r) = Recorded By, (t) = Taken By, (c) = Cosigned By      Initials Name Provider Type    Shania Jordan OTR/L Occupational Therapist                   Goals/Plan       Row Name 06/06/24 1333          Transfer Goal 1 (OT)    Activity/Assistive Device (Transfer Goal 1, OT) commode, bedside without drop arms  -LS     [place] Level/Cues Needed (Transfer Goal 1, OT) modified independence  -LS     Time Frame (Transfer Goal 1, OT) long term goal (LTG);10 days  -LS     Progress/Outcome (Transfer Goal 1, OT) new goal  -       Row Name 06/06/24 1333          Toileting Goal 1 (OT)     Activity/Device (Toileting Goal 1, OT) toileting skills, all;commode, bedside without drop arms  -LS     [place] Level/Cues Needed (Toileting Goal 1, OT) modified independence  -LS     Time Frame (Toileting Goal 1, OT) long term goal (LTG);10 days  -LS     Progress/Outcome (Toileting Goal 1, OT) new goal  -LS       Row Name 06/06/24 1333          Therapy Assessment/Plan (OT)    Planned Therapy Interventions (OT) activity tolerance training;functional balance retraining;occupation/activity based interventions;ROM/therapeutic exercise;strengthening exercise;transfer/mobility retraining;patient/caregiver education/training;adaptive equipment training;BADL retraining  -LS               User Key  (r) = Recorded By, (t) = Taken By, (c) = Cosigned By      Initials Name Provider Type    Shania Jordan, OTR/L Occupational Therapist                   Clinical Impression       Row Name 06/06/24 1333          Pain Assessment    Pretreatment Pain Rating 0/10 - no pain  -LS     Posttreatment Pain Rating 0/10 - no pain  -LS       Row Name 06/06/24 1333          Plan of Care Review    Plan of Care Reviewed With patient  -LS     Progress no change  -LS     Outcome Evaluation OT eval completed. Pt seated EOB upon therapist arrival; A&Ox4; No c/o pain; 4L BNC with SpO2 in mid 90s. Pt reports Mod I with all BADLs including fxl mobility with use of power chair at baseline; Pt reports he did ambulate very short distances with rwx at baseline. Today, Pt reported dizziness while seated EOB upon therapist arrival therefore, standing was not attempted. Pt performed sit>supine utilizing bedrail with HOB elevated requiring Max A x2 due to difficulty moving on inflated air mattress. Pt performed rolling to L and R with SBA. On this date, Pt required extensive assist with LB dressing/toileting at bed level due to difficulty performing the tasks at bed level in supine, however, Pt reports that he feels he would be able to perform these  tasks in standing if he was not dizzy. BUE strength WNL. SpO2 remained WNL throughout eval. Skilled OT intervention indicated in order to address remaining deficits in fxl mobility, fxl activity tolerance, balance, strength, and use of adaptive techniques/equipment during performance of BADLs. Anticipate Pt to return home with assist and HH at discharge.  -       Row Name 06/06/24 1333          Therapy Assessment/Plan (OT)    Rehab Potential (OT) fair, will monitor progress closely  -     Criteria for Skilled Therapeutic Interventions Met (OT) yes;skilled treatment is necessary  -     Therapy Frequency (OT) 3 times/wk  -       Row Name 06/06/24 1333          Therapy Plan Review/Discharge Plan (OT)    Anticipated Discharge Disposition (OT) home with assist;home with home health  -       Row Name 06/06/24 1333          Positioning and Restraints    Pre-Treatment Position in bed  -LS     Post Treatment Position bed  -LS     In Bed fowlers;side rails up x3;call light within reach;encouraged to call for assist  -LS               User Key  (r) = Recorded By, (t) = Taken By, (c) = Cosigned By      Initials Name Provider Type    Shania Jordan, ROBLESR/L Occupational Therapist                   Outcome Measures       Row Name 06/06/24 1333          How much help from another is currently needed...    Putting on and taking off regular lower body clothing? 2  -LS     Bathing (including washing, rinsing, and drying) 2  -LS     Toileting (which includes using toilet bed pan or urinal) 2  -LS     Putting on and taking off regular upper body clothing 4  -LS     Taking care of personal grooming (such as brushing teeth) 4  -LS     Eating meals 4  -LS     AM-PAC 6 Clicks Score (OT) 18  -       Row Name 06/06/24 1333          Functional Assessment    Outcome Measure Options AM-PAC 6 Clicks Daily Activity (OT)  -               User Key  (r) = Recorded By, (t) = Taken By, (c) = Cosigned By      Initials Name Provider Type     Shania Jordan OTR/L Occupational Therapist                    Occupational Therapy Education       Title: PT OT SLP Therapies (In Progress)       Topic: Occupational Therapy (In Progress)       Point: ADL training (Done)       Description:   Instruct learner(s) on proper safety adaptation and remediation techniques during self care or transfers.   Instruct in proper use of assistive devices.                  Learning Progress Summary             Patient Acceptance, E, VU by  at 6/6/2024 1419                         Point: Home exercise program (Not Started)       Description:   Instruct learner(s) on appropriate technique for monitoring, assisting and/or progressing therapeutic exercises/activities.                  Learner Progress:  Not documented in this visit.              Point: Precautions (Done)       Description:   Instruct learner(s) on prescribed precautions during self-care and functional transfers.                  Learning Progress Summary             Patient Acceptance, E, VU by SKYLER at 6/6/2024 1419                         Point: Body mechanics (Not Started)       Description:   Instruct learner(s) on proper positioning and spine alignment during self-care, functional mobility activities and/or exercises.                  Learner Progress:  Not documented in this visit.                              User Key       Initials Effective Dates Name Provider Type Discipline     06/20/22 -  Shania Thayer OTR/L Occupational Therapist OT                  OT Recommendation and Plan  Planned Therapy Interventions (OT): activity tolerance training, functional balance retraining, occupation/activity based interventions, ROM/therapeutic exercise, strengthening exercise, transfer/mobility retraining, patient/caregiver education/training, adaptive equipment training, BADL retraining  Therapy Frequency (OT): 3 times/wk  Plan of Care Review  Plan of Care Reviewed With: patient  Progress: no change  Outcome  Evaluation: OT eval completed. Pt seated EOB upon therapist arrival; A&Ox4; No c/o pain; 4L BNC with SpO2 in mid 90s. Pt reports Mod I with all BADLs including fxl mobility with use of power chair at baseline; Pt reports he did ambulate very short distances with rwx at baseline. Today, Pt reported dizziness while seated EOB upon therapist arrival therefore, standing was not attempted. Pt performed sit>supine utilizing bedrail with HOB elevated requiring Max A x2 due to difficulty moving on inflated air mattress. Pt performed rolling to L and R with SBA. On this date, Pt required extensive assist with LB dressing/toileting at bed level due to difficulty performing the tasks at bed level in supine, however, Pt reports that he feels he would be able to perform these tasks in standing if he was not dizzy. BUE strength WNL. SpO2 remained WNL throughout eval. Skilled OT intervention indicated in order to address remaining deficits in fxl mobility, fxl activity tolerance, balance, strength, and use of adaptive techniques/equipment during performance of BADLs. Anticipate Pt to return home with assist and HH at discharge.     Time Calculation:         Time Calculation- OT       Row Name 06/06/24 1333             Time Calculation- OT    OT Start Time 1333  +10 minutes chart review  -      OT Stop Time 1404  -      OT Time Calculation (min) 31 min  -      OT Non-Billable Time (min) 41 min  -      OT Received On 06/06/24  -      OT Goal Re-Cert Due Date 06/16/24  -                User Key  (r) = Recorded By, (t) = Taken By, (c) = Cosigned By      Initials Name Provider Type     Shania Thayer OTR/L Occupational Therapist                  Therapy Charges for Today       Code Description Service Date Service Provider Modifiers Qty    02318418756 HC OT EVAL MOD COMPLEXITY 3 6/6/2024 Shania Thayer OTR/L GO 1                 Shania Thayer OTR/MAROCS  6/6/2024

## 2024-06-06 NOTE — PROGRESS NOTES
"Pharmacy Dosing Service  Pharmacokinetics  Vancomycin Initial Evaluation  Assessment/Action/Plan:  Loading dose?: 3000 mg  Current Order: Vancomycin 1500 mg IVPB every 12 hours  Current end date:6/11/24  Levels: not ordered at this time  Additional antimicrobial agent(s): Rocephin  MRSA Nasal PCR ordered: Yes (Vancomycin indication is pneumonia, bone/joint, SSTI or IAI)    Vancomycin dosage initiated based on population pharmacokinetic parameters. Pharmacy will continue to follow daily and adjust dose accordingly.     Subjective:  Jose Landeros is a 63 y.o. male with a Vancomycin \"Pharmacy to Dose\" consult for the treatment of SSTI , day 1 of 7 of treatment.    AUC Model Data:  Loading dose: 3000 mg  Regimen: 1500 mg IV every 12 hours.  Start time: 06:00 on 06/06/2024  Exposure target: AUC24 (range)400-600 mg/L.hr   AUC24,ss: 538 mg/L.hr  PAUC*: 71 %  Ctrough,ss: 13.6 mg/L  Pconc*: 33 %  Tox.: 9 %    Objective:  Ht: 175.3 cm (69\"); Wt: (!) 254 kg (560 lb)  Estimated Creatinine Clearance: 161.3 mL/min (by C-G formula based on SCr of 0.93 mg/dL).   Creatinine   Date Value Ref Range Status   06/05/2024 0.93 0.76 - 1.27 mg/dL Final   03/25/2022 1.12 0.76 - 1.27 mg/dL Final   11/01/2021 0.94 0.76 - 1.27 mg/dL Final   10/05/2020 1.1 0.5 - 1.2 mg/dL Final   01/30/2020 1 0.5 - 1.2 mg/dL Final   01/27/2020 1.1 0.5 - 1.2 mg/dL Final      Lab Results   Component Value Date    WBC 7.87 06/05/2024    WBC 7.7 01/15/2024    WBC 8.7 11/08/2022      Baseline culture results:  Microbiology Results (last 10 days)       ** No results found for the last 240 hours. **            MARCOS Yin, [facility]  06/05/24 20:11 CDT    "

## 2024-06-06 NOTE — THERAPY DISCHARGE NOTE
Acute Care - Speech Language Pathology   Swallow Initial Evaluation/Discharge [facility]     Patient Name: Jose Landeros  : 1960  MRN: [id number]  Today's Date: 2024               Admit Date: 2024  Clinical bedside swallow evaluation complete. The pt is alert sitting upright in bed at about to begin his breakfast consisting of regular, soft solids, and thin liquid consistencies. His oral mechanism exam was unremarkable. SLP observed pt complete 75% of breakfast with adequate oral prep, bolus formation, and bolus clearance. Laryngeal elevation is noted to be WFL with palpation during swallow completion. His voice remained clear and no overt s/s of distress or aspiration is noted. The pt is ok to continue a regular diet with thin liquids. Ok to continue med administration whole with thin liquids as tolerated. Pt does not warrant skilled SLP intervention at this time. Please re-consult if concerns arise.    Phu Aguilar CCC-SLP 2024 10:27 CDT    Visit Dx:    ICD-10-CM ICD-9-CM   1. Wound infection  T14.8XXA 958.3    L08.9    2. History of diabetic ulcer of foot  Z86.31 V12.29   3. Hypoxia  R09.02 799.02   4. Elevated brain natriuretic peptide (BNP) level  R79.89 790.99   5. Cardiomegaly  I51.7 429.3   6. Dysphagia, unspecified type  R13.10 787.20     Patient Active Problem List   Diagnosis    Venous insufficiency    Hypertension    Diabetes mellitus    Cellulitis of left lower extremity    COPD (chronic obstructive pulmonary disease)    Cellulitis of lower extremity    UTI (urinary tract infection), bacterial    Morbid obesity with BMI of 70 and over, adult    Obstructive sleep apnea    Obesity hypoventilation syndrome    Hypoxia    Wound infection    Chronic hypoxic respiratory failure, on home oxygen therapy     Past Medical History:   Diagnosis Date    COPD (chronic obstructive pulmonary disease)     Depression     Diabetes mellitus     Hyperlipidemia     Hypertension     Venous insufficiency      Venous ulcer of right leg      Past Surgical History:   Procedure Laterality Date    APPENDECTOMY      FOOT SURGERY      TESTICLE SURGERY      TRACHEOSTOMY         SLP Recommendation and Plan  SLP Swallowing Diagnosis: swallow WFL/no suspected pharyngeal impairment (06/06/24 0801)  SLP Diet Recommendation: regular textures, thin liquids (06/06/24 0801)     Monitor for Signs of Aspiration: yes, notify SLP if any concerns (06/06/24 0801)  Recommended Diagnostics: No further SLP services recommended (06/06/24 0801)  Swallow Criteria for Skilled Therapeutic Interventions Met: no problems identified which require skilled intervention, baseline status (06/06/24 0801)  Anticipated Discharge Disposition (SLP): unknown (06/06/24 1022)  Rehab Potential/Prognosis, Swallowing: good, to achieve stated therapy goals (06/06/24 0801)  Therapy Frequency (Swallow): evaluation only (06/06/24 0801)              Anticipated Discharge Disposition (SLP): unknown (06/06/24 1022)           Reason for Discharge: all goals and outcomes met, no further needs identified (06/06/24 1022)                Plan of Care Reviewed With: patient (06/06/24 1018)  Outcome Evaluation: See note (06/06/24 1018)    SWALLOW EVALUATION (Last 72 Hours)       SLP Adult Swallow Evaluation       Row Name 06/06/24 0801                   Rehab Evaluation    Document Type evaluation  -CS        Subjective Information complains of;weakness  -CS        Patient Observations alert;cooperative;agree to therapy  -CS        Session Not Performed patient/family declined evaluation  -CS        Patient Effort good  -CS           General Information    Patient Profile Reviewed yes  -CS        Pertinent History Of Current Problem COPD, hypoxia, CXR- No acute infiltrate.  -CS        Current Method of Nutrition regular textures;thin liquids  -CS        Precautions/Limitations, Vision WFL  -CS        Precautions/Limitations, Hearing WFL  -CS        Prior Level of  Function-Communication WFL  -CS        Prior Level of Function-Swallowing no diet consistency restrictions  -CS        Plans/Goals Discussed with patient  -CS        Barriers to Rehab none identified  -CS        Patient's Goals for Discharge patient did not state  -CS           Pain    Additional Documentation Pain Scale: FACES Pre/Post-Treatment (Group)  -CS           Pain Scale: FACES Pre/Post-Treatment    Pain: FACES Scale, Pretreatment 2-->hurts little bit  -CS        Posttreatment Pain Rating 2-->hurts little bit  -CS           Oral Motor Structure and Function    Dentition Assessment missing teeth  -CS        Secretion Management WNL/WFL  -CS        Mucosal Quality moist, healthy  -CS        Volitional Swallow WFL  -CS        Volitional Cough WFL  -CS           Oral Musculature and Cranial Nerve Assessment    Oral Motor General Assessment WFL  -CS           General Eating/Swallowing Observations    Respiratory Support Currently in Use nasal cannula  -CS        Eating/Swallowing Skills self-fed  -CS        Positioning During Eating upright in bed  -CS        Utensils Used spoon;straw  -CS        Consistencies Trialed regular textures;soft to chew textures;thin liquids  -CS           Clinical Swallow Eval    Oral Prep Phase WFL  -CS        Oral Transit WFL  -CS        Oral Residue WFL  -CS        Pharyngeal Phase WFL  -CS        Esophageal Phase unremarkable  -CS        Clinical Swallow Evaluation Summary See note  -CS           SLP Evaluation Clinical Impression    SLP Swallowing Diagnosis swallow WFL/no suspected pharyngeal impairment  -CS        Functional Impact no impact on function  -CS        Rehab Potential/Prognosis, Swallowing good, to achieve stated therapy goals  -CS        Swallow Criteria for Skilled Therapeutic Interventions Met no problems identified which require skilled intervention;baseline status  -CS           Recommendations    Therapy Frequency (Swallow) evaluation only  -CS        SLP  Diet Recommendation regular textures;thin liquids  -CS        Recommended Diagnostics No further SLP services recommended  -CS        Recommended Precautions and Strategies upright posture during/after eating;small bites of food and sips of liquid  -CS        Oral Care Recommendations Oral Care BID/PRN  -CS        SLP Rec. for Method of Medication Administration meds whole;with thin liquids;as tolerated  -CS        Monitor for Signs of Aspiration yes;notify SLP if any concerns  -CS        Anticipated Discharge Disposition (SLP) unknown  -CS                  User Key  (r) = Recorded By, (t) = Taken By, (c) = Cosigned By      Initials Name Effective Dates    CS Phu Aguilar CCC-SLP 05/07/24 -                     EDUCATION  The patient has been educated in the following areas:   Dysphagia (Swallowing Impairment).                   Time Calculation:    Time Calculation- SLP       Row Name 06/06/24 1023             Time Calculation- SLP    SLP Start Time 0801  -CS      SLP Stop Time 0911  -      SLP Time Calculation (min) 70 min  -CS      SLP Received On 06/06/24  -CS      SLP Goal Re-Cert Due Date 06/16/24  -CS         Untimed Charges    SLP Eval/Re-eval  ST Eval Oral Pharyng Swallow - [id number]  -CS      [id number] Eval Oral Pharyng Swallow Minutes 70  -CS         Total Minutes    Untimed Charges Total Minutes 70  -CS       Total Minutes 70  -CS                User Key  (r) = Recorded By, (t) = Taken By, (c) = Cosigned By      Initials Name Provider Type    CS Phu Aguilar CCC-SLP Speech and Language Pathologist                    Therapy Charges for Today       Code Description Service Date Service Provider Modifiers Qty    63804667891 HC ST EVAL ORAL PHARYNG SWALLOW 5 6/6/2024 Phu Aguilar CCC-SLP GN 1                 SLP Discharge Summary  Anticipated Discharge Disposition (SLP): unknown  Reason for Discharge: all goals and outcomes met, no further needs identified  Progress Toward Achieving Short/long Term Goals:  all goals met within established timelines  Discharge Destination: unknown    Phu Aguilar CCC-SLP  6/6/2024

## 2024-06-06 NOTE — PLAN OF CARE
Goal Outcome Evaluation:                     Anticipated Discharge Disposition (SLP): unknown          SLP Swallowing Diagnosis: swallow WFL/no suspected pharyngeal impairment (06/06/24 0801)           Clinical bedside swallow evaluation complete. The pt is alert sitting upright in bed at about to begin his breakfast consisting of regular, soft solids, and thin liquid consistencies. His oral mechanism exam was unremarkable. SLP observed pt complete 75% of breakfast with adequate oral prep, bolus formation, and bolus clearance. Laryngeal elevation is noted to be WFL with palpation during swallow completion. His voice remained clear and no overt s/s of distress or aspiration is noted. The pt is ok to continue a regular diet with thin liquids. Ok to continue med administration whole with thin liquids as tolerated. Pt does not warrant skilled SLP intervention at this time. Please re-consult if concerns arise.

## 2024-06-06 NOTE — PLAN OF CARE
Goal Outcome Evaluation:  Plan of Care Reviewed With: patient        Progress: improving  Outcome Evaluation: Initial nutrition assessment. Pt identified at nutrition risk secondary to nurse admission screen for difficulty chewing/swallowing and a wound care provider consult. Pt has a chronic L foot ulcer. Per MD notes, the wound looks good today. He had a swallow evaluation with swallow WFL. Pt is on a regular diet with thin liquids. He consumed 100% of breakfast this morning. He is obese with a BMI of 84.47. MD notes indicate he may go home tomorrow. Will follow.

## 2024-06-06 NOTE — H&P
"    [facility]  HISTORY AND PHYSICAL    Date of Admission: 6/5/2024  Primary Care Physician: Provider, No Known    Subjective   Primary Historian: The patient    Chief Complaint: Left foot wound    History of Present Illness    This 63-year-old male presents to the emergency department with a chief complaint of a nonhealing wound on his left foot.  The wound is chronic having been present for well over 2 years.  On examination, the emergency department APRN noted that several maggots were present in the wound.  The patient apparently was seen at his PCPs office this morning and was sent to the emergency department for evaluation.  The patient notes that he has previously been under the care of home health nursing for wound care but a few weeks ago he apparently \"lost insurance coverage\" and home wound care ceased.  He has noted worsening of the wound since that time.  At the time of evaluation in the emergency department, the patient was noted to have an oxygen saturation of 85% however the patient suffers from chronic respiratory failure secondary to obesity hypoventilation syndrome and obstructive sleep apnea.  Occasional periods of desaturation would be expected.  Currently, the patient is on 2 L per nasal cannula with saturations in the high 90s.  Chest x-ray revealed cardiomegaly with pulmonary vascular congestion with what appears to be chronic interstitial edema which is to be expected in a patient with super morbid obesity and with chronic hypoventilation and obstructive sleep apnea.  BNP is also slightly elevated at 2100 which also is to be expected in a person with chronic hypoxia and RV strain secondary to pulmonary hypertension induced by ELYSSA/OHS.  Workup in the emergency department reveals a glucose of 166, lactate and procalcitonin within normal limits.  Sed rate elevated at 38 and CRP 1.39.  White blood cell count within normal limits.  proBNP " chronically elevated at 2167.  X-ray of the right foot shows soft tissue wound along the lateral aspect of the midfoot with no destructive bony changes noted.    Review of Systems   Constitutional: Negative.    HENT: Negative.     Eyes: Negative.    Respiratory: Negative.     Cardiovascular: Negative.    Gastrointestinal: Negative.    Endocrine: Negative.    Genitourinary: Negative.    Musculoskeletal:  Positive for gait problem.   Skin:  Positive for color change and wound.   Allergic/Immunologic: Negative.    Neurological: Negative.    Hematological: Negative.    Psychiatric/Behavioral: Negative.        Otherwise complete ROS reviewed and negative except as mentioned in the HPI.    Past Medical History:   Past Medical History:   Diagnosis Date    COPD (chronic obstructive pulmonary disease)     Depression     Diabetes mellitus     Hyperlipidemia     Hypertension     Venous insufficiency     Venous ulcer of right leg      Past Surgical History:  Past Surgical History:   Procedure Laterality Date    APPENDECTOMY      FOOT SURGERY      TESTICLE SURGERY      TRACHEOSTOMY       Social History:  reports that he has never smoked. He has never used smokeless tobacco. He reports that he does not drink alcohol and does not use drugs.    Family History: family history includes COPD in his mother; Diabetes in his father and mother; Heart disease in his father and mother; Hyperlipidemia in his mother; Hypertension in his father and mother; No Known Problems in his brother, sister, sister, sister, and sister.       Allergies:  Allergies   Allergen Reactions    Penicillins Hives and Itching    Cephalosporins Other (See Comments)     Per d/c summary of 12/17/15, pt possibly had cross-reactivity to a cephalosporin during his hospitalization    Hemp Seed Oil Hives    Neosporin  [Neomycin-Bacitracin Zn-Polymyx]     Penicillamine     Silver Sulfadiazine        Medications:  Prior to Admission medications    Medication Sig Start Date  End Date Taking? Authorizing Provider   albuterol (PROVENTIL) (2.5 MG/3ML) 0.083% nebulizer solution 3 mL Every 6 (Six) Hours. PRN 1/27/16   Surya Patten MD   albuterol (VENTOLIN HFA) 108 (90 BASE) MCG/ACT inhaler Inhale 2 puffs Every 6 (Six) Hours. prn 1/27/16   Surya Patten MD   allopurinol (ZYLOPRIM) 300 MG tablet Take 1 tablet by mouth Daily. 11/16/16   Surya Patten MD   ammonium lactate (AMLACTIN) 12 % cream Apply 1 application topically to the appropriate area as directed Every 12 (Twelve) Hours. 11/3/21   Ethel Gayle APRN   APPLE CIDER VINEGAR PO Take 1 capsule by mouth Daily. Buys OTC at [facility] and takes with orange juice    Surya Patten MD   aspirin 81 MG EC tablet Take 1 tablet by mouth Daily. 1/27/16   Surya Patten MD   bumetanide (BUMEX) 1 MG tablet Take 1-2 tablets by mouth Daily. Last week 11/16/16   Surya Patten MD   cetirizine (zyrTEC) 5 MG tablet Take 1 tablet by mouth Daily.  Patient not taking: Reported on 4/29/2022 11/4/21   Ethel Gayle APRN   diphenhydrAMINE (BENADRYL) 25 mg capsule Take 1 capsule by mouth 2 (Two) Times a Day. prn 1/27/16   Surya Patten MD   fluticasone (FLONASE) 50 MCG/ACT nasal spray 1 spray into each nostril 2 (Two) Times a Day As Needed. 11/16/16   Surya Patten MD   Ginger, Zingiber officinalis, (GINGER PO) Take 2 each by mouth Daily. 12/19/21   Rosa Tompkins APRN   insulin regular (NOVOLIN R RELION) 100 UNIT/ML injection Inject 30 Units under the skin into the appropriate area as directed. Pt injects 10-30 units 3 TIMES daily with breakfast and dinner depending on his bg 6/3/16   Surya Patten MD   lisinopril (PRINIVIL,ZESTRIL) 10 MG tablet Take 10 mg by mouth Daily. 4/11/22   Rosa Tompkins APRN   nebivolol (Bystolic) 20 MG tablet Take 20 mg by mouth Daily. 5/3/22   Rosa Tompkins APRN   nystatin (MYCOSTATIN) 416769 UNIT/GM cream Apply 1 application topically to the appropriate area as  "directed Every 12 (Twelve) Hours.  Patient not taking: Reported on 4/29/2022 11/3/21   Ethel Gayle APRN   O2 (OXYGEN) Inhale 2 L As Needed.    ProviderSurya MD   olmesartan (BENICAR) 20 MG tablet Take 20 mg by mouth Daily. 5/4/22   Rosa Tompkins APRN   potassium chloride (KLOR-CON) 20 MEQ CR tablet Take 1 tablet by mouth Daily. 11/16/16   Surya Patten MD   pravastatin (PRAVACHOL) 20 MG tablet Take 1 tablet by mouth Every Night. 11/16/16   Surya Patten MD   Turmeric (QC Tumeric Complex) 500 MG capsule Take 2 each by mouth Daily. 12/19/21   Rosa Tompkins APRN   vitamin B-12 (CYANOCOBALAMIN) 500 MCG tablet Take 1 tablet by mouth 2 (Two) Times a Day. 1/27/16   Surya Patten MD     I have utilized all available immediate resources to obtain, update, or review the patient's current medications (including all prescriptions, over-the-counter products, herbals, cannabis/cannabidiol products, and vitamin/mineral/dietary (nutritional) supplements).    Objective     Vital Signs: BP (!) 145/105 (BP Location: Right arm, Patient Position: Lying)   Pulse 111   Temp 97.5 °F (36.4 °C) (Oral)   Resp 18   Ht 175.3 cm (69\")   Wt (!) 254 kg (560 lb)   SpO2 96%   BMI 82.70 kg/m²   Physical Exam  Constitutional:       General: He is not in acute distress.     Appearance: He is well-developed. He is morbidly obese.      Interventions: Nasal cannula in place.   HENT:      Head: Normocephalic and atraumatic.      Right Ear: External ear normal.      Left Ear: External ear normal.      Nose: Nose normal.      Mouth/Throat:      Mouth: Mucous membranes are moist.      Pharynx: Oropharynx is clear.   Eyes:      General: No scleral icterus.     Extraocular Movements: Extraocular movements intact.      Conjunctiva/sclera: Conjunctivae normal.      Pupils: Pupils are equal, round, and reactive to light.   Cardiovascular:      Rate and Rhythm: Regular rhythm. Tachycardia present.      Pulses: Normal " pulses.      Heart sounds: Normal heart sounds. No murmur heard.  Pulmonary:      Effort: Pulmonary effort is normal. No respiratory distress.      Breath sounds: Normal breath sounds.   Abdominal:      General: Abdomen is protuberant. Bowel sounds are normal.      Palpations: Abdomen is soft. There is no mass.      Tenderness: There is no abdominal tenderness.      Comments: Large abdominal pannus   Musculoskeletal:         General: No tenderness. Normal range of motion.      Right lower leg: Edema present.      Left lower leg: Edema present.   Skin:     General: Skin is warm and dry.      Findings: Erythema (Mild, chronic hyperpigmented skin changes.) and rash present. Rash is scaling.      Comments: Chronic stasis dermatitis with flaking, scaling skin BLE.  Kerlix wrap left foot.  Wound documentation in photos below.   Neurological:      General: No focal deficit present.      Mental Status: He is alert and oriented to person, place, and time. Mental status is at baseline.      Cranial Nerves: No cranial nerve deficit.   Psychiatric:         Mood and Affect: Mood normal.         Judgment: Judgment normal.           File Link           Results Reviewed:  Lab Results (last 24 hours)       Procedure Component Value Units Date/Time    Procalcitonin [[id number]]  (Normal) Collected: 06/05/24 0422    Specimen: Blood Updated: 06/05/24 1509     Procalcitonin 0.06 ng/mL     Narrative:      As a Marker for Sepsis (Non-Neonates):    1. <0.5 ng/mL represents a low risk of severe sepsis and/or septic shock.  2. >2 ng/mL represents a high risk of severe sepsis and/or septic shock.    As a Marker for Lower Respiratory Tract Infections that require antibiotic therapy:    PCT on Admission    Antibiotic Therapy       6-12 Hrs later    >0.5                Strongly Recommended  >0.25 - <0.5        Recommended   0.1 - 0.25          Discouraged              Remeasure/reassess PCT  <0.1                Strongly Discouraged      "Remeasure/reassess PCT    As 28 day mortality risk marker: \"Change in Procalcitonin Result\" (>80% or <=80%) if Day 0 (or Day 1) and Day 4 values are available. Refer to http://www.[facility]-pct-calculator.com    Change in PCT <=80%  A decrease of PCT levels below or equal to 80% defines a positive change in PCT test result representing a higher risk for 28-day all-cause mortality of patients diagnosed with severe sepsis for septic shock.    Change in PCT >80%  A decrease of PCT levels of more than 80% defines a negative change in PCT result representing a lower risk for 28-day all-cause mortality of patients diagnosed with severe sepsis or septic shock.       Comprehensive Metabolic Panel [[id number]]  (Abnormal) Collected: 06/05/24 0422    Specimen: Blood Updated: 06/05/24 1503     Glucose 166 mg/dL      BUN 18 mg/dL      Creatinine 0.93 mg/dL      Sodium 141 mmol/L      Potassium 3.7 mmol/L      Chloride 102 mmol/L      CO2 26.0 mmol/L      Calcium 9.0 mg/dL      Total Protein 6.5 g/dL      Albumin 3.7 g/dL      ALT (SGPT) 12 U/L      AST (SGOT) 14 U/L      Alkaline Phosphatase 84 U/L      Total Bilirubin 0.8 mg/dL      Globulin 2.8 gm/dL      A/G Ratio 1.3 g/dL      BUN/Creatinine Ratio 19.4     Anion Gap 13.0 mmol/L      eGFR 91.1 mL/min/1.73     Narrative:      GFR Normal >60  Chronic Kidney Disease <60  Kidney Failure <15      C-reactive Protein [[id number]]  (Abnormal) Collected: 06/05/24 0422    Specimen: Blood Updated: 06/05/24 1503     C-Reactive Protein 1.39 mg/dL     Lactic Acid, Plasma [[id number]]  (Normal) Collected: 06/05/24 0422    Specimen: Blood Updated: 06/05/24 1501     Lactate 2.0 mmol/L     BNP [[id number]]  (Abnormal) Collected: 06/05/24 0422    Specimen: Blood Updated: 06/05/24 1500     proBNP 2,167.0 pg/mL     Narrative:      This assay is used as an aid in the diagnosis of individuals suspected of having heart failure. It can be used as an aid in the diagnosis of acute decompensated heart failure " (ADHF) in patients presenting with signs and symptoms of ADHF to the emergency department (ED). In addition, NT-proBNP of <300 pg/mL indicates ADHF is not likely.    Age Range Result Interpretation  NT-proBNP Concentration (pg/mL:      <50             Positive            >450                   Gray                 300-450                    Negative             <300    50-75           Positive            >900                  Gray                300-900                  Negative            <300      >75             Positive            >1800                  Gray                300-1800                  Negative            <300    Sedimentation Rate [[id number]]  (Abnormal) Collected: 06/05/24 0422    Specimen: Blood Updated: 06/05/24 1454     Sed Rate 38 mm/hr     Blood Culture - Blood, Arm, Left [[id number]] Collected: 06/05/24 1432    Specimen: Blood from Arm, Left Updated: 06/05/24 1450    Blood Culture - Blood, Arm, Right [[id number]] Collected: 06/05/24 0422    Specimen: Blood from Arm, Right Updated: 06/05/24 1449    CBC & Differential [[id number]]  (Abnormal) Collected: 06/05/24 0422    Specimen: Blood Updated: 06/05/24 1442    Narrative:      The following orders were created for panel order CBC & Differential.  Procedure                               Abnormality         Status                     ---------                               -----------         ------                     CBC Auto Differential[[id number]]        Abnormal            Final result                 Please view results for these tests on the individual orders.    CBC Auto Differential [[id number]]  (Abnormal) Collected: 06/05/24 0422    Specimen: Blood Updated: 06/05/24 1442     WBC 7.87 10*3/mm3      RBC 4.77 10*6/mm3      Hemoglobin 14.0 g/dL      Hematocrit 44.6 %      MCV 93.5 fL      MCH 29.4 pg      MCHC 31.4 g/dL      RDW 15.5 %      RDW-SD 52.8 fl      MPV 12.4 fL      Platelets 142 10*3/mm3      Neutrophil % 74.9 %      Lymphocyte %  14.2 %      Monocyte % 7.1 %      Eosinophil % 2.9 %      Basophil % 0.5 %      Immature Grans % 0.4 %      Neutrophils, Absolute 5.89 10*3/mm3      Lymphocytes, Absolute 1.12 10*3/mm3      Monocytes, Absolute 0.56 10*3/mm3      Eosinophils, Absolute 0.23 10*3/mm3      Basophils, Absolute 0.04 10*3/mm3      Immature Grans, Absolute 0.03 10*3/mm3      nRBC 0.0 /100 WBC           Imaging Results (Last 24 Hours)       Procedure Component Value Units Date/Time    XR Foot 3+ View Left [[id number]] Collected: 06/05/24 1511     Updated: 06/05/24 1517    Narrative:      EXAMINATION: XR FOOT 3+ VW LEFT-  6/5/2024 3:11 PM     HISTORY: wound. Per the technologist report, the patient was  uncooperative during the examination, limiting the images.     COMPARISON: None      TECHNIQUE:  Frontal, lateral and oblique radiographs of the LEFT foot were provided  for review.     FINDINGS:  Posterior Achilles enthesophyte. Mild edema in Kager's fat pad. Plantar  calcaneal spur noted. Mild diffuse soft tissue swelling and edema,  nonspecific. Soft tissue wound along the lateral aspect of the LEFT  foot. Mild arthritis at the first MTP joint. I don't see any definite  destructive bone changes to suggest acute osteomyelitis on today's  examination. No definite acute fracture. Vascular calcifications are  seen. I do suspect some arthritis at the TMT joints but it is mild.  Hammertoe deformities of the second through fifth digits.          Impression:         1.  Soft tissue wound along the lateral aspect of the midfoot and  hindfoot without definite destructive bony changes.     2.  Diffuse soft tissue swelling and edema as discussed above.     This report was signed and finalized on 6/5/2024 3:14 PM by Dr. Ranjit Norris MD.       XR Chest 1 View [[id number]] Collected: 06/05/24 1453     Updated: 06/05/24 1457    Narrative:      EXAMINATION: XR CHEST 1 VW-  6/5/2024 2:53 PM     HISTORY: Hypoxic on arrival.     FINDINGS: Today's exam is  compared to previous study of 11/2/2021. There  is cardiomegaly with vascular redistribution suggesting pulmonary venous  hypertension with interstitial edema. No acute infiltrate. No  significant effusion is present.       Impression:      1.. Cardiomegaly with pulmonary vascular congestion. An element of  interstitial edema suspected. This appears to be a chronic finding in  this patient.     This report was signed and finalized on 6/5/2024 2:54 PM by Dr. Karl Loving MD.             I have personally reviewed and interpreted the radiology studies and ECG obtained at time of admission.     Assessment / Plan   Assessment:   Active Hospital Problems    Diagnosis     **Wound infection     Chronic hypoxic respiratory failure, on home oxygen therapy     Obstructive sleep apnea     Obesity hypoventilation syndrome     Morbid obesity with BMI of 70 and over, adult     COPD (chronic obstructive pulmonary disease)     Diabetes mellitus        Treatment Plan  Admit to the medical surgical floor  Wound culture  Vancomycin dosed per pharmacy  Rocephin 2 g IV every 24 hours  Pulmonology consultation regarding chronic respiratory failure  Podiatry consultation regarding chronic left foot ulcer  Plan to establish a wound care plan with podiatry and wound care  Continue empiric IV antibiotics with transition to oral antibiotics after culture has been received  Discharge 2-3 days    Medical Decision Making  Number and Complexity of problems:   1 acute, high complexity problem  5+ chronic moderate complexity problems    Differential Diagnosis: None    Conditions and Status        Condition is unchanged.     [facility] Data  External documents reviewed: Care Everywhere documentation  Cardiac tracing (EKG, telemetry) interpretation: See HPI  Radiology interpretation: See HPI  Labs reviewed: See HPI  Any tests that were considered but not ordered: None     Decision rules/scores evaluated (example [id number], Wells, etc): None      Discussed with: The patient     Care Planning  Shared decision making: The patient, pulmonology, podiatry, wound care  Code status and discussions: Full code    Disposition  Social Determinants of Health that impact treatment or disposition: Morbid obesity  Estimated length of stay is 2-3 days.     I confirmed that the patient's advanced care plan is present, code status is documented, and a surrogate decision maker is listed in the patient's medical record.     The patient's surrogate decision maker is his sister.     The patient was seen and examined by me on 6/5/2024 at 1930.    Electronically signed by Kuldeep Rothman DO, 06/05/24, 20:00 CDT.

## 2024-06-06 NOTE — PROGRESS NOTES
[facility] Medicine Services  INPATIENT PROGRESS NOTE    Patient Name: Jose Landeros  Date of Admission: 6/5/2024  Today's Date: 06/06/24  Length of Stay: 1  Primary Care Physician: Provider, No Known    Subjective   Chief Complaint: Left foot wound  HPI     The patient's left foot wound actually looks quite good.  There is no erythema.  Skin changes of the left lower extremity are chronic in nature and consistent with erythema nigricans.  I would anticipate the patient would be appropriate for discharge as early as tomorrow given the remarkably better than anticipated appearance of his foot wounds.  The patient remains afebrile.  He states that he can function well at home with the devices that he has currently.  Pulmonology may be able to acquire a trilogy device for the patient.  An oximetry will be ordered in an attempt to qualify the patient for daytime oxygen.  MRSA nasal swab was positive.  Mupirocin ointment will be prescribed.  ABGs this a.m. showed normal pH with pCO2 elevated at 48.5 and pO2 70.8 while on CPAP.  Gram stain of the foot wound shows no WBCs or organisms.  Blood cultures show no growth at 24 hours.    Review of Systems   All pertinent negatives and positives are as above. All other systems have been reviewed and are negative unless otherwise stated.     Objective    Temp:  [97.4 °F (36.3 °C)-98.4 °F (36.9 °C)] 97.4 °F (36.3 °C)  Heart Rate:  [] 97  Resp:  [18-19] 18  BP: (133-156)/() 135/83  Physical Exam    Constitutional:       General: He is not in acute distress.     Appearance: He is well-developed. He is morbidly obese.      Interventions: Nasal cannula in place.   HENT:      Head: Normocephalic and atraumatic.      Right Ear: External ear normal.      Left Ear: External ear normal.      Nose: Nose normal.      Mouth/Throat:      Mouth: Mucous membranes are moist.      Pharynx: Oropharynx is clear.   Eyes:      General: No scleral  icterus.     Extraocular Movements: Extraocular movements intact.      Conjunctiva/sclera: Conjunctivae normal.      Pupils: Pupils are equal, round, and reactive to light.   Cardiovascular:      Rate and Rhythm: Regular rhythm.      Pulses: Normal pulses.      Heart sounds: Normal heart sounds. No murmur heard.  Pulmonary:      Effort: Pulmonary effort is normal. No respiratory distress.      Breath sounds: Normal breath sounds.   Abdominal:      General: Abdomen is protuberant. Bowel sounds are normal.      Palpations: Abdomen is soft. There is no mass.      Tenderness: There is no abdominal tenderness.      Comments: Large abdominal pannus   Musculoskeletal:         General: No tenderness. Normal range of motion.      Right lower leg: Edema present.      Left lower leg: Edema present.   Skin:     General: Skin is warm and dry.      Findings: Chronic hyperpigmented skin changes and rash present. Rash is scaling.      Comments: Chronic stasis dermatitis with flaking, scaling skin BLE.  Kerlix wrap left foot.  Wound documentation in photos.   Neurological:      General: No focal deficit present.      Mental Status: He is alert and oriented to person, place, and time. Mental status is at baseline.      Cranial Nerves: No cranial nerve deficit.   Psychiatric:         Mood and Affect: Mood normal.         Judgment: Judgment normal.     Results Review:  I have reviewed the labs, radiology results, and diagnostic studies.    Laboratory Data:   Results from last 7 days   Lab Units 06/05/24  0422   WBC 10*3/mm3 7.87   HEMOGLOBIN g/dL 14.0   HEMATOCRIT % 44.6   PLATELETS 10*3/mm3 142        Results from last 7 days   Lab Units 06/05/24  0422   SODIUM mmol/L 141   POTASSIUM mmol/L 3.7   CHLORIDE mmol/L 102   CO2 mmol/L 26.0   BUN mg/dL 18   CREATININE mg/dL 0.93   CALCIUM mg/dL 9.0   BILIRUBIN mg/dL 0.8   ALK PHOS U/L 84   ALT (SGPT) U/L 12   AST (SGOT) U/L 14   GLUCOSE mg/dL 166*       Culture Data:   Blood Culture   Date  Value Ref Range Status   06/05/2024 No growth at 24 hours  Preliminary   06/05/2024 No growth at 24 hours  Preliminary       Radiology Data:   Imaging Results (Last 24 Hours)       ** No results found for the last 24 hours. **            I have reviewed the patient's current medications.     Assessment/Plan   Assessment  Active Hospital Problems    Diagnosis     **Wound infection     Chronic hypoxic respiratory failure, on home oxygen therapy     Obstructive sleep apnea     Obesity hypoventilation syndrome     Morbid obesity with BMI of 70 and over, adult     COPD (chronic obstructive pulmonary disease)     Diabetes mellitus        Treatment Plan  Continue current IV antibiotics  Wound culture negative  Will transition to oral antibiotics and topical treatment at the time of discharge  Probable discharge tomorrow    Medical Decision Making  Number and Complexity of problems:   1 acute, high complexity problem  5+ chronic moderate complexity problems     Differential Diagnosis: None     Conditions and Status        Condition is unchanged.     [facility] Data  External documents reviewed: Care Everywhere documentation  Cardiac tracing (EKG, telemetry) interpretation: See HPI  Radiology interpretation: See HPI  Labs reviewed: See HPI  Any tests that were considered but not ordered: None     Decision rules/scores evaluated (example [id number], Wells, etc): None     Discussed with: The patient     Care Planning  Shared decision making: The patient, pulmonology, podiatry, wound care  Code status and discussions: Full code     Disposition  Social Determinants of Health that impact treatment or disposition: Morbid obesity  I expect the patient to be discharged to home in 1 days.     Electronically signed by Kuldeep Rothman DO, 06/06/24, 15:21 CDT.

## 2024-06-06 NOTE — CONSULTS
[facility] PULMONARY CONSULT - [facility]    24, 21:18 CDT  Patient Care Team:  Provider, No Known as PCP - General  Name: Jose Landeros  : 1960  MRN: [id number]  Contact Serial Number [id number]    Chief complaint: Acute on chronic hypercapnic and hypoxic respiratory failure, left foot wound, super morbid obesity with BMI of 84.47, obstructive sleep apnea on CPAP, obesity apnea syndrome, dependence on supplemental oxygen, history of tracheostomy in the past, congestive diastolic heart failure    HPI:  We have been consulted by Kuldeep Rothman DO to see this 63 y.o. male.  Patient was seen as inpatient pulmonary consult in the medical surgical floor and Dr. Rothman discussed the case with me.    Patient has super morbid obesity with BMI of 84.47 and he weighs 572 pounds.  He has a established diagnosis of obstructive sleep apnea which was apparently managed by Dr. Kenny.  He is on home CPAP more nasal mask and according to the patient his last sleep study was done in .  He is homebound and has got very poor mobility.  He has a history of chronic heart problems and had history of heart failure in the past.  According to the patient he was once this hospital in  and needed to be transferred to the [place] for a suspected acute cardiac event but later was noted to have volume overload and congestive heart failure.  He needed intubation and mechanical ventilation and eventually needed tracheostomy at that time which was later removed.  He was not on home oxygen.  Today he presents with a emergency room with worsening leg wound and some shortness of breath tiredness and lethargy and was noted to have acute on chronic hypercapnic and hypoxic respiratory failure.  He has known diabetes mellitus type 2 he was getting wound care by the home health care team but currently they were not coming due to some vague reasons as mentioned in Dr. Rothman's note.  He was noted to be  hypoxic on arrival and his oxygen saturation improved after getting 4 L of oxygen.  Arterial blood gas shows CO2 retention and chronic respiratory failure.    Patient is a non-smoker and reportedly has a history of COPD or asthma and he was on albuterol nebulizer and rescue inhaler but to my knowledge he was not on any long-term inhalers he had a thorough workup done.  The chest x-ray shows cardiomegaly and volume overload he was given Lasix.  X-ray of the right foot shows soft tissue wound no gas shadow noted.  No destructive bony lesions noted.  Patient told me he is vaccinated for COVID and did not have any COVID infection and recent hospitalizations and ER visit.  He lives at home on his own.  He had no recent pulmonary function test done.  The time of my visit patient was on 4 L of oxygen and is oxygenating in the mid 90s.  He appears to be comfortable he also has some allergy problems and nasal drainage and postnasal drip with congestion and cough.  He had bilateral lymphedema venous stasis noted with chronic skin changes and left-sided foot wound with dressing noted.    Past Medical History:   has a past medical history of COPD (chronic obstructive pulmonary disease), Depression, Diabetes mellitus, Hyperlipidemia, Hypertension, Venous insufficiency, and Venous ulcer of right leg.   has a past surgical history that includes Tracheostomy tube placement; Testicle surgery; Foot surgery; and Appendectomy.  Allergies   Allergen Reactions    Penicillins Hives and Itching    Cephalosporins Other (See Comments)     Per d/c summary of 12/17/15, pt possibly had cross-reactivity to a cephalosporin during his hospitalization    Hemp Seed Oil Hives    Neosporin  [Neomycin-Bacitracin Zn-Polymyx]     Penicillamine     Silver Sulfadiazine      Medications:  cefTRIAXone, 2,000 mg, Intravenous, Q24H  enoxaparin, 60 mg, Subcutaneous, Q12H  [START ON 6/6/2024] insulin glargine, 30 Units, Subcutaneous, Daily  insulin lispro, 3-14  Units, Subcutaneous, 4x Daily AC & at Bedtime  sodium chloride, 10 mL, Intravenous, Q12H  [START ON 6/6/2024] vancomycin, 1,500 mg, Intravenous, Q12H         Family History:  Family History   Problem Relation Age of Onset    Diabetes Mother     Heart disease Mother     Hypertension Mother     Hyperlipidemia Mother     COPD Mother     Diabetes Father     Heart disease Father     Hypertension Father     No Known Problems Sister     No Known Problems Brother     No Known Problems Sister     No Known Problems Sister     No Known Problems Sister      Social History:   reports that he has never smoked. He has never used smokeless tobacco. He reports that he does not drink alcohol and does not use drugs.  Review of Systems:  Review of Systems   Constitutional:  Positive for fatigue and unexpected weight change.   HENT:  Positive for congestion, postnasal drip and sinus pressure.    Eyes: Negative.    Respiratory:  Positive for cough, chest tightness and stridor.    Cardiovascular:  Positive for leg swelling. Negative for chest pain.   Gastrointestinal: Negative.    Endocrine: Negative.    Genitourinary: Negative.    Musculoskeletal:  Negative for arthralgias and back pain.   Skin:  Positive for wound.        Left foot wound.  Chronic skin changes and stasis dermatitis noted in both lower extremities.   Allergic/Immunologic: Positive for environmental allergies.   Neurological: Negative.    Hematological: Negative.    Psychiatric/Behavioral: Negative.        Physical Exam:  Temp:  [97.5 °F (36.4 °C)-97.7 °F (36.5 °C)] 97.5 °F (36.4 °C)  Heart Rate:  [] 111  Resp:  [18-23] 18  BP: (120-167)/() 145/105  Intake/Output Summary (Last 24 hours) at 6/5/2024 2118  Last data filed at 6/5/2024 2000  Gross per 24 hour   Intake --   Output 900 ml   Net -900 ml         06/05/24  1411 06/05/24 2000   Weight: (!) 254 kg (560 lb) (!) 259 kg (572 lb)     SpO2 Percentage    06/05/24 1504 06/05/24 1702 06/05/24 1849   SpO2: 90%  95% 96%     Body mass index is 84.47 kg/m².   Physical Exam  Vitals and nursing note reviewed.   Constitutional:       General: He is not in acute distress.     Appearance: He is obese. He is ill-appearing.      Comments: Middle-aged  male has super morbid obesity alert and awake but tired and exhausted   HENT:      Head: Normocephalic and atraumatic.      Right Ear: Tympanic membrane normal. There is no impacted cerumen.      Left Ear: Tympanic membrane normal. There is no impacted cerumen.      Nose: Congestion present. No rhinorrhea.      Mouth/Throat:      Mouth: Mucous membranes are dry.      Pharynx: Oropharynx is clear. No oropharyngeal exudate or posterior oropharyngeal erythema.      Comments: Narrow upper airway noted Mallampati class IV  Eyes:      General: No scleral icterus.        Right eye: No discharge.         Left eye: No discharge.      Conjunctiva/sclera: Conjunctivae normal.      Pupils: Pupils are equal, round, and reactive to light.   Neck:      Vascular: No carotid bruit.      Comments: Old tracheostomy scar noted in the front of the neck  Cardiovascular:      Rate and Rhythm: Tachycardia present.      Pulses: Normal pulses.      Heart sounds: Normal heart sounds. No murmur heard.     No friction rub. No gallop.   Pulmonary:      Effort: Pulmonary effort is normal. No respiratory distress.      Breath sounds: No stridor. Wheezing and rales present. No rhonchi.      Comments: Decreased breath sound both lungs at the bases  Chest:      Chest wall: No tenderness.   Abdominal:      General: Abdomen is flat. Bowel sounds are normal. There is no distension.      Palpations: There is no mass.      Tenderness: There is no abdominal tenderness. There is no guarding or rebound.      Hernia: No hernia is present.   Genitourinary:     Comments: Not examined  Musculoskeletal:         General: No swelling, tenderness, deformity or signs of injury. Normal range of motion.      Cervical back: Normal  range of motion and neck supple. No rigidity or tenderness.      Right lower leg: Edema present.      Left lower leg: Edema present.   Lymphadenopathy:      Cervical: No cervical adenopathy.   Skin:     General: Skin is warm and dry.      Capillary Refill: Capillary refill takes less than 2 seconds.      Coloration: Skin is not jaundiced or pale.      Findings: Bruising present. No erythema, lesion or rash.      Comments: Chronic venous stasis noted in both lower extremities   Neurological:      General: No focal deficit present.      Mental Status: He is alert and oriented to person, place, and time. Mental status is at baseline.      Cranial Nerves: No cranial nerve deficit.      Sensory: No sensory deficit.      Motor: Weakness present.      Deep Tendon Reflexes: Reflexes normal.   Psychiatric:         Mood and Affect: Mood normal.         Thought Content: Thought content normal.       Result Review  Results from last 7 days   Lab Units 06/05/24  0422   WBC 10*3/mm3 7.87   HEMOGLOBIN g/dL 14.0   PLATELETS 10*3/mm3 142     Results from last 7 days   Lab Units 06/05/24  0422   SODIUM mmol/L 141   POTASSIUM mmol/L 3.7   CO2 mmol/L 26.0   BUN mg/dL 18   CREATININE mg/dL 0.93   GLUCOSE mg/dL 166*     Results from last 7 days   Lab Units 06/05/24  2042   PH, ARTERIAL pH units 7.358   PCO2, ARTERIAL mm Hg 55.3*   PO2 ART mm Hg 78.6*     Microbiology Results (last 10 days)       ** No results found for the last 240 hours. **          Recent radiology:   Imaging Results (Last 72 Hours)       Procedure Component Value Units Date/Time    XR Foot 3+ View Left [[id number]] Collected: 06/05/24 1511     Updated: 06/05/24 1517    Narrative:      EXAMINATION: XR FOOT 3+ VW LEFT-  6/5/2024 3:11 PM     HISTORY: wound. Per the technologist report, the patient was  uncooperative during the examination, limiting the images.     COMPARISON: None      TECHNIQUE:  Frontal, lateral and oblique radiographs of the LEFT foot were  provided  for review.     FINDINGS:  Posterior Achilles enthesophyte. Mild edema in Kager's fat pad. Plantar  calcaneal spur noted. Mild diffuse soft tissue swelling and edema,  nonspecific. Soft tissue wound along the lateral aspect of the LEFT  foot. Mild arthritis at the first MTP joint. I don't see any definite  destructive bone changes to suggest acute osteomyelitis on today's  examination. No definite acute fracture. Vascular calcifications are  seen. I do suspect some arthritis at the TMT joints but it is mild.  Hammertoe deformities of the second through fifth digits.          Impression:         1.  Soft tissue wound along the lateral aspect of the midfoot and  hindfoot without definite destructive bony changes.     2.  Diffuse soft tissue swelling and edema as discussed above.     This report was signed and finalized on 6/5/2024 3:14 PM by Dr. Ranjit Norris MD.       XR Chest 1 View [[id number]] Collected: 06/05/24 1453     Updated: 06/05/24 1457    Narrative:      EXAMINATION: XR CHEST 1 VW-  6/5/2024 2:53 PM     HISTORY: Hypoxic on arrival.     FINDINGS: Today's exam is compared to previous study of 11/2/2021. There  is cardiomegaly with vascular redistribution suggesting pulmonary venous  hypertension with interstitial edema. No acute infiltrate. No  significant effusion is present.       Impression:      1.. Cardiomegaly with pulmonary vascular congestion. An element of  interstitial edema suspected. This appears to be a chronic finding in  this patient.     This report was signed and finalized on 6/5/2024 2:54 PM by Dr. Karl Loving MD.             Personal review of imaging : CXR shows cardiomegaly bilateral pulm infiltrate more on the right side pulmonary edema suspected.  Other test results (not lab or imaging):  Reviewed.  Abnormal blood glucose.  Abnormal arterial blood gas with hypercapnia and hypoxemia  Independent review of ekg: Done  Problem List as identified by Epic (may contain  historical, inactive problems)    Wound infection    Diabetes mellitus    COPD (chronic obstructive pulmonary disease)    Morbid obesity with BMI of 70 and over, adult    Obstructive sleep apnea    Obesity hypoventilation syndrome    Chronic hypoxic respiratory failure, on home oxygen therapy    Pulmonary Assessment:    Acute on chronic hypercapnic and hypoxic respiratory failure  Chronic respiratory failure currently oxygen dependent  Super morbid obesity with BMI more than 84  Severe obstructive sleep apnea on CPAP treatment likely inadequate  Obesity hypoventilation syndrome  COPD apparently non-smoker  History of congestive diastolic heart failure and fluid overload  History of tracheostomy placement and ventilator dependence in the past  Bilateral lymphedema and venous stasis  Hypertension  Hyperlipidemia  Diabetes mellitus type 2  Allergic rhinitis  Left foot wound appears chronic    Recommend/plan:   Patient presented with a left leg wound she was also noted to have acute on chronic hypercapnic and hypoxic respiratory failure  He will use hospital BiPAP.  He has a CPAP machine at bedside which appears to be old and I doubt it is working properly and he also has a very small nasal mask which is probably not working very well.  I will keep the initial BiPAP settings 16/8 with a rate of 20 and FiO2 35% to 40% keep oxygen saturation more than 92%.  Devyn ABG after the overnight BiPAP and then decide about the BiPAP settings  We may change the BiPAP settings to CPAP settings and he may even qualify for home trilogy if he has chronic CO2 retention  Patient was seen and followed by Dr. Kenny and had the last sleep study done almost 9 years ago  He has a documented history of COPD but apparently patient denied any history of tobacco use.  He will need outpatient pulmonary function test and currently I will start him on Pulmicort and Atrovent nebulizer  Albuterol will be avoided due to tachycardia.  In addition to  the obstructive lung disease he probably has significant restrictive lung dysfunction due to his body habitus.  Patient has nasal allergy and will be started on fluticasone nasal spray and Zyrtec  Plan for outpatient PFT and sleep study when he is more stable.  Continue blood pressure and glycemic control per hospitalist team  He was already given Lasix for pulmonary edema.  Echocardiogram will be needed and cardiac workup will be also needed.  He will be encouraged to do incentive spirometer to improve pulmonary compliance.  Wound care will be following the left leg wound.  He has bilateral lymphedema and left leg wound which is bandaged  DVT and stress ulcer prophylaxis and pain and anxiety control.  Wound care.  Echocardiogram will be ordered  Physical activity as tolerated.  Nutritional support.  Repeat labs and imaging studies from time to time  Arterial blood gas ordered for tomorrow morning.  Care plan discussed with Dr. Rothman  CODE STATUS: Full.  Overall prognosis: Guarded.  We appreciate the consult and we will follow  Time spent in seeing this patient as inpatient pulmonary consult was 45 minutes    Thank you for this consult.  We will follow along.    Electronically signed by     Zak Kelly MD,  Pulmonologist/Intensivist   06/05/24, 9:18 PM CDT.

## 2024-06-06 NOTE — PROGRESS NOTES
[facility] – [place] PULMONARY PROGRESS NOTE - [facility]    Patient: Jose Landeros  1960   MR# [id number]   Acct# [id number]  06/06/24   08:37 CDT  Referring Provider: Kuldeep Rothman DO    Chief Complaint: Shortness of breath    Interval history: Afebrile.  Saturation 95 on home PAP.  He tried the hospital PAP but felt it was very uncomfortable and hurt his chest.  ABGs this morning on home device appears sufficient.  His cough is less productive.  Continue bronchodilators and incentive.  Added Mucinex.  He feels he is breathing better.    Meds:  budesonide, 0.5 mg, Nebulization, BID - RT  cefTRIAXone, 2,000 mg, Intravenous, Q24H  cetirizine, 10 mg, Oral, Daily  enoxaparin, 60 mg, Subcutaneous, Q12H  fluticasone, 2 spray, Each Nare, Daily  insulin glargine, 30 Units, Subcutaneous, Daily  insulin lispro, 3-14 Units, Subcutaneous, 4x Daily AC & at Bedtime  ipratropium, 0.5 mg, Nebulization, 4x Daily - RT  sodium chloride, 10 mL, Intravenous, Q12H  vancomycin, 1,500 mg, Intravenous, Q12H           Physical Exam:  SpO2 Percentage    06/06/24 0640 06/06/24 0644 06/06/24 0745   SpO2: 99% 92% 96%     Body mass index is 84.47 kg/m².   Temp:  [97.5 °F (36.4 °C)-98.4 °F (36.9 °C)] 98.4 °F (36.9 °C)  Heart Rate:  [] 96  Resp:  [18-23] 18  BP: (120-167)/() 133/88  Intake/Output Summary (Last 24 hours) at 6/6/2024 0837  Last data filed at 6/6/2024 0625  Gross per 24 hour   Intake --   Output 3800 ml   Net -3800 ml     Physical Exam  Vitals and nursing note reviewed.   Constitutional:       General: He is not in acute distress.     Appearance: He is obese. He is ill-appearing.      Comments: Home CPAP  HENT:      Nose: Congestion present. No rhinorrhea.      Mouth/Throat:      Mouth: Mucous membranes are dry.   Eyes:      General: No scleral icterus.        Right eye: No discharge.         Left eye: No discharge.      Conjunctiva/sclera: Conjunctivae normal.      Pupils: Pupils are equal, round, and  reactive to light.   Neck:      Vascular: No carotid bruit.      Comments: Old tracheostomy scar noted in the front of the neck  Cardiovascular:      Rate and Rhythm: Tachycardia present.      Pulses: Normal pulses.      Heart sounds: Normal heart sounds. No murmur heard.     No friction rub. No gallop.   Pulmonary:      Effort: Pulmonary effort is normal. No respiratory distress.      Breath sounds: No stridor.  Rales and rhonchi  Chest:      Chest wall: No tenderness.   Abdominal:      General: Abdomen is flat. Bowel sounds are normal. There is no distension.   Genitourinary:     Comments: Not examined  Musculoskeletal:         General: No swelling, tenderness, deformity or signs of injury. Normal range of motion.      Cervical back: Normal range of motion and neck supple. No rigidity or tenderness.      Right lower leg: Edema present.      Left lower leg: Edema present.   Lymphadenopathy:      Cervical: No cervical adenopathy.   Skin:     General: Skin is warm and dry.      Capillary Refill: Capillary refill takes less than 2 seconds.      Coloration: Skin is not jaundiced or pale.      Findings: Bruising present. No erythema, lesion or rash.      Comments: Chronic venous stasis noted in both lower extremities   Neurological:      General: No focal deficit present.      Mental Status: He is alert and oriented to person, place, and time. Mental status is at baseline.      Cranial Nerves: No cranial nerve deficit.      Sensory: No sensory deficit.      Motor: Weakness present.      Deep Tendon Reflexes: Reflexes normal.   Psychiatric:         Mood and Affect: Mood normal.         Thought Content: Thought content normal.     Electronically signed by NOAH Keenan, 6/6/2024, 08:37 CDT      Physician Substantive Portion: Medical Decision Making to follow:      Physician substantive portion: medical decision making  Result Review  Laboratory Data:  Results from last 7 days   Lab Units 06/05/24  0422   WBC  10*3/mm3 7.87   HEMOGLOBIN g/dL 14.0   PLATELETS 10*3/mm3 142     Results from last 7 days   Lab Units 06/05/24  0422   SODIUM mmol/L 141   POTASSIUM mmol/L 3.7   CO2 mmol/L 26.0   BUN mg/dL 18   CREATININE mg/dL 0.93   LACTATE mmol/L 2.0   CRP mg/dL 1.39*     Results from last 7 days   Lab Units 06/06/24  0352 06/05/24  2042   PH, ARTERIAL pH units 7.427 7.358   PCO2, ARTERIAL mm Hg 48.5* 55.3*   PO2 ART mm Hg 70.8* 78.6*     Microbiology Results (last 10 days)       Procedure Component Value - Date/Time    Wound Culture - Wound, Foot, Left [[id number]] Collected: 06/06/24 0303    Lab Status: Preliminary result Specimen: Wound from Foot, Left Updated: 06/06/24 1236     Gram Stain No WBCs or organisms seen    MRSA Screen, PCR (Inpatient) - Swab, Nares [[id number]]  (Abnormal) Collected: 06/05/24 2054    Lab Status: Final result Specimen: Swab from Nares Updated: 06/05/24 2213     MRSA PCR MRSA Detected    Narrative:      The negative predictive value of this diagnostic test is high and should only be used to consider de-escalating anti-MRSA therapy. A positive result may indicate colonization with MRSA and must be correlated clinically.    Blood Culture - Blood, Arm, Left [[id number]]  (Normal) Collected: 06/05/24 1432    Lab Status: Preliminary result Specimen: Blood from Arm, Left Updated: 06/06/24 1501     Blood Culture No growth at 24 hours    Blood Culture - Blood, Arm, Right [[id number]]  (Normal) Collected: 06/05/24 0422    Lab Status: Preliminary result Specimen: Blood from Arm, Right Updated: 06/06/24 1501     Blood Culture No growth at 24 hours           Recent films:  XR Foot 3+ View Left    Result Date: 6/5/2024  EXAMINATION: XR FOOT 3+ VW LEFT-  6/5/2024 3:11 PM  HISTORY: wound. Per the technologist report, the patient was uncooperative during the examination, limiting the images.  COMPARISON: None  TECHNIQUE: Frontal, lateral and oblique radiographs of the LEFT foot were provided for review.  FINDINGS:  Posterior Achilles enthesophyte. Mild edema in Kager's fat pad. Plantar calcaneal spur noted. Mild diffuse soft tissue swelling and edema, nonspecific. Soft tissue wound along the lateral aspect of the LEFT foot. Mild arthritis at the first MTP joint. I don't see any definite destructive bone changes to suggest acute osteomyelitis on today's examination. No definite acute fracture. Vascular calcifications are seen. I do suspect some arthritis at the TMT joints but it is mild. Hammertoe deformities of the second through fifth digits.       Impression:  1.  Soft tissue wound along the lateral aspect of the midfoot and hindfoot without definite destructive bony changes.  2.  Diffuse soft tissue swelling and edema as discussed above.  This report was signed and finalized on 6/5/2024 3:14 PM by Dr. Ranjit Norris MD.      XR Chest 1 View    Result Date: 6/5/2024  EXAMINATION: XR CHEST 1 VW-  6/5/2024 2:53 PM  HISTORY: Hypoxic on arrival.  FINDINGS: Today's exam is compared to previous study of 11/2/2021. There is cardiomegaly with vascular redistribution suggesting pulmonary venous hypertension with interstitial edema. No acute infiltrate. No significant effusion is present.      Impression: 1.. Cardiomegaly with pulmonary vascular congestion. An element of interstitial edema suspected. This appears to be a chronic finding in this patient.  This report was signed and finalized on 6/5/2024 2:54 PM by Dr. Karl Loving MD.      Personal review of imaging : CXR shows cardiomegaly pulmonary vascular congestion and interstitial edema noted.      Pulmonary Assessment:    Acute on chronic hypercapnic and hypoxic respiratory failure  Chronic respiratory failure currently oxygen dependent/currently MRSA positive  Super morbid obesity with BMI more than 84  Severe obstructive sleep apnea on CPAP treatment likely inadequate  Obesity hypoventilation syndrome  COPD apparently non-smoker  History of congestive diastolic heart  failure and fluid overload  History of tracheostomy placement and ventilator dependence in the past  Bilateral lymphedema and venous stasis  Hypertension  Hyperlipidemia  Diabetes mellitus type 2  Allergic rhinitis  Left foot wound appears chronic    Recommend/plan:   Patient was seen in the follow-up visit in pulmonary rounds in medical surgical floor today.  He is requiring 4 L of oxygen at rest.  He is unable to tolerate the BiPAP and is back on his home CPAP.  His nasal MRSA swab is positive and he is currently on isolation  His arterial blood gas shows mild hypercapnia which is compensated but will continue with home CPAP for now  Continue routine respiratory care and bronchodilator treatment  Plan for outpatient PFT if he is able to come to outpatient.  He has very poor mobility but walks with a walker at home.  He will probably also need a new sleep study and follow-up as an outpatient  Discussed with Dr. Rothman his plan is to discharge the patient soon.  Patient will need home oxygen evaluation and will need to get it done while in the hospital  He was not on oxygen before but currently requiring 4 L of oxygen  He may need some aggressive weight loss.  Bariatric surgery which may need to be addressed by the primary care provider after discharge.  Continue current care plan.  DVT and stress ulcer prophylaxis pain and anxiety control.  Plan for diuresis for fluid overload and pulm edema and monitor renal function electrolytes.  Repeat labs and imaging studies from time to time.  Physical activity as tolerated.  Nutritional support  CODE STATUS: Full.  Overall prognosis: Guarded  We will follow    Time spent by me: 35 min    This visit was performed by both a physician and an [facility] RN.  I performed all aspects of the medical decision making as documented.    Electronically signed by     Zak Kelly MD,  Pulmonologist/Intensivist   6/6/2024, 19:39 CDT

## 2024-06-06 NOTE — PROGRESS NOTES
The patient was placed on a hospital bipap.  Later, when I returned to start his breathing treatment the bipap mask was removed and he was on 4 lpm nasal cannula.  The patient stated he did not like our machine because it hurt his chest and the mask was tight.  He was placed on his home unit with 4 lpm of O2.  The nurse asked that the hospital bipap be removed from the room.

## 2024-06-06 NOTE — PLAN OF CARE
Goal Outcome Evaluation:  Plan of Care Reviewed With: patient        Progress: no change  Outcome Evaluation: OT eval completed. Pt seated EOB upon therapist arrival; A&Ox4; No c/o pain; 4L BNC with SpO2 in mid 90s. Pt reports Mod I with all BADLs including fxl mobility with use of power chair at baseline; Pt reports he did ambulate very short distances with rwx at baseline. Today, Pt reported dizziness while seated EOB upon therapist arrival therefore, standing was not attempted. Pt performed sit>supine utilizing bedrail with HOB elevated requiring Max A x2 due to difficulty moving on inflated air mattress. Pt performed rolling to L and R with SBA. On this date, Pt required extensive assist with LB dressing/toileting at bed level due to difficulty performing the tasks at bed level in supine, however, Pt reports that he feels he would be able to perform these tasks in standing if he was not dizzy. BUE strength WNL. SpO2 remained WNL throughout eval. Skilled OT intervention indicated in order to address remaining deficits in fxl mobility, fxl activity tolerance, balance, strength, and use of adaptive techniques/equipment during performance of BADLs. Anticipate Pt to return home with assist and HH at discharge.      Anticipated Discharge Disposition (OT): home with assist, home with home health

## 2024-06-06 NOTE — PROGRESS NOTES
"Pharmacy Dosing Service  Pharmacokinetics  Vancomycin Follow-up Evaluation    Assessment/Action/Plan:  Current Order: Vancomycin 1500 mg IVPB every 12 hours  Current end date:6/11/2024  Levels: Vancomycin trough ordered before dose due on 6/7/2024 at 0600  Additional antimicrobial agent(s): ceftriaxone    Continue Vancomycin 1500 mg iv q12h. Pharmacy will continue to follow daily and adjust dose accordingly.     Subjective:  Jose Landeros is a 63 y.o. male currently on Vancomycin for the treatment of SSTI, day 2 of 7 of treatment.    AUC Model Data:  Regimen: 1500 mg IV every 12 hours.  Exposure target: AUC24 (range)400-600 mg/L.hr   AUC24,ss: 538 mg/L.hr  PAUC*: 71 %  Ctrough,ss: 13.6 mg/L  Pconc*: 33 %  Tox.: 9 %    Objective:  Ht: 175.3 cm (69\"); Wt: (!) 259 kg (572 lb)  Estimated Creatinine Clearance: 163.5 mL/min (by C-G formula based on SCr of 0.93 mg/dL).   Creatinine   Date Value Ref Range Status   06/05/2024 0.93 0.76 - 1.27 mg/dL Final   03/25/2022 1.12 0.76 - 1.27 mg/dL Final   11/01/2021 0.94 0.76 - 1.27 mg/dL Final   10/05/2020 1.1 0.5 - 1.2 mg/dL Final   01/30/2020 1 0.5 - 1.2 mg/dL Final   01/27/2020 1.1 0.5 - 1.2 mg/dL Final      Lab Results   Component Value Date    WBC 7.87 06/05/2024    WBC 7.7 01/15/2024    WBC 8.7 11/08/2022      Culture Results:  Microbiology Results (last 10 days)       Procedure Component Value - Date/Time    MRSA Screen, PCR (Inpatient) - Swab, Nares [[id number]]  (Abnormal) Collected: 06/05/24 2054    Lab Status: Final result Specimen: Swab from Nares Updated: 06/05/24 2213     MRSA PCR MRSA Detected    Narrative:      The negative predictive value of this diagnostic test is high and should only be used to consider de-escalating anti-MRSA therapy. A positive result may indicate colonization with MRSA and must be correlated clinically.            Juan Luis Rodriguez, PharmD   06/06/24 09:47 CDT    "

## 2024-06-06 NOTE — PLAN OF CARE
Goal Outcome Evaluation:  Plan of Care Reviewed With: patient        Progress: no change  Outcome Evaluation: Pt refused using the facilitys' BiPAP machine. Pt states it is not comfortable on his face and feeels a big  air leak. Pt tolerated his own home unit, and therefore used home unit for most of night. Good urine output this shift. Wound care done and culture collected. Pt placed on contact precautions. Pt remains on 4L of supplemental o2, a febrile, and A&Ox4. Call light within reach.

## 2024-06-07 ENCOUNTER — HOME HEALTH ADMISSION (OUTPATIENT)
Dept: HOME HEALTH SERVICES | Facility: HOME HEALTHCARE | Age: 64
End: 2024-06-07
Payer: MEDICARE

## 2024-06-07 ENCOUNTER — READMISSION MANAGEMENT (OUTPATIENT)
Dept: CALL CENTER | Facility: HOSPITAL | Age: 64
End: 2024-06-07
Payer: MEDICARE

## 2024-06-07 ENCOUNTER — APPOINTMENT (OUTPATIENT)
Dept: CARDIOLOGY | Facility: HOSPITAL | Age: 64
End: 2024-06-07
Payer: MEDICARE

## 2024-06-07 VITALS
HEIGHT: 69 IN | BODY MASS INDEX: 46.65 KG/M2 | WEIGHT: 315 LBS | DIASTOLIC BLOOD PRESSURE: 81 MMHG | TEMPERATURE: 97.7 F | OXYGEN SATURATION: 97 % | SYSTOLIC BLOOD PRESSURE: 126 MMHG | RESPIRATION RATE: 18 BRPM | HEART RATE: 115 BPM

## 2024-06-07 LAB
GLUCOSE BLDC GLUCOMTR-MCNC: 119 MG/DL (ref 70–130)
GLUCOSE BLDC GLUCOMTR-MCNC: 193 MG/DL (ref 70–130)
VANCOMYCIN TROUGH SERPL-MCNC: 18.4 MCG/ML (ref 5–20)

## 2024-06-07 PROCEDURE — 25810000003 SODIUM CHLORIDE 0.9 % SOLUTION: Performed by: FAMILY MEDICINE

## 2024-06-07 PROCEDURE — 99232 SBSQ HOSP IP/OBS MODERATE 35: CPT | Performed by: INTERNAL MEDICINE

## 2024-06-07 PROCEDURE — 94799 UNLISTED PULMONARY SVC/PX: CPT

## 2024-06-07 PROCEDURE — 63710000001 INSULIN GLARGINE PER 5 UNITS: Performed by: FAMILY MEDICINE

## 2024-06-07 PROCEDURE — 63710000001 INSULIN LISPRO (HUMAN) PER 5 UNITS: Performed by: FAMILY MEDICINE

## 2024-06-07 PROCEDURE — 94664 DEMO&/EVAL PT USE INHALER: CPT

## 2024-06-07 PROCEDURE — 82948 REAGENT STRIP/BLOOD GLUCOSE: CPT

## 2024-06-07 PROCEDURE — 25010000002 VANCOMYCIN 10 G RECONSTITUTED SOLUTION: Performed by: FAMILY MEDICINE

## 2024-06-07 PROCEDURE — 93306 TTE W/DOPPLER COMPLETE: CPT | Performed by: INTERNAL MEDICINE

## 2024-06-07 PROCEDURE — 93306 TTE W/DOPPLER COMPLETE: CPT

## 2024-06-07 PROCEDURE — 80202 ASSAY OF VANCOMYCIN: CPT | Performed by: FAMILY MEDICINE

## 2024-06-07 PROCEDURE — 25010000002 ENOXAPARIN PER 10 MG: Performed by: FAMILY MEDICINE

## 2024-06-07 PROCEDURE — 25510000001 PERFLUTREN 6.52 MG/ML SUSPENSION: Performed by: INTERNAL MEDICINE

## 2024-06-07 RX ORDER — CETIRIZINE HYDROCHLORIDE 10 MG/1
10 TABLET ORAL DAILY
Qty: 30 TABLET | Refills: 2 | Status: SHIPPED | OUTPATIENT
Start: 2024-06-08

## 2024-06-07 RX ADMIN — PERFLUTREN 13.04 MG: 6.52 INJECTION, SUSPENSION INTRAVENOUS at 11:49

## 2024-06-07 RX ADMIN — CETIRIZINE HYDROCHLORIDE 10 MG: 10 TABLET ORAL at 08:55

## 2024-06-07 RX ADMIN — Medication 1 APPLICATION: at 08:56

## 2024-06-07 RX ADMIN — SODIUM CHLORIDE 1500 MG: 9 INJECTION, SOLUTION INTRAVENOUS at 06:26

## 2024-06-07 RX ADMIN — INSULIN GLARGINE 30 UNITS: 100 INJECTION, SOLUTION SUBCUTANEOUS at 08:55

## 2024-06-07 RX ADMIN — FLUTICASONE PROPIONATE 2 SPRAY: 50 SPRAY, METERED NASAL at 08:57

## 2024-06-07 RX ADMIN — BUDESONIDE 0.5 MG: 0.5 SUSPENSION RESPIRATORY (INHALATION) at 06:35

## 2024-06-07 RX ADMIN — GUAIFENESIN 1200 MG: 600 TABLET, EXTENDED RELEASE ORAL at 08:55

## 2024-06-07 RX ADMIN — ENOXAPARIN SODIUM 60 MG: 100 INJECTION SUBCUTANEOUS at 08:56

## 2024-06-07 RX ADMIN — Medication 10 ML: at 08:56

## 2024-06-07 RX ADMIN — INSULIN LISPRO 3 UNITS: 100 INJECTION, SOLUTION INTRAVENOUS; SUBCUTANEOUS at 12:50

## 2024-06-07 RX ADMIN — SENNOSIDES AND DOCUSATE SODIUM 2 TABLET: 50; 8.6 TABLET ORAL at 15:03

## 2024-06-07 RX ADMIN — IPRATROPIUM BROMIDE 0.5 MG: 0.5 SOLUTION RESPIRATORY (INHALATION) at 10:39

## 2024-06-07 RX ADMIN — IPRATROPIUM BROMIDE 0.5 MG: 0.5 SOLUTION RESPIRATORY (INHALATION) at 14:30

## 2024-06-07 RX ADMIN — IPRATROPIUM BROMIDE 0.5 MG: 0.5 SOLUTION RESPIRATORY (INHALATION) at 06:35

## 2024-06-07 NOTE — PLAN OF CARE
Goal Outcome Evaluation:           Progress: no change                                  Glucose at bedtime was 188, SSI given. Patient incontinent of urine this shift, bladder scan showed 63 mls. Dressing CDI

## 2024-06-07 NOTE — PROGRESS NOTES
"Pharmacy Dosing Service  Pharmacokinetics  Vancomycin Follow-up Evaluation     Assessment/Action/Plan:  Current Order: Vancomycin 1500 mg IVPB every 12 hours  Current end date:6/11/2024  Levels: 6/7/2024 0446 Vancomycin trough = 18.4 (11 hours post 1500 mg dose)  Additional antimicrobial agent(s): ceftriaxone     Vancomycin adjusted to 1250 mg iv q12h. Pharmacy will continue to follow daily and adjust dose accordingly.      Subjective:  Jose Landeros is a 63 y.o. male currently on Vancomycin for the treatment of SSTI, day 3 of 7 of treatment.    AUC Model Data:  Regimen: 1250 mg IV every 12 hours.  Exposure target: AUC24 (range)400-600 mg/L.hr   AUC24,ss: 504 mg/L.hr  PAUC*: 97 %  Ctrough,ss: 13.9 mg/L  Pconc*: 0 %  Tox.: 9 %      Objective:  Ht: 175.3 cm (69\"); Wt: (!) 259 kg (572 lb)  Estimated Creatinine Clearance: 161.4 mL/min (by C-G formula based on SCr of 0.93 mg/dL).   Creatinine   Date Value Ref Range Status   06/05/2024 0.93 0.76 - 1.27 mg/dL Final   03/25/2022 1.12 0.76 - 1.27 mg/dL Final   11/01/2021 0.94 0.76 - 1.27 mg/dL Final   10/05/2020 1.1 0.5 - 1.2 mg/dL Final   01/30/2020 1 0.5 - 1.2 mg/dL Final   01/27/2020 1.1 0.5 - 1.2 mg/dL Final      Lab Results   Component Value Date    WBC 7.87 06/05/2024    WBC 7.7 01/15/2024    WBC 8.7 11/08/2022         Lab Results   Component Value Date    VANCOTROUGH 18.40 06/07/2024       Culture Results:  Microbiology Results (last 10 days)       Procedure Component Value - Date/Time    Wound Culture - Wound, Foot, Left [[id number]] Collected: 06/06/24 0303    Lab Status: Preliminary result Specimen: Wound from Foot, Left Updated: 06/06/24 1236     Gram Stain No WBCs or organisms seen    MRSA Screen, PCR (Inpatient) - Swab, Nares [[id number]]  (Abnormal) Collected: 06/05/24 2054    Lab Status: Final result Specimen: Swab from Nares Updated: 06/05/24 2213     MRSA PCR MRSA Detected    Narrative:      The negative predictive value of this diagnostic test is high " and should only be used to consider de-escalating anti-MRSA therapy. A positive result may indicate colonization with MRSA and must be correlated clinically.    Blood Culture - Blood, Arm, Left [[id number]]  (Normal) Collected: 06/05/24 1432    Lab Status: Preliminary result Specimen: Blood from Arm, Left Updated: 06/06/24 1501     Blood Culture No growth at 24 hours    Blood Culture - Blood, Arm, Right [[id number]]  (Normal) Collected: 06/05/24 0422    Lab Status: Preliminary result Specimen: Blood from Arm, Right Updated: 06/06/24 1501     Blood Culture No growth at 24 hours            Juan Luis Rodriguez, PharmD   06/07/24 11:25 CDT

## 2024-06-07 NOTE — DISCHARGE PLACEMENT REQUEST
"Jose Birmingham (64 y.o. Male)       Date of Birth   1960    Social Security Number       Address   [address] [place], [place] [address]    Home Phone   [phone]    MRN   [id number]       [facility]   [facility]    Marital Status   Single                            Admission Date   6/5/24    Admission Type   Emergency    Admitting Provider   Kuldeep Rothman DO    Attending Provider   Kuldeep Rothman DO    Department, Room/Bed   [facility] 3C, 374/1       Discharge Date       Discharge Disposition   Home or Self Care    Discharge Destination                                 Attending Provider: Kuldeep Rothman DO    Allergies: Penicillins, Cephalosporins, Hemp Seed Oil, Neosporin  [Neomycin-bacitracin Zn-polymyx], Penicillamine, Silver Sulfadiazine    Isolation: Contact   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), 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)   Code Status: CPR    Ht: 175.3 cm (69\")   Wt: 259 kg (572 lb)    Admission Cmt: None   Principal Problem: Wound infection [T14.8XXA,L08.9]                   Active Insurance as of 6/5/2024       Primary Coverage       Payor Plan Insurance Group Employer/Plan Group    HUMANA MEDICARE REPLACEMENT HUMANA MED ADV HMO [id number]       Payor Plan Address Payor Plan Phone Number Payor Plan Fax Number Effective Dates    [address] [address] [phone]  6/1/2024 - None Entered    [facility] [address]         Subscriber Name Subscriber Birth Date Member ID       JOSE BIRMINGHAM 1960 [id number]                     Emergency Contacts        (Rel.) Home Phone Work Phone Mobile Phone    Katy Khan (Sister) [phone] -- --              Insurance Information                  HUMANA MEDICARE REPLACEMENT/HUMANA MED ADV HMO Phone: [phone]    Subscriber: Jose Birmingham Subscriber#: [id number]    Group#: [id number] Precert#: --             History & Physical        Kuldeep Rothman DO at 06/05/24 2000              [facility] Team " "[facility] Medicine Services  HISTORY AND PHYSICAL    Date of Admission: 6/5/2024  Primary Care Physician: Provider, No Known    Subjective   Primary Historian: The patient    Chief Complaint: Left foot wound    History of Present Illness    This 63-year-old male presents to the emergency department with a chief complaint of a nonhealing wound on his left foot.  The wound is chronic having been present for well over 2 years.  On examination, the emergency department APRN noted that several maggots were present in the wound.  The patient apparently was seen at his PCPs office this morning and was sent to the emergency department for evaluation.  The patient notes that he has previously been under the care of home health nursing for wound care but a few weeks ago he apparently \"lost insurance coverage\" and home wound care ceased.  He has noted worsening of the wound since that time.  At the time of evaluation in the emergency department, the patient was noted to have an oxygen saturation of 85% however the patient suffers from chronic respiratory failure secondary to obesity hypoventilation syndrome and obstructive sleep apnea.  Occasional periods of desaturation would be expected.  Currently, the patient is on 2 L per nasal cannula with saturations in the high 90s.  Chest x-ray revealed cardiomegaly with pulmonary vascular congestion with what appears to be chronic interstitial edema which is to be expected in a patient with super morbid obesity and with chronic hypoventilation and obstructive sleep apnea.  BNP is also slightly elevated at 2100 which also is to be expected in a person with chronic hypoxia and RV strain secondary to pulmonary hypertension induced by ELYSSA/OHS.  Workup in the emergency department reveals a glucose of 166, lactate and procalcitonin within normal limits.  Sed rate elevated at 38 and CRP 1.39.  White blood cell count within normal limits.  proBNP chronically elevated at 2167.  X-ray of " the right foot shows soft tissue wound along the lateral aspect of the midfoot with no destructive bony changes noted.    Review of Systems   Constitutional: Negative.    HENT: Negative.     Eyes: Negative.    Respiratory: Negative.     Cardiovascular: Negative.    Gastrointestinal: Negative.    Endocrine: Negative.    Genitourinary: Negative.    Musculoskeletal:  Positive for gait problem.   Skin:  Positive for color change and wound.   Allergic/Immunologic: Negative.    Neurological: Negative.    Hematological: Negative.    Psychiatric/Behavioral: Negative.        Otherwise complete ROS reviewed and negative except as mentioned in the HPI.    Past Medical History:   Past Medical History:   Diagnosis Date    COPD (chronic obstructive pulmonary disease)     Depression     Diabetes mellitus     Hyperlipidemia     Hypertension     Venous insufficiency     Venous ulcer of right leg      Past Surgical History:  Past Surgical History:   Procedure Laterality Date    APPENDECTOMY      FOOT SURGERY      TESTICLE SURGERY      TRACHEOSTOMY       Social History:  reports that he has never smoked. He has never used smokeless tobacco. He reports that he does not drink alcohol and does not use drugs.    Family History: family history includes COPD in his mother; Diabetes in his father and mother; Heart disease in his father and mother; Hyperlipidemia in his mother; Hypertension in his father and mother; No Known Problems in his brother, sister, sister, sister, and sister.       Allergies:  Allergies   Allergen Reactions    Penicillins Hives and Itching    Cephalosporins Other (See Comments)     Per d/c summary of 12/17/15, pt possibly had cross-reactivity to a cephalosporin during his hospitalization    Hemp Seed Oil Hives    Neosporin  [Neomycin-Bacitracin Zn-Polymyx]     Penicillamine     Silver Sulfadiazine        Medications:  Prior to Admission medications    Medication Sig Start Date End Date Taking? Authorizing Provider    albuterol (PROVENTIL) (2.5 MG/3ML) 0.083% nebulizer solution 3 mL Every 6 (Six) Hours. PRN 1/27/16   Surya Patten MD   albuterol (VENTOLIN HFA) 108 (90 BASE) MCG/ACT inhaler Inhale 2 puffs Every 6 (Six) Hours. prn 1/27/16   Surya Patten MD   allopurinol (ZYLOPRIM) 300 MG tablet Take 1 tablet by mouth Daily. 11/16/16   Surya Patten MD   ammonium lactate (AMLACTIN) 12 % cream Apply 1 application topically to the appropriate area as directed Every 12 (Twelve) Hours. 11/3/21   Ethel Gayle APRN   APPLE CIDER VINEGAR PO Take 1 capsule by mouth Daily. Buys OTC at [facility] and takes with orange juice    Surya Patten MD   aspirin 81 MG EC tablet Take 1 tablet by mouth Daily. 1/27/16   Surya Patten MD   bumetanide (BUMEX) 1 MG tablet Take 1-2 tablets by mouth Daily. Last week 11/16/16   Surya Patten MD   cetirizine (zyrTEC) 5 MG tablet Take 1 tablet by mouth Daily.  Patient not taking: Reported on 4/29/2022 11/4/21   Ethel Gayle APRN   diphenhydrAMINE (BENADRYL) 25 mg capsule Take 1 capsule by mouth 2 (Two) Times a Day. prn 1/27/16   Surya Patten MD   fluticasone (FLONASE) 50 MCG/ACT nasal spray 1 spray into each nostril 2 (Two) Times a Day As Needed. 11/16/16   Surya Patten MD   Ginger, Zingiber officinalis, (GINGER PO) Take 2 each by mouth Daily. 12/19/21   Rosa Tompkins APRN   insulin regular (NOVOLIN R RELION) 100 UNIT/ML injection Inject 30 Units under the skin into the appropriate area as directed. Pt injects 10-30 units 3 TIMES daily with breakfast and dinner depending on his bg 6/3/16   Surya Patten MD   lisinopril (PRINIVIL,ZESTRIL) 10 MG tablet Take 10 mg by mouth Daily. 4/11/22   Rosa Tompkins APRN   nebivolol (Bystolic) 20 MG tablet Take 20 mg by mouth Daily. 5/3/22   Rosa Tompkins APRN   nystatin (MYCOSTATIN) 927594 UNIT/GM cream Apply 1 application topically to the appropriate area as directed Every 12 (Twelve)  "Hours.  Patient not taking: Reported on 4/29/2022 11/3/21   Ethel Gayle APRN   O2 (OXYGEN) Inhale 2 L As Needed.    ProviderSurya MD   olmesartan (BENICAR) 20 MG tablet Take 20 mg by mouth Daily. 5/4/22   Rosa Tompkins APRN   potassium chloride (KLOR-CON) 20 MEQ CR tablet Take 1 tablet by mouth Daily. 11/16/16   Surya Patten MD   pravastatin (PRAVACHOL) 20 MG tablet Take 1 tablet by mouth Every Night. 11/16/16   Surya Patten MD   Turmeric (QC Tumeric Complex) 500 MG capsule Take 2 each by mouth Daily. 12/19/21   Rosa Tompkins APRN   vitamin B-12 (CYANOCOBALAMIN) 500 MCG tablet Take 1 tablet by mouth 2 (Two) Times a Day. 1/27/16   Surya Patten MD     I have utilized all available immediate resources to obtain, update, or review the patient's current medications (including all prescriptions, over-the-counter products, herbals, cannabis/cannabidiol products, and vitamin/mineral/dietary (nutritional) supplements).    Objective     Vital Signs: BP (!) 145/105 (BP Location: Right arm, Patient Position: Lying)   Pulse 111   Temp 97.5 °F (36.4 °C) (Oral)   Resp 18   Ht 175.3 cm (69\")   Wt (!) 254 kg (560 lb)   SpO2 96%   BMI 82.70 kg/m²   Physical Exam  Constitutional:       General: He is not in acute distress.     Appearance: He is well-developed. He is morbidly obese.      Interventions: Nasal cannula in place.   HENT:      Head: Normocephalic and atraumatic.      Right Ear: External ear normal.      Left Ear: External ear normal.      Nose: Nose normal.      Mouth/Throat:      Mouth: Mucous membranes are moist.      Pharynx: Oropharynx is clear.   Eyes:      General: No scleral icterus.     Extraocular Movements: Extraocular movements intact.      Conjunctiva/sclera: Conjunctivae normal.      Pupils: Pupils are equal, round, and reactive to light.   Cardiovascular:      Rate and Rhythm: Regular rhythm. Tachycardia present.      Pulses: Normal pulses.      Heart sounds: " Normal heart sounds. No murmur heard.  Pulmonary:      Effort: Pulmonary effort is normal. No respiratory distress.      Breath sounds: Normal breath sounds.   Abdominal:      General: Abdomen is protuberant. Bowel sounds are normal.      Palpations: Abdomen is soft. There is no mass.      Tenderness: There is no abdominal tenderness.      Comments: Large abdominal pannus   Musculoskeletal:         General: No tenderness. Normal range of motion.      Right lower leg: Edema present.      Left lower leg: Edema present.   Skin:     General: Skin is warm and dry.      Findings: Erythema (Mild, chronic hyperpigmented skin changes.) and rash present. Rash is scaling.      Comments: Chronic stasis dermatitis with flaking, scaling skin BLE.  Kerlix wrap left foot.  Wound documentation in photos below.   Neurological:      General: No focal deficit present.      Mental Status: He is alert and oriented to person, place, and time. Mental status is at baseline.      Cranial Nerves: No cranial nerve deficit.   Psychiatric:         Mood and Affect: Mood normal.         Judgment: Judgment normal.           File Link           Results Reviewed:  Lab Results (last 24 hours)       Procedure Component Value Units Date/Time    Procalcitonin [[id number]]  (Normal) Collected: 06/05/24 0422    Specimen: Blood Updated: 06/05/24 1509     Procalcitonin 0.06 ng/mL     Narrative:      As a Marker for Sepsis (Non-Neonates):    1. <0.5 ng/mL represents a low risk of severe sepsis and/or septic shock.  2. >2 ng/mL represents a high risk of severe sepsis and/or septic shock.    As a Marker for Lower Respiratory Tract Infections that require antibiotic therapy:    PCT on Admission    Antibiotic Therapy       6-12 Hrs later    >0.5                Strongly Recommended  >0.25 - <0.5        Recommended   0.1 - 0.25          Discouraged              Remeasure/reassess PCT  <0.1                Strongly Discouraged     Remeasure/reassess PCT    As 28 day  "mortality risk marker: \"Change in Procalcitonin Result\" (>80% or <=80%) if Day 0 (or Day 1) and Day 4 values are available. Refer to http://[email or web address]pct-[email or web address].com    Change in PCT <=80%  A decrease of PCT levels below or equal to 80% defines a positive change in PCT test result representing a higher risk for 28-day all-cause mortality of patients diagnosed with severe sepsis for septic shock.    Change in PCT >80%  A decrease of PCT levels of more than 80% defines a negative change in PCT result representing a lower risk for 28-day all-cause mortality of patients diagnosed with severe sepsis or septic shock.       Comprehensive Metabolic Panel [[id number]]  (Abnormal) Collected: 06/05/24 0422    Specimen: Blood Updated: 06/05/24 1503     Glucose 166 mg/dL      BUN 18 mg/dL      Creatinine 0.93 mg/dL      Sodium 141 mmol/L      Potassium 3.7 mmol/L      Chloride 102 mmol/L      CO2 26.0 mmol/L      Calcium 9.0 mg/dL      Total Protein 6.5 g/dL      Albumin 3.7 g/dL      ALT (SGPT) 12 U/L      AST (SGOT) 14 U/L      Alkaline Phosphatase 84 U/L      Total Bilirubin 0.8 mg/dL      Globulin 2.8 gm/dL      A/G Ratio 1.3 g/dL      BUN/Creatinine Ratio 19.4     Anion Gap 13.0 mmol/L      eGFR 91.1 mL/min/1.73     Narrative:      GFR Normal >60  Chronic Kidney Disease <60  Kidney Failure <15      C-reactive Protein [[id number]]  (Abnormal) Collected: 06/05/24 0422    Specimen: Blood Updated: 06/05/24 1503     C-Reactive Protein 1.39 mg/dL     Lactic Acid, Plasma [[id number]]  (Normal) Collected: 06/05/24 0422    Specimen: Blood Updated: 06/05/24 1501     Lactate 2.0 mmol/L     BNP [[id number]]  (Abnormal) Collected: 06/05/24 0422    Specimen: Blood Updated: 06/05/24 1500     proBNP 2,167.0 pg/mL     Narrative:      This assay is used as an aid in the diagnosis of individuals suspected of having heart failure. It can be used as an aid in the diagnosis of acute decompensated heart failure (ADHF) in patients presenting with " signs and symptoms of ADHF to the emergency department (ED). In addition, NT-proBNP of <300 pg/mL indicates ADHF is not likely.    Age Range Result Interpretation  NT-proBNP Concentration (pg/mL:      <50             Positive            >450                   Gray                 300-450                    Negative             <300    50-75           Positive            >900                  Gray                300-900                  Negative            <300      >75             Positive            >1800                  Gray                300-1800                  Negative            <300    Sedimentation Rate [[id number]]  (Abnormal) Collected: 06/05/24 0422    Specimen: Blood Updated: 06/05/24 1454     Sed Rate 38 mm/hr     Blood Culture - Blood, Arm, Left [[id number]] Collected: 06/05/24 1432    Specimen: Blood from Arm, Left Updated: 06/05/24 1450    Blood Culture - Blood, Arm, Right [[id number]] Collected: 06/05/24 0422    Specimen: Blood from Arm, Right Updated: 06/05/24 1449    CBC & Differential [[id number]]  (Abnormal) Collected: 06/05/24 0422    Specimen: Blood Updated: 06/05/24 1442    Narrative:      The following orders were created for panel order CBC & Differential.  Procedure                               Abnormality         Status                     ---------                               -----------         ------                     CBC Auto Differential[[id number]]        Abnormal            Final result                 Please view results for these tests on the individual orders.    CBC Auto Differential [[id number]]  (Abnormal) Collected: 06/05/24 0422    Specimen: Blood Updated: 06/05/24 1442     WBC 7.87 10*3/mm3      RBC 4.77 10*6/mm3      Hemoglobin 14.0 g/dL      Hematocrit 44.6 %      MCV 93.5 fL      MCH 29.4 pg      MCHC 31.4 g/dL      RDW 15.5 %      RDW-SD 52.8 fl      MPV 12.4 fL      Platelets 142 10*3/mm3      Neutrophil % 74.9 %      Lymphocyte % 14.2 %      Monocyte % 7.1 %       Eosinophil % 2.9 %      Basophil % 0.5 %      Immature Grans % 0.4 %      Neutrophils, Absolute 5.89 10*3/mm3      Lymphocytes, Absolute 1.12 10*3/mm3      Monocytes, Absolute 0.56 10*3/mm3      Eosinophils, Absolute 0.23 10*3/mm3      Basophils, Absolute 0.04 10*3/mm3      Immature Grans, Absolute 0.03 10*3/mm3      nRBC 0.0 /100 WBC           Imaging Results (Last 24 Hours)       Procedure Component Value Units Date/Time    XR Foot 3+ View Left [[id number]] Collected: 06/05/24 1511     Updated: 06/05/24 1517    Narrative:      EXAMINATION: XR FOOT 3+ VW LEFT-  6/5/2024 3:11 PM     HISTORY: wound. Per the technologist report, the patient was  uncooperative during the examination, limiting the images.     COMPARISON: None      TECHNIQUE:  Frontal, lateral and oblique radiographs of the LEFT foot were provided  for review.     FINDINGS:  Posterior Achilles enthesophyte. Mild edema in Kager's fat pad. Plantar  calcaneal spur noted. Mild diffuse soft tissue swelling and edema,  nonspecific. Soft tissue wound along the lateral aspect of the LEFT  foot. Mild arthritis at the first MTP joint. I don't see any definite  destructive bone changes to suggest acute osteomyelitis on today's  examination. No definite acute fracture. Vascular calcifications are  seen. I do suspect some arthritis at the TMT joints but it is mild.  Hammertoe deformities of the second through fifth digits.          Impression:         1.  Soft tissue wound along the lateral aspect of the midfoot and  hindfoot without definite destructive bony changes.     2.  Diffuse soft tissue swelling and edema as discussed above.     This report was signed and finalized on 6/5/2024 3:14 PM by Dr. Ranjit Norris MD.       XR Chest 1 View [[id number]] Collected: 06/05/24 1453     Updated: 06/05/24 1457    Narrative:      EXAMINATION: XR CHEST 1 VW-  6/5/2024 2:53 PM     HISTORY: Hypoxic on arrival.     FINDINGS: Today's exam is compared to previous study of  11/2/2021. There  is cardiomegaly with vascular redistribution suggesting pulmonary venous  hypertension with interstitial edema. No acute infiltrate. No  significant effusion is present.       Impression:      1.. Cardiomegaly with pulmonary vascular congestion. An element of  interstitial edema suspected. This appears to be a chronic finding in  this patient.     This report was signed and finalized on 6/5/2024 2:54 PM by Dr. Karl Loving MD.             I have personally reviewed and interpreted the radiology studies and ECG obtained at time of admission.     Assessment / Plan   Assessment:   Active Hospital Problems    Diagnosis     **Wound infection     Chronic hypoxic respiratory failure, on home oxygen therapy     Obstructive sleep apnea     Obesity hypoventilation syndrome     Morbid obesity with BMI of 70 and over, adult     COPD (chronic obstructive pulmonary disease)     Diabetes mellitus        Treatment Plan  Admit to the medical surgical floor  Wound culture  Vancomycin dosed per pharmacy  Rocephin 2 g IV every 24 hours  Pulmonology consultation regarding chronic respiratory failure  Podiatry consultation regarding chronic left foot ulcer  Plan to establish a wound care plan with podiatry and wound care  Continue empiric IV antibiotics with transition to oral antibiotics after culture has been received  Discharge 2-3 days    Medical Decision Making  Number and Complexity of problems:   1 acute, high complexity problem  5+ chronic moderate complexity problems    Differential Diagnosis: None    Conditions and Status        Condition is unchanged.     [facility] Data  External documents reviewed: Care Everywhere documentation  Cardiac tracing (EKG, telemetry) interpretation: See HPI  Radiology interpretation: See HPI  Labs reviewed: See HPI  Any tests that were considered but not ordered: None     Decision rules/scores evaluated (example [id number], Wells, etc): None     Discussed with: The patient      Care Planning  Shared decision making: The patient, pulmonology, podiatry, wound care  Code status and discussions: Full code    Disposition  Social Determinants of Health that impact treatment or disposition: Morbid obesity  Estimated length of stay is 2-3 days.     I confirmed that the patient's advanced care plan is present, code status is documented, and a surrogate decision maker is listed in the patient's medical record.     The patient's surrogate decision maker is his sister.     The patient was seen and examined by me on 6/5/2024 at 1930.    Electronically signed by Kuldeep Rothman DO, 06/05/24, 20:00 CDT.                Electronically signed by Kuldeep Rothman DO at 06/05/24 2017       Oxygen Therapy (last 2 days)       Date/Time SpO2 Device (Oxygen Therapy) Flow (L/min) Oxygen Concentration (%) ETCO2 (mmHg)    06/07/24 1241 93 CPAP 2.5 -- --    06/07/24 1039 92 CPAP 2.5 -- --    06/07/24 0735 95 CPAP 4 -- --    06/07/24 0635 92 CPAP 4 -- --    06/07/24 0346 90 CPAP -- -- --    06/06/24 2110 -- nasal cannula 4 -- --    06/06/24 2037 97 nasal cannula 4 -- --    06/06/24 2032 96 nasal cannula 4 -- --    06/06/24 1530 92 nasal cannula -- -- --    06/06/24 1420 95 nasal cannula 4 -- --    06/06/24 1415 91 nasal cannula 4 -- --    06/06/24 1156 91 nasal cannula 4 -- --    06/06/24 1025 96 nasal cannula 4 -- --    06/06/24 1020 93 nasal cannula 4 -- --    06/06/24 0745 96 nasal cannula 4 -- --    06/06/24 0644 92 nasal cannula 4 -- --    06/06/24 0640 99 nasal cannula 4 -- --    06/06/24 0633 94 nasal cannula 4 -- --    06/06/24 0236 96 CPAP -- -- --    06/05/24 2317 -- -- 4 -- --    06/05/24 2316 97 CPAP -- -- --    06/05/24 2300 93 nasal cannula 4 -- --    06/05/24 2237 98 nasal cannula 4 -- --    06/05/24 2230 96 nasal cannula 4 -- --    06/05/24 2145 -- -- -- 40 --    06/05/24 1955 96 nasal cannula 4 -- --    06/05/24 1849 96 nasal cannula 4 -- --    06/05/24 1702 95 -- -- -- --    06/05/24 1504  90 -- -- -- --    06/05/24 14:14:44 -- nasal cannula 2 -- --    06/05/24 1411 85 room air -- -- --          Operative/Procedure Notes (last 4 days)  Notes from 06/03/24 1345 through 06/07/24 1345   No notes of this type exist for this encounter.          Physician Progress Notes (last 24 hours)        Harper Song APRN at 06/07/24 1134              [facility] – [place], [place] PULMONARY PROGRESS NOTE - [facility]    Patient: Jose Landeros  1960   MR# [id number]   Acct# [id number]  06/07/24   11:34 CDT  Referring Provider: Kuldeep Rothman DO    Chief Complaint: Shortness of breath    Interval history: Afebrile.  Saturation 92 on home PAP.  He is otherwise on 4L NC. Continue bronchodilators and incentive.  Continue Mucinex.  He feels he is breathing better. No new labs or imaging for review. Further recommendations per Dr. Kelly.     Meds:  budesonide, 0.5 mg, Nebulization, BID - RT  cefTRIAXone, 2,000 mg, Intravenous, Q24H  cetirizine, 10 mg, Oral, Daily  enoxaparin, 60 mg, Subcutaneous, Q12H  fluticasone, 2 spray, Each Nare, Daily  guaiFENesin, 1,200 mg, Oral, BID  insulin glargine, 30 Units, Subcutaneous, Daily  insulin lispro, 3-14 Units, Subcutaneous, 4x Daily AC & at Bedtime  ipratropium, 0.5 mg, Nebulization, 4x Daily - RT  mupirocin, 1 Application, Each Nare, BID  sodium chloride, 10 mL, Intravenous, Q12H  vancomycin, 1,250 mg, Intravenous, Q12H           Physical Exam:  SpO2 Percentage    06/07/24 0635 06/07/24 0735 06/07/24 1039   SpO2: 92% 95% 92%     Body mass index is 84.47 kg/m².   Temp:  [97.4 °F (36.3 °C)-98.6 °F (37 °C)] 98.1 °F (36.7 °C)  Heart Rate:  [] 103  Resp:  [18-19] 18  BP: (118-135)/(81-85) 118/81  Intake/Output Summary (Last 24 hours) at 6/7/2024 1134  Last data filed at 6/7/2024 0900  Gross per 24 hour   Intake 350 ml   Output 150 ml   Net 200 ml     Physical Exam  Vitals and nursing note reviewed.   Constitutional:       General: He is not in acute distress.      Appearance: He is obese. He is ill-appearing.      Comments: Home CPAP  HENT:      Nose: Congestion present. No rhinorrhea.      Mouth/Throat:      Mouth: Mucous membranes are dry.   Neck:      Vascular: No carotid bruit.      Comments: Old tracheostomy scar noted in the front of the neck  Cardiovascular:      Rate and Rhythm: Tachycardia present.      Pulses: Normal pulses.      Heart sounds: Normal heart sounds. No murmur heard.     No friction rub. No gallop.   Pulmonary:      Effort: Pulmonary effort is normal. No respiratory distress.      Breath sounds: No stridor.  Rales and rhonchi  Genitourinary:     Comments: Not examined  Musculoskeletal:         General: No swelling, tenderness, deformity or signs of injury. Normal range of motion.      Cervical back: Normal range of motion and neck supple. No rigidity or tenderness.      Right lower leg: Edema present.      Left lower leg: Edema present.   Skin:     General: Skin is warm and dry.      Capillary Refill: Capillary refill takes less than 2 seconds.      Coloration: Skin is not jaundiced or pale.      Findings: Bruising present. No erythema, lesion or rash.      Comments: Chronic venous stasis noted in both lower extremities   Neurological:      Motor: Weakness present.      Deep Tendon Reflexes: Reflexes normal.   Psychiatric:         Mood and Affect: Mood normal.         Thought Content: Thought content normal.     Electronically signed by NOAH Roberts, 6/7/2024, 11:34 CDT      Physician Substantive Portion: Medical Decision Making to follow:          Electronically signed by Harper Song APRN at 06/07/24 1138       Kuldeep Rothman DO at 06/06/24 1521              [facility] Medicine Services  INPATIENT PROGRESS NOTE    Patient Name: Jose Landeros  Date of Admission: 6/5/2024  Today's Date: 06/06/24  Length of Stay: 1  Primary Care Physician: Provider, No Known    Subjective   Chief  Complaint: Left foot wound  HPI     The patient's left foot wound actually looks quite good.  There is no erythema.  Skin changes of the left lower extremity are chronic in nature and consistent with erythema nigricans.  I would anticipate the patient would be appropriate for discharge as early as tomorrow given the remarkably better than anticipated appearance of his foot wounds.  The patient remains afebrile.  He states that he can function well at home with the devices that he has currently.  Pulmonology may be able to acquire a trilogy device for the patient.  An oximetry will be ordered in an attempt to qualify the patient for daytime oxygen.  MRSA nasal swab was positive.  Mupirocin ointment will be prescribed.  ABGs this a.m. showed normal pH with pCO2 elevated at 48.5 and pO2 70.8 while on CPAP.  Gram stain of the foot wound shows no WBCs or organisms.  Blood cultures show no growth at 24 hours.    Review of Systems   All pertinent negatives and positives are as above. All other systems have been reviewed and are negative unless otherwise stated.     Objective    Temp:  [97.4 °F (36.3 °C)-98.4 °F (36.9 °C)] 97.4 °F (36.3 °C)  Heart Rate:  [] 97  Resp:  [18-19] 18  BP: (133-156)/() 135/83  Physical Exam    Constitutional:       General: He is not in acute distress.     Appearance: He is well-developed. He is morbidly obese.      Interventions: Nasal cannula in place.   HENT:      Head: Normocephalic and atraumatic.      Right Ear: External ear normal.      Left Ear: External ear normal.      Nose: Nose normal.      Mouth/Throat:      Mouth: Mucous membranes are moist.      Pharynx: Oropharynx is clear.   Eyes:      General: No scleral icterus.     Extraocular Movements: Extraocular movements intact.      Conjunctiva/sclera: Conjunctivae normal.      Pupils: Pupils are equal, round, and reactive to light.   Cardiovascular:      Rate and Rhythm: Regular rhythm.      Pulses: Normal pulses.      Heart  sounds: Normal heart sounds. No murmur heard.  Pulmonary:      Effort: Pulmonary effort is normal. No respiratory distress.      Breath sounds: Normal breath sounds.   Abdominal:      General: Abdomen is protuberant. Bowel sounds are normal.      Palpations: Abdomen is soft. There is no mass.      Tenderness: There is no abdominal tenderness.      Comments: Large abdominal pannus   Musculoskeletal:         General: No tenderness. Normal range of motion.      Right lower leg: Edema present.      Left lower leg: Edema present.   Skin:     General: Skin is warm and dry.      Findings: Chronic hyperpigmented skin changes and rash present. Rash is scaling.      Comments: Chronic stasis dermatitis with flaking, scaling skin BLE.  Kerlix wrap left foot.  Wound documentation in photos.   Neurological:      General: No focal deficit present.      Mental Status: He is alert and oriented to person, place, and time. Mental status is at baseline.      Cranial Nerves: No cranial nerve deficit.   Psychiatric:         Mood and Affect: Mood normal.         Judgment: Judgment normal.     Results Review:  I have reviewed the labs, radiology results, and diagnostic studies.    Laboratory Data:   Results from last 7 days   Lab Units 06/05/24  0422   WBC 10*3/mm3 7.87   HEMOGLOBIN g/dL 14.0   HEMATOCRIT % 44.6   PLATELETS 10*3/mm3 142        Results from last 7 days   Lab Units 06/05/24  0422   SODIUM mmol/L 141   POTASSIUM mmol/L 3.7   CHLORIDE mmol/L 102   CO2 mmol/L 26.0   BUN mg/dL 18   CREATININE mg/dL 0.93   CALCIUM mg/dL 9.0   BILIRUBIN mg/dL 0.8   ALK PHOS U/L 84   ALT (SGPT) U/L 12   AST (SGOT) U/L 14   GLUCOSE mg/dL 166*       Culture Data:   Blood Culture   Date Value Ref Range Status   06/05/2024 No growth at 24 hours  Preliminary   06/05/2024 No growth at 24 hours  Preliminary       Radiology Data:   Imaging Results (Last 24 Hours)       ** No results found for the last 24 hours. **            I have reviewed the patient's  current medications.     Assessment/Plan   Assessment  Active Hospital Problems    Diagnosis     **Wound infection     Chronic hypoxic respiratory failure, on home oxygen therapy     Obstructive sleep apnea     Obesity hypoventilation syndrome     Morbid obesity with BMI of 70 and over, adult     COPD (chronic obstructive pulmonary disease)     Diabetes mellitus        Treatment Plan  Continue current IV antibiotics  Wound culture negative  Will transition to oral antibiotics and topical treatment at the time of discharge  Probable discharge tomorrow    Medical Decision Making  Number and Complexity of problems:   1 acute, high complexity problem  5+ chronic moderate complexity problems     Differential Diagnosis: None     Conditions and Status        Condition is unchanged.     [facility] Data  External documents reviewed: Care Everywhere documentation  Cardiac tracing (EKG, telemetry) interpretation: See HPI  Radiology interpretation: See HPI  Labs reviewed: See HPI  Any tests that were considered but not ordered: None     Decision rules/scores evaluated (example [id number], Wells, etc): None     Discussed with: The patient     Care Planning  Shared decision making: The patient, pulmonology, podiatry, wound care  Code status and discussions: Full code     Disposition  Social Determinants of Health that impact treatment or disposition: Morbid obesity  I expect the patient to be discharged to home in 1 days.     Electronically signed by Kuldeep Rothman DO, 06/06/24, 15:21 CDT.      Electronically signed by Kuldeep Rothman DO at 06/06/24 1526       Consult Notes (last 24 hours)  Notes from 06/06/24 1345 through 06/07/24 1345   No notes of this type exist for this encounter.       [unfilled]     Discharge Summary        Kuldeep Rothman DO at 06/07/24 1149              [facility] Medicine Services  DISCHARGE SUMMARY       Date of Admission: 6/5/2024  Date of Discharge:   "6/7/2024  Primary Care Physician: Provider, No Known    Discharge Diagnoses:  Active Hospital Problems    Diagnosis     **Wound infection     Chronic hypoxic respiratory failure, on home oxygen therapy     Obstructive sleep apnea     Obesity hypoventilation syndrome     Morbid obesity with BMI of 70 and over, adult     COPD (chronic obstructive pulmonary disease)     Diabetes mellitus          Presenting Problem/History of Present Illness:  Wound infection [T14.8XXA, L08.9]     Chief Complaint on Day of Discharge:   Left foot wound    History of Present Illness on Day of Discharge:   Left foot wound actually looks good today.  Podiatry has seen and evaluated the patient and recommends outpatient follow-up with wound care or home health nursing for wound care.  Pulmonology has seen the patient I would like an outpatient follow-up with him after discharge for repeat sleep study.  The patient is appropriate for discharge home today.    Hospital Course     This 63-year-old male presents to the emergency department with a chief complaint of a nonhealing wound on his left foot.  The wound is chronic having been present for well over 2 years.  On examination, the emergency department APRN noted that several maggots were present in the wound.  The patient apparently was seen at his PCPs office this morning and was sent to the emergency department for evaluation.  The patient notes that he has previously been under the care of home health nursing for wound care but a few weeks ago he apparently \"lost insurance coverage\" and home wound care ceased.  He has noted worsening of the wound since that time.  At the time of evaluation in the emergency department, the patient was noted to have an oxygen saturation of 85% however the patient suffers from chronic respiratory failure secondary to obesity hypoventilation syndrome and obstructive sleep apnea.  Occasional periods of desaturation would be expected.  Currently, the patient " is on 2 L per nasal cannula with saturations in the high 90s.  Chest x-ray revealed cardiomegaly with pulmonary vascular congestion with what appears to be chronic interstitial edema which is to be expected in a patient with super morbid obesity and with chronic hypoventilation and obstructive sleep apnea.  BNP is also slightly elevated at 2100 which also is to be expected in a person with chronic hypoxia and RV strain secondary to pulmonary hypertension induced by ELYSSA/OHS.  Workup in the emergency department reveals a glucose of 166, lactate and procalcitonin within normal limits.  Sed rate elevated at 38 and CRP 1.39.  White blood cell count within normal limits.  proBNP chronically elevated at 2167.  X-ray of the right foot shows soft tissue wound along the lateral aspect of the midfoot with no destructive bony changes noted.  Treatment Plan  Admit to the medical surgical floor  Wound culture  Vancomycin dosed per pharmacy  Rocephin 2 g IV every 24 hours  Pulmonology consultation regarding chronic respiratory failure  Podiatry consultation regarding chronic left foot ulcer  Plan to establish a wound care plan with podiatry and wound care  Continue empiric IV antibiotics with transition to oral antibiotics after culture has been received  Discharge 2-3 days    Patient was seen and evaluated by pulmonology shortly after admission.  Recommendations are noted below.  The patient has chronic respiratory failure secondary to super morbid obesity and ELYSSA/OHS.  He required 4 L per nasal cannula with oxygenation with BiPAP.  The patient was unable to tolerate the hospital BiPAP system and continue to use his own CPAP system.  The left foot wound actually looked good without erythema there was no evidence of cellulitis.  The patient's skin changes were chronic in nature and and there was significant evidence of erythema nigra cans from chronic venous stasis.  MRSA nasal swab was positive and the patient was placed on  "mupirocin ointment.  The patient is refusing protective foam dressings on his bottom and his heels.  He is refusing topicals because he is \"allergic to everything\".  Currently the patient has no acute condition and is appropriate for discharge home for further outpatient treatment.  Wound care follow-up has been recommended however it is unlikely that the patient will be compliant given his previous experience.  Home health nursing will be recruited for wound care.  The patient should follow-up with pulmonology for repeat sleep study.      Consults:   Pulmonology:  Problem List as identified by Epic (may contain historical, inactive problems)    Wound infection    Diabetes mellitus    COPD (chronic obstructive pulmonary disease)    Morbid obesity with BMI of 70 and over, adult    Obstructive sleep apnea    Obesity hypoventilation syndrome    Chronic hypoxic respiratory failure, on home oxygen therapy     Pulmonary Assessment:     Acute on chronic hypercapnic and hypoxic respiratory failure  Chronic respiratory failure currently oxygen dependent  Super morbid obesity with BMI more than 84  Severe obstructive sleep apnea on CPAP treatment likely inadequate  Obesity hypoventilation syndrome  COPD apparently non-smoker  History of congestive diastolic heart failure and fluid overload  History of tracheostomy placement and ventilator dependence in the past  Bilateral lymphedema and venous stasis  Hypertension  Hyperlipidemia  Diabetes mellitus type 2  Allergic rhinitis  Left foot wound appears chronic     Recommend/plan:   Patient presented with a left leg wound she was also noted to have acute on chronic hypercapnic and hypoxic respiratory failure  He will use hospital BiPAP.  He has a CPAP machine at bedside which appears to be old and I doubt it is working properly and he also has a very small nasal mask which is probably not working very well.  I will keep the initial BiPAP settings 16/8 with a rate of 20 and FiO2 " 35% to 40% keep oxygen saturation more than 92%.  Devyn ABG after the overnight BiPAP and then decide about the BiPAP settings  We may change the BiPAP settings to CPAP settings and he may even qualify for home trilogy if he has chronic CO2 retention  Patient was seen and followed by Dr. Kenny and had the last sleep study done almost 9 years ago  He has a documented history of COPD but apparently patient denied any history of tobacco use.  He will need outpatient pulmonary function test and currently I will start him on Pulmicort and Atrovent nebulizer  Albuterol will be avoided due to tachycardia.  In addition to the obstructive lung disease he probably has significant restrictive lung dysfunction due to his body habitus.  Patient has nasal allergy and will be started on fluticasone nasal spray and Zyrtec  Plan for outpatient PFT and sleep study when he is more stable.  Continue blood pressure and glycemic control per hospitalist team  He was already given Lasix for pulmonary edema.  Echocardiogram will be needed and cardiac workup will be also needed.  He will be encouraged to do incentive spirometer to improve pulmonary compliance.  Wound care will be following the left leg wound.  He has bilateral lymphedema and left leg wound which is bandaged  DVT and stress ulcer prophylaxis and pain and anxiety control.  Wound care.  Echocardiogram will be ordered  Physical activity as tolerated.  Nutritional support.  Repeat labs and imaging studies from time to time  Arterial blood gas ordered for tomorrow morning.  Care plan discussed with Dr. Rothman  CODE STATUS: Full.  Overall prognosis: Guarded.  We appreciate the consult and we will follow  Time spent in seeing this patient as inpatient pulmonary consult was 45 minutes     Thank you for this consult.  We will follow along.     Electronically signed by      Zak Kelly MD      Result Review    Result Review:  I have personally reviewed the results from the  "time of this admission to 6/7/2024 11:49 CDT and agree with these findings:  []  Laboratory  []  Microbiology  []  Radiology  []  EKG/Telemetry   []  Cardiology/Vascular   []  Pathology  []  Old records  []  Other:    Condition on Discharge:    Stable and at baseline    Physical Exam on Discharge:  /81   Pulse 103   Temp 98.1 °F (36.7 °C) (Oral)   Resp 18   Ht 175.3 cm (69\")   Wt (!) 259 kg (572 lb)   SpO2 92%   BMI 84.47 kg/m²   Physical Exam       Constitutional:       General: He is not in acute distress.     Appearance: He is well-developed. He is morbidly obese.      Interventions: Nasal cannula in place.   HENT:      Head: Normocephalic and atraumatic.      Right Ear: External ear normal.      Left Ear: External ear normal.      Nose: Nose normal.      Mouth: Mucous membranes are moist.      Pharynx: Oropharynx is clear.   Eyes:      General: No scleral icterus.     Conjunctiva/sclera: Conjunctivae normal.   Cardiovascular:      Rate and Rhythm: Regular rhythm.      Pulses: Normal pulses.      Heart sounds: Normal heart sounds. No murmur heard.  Pulmonary:      Effort: Pulmonary effort is normal. No respiratory distress.      Breath sounds: Normal breath sounds.   Abdominal:      General: Abdomen is protuberant. Bowel sounds are normal.      Palpations: Abdomen is soft. There is no mass.      Tenderness: There is no abdominal tenderness.      Comments: Large abdominal pannus   Musculoskeletal:         General: No tenderness. Normal range of motion.      Right lower leg: Edema present.      Left lower leg: Edema present.   Skin:     General: Skin is warm and dry.      Findings: Chronic hyperpigmented skin changes and rash present. Rash is scaling.      Comments: Chronic stasis dermatitis with flaking, scaling skin BLE.  Kerlix wrap left foot.  Wound documentation in photos.   Neurological:      General: No focal deficit present.      Mental Status: He is alert and oriented to person, place, and " time. Mental status is at baseline.   Psychiatric:         Mood and Affect: Mood normal.         Judgment: Judgment normal.     Discharge Disposition:  Home or Self Care    Discharge Medications:     Discharge Medications        New Medications        Instructions Start Date   cetirizine 10 MG tablet  Commonly known as: zyrTEC   10 mg, Oral, Daily   Start Date: June 8, 2024     mupirocin 2 % ointment  Commonly known as: BACTROBAN   Apply into each nare as directed by provider 2 (Two) Times a Day for 5 days.             Continue These Medications        Instructions Start Date   allopurinol 300 MG tablet  Commonly known as: ZYLOPRIM   1 tablet, Oral, Daily      APPLE CIDER VINEGAR PO   1 capsule, Oral, Daily, Buys OTC at [facility] and takes with orange juice      aspirin 81 MG EC tablet   1 tablet, Oral, Daily      bumetanide 1 MG tablet  Commonly known as: BUMEX   1-2 tablets, Oral, Daily, Last week      Bystolic 20 MG tablet  Generic drug: nebivolol   20 mg, Oral, Daily      diphenhydrAMINE 25 mg capsule  Commonly known as: BENADRYL   1 capsule, Oral, 2 Times Daily PRN      Flonase 50 MCG/ACT nasal spray  Generic drug: fluticasone   1 spray, Nasal, 2 Times Daily PRN      NovoLIN R ReliOn 100 UNIT/ML injection  Generic drug: insulin regular   10 Units, Subcutaneous, 3 Times Daily Before Meals, Pt injects 10-30 units 3 TIMES daily with breakfast and dinner depending on his bg      olmesartan 20 MG tablet  Commonly known as: BENICAR   20 mg, Oral, Daily      omeprazole 20 MG capsule  Commonly known as: priLOSEC   20 mg, Oral, Daily      pravastatin 20 MG tablet  Commonly known as: PRAVACHOL   1 tablet, Oral, Nightly      Turmeric Complex/Black Pepper 3-500 MG capsule  Generic drug: Black Pepper-Turmeric   1 capsule, Oral, Daily      vitamin B-12 500 MCG tablet  Commonly known as: CYANOCOBALAMIN   1 tablet, Oral, 2 Times Daily               Discharge Diet:   Diet Instructions       Diet: Diabetic Diets; Consistent  "Carbohydrate; Thin (IDDSI 0)      Discharge Diet: Diabetic Diets    Diabetic Diet: Consistent Carbohydrate    Fluid Consistency: Thin (IDDSI 0)            Discharge Care Plan / Instructions:   Discharge home  Home health nursing to follow-up    Activity at Discharge:   Activity Instructions       Activity as Tolerated              Follow-up Appointments:  Follow-up with PCP next week  Follow-up with pulmonology in 2 weeks  Recommend follow-up with outpatient wound care in 1 week    Electronically signed by Kuldeep Pierce DO, 24, 11:49 CDT.    Time: Discharge over 30 min    Part of this note may be an electronic transcription/translation of spoken language to printed text using the Dragon Dictation system.        Electronically signed by Kuldeep Pierce DO at 24 1215       Discharge Order (From admission, onward)       Start     Ordered    24 1131  Discharge patient  Once        Expected Discharge Date: 24   Discharge Disposition: Home or Self Care   Physician of Record for Attribution - Please select from Treatment Team: KULDEEP PIERCE [[id number]]   Review needed by CMO to determine Physician of Record: No      Question Answer Comment   Physician of Record for Attribution - Please select from Treatment Team KULDEEP PIERCE    Review needed by CMO to determine Physician of Record No        24 1134                [address] [address]  Dept. Phone:  [phone]  Dept. Fax:   Date Ordered: 2024         Patient:  Jose Landeros MRN:  [id number]   [address] [place] [id number] :  1960  SSN:    Phone: [phone] Sex:  M     Weight: 259 kg (572 lb)         Ht Readings from Last 1 Encounters:   24 175.3 cm (69\")         Home Oxygen Therapy          (Order ID: [id number])    Diagnosis:  Chronic hypoxic respiratory failure, on home oxygen therapy (J96.11,Z99.81 [ICD-10-CM] " 518.83,799.02,V46.2 [ICD-9-CM])   Quantity:  1     Delivery Modality: Nasal Cannula  Liters Per Minute: 4  Duration: Continuous  Equipment:  Oxygen Concentrator &  &  Portable Gaseous Oxygen System & Portable Oxygen Contents Gaseous &  Conserving Regulator  Length of Need: 99 Months = Lifetime        Authorizing Provider's Phone: [phone]  Authorizing Provider:Kuldeep Rothman DO  Authorizing Provider's NPI: [id number]  Order Entered By: uKldeep Rothman DO 6/7/2024 11:55 AM     Electronically signed by: Kuldeep Rothman DO 6/7/2024 11:55 AM

## 2024-06-07 NOTE — PROGRESS NOTES
[facility] PULMONARY PROGRESS NOTE - [facility]    Patient: Jose Landeros  1960   MR# [id number]   Acct# [id number]  06/07/24   11:34 CDT  Referring Provider: Kuldeep Rothman DO    Chief Complaint: Shortness of breath    Interval history: Afebrile.  Saturation 92 on home PAP.  He is otherwise on 4L NC. Continue bronchodilators and incentive.  Continue Mucinex.  He feels he is breathing better. No new labs or imaging for review. Further recommendations per Dr. Kelly.     Meds:  budesonide, 0.5 mg, Nebulization, BID - RT  cefTRIAXone, 2,000 mg, Intravenous, Q24H  cetirizine, 10 mg, Oral, Daily  enoxaparin, 60 mg, Subcutaneous, Q12H  fluticasone, 2 spray, Each Nare, Daily  guaiFENesin, 1,200 mg, Oral, BID  insulin glargine, 30 Units, Subcutaneous, Daily  insulin lispro, 3-14 Units, Subcutaneous, 4x Daily AC & at Bedtime  ipratropium, 0.5 mg, Nebulization, 4x Daily - RT  mupirocin, 1 Application, Each Nare, BID  sodium chloride, 10 mL, Intravenous, Q12H  vancomycin, 1,250 mg, Intravenous, Q12H           Physical Exam:  SpO2 Percentage    06/07/24 0635 06/07/24 0735 06/07/24 1039   SpO2: 92% 95% 92%     Body mass index is 84.47 kg/m².   Temp:  [97.4 °F (36.3 °C)-98.6 °F (37 °C)] 98.1 °F (36.7 °C)  Heart Rate:  [] 103  Resp:  [18-19] 18  BP: (118-135)/(81-85) 118/81  Intake/Output Summary (Last 24 hours) at 6/7/2024 1134  Last data filed at 6/7/2024 0900  Gross per 24 hour   Intake 350 ml   Output 150 ml   Net 200 ml     Physical Exam  Vitals and nursing note reviewed.   Constitutional:       General: He is not in acute distress.     Appearance: He is obese. He is ill-appearing.      Comments: Home CPAP  HENT:      Nose: Congestion present. No rhinorrhea.      Mouth/Throat:      Mouth: Mucous membranes are dry.   Neck:      Vascular: No carotid bruit.      Comments: Old tracheostomy scar noted in the front of the neck  Cardiovascular:      Rate and Rhythm: Tachycardia present.       Pulses: Normal pulses.      Heart sounds: Normal heart sounds. No murmur heard.     No friction rub. No gallop.   Pulmonary:      Effort: Pulmonary effort is normal. No respiratory distress.      Breath sounds: No stridor.  Rales and rhonchi  Genitourinary:     Comments: Not examined  Musculoskeletal:         General: No swelling, tenderness, deformity or signs of injury. Normal range of motion.      Cervical back: Normal range of motion and neck supple. No rigidity or tenderness.      Right lower leg: Edema present.      Left lower leg: Edema present.   Skin:     General: Skin is warm and dry.      Capillary Refill: Capillary refill takes less than 2 seconds.      Coloration: Skin is not jaundiced or pale.      Findings: Bruising present. No erythema, lesion or rash.      Comments: Chronic venous stasis noted in both lower extremities   Neurological:      Motor: Weakness present.      Deep Tendon Reflexes: Reflexes normal.   Psychiatric:         Mood and Affect: Mood normal.         Thought Content: Thought content normal.     Electronically signed by NOAH Roberts, 6/7/2024, 11:34 CDT      Physician Substantive Portion: Medical Decision Making to follow:      Physician substantive portion: medical decision making  Result Review  Laboratory Data:  Results from last 7 days   Lab Units 06/05/24  0422   WBC 10*3/mm3 7.87   HEMOGLOBIN g/dL 14.0   PLATELETS 10*3/mm3 142     Results from last 7 days   Lab Units 06/05/24  0422   SODIUM mmol/L 141   POTASSIUM mmol/L 3.7   CO2 mmol/L 26.0   BUN mg/dL 18   CREATININE mg/dL 0.93   LACTATE mmol/L 2.0   CRP mg/dL 1.39*     Results from last 7 days   Lab Units 06/06/24  0352 06/05/24  2042   PH, ARTERIAL pH units 7.427 7.358   PCO2, ARTERIAL mm Hg 48.5* 55.3*   PO2 ART mm Hg 70.8* 78.6*     Microbiology Results (last 10 days)       Procedure Component Value - Date/Time    Wound Culture - Wound, Foot, Left [[id number]] Collected: 06/06/24 0303    Lab Status:  Preliminary result Specimen: Wound from Foot, Left Updated: 06/07/24 1309     Wound Culture Culture in progress     Gram Stain No WBCs or organisms seen    MRSA Screen, PCR (Inpatient) - Swab, Nares [[id number]]  (Abnormal) Collected: 06/05/24 2054    Lab Status: Final result Specimen: Swab from Nares Updated: 06/05/24 2213     MRSA PCR MRSA Detected    Narrative:      The negative predictive value of this diagnostic test is high and should only be used to consider de-escalating anti-MRSA therapy. A positive result may indicate colonization with MRSA and must be correlated clinically.    Blood Culture - Blood, Arm, Left [[id number]]  (Normal) Collected: 06/05/24 1432    Lab Status: Preliminary result Specimen: Blood from Arm, Left Updated: 06/07/24 1500     Blood Culture No growth at 2 days    Blood Culture - Blood, Arm, Right [[id number]]  (Normal) Collected: 06/05/24 0422    Lab Status: Preliminary result Specimen: Blood from Arm, Right Updated: 06/07/24 1500     Blood Culture No growth at 2 days           Recent films:  No radiology results from the last 24 hrs   Personal review of imaging : CXR shows last chest x-ray reviewed shows low lung volumes and bibasilar crowding but no acute infiltrates noted.  Some chronic changes noted      Pulmonary Assessment:    Acute on chronic hypercapnic and hypoxic respiratory failure  Chronic respiratory failure currently oxygen dependent/currently MRSA positive  Super morbid obesity with BMI more than 84  Severe obstructive sleep apnea on CPAP treatment likely inadequate  Obesity hypoventilation syndrome  COPD apparently non-smoker  History of congestive diastolic heart failure and fluid overload  History of tracheostomy placement and ventilator dependence in the past  Bilateral lymphedema and venous stasis  Hypertension  Hyperlipidemia  Diabetes mellitus type 2  Allergic rhinitis  Left foot wound appears chronic    Recommend/plan:   Patient was seen in the follow-up visit in  pulmonary rounds in medical surgical floor today  He did well overnight and was tolerating his home CPAP  He did qualify for home oxygen due to oxygen desaturation and was discharged home on 3 to 4 L of oxygen will be used during the daytime and he will continue his home CPAP at night  Continue respiratory care and bronchodilator treatment advised  Patient had chronic leg wounds and the wound care will be continued by home health care at home.  Discussed discharge planning with Dr. Rothman.  Patient to return to pulmonary clinic for follow-up visit with PFT and sleep study in a few weeks time.  Reviewed discharge planning and agree with current planning.  We appreciate the consult and like to thank Dr. Rothman for the referral.    Time spent by me: 35 min    This visit was performed by both a physician and an [facility] RN.  I performed all aspects of the medical decision making as documented.    Electronically signed by     Zak Kelly MD,   Pulmonologist/Intensivist   6/7/2024, 21:20 CDT

## 2024-06-07 NOTE — DISCHARGE SUMMARY
"    [facility] Medicine Services  DISCHARGE SUMMARY       Date of Admission: 6/5/2024  Date of Discharge:  6/7/2024  Primary Care Physician: Provider, No Known    Discharge Diagnoses:  Active Hospital Problems    Diagnosis     **Wound infection     Chronic hypoxic respiratory failure, on home oxygen therapy     Obstructive sleep apnea     Obesity hypoventilation syndrome     Morbid obesity with BMI of 70 and over, adult     COPD (chronic obstructive pulmonary disease)     Diabetes mellitus          Presenting Problem/History of Present Illness:  Wound infection [T14.8XXA, L08.9]     Chief Complaint on Day of Discharge:   Left foot wound    History of Present Illness on Day of Discharge:   Left foot wound actually looks good today.  Podiatry has seen and evaluated the patient and recommends outpatient follow-up with wound care or home health nursing for wound care.  Pulmonology has seen the patient I would like an outpatient follow-up with him after discharge for repeat sleep study.  The patient is appropriate for discharge home today.    Hospital Course     This 63-year-old male presents to the emergency department with a chief complaint of a nonhealing wound on his left foot.  The wound is chronic having been present for well over 2 years.  On examination, the emergency department APRN noted that several maggots were present in the wound.  The patient apparently was seen at his PCPs office this morning and was sent to the emergency department for evaluation.  The patient notes that he has previously been under the care of home health nursing for wound care but a few weeks ago he apparently \"lost insurance coverage\" and home wound care ceased.  He has noted worsening of the wound since that time.  At the time of evaluation in the emergency department, the patient was noted to have an oxygen saturation of 85% however the patient suffers from chronic respiratory failure secondary to " obesity hypoventilation syndrome and obstructive sleep apnea.  Occasional periods of desaturation would be expected.  Currently, the patient is on 2 L per nasal cannula with saturations in the high 90s.  Chest x-ray revealed cardiomegaly with pulmonary vascular congestion with what appears to be chronic interstitial edema which is to be expected in a patient with super morbid obesity and with chronic hypoventilation and obstructive sleep apnea.  BNP is also slightly elevated at 2100 which also is to be expected in a person with chronic hypoxia and RV strain secondary to pulmonary hypertension induced by ELYSSA/OHS.  Workup in the emergency department reveals a glucose of 166, lactate and procalcitonin within normal limits.  Sed rate elevated at 38 and CRP 1.39.  White blood cell count within normal limits.  proBNP chronically elevated at 2167.  X-ray of the right foot shows soft tissue wound along the lateral aspect of the midfoot with no destructive bony changes noted.  Treatment Plan  Admit to the medical surgical floor  Wound culture  Vancomycin dosed per pharmacy  Rocephin 2 g IV every 24 hours  Pulmonology consultation regarding chronic respiratory failure  Podiatry consultation regarding chronic left foot ulcer  Plan to establish a wound care plan with podiatry and wound care  Continue empiric IV antibiotics with transition to oral antibiotics after culture has been received  Discharge 2-3 days    Patient was seen and evaluated by pulmonology shortly after admission.  Recommendations are noted below.  The patient has chronic respiratory failure secondary to super morbid obesity and ELYSSA/OHS.  He required 4 L per nasal cannula with oxygenation with BiPAP.  The patient was unable to tolerate the hospital BiPAP system and continue to use his own CPAP system.  The left foot wound actually looked good without erythema there was no evidence of cellulitis.  The patient's skin changes were chronic in nature and and there  "was significant evidence of erythema nigra cans from chronic venous stasis.  MRSA nasal swab was positive and the patient was placed on mupirocin ointment.  The patient is refusing protective foam dressings on his bottom and his heels.  He is refusing topicals because he is \"allergic to everything\".  Currently the patient has no acute condition and is appropriate for discharge home for further outpatient treatment.  Wound care follow-up has been recommended however it is unlikely that the patient will be compliant given his previous experience.  Home health nursing will be recruited for wound care.  The patient should follow-up with pulmonology for repeat sleep study.      Consults:   Pulmonology:  Problem List as identified by Epic (may contain historical, inactive problems)    Wound infection    Diabetes mellitus    COPD (chronic obstructive pulmonary disease)    Morbid obesity with BMI of 70 and over, adult    Obstructive sleep apnea    Obesity hypoventilation syndrome    Chronic hypoxic respiratory failure, on home oxygen therapy     Pulmonary Assessment:     Acute on chronic hypercapnic and hypoxic respiratory failure  Chronic respiratory failure currently oxygen dependent  Super morbid obesity with BMI more than 84  Severe obstructive sleep apnea on CPAP treatment likely inadequate  Obesity hypoventilation syndrome  COPD apparently non-smoker  History of congestive diastolic heart failure and fluid overload  History of tracheostomy placement and ventilator dependence in the past  Bilateral lymphedema and venous stasis  Hypertension  Hyperlipidemia  Diabetes mellitus type 2  Allergic rhinitis  Left foot wound appears chronic     Recommend/plan:   Patient presented with a left leg wound she was also noted to have acute on chronic hypercapnic and hypoxic respiratory failure  He will use hospital BiPAP.  He has a CPAP machine at bedside which appears to be old and I doubt it is working properly and he also has a " very small nasal mask which is probably not working very well.  I will keep the initial BiPAP settings 16/8 with a rate of 20 and FiO2 35% to 40% keep oxygen saturation more than 92%.  Devyn ABG after the overnight BiPAP and then decide about the BiPAP settings  We may change the BiPAP settings to CPAP settings and he may even qualify for home trilogy if he has chronic CO2 retention  Patient was seen and followed by Dr. Kenny and had the last sleep study done almost 9 years ago  He has a documented history of COPD but apparently patient denied any history of tobacco use.  He will need outpatient pulmonary function test and currently I will start him on Pulmicort and Atrovent nebulizer  Albuterol will be avoided due to tachycardia.  In addition to the obstructive lung disease he probably has significant restrictive lung dysfunction due to his body habitus.  Patient has nasal allergy and will be started on fluticasone nasal spray and Zyrtec  Plan for outpatient PFT and sleep study when he is more stable.  Continue blood pressure and glycemic control per hospitalist team  He was already given Lasix for pulmonary edema.  Echocardiogram will be needed and cardiac workup will be also needed.  He will be encouraged to do incentive spirometer to improve pulmonary compliance.  Wound care will be following the left leg wound.  He has bilateral lymphedema and left leg wound which is bandaged  DVT and stress ulcer prophylaxis and pain and anxiety control.  Wound care.  Echocardiogram will be ordered  Physical activity as tolerated.  Nutritional support.  Repeat labs and imaging studies from time to time  Arterial blood gas ordered for tomorrow morning.  Care plan discussed with Dr. Rothman  CODE STATUS: Full.  Overall prognosis: Guarded.  We appreciate the consult and we will follow  Time spent in seeing this patient as inpatient pulmonary consult was 45 minutes     Thank you for this consult.  We will follow along.    "  Electronically signed by      Zak Kelly MD      Result Review    Result Review:  I have personally reviewed the results from the time of this admission to 6/7/2024 11:49 CDT and agree with these findings:  []  Laboratory  []  Microbiology  []  Radiology  []  EKG/Telemetry   []  Cardiology/Vascular   []  Pathology  []  Old records  []  Other:    Condition on Discharge:    Stable and at baseline    Physical Exam on Discharge:  /81   Pulse 103   Temp 98.1 °F (36.7 °C) (Oral)   Resp 18   Ht 175.3 cm (69\")   Wt (!) 259 kg (572 lb)   SpO2 92%   BMI 84.47 kg/m²   Physical Exam       Constitutional:       General: He is not in acute distress.     Appearance: He is well-developed. He is morbidly obese.      Interventions: Nasal cannula in place.   HENT:      Head: Normocephalic and atraumatic.      Right Ear: External ear normal.      Left Ear: External ear normal.      Nose: Nose normal.      Mouth: Mucous membranes are moist.      Pharynx: Oropharynx is clear.   Eyes:      General: No scleral icterus.     Conjunctiva/sclera: Conjunctivae normal.   Cardiovascular:      Rate and Rhythm: Regular rhythm.      Pulses: Normal pulses.      Heart sounds: Normal heart sounds. No murmur heard.  Pulmonary:      Effort: Pulmonary effort is normal. No respiratory distress.      Breath sounds: Normal breath sounds.   Abdominal:      General: Abdomen is protuberant. Bowel sounds are normal.      Palpations: Abdomen is soft. There is no mass.      Tenderness: There is no abdominal tenderness.      Comments: Large abdominal pannus   Musculoskeletal:         General: No tenderness. Normal range of motion.      Right lower leg: Edema present.      Left lower leg: Edema present.   Skin:     General: Skin is warm and dry.      Findings: Chronic hyperpigmented skin changes and rash present. Rash is scaling.      Comments: Chronic stasis dermatitis with flaking, scaling skin BLE.  Kerlix wrap left foot.  Wound documentation " in photos.   Neurological:      General: No focal deficit present.      Mental Status: He is alert and oriented to person, place, and time. Mental status is at baseline.   Psychiatric:         Mood and Affect: Mood normal.         Judgment: Judgment normal.     Discharge Disposition:  Home or Self Care    Discharge Medications:     Discharge Medications        New Medications        Instructions Start Date   cetirizine 10 MG tablet  Commonly known as: zyrTEC   10 mg, Oral, Daily   Start Date: June 8, 2024     mupirocin 2 % ointment  Commonly known as: BACTROBAN   Apply into each nare as directed by provider 2 (Two) Times a Day for 5 days.             Continue These Medications        Instructions Start Date   allopurinol 300 MG tablet  Commonly known as: ZYLOPRIM   1 tablet, Oral, Daily      APPLE CIDER VINEGAR PO   1 capsule, Oral, Daily, Buys OTC at [facility] and takes with orange juice      aspirin 81 MG EC tablet   1 tablet, Oral, Daily      bumetanide 1 MG tablet  Commonly known as: BUMEX   1-2 tablets, Oral, Daily, Last week      Bystolic 20 MG tablet  Generic drug: nebivolol   20 mg, Oral, Daily      diphenhydrAMINE 25 mg capsule  Commonly known as: BENADRYL   1 capsule, Oral, 2 Times Daily PRN      Flonase 50 MCG/ACT nasal spray  Generic drug: fluticasone   1 spray, Nasal, 2 Times Daily PRN      NovoLIN R ReliOn 100 UNIT/ML injection  Generic drug: insulin regular   10 Units, Subcutaneous, 3 Times Daily Before Meals, Pt injects 10-30 units 3 TIMES daily with breakfast and dinner depending on his bg      olmesartan 20 MG tablet  Commonly known as: BENICAR   20 mg, Oral, Daily      omeprazole 20 MG capsule  Commonly known as: priLOSEC   20 mg, Oral, Daily      pravastatin 20 MG tablet  Commonly known as: PRAVACHOL   1 tablet, Oral, Nightly      Turmeric Complex/Black Pepper 3-500 MG capsule  Generic drug: Black Pepper-Turmeric   1 capsule, Oral, Daily      vitamin B-12 500 MCG tablet  Commonly known as:  CYANOCOBALAMIN   1 tablet, Oral, 2 Times Daily               Discharge Diet:   Diet Instructions       Diet: Diabetic Diets; Consistent Carbohydrate; Thin (IDDSI 0)      Discharge Diet: Diabetic Diets    Diabetic Diet: Consistent Carbohydrate    Fluid Consistency: Thin (IDDSI 0)            Discharge Care Plan / Instructions:   Discharge home  Home health nursing to follow-up    Activity at Discharge:   Activity Instructions       Activity as Tolerated              Follow-up Appointments:  Follow-up with PCP next week  Follow-up with pulmonology in 2 weeks  Recommend follow-up with outpatient wound care in 1 week    Electronically signed by Kuldeep Rothman DO, 06/07/24, 11:49 CDT.    Time: Discharge over 30 min    Part of this note may be an electronic transcription/translation of spoken language to printed text using the Dragon Dictation system.

## 2024-06-07 NOTE — CASE MANAGEMENT/SOCIAL WORK
Continued Stay Note   [place]     Patient Name: Jose Landeros  MRN: [id number]  Today's Date: 6/7/2024    Admit Date: 6/5/2024        Discharge Plan       Row Name 06/07/24 1433       Plan    Final Discharge Disposition Code 06 - home with home health care    Final Note Pt is being dcd home today. SW spoke to pt to inform that HH and home O2 are being arranged. [facility] will be bringing portable tank to pt room. [facility] can take on Monday.  director called [address] [place] [address] Transport to take pt home today. They will call the floor number when they arrive.                   Discharge Codes    No documentation.                 Expected Discharge Date and Time       Expected Discharge Date Expected Discharge Time    Jun 7, 2024               ELISSA Velasquez

## 2024-06-07 NOTE — PLAN OF CARE
Pt stable at time of d/c; follow-up instructions provided verbal and written; Home oxygen delivered to bedside; [facility] provided transportation.

## 2024-06-08 LAB
BH CV ECHO MEAS - AO ROOT DIAM: 3.8 CM
BH CV ECHO MEAS - EDV(CUBED): 185.2 ML
BH CV ECHO MEAS - ESV(CUBED): 110.6 ML
BH CV ECHO MEAS - FS: 15.8 %
BH CV ECHO MEAS - IVS/LVPW: 0.4 CM
BH CV ECHO MEAS - IVSD: 0.6 CM
BH CV ECHO MEAS - LA DIMENSION: 4.2 CM
BH CV ECHO MEAS - LAT PEAK E' VEL: 7.2 CM/SEC
BH CV ECHO MEAS - LV MASS(C)D: 241.3 GRAMS
BH CV ECHO MEAS - LVIDD: 5.7 CM
BH CV ECHO MEAS - LVIDS: 4.8 CM
BH CV ECHO MEAS - LVOT AREA: 4.2 CM2
BH CV ECHO MEAS - LVOT DIAM: 2.3 CM
BH CV ECHO MEAS - LVPWD: 1.5 CM
BH CV ECHO MEAS - MV A MAX VEL: 56.1 CM/SEC
BH CV ECHO MEAS - MV DEC TIME: 0.14 SEC
BH CV ECHO MEAS - MV E MAX VEL: 92.7 CM/SEC
BH CV ECHO MEAS - MV E/A: 1.65
BH CV ECHO MEAS - MV MAX PG: 3.4 MMHG
BH CV ECHO MEAS - MV MEAN PG: 1 MMHG
BH CV ECHO MEAS - MV V2 VTI: 16.4 CM
BH CV ECHO MEAS - PA V2 MAX: 84.5 CM/SEC
BH CV ECHO MEAS - RV MAX PG: 1.32 MMHG
BH CV ECHO MEAS - RV V1 MAX: 57.4 CM/SEC
BH CV ECHO MEAS - RV V1 VTI: 9 CM
BH CV ECHO MEAS - RVDD: 3.4 CM
BH CV ECHO MEAS - TR MAX VEL: 125 CM/SEC

## 2024-06-08 NOTE — OUTREACH NOTE
Prep Survey      Flowsheet Row Responses   [facility] facility patient discharged from? [place]   Is LACE score < 7 ? No   Eligibility Readm Mgmt   Discharge diagnosis Wound infection   Does the patient have one of the following disease processes/diagnoses(primary or secondary)? Other   Does the patient have Home health ordered? Yes   What is the Home health agency?  [facility]   Is there a DME ordered? Yes   What DME was ordered? [facility]- 02   Prep survey completed? Yes            Maryann MORTENSEN - Registered Nurse

## 2024-06-08 NOTE — THERAPY DISCHARGE NOTE
Acute Care - Occupational Therapy Discharge Summary  [facility]     Patient Name: Jose Landeros  : 1960  MRN: [id number]    Today's Date: 2024                 Admit Date: 2024        OT Recommendation and Plan    Visit Dx:    ICD-10-CM ICD-9-CM   1. Wound infection  T14.8XXA 958.3    L08.9    2. History of diabetic ulcer of foot  Z86.31 V12.29   3. Hypoxia  R09.02 799.02   4. Elevated brain natriuretic peptide (BNP) level  R79.89 790.99   5. Cardiomegaly  I51.7 429.3   6. Dysphagia, unspecified type  R13.10 787.20   7. Chronic hypoxic respiratory failure, on home oxygen therapy  J96.11 518.83    Z99.81 799.02     V46.2                OT Rehab Goals       Row Name 24 0800             Transfer Goal 1 (OT)    Activity/Assistive Device (Transfer Goal 1, OT) commode, bedside without drop arms  -CS      [place] Level/Cues Needed (Transfer Goal 1, OT) modified independence  -CS      Time Frame (Transfer Goal 1, OT) long term goal (LTG);10 days  -CS      Progress/Outcome (Transfer Goal 1, OT) goal not met  -CS         Toileting Goal 1 (OT)    Activity/Device (Toileting Goal 1, OT) toileting skills, all;commode, bedside without drop arms  -CS      [place] Level/Cues Needed (Toileting Goal 1, OT) modified independence  -CS      Time Frame (Toileting Goal 1, OT) long term goal (LTG);10 days  -CS      Progress/Outcome (Toileting Goal 1, OT) goal not met  -CS                User Key  (r) = Recorded By, (t) = Taken By, (c) = Cosigned By      Initials Name Provider Type Discipline    CS Lexy Sanchez OTR/L, CNT Occupational Therapist OT                                OT Discharge Summary  Anticipated Discharge Disposition (OT): home with assist, home with home health  Reason for Discharge: Discharge from facility  Outcomes Achieved: Refer to plan of care for updates on goals achieved  Discharge Destination: Home with assist, Home with home health      SKYE Landers/L, SCARLETT  2024

## 2024-06-10 ENCOUNTER — DOCUMENTATION (OUTPATIENT)
Dept: HOME HEALTH SERVICES | Facility: HOME HEALTHCARE | Age: 64
End: 2024-06-10
Payer: MEDICARE

## 2024-06-10 ENCOUNTER — OFFICE VISIT (OUTPATIENT)
Dept: WOUND CARE | Facility: HOSPITAL | Age: 64
End: 2024-06-10
Payer: MEDICARE

## 2024-06-10 PROBLEM — I89.0 CHRONIC ACQUIRED LYMPHEDEMA: Chronic | Status: ACTIVE | Noted: 2017-08-03

## 2024-06-10 PROBLEM — E78.5 HYPERLIPIDEMIA: Status: ACTIVE | Noted: 2024-06-10

## 2024-06-10 PROBLEM — G72.81 INTENSIVE CARE (ICU) MYOPATHY: Status: ACTIVE | Noted: 2024-06-10

## 2024-06-10 PROBLEM — K21.9 GERD (GASTROESOPHAGEAL REFLUX DISEASE): Status: ACTIVE | Noted: 2024-06-10

## 2024-06-10 PROBLEM — I50.9 CONGESTIVE HEART FAILURE: Status: ACTIVE | Noted: 2024-06-10

## 2024-06-10 PROBLEM — I89.8 LYMPHORRHEA: Chronic | Status: ACTIVE | Noted: 2018-01-08

## 2024-06-10 PROBLEM — I21.9 MYOCARDIAL INFARCTION: Status: ACTIVE | Noted: 2024-06-10

## 2024-06-10 PROBLEM — G51.0 BELL'S PALSY: Status: ACTIVE | Noted: 2024-06-10

## 2024-06-10 LAB
BACTERIA SPEC AEROBE CULT: ABNORMAL
BACTERIA SPEC AEROBE CULT: NORMAL
BACTERIA SPEC AEROBE CULT: NORMAL
GRAM STN SPEC: ABNORMAL

## 2024-06-10 PROCEDURE — G0463 HOSPITAL OUTPT CLINIC VISIT: HCPCS

## 2024-06-10 NOTE — PROGRESS NOTES
Received HH order for SN from [facility]. Monica William will follow. Spoke with pt who is agreeable to services and confirmed demographics. Will see pt Monday, 6/10, for SN SOC.

## 2024-06-11 ENCOUNTER — HOME CARE VISIT (OUTPATIENT)
Dept: HOME HEALTH SERVICES | Facility: CLINIC | Age: 64
End: 2024-06-11
Payer: MEDICARE

## 2024-06-11 PROCEDURE — G0299 HHS/HOSPICE OF RN EA 15 MIN: HCPCS

## 2024-06-11 NOTE — Clinical Note
"SOC Note: Pt has diabetic left foot ulcer which is chronic for at least 2 years.  Recently went to hospital for wound infection and has followed up with Dr Garza for wound care. Currently has dressing wrapped to knee which Dr Garza said to leave until he returns. Pt is also diabetic and has COPD.  Home Health ordered for: disciplines SN    Reason for Hosp/Primary Dx/Co-morbidities: diabetic foot wound, COPD, Type 2 Diabetes    Focus of Care: local infection of skin and subcutaneous tissue, left foot diabetic ulcer    Patient's goal(s):\"for my foot to heal\"    Current Functional status/mobility/DME: uses motorized wheelchair, has walker, hospital bed, shower bench,     HB status/Living Arrangements: pt lives alone,     Skin Integrity/wound status: left foot diabetic ulcer being treated by Dr Garza    Code Status: CPR    Fall Risk/Safety concerns: high fall risk    Educated on Emergency Plan, steps to take prior to going to the ER and when to Call Home Health [facility]:  Pt verbalized understanding of HH number to call and how to call md     Medication issues/Concerns:med education, insulin management, ozygen use and safety    Additional Problems/Concerns:no    SDOH Barriers (i.e. caregiver concerns, social isolation, transportation, food insecurity, environment, income etc.)/Need for MSW: pt uses public transportation to get to Dr villagomez    Plan for next visit: wound assessment and care, diabetes education, COPD education"

## 2024-06-12 ENCOUNTER — TRANSCRIBE ORDERS (OUTPATIENT)
Dept: ADMINISTRATIVE | Facility: HOSPITAL | Age: 64
End: 2024-06-12
Payer: MEDICARE

## 2024-06-12 VITALS
TEMPERATURE: 98.3 F | HEART RATE: 99 BPM | DIASTOLIC BLOOD PRESSURE: 70 MMHG | SYSTOLIC BLOOD PRESSURE: 110 MMHG | OXYGEN SATURATION: 93 %

## 2024-06-12 DIAGNOSIS — R60.0 LOCALIZED EDEMA: Primary | ICD-10-CM

## 2024-06-12 NOTE — HOME HEALTH
"SOC Note: Pt has diabetic left foot ulcer which is chronic for at least 2 years.  Recently went to hospital for wound infection and has followed up with Dr Garza for wound care. Currently has dressing wrapped to knee which Dr Garza said to leave until he returns. Pt is also diabetic and has COPD.  Home Health ordered for: disciplines SN    Reason for Hosp/Primary Dx/Co-morbidities: diabetic foot wound, COPD, Type 2 Diabetes    Focus of Care:infected diabetic foot ulcer left foot    Patient's goal(s):\"for my foot to heal\"    Current Functional status/mobility/DME: uses motorized wheelchair, has walker, hospital bed, shower bench,     HB status/Living Arrangements: pt lives alone,     Skin Integrity/wound status: left foot diabetic ulcer being treated by Dr Garza    Code Status: CPR    Fall Risk/Safety concerns: high fall risk    Educated on Emergency Plan, steps to take prior to going to the ER and when to Call [facility]:  Pt verbalized understanding of HH number to call and how to call md     Medication issues/Concerns:med education, insulin management, ozygen use and safety    Additional Problems/Concerns:no    SDOH Barriers (i.e. caregiver concerns, social isolation, transportation, food insecurity, environment, income etc.)/Need for MSW: pt uses public transportation to get to Dr villagomez    Plan for next visit: wound assessment and care, diabetes education, COPD education"

## 2024-06-14 ENCOUNTER — HOME CARE VISIT (OUTPATIENT)
Dept: HOME HEALTH SERVICES | Facility: CLINIC | Age: 64
End: 2024-06-14
Payer: MEDICARE

## 2024-06-17 ENCOUNTER — HOME CARE VISIT (OUTPATIENT)
Dept: HOME HEALTH SERVICES | Facility: CLINIC | Age: 64
End: 2024-06-17
Payer: MEDICARE

## 2024-06-18 ENCOUNTER — HOSPITAL ENCOUNTER (OUTPATIENT)
Dept: ULTRASOUND IMAGING | Facility: HOSPITAL | Age: 64
Discharge: HOME | End: 2024-06-18
Payer: MEDICARE

## 2024-06-18 DIAGNOSIS — R60.0 LOCALIZED EDEMA: ICD-10-CM

## 2024-06-18 PROCEDURE — 93970 EXTREMITY STUDY: CPT

## 2024-06-19 ENCOUNTER — READMISSION MANAGEMENT (OUTPATIENT)
Dept: CALL CENTER | Facility: HOSPITAL | Age: 64
End: 2024-06-19
Payer: MEDICARE

## 2024-06-19 ENCOUNTER — TELEPHONE (OUTPATIENT)
Dept: WOUND CARE | Facility: HOSPITAL | Age: 64
End: 2024-06-19
Payer: MEDICARE

## 2024-06-19 NOTE — OUTREACH NOTE
Medical Week 2 Survey      Flowsheet Row Responses   [facility] patient discharged from? May   Does the patient have one of the following disease processes/diagnoses(primary or secondary)? Other   Week 2 attempt successful? Yes   Call start time 1606   Discharge diagnosis Wound infection       Chronic hypoxic respiratory failure, on home oxygen therapy       Obstructive sleep apnea       Obesity hypoventilation syndrome       Morbid obesity with BMI of 70 and over, adult       COPD (chronic obstructive pulmonary disease)       Diabetes mellitus   Call end time 1610   Person spoke with today (if not patient) and relationship Patient   Meds reviewed with patient/caregiver? Yes   Does the patient have all medications ordered at discharge? Yes   Is the patient taking all medications as directed (includes completed medication regime)? Yes   Comments regarding appointments Vascular surgery appt 6/20  115pm   Does the patient have a primary care provider?  Yes   Comments regarding PCP Patient reports that he is not going to PCP because they want to unwrap and see his foot and its not supposed to be unwrapped.   Has the patient kept scheduled appointments due by today? Yes   Comments Wound Care Center appt tomorrow 6/20  1130am   What is the Home health agency?   [facility]   What DME was ordered? [facility]- [facility]   Has all DME been delivered? Yes   Psychosocial issues? No   Did the patient receive a copy of their discharge instructions? Yes   Nursing interventions Reviewed instructions with patient   What is the patient's perception of their health status since discharge? Same   Is the patient/caregiver able to teach back signs and symptoms related to disease process for when to call PCP? Yes   Is the patient/caregiver able to teach back signs and symptoms related to disease process for when to call 911? Yes   Is the patient/caregiver able to teach back the hierarchy of who to call/visit  for symptoms/problems? PCP, Specialist, Home health nurse, Urgent Care, ED, 911 Yes   If the patient is a current smoker, are they able to teach back resources for cessation? Not a smoker   Week 2 Call Completed? Yes   Is the patient interested in additional calls from an ambulatory ? No   Would this patient benefit from a Referral to [facility] Social Work? No   Call end time 1610            SARAH NELSON - Registered Nurse

## 2024-06-19 NOTE — TELEPHONE ENCOUNTER
Patient called stating that he had testing done today. Patient states radiology told him his testing looked fine. I informed patient that he needs to speak with NOAH Armijo tomorrow at his appointment regarding his testing. Patient verbalized understanding and will call with any questions or concerns. Patient confirmed his date and time of appointment.

## 2024-06-20 ENCOUNTER — OFFICE VISIT (OUTPATIENT)
Dept: WOUND CARE | Facility: HOSPITAL | Age: 64
End: 2024-06-20
Payer: MEDICARE

## 2024-06-20 ENCOUNTER — OFFICE VISIT (OUTPATIENT)
Dept: VASCULAR SURGERY | Facility: CLINIC | Age: 64
End: 2024-06-20
Payer: MEDICARE

## 2024-06-20 VITALS
BODY MASS INDEX: 45.1 KG/M2 | SYSTOLIC BLOOD PRESSURE: 126 MMHG | DIASTOLIC BLOOD PRESSURE: 82 MMHG | WEIGHT: 315 LBS | HEART RATE: 90 BPM | HEIGHT: 70 IN | OXYGEN SATURATION: 94 %

## 2024-06-20 DIAGNOSIS — E66.01 MORBID OBESITY WITH BMI OF 70 AND OVER, ADULT: ICD-10-CM

## 2024-06-20 DIAGNOSIS — I89.0 CHRONIC ACQUIRED LYMPHEDEMA: Chronic | ICD-10-CM

## 2024-06-20 DIAGNOSIS — I50.9 CONGESTIVE HEART FAILURE, UNSPECIFIED HF CHRONICITY, UNSPECIFIED HEART FAILURE TYPE: ICD-10-CM

## 2024-06-20 DIAGNOSIS — R60.0 BILATERAL LOWER EXTREMITY EDEMA: Primary | ICD-10-CM

## 2024-06-21 ENCOUNTER — HOME CARE VISIT (OUTPATIENT)
Dept: HOME HEALTH SERVICES | Facility: CLINIC | Age: 64
End: 2024-06-21
Payer: MEDICARE

## 2024-06-21 NOTE — PROGRESS NOTES
06/20/2024      Tere Sagastume APRN  [address]  [address]  [place] [address]      Jose Landeros  1960    Chief Complaint   Patient presents with    NEW PATIENT     Referred from [facility] for localized edema. Testing done 6/18/24. Patient states that he was recently admitted at the hospital for having maggots left on foot. Had wound on foot that opened up and got infected. Was seeing NP at [facility] who discharged him as a patient. Patient is diabetic. Foot was just wrapped at wound care. Former smoker only for a few months in the past, quit 50 years ago.        Dear NOAH Neri:      HPI  I had the pleasure of seeing your patient Jose Landeros in the office today.  Thank you kindly for this consultation.  As you recall, Jose Landeros is a 64 y.o.  male who you are currently following for wound care.  He is here today due to swelling to bilateral lower extremities.  He has home lymphedema pumps, which he tells me he has never been told how to work.  He also tells me he has never been told to wear compression stockings, but in previous wound care charting he admits to not wearing compression stockings consistently and not using his home lymphedema pumps regularly.  He did state he attends lymphedema clinic at times, but does not admit to attending now.  He does have wounds to his left lower extremity.  He saw wound care this a.m. and had an Unna boot placed to his left lower extremity, then was sent for VVI, and is now being seen by vascular.  He is maintained on aspirin.    Past Medical History:   Diagnosis Date    COPD (chronic obstructive pulmonary disease)     Depression     Diabetes mellitus     Hyperlipidemia     Hypertension     Venous insufficiency     Venous ulcer of right leg        Past Surgical History:   Procedure Laterality Date    APPENDECTOMY      FOOT SURGERY Bilateral     TESTICLE SURGERY      TRACHEOSTOMY         Family History   Problem Relation Age of Onset     Diabetes Mother     Heart disease Mother     Hypertension Mother     Hyperlipidemia Mother     COPD Mother     Diabetes Father     Heart disease Father     Hypertension Father     No Known Problems Sister     No Known Problems Brother     No Known Problems Sister     No Known Problems Sister     No Known Problems Sister        Social History     Socioeconomic History    Marital status: Single   Tobacco Use    Smoking status: Never    Smokeless tobacco: Never   Vaping Use    Vaping status: Never Used   Substance and Sexual Activity    Alcohol use: No    Drug use: No    Sexual activity: Defer       Allergies   Allergen Reactions    Penicillins Hives and Itching    Cephalosporins Other (See Comments)     Per d/c summary of 12/17/15, pt possibly had cross-reactivity to a cephalosporin during his hospitalization    Hemp Seed Oil Hives    Neosporin  [Neomycin-Bacitracin Zn-Polymyx]     Penicillamine     Silver Sulfadiazine        Prior to Admission medications    Medication Sig Start Date End Date Taking? Authorizing Provider   allopurinol (ZYLOPRIM) 300 MG tablet Take 1 tablet by mouth Daily. Indications: Gout 11/16/16  Yes ProviderSurya MD   APPLE CIDER VINEGAR PO Take 1 capsule by mouth Daily. Buys OTC at [facility] and takes with orange juice  Indications: supplement 5/17/18  Yes ProviderSurya MD   aspirin 81 MG EC tablet Take 1 tablet by mouth Daily. Indications: Acute Heart Attack 1/27/16  Yes ProviderSurya MD   Black Pepper-Turmeric (Turmeric Complex/Black Pepper) 3-500 MG capsule Take 1 capsule by mouth Daily. Indications: supplement 6/6/24  Yes Surya Patten MD   bumetanide (BUMEX) 1 MG tablet Take 1-2 tablets by mouth Daily. Last week  Indications: Edema 11/16/16  Yes Suray Patten MD   cetirizine (zyrTEC) 10 MG tablet Take 1 tablet by mouth Daily. 6/8/24  Yes Kuldeep Rothman DO   diphenhydrAMINE (BENADRYL) 25 mg capsule Take 1 capsule by mouth 2 (Two) Times a Day As Needed  for Allergies. Indications: Hayfever 1/27/16  Yes Surya Patten MD   fluticasone (FLONASE) 50 MCG/ACT nasal spray 1 spray into the nostril(s) as directed by provider 2 (Two) Times a Day As Needed. Indications: Stuffy Nose 11/16/16  Yes Surya Patten MD   insulin regular (NOVOLIN R RELION) 100 UNIT/ML injection Inject 10 Units under the skin into the appropriate area as directed 3 (Three) Times a Day Before Meals. Pt injects 10-30 units 3 TIMES daily with breakfast and dinner depending on his bg  Indications: Type 2 Diabetes 6/3/16  Yes Surya Patten MD   Multiple Vitamins-Minerals (Daily Multivitamin) capsule Take 1 tablet by mouth Daily. 6/11/24  Yes Surya Patten MD   nebivolol (Bystolic) 20 MG tablet Take 1 tablet by mouth Daily. Indications: High Blood Pressure Disorder 5/3/22  Yes Rosa Tompkins APRN   olmesartan (BENICAR) 20 MG tablet Take 1 tablet by mouth Daily. Indications: High Blood Pressure Disorder 5/4/22  Yes Rosa Tompkins APRN   omeprazole (priLOSEC) 20 MG capsule Take 1 capsule by mouth Daily. Indications: Gastroesophageal Reflux Disease 6/6/24  Yes Surya Patten MD   pravastatin (PRAVACHOL) 20 MG tablet Take 1 tablet by mouth Every Night. Indications: High Amount of Fats in the Blood 11/16/16  Yes Surya Patten MD   vitamin B-12 (CYANOCOBALAMIN) 500 MCG tablet Take 1 tablet by mouth 2 (Two) Times a Day. Indications: Inadequate Vitamin B12 1/27/16  Yes Surya Patten MD       Review of Systems   Constitutional: Negative.  Negative for diaphoresis and fever.   HENT: Negative.     Eyes: Negative.    Respiratory: Negative.  Negative for shortness of breath and wheezing.    Cardiovascular:  Positive for leg swelling. Negative for chest pain.   Gastrointestinal: Negative.  Negative for abdominal pain.   Endocrine: Negative.    Genitourinary: Negative.    Musculoskeletal: Negative.    Skin:  Positive for wound.   Allergic/Immunologic: Negative.   "  Neurological: Negative.  Negative for dizziness and weakness.   Hematological: Negative.    Psychiatric/Behavioral: Negative.         /82   Pulse 90   Ht 177.8 cm (70\")   Wt (!) 259 kg (572 lb)   SpO2 94%   BMI 82.07 kg/m²       Physical Exam  Vitals and nursing note reviewed.   Constitutional:       General: He is not in acute distress.     Appearance: Normal appearance. He is not diaphoretic.   HENT:      Head: Normocephalic. No right periorbital erythema or left periorbital erythema.      Nose: Nose normal.   Eyes:      General: No scleral icterus.     Pupils: Pupils are equal.   Cardiovascular:      Rate and Rhythm: Normal rate and regular rhythm.      Pulses: Normal pulses.           Dorsalis pedis pulses are detected w/ Doppler on the right side and detected w/ Doppler on the left side.        Posterior tibial pulses are detected w/ Doppler on the right side and detected w/ Doppler on the left side.      Heart sounds: Normal heart sounds. No murmur heard.  Pulmonary:      Effort: Pulmonary effort is normal. No respiratory distress.      Breath sounds: Normal breath sounds.   Abdominal:      General: Bowel sounds are normal. There is no distension.      Palpations: Abdomen is soft.      Tenderness: There is no abdominal tenderness. There is no guarding.   Musculoskeletal:         General: No swelling or tenderness. Normal range of motion.      Cervical back: Normal range of motion and neck supple.      Right lower leg: 3+ Edema present.      Left lower le+ Edema present.   Feet:      Right foot:      Skin integrity: Skin integrity normal.      Left foot:      Skin integrity: Skin integrity normal.   Skin:     General: Skin is warm and dry.      Findings: No erythema or rash.   Neurological:      General: No focal deficit present.      Mental Status: He is alert and oriented to person, place, and time. Mental status is at baseline.      Cranial Nerves: No cranial nerve deficit.      Gait: Gait " normal.   Psychiatric:         Attention and Perception: Attention normal.         Mood and Affect: Mood normal.     DIAGNOSTIC DATA      Adult Transthoracic Echo Complete W/ Cont if Necessary Per Protocol    Result Date: 6/8/2024  Narrative:   Left ventricular ejection fraction appears to be 26 - 30%.   The left ventricular cavity is mildly dilated.  Very limited data quality   Estimated right ventricular systolic pressure from tricuspid regurgitation is normal (<35 mmHg).     XR Foot 3+ View Left    Result Date: 6/5/2024  Narrative: EXAMINATION: XR FOOT 3+ VW LEFT-  6/5/2024 3:11 PM  HISTORY: wound. Per the technologist report, the patient was uncooperative during the examination, limiting the images.  COMPARISON: None  TECHNIQUE: Frontal, lateral and oblique radiographs of the LEFT foot were provided for review.  FINDINGS: Posterior Achilles enthesophyte. Mild edema in Kager's fat pad. Plantar calcaneal spur noted. Mild diffuse soft tissue swelling and edema, nonspecific. Soft tissue wound along the lateral aspect of the LEFT foot. Mild arthritis at the first MTP joint. I don't see any definite destructive bone changes to suggest acute osteomyelitis on today's examination. No definite acute fracture. Vascular calcifications are seen. I do suspect some arthritis at the TMT joints but it is mild. Hammertoe deformities of the second through fifth digits.       Impression:  1.  Soft tissue wound along the lateral aspect of the midfoot and hindfoot without definite destructive bony changes.  2.  Diffuse soft tissue swelling and edema as discussed above.  This report was signed and finalized on 6/5/2024 3:14 PM by Dr. Ranjit Norris MD.      XR Chest 1 View    Result Date: 6/5/2024  Narrative: EXAMINATION: XR CHEST 1 VW-  6/5/2024 2:53 PM  HISTORY: Hypoxic on arrival.  FINDINGS: Today's exam is compared to previous study of 11/2/2021. There is cardiomegaly with vascular redistribution suggesting pulmonary venous  hypertension with interstitial edema. No acute infiltrate. No significant effusion is present.      Impression: 1.. Cardiomegaly with pulmonary vascular congestion. An element of interstitial edema suspected. This appears to be a chronic finding in this patient.  This report was signed and finalized on 6/5/2024 2:54 PM by Dr. Karl Loving MD.       Patient Active Problem List   Diagnosis    Venous insufficiency    Hypertension    Diabetes mellitus    Cellulitis of left lower extremity    COPD (chronic obstructive pulmonary disease)    Cellulitis of lower extremity    UTI (urinary tract infection), bacterial    Morbid obesity with BMI of 70 and over, adult    Obstructive sleep apnea    Obesity hypoventilation syndrome    Hypoxia    Wound infection    Chronic hypoxic respiratory failure, on home oxygen therapy    Anxiety    Bell's palsy    Chronic acquired lymphedema    Congestive heart failure    GERD (gastroesophageal reflux disease)    Hyperlipidemia    Intensive care (ICU) myopathy    Lymphorrhea    Myocardial infarction    Vitamin D deficiency    Reduced mobility         ICD-10-CM ICD-9-CM   1. Bilateral lower extremity edema  R60.0 782.3   2. Morbid obesity with BMI of 70 and over, adult  E66.01 278.01    Z68.45 V85.45   3. Congestive heart failure, unspecified HF chronicity, unspecified heart failure type  I50.9 428.0   4. Chronic acquired lymphedema  I89.0 457.1           Plan: After thoroughly evaluating Jose Landeros, I believe the best course of action is to remain conservative from vascular surgery standpoint.  He did have noninvasive testing performed today which showed no venous insufficiency.  Pulses are dopplerable.  He does have a history of heart failure, lymphedema-noncompliant with compression and home lymphedema pumps.  He does state that he was not showed how to use his lymphedema pumps.  We will asked to Joel with tactile to send someone out to teach him how to use them.  I have provided  him with a prescription for compression in the range of 20 to 30 mmHg to get for his right lower extremity, currently his left lower extremity is in an Unna boot through wound care.   I would like him to elevate his legs whenever he is not on them.  He can follow-up on an as-needed basis.  He is maintained on aspirin 81 mg daily.  He patient can continue taking their current medication regimen as previously planned.  This was all discussed in full with complete understanding.    Thank you for allowing me to participate in the care of your patient.  Please do not hesitate with any questions or concerns.  I will keep you aware of any further encounters with Jose Landeros.        Sincerely yours,         NOAH Tamez

## 2024-06-24 ENCOUNTER — HOME CARE VISIT (OUTPATIENT)
Dept: HOME HEALTH SERVICES | Facility: CLINIC | Age: 64
End: 2024-06-24
Payer: MEDICARE

## 2024-06-24 VITALS
TEMPERATURE: 98 F | OXYGEN SATURATION: 96 % | HEART RATE: 80 BPM | SYSTOLIC BLOOD PRESSURE: 120 MMHG | RESPIRATION RATE: 20 BRPM | DIASTOLIC BLOOD PRESSURE: 80 MMHG

## 2024-06-24 PROCEDURE — G0299 HHS/HOSPICE OF RN EA 15 MIN: HCPCS

## 2024-06-27 ENCOUNTER — HOME CARE VISIT (OUTPATIENT)
Dept: HOME HEALTH SERVICES | Facility: CLINIC | Age: 64
End: 2024-06-27
Payer: MEDICARE

## 2024-06-27 PROCEDURE — G0299 HHS/HOSPICE OF RN EA 15 MIN: HCPCS

## 2024-06-30 VITALS
HEART RATE: 82 BPM | DIASTOLIC BLOOD PRESSURE: 80 MMHG | TEMPERATURE: 98 F | RESPIRATION RATE: 20 BRPM | OXYGEN SATURATION: 97 % | SYSTOLIC BLOOD PRESSURE: 120 MMHG

## 2024-07-01 ENCOUNTER — HOME CARE VISIT (OUTPATIENT)
Dept: HOME HEALTH SERVICES | Facility: CLINIC | Age: 64
End: 2024-07-01
Payer: MEDICARE

## 2024-07-01 VITALS
HEART RATE: 92 BPM | TEMPERATURE: 97.5 F | SYSTOLIC BLOOD PRESSURE: 130 MMHG | OXYGEN SATURATION: 93 % | DIASTOLIC BLOOD PRESSURE: 70 MMHG | RESPIRATION RATE: 18 BRPM

## 2024-07-01 PROCEDURE — G0300 HHS/HOSPICE OF LPN EA 15 MIN: HCPCS

## 2024-07-08 ENCOUNTER — HOME CARE VISIT (OUTPATIENT)
Dept: HOME HEALTH SERVICES | Facility: CLINIC | Age: 64
End: 2024-07-08
Payer: MEDICARE

## 2024-07-08 VITALS
OXYGEN SATURATION: 96 % | RESPIRATION RATE: 20 BRPM | DIASTOLIC BLOOD PRESSURE: 82 MMHG | TEMPERATURE: 98 F | HEART RATE: 78 BPM | SYSTOLIC BLOOD PRESSURE: 120 MMHG

## 2024-07-08 PROCEDURE — G0299 HHS/HOSPICE OF RN EA 15 MIN: HCPCS

## 2024-07-15 ENCOUNTER — HOME CARE VISIT (OUTPATIENT)
Dept: HOME HEALTH SERVICES | Facility: CLINIC | Age: 64
End: 2024-07-15
Payer: MEDICARE

## 2024-07-15 VITALS
DIASTOLIC BLOOD PRESSURE: 70 MMHG | SYSTOLIC BLOOD PRESSURE: 120 MMHG | RESPIRATION RATE: 20 BRPM | OXYGEN SATURATION: 98 % | HEART RATE: 80 BPM | TEMPERATURE: 98.9 F

## 2024-07-15 PROCEDURE — G0299 HHS/HOSPICE OF RN EA 15 MIN: HCPCS

## 2024-07-15 NOTE — Clinical Note
Patient prefers 2 layer wrap UNNA boot as opposed to zinc boot/ has stated it makes his leg worse and he has a possible allergic slin rxn to the zinc boots. Patient wanted dressing changed today, even though he has appointment with [facility] tomorrow  As of 7/15/24 patient had no air conditioning in home, he states it is old and cannot be fixed, except he has cool air in his bedroom. Manager is consulted to see if services can be continued due to compromise of employee health working on wound in excessive heat.

## 2024-07-16 ENCOUNTER — OFFICE VISIT (OUTPATIENT)
Dept: WOUND CARE | Facility: HOSPITAL | Age: 64
End: 2024-07-16
Payer: MEDICARE

## 2024-07-22 ENCOUNTER — HOME CARE VISIT (OUTPATIENT)
Dept: HOME HEALTH SERVICES | Facility: CLINIC | Age: 64
End: 2024-07-22
Payer: MEDICARE

## 2024-07-22 VITALS
RESPIRATION RATE: 18 BRPM | DIASTOLIC BLOOD PRESSURE: 78 MMHG | SYSTOLIC BLOOD PRESSURE: 120 MMHG | HEART RATE: 76 BPM | OXYGEN SATURATION: 96 % | TEMPERATURE: 98 F

## 2024-07-22 PROCEDURE — G0299 HHS/HOSPICE OF RN EA 15 MIN: HCPCS

## 2024-07-25 ENCOUNTER — HOME CARE VISIT (OUTPATIENT)
Dept: HOME HEALTH SERVICES | Facility: CLINIC | Age: 64
End: 2024-07-25
Payer: MEDICARE

## 2024-07-26 NOTE — CASE COMMUNICATION
Patient missed a MSW home visit from [facility] on 7/25/24    Reason: MSW contacted patient to schedule MSW home visit and patient stated he was doing fine and he did not need assistance. MSW discussed resource options for support and patient denied need. He stated he has hired private pay in-home support. MSW will continue to assist over the phone as needed.    For your records only.   Per CMS Guidance, MD must be notifie d of missed/cancelled visits; therefore the prescribed frequency was not met.

## 2024-07-29 ENCOUNTER — HOME CARE VISIT (OUTPATIENT)
Dept: HOME HEALTH SERVICES | Facility: CLINIC | Age: 64
End: 2024-07-29
Payer: MEDICARE

## 2024-07-29 PROCEDURE — G0299 HHS/HOSPICE OF RN EA 15 MIN: HCPCS

## 2024-07-31 VITALS
TEMPERATURE: 98.2 F | OXYGEN SATURATION: 94 % | DIASTOLIC BLOOD PRESSURE: 70 MMHG | RESPIRATION RATE: 18 BRPM | SYSTOLIC BLOOD PRESSURE: 125 MMHG | HEART RATE: 63 BPM

## 2024-07-31 NOTE — HOME HEALTH
FOCUS OF CARE/SKILLED NEED;  diabetic foot ulcer left foot, dressing changes, wound care    TEACHING/INTERVENTIONS nutrition education, disease management, s/s of infection    PROGRESS TOWARD GOALS ongoing    PHYSICIAN CONTACT no    INSURANCE CHANGES no    FALLS SINCE LAST VISIT no    MEDICATION CHANGES SINCE LAST VISIT no    PLANS FOR NEXT VISIT dressing change, wound assessment    PRESCREEN FOR COVID: yes no s/s covid no recent exposure

## 2024-08-05 ENCOUNTER — HOME CARE VISIT (OUTPATIENT)
Dept: HOME HEALTH SERVICES | Facility: CLINIC | Age: 64
End: 2024-08-05
Payer: MEDICARE

## 2024-08-05 ENCOUNTER — HOME CARE VISIT (OUTPATIENT)
Dept: HOME HEALTH SERVICES | Facility: HOME HEALTHCARE | Age: 64
End: 2024-08-05
Payer: MEDICARE

## 2024-08-05 VITALS
OXYGEN SATURATION: 97 % | HEART RATE: 78 BPM | DIASTOLIC BLOOD PRESSURE: 76 MMHG | SYSTOLIC BLOOD PRESSURE: 120 MMHG | RESPIRATION RATE: 20 BRPM | TEMPERATURE: 98.6 F

## 2024-08-05 PROCEDURE — G0299 HHS/HOSPICE OF RN EA 15 MIN: HCPCS

## 2024-08-08 ENCOUNTER — APPOINTMENT (OUTPATIENT)
Dept: WOUND CARE | Facility: HOSPITAL | Age: 64
End: 2024-08-08
Payer: MEDICARE

## 2024-08-08 DIAGNOSIS — L97.509 TYPE 2 DIABETES MELLITUS WITH FOOT ULCER, UNSPECIFIED WHETHER LONG TERM INSULIN USE: Primary | ICD-10-CM

## 2024-08-08 DIAGNOSIS — E11.621 TYPE 2 DIABETES MELLITUS WITH FOOT ULCER, UNSPECIFIED WHETHER LONG TERM INSULIN USE: Primary | ICD-10-CM

## 2024-08-08 DIAGNOSIS — L97.322 NON-PRESSURE CHRONIC ULCER OF LEFT ANKLE WITH FAT LAYER EXPOSED: ICD-10-CM

## 2024-08-08 DIAGNOSIS — L97.821 NON-PRESSURE CHRONIC ULCER OF OTHER PART OF LEFT LOWER LEG LIMITED TO BREAKDOWN OF SKIN: ICD-10-CM

## 2024-08-08 DIAGNOSIS — I87.2 VENOUS INSUFFICIENCY (CHRONIC) (PERIPHERAL): ICD-10-CM

## 2024-08-09 ENCOUNTER — HOME CARE VISIT (OUTPATIENT)
Dept: HOME HEALTH SERVICES | Facility: CLINIC | Age: 64
End: 2024-08-09
Payer: MEDICARE

## 2024-08-09 PROCEDURE — G0299 HHS/HOSPICE OF RN EA 15 MIN: HCPCS

## 2024-08-09 NOTE — Clinical Note
60 Day Summary  Home Health need continues for: diabetic ulcer to LLL  Primary diagnoses/co-morbidities/recent procedures in past 60 days that impact current episode: None  Current level of functional ability: Uses power w/c, can transfer self  Homebound status and living arrangements: Lives alone/taxing effort to leave home  Goals accomplished and/or measurable progress toward unmet goals in past 60 days: Wound care has not improved  Focus of care for next 60 days for each discipline ordered: Diabetic ulcer to LLL  Skin integrity/wound status: Dry skin, Diabetic ulcer persists to LLL  Estimated date when home care services will end When wound is healed and goals are met 60 days  SDOH changes/barriers (i.e. Caregiver availability, social isolation, environment, income, transportation access, food insecurity etc.)yes, transportation  Need for MSW referral? Yes

## 2024-08-11 VITALS
RESPIRATION RATE: 20 BRPM | BODY MASS INDEX: 75.33 KG/M2 | SYSTOLIC BLOOD PRESSURE: 120 MMHG | HEART RATE: 79 BPM | OXYGEN SATURATION: 98 % | DIASTOLIC BLOOD PRESSURE: 80 MMHG | WEIGHT: 315 LBS | TEMPERATURE: 98.6 F

## 2024-08-12 ENCOUNTER — HOME CARE VISIT (OUTPATIENT)
Dept: HOME HEALTH SERVICES | Facility: CLINIC | Age: 64
End: 2024-08-12
Payer: MEDICARE

## 2024-08-12 PROCEDURE — G0300 HHS/HOSPICE OF LPN EA 15 MIN: HCPCS

## 2024-08-13 VITALS
HEART RATE: 92 BPM | SYSTOLIC BLOOD PRESSURE: 140 MMHG | RESPIRATION RATE: 16 BRPM | OXYGEN SATURATION: 94 % | DIASTOLIC BLOOD PRESSURE: 88 MMHG | TEMPERATURE: 97.4 F

## 2024-08-19 ENCOUNTER — HOME CARE VISIT (OUTPATIENT)
Dept: HOME HEALTH SERVICES | Facility: CLINIC | Age: 64
End: 2024-08-19
Payer: MEDICARE

## 2024-08-19 VITALS
DIASTOLIC BLOOD PRESSURE: 70 MMHG | OXYGEN SATURATION: 99 % | HEART RATE: 80 BPM | RESPIRATION RATE: 20 BRPM | SYSTOLIC BLOOD PRESSURE: 134 MMHG | TEMPERATURE: 98.4 F

## 2024-08-19 PROCEDURE — G0299 HHS/HOSPICE OF RN EA 15 MIN: HCPCS

## 2024-08-27 ENCOUNTER — OFFICE VISIT (OUTPATIENT)
Dept: WOUND CARE | Facility: HOSPITAL | Age: 64
End: 2024-08-27
Payer: MEDICARE

## 2024-08-27 DIAGNOSIS — L97.322 NON-PRESSURE CHRONIC ULCER OF LEFT ANKLE WITH FAT LAYER EXPOSED: ICD-10-CM

## 2024-08-27 DIAGNOSIS — L97.522 NON-PRESSURE CHRONIC ULCER OF OTHER PART OF LEFT FOOT WITH FAT LAYER EXPOSED: ICD-10-CM

## 2024-08-27 DIAGNOSIS — L97.821 NON-PRESSURE CHRONIC ULCER OF OTHER PART OF LEFT LOWER LEG LIMITED TO BREAKDOWN OF SKIN: ICD-10-CM

## 2024-08-27 DIAGNOSIS — L97.509 TYPE 2 DIABETES MELLITUS WITH FOOT ULCER, UNSPECIFIED WHETHER LONG TERM INSULIN USE: Primary | ICD-10-CM

## 2024-08-27 DIAGNOSIS — E11.621 TYPE 2 DIABETES MELLITUS WITH FOOT ULCER, UNSPECIFIED WHETHER LONG TERM INSULIN USE: Primary | ICD-10-CM

## 2024-08-28 ENCOUNTER — HOME CARE VISIT (OUTPATIENT)
Dept: HOME HEALTH SERVICES | Facility: CLINIC | Age: 64
End: 2024-08-28
Payer: MEDICARE

## 2024-08-28 PROCEDURE — G0155 HHCP-SVS OF CSW,EA 15 MIN: HCPCS

## 2024-08-29 ENCOUNTER — HOME CARE VISIT (OUTPATIENT)
Dept: HOME HEALTH SERVICES | Facility: HOME HEALTHCARE | Age: 64
End: 2024-08-29
Payer: MEDICARE

## 2024-08-30 ENCOUNTER — HOME CARE VISIT (OUTPATIENT)
Dept: HOME HEALTH SERVICES | Facility: CLINIC | Age: 64
End: 2024-08-30
Payer: MEDICARE

## 2024-08-30 NOTE — PLAN OF CARE
Goal Outcome Evaluation:           Progress: improving   Pt alert and oriented x4. VSS. No c/o pain. CAR[place]. PPP.Lovenox for VTE. . Tolerating reg diet. Dressings cdi. Voiding via bathroom. Up x standby. Last BM today BS monitoring. Plans to dc home today with home health. Call light within reach. Safety maintained.     DASH Diet

## 2024-09-03 ENCOUNTER — HOME CARE VISIT (OUTPATIENT)
Dept: HOME HEALTH SERVICES | Facility: CLINIC | Age: 64
End: 2024-09-03
Payer: MEDICARE

## 2024-09-03 VITALS
TEMPERATURE: 98 F | RESPIRATION RATE: 20 BRPM | SYSTOLIC BLOOD PRESSURE: 120 MMHG | DIASTOLIC BLOOD PRESSURE: 70 MMHG | OXYGEN SATURATION: 98 % | HEART RATE: 66 BPM

## 2024-09-03 PROCEDURE — G0299 HHS/HOSPICE OF RN EA 15 MIN: HCPCS

## 2024-09-09 ENCOUNTER — HOME CARE VISIT (OUTPATIENT)
Dept: HOME HEALTH SERVICES | Facility: CLINIC | Age: 64
End: 2024-09-09
Payer: MEDICARE

## 2024-09-09 PROCEDURE — G0300 HHS/HOSPICE OF LPN EA 15 MIN: HCPCS

## 2024-09-10 VITALS
SYSTOLIC BLOOD PRESSURE: 132 MMHG | RESPIRATION RATE: 16 BRPM | DIASTOLIC BLOOD PRESSURE: 68 MMHG | TEMPERATURE: 97.6 F | OXYGEN SATURATION: 91 % | HEART RATE: 81 BPM

## 2024-09-11 ENCOUNTER — OFFICE VISIT (OUTPATIENT)
Dept: WOUND CARE | Facility: HOSPITAL | Age: 64
End: 2024-09-11
Payer: MEDICARE

## 2024-09-11 DIAGNOSIS — L97.322 NON-PRESSURE CHRONIC ULCER OF LEFT ANKLE WITH FAT LAYER EXPOSED: ICD-10-CM

## 2024-09-11 DIAGNOSIS — E11.621 TYPE 2 DIABETES MELLITUS WITH FOOT ULCER, UNSPECIFIED WHETHER LONG TERM INSULIN USE: Primary | ICD-10-CM

## 2024-09-11 DIAGNOSIS — L97.522 NON-PRESSURE CHRONIC ULCER OF OTHER PART OF LEFT FOOT WITH FAT LAYER EXPOSED: ICD-10-CM

## 2024-09-11 DIAGNOSIS — L97.509 TYPE 2 DIABETES MELLITUS WITH FOOT ULCER, UNSPECIFIED WHETHER LONG TERM INSULIN USE: Primary | ICD-10-CM

## 2024-09-11 DIAGNOSIS — L97.821 NON-PRESSURE CHRONIC ULCER OF OTHER PART OF LEFT LOWER LEG LIMITED TO BREAKDOWN OF SKIN: ICD-10-CM

## 2024-09-16 ENCOUNTER — HOME CARE VISIT (OUTPATIENT)
Dept: HOME HEALTH SERVICES | Facility: CLINIC | Age: 64
End: 2024-09-16
Payer: MEDICARE

## 2024-09-16 VITALS
RESPIRATION RATE: 20 BRPM | OXYGEN SATURATION: 96 % | HEART RATE: 80 BPM | TEMPERATURE: 98.6 F | SYSTOLIC BLOOD PRESSURE: 120 MMHG | DIASTOLIC BLOOD PRESSURE: 80 MMHG

## 2024-09-16 PROCEDURE — G0299 HHS/HOSPICE OF RN EA 15 MIN: HCPCS

## 2024-09-18 ENCOUNTER — HOME CARE VISIT (OUTPATIENT)
Dept: HOME HEALTH SERVICES | Facility: HOME HEALTHCARE | Age: 64
End: 2024-09-18
Payer: MEDICARE

## 2024-09-18 ENCOUNTER — HOME CARE VISIT (OUTPATIENT)
Dept: HOME HEALTH SERVICES | Facility: CLINIC | Age: 64
End: 2024-09-18
Payer: MEDICARE

## 2024-10-02 ENCOUNTER — OFFICE VISIT (OUTPATIENT)
Dept: WOUND CARE | Facility: HOSPITAL | Age: 64
End: 2024-10-02
Payer: MEDICARE

## 2024-10-11 ENCOUNTER — OFFICE VISIT (OUTPATIENT)
Dept: WOUND CARE | Facility: HOSPITAL | Age: 64
End: 2024-10-11
Payer: MEDICARE

## 2024-10-30 ENCOUNTER — OFFICE VISIT (OUTPATIENT)
Dept: WOUND CARE | Facility: HOSPITAL | Age: 64
End: 2024-10-30
Payer: MEDICARE

## 2024-11-07 ENCOUNTER — OFFICE VISIT (OUTPATIENT)
Dept: WOUND CARE | Facility: HOSPITAL | Age: 64
End: 2024-11-07
Payer: MEDICARE

## 2024-11-18 ENCOUNTER — OFFICE VISIT (OUTPATIENT)
Dept: WOUND CARE | Facility: HOSPITAL | Age: 64
End: 2024-11-18
Payer: MEDICARE

## 2024-11-18 PROCEDURE — G0463 HOSPITAL OUTPT CLINIC VISIT: HCPCS

## 2025-03-12 ENCOUNTER — OFFICE VISIT (OUTPATIENT)
Dept: PULMONOLOGY | Facility: CLINIC | Age: 65
End: 2025-03-12
Payer: MEDICARE

## 2025-03-12 VITALS
SYSTOLIC BLOOD PRESSURE: 155 MMHG | DIASTOLIC BLOOD PRESSURE: 61 MMHG | OXYGEN SATURATION: 98 % | HEIGHT: 70 IN | BODY MASS INDEX: 45.1 KG/M2 | WEIGHT: 315 LBS | HEART RATE: 90 BPM

## 2025-03-12 DIAGNOSIS — J44.9 CHRONIC OBSTRUCTIVE PULMONARY DISEASE, UNSPECIFIED COPD TYPE: Primary | ICD-10-CM

## 2025-03-12 DIAGNOSIS — Z74.09 REDUCED MOBILITY: ICD-10-CM

## 2025-03-12 DIAGNOSIS — I50.9 CONGESTIVE HEART FAILURE, UNSPECIFIED HF CHRONICITY, UNSPECIFIED HEART FAILURE TYPE: ICD-10-CM

## 2025-03-12 DIAGNOSIS — E66.01 MORBID OBESITY WITH BMI OF 70 AND OVER, ADULT: ICD-10-CM

## 2025-03-12 DIAGNOSIS — G47.33 OSA (OBSTRUCTIVE SLEEP APNEA): ICD-10-CM

## 2025-03-12 DIAGNOSIS — J96.11 CHRONIC HYPOXIC RESPIRATORY FAILURE, ON HOME OXYGEN THERAPY: ICD-10-CM

## 2025-03-12 DIAGNOSIS — E66.2 OBESITY HYPOVENTILATION SYNDROME: ICD-10-CM

## 2025-03-12 DIAGNOSIS — Z99.81 CHRONIC HYPOXIC RESPIRATORY FAILURE, ON HOME OXYGEN THERAPY: ICD-10-CM

## 2025-03-12 DIAGNOSIS — J96.10 CHRONIC RESPIRATORY FAILURE: ICD-10-CM

## 2025-03-12 DIAGNOSIS — J30.89 NON-SEASONAL ALLERGIC RHINITIS, UNSPECIFIED TRIGGER: ICD-10-CM

## 2025-03-12 DIAGNOSIS — I89.8 LYMPHORRHEA: Chronic | ICD-10-CM

## 2025-03-12 RX ORDER — ALBUTEROL SULFATE 90 UG/1
2 INHALANT RESPIRATORY (INHALATION) EVERY 4 HOURS PRN
Qty: 6.7 G | Refills: 5 | Status: SHIPPED | OUTPATIENT
Start: 2025-03-12

## 2025-03-12 RX ORDER — FLUTICASONE PROPIONATE 50 MCG
2 SPRAY, SUSPENSION (ML) NASAL DAILY
Qty: 16 G | Refills: 5 | Status: SHIPPED | OUTPATIENT
Start: 2025-03-12

## 2025-03-12 RX ORDER — CETIRIZINE HYDROCHLORIDE 10 MG/1
10 TABLET ORAL DAILY
Qty: 90 TABLET | Refills: 3 | Status: SHIPPED | OUTPATIENT
Start: 2025-03-12

## 2025-03-12 RX ORDER — ALBUTEROL SULFATE 0.83 MG/ML
SOLUTION RESPIRATORY (INHALATION)
COMMUNITY
Start: 2025-03-04

## 2025-03-12 RX ORDER — ARFORMOTEROL TARTRATE 15 UG/2ML
15 SOLUTION RESPIRATORY (INHALATION)
Qty: 120 ML | Refills: 5 | Status: SHIPPED | OUTPATIENT
Start: 2025-03-12

## 2025-03-12 RX ORDER — BUDESONIDE 0.5 MG/2ML
0.5 INHALANT ORAL
Qty: 120 ML | Refills: 5 | Status: SHIPPED | OUTPATIENT
Start: 2025-03-12

## 2025-03-12 RX ORDER — ALBUTEROL SULFATE 90 UG/1
INHALANT RESPIRATORY (INHALATION)
COMMUNITY
Start: 2025-03-04

## 2025-03-12 RX ORDER — ACETAMINOPHEN 500 MG
1000 TABLET ORAL
COMMUNITY

## 2025-03-12 NOTE — PROGRESS NOTES
[facility]AT[facility] CLINIC OUTPATIENT PROGRESS NOTE    Patient: Jose Landeros  : 1960  Age: 64 y.o.  Date of Service: 2025    REASON FOR CLINIC VISIT:  Chief Complaint   Patient presents with    COPD       Subjective:    History of Present Illness:  Jose Landeros is a 64 y.o. male who presents to the office today to be seen for    Diagnosis Plan   1. Chronic obstructive pulmonary disease, unspecified COPD type        2. Chronic hypoxic respiratory failure, on home oxygen therapy        3. Lymphorrhea        4. Reduced mobility        5. Morbid obesity with BMI of 70 and over, adult        6. Obesity hypoventilation syndrome        7. Congestive heart failure, unspecified HF chronicity, unspecified heart failure type        8. Non-seasonal allergic rhinitis, unspecified trigger        .  Other problems per record.    History:    Patient is a very pleasant middle-aged  gentleman who was seen in the pulmonary clinic for a follow-up visit.  He was last seen by me during his hospitalizations in the [facility] in 2024.    Patient history of chronic obstructive pulmonary disease.  He did not have any recent pulmonary function test done.  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  He currently lives at home alone and he was taken to the clinic by his friend.  He is oxygen dependent and uses 3 L oxygen at night all the time.  He has a BiPAP device at home which uses at night and he was getting the BiPAP through a different DME company and currently switched to a new company requesting a new BiPAP with a filter and tubing.  His BiPAP device is apparently new and was delivered by the older insurance coverage few days ago.    He did not have any other hospital admissions and ER visit any urgent care visit any other new complaints.  He has poor mobility and  he is mostly wheelchair-bound he is requesting oxygen concentrator as well.  He had a chronic cellulitis of the left lower extremity which is treated with antibiotics and he was seen in the wound care recently due to improvement wound care is not following him anymore.  He told me he is up-to-date on his vaccinations.  He needed a refill of his medications.  For his obstructive lung disease he is using only albuterol nebulizer and does not have a rescue inhaler at home and for the nasal allergy he is currently using fluticasone nasal spray and Zyrtec.  He does not use Zyrtec regularly.    Started on Brovana and Pulmicort was also given albuterol rescue inhaler to be used as needed.  He has heart failure and fluid overload and is also getting Bumex but currently not taking it regularly and uses this if there is increased leg swelling.  He did not have any other recent neurology or other follow-ups done.  He has gained further weight since his hospital discharge and currently weighs 572 pounds or BMI of 82.07.  His last imaging study was done during the last hospitalization which showed cardiomegaly and pulmonary vascular congestion.    PFT done today:  Not done today      No results found for this or any previous visit.         Bronchodilator therapy: Albuterol nebulizer and  no other long-term inhalers or nebulizers    Smoking Status:   Social History     Tobacco Use   Smoking Status Never   Smokeless Tobacco Never     Pulm Rehab: no  Sleep: yes 3 LPM all the time and BiPAP at night.     Support System: lives with an adult     Code Status:   There are no questions and answers to display.        Review of Systems:  A complete review of systems is performed and all other systems were reviewed and negative as note above in the HPI.  Review of Systems   Constitutional:  Positive for fatigue and unexpected weight gain.   HENT:  Positive for congestion, postnasal drip and sinus pressure.    Eyes: Negative.     Respiratory:  Positive for cough, chest tightness and shortness of breath.    Cardiovascular: Negative.    Gastrointestinal: Negative.    Endocrine: Negative.    Genitourinary: Negative.    Musculoskeletal:  Positive for arthralgias and back pain.   Skin: Negative.    Allergic/Immunologic: Positive for environmental allergies.   Neurological: Negative.    Hematological: Negative.    Psychiatric/Behavioral: Negative.  The patient is nervous/anxious.        CAT/ACT Score:  Not done today    Medications:  Outpatient Encounter Medications as of 3/12/2025   Medication Sig Dispense Refill    acetaminophen (TYLENOL) 500 MG tablet Take 2 tablets by mouth.      albuterol (PROVENTIL) (2.5 MG/3ML) 0.083% nebulizer solution inhale contents of 1 vial (3 mL) using nebulizer EVERY 4 TO 6 HOURS AS NEEDED FOR WHEEZING      albuterol sulfate  (90 Base) MCG/ACT inhaler INHALE 1 PUFF BY MOUTH EVERY 4 TO 6 HOURS AS NEEDED      allopurinol (ZYLOPRIM) 300 MG tablet Take 1 tablet by mouth Daily. Indications: Gout      APPLE CIDER VINEGAR PO Take 1 capsule by mouth Daily. Buys OTC at [facility] and takes with orange juice  Indications: supplement      aspirin 81 MG EC tablet Take 1 tablet by mouth Daily. Indications: Acute Heart Attack      Black Pepper-Turmeric (Turmeric Complex/Black Pepper) 3-500 MG capsule Take 1 capsule by mouth Daily. Indications: supplement      bumetanide (BUMEX) 1 MG tablet Take 1-2 tablets by mouth Daily. Last week  Indications: Edema      cetirizine (zyrTEC) 10 MG tablet Take 1 tablet by mouth Daily. 30 tablet 2    diphenhydrAMINE (BENADRYL) 25 mg capsule Take 1 capsule by mouth 2 (Two) Times a Day As Needed for Allergies. Indications: Hayfever      fluticasone (FLONASE) 50 MCG/ACT nasal spray Administer 1 spray into the nostril(s) as directed by provider 2 (Two) Times a Day As Needed for Allergies. 2 times dly AS NEEDED  Indications: Stuffy Nose      insulin regular (NOVOLIN R RELION) 100 UNIT/ML injection  Inject 10 Units under the skin into the appropriate area as directed 3 (Three) Times a Day Before Meals. Pt injects 10-30 units 3 TIMES daily with breakfast and dinner depending on his bg  Indications: Type 2 Diabetes      Multiple Vitamins-Minerals (Daily Multivitamin) capsule Take 1 tablet by mouth Daily. Indications: supplemeny      nebivolol (Bystolic) 20 MG tablet Take 1 tablet by mouth Daily. Indications: High Blood Pressure      O2 (OXYGEN) Inhale 2 L/min As Needed (SOB). SOB  Indications: SOB      olmesartan (BENICAR) 20 MG tablet Take 1 tablet by mouth Daily. Indications: High Blood Pressure      omeprazole (priLOSEC) 20 MG capsule Take 1 capsule by mouth Daily. Indications: Gastroesophageal Reflux Disease      pravastatin (PRAVACHOL) 20 MG tablet Take 1 tablet by mouth Every Night. Indications: High Amount of Fats in the Blood      vitamin B-12 (CYANOCOBALAMIN) 500 MCG tablet Take 1 tablet by mouth 2 (Two) Times a Day. Indications: Inadequate Vitamin B12      vitamin D3 (vitamin d) 125 MCG (5000 UT) capsule capsule Take 1 capsule by mouth Daily.       No facility-administered encounter medications on file as of 3/12/2025.       Allergies:  Allergies   Allergen Reactions    Cephalosporins Other (See Comments)     Per d/c summary of 12/17/15, pt possibly had cross-reactivity to a cephalosporin during his hospitalization    Penicillins Hives and Itching    Hemp Seed Oil Hives    Neosporin  [Neomycin-Bacitracin Zn-Polymyx] Diarrhea    Penicillamine Diarrhea    Silver Sulfadiazine Diarrhea       Immunizations:  Immunization History   Administered Date(s) Administered    31-influenza Vac Quardvalent Preservativ 11/16/2016, 10/25/2017    COVID-19 (MODERNA) 1st,2nd,3rd Dose Monovalent 11/19/2021, 12/17/2021    FLUAD TRI 65YR+ 11/22/2024    Fluzone (or Fluarix & Flulaval for VFC) >6mos 11/19/2021, 11/02/2022    Pneumococcal Polysaccharide (PPSV23) 10/25/2017    Rubella 04/07/1970    Td, Unspecified 10/03/1978,  "01/04/1995    Tdap 09/11/2024    Tetanus Toxoid, Unspecified 07/31/2015       Objective:    Vitals:  /61   Pulse 90   Ht 177.8 cm (70\")   Wt (!) 259 kg (572 lb)   SpO2 98% Comment: 3 LT O2  BMI 82.07 kg/m²     Physical Exam:  General: Patient is a 64 y.o. morbidly obese  male.  He was in a wheelchair on supplemental oxygen.  Looks stated age. Appears to be in no acute distress.  Eyes: EOMI. PERRLA. Vision intact. No scleral icterus.  Ear, Nose, Mouth and Throat: Hearing is grossly intact. No Leukoplakia, pharyngitis, stomatitis or thrush. Swollen nasal mucosa with post nasal drop.  Neck: Range of motion of neck normal. No thyromegaly or masses. Mallampati Class 3  Respiratory: Clear to auscultation bilaterally. No use of accessory muscles. Decreased breath sounds.  Cardiovascular: Normal heart sounds. Regularly regular rhythm without murmur.  Gastrointestinal: Non tender, non distended, soft. Bowel sounds positive in all four quadrants. No organomegaly.  Skin: No obvious rashes, lesions, ulcers or large amount of bruising. No edema.   Neurological: No new motor deficits. Cranial nerves appear intact.  Psychiatric: Patient is alert and oriented to person, place and time.    Chest Imaging:    Study Result    Narrative & Impression   EXAMINATION: XR CHEST 1 VW-  6/5/2024 2:53 PM     HISTORY: Hypoxic on arrival.     FINDINGS: Today's exam is compared to previous study of 11/2/2021. There  is cardiomegaly with vascular redistribution suggesting pulmonary venous  hypertension with interstitial edema. No acute infiltrate. No  significant effusion is present.     IMPRESSION:  1.. Cardiomegaly with pulmonary vascular congestion. An element of  interstitial edema suspected. This appears to be a chronic finding in  this patient.     This report was signed and finalized on 6/5/2024 2:54 PM by Dr. Karl Loving MD.       Assessment:  1. Chronic obstructive pulmonary disease, unspecified COPD type    2. " Chronic hypoxic respiratory failure, on home oxygen therapy    3. Lymphorrhea    4. Reduced mobility    5. Morbid obesity with BMI of 70 and over, adult    6. Obesity hypoventilation syndrome    7. Congestive heart failure, unspecified HF chronicity, unspecified heart failure type    8. 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  He did not see me since his hospital discharge almost 8 months ago and will need a full workup.  A PFT and a CT scan of the chest noncontrast ordered for the next visit.  For his COPD he is using only albuterol nebulizer on and off and still short of breath.  I started the patient on Pulmicort and Brovana nebulizer in addition and he can use albuterol rescue med as needed.  Prescription refills were sent to the pharmacy.  He has hypoxemia chronic respiratory failure and will continue using oxygen 3 L all the time.  He also has morbid obesity with obstructive sleep apnea and obesity hypoventilation syndrome and is using the BiPAP.  Continue BiPAP orders are placed for the DME company and oxygen concentrator was advised and a prescription was sent to the [facility].  Patient will continue fluticasone nasal spray and loratadine for nasal allergy.  Prescription was resent to pharmacy she will need to continue follow-up with his primary care provider and get annual vaccinations continue follow-up with the primary care provider he will need to see cardiology as well for the history of chronic congestive diastolic heart failure and fluid overload.  He also has joint pains and very poor mobility and uses pain medications.  He is nondiabetic and is also on insulin.  He will return to pulmonary clinic for follow-up visit in 6 months time or earlier if needed.    Follow up:  6 Months    Time Spent:  30 minutes    I appreciate the opportunity  of participating in this patient's care. I would like to thank the PCP for the referral.  Please feel free to contact me with any other questions.    Zak Kelly MD   Pulmonologist/Intensivist     Electronically signed by: Zak Kelly MD, 3/12/2025 13:16 CDT

## 2025-04-24 ENCOUNTER — HOSPITAL ENCOUNTER (EMERGENCY)
Facility: HOSPITAL | Age: 65
Discharge: HOME | End: 2025-04-24
Attending: EMERGENCY MEDICINE
Payer: MEDICARE

## 2025-04-24 VITALS
BODY MASS INDEX: 45.1 KG/M2 | SYSTOLIC BLOOD PRESSURE: 123 MMHG | HEART RATE: 96 BPM | OXYGEN SATURATION: 94 % | HEIGHT: 70 IN | RESPIRATION RATE: 21 BRPM | DIASTOLIC BLOOD PRESSURE: 90 MMHG | TEMPERATURE: 98 F | WEIGHT: 315 LBS

## 2025-04-24 DIAGNOSIS — E66.01 MORBID OBESITY: ICD-10-CM

## 2025-04-24 DIAGNOSIS — R19.7 DIARRHEA, UNSPECIFIED TYPE: Primary | ICD-10-CM

## 2025-04-24 LAB
ADV 40+41 DNA STL QL NAA+NON-PROBE: NOT DETECTED
ALBUMIN SERPL-MCNC: 3.4 G/DL (ref 3.5–5.2)
ALBUMIN/GLOB SERPL: 1.2 G/DL
ALP SERPL-CCNC: 113 U/L (ref 39–117)
ALT SERPL W P-5'-P-CCNC: 12 U/L (ref 1–41)
ANION GAP SERPL CALCULATED.3IONS-SCNC: 7 MMOL/L (ref 5–15)
AST SERPL-CCNC: 16 U/L (ref 1–40)
ASTRO TYP 1-8 RNA STL QL NAA+NON-PROBE: NOT DETECTED
BASOPHILS # BLD AUTO: 0.05 10*3/MM3 (ref 0–0.2)
BASOPHILS NFR BLD AUTO: 0.7 % (ref 0–1.5)
BILIRUB SERPL-MCNC: 0.7 MG/DL (ref 0–1.2)
BUN SERPL-MCNC: 14 MG/DL (ref 8–23)
BUN/CREAT SERPL: 14.6 (ref 7–25)
C CAYETANENSIS DNA STL QL NAA+NON-PROBE: NOT DETECTED
C COLI+JEJ+UPSA DNA STL QL NAA+NON-PROBE: NOT DETECTED
CALCIUM SPEC-SCNC: 9 MG/DL (ref 8.6–10.5)
CHLORIDE SERPL-SCNC: 107 MMOL/L (ref 98–107)
CO2 SERPL-SCNC: 32 MMOL/L (ref 22–29)
CREAT SERPL-MCNC: 0.96 MG/DL (ref 0.76–1.27)
CRYPTOSP DNA STL QL NAA+NON-PROBE: NOT DETECTED
D-LACTATE SERPL-SCNC: 1.1 MMOL/L (ref 0.5–2)
DEPRECATED RDW RBC AUTO: 57.6 FL (ref 37–54)
E HISTOLYT DNA STL QL NAA+NON-PROBE: NOT DETECTED
EAEC PAA PLAS AGGR+AATA ST NAA+NON-PRB: NOT DETECTED
EC STX1+STX2 GENES STL QL NAA+NON-PROBE: NOT DETECTED
EGFRCR SERPLBLD CKD-EPI 2021: 87.2 ML/MIN/1.73
EOSINOPHIL # BLD AUTO: 0.2 10*3/MM3 (ref 0–0.4)
EOSINOPHIL NFR BLD AUTO: 2.7 % (ref 0.3–6.2)
EPEC EAE GENE STL QL NAA+NON-PROBE: NOT DETECTED
ERYTHROCYTE [DISTWIDTH] IN BLOOD BY AUTOMATED COUNT: 16.5 % (ref 12.3–15.4)
ETEC LTA+ST1A+ST1B TOX ST NAA+NON-PROBE: NOT DETECTED
G LAMBLIA DNA STL QL NAA+NON-PROBE: NOT DETECTED
GLOBULIN UR ELPH-MCNC: 2.9 GM/DL
GLUCOSE SERPL-MCNC: 93 MG/DL (ref 65–99)
HCT VFR BLD AUTO: 45.8 % (ref 37.5–51)
HGB BLD-MCNC: 14 G/DL (ref 13–17.7)
IMM GRANULOCYTES # BLD AUTO: 0.02 10*3/MM3 (ref 0–0.05)
IMM GRANULOCYTES NFR BLD AUTO: 0.3 % (ref 0–0.5)
LYMPHOCYTES # BLD AUTO: 0.85 10*3/MM3 (ref 0.7–3.1)
LYMPHOCYTES NFR BLD AUTO: 11.5 % (ref 19.6–45.3)
MAGNESIUM SERPL-MCNC: 1.8 MG/DL (ref 1.6–2.4)
MCH RBC QN AUTO: 29.5 PG (ref 26.6–33)
MCHC RBC AUTO-ENTMCNC: 30.6 G/DL (ref 31.5–35.7)
MCV RBC AUTO: 96.6 FL (ref 79–97)
MONOCYTES # BLD AUTO: 0.57 10*3/MM3 (ref 0.1–0.9)
MONOCYTES NFR BLD AUTO: 7.7 % (ref 5–12)
NEUTROPHILS NFR BLD AUTO: 5.71 10*3/MM3 (ref 1.7–7)
NEUTROPHILS NFR BLD AUTO: 77.1 % (ref 42.7–76)
NOROVIRUS GI+II RNA STL QL NAA+NON-PROBE: NOT DETECTED
NRBC BLD AUTO-RTO: 0 /100 WBC (ref 0–0.2)
P SHIGELLOIDES DNA STL QL NAA+NON-PROBE: NOT DETECTED
PLATELET # BLD AUTO: 133 10*3/MM3 (ref 140–450)
PMV BLD AUTO: 12.8 FL (ref 6–12)
POTASSIUM SERPL-SCNC: 4.3 MMOL/L (ref 3.5–5.2)
PROT SERPL-MCNC: 6.3 G/DL (ref 6–8.5)
RBC # BLD AUTO: 4.74 10*6/MM3 (ref 4.14–5.8)
RVA RNA STL QL NAA+NON-PROBE: NOT DETECTED
S ENT+BONG DNA STL QL NAA+NON-PROBE: NOT DETECTED
SAPO I+II+IV+V RNA STL QL NAA+NON-PROBE: NOT DETECTED
SHIGELLA SP+EIEC IPAH ST NAA+NON-PROBE: NOT DETECTED
SODIUM SERPL-SCNC: 146 MMOL/L (ref 136–145)
V CHOL+PARA+VUL DNA STL QL NAA+NON-PROBE: NOT DETECTED
V CHOLERAE DNA STL QL NAA+NON-PROBE: NOT DETECTED
WBC NRBC COR # BLD AUTO: 7.4 10*3/MM3 (ref 3.4–10.8)
Y ENTEROCOL DNA STL QL NAA+NON-PROBE: NOT DETECTED

## 2025-04-24 PROCEDURE — 83735 ASSAY OF MAGNESIUM: CPT | Performed by: EMERGENCY MEDICINE

## 2025-04-24 PROCEDURE — 85025 COMPLETE CBC W/AUTO DIFF WBC: CPT | Performed by: EMERGENCY MEDICINE

## 2025-04-24 PROCEDURE — 80053 COMPREHEN METABOLIC PANEL: CPT | Performed by: EMERGENCY MEDICINE

## 2025-04-24 PROCEDURE — 25810000003 LACTATED RINGERS SOLUTION: Performed by: EMERGENCY MEDICINE

## 2025-04-24 PROCEDURE — 83605 ASSAY OF LACTIC ACID: CPT | Performed by: EMERGENCY MEDICINE

## 2025-04-24 PROCEDURE — 99283 EMERGENCY DEPT VISIT LOW MDM: CPT

## 2025-04-24 PROCEDURE — 87507 IADNA-DNA/RNA PROBE TQ 12-25: CPT | Performed by: EMERGENCY MEDICINE

## 2025-04-24 RX ORDER — LOPERAMIDE HYDROCHLORIDE 2 MG/1
2 CAPSULE ORAL 4 TIMES DAILY PRN
Qty: 10 CAPSULE | Refills: 0 | Status: SHIPPED | OUTPATIENT
Start: 2025-04-24

## 2025-04-24 RX ORDER — SODIUM CHLORIDE 0.9 % (FLUSH) 0.9 %
10 SYRINGE (ML) INJECTION AS NEEDED
Status: DISCONTINUED | OUTPATIENT
Start: 2025-04-24 | End: 2025-04-24 | Stop reason: HOSPADM

## 2025-04-24 RX ADMIN — SODIUM CHLORIDE, POTASSIUM CHLORIDE, SODIUM LACTATE AND CALCIUM CHLORIDE 1000 ML: 600; 310; 30; 20 INJECTION, SOLUTION INTRAVENOUS at 08:03

## 2025-04-24 NOTE — ED PROVIDER NOTES
Subjective   History of Present Illness  Patient is a 64-year-old very large gentleman who weighs approximately 260 kg at least was brought to the ED by EMS because of diarrhea the patient states that he lives alone at home with neighbors help.  The patient states that he is able to walk with a use of a walker to the bathroom and he started having diarrhea last night with no abdominal pain and nausea diarrhea was persistent to the point that he just could not make it to the bathroom fast enough and started getting diarrhea on himself in the bed.  Subsequently called the EMS and they were in the ED.  There is no abdominal pain associated with this.  I have discussed this case at length with the patient and he had not noticed any blood in the stool.  Patient is a full code.    He has got fungal infections in different areas of the body i.e. skin infection because of skin maceration also has got a tracheostomy in the past which is almost closed over.    Nausea  The primary symptoms include nausea and diarrhea. Primary symptoms do not include fever, weight loss, fatigue, abdominal pain, melena, hematemesis, hematochezia, dysuria or myalgias. The illness began yesterday.   The illness is also significant for chills. The illness does not include anorexia, dysphagia, bloating, back pain or itching. Associated medical issues do not include GERD, gallstones or diverticulitis.   Diarrhea  The primary symptoms include nausea and diarrhea. Primary symptoms do not include fever, weight loss, fatigue, abdominal pain, melena, hematemesis, hematochezia, dysuria or myalgias.   The illness is also significant for chills. The illness does not include anorexia, dysphagia, bloating, back pain or itching. Associated medical issues do not include GERD, gallstones or diverticulitis.       Review of Systems   Constitutional:  Positive for chills. Negative for fatigue, fever and weight loss.   HENT: Negative.     Eyes: Negative.     Respiratory: Negative.     Cardiovascular: Negative.    Gastrointestinal:  Positive for diarrhea and nausea. Negative for abdominal pain, anorexia, bloating, dysphagia, hematemesis, hematochezia and melena.   Endocrine: Negative.    Genitourinary: Negative.  Negative for dysuria.   Musculoskeletal:  Negative for back pain and myalgias.   Skin: Negative.  Negative for itching.   Neurological: Negative.    Hematological: Negative.    All other systems reviewed and are negative.      Past Medical History:   Diagnosis Date    COPD (chronic obstructive pulmonary disease)     Depression     Diabetes mellitus     Hyperlipidemia     Hypertension     Venous insufficiency     Venous ulcer of right leg        Allergies   Allergen Reactions    Cephalosporins Other (See Comments)     Per d/c summary of 12/17/15, pt possibly had cross-reactivity to a cephalosporin during his hospitalization    Penicillins Hives and Itching    Hemp Seed Oil Hives    Neosporin  [Neomycin-Bacitracin Zn-Polymyx] Diarrhea    Penicillamine Diarrhea    Silver Sulfadiazine Diarrhea       Past Surgical History:   Procedure Laterality Date    APPENDECTOMY      FOOT SURGERY Bilateral     TESTICLE SURGERY      TRACHEOSTOMY         Family History   Problem Relation Age of Onset    Diabetes Mother     Heart disease Mother     Hypertension Mother     Hyperlipidemia Mother     COPD Mother     Diabetes Father     Heart disease Father     Hypertension Father     No Known Problems Sister     No Known Problems Brother     No Known Problems Sister     No Known Problems Sister     No Known Problems Sister        Social History     Socioeconomic History    Marital status: Single   Tobacco Use    Smoking status: Never    Smokeless tobacco: Never   Vaping Use    Vaping status: Never Used   Substance and Sexual Activity    Alcohol use: No    Drug use: No    Sexual activity: Defer           Objective   Physical Exam  Vitals and nursing note reviewed. Exam conducted with  a chaperone present.   Constitutional:       General: He is awake. He is not in acute distress.     Appearance: He is morbidly obese. He is not toxic-appearing.   HENT:      Head: Normocephalic and atraumatic.      Nose:      Right Nostril: No epistaxis.      Left Nostril: No epistaxis.   Eyes:      General: Lids are normal. No scleral icterus.     Conjunctiva/sclera: Conjunctivae normal.      Pupils: Pupils are equal, round, and reactive to light.   Neck:      Vascular: No hepatojugular reflux or JVD.   Cardiovascular:      Rate and Rhythm: Regular rhythm. Tachycardia present.      Chest Wall: PMI is not displaced.      Pulses: Normal pulses. No decreased pulses.      Heart sounds: Normal heart sounds.      Comments: Diminished heartbeat secondary patient's large size.  Pulmonary:      Effort: Pulmonary effort is normal. No accessory muscle usage or respiratory distress.      Breath sounds: No decreased breath sounds or wheezing.      Comments: Patient has diminished breath sounds bilaterally because of body habitus oxygen saturation within normal limits.  Abdominal:      General: Bowel sounds are normal.      Palpations: Abdomen is soft. There is no shifting dullness, fluid wave, mass or pulsatile mass.      Tenderness: There is no abdominal tenderness. There is no guarding or rebound.      Comments: Large pannus there is no guarding or rebound tenderness there is some abdominal skin fungal infection secondary to skin maceration no cellulitis.  There is brawny edema to the abdominal wall appreciate obvious hernia but his abdomen is so large that we difficult to discern small hernias.   Musculoskeletal:         General: Normal range of motion.      Cervical back: Normal range of motion and neck supple. No rigidity.      Right lower le+ Edema present.      Left lower le+ Edema present.   Skin:     General: Skin is warm and dry.      Capillary Refill: Capillary refill takes less than 2 seconds.       Coloration: Skin is not cyanotic, jaundiced, mottled or pale.   Neurological:      General: No focal deficit present.      Mental Status: He is alert and oriented to person, place, and time. Mental status is at baseline.      GCS: GCS eye subscore is 4. GCS verbal subscore is 5. GCS motor subscore is 6.      Cranial Nerves: Cranial nerves 2-12 are intact. No cranial nerve deficit.      Sensory: Sensation is intact.      Motor: Motor function is intact.   Psychiatric:         Behavior: Behavior normal. Behavior is cooperative.         Procedures           ED Course  ED Course as of 04/24/25 1503   Thu Apr 24, 2025   1455 It took a while for us to get a stool sample which essentially is unremarkable.  His lab workup is unremarkable. [TS]   1455 [place] are minimally low side.  And sodium is slightly elevated with a normal chemistries are normal bicarb. [TS]   1455 Patient on examination currently has skin excoriation because of fungal infection and maceration of the skin but does not have any open wounds.  This is true for the area on his buttock and also underneath his pannus.  He has been having diarrhea he has not been able to go to the bathroom on time because of his large size and difficulty in getting the bathroom fast enough.  But diagnostic lab workup lactic acid magnesium and potassium all within normal limits. [TS]   1456 I have discussed this case with the  as per them this patient has had these episodes in the past also.  They stated that the patient has mostly placed observation if needed. [TS]   1456 I have discussed with the patient regarding his care.  Asked him if he would be okay in being sent to a nursing home for rehabitation and to find him a place where he can be taken care of.  The patient absolutely refused to go to nursing home he is awake alert oriented x 3 at this time and states that does not something he is going to entertain. [TS]   1457 Subsequently call placed to the  hospitalist service the hospitalist ABY was apprised of the patient's situation and request for possible admission at least for observation.  The hospitalist ABY discussed this case with their medical director Dr. Branch per Dr. Branch's recommendation the patient does not need to be admitted to the medicine service.  We can try Imodium as an outpatient and discharge patient home with follow-up. [TS]   4261 I have constantly being informing the patient regarding what is going on .  Currently he is unhappy that he is not getting get admitted.      I have discussed this at length with the patient informed him that if there is anything else that we can do for him for him to let us know if he has any worsening condition come back to the ED. [TS]   1500 His exam is limited because of his large size and furthermore although a CT scan was not needed but scans could not be performed because I had asked radiology and the patient goes beyond the CT tables capability. [TS]      ED Course User Index  [TS] Cameron Larson MD                                                       Medical Decision Making  Patient with persistent diarrhea we will get a diarrhea panel lab workup cannot scan if he does not want to fit in a CAT scan or patient has no abdominal pain no nausea and no vomiting he said he had vomited once before.  Lab workup essentially will be ordered on this patient and repeat evaluation was performed.  There is no bleeding there is no melena he has been able to eat or drink in fact he wants to eat or drink something right now.    Problems Addressed:  Diarrhea, unspecified type: acute illness or injury  Morbid obesity: chronic illness or injury    Amount and/or Complexity of Data Reviewed  Labs: ordered.     Details: Labs reviewed  Discussion of management or test interpretation with external provider(s): Fitz with the hospitalist ABY who discussed this case with Dr. Branch    Risk  Prescription drug management.  Risk  Details: Patient came in with diarrhea with no abdominal pain no melena no hematochezia no hematemesis normal hemodynamics and normal blood pressure and also normal lab workup.        Final diagnoses:   Diarrhea, unspecified type   Morbid obesity       ED Disposition  ED Disposition       ED Disposition   Discharge    Condition   Stable    Comment   --               No follow-up provider specified.       Medication List        New Prescriptions      loperamide 2 MG capsule  Commonly known as: IMODIUM  Take 1 capsule by mouth 4 (Four) Times a Day As Needed for Diarrhea for up to 10 doses.               Where to Get Your Medications        These medications were sent to [facility] [address] [place] [address] - [address] - [phone]  - [phone]   [address] [facility] [place] [address]      Phone: [phone]   loperamide 2 MG capsule            Cameron Larson MD  04/24/25 8891       Cameron Larson MD  04/24/25 8403

## 2025-04-24 NOTE — DISCHARGE INSTRUCTIONS
It was very nice to meet you,Jose . Thank you for allowing us to take care of you today at [facility].     Today you were seen in the emergency department for your symptoms. Please understand that an ER evaluation is just the start of your evaluation. We do the best we can, but we are often unable to fully find what is causing your symptoms from one evaluation.  Because of this, the goal is to determine whether you need to be evaluated in the hospital or if it is safe for you to go home and see other doctors provided such as primary care physicians or specialist on an outpatient basis.      Like we discussed, I strongly urge that you follow up with your primary care doctor. Please call their office to set up an appointment as soon as possible so that you can be re-evaluated for improvement in your symptoms or for any other questions.  I have provided the information needed, including phone number, to call to set up an appointment below in these discharge papers.      Educational material has also been provided in the following pages regarding what we have discussed today.      Please return to the emergency room within 12-48 hours if you experience symptoms such as the following:   Fever, chills, chest pain or shortness of breath, pain with inspiration/expiration, pain that travels to your arms, neck or back, nausea, vomiting, severe headache, tearing pain in your chest, dizziness, feel as though you are about to pass out, OR if you have any worsening symptoms, or any other concerns.

## 2025-05-14 ENCOUNTER — APPOINTMENT (OUTPATIENT)
Dept: GENERAL RADIOLOGY | Age: 65
DRG: 291 | End: 2025-05-14
Payer: MEDICARE

## 2025-05-14 ENCOUNTER — HOSPITAL ENCOUNTER (INPATIENT)
Age: 65
LOS: 73 days | Discharge: NURSING, REHAB OR LONG-TERM CARE | DRG: 291 | End: 2025-07-26
Attending: STUDENT IN AN ORGANIZED HEALTH CARE EDUCATION/TRAINING PROGRAM | Admitting: STUDENT IN AN ORGANIZED HEALTH CARE EDUCATION/TRAINING PROGRAM
Payer: MEDICARE

## 2025-05-14 DIAGNOSIS — L97.222 NON-PRESSURE CHRONIC ULCER OF LEFT CALF WITH FAT LAYER EXPOSED (HCC): Chronic | ICD-10-CM

## 2025-05-14 DIAGNOSIS — L02.416 CELLULITIS AND ABSCESS OF LEFT LOWER EXTREMITY: ICD-10-CM

## 2025-05-14 DIAGNOSIS — I49.9 CARDIAC ARRHYTHMIA, UNSPECIFIED CARDIAC ARRHYTHMIA TYPE: ICD-10-CM

## 2025-05-14 DIAGNOSIS — E87.70 HYPERVOLEMIA, UNSPECIFIED HYPERVOLEMIA TYPE: Primary | ICD-10-CM

## 2025-05-14 DIAGNOSIS — J18.9 COMMUNITY ACQUIRED PNEUMONIA, UNSPECIFIED LATERALITY: ICD-10-CM

## 2025-05-14 DIAGNOSIS — R60.0 BILATERAL LOWER EXTREMITY EDEMA: ICD-10-CM

## 2025-05-14 DIAGNOSIS — R06.02 SHORTNESS OF BREATH: ICD-10-CM

## 2025-05-14 DIAGNOSIS — L03.116 CELLULITIS AND ABSCESS OF LEFT LOWER EXTREMITY: ICD-10-CM

## 2025-05-14 PROBLEM — I50.9 ACUTE CONGESTIVE HEART FAILURE, UNSPECIFIED HEART FAILURE TYPE (HCC): Status: ACTIVE | Noted: 2025-05-14

## 2025-05-14 LAB
ALBUMIN SERPL-MCNC: 3.4 G/DL (ref 3.5–5.2)
ALP SERPL-CCNC: 103 U/L (ref 40–129)
ALT SERPL-CCNC: 7 U/L (ref 10–50)
ANION GAP SERPL CALCULATED.3IONS-SCNC: 13 MMOL/L (ref 8–16)
AST SERPL-CCNC: 17 U/L (ref 10–50)
BACTERIA URNS QL MICRO: NEGATIVE /HPF
BASOPHILS # BLD: 0.1 K/UL (ref 0–0.2)
BASOPHILS NFR BLD: 1.1 % (ref 0–1)
BILIRUB SERPL-MCNC: 1 MG/DL (ref 0.2–1.2)
BILIRUB UR QL STRIP: ABNORMAL
BNP BLD-MCNC: 4707 PG/ML (ref 0–124)
BUN SERPL-MCNC: 15 MG/DL (ref 8–23)
CALCIUM SERPL-MCNC: 9.4 MG/DL (ref 8.8–10.2)
CHLORIDE SERPL-SCNC: 105 MMOL/L (ref 98–107)
CLARITY UR: CLEAR
CO2 SERPL-SCNC: 28 MMOL/L (ref 22–29)
COLOR UR: ABNORMAL
CREAT SERPL-MCNC: 0.9 MG/DL (ref 0.7–1.2)
CRYSTALS URNS MICRO: NORMAL /HPF
EOSINOPHIL # BLD: 0.2 K/UL (ref 0–0.6)
EOSINOPHIL NFR BLD: 3.5 % (ref 0–5)
EPI CELLS #/AREA URNS AUTO: 2 /HPF (ref 0–5)
ERYTHROCYTE [DISTWIDTH] IN BLOOD BY AUTOMATED COUNT: 16.6 % (ref 11.5–14.5)
FERRITIN SERPL-MCNC: 83.5 NG/ML (ref 30–400)
GLUCOSE BLD-MCNC: 133 MG/DL (ref 70–99)
GLUCOSE SERPL-MCNC: 127 MG/DL (ref 70–99)
GLUCOSE UR STRIP.AUTO-MCNC: NEGATIVE MG/DL
HCT VFR BLD AUTO: 47 % (ref 42–52)
HGB BLD-MCNC: 14 G/DL (ref 14–18)
HGB UR STRIP.AUTO-MCNC: NEGATIVE MG/L
HYALINE CASTS #/AREA URNS AUTO: 2 /HPF (ref 0–8)
IMM GRANULOCYTES # BLD: 0 K/UL
IRON SATN MFR SERPL: 28 % (ref 20–50)
IRON SERPL-MCNC: 65 UG/DL (ref 59–158)
KETONES UR STRIP.AUTO-MCNC: 15 MG/DL
LEUKOCYTE ESTERASE UR QL STRIP.AUTO: ABNORMAL
LIPASE SERPL-CCNC: 11 U/L (ref 13–60)
LYMPHOCYTES # BLD: 0.9 K/UL (ref 1.1–4.5)
LYMPHOCYTES NFR BLD: 15.9 % (ref 20–40)
MAGNESIUM SERPL-MCNC: 2.1 MG/DL (ref 1.6–2.4)
MCH RBC QN AUTO: 29.6 PG (ref 27–31)
MCHC RBC AUTO-ENTMCNC: 29.8 G/DL (ref 33–37)
MCV RBC AUTO: 99.4 FL (ref 80–94)
MONOCYTES # BLD: 0.5 K/UL (ref 0–0.9)
MONOCYTES NFR BLD: 9.3 % (ref 0–10)
NEUTROPHILS # BLD: 4 K/UL (ref 1.5–7.5)
NEUTS SEG NFR BLD: 69.7 % (ref 50–65)
NITRITE UR QL STRIP.AUTO: NEGATIVE
PERFORMED ON: ABNORMAL
PH UR STRIP.AUTO: 5 [PH] (ref 5–8)
PLATELET # BLD AUTO: 156 K/UL (ref 130–400)
PMV BLD AUTO: 12.9 FL (ref 9.4–12.4)
POTASSIUM SERPL-SCNC: 4.5 MMOL/L (ref 3.5–5.1)
PROT SERPL-MCNC: 6.6 G/DL (ref 6.4–8.3)
PROT UR STRIP.AUTO-MCNC: 100 MG/DL
RBC # BLD AUTO: 4.73 M/UL (ref 4.7–6.1)
RBC #/AREA URNS AUTO: 2 /HPF (ref 0–4)
SODIUM SERPL-SCNC: 146 MMOL/L (ref 136–145)
SP GR UR STRIP.AUTO: 1.03 (ref 1–1.03)
T4 FREE SERPL-MCNC: 1.52 NG/DL (ref 0.93–1.7)
TIBC SERPL-MCNC: 231 UG/DL (ref 250–400)
TSH SERPL DL<=0.005 MIU/L-ACNC: 3.57 UIU/ML (ref 0.27–4.2)
UROBILINOGEN UR STRIP.AUTO-MCNC: 1 E.U./DL
WBC # BLD AUTO: 5.7 K/UL (ref 4.8–10.8)
WBC #/AREA URNS AUTO: 3 /HPF (ref 0–5)

## 2025-05-14 PROCEDURE — 80053 COMPREHEN METABOLIC PANEL: CPT

## 2025-05-14 PROCEDURE — 2700000000 HC OXYGEN THERAPY PER DAY

## 2025-05-14 PROCEDURE — 83540 ASSAY OF IRON: CPT

## 2025-05-14 PROCEDURE — 2500000003 HC RX 250 WO HCPCS

## 2025-05-14 PROCEDURE — 84439 ASSAY OF FREE THYROXINE: CPT

## 2025-05-14 PROCEDURE — 36415 COLL VENOUS BLD VENIPUNCTURE: CPT

## 2025-05-14 PROCEDURE — 83690 ASSAY OF LIPASE: CPT

## 2025-05-14 PROCEDURE — 6360000002 HC RX W HCPCS

## 2025-05-14 PROCEDURE — 81001 URINALYSIS AUTO W/SCOPE: CPT

## 2025-05-14 PROCEDURE — 84443 ASSAY THYROID STIM HORMONE: CPT

## 2025-05-14 PROCEDURE — 1200000000 HC SEMI PRIVATE

## 2025-05-14 PROCEDURE — 83735 ASSAY OF MAGNESIUM: CPT

## 2025-05-14 PROCEDURE — 82962 GLUCOSE BLOOD TEST: CPT

## 2025-05-14 PROCEDURE — 71045 X-RAY EXAM CHEST 1 VIEW: CPT

## 2025-05-14 PROCEDURE — 87040 BLOOD CULTURE FOR BACTERIA: CPT

## 2025-05-14 PROCEDURE — 6370000000 HC RX 637 (ALT 250 FOR IP)

## 2025-05-14 PROCEDURE — 99285 EMERGENCY DEPT VISIT HI MDM: CPT

## 2025-05-14 PROCEDURE — 83550 IRON BINDING TEST: CPT

## 2025-05-14 PROCEDURE — 93005 ELECTROCARDIOGRAM TRACING: CPT | Performed by: STUDENT IN AN ORGANIZED HEALTH CARE EDUCATION/TRAINING PROGRAM

## 2025-05-14 PROCEDURE — 83880 ASSAY OF NATRIURETIC PEPTIDE: CPT

## 2025-05-14 PROCEDURE — 85025 COMPLETE CBC W/AUTO DIFF WBC: CPT

## 2025-05-14 PROCEDURE — 6360000002 HC RX W HCPCS: Performed by: STUDENT IN AN ORGANIZED HEALTH CARE EDUCATION/TRAINING PROGRAM

## 2025-05-14 PROCEDURE — 94760 N-INVAS EAR/PLS OXIMETRY 1: CPT

## 2025-05-14 PROCEDURE — 82728 ASSAY OF FERRITIN: CPT

## 2025-05-14 RX ORDER — POTASSIUM CHLORIDE 7.45 MG/ML
10 INJECTION INTRAVENOUS PRN
Status: DISCONTINUED | OUTPATIENT
Start: 2025-05-14 | End: 2025-07-26 | Stop reason: HOSPADM

## 2025-05-14 RX ORDER — LEVOFLOXACIN 5 MG/ML
750 INJECTION, SOLUTION INTRAVENOUS ONCE
Status: COMPLETED | OUTPATIENT
Start: 2025-05-14 | End: 2025-05-14

## 2025-05-14 RX ORDER — GLUCAGON 1 MG/ML
1 KIT INJECTION PRN
Status: DISCONTINUED | OUTPATIENT
Start: 2025-05-14 | End: 2025-07-26 | Stop reason: HOSPADM

## 2025-05-14 RX ORDER — ONDANSETRON 2 MG/ML
4 INJECTION INTRAMUSCULAR; INTRAVENOUS EVERY 6 HOURS PRN
Status: DISCONTINUED | OUTPATIENT
Start: 2025-05-14 | End: 2025-07-26 | Stop reason: HOSPADM

## 2025-05-14 RX ORDER — POLYETHYLENE GLYCOL 3350 17 G/17G
17 POWDER, FOR SOLUTION ORAL DAILY PRN
Status: DISCONTINUED | OUTPATIENT
Start: 2025-05-14 | End: 2025-07-26 | Stop reason: HOSPADM

## 2025-05-14 RX ORDER — BUMETANIDE 0.25 MG/ML
2 INJECTION, SOLUTION INTRAMUSCULAR; INTRAVENOUS DAILY
Status: CANCELLED | OUTPATIENT
Start: 2025-05-14

## 2025-05-14 RX ORDER — BUMETANIDE 1 MG/1
1 TABLET ORAL DAILY
Status: DISCONTINUED | OUTPATIENT
Start: 2025-05-14 | End: 2025-05-18

## 2025-05-14 RX ORDER — SODIUM CHLORIDE 9 MG/ML
INJECTION, SOLUTION INTRAVENOUS PRN
Status: DISCONTINUED | OUTPATIENT
Start: 2025-05-14 | End: 2025-07-26 | Stop reason: HOSPADM

## 2025-05-14 RX ORDER — METOPROLOL SUCCINATE 50 MG/1
100 TABLET, EXTENDED RELEASE ORAL DAILY
Status: DISCONTINUED | OUTPATIENT
Start: 2025-05-14 | End: 2025-05-22

## 2025-05-14 RX ORDER — SODIUM CHLORIDE 0.9 % (FLUSH) 0.9 %
5-40 SYRINGE (ML) INJECTION EVERY 12 HOURS SCHEDULED
Status: DISCONTINUED | OUTPATIENT
Start: 2025-05-14 | End: 2025-07-26 | Stop reason: HOSPADM

## 2025-05-14 RX ORDER — PANTOPRAZOLE SODIUM 40 MG/1
40 TABLET, DELAYED RELEASE ORAL
Status: DISCONTINUED | OUTPATIENT
Start: 2025-05-15 | End: 2025-07-26 | Stop reason: HOSPADM

## 2025-05-14 RX ORDER — ALBUTEROL SULFATE 0.83 MG/ML
2.5 SOLUTION RESPIRATORY (INHALATION) EVERY 6 HOURS PRN
Status: DISCONTINUED | OUTPATIENT
Start: 2025-05-14 | End: 2025-07-26 | Stop reason: HOSPADM

## 2025-05-14 RX ORDER — LEVOFLOXACIN 5 MG/ML
750 INJECTION, SOLUTION INTRAVENOUS EVERY 24 HOURS
Status: COMPLETED | OUTPATIENT
Start: 2025-05-15 | End: 2025-05-17

## 2025-05-14 RX ORDER — ENOXAPARIN SODIUM 100 MG/ML
60 INJECTION SUBCUTANEOUS 2 TIMES DAILY
Status: DISCONTINUED | OUTPATIENT
Start: 2025-05-14 | End: 2025-07-26

## 2025-05-14 RX ORDER — BUMETANIDE 0.25 MG/ML
1 INJECTION, SOLUTION INTRAMUSCULAR; INTRAVENOUS ONCE
Status: COMPLETED | OUTPATIENT
Start: 2025-05-14 | End: 2025-05-14

## 2025-05-14 RX ORDER — CETIRIZINE HYDROCHLORIDE 10 MG/1
10 TABLET ORAL DAILY
COMMUNITY

## 2025-05-14 RX ORDER — PRAVASTATIN SODIUM 20 MG
20 TABLET ORAL NIGHTLY
Status: DISCONTINUED | OUTPATIENT
Start: 2025-05-14 | End: 2025-07-26 | Stop reason: HOSPADM

## 2025-05-14 RX ORDER — ASPIRIN 81 MG/1
81 TABLET ORAL DAILY
Status: DISCONTINUED | OUTPATIENT
Start: 2025-05-14 | End: 2025-07-26 | Stop reason: HOSPADM

## 2025-05-14 RX ORDER — SODIUM CHLORIDE 0.9 % (FLUSH) 0.9 %
5-40 SYRINGE (ML) INJECTION PRN
Status: DISCONTINUED | OUTPATIENT
Start: 2025-05-14 | End: 2025-07-26 | Stop reason: HOSPADM

## 2025-05-14 RX ORDER — ONDANSETRON 4 MG/1
4 TABLET, ORALLY DISINTEGRATING ORAL EVERY 8 HOURS PRN
Status: DISCONTINUED | OUTPATIENT
Start: 2025-05-14 | End: 2025-07-26 | Stop reason: HOSPADM

## 2025-05-14 RX ORDER — INSULIN LISPRO 100 [IU]/ML
0-8 INJECTION, SOLUTION INTRAVENOUS; SUBCUTANEOUS
Status: DISCONTINUED | OUTPATIENT
Start: 2025-05-14 | End: 2025-06-11

## 2025-05-14 RX ORDER — LOSARTAN POTASSIUM 50 MG/1
50 TABLET ORAL DAILY
Status: DISCONTINUED | OUTPATIENT
Start: 2025-05-14 | End: 2025-07-14

## 2025-05-14 RX ORDER — BUMETANIDE 0.25 MG/ML
1 INJECTION, SOLUTION INTRAMUSCULAR; INTRAVENOUS 2 TIMES DAILY
Status: DISPENSED | OUTPATIENT
Start: 2025-05-14 | End: 2025-05-16

## 2025-05-14 RX ORDER — POTASSIUM CHLORIDE 1500 MG/1
20 TABLET, EXTENDED RELEASE ORAL DAILY
Status: DISCONTINUED | OUTPATIENT
Start: 2025-05-14 | End: 2025-06-18

## 2025-05-14 RX ORDER — ACETAMINOPHEN 650 MG/1
650 SUPPOSITORY RECTAL EVERY 6 HOURS PRN
Status: DISCONTINUED | OUTPATIENT
Start: 2025-05-14 | End: 2025-07-26 | Stop reason: HOSPADM

## 2025-05-14 RX ORDER — ALLOPURINOL 100 MG/1
300 TABLET ORAL DAILY
Status: DISCONTINUED | OUTPATIENT
Start: 2025-05-14 | End: 2025-07-26 | Stop reason: HOSPADM

## 2025-05-14 RX ORDER — INSULIN GLARGINE 100 [IU]/ML
30 INJECTION, SOLUTION SUBCUTANEOUS NIGHTLY
Status: DISCONTINUED | OUTPATIENT
Start: 2025-05-14 | End: 2025-07-26 | Stop reason: HOSPADM

## 2025-05-14 RX ORDER — ACETAMINOPHEN 325 MG/1
650 TABLET ORAL EVERY 6 HOURS PRN
Status: DISCONTINUED | OUTPATIENT
Start: 2025-05-14 | End: 2025-07-26 | Stop reason: HOSPADM

## 2025-05-14 RX ORDER — POTASSIUM CHLORIDE 1500 MG/1
40 TABLET, EXTENDED RELEASE ORAL PRN
Status: DISCONTINUED | OUTPATIENT
Start: 2025-05-14 | End: 2025-07-26 | Stop reason: HOSPADM

## 2025-05-14 RX ORDER — MAGNESIUM SULFATE IN WATER 40 MG/ML
2000 INJECTION, SOLUTION INTRAVENOUS PRN
Status: DISCONTINUED | OUTPATIENT
Start: 2025-05-14 | End: 2025-07-26 | Stop reason: HOSPADM

## 2025-05-14 RX ORDER — DEXTROSE MONOHYDRATE 100 MG/ML
INJECTION, SOLUTION INTRAVENOUS CONTINUOUS PRN
Status: DISCONTINUED | OUTPATIENT
Start: 2025-05-14 | End: 2025-07-26 | Stop reason: HOSPADM

## 2025-05-14 RX ADMIN — METOPROLOL SUCCINATE 100 MG: 50 TABLET, EXTENDED RELEASE ORAL at 21:40

## 2025-05-14 RX ADMIN — ENOXAPARIN SODIUM 60 MG: 100 INJECTION SUBCUTANEOUS at 21:40

## 2025-05-14 RX ADMIN — ASPIRIN 81 MG: 81 TABLET, COATED ORAL at 21:39

## 2025-05-14 RX ADMIN — ALLOPURINOL 300 MG: 300 TABLET ORAL at 21:39

## 2025-05-14 RX ADMIN — PRAVASTATIN SODIUM 20 MG: 20 TABLET ORAL at 21:39

## 2025-05-14 RX ADMIN — SODIUM CHLORIDE, PRESERVATIVE FREE 10 ML: 5 INJECTION INTRAVENOUS at 21:40

## 2025-05-14 RX ADMIN — POTASSIUM CHLORIDE 20 MEQ: 1500 TABLET, EXTENDED RELEASE ORAL at 21:39

## 2025-05-14 RX ADMIN — LEVOFLOXACIN 750 MG: 5 INJECTION, SOLUTION INTRAVENOUS at 17:26

## 2025-05-14 RX ADMIN — BUMETANIDE 1 MG: 0.25 INJECTION INTRAMUSCULAR; INTRAVENOUS at 17:25

## 2025-05-14 RX ADMIN — LOSARTAN POTASSIUM 50 MG: 50 TABLET, FILM COATED ORAL at 21:40

## 2025-05-14 ASSESSMENT — PAIN SCALES - GENERAL: PAINLEVEL_OUTOF10: 3

## 2025-05-14 ASSESSMENT — PAIN DESCRIPTION - LOCATION: LOCATION: SCROTUM

## 2025-05-14 ASSESSMENT — PAIN - FUNCTIONAL ASSESSMENT: PAIN_FUNCTIONAL_ASSESSMENT: 0-10

## 2025-05-14 NOTE — H&P
[facility]      Hospitalist - History & Physical      PCP: Jessie Helms APRN - CNP    Date of Admission: 5/14/2025    Date of Service: 5/14/2025    Chief Complaint:  Shortness of breath    History Of Present Illness:   The patient is a 64 y.o. male with anxiety, diabetes, GERD, hyperlipidemia, HTN comes to ED complaining of shortness of breath.  Patient states that over the last several weeks he reports he has had increased dyspnea on exertion.  Patient states that it feels like something is sitting on his chest.  He also has increased lower extremity weakness and edema.  Patient is morbidly obese and normally ambulates with a walker at home.  Patient states that he has been unable to get out of bed for the last couple of weeks.  Patient notes that he has not taken his daily Bumex in several days because it increases his urination and he has been unable to get out of bed to make it to the bathroom.  Patient is normally on 3 L of oxygen at home however it was bumped up to 6 L by EMS en route today.  Patient is currently on 3 L nasal cannula oxygen in the ER.  Patient does report a productive cough of cloudy thick pink sputum.  Denies fever, nausea, vomiting, chills, abdominal pain.    In ED: CXR with atelectasis and or infiltrate on the left, small left pleural effusion; UA unremarkable for UTI; blood cultures pending; WBC 5.7, H&H 14/47, BNP 4707, CMP unremarkable.  Patient will be admitted inpatient to hospitalist.    Past Medical History:        Diagnosis Date    Abrasion     Anxiety     Asthma     Bell's palsy     Cataracts, bilateral     Cellulitis     Diabetes (HCC)     Edema extremities     GERD (gastroesophageal reflux disease)     Gout     H/O tracheostomy     2015    H/O urinary retention     Hernia, hiatal     History of panniculitis     History of shingles     Hyperlipemia     Hypertension     Morbid obesity (HCC)     Non-ST elevated myocardial infarction (HCC)     11/2015    Obesity           Mouth: Mucous membranes are moist.      Pharynx: Oropharynx is clear.   Eyes:      Pupils: Pupils are equal, round, and reactive to light.   Cardiovascular:      Rate and Rhythm: Normal rate and regular rhythm.      Pulses: Normal pulses.      Heart sounds: Normal heart sounds. No murmur heard.  Pulmonary:      Effort: Pulmonary effort is normal. No respiratory distress.      Breath sounds: Normal breath sounds. No wheezing, rhonchi or rales.   Abdominal:      General: Bowel sounds are normal. There is no distension.      Palpations: Abdomen is soft.      Tenderness: There is no abdominal tenderness. There is no guarding.   Musculoskeletal:         General: Normal range of motion.      Cervical back: Normal range of motion.      Right lower le+ Pitting Edema present.      Left lower le+ Pitting Edema present.   Skin:     General: Skin is warm and dry.      Capillary Refill: Capillary refill takes less than 2 seconds.   Neurological:      Mental Status: He is alert and oriented to person, place, and time. Mental status is at baseline.      Motor: Weakness present.      Diagnostic Data:  CBC:  Recent Labs     25  1415   WBC 5.7   HGB 14.0   HCT 47.0        BMP:  Recent Labs     25  1415   *   K 4.5      CO2 28   BUN 15   CREATININE 0.9   CALCIUM 9.4     Recent Labs     25  1415   AST 17   ALT 7*   BILITOT 1.0   ALKPHOS 103     Urinalysis:  Lab Results   Component Value Date/Time    NITRU Negative 2025 03:41 PM    WBCUA 3 2025 03:41 PM    BACTERIA Negative 2025 03:41 PM    RBCUA 2 2025 03:41 PM    BLOODU Negative 2025 03:41 PM    SPECGRAV 1.020 2017 02:40 PM    GLUCOSEU Negative 2025 03:41 PM     XR CHEST PORTABLE  Result Date: 2025  EXAM: XR CHEST PORTABLE  TECHNIQUE: Single frontal chest radiograph.  HISTORY: dyspnea  COMPARISON: None.  FINDINGS: Evaluation is limited by patient body habitus, portable technique, patient

## 2025-05-14 NOTE — ED NOTES
ED TO INPATIENT SBAR HANDOFF    Patient Name: Daljit Elizondo   : 1960  64 y.o.   Family/Caregiver Present: No  Code Status Order: No Order    C-SSRS: Risk of Suicide: No Risk  Sitter No  Restraints:         Situation  Chief Complaint:   Chief Complaint   Patient presents with    Shortness of Breath     Since     Scrotal Pain     Patient Diagnosis: Acute congestive heart failure, unspecified heart failure type (HCC) [I50.9]     Brief Description of Patient's Condition: Pt to ER via EMS from home.   Pt c/o increased weakness over the past several weeks.  Pt states that he has not been able to get up out of bed and walk per normal over the past few days.  Pt is 572lbs.    Pt wears bipap at night, did bring his from home.  Bilateral leg swelling that is worse than normal per patient.   Redness and yellow drainage to both eyes, pt states this has starting a few months ago when he started wearing his bipap machine.  Pt also had prior trach in, and states that he has noticed some draining from it.    Redness to site seen.  Pt also complains of scrotal pain.  Pt very excoriated and swollen.  PT states that he has home help come some as well as a nurse friend.   Pt given 1mg bumex and has levaquin infusing.     Mental Status: oriented, alert, coherent, logical, thought processes intact, and able to concentrate and follow conversation  Arrived from: home    Imaging:   XR CHEST PORTABLE   Final Result   Atelectasis and/or infiltrate on the left.   Small left pleural effusion           ______________________________________    Electronically signed by: EMILIANO JEFFERS M.D.   Date:     2025   Time:    14:55         COVID-19 Results:   Internal Administration   First Dose      Second Dose           Last COVID Lab No results found for: \"SARS-COV-2\"        Abnormal labs:   Abnormal Labs Reviewed   URINALYSIS WITH REFLEX TO CULTURE - Abnormal; Notable for the following components:       Result Value    Color, UA

## 2025-05-14 NOTE — ED PROVIDER NOTES
[facility] [place] EMERGENCY DEPARTMENT  eMERGENCY dEPARTMENT eNCOUnter      Pt Name: Daljit Elizondo  MRN: [id number]  Birthdate 1960  Date of evaluation: 5/14/2025  Provider: Freddy Rosa MD    Chief Complaint:  Chief Complaint   Patient presents with    Shortness of Breath     Since Jan 1    Scrotal Pain     HPI    Daljit Elizondo is a 64 y.o. male who presents to the emergency department with weakness. States his legs give out. He has had to wear oxygen for the past few months and he feels shortness of breath when he does anything. His friend thought his urine had blood in it and his privates were swollen. He comes to the ER stating because he cannot get up and cannot get around on his own. This has been the case for the past several weeks. He feels weak in the legs. The swelling in the scrotum started \"I don't know.\" There is more swelling in the legs than usual. That started several weeks ago/unknown duration of time.     RoS positives:  Abdominal pain: \"feels like if I have a breakfast sandwich I feel like I'm full just stuffed.\" The pain comes and goes. Location is \"all across the belly\" he goes on to discuss having a trach in his throat 10y ago. Pain across the abdomen is not present now he last felt it \"I don't know.\" Has had a month of diarrhea.     Was rx 3L oxygen initially. Turned up to 6L today. Was on 3L yesterday. Is currently on 3L satting 94%.     Review of Systems   Constitutional:  Negative for fever.   Respiratory:  Positive for shortness of breath.    Cardiovascular:  Positive for leg swelling. Negative for chest pain.   Gastrointestinal:  Positive for abdominal pain (x2wk). Negative for nausea and vomiting.   Genitourinary:  Positive for dysuria and hematuria.   Neurological:  Negative for syncope.       Past Medical History:   Diagnosis Date    Abrasion     Anxiety     Asthma     Bell's palsy     Cataracts, bilateral     Cellulitis     Diabetes (HCC)     Edema extremities     GERD

## 2025-05-15 ENCOUNTER — APPOINTMENT (OUTPATIENT)
Age: 65
DRG: 291 | End: 2025-05-15
Payer: MEDICARE

## 2025-05-15 LAB
ALBUMIN SERPL-MCNC: 3.4 G/DL (ref 3.5–5.2)
ALP SERPL-CCNC: 105 U/L (ref 40–129)
ALT SERPL-CCNC: 5 U/L (ref 10–50)
ANION GAP SERPL CALCULATED.3IONS-SCNC: 8 MMOL/L (ref 8–16)
ANISOCYTOSIS BLD QL SMEAR: ABNORMAL
AST SERPL-CCNC: 16 U/L (ref 10–50)
BASOPHILS # BLD: 0.1 K/UL (ref 0–0.2)
BASOPHILS NFR BLD: 1.1 % (ref 0–1)
BILIRUB DIRECT SERPL-MCNC: 0.6 MG/DL (ref 0–0.3)
BILIRUB INDIRECT SERPL-MCNC: 0.4 MG/DL (ref 0–1)
BILIRUB SERPL-MCNC: 1 MG/DL (ref 0.2–1.2)
BUN SERPL-MCNC: 13 MG/DL (ref 8–23)
CALCIUM SERPL-MCNC: 9 MG/DL (ref 8.8–10.2)
CHLORIDE SERPL-SCNC: 102 MMOL/L (ref 98–107)
CHOLEST SERPL-MCNC: 120 MG/DL (ref 0–199)
CO2 SERPL-SCNC: 32 MMOL/L (ref 22–29)
CREAT SERPL-MCNC: 0.9 MG/DL (ref 0.7–1.2)
ECHO AO ASC DIAM: 3.6 CM
ECHO AO ASCENDING AORTA INDEX: 1.11 CM/M2
ECHO AO ROOT DIAM: 2.8 CM
ECHO AO ROOT INDEX: 0.87 CM/M2
ECHO AO SINUS VALSALVA DIAM: 3.7 CM
ECHO AO SINUS VALSALVA INDEX: 1.15 CM/M2
ECHO AO ST JNCT DIAM: 3.3 CM
ECHO BSA: 3.55 M2
ECHO IVC PROX: 1.7 CM
ECHO LA DIAMETER INDEX: 1.52 CM/M2
ECHO LA DIAMETER: 4.9 CM
ECHO LA TO AORTIC ROOT RATIO: 1.75
ECHO LV FRACTIONAL SHORTENING: 15 % (ref 28–44)
ECHO LV INTERNAL DIMENSION DIASTOLE INDEX: 2.11 CM/M2
ECHO LV INTERNAL DIMENSION DIASTOLIC: 6.8 CM (ref 4.2–5.9)
ECHO LV INTERNAL DIMENSION SYSTOLIC INDEX: 1.8 CM/M2
ECHO LV INTERNAL DIMENSION SYSTOLIC: 5.8 CM
ECHO LV IVSD: 0.9 CM (ref 0.6–1)
ECHO LV MASS 2D: 268.2 G (ref 88–224)
ECHO LV MASS INDEX 2D: 83 G/M2 (ref 49–115)
ECHO LV POSTERIOR WALL DIASTOLIC: 0.9 CM (ref 0.6–1)
ECHO LV RELATIVE WALL THICKNESS RATIO: 0.26
ECHO LVOT AREA: 3.8 CM2
ECHO LVOT DIAM: 2.2 CM
ECHO RV INTERNAL DIMENSION: 4.6 CM
EOSINOPHIL # BLD: 0.2 K/UL (ref 0–0.6)
EOSINOPHIL NFR BLD: 3.5 % (ref 0–5)
ERYTHROCYTE [DISTWIDTH] IN BLOOD BY AUTOMATED COUNT: 16.5 % (ref 11.5–14.5)
GLUCOSE BLD-MCNC: 112 MG/DL (ref 70–99)
GLUCOSE BLD-MCNC: 131 MG/DL (ref 70–99)
GLUCOSE BLD-MCNC: 170 MG/DL (ref 70–99)
GLUCOSE BLD-MCNC: 212 MG/DL (ref 70–99)
GLUCOSE SERPL-MCNC: 137 MG/DL (ref 70–99)
HBA1C MFR BLD: 6.7 % (ref 4–5.6)
HCT VFR BLD AUTO: 47.7 % (ref 42–52)
HDLC SERPL-MCNC: 29 MG/DL (ref 40–60)
HGB BLD-MCNC: 13.8 G/DL (ref 14–18)
HYPOCHROMIA BLD QL SMEAR: ABNORMAL
IMM GRANULOCYTES # BLD: 0 K/UL
LDLC SERPL CALC-MCNC: 74 MG/DL
LYMPHOCYTES # BLD: 1.1 K/UL (ref 1.1–4.5)
LYMPHOCYTES NFR BLD: 19.5 % (ref 20–40)
MACROCYTES BLD QL SMEAR: ABNORMAL
MAGNESIUM SERPL-MCNC: 2 MG/DL (ref 1.6–2.4)
MCH RBC QN AUTO: 29 PG (ref 27–31)
MCHC RBC AUTO-ENTMCNC: 28.9 G/DL (ref 33–37)
MCV RBC AUTO: 100.2 FL (ref 80–94)
MONOCYTES # BLD: 0.5 K/UL (ref 0–0.9)
MONOCYTES NFR BLD: 8.5 % (ref 0–10)
NEUTROPHILS # BLD: 3.8 K/UL (ref 1.5–7.5)
NEUTS SEG NFR BLD: 67.2 % (ref 50–65)
PERFORMED ON: ABNORMAL
PLATELET # BLD AUTO: 145 K/UL (ref 130–400)
PLATELET SLIDE REVIEW: ADEQUATE
PMV BLD AUTO: 13 FL (ref 9.4–12.4)
POTASSIUM SERPL-SCNC: 4.4 MMOL/L (ref 3.5–5.1)
PROCALCITONIN: 0.07 NG/ML (ref 0–0.09)
PROT SERPL-MCNC: 6.4 G/DL (ref 6.4–8.3)
RBC # BLD AUTO: 4.76 M/UL (ref 4.7–6.1)
SODIUM SERPL-SCNC: 142 MMOL/L (ref 136–145)
TRIGL SERPL-MCNC: 87 MG/DL (ref 0–149)
WBC # BLD AUTO: 5.7 K/UL (ref 4.8–10.8)

## 2025-05-15 PROCEDURE — 82962 GLUCOSE BLOOD TEST: CPT

## 2025-05-15 PROCEDURE — 2700000000 HC OXYGEN THERAPY PER DAY

## 2025-05-15 PROCEDURE — 1200000000 HC SEMI PRIVATE

## 2025-05-15 PROCEDURE — 2500000003 HC RX 250 WO HCPCS

## 2025-05-15 PROCEDURE — 6370000000 HC RX 637 (ALT 250 FOR IP)

## 2025-05-15 PROCEDURE — 97530 THERAPEUTIC ACTIVITIES: CPT

## 2025-05-15 PROCEDURE — 85025 COMPLETE CBC W/AUTO DIFF WBC: CPT

## 2025-05-15 PROCEDURE — 6360000002 HC RX W HCPCS

## 2025-05-15 PROCEDURE — 97162 PT EVAL MOD COMPLEX 30 MIN: CPT

## 2025-05-15 PROCEDURE — 80076 HEPATIC FUNCTION PANEL: CPT

## 2025-05-15 PROCEDURE — 97165 OT EVAL LOW COMPLEX 30 MIN: CPT

## 2025-05-15 PROCEDURE — 80061 LIPID PANEL: CPT

## 2025-05-15 PROCEDURE — 84145 PROCALCITONIN (PCT): CPT

## 2025-05-15 PROCEDURE — 93306 TTE W/DOPPLER COMPLETE: CPT | Performed by: INTERNAL MEDICINE

## 2025-05-15 PROCEDURE — 2500000003 HC RX 250 WO HCPCS: Performed by: HOSPITALIST

## 2025-05-15 PROCEDURE — 80048 BASIC METABOLIC PNL TOTAL CA: CPT

## 2025-05-15 PROCEDURE — 93306 TTE W/DOPPLER COMPLETE: CPT

## 2025-05-15 PROCEDURE — 36415 COLL VENOUS BLD VENIPUNCTURE: CPT

## 2025-05-15 PROCEDURE — 83735 ASSAY OF MAGNESIUM: CPT

## 2025-05-15 PROCEDURE — 83036 HEMOGLOBIN GLYCOSYLATED A1C: CPT

## 2025-05-15 PROCEDURE — 94760 N-INVAS EAR/PLS OXIMETRY 1: CPT

## 2025-05-15 RX ADMIN — LOSARTAN POTASSIUM 50 MG: 50 TABLET, FILM COATED ORAL at 07:28

## 2025-05-15 RX ADMIN — MICONAZOLE NITRATE: 2 POWDER TOPICAL at 16:42

## 2025-05-15 RX ADMIN — PANTOPRAZOLE SODIUM 40 MG: 40 TABLET, DELAYED RELEASE ORAL at 05:21

## 2025-05-15 RX ADMIN — BUMETANIDE 1 MG: 0.25 INJECTION INTRAMUSCULAR; INTRAVENOUS at 16:42

## 2025-05-15 RX ADMIN — ENOXAPARIN SODIUM 60 MG: 100 INJECTION SUBCUTANEOUS at 07:28

## 2025-05-15 RX ADMIN — INSULIN GLARGINE 30 UNITS: 100 INJECTION, SOLUTION SUBCUTANEOUS at 20:29

## 2025-05-15 RX ADMIN — ENOXAPARIN SODIUM 60 MG: 100 INJECTION SUBCUTANEOUS at 20:28

## 2025-05-15 RX ADMIN — PRAVASTATIN SODIUM 20 MG: 20 TABLET ORAL at 20:42

## 2025-05-15 RX ADMIN — LEVOFLOXACIN 750 MG: 5 INJECTION, SOLUTION INTRAVENOUS at 20:41

## 2025-05-15 RX ADMIN — ALLOPURINOL 300 MG: 300 TABLET ORAL at 07:28

## 2025-05-15 RX ADMIN — BUMETANIDE 1 MG: 0.25 INJECTION INTRAMUSCULAR; INTRAVENOUS at 05:21

## 2025-05-15 RX ADMIN — ASPIRIN 81 MG: 81 TABLET, COATED ORAL at 07:28

## 2025-05-15 RX ADMIN — INSULIN LISPRO 2 UNITS: 100 INJECTION, SOLUTION INTRAVENOUS; SUBCUTANEOUS at 20:29

## 2025-05-15 RX ADMIN — MICONAZOLE NITRATE: 2 POWDER TOPICAL at 20:32

## 2025-05-15 RX ADMIN — METOPROLOL SUCCINATE 100 MG: 50 TABLET, EXTENDED RELEASE ORAL at 07:28

## 2025-05-15 RX ADMIN — POTASSIUM CHLORIDE 20 MEQ: 1500 TABLET, EXTENDED RELEASE ORAL at 07:28

## 2025-05-15 RX ADMIN — SODIUM CHLORIDE, PRESERVATIVE FREE 10 ML: 5 INJECTION INTRAVENOUS at 07:28

## 2025-05-15 ASSESSMENT — PAIN SCALES - GENERAL: PAINLEVEL_OUTOF10: 0

## 2025-05-15 NOTE — PROGRESS NOTES
[facility]  N[facility]  Consult Note       NAME:  Daljit Elizondo  MEDICAL RECORD NUMBER:  [id number]  AGE: 64 y.o.   GENDER: male  : 1960  TODAY'S DATE:  5/15/2025    Subjective   Reason for Wound Nurse Evaluation and Assessment: diabetic wounds left great toe and second toe      Daljit Elizondo is a 64 y.o. male referred by:   [x] Physician  [] Nursing  [] Other:     Wound Identification:  Wound Type: diabetic  Contributing Factors: diabetes and poor glucose control    Wound History: Patient presented to the hospital with diabetic wounds to his left great toe and left 2nd toe. These wounds are covered with dry eschar. No drainage, redness, or odor noted. The benito wound skin is dry and flaky but intact at this time.       Current Wound Care Treatment:        LEFT FOOT GREAT TOE AND SECOND TOE: Paint with betadine twice daily.     COCCYX: Cleanse with ph balanced cleanser. Apply zinc paste to area of excoriation. Re-apply twice daily and PRN       Patient Goal of Care:  [x] Wound Healing  [] Odor Control  [] Palliative Care  [] Pain Control   [] Other:         PAST MEDICAL HISTORY        Diagnosis Date    Abrasion     Anxiety     Asthma     Bell's palsy     Cataracts, bilateral     Cellulitis     Diabetes (HCC)     Edema extremities     GERD (gastroesophageal reflux disease)     Gout     H/O tracheostomy         H/O urinary retention     Hernia, hiatal     History of panniculitis     History of shingles     Hyperlipemia     Hypertension     Morbid obesity (HCC)     Non-ST elevated myocardial infarction (HCC)     2015    Obesity     Paraphimosis     tx w circumcision 2015    Pulmonary hypertension (HCC)     Renal failure     Respiratory failure (HCC)         Shoulder pain     Sleep apnea     Testosterone deficiency     Ulcer     Ulcer of calf (HCC)     Venous stasis of both lower extremities        PAST SURGICAL HISTORY    Past Surgical History:   Procedure Laterality Date    APPENDECTOMY      PT

## 2025-05-15 NOTE — PLAN OF CARE
Nutrition Problem #1: Food & nutrition-related knowledge deficit  Intervention: Food and/or Nutrient Delivery: Continue Current Diet  Nutritional

## 2025-05-15 NOTE — PROGRESS NOTES
[facility]      Progress Note    Patient:  Daljit Elizondo  YOB: 1960  Date of Service: 5/15/2025  MRN: [id number]   Acct: [id number]   Primary Care Physician: Jessie Helms APRN - CNP  Advance Directive: Full Code  Admit Date: 5/14/2025       Hospital Day: 1    Portions of this note have been copied forward, however, updated to reflect the most current clinical status of this patient.     CHIEF COMPLAINT Shortness of breath     SUBJECTIVE:  Mr. Elizondo was resting in bed this morning. Reports shortness of breath with minimal exertion such as turning in bed. Reported lightheadedness and dizziness. Denied chest pain.      CUMULATIVE HOSPITAL COURSE:   Patient is a 64-year-old male with past medical history of anxiety, diabetes, GERD, hyperlipidemia, and HTN who presented to [facility] ED for evaluation of increased dyspnea on exertion and weakness.  Reported chest heaviness. Increased lower extremity weakness and edema. Patient is morbidly obese and normally ambulates short distances with a walker at home.Stated he has not been unable to get out of bed for past couple of days. Therefore, he had not been taking his Bumex for past several days. Stated he is normally on 3L O2 at all times. Reported productive cough with cloudy thick pink sputum. Denied fever, chills, nausea, vomiting, or abdominal pain. Workup in ED revealed CXR with atelectasis and or infiltrate on the left, small left pleural effusion; UA unremarkable for UTI; blood cultures pending; WBC 5.7, H&H 14/47, BNP 4707, CMP unremarkable.   Patient was admitted to hospital medicine for further evaluation. IV Bumex 1 mg BID was initiated. ECHO was attempted, unable to obtain due to patient's body habitus.         Review of Systems   Constitutional:  Negative for chills, diaphoresis, fatigue and fever.   HENT:  Negative for congestion and ear pain.    Eyes:  Negative for visual disturbance.   Respiratory:  Positive for shortness of breath.

## 2025-05-15 NOTE — PROGRESS NOTES
Physical Therapy  Facility/Department: [facility] 3 DUNIA/VAS/MED  Physical Therapy Initial Assessment    Name: Daljit Elizondo  : 1960  MRN: [id number]  Date of Service: 5/15/2025    Discharge Recommendations:  Continue to assess pending progress, 24 hour supervision or assist, Patient would benefit from continued therapy after discharge          Patient Diagnosis(es): The primary encounter diagnosis was Hypervolemia, unspecified hypervolemia type. Diagnoses of Cardiac arrhythmia, unspecified cardiac arrhythmia type, Community acquired pneumonia, unspecified laterality, and Shortness of breath were also pertinent to this visit.  Past Medical History:  has a past medical history of Abrasion, Anxiety, Asthma, Bell's palsy, Cataracts, bilateral, Cellulitis, Diabetes (HCC), Edema extremities, GERD (gastroesophageal reflux disease), Gout, H/O tracheostomy, H/O urinary retention, Hernia, hiatal, History of panniculitis, History of shingles, Hyperlipemia, Hypertension, Morbid obesity (HCC), Non-ST elevated myocardial infarction (HCC), Obesity, Paraphimosis, Pulmonary hypertension (HCC), Renal failure, Respiratory failure (HCC), Shoulder pain, Sleep apnea, Testosterone deficiency, Ulcer, Ulcer of calf (HCC), and Venous stasis of both lower extremities.  Past Surgical History:  has a past surgical history that includes Testicle surgery (10/1970); Foot surgery (Left, LONG AGO ); Tracheostomy tube placement; Foot surgery (Right); Mouth surgery; Appendectomy; Colonoscopy; and Endoscopy, colon, diagnostic.    Assessment  Body Structures, Functions, Activity Limitations Requiring Skilled Therapeutic Intervention: Decreased functional mobility ;Decreased ADL status;Decreased ROM;Decreased strength;Decreased balance;Decreased endurance;Decreased sensation;Decreased posture  Assessment: Pt. will benefit from cont. PT to decrease impairments. Pt. a fall risk and should not attempt mobility on his own. Pt. stated he has been

## 2025-05-15 NOTE — CARE COORDINATION
Case Management Assessment  Initial Evaluation    Date/Time of Evaluation: 5/15/2025 2:27 PM  Assessment Completed by: Shira Tomlin RN    If patient is discharged prior to next notation, then this note serves as note for discharge by case management.    Patient Name: Daljit Elizondo                   YOB: 1960  Diagnosis: Shortness of breath [R06.02]  Cardiac arrhythmia, unspecified cardiac arrhythmia type [I49.9]  Hypervolemia, unspecified hypervolemia type [E87.70]  Community acquired pneumonia, unspecified laterality [J18.9]  Acute congestive heart failure, unspecified heart failure type (HCC) [I50.9]                   Date / Time: 5/14/2025 12:50 PM    Patient Admission Status: Inpatient   Readmission Risk (Low < 19, Mod (19-27), High > 27): Readmission Risk Score: 14.5    Current PCP: Jessie Helms APRN - CNP  PCP verified by CM? (P) Yes    Chart Reviewed: Yes      History Provided by: (P) Patient  Patient Orientation: (P) Alert and Oriented, Person, Place    Patient Cognition: (P) Alert    Hospitalization in the last 30 days (Readmission):  No    If yes, Readmission Assessment in CM Navigator will be completed.    Advance Directives:      Code Status: Full Code   Patient's Primary Decision Maker is: (P) Legal Next of Kin      Discharge Planning:    Patient lives with: (P) Alone Type of Home: Trailer/Mobile Home  Primary Care Giver: (P) Self  Patient Support Systems include: (P) Family Members, Friends/Neighbors   Current Financial resources:    Current community resources:    Current services prior to admission: (P) C-pap, Oxygen Therapy            Current DME:              Type of Home Care services:  (P) None    ADLS  Prior functional level: (P) Assistance with the following:, Independent in ADLs/IADLs, Other (see comment) (pt states he was indepenednt up until a few weeks ago)  Current functional level: (P) Assistance with the following:, Bathing, Toileting, Mobility    PT AM-PAC:

## 2025-05-15 NOTE — PROGRESS NOTES
Occupational Therapy  Facility/Department: [facility] 3 DUNIA/VAS/MED  Occupational Therapy Initial Assessment    Name: Daljit Elizondo  : 1960  MRN: [id number]  Date of Service: 5/15/2025    Discharge Recommendations:  Patient would benefit from continued therapy after discharge          Patient Diagnosis(es): The primary encounter diagnosis was Hypervolemia, unspecified hypervolemia type. Diagnoses of Cardiac arrhythmia, unspecified cardiac arrhythmia type, Community acquired pneumonia, unspecified laterality, and Shortness of breath were also pertinent to this visit.  Past Medical History:  has a past medical history of Abrasion, Anxiety, Asthma, Bell's palsy, Cataracts, bilateral, Cellulitis, Diabetes (HCC), Edema extremities, GERD (gastroesophageal reflux disease), Gout, H/O tracheostomy, H/O urinary retention, Hernia, hiatal, History of panniculitis, History of shingles, Hyperlipemia, Hypertension, Morbid obesity (HCC), Non-ST elevated myocardial infarction (HCC), Obesity, Paraphimosis, Pulmonary hypertension (HCC), Renal failure, Respiratory failure (HCC), Shoulder pain, Sleep apnea, Testosterone deficiency, Ulcer, Ulcer of calf (HCC), and Venous stasis of both lower extremities.  Past Surgical History:  has a past surgical history that includes Testicle surgery (10/1970); Foot surgery (Left, LONG AGO ); Tracheostomy tube placement; Foot surgery (Right); Mouth surgery; Appendectomy; Colonoscopy; and Endoscopy, colon, diagnostic.    Treatment Diagnosis: Acute congestive heart failure      Assessment  Assessment: Evaluation completed and treatment initiated. Pt would benefit from further skilled therapy to upgrade safety and functional independence. Pt may initiatlly require lift from bed to chair  Treatment Diagnosis: Acute congestive heart failure  REQUIRES OT FOLLOW-UP: Yes  Activity Tolerance  Activity Tolerance: Patient limited by fatigue  Activity Tolerance Comments: but fully participated in all

## 2025-05-15 NOTE — PROGRESS NOTES
Chief Complaint   Patient presents with   • Allergies     Sneezing and watery eyes.        Allergies   Allergen Reactions   • Penicillins Rash     Childhood allergy       HPI:  Michael Issa is a 55 y.o. male presents today with complaints of allergies that started yesterday.  Says he is misery, sneezing, blowing clear out of nose, itchy watery eyes.  Uses flonase but it is not helping. Says he goes through this every year at this time but says that this is the worst he has ever been.  Denies any fever or chills, has associated sneezing, itchy eyes, cough and running nose.     Chronic problems:  HTN stable with lisinopril-hctz, ED stable with sildenafil, allergies stable with fexofenadine and flonase    Past Medical History:   Diagnosis Date   • Arthritis    • Disease of thyroid gland     NODULE PRESENT    • Hx of chest pain    • Hypertension      Past Surgical History:   Procedure Laterality Date   • KNEE SURGERY      rt knee MENISCUS   • PREPERITONEAL HERNIA REPAIR Bilateral 2017    Procedure: BILATERAL PREPERITONEAL INGUINAL HERNIA REPAIR LAPAROSCOPIC WITH MESH;  Surgeon: Rambo Church MD;  Location: [facility] OR;  Service:      Social History     Socioeconomic History   • Marital status: Unknown     Spouse name: Not on file   • Number of children: Not on file   • Years of education: Not on file   • Highest education level: Not on file   Tobacco Use   • Smoking status: Former Smoker     Types: Cigarettes     Last attempt to quit: 2017     Years since quittin.6   • Smokeless tobacco: Never Used   Substance and Sexual Activity   • Alcohol use: No   • Drug use: No   • Sexual activity: Defer     Family History   Problem Relation Age of Onset   • No Known Problems Mother    • Diabetes Father    • Heart disease Father    • No Known Problems Sister    • No Known Problems Brother    • No Known Problems Daughter    • No Known Problems Maternal Grandmother    • No Known Problems Maternal Grandfather   Comprehensive Nutrition Assessment    Type and Reason for Visit:  Initial    Nutrition Recommendations/Plan:   Follow for questions and encouragement to modify current dietary habits re: sodium.  Follow for po intake, labs     Malnutrition Assessment:  Malnutrition Status:  At risk for malnutrition (05/15/25 1006)    Context:  Acute Illness     Findings of the 6 clinical characteristics of malnutrition:  Energy Intake:  Mild decrease in energy intake  Weight Loss:  Mild weight loss     Body Fat Loss:  No body fat loss     Muscle Mass Loss:  No muscle mass loss    Fluid Accumulation:  Moderate to Severe Generalized   Strength:  Not Performed    Nutrition Assessment:    Following patient for dx-CHF.  Patient getting ready to eat breakfast at time of visit.  Waiting for nurses to take off BiPap.  Glucose 127-137.  NT-Pro BNP 4707.  Diet hx reveals patient does not use the salt shaker--\"I don't like salt.\"  But does consume high sodium foods such as sausage, stephenson, potatoe chips on a regular basis.  Unaware needed to avoid these foods.  Diet copy explaining CHF dietary principles left with pt and brief explanation given.  Will continue to follow for encouragement to change current dietary habits    Nutrition Related Findings:    Glucose 127-137.  Accuchek's 133 Wound Type: Pressure Injury       Current Nutrition Intake & Therapies:    Average Meal Intake: 51-75%, 26-50%  Average Supplements Intake: None Ordered  ADULT DIET; Regular; 4 carb choices (60 gm/meal); Low Fat/Low Chol/High Fiber/2 gm Na    Anthropometric Measures:  Height: 175.3 cm (5' 9.02\")  Ideal Body Weight (IBW): 160 lbs (73 kg)    Admission Body Weight: 259.5 kg (572 lb)  Current Body Weight: 259.5 kg (572 lb 1.5 oz), 357.6 % IBW.    Current BMI (kg/m2): 84.4  Usual Body Weight: 263 kg (579 lb 13 oz) (4/24/2025)  % Weight Change (Calculated): -1.3  Weight Adjustment For: No Adjustment  BMI Categories: Obese Class 3 (BMI 40.0 or greater)    Estimated  "  • No Known Problems Paternal Grandmother    • No Known Problems Paternal Grandfather    • Hypertension Brother    • No Known Problems Brother        Current Outpatient Medications on File Prior to Visit   Medication Sig Dispense Refill   • Ascorbic Acid (VITAMIN C PO) Take 1,000 mg by mouth Daily. ON HOLD 9-15-17     • fexofenadine-pseudoephedrine (ALLEGRA-D 24) 180-240 MG per 24 hr tablet Take 1 tablet by mouth Daily. 30 tablet 6   • fluticasone (FLONASE) 50 MCG/ACT nasal spray 2 sprays into the nostril(s) as directed by provider Daily. 1 bottle 5   • lisinopril-hydrochlorothiazide (PRINZIDE,ZESTORETIC) 20-12.5 MG per tablet Take 1 tablet by mouth Daily. 90 tablet 1   • sildenafil (VIAGRA) 100 MG tablet Take 1 tablet by mouth Daily As Needed for erectile dysfunction. 30 tablet 4     No current facility-administered medications on file prior to visit.         ROS:  REVIEW OF SYMPTOMS: (Positives bolded)  General:  weight loss, fever, chills, night sweats, fatigue, appetite loss  HEENT:   sore throat tinnitus, bloody nose, hearing loss, sinus pain/pressure, ear pain/pressure.   Respiratory: shortness of breath, cough, hemoptysis, wheezing, pleurisy,   Cardiovascular:  chest pain, PND, palpitation, edema, orthopnea, syncope, swelling of extremities  Gastro: Nausea, vomiting, diarrhea, hematemesis, abdominal pain, constipation  Neuro:  Migraine, numbness, ataxia, tremor, vertigo, weakness, memory loss, Irritability, dizziness  Allergic/immune: allergic rhinitis, hay fever, asthma, hives  \"All other systems reviewed and negative, except as listed above.”      OBJECTIVE:  Constitutional:  Alert, oriented x 3, well developed, well nourished. Consistent with stated age. Not in acute distress.  Has normal posture. Gait and station normal. .  Behavior appropriate. Patient is pleasant and cooperative with the interview and exam.    Skin: No rashes, no visible scars or suspicious moles noted.  Skin is warm to touch. Normal " appropriate skin turgor.  Capillary refill is normal bilateral Upper and lower extremity.     Head/Neck: Head is normocephalic and atraumatic.  Neck without visible/palpable lumps.  No thyromegaly.    Eye: Bilaterally EOMI.  PERRLA.  Sclera/conjunctiva is normal, no discharge. Cornea is normal and clear. Lens is normal.  Eyeball normal. Upper eyelid normal.  Lower eyelid normal.   Abnormal: allergic shiners, maira-orbital puffiness    OROPHARYNX: without redness or post nasal drip, no exudate, no irregularities, tonsils normal, Mucosa pink and moist, Dentition average for age. No obvious dental carries. No lesions. Tongue normal.     EARS: Bilateral auditory canal normal, without redness or cerumen impaction. Bilateral Tympanic membrane Normal, pearly gray with good cone of light and landmarks.  Hearing grossly intact to normal conversation.     NOSE: External appearance normal/midline Bilateral nares normal, without purulent discharge, congested, nares patent    SINUS:  No Frontal and maxillary sinus tenderness on palpation.       CHEST/LUNG: No use of accessory muscles, chest non-tender on palpation.  Breath sounds normal throughout all lung fields.  No wheezes, rales, rhonchi.    CARDIOVASCULAR:  Inspection: Carotid artery bilateral normal, no bruits, pulse regular.  Palpation/Percussion Bilateral normal pulsations.  Auscultation: Regular rate and rhythm. No murmur noted in sitting, supine, standing or squatting positions.    LYMPH: Cervical Nodes-normal, size; non-tender to palpation. Axillary Nodes- normal size; non-tender to palpation.     Assessment:  Michael was seen today for allergies.    Diagnoses and all orders for this visit:    Seasonal allergies  -     dexamethasone (DECADRON) injection 10 mg  -     olopatadine (PATANOL) 0.1 % ophthalmic solution; Administer 1 drop to both eyes 2 (Two) Times a Day.    Chronic seasonal allergic rhinitis due to pollen  -     fexofenadine-pseudoephedrine (ALLEGRA-D 24)  180-240 MG per 24 hr tablet; Take 1 tablet by mouth Daily.      I spoke with the patient about the benefits and risks associated with antibiotic therapy; the benefits include treating a bacterial infection, preventing the spread of disease, and minimizing serious complications associated with bacterial diseases; the risks include antibiotic resistance, allergic reactions, oral and/ or vaginal candidia, and GI related side effects such as an upset stomach and diarrhea, as well as placing the patient at an increase risk for C-diff; verbal acknowledgement of these risk were obtained; based on the patients complaint and clinical finding, no antibiotics are required at this time.             Return in about 1 week (around 12/2/2019), or if symptoms worsen or fail to improve.  Electronically signed by Mansi Zapata DNP, APRN, 11/25/19, 8:15 AM.

## 2025-05-15 NOTE — PROGRESS NOTES
Nutrition Education    Educated on Nutritional Therapy for Heart FAilure  Learners: Patient  Readiness: Acceptance  Method: Explanation and Handout  Response: Verbalizes Understanding  Contact name and number provided.    Miriam Maldonado MS, RD, LD  Contact Number: [phone]

## 2025-05-15 NOTE — PROGRESS NOTES
Daljit Elizondo arrived to room # 427.   Presented with: Shortness of breath, leg weakness   Mental Status: Patient is oriented, alert, coherent, logical, thought processes intact, and able to concentrate and follow conversation.   Vitals:    05/15/25 0020   BP: (!) 130/97   Pulse: 94   Resp: 20   Temp: 97.9 °F (36.6 °C)   SpO2: 95%     Patient safety contract and falls prevention contract reviewed with patient Yes.  Oriented Patient to room.  Call light within reach. Yes.  Needs, issues or concerns expressed at this time: no.      Electronically signed by Moshe Cohen RN on 5/15/2025 at 12:27 AM

## 2025-05-15 NOTE — PROGRESS NOTES
4 Eyes Skin Assessment     NAME:  Daljit Elizondo  YOB: 1960  MEDICAL RECORD NUMBER:  [id number]    The patient is being assessed for  Admission    I agree that at least one RN has performed a thorough Head to Toe Skin Assessment on the patient. ALL assessment sites listed below have been assessed.      Areas assessed by both nurses:    Head, Face, Ears, Shoulders, Back, Chest, Arms, Elbows, Hands, Sacrum. Buttock, Coccyx, Ischium, Legs. Feet and Heels, and Under Medical Devices         Does the Patient have a Wound? Yes wound(s) were present on assessment. LDA wound assessment was Initiated and completed by RN       Roel Prevention initiated by RN: Yes  Wound Care Orders initiated by RN:yes    Pressure Injury (Stage 3,4, Unstageable, DTI, NWPT, and Complex wounds) if present, place Wound referral order by RN under : No    New Ostomies, if present place, Ostomy referral order under : No     Nurse 1 eSignature: Electronically signed by Moshe Cohen RN on 5/15/25 at 12:26 AM CDT    **SHARE this note so that the co-signing nurse can place an eSignature**    Nurse 2 eSignature: Electronically signed by Diya Bender RN on 5/15/25 at 2:02 AM CDT

## 2025-05-16 LAB
ANION GAP SERPL CALCULATED.3IONS-SCNC: 10 MMOL/L (ref 8–16)
BASOPHILS # BLD: 0 K/UL (ref 0–0.2)
BASOPHILS NFR BLD: 0.7 % (ref 0–1)
BNP BLD-MCNC: 6068 PG/ML (ref 0–124)
BUN SERPL-MCNC: 13 MG/DL (ref 8–23)
CALCIUM SERPL-MCNC: 9.3 MG/DL (ref 8.8–10.2)
CHLORIDE SERPL-SCNC: 100 MMOL/L (ref 98–107)
CO2 SERPL-SCNC: 32 MMOL/L (ref 22–29)
CREAT SERPL-MCNC: 0.9 MG/DL (ref 0.7–1.2)
EOSINOPHIL # BLD: 0.3 K/UL (ref 0–0.6)
EOSINOPHIL NFR BLD: 4.3 % (ref 0–5)
ERYTHROCYTE [DISTWIDTH] IN BLOOD BY AUTOMATED COUNT: 16.1 % (ref 11.5–14.5)
GLUCOSE BLD-MCNC: 111 MG/DL (ref 70–99)
GLUCOSE BLD-MCNC: 140 MG/DL (ref 70–99)
GLUCOSE BLD-MCNC: 198 MG/DL (ref 70–99)
GLUCOSE BLD-MCNC: 208 MG/DL (ref 70–99)
GLUCOSE SERPL-MCNC: 141 MG/DL (ref 70–99)
HCT VFR BLD AUTO: 45.1 % (ref 42–52)
HGB BLD-MCNC: 13.3 G/DL (ref 14–18)
IMM GRANULOCYTES # BLD: 0 K/UL
LYMPHOCYTES # BLD: 0.8 K/UL (ref 1.1–4.5)
LYMPHOCYTES NFR BLD: 14.1 % (ref 20–40)
MAGNESIUM SERPL-MCNC: 1.7 MG/DL (ref 1.6–2.4)
MCH RBC QN AUTO: 29.3 PG (ref 27–31)
MCHC RBC AUTO-ENTMCNC: 29.5 G/DL (ref 33–37)
MCV RBC AUTO: 99.3 FL (ref 80–94)
MONOCYTES # BLD: 0.5 K/UL (ref 0–0.9)
MONOCYTES NFR BLD: 8.9 % (ref 0–10)
NEUTROPHILS # BLD: 4.1 K/UL (ref 1.5–7.5)
NEUTS SEG NFR BLD: 71.5 % (ref 50–65)
PERFORMED ON: ABNORMAL
PLATELET # BLD AUTO: 144 K/UL (ref 130–400)
PMV BLD AUTO: 12.9 FL (ref 9.4–12.4)
POTASSIUM SERPL-SCNC: 4 MMOL/L (ref 3.5–5.1)
RBC # BLD AUTO: 4.54 M/UL (ref 4.7–6.1)
SODIUM SERPL-SCNC: 142 MMOL/L (ref 136–145)
WBC # BLD AUTO: 5.8 K/UL (ref 4.8–10.8)

## 2025-05-16 PROCEDURE — 97110 THERAPEUTIC EXERCISES: CPT

## 2025-05-16 PROCEDURE — 83880 ASSAY OF NATRIURETIC PEPTIDE: CPT

## 2025-05-16 PROCEDURE — 2580000003 HC RX 258: Performed by: NURSE PRACTITIONER

## 2025-05-16 PROCEDURE — 6360000002 HC RX W HCPCS

## 2025-05-16 PROCEDURE — 83735 ASSAY OF MAGNESIUM: CPT

## 2025-05-16 PROCEDURE — 6370000000 HC RX 637 (ALT 250 FOR IP): Performed by: NURSE PRACTITIONER

## 2025-05-16 PROCEDURE — 36415 COLL VENOUS BLD VENIPUNCTURE: CPT

## 2025-05-16 PROCEDURE — 1200000000 HC SEMI PRIVATE

## 2025-05-16 PROCEDURE — 6370000000 HC RX 637 (ALT 250 FOR IP)

## 2025-05-16 PROCEDURE — 94640 AIRWAY INHALATION TREATMENT: CPT

## 2025-05-16 PROCEDURE — 82962 GLUCOSE BLOOD TEST: CPT

## 2025-05-16 PROCEDURE — 2700000000 HC OXYGEN THERAPY PER DAY

## 2025-05-16 PROCEDURE — 2500000003 HC RX 250 WO HCPCS

## 2025-05-16 PROCEDURE — 6360000002 HC RX W HCPCS: Performed by: NURSE PRACTITIONER

## 2025-05-16 PROCEDURE — 97530 THERAPEUTIC ACTIVITIES: CPT

## 2025-05-16 PROCEDURE — 80048 BASIC METABOLIC PNL TOTAL CA: CPT

## 2025-05-16 PROCEDURE — 94760 N-INVAS EAR/PLS OXIMETRY 1: CPT

## 2025-05-16 PROCEDURE — 85025 COMPLETE CBC W/AUTO DIFF WBC: CPT

## 2025-05-16 RX ORDER — IPRATROPIUM BROMIDE AND ALBUTEROL SULFATE 2.5; .5 MG/3ML; MG/3ML
1 SOLUTION RESPIRATORY (INHALATION)
Status: DISCONTINUED | OUTPATIENT
Start: 2025-05-16 | End: 2025-05-24

## 2025-05-16 RX ORDER — GUAIFENESIN 600 MG/1
600 TABLET, EXTENDED RELEASE ORAL 2 TIMES DAILY
Status: DISCONTINUED | OUTPATIENT
Start: 2025-05-16 | End: 2025-07-26 | Stop reason: HOSPADM

## 2025-05-16 RX ORDER — CETIRIZINE HYDROCHLORIDE 10 MG/1
10 TABLET ORAL DAILY
Status: DISCONTINUED | OUTPATIENT
Start: 2025-05-16 | End: 2025-07-26 | Stop reason: HOSPADM

## 2025-05-16 RX ADMIN — ENOXAPARIN SODIUM 60 MG: 100 INJECTION SUBCUTANEOUS at 09:12

## 2025-05-16 RX ADMIN — POTASSIUM CHLORIDE 20 MEQ: 1500 TABLET, EXTENDED RELEASE ORAL at 09:12

## 2025-05-16 RX ADMIN — SODIUM CHLORIDE, PRESERVATIVE FREE 10 ML: 5 INJECTION INTRAVENOUS at 11:11

## 2025-05-16 RX ADMIN — ENOXAPARIN SODIUM 60 MG: 100 INJECTION SUBCUTANEOUS at 22:14

## 2025-05-16 RX ADMIN — GUAIFENESIN 600 MG: 600 TABLET, EXTENDED RELEASE ORAL at 22:14

## 2025-05-16 RX ADMIN — METOPROLOL SUCCINATE 100 MG: 50 TABLET, EXTENDED RELEASE ORAL at 09:11

## 2025-05-16 RX ADMIN — BUMETANIDE 0.2 MG/HR: 0.25 INJECTION INTRAMUSCULAR; INTRAVENOUS at 11:02

## 2025-05-16 RX ADMIN — MICONAZOLE NITRATE: 2 POWDER TOPICAL at 22:15

## 2025-05-16 RX ADMIN — IPRATROPIUM BROMIDE AND ALBUTEROL SULFATE 1 DOSE: 2.5; .5 SOLUTION RESPIRATORY (INHALATION) at 18:36

## 2025-05-16 RX ADMIN — CETIRIZINE HYDROCHLORIDE 10 MG: 10 TABLET ORAL at 18:02

## 2025-05-16 RX ADMIN — PANTOPRAZOLE SODIUM 40 MG: 40 TABLET, DELAYED RELEASE ORAL at 06:10

## 2025-05-16 RX ADMIN — INSULIN GLARGINE 30 UNITS: 100 INJECTION, SOLUTION SUBCUTANEOUS at 22:14

## 2025-05-16 RX ADMIN — BUMETANIDE 1 MG: 0.25 INJECTION INTRAMUSCULAR; INTRAVENOUS at 06:10

## 2025-05-16 RX ADMIN — ASPIRIN 81 MG: 81 TABLET, COATED ORAL at 09:12

## 2025-05-16 RX ADMIN — INSULIN LISPRO 2 UNITS: 100 INJECTION, SOLUTION INTRAVENOUS; SUBCUTANEOUS at 18:02

## 2025-05-16 RX ADMIN — MICONAZOLE NITRATE: 2 POWDER TOPICAL at 10:52

## 2025-05-16 RX ADMIN — PRAVASTATIN SODIUM 20 MG: 20 TABLET ORAL at 22:14

## 2025-05-16 RX ADMIN — ALLOPURINOL 300 MG: 300 TABLET ORAL at 09:11

## 2025-05-16 RX ADMIN — LEVOFLOXACIN 750 MG: 5 INJECTION, SOLUTION INTRAVENOUS at 22:24

## 2025-05-16 RX ADMIN — LOSARTAN POTASSIUM 50 MG: 50 TABLET, FILM COATED ORAL at 09:12

## 2025-05-16 RX ADMIN — INSULIN LISPRO 2 UNITS: 100 INJECTION, SOLUTION INTRAVENOUS; SUBCUTANEOUS at 22:14

## 2025-05-16 NOTE — PROGRESS NOTES
Occupational Therapy     05/16/25 1500   Subjective   Subjective Pt in bed upon arrival for therapy. Pt agreeable to participate.   Pain Assessment   Pain Assessment None - Denies Pain   Vitals   O2 Device Nasal cannula   Cognition   Overall Cognitive Status WFL   Cognition Comment Awake, alert, follows simple commands   Orientation   Overall Orientation Status WFL   Bed Mobility Training   Bed Mobility Training Yes   Overall Level of Assistance Partial/Moderate assistance   Interventions Verbal cues;Tactile cues;Manual cues   Rolling Partial/Moderate assistance;Substantial/Maximal assistance   Scooting Minimal assistance;Partial/Moderate assistance  (in supine using head board and trapeze)   Transfer Training   Transfer Training No   ADL   Feeding Independent;Setup   Grooming Stand by assistance   UE Bathing Moderate assistance;Maximum assistance   LE Bathing Dependent/Total   UE Dressing Moderate assistance;Maximum assistance   LE Dressing Dependent/Total   Putting On/Taking Off Footwear Dependent/Total   Toileting Maximum assistance   Functional Mobility Dependent/Total   Additional Comments Based on clinical observation.   Assessment   Assessment Tx focused on bed mobility; including scooting and rolling side to side, repositioning in bed. Pt instructed chest raises using trapeze and AROM as tolerated using BUEs/BLEs to promote mobility.   Activity Tolerance Patient tolerated treatment well  (Simultaneous filing. User may not have seen previous data.)   Discharge Recommendations Patient would benefit from continued therapy after discharge;24 hour supervision or assist   Occupational Therapy Plan   Times Per Week 3-5   Times Per Day Once a day   OT Plan of Care   Friday X     Electronically signed by BRUNO Frausto on 5/16/2025 at 3:44 PM

## 2025-05-16 NOTE — PROGRESS NOTES
Physical Therapy  Name: Daljit Elizondo  MRN:  [id number]  Date of service:  5/16/2025 05/16/25 1500   Restrictions/Precautions   Restrictions/Precautions Fall Risk;Contact Precautions   Subjective   Subjective Pt agreed to therapy.   Pain Assessment   Pain Assessment None - Denies Pain   Bed Mobility   Rolling Moderate assistance;Maximal assistance  (partial roll)   Scooting Minimal assistance;2 Person assistance  (scooting in supine with bed flat, pt able to use headboard to scoot a small distance)   Exercises   Hip Flexion x10   Hip Abduction x10   Knee Active Range of Motion x10 knee flexion   Ankle Pumps x10   Comments BLE ROM   Other Activities   Comment pt able to work on pull ups with trapeze bar   Patient Goals    Patient Goals  get better   Short Term Goals   Time Frame for Short Term Goals 2 wks   Short Term Goal 1 supine to sit Min A   Short Term Goal 2 sit to stand Min A x 2 with SW   Short Term Goal 3 bed to bariatric chair SBA   Short Term Goal 4 bed mobility Min A   Short Term Goal 5 amb. 15' Min A x 1 SW   Conditions Requiring Skilled Therapeutic Intervention   Body Structures, Functions, Activity Limitations Requiring Skilled Therapeutic Intervention Decreased functional mobility ;Decreased ADL status;Decreased ROM;Decreased strength;Decreased balance;Decreased endurance;Decreased sensation;Decreased posture   Assessment Pt able to work on bed mobility and LE ROM. Tolerated fairly well but doesn't tolerate laying flat for long. Positioned with LE's in more neutral position.   Activity Tolerance   Activity Tolerance Patient tolerated treatment well   PT Plan of Care   Friday X   Safety Devices   Type of Devices Call light within reach;Left in bed         Electronically signed by Joy Finn PTA on 5/16/2025 at 3:44 PM

## 2025-05-16 NOTE — PROGRESS NOTES
Physical Therapy  Name: Daljit Elizondo  MRN:  [id number]  Date of service:  5/16/2025 05/16/25 1500   Restrictions/Precautions   Restrictions/Precautions Fall Risk;Contact Precautions   Subjective   Subjective Pt agreed to therapy.   Pain Assessment   Pain Assessment None - Denies Pain   Oxygen Therapy   O2 Device Nasal cannula   Orientation   Overall Orientation Status WFL   Bed Mobility   Rolling Moderate assistance;Maximal assistance  (partial roll)   Scooting Minimal assistance;2 Person assistance  (scooting in supine with bed flat, pt able to use headboard to scoot a small distance)   Exercises   Hip Flexion x10   Hip Abduction x10   Knee Active Range of Motion x10 knee flexion   Ankle Pumps x10   Comments BLE ROM   Other Activities   Comment pt able to work on pull ups with [employer] bar   Patient Goals    Patient Goals  get better   Short Term Goals   Time Frame for Short Term Goals 2 wks   Short Term Goal 1 supine to sit Min A   Short Term Goal 2 sit to stand Min A x 2 with SW   Short Term Goal 3 bed to bariatric chair SBA   Short Term Goal 4 bed mobility Min A   Short Term Goal 5 amb. 15' Min A x 1 SW   Conditions Requiring Skilled Therapeutic Intervention   Body Structures, Functions, Activity Limitations Requiring Skilled Therapeutic Intervention Decreased functional mobility ;Decreased ADL status;Decreased ROM;Decreased strength;Decreased balance;Decreased endurance;Decreased sensation;Decreased posture   Assessment Pt able to work on bed mobility and LE ROM. Tolerated fairly well but doesn't tolerate laying flat for long. Positioned with LE's in more neutral position.   Activity Tolerance   Activity Tolerance Patient tolerated treatment well;Patient limited by endurance  (Simultaneous filing. User may not have seen previous data.  Simultaneous filing. User may not have seen previous data.)   PT Plan of Care   Friday X   Safety Devices   Type of Devices Call light within reach;Left in bed

## 2025-05-16 NOTE — PLAN OF CARE
Problem: Chronic Conditions and Co-morbidities  Goal: Patient's chronic conditions and co-morbidity symptoms are monitored and maintained or improved  Outcome: Progressing     Problem: Discharge Planning  Goal: Discharge to home or other facility with appropriate resources  Outcome: Progressing     Problem: Pain  Goal: Verbalizes/displays adequate comfort level or baseline comfort level  Outcome: Progressing     Problem: Safety - Adult  Goal: Free from fall injury  Outcome: Progressing     Problem: ABCDS Injury Assessment  Goal: Absence of physical injury  Outcome: Progressing     Problem: Skin/Tissue Integrity  Goal: Skin integrity remains intact  Description: 1.  Monitor for areas of redness and/or skin breakdown2.  Assess vascular access sites hourly3.  Every 4-6 hours minimum:  Change oxygen saturation probe site4.  Every 4-6 hours:  If on nasal continuous positive airway pressure, respiratory therapy assess nares and determine need for appliance change or resting period  Outcome: Progressing     Problem: Nutrition Deficit:  Goal: Optimize nutritional status  Outcome: Progressing

## 2025-05-16 NOTE — PROGRESS NOTES
[facility]      [facility]    Patient:  Daljit Elizondo  YOB: 1960  Date of Service: 5/16/2025  MRN: [id number]   Acct: [id number]   Primary Care Physician: Jessie Helms APRN - CNP  Advance Directive: Full Code  Admit Date: 5/14/2025       Hospital Day: 2    Portions of this note have been copied forward, however, updated to reflect the most current clinical status of this patient.     CHIEF COMPLAINT Shortness of breath     SUBJECTIVE:  Mr. Elizondo was resting in bed this morning. Continues to report shortness of breath with minimal exertion. Denies chest pain. Reports congestion.     CUMULATIVE HOSPITAL COURSE:   Patient is a 64-year-old male with past medical history of anxiety, diabetes, GERD, hyperlipidemia, and HTN who presented to [facility] ED for evaluation of increased dyspnea on exertion and weakness.  Reported chest heaviness. Increased lower extremity weakness and edema. Patient is morbidly obese and normally ambulates short distances with a walker at home.Stated he has not been unable to get out of bed for past couple of days. Therefore, he had not been taking his Bumex for past several days. Stated he is normally on 3L O2 at all times. Reported productive cough with cloudy thick pink sputum. Denied fever, chills, nausea, vomiting, or abdominal pain. Workup in ED revealed CXR with atelectasis and or infiltrate on the left, small left pleural effusion; UA unremarkable for UTI; blood cultures pending; WBC 5.7, H&H 14/47, BNP 4707, CMP unremarkable.   Patient was admitted to hospital medicine for further evaluation. IV Bumex 1 mg BID was initiated. ECHO was attempted, unable to obtain due to patient's body habitus. Bumex gtt initiated.         Review of Systems   Constitutional:  Negative for chills, diaphoresis, fatigue and fever.   HENT:  Positive for congestion and rhinorrhea. Negative for ear pain.    Eyes:  Negative for visual disturbance.   Respiratory:  Positive for shortness of  dextrose bolus **OR** dextrose bolus, glucagon (rDNA), dextrose  ADULT DIET; Regular; 4 carb choices (60 gm/meal); Low Fat/Low Chol/High Fiber/2 gm Na     Lab and other Data:     Recent Labs     05/14/25  1415 05/15/25  0224 05/16/25  0315   WBC 5.7 5.7 5.8   HGB 14.0 13.8* 13.3*    145 144     Recent Labs     05/14/25  1415 05/15/25  0224 05/16/25  0315   * 142 142   K 4.5 4.4 4.0    102 100   CO2 28 32* 32*   BUN 15 13 13   CREATININE 0.9 0.9 0.9   GLUCOSE 127* 137* 141*     Recent Labs     05/14/25  1415 05/15/25  0224   AST 17 16   ALT 7* 5*   BILITOT 1.0 1.0   ALKPHOS 103 105       UA:  Recent Labs     05/14/25  1541   COLORU DARK YELLOW*   PHUR 5.0   WBCUA 3   RBCUA 2   BACTERIA Negative   CLARITYU Clear   LEUKOCYTESUR SMALL*   UROBILINOGEN 1.0   BILIRUBINUR MODERATE*   BLOODU Negative   GLUCOSEU Negative     RAD:   Echo (TTE) complete (PRN contrast/bubble/strain/3D)  Result Date: 5/15/2025  Technically difficult study due to patient's body habitus. Nondiagnostic study due to poor image quality.  Suggest alternative cardiac imaging including RISSA or cardiac CT, if clinically indicated.     XR CHEST PORTABLE  Result Date: 5/14/2025  EXAM: XR CHEST PORTABLE  TECHNIQUE: Single frontal chest radiograph.  HISTORY: dyspnea  COMPARISON: None.  FINDINGS: Evaluation is limited by patient body habitus, portable technique, patient position Atelectasis and/or infiltrate overlies the left lung base.  There is a small left pleural effusion       Atelectasis and/or infiltrate on the left. Small left pleural effusion   ______________________________________ Electronically signed by: EMILIANO JEFFERS M.D. Date:     05/14/2025 Time:    14:55           Micro:      Results       Procedure Component Value Units Date/Time    Culture, Blood 2 [[id number]] Collected: 05/14/25 1430    Order Status: Completed Specimen: Blood Updated: 05/15/25 1914     Culture, Blood 2 No Growth to date.  Any change in status will be

## 2025-05-16 NOTE — PROGRESS NOTES
Due to acute diuresis for CHF presenting with elevated BNP, order initiated for indwelling brown catheter for accurate intake and output. Pt has utilized several different external catheter set ups with limited success due to body habitus and leaking.   Pt declined placement of indwelling at this time, but is agreeable to reconsider if continued difficulty maintaining clean, dry skin due to urinary output.  Full bed change and bath provided. Micotin applied to skin folds. Mepilex removed from sacrum due to complaints of itching by pt.   Hospitalist made aware.

## 2025-05-16 NOTE — CARE COORDINATION
[facility] unable to offer a bed.  Pt agreeable for referral to be sent to [facility].  Referral sent. Will follow.    Followed with pt again about dc plans, pt did agree to let me send a referral to [facility] to see if they can accept.  Pt is still talking with family to see about other options.  Will follow.  Electronically signed by Shira Tomlin RN on 5/16/2025 at 12:16 PM

## 2025-05-17 LAB
ANION GAP SERPL CALCULATED.3IONS-SCNC: 9 MMOL/L (ref 8–16)
BASOPHILS # BLD: 0.1 K/UL (ref 0–0.2)
BASOPHILS NFR BLD: 0.8 % (ref 0–1)
BUN SERPL-MCNC: 14 MG/DL (ref 8–23)
CALCIUM SERPL-MCNC: 9.7 MG/DL (ref 8.8–10.2)
CHLORIDE SERPL-SCNC: 96 MMOL/L (ref 98–107)
CO2 SERPL-SCNC: 39 MMOL/L (ref 22–29)
CREAT SERPL-MCNC: 1.1 MG/DL (ref 0.7–1.2)
EKG P AXIS: NORMAL DEGREES
EKG P-R INTERVAL: NORMAL MS
EKG Q-T INTERVAL: 380 MS
EKG QRS DURATION: 110 MS
EKG QTC CALCULATION (BAZETT): 455 MS
EKG T AXIS: 58 DEGREES
EOSINOPHIL # BLD: 0.3 K/UL (ref 0–0.6)
EOSINOPHIL NFR BLD: 4.1 % (ref 0–5)
ERYTHROCYTE [DISTWIDTH] IN BLOOD BY AUTOMATED COUNT: 15.9 % (ref 11.5–14.5)
GLUCOSE BLD-MCNC: 101 MG/DL (ref 70–99)
GLUCOSE BLD-MCNC: 117 MG/DL (ref 70–99)
GLUCOSE BLD-MCNC: 146 MG/DL (ref 70–99)
GLUCOSE BLD-MCNC: 184 MG/DL (ref 70–99)
GLUCOSE SERPL-MCNC: 166 MG/DL (ref 70–99)
HCT VFR BLD AUTO: 47.3 % (ref 42–52)
HGB BLD-MCNC: 14 G/DL (ref 14–18)
IMM GRANULOCYTES # BLD: 0 K/UL
LYMPHOCYTES # BLD: 0.9 K/UL (ref 1.1–4.5)
LYMPHOCYTES NFR BLD: 14.3 % (ref 20–40)
MAGNESIUM SERPL-MCNC: 1.7 MG/DL (ref 1.6–2.4)
MCH RBC QN AUTO: 29.4 PG (ref 27–31)
MCHC RBC AUTO-ENTMCNC: 29.6 G/DL (ref 33–37)
MCV RBC AUTO: 99.2 FL (ref 80–94)
MONOCYTES # BLD: 0.7 K/UL (ref 0–0.9)
MONOCYTES NFR BLD: 10.6 % (ref 0–10)
NEUTROPHILS # BLD: 4.3 K/UL (ref 1.5–7.5)
NEUTS SEG NFR BLD: 69.7 % (ref 50–65)
PERFORMED ON: ABNORMAL
PLATELET # BLD AUTO: 139 K/UL (ref 130–400)
PMV BLD AUTO: 12.8 FL (ref 9.4–12.4)
POTASSIUM SERPL-SCNC: 4 MMOL/L (ref 3.5–5.1)
RBC # BLD AUTO: 4.77 M/UL (ref 4.7–6.1)
SODIUM SERPL-SCNC: 144 MMOL/L (ref 136–145)
WBC # BLD AUTO: 6.1 K/UL (ref 4.8–10.8)

## 2025-05-17 PROCEDURE — 1200000000 HC SEMI PRIVATE

## 2025-05-17 PROCEDURE — 6360000002 HC RX W HCPCS: Performed by: STUDENT IN AN ORGANIZED HEALTH CARE EDUCATION/TRAINING PROGRAM

## 2025-05-17 PROCEDURE — 83735 ASSAY OF MAGNESIUM: CPT

## 2025-05-17 PROCEDURE — 6370000000 HC RX 637 (ALT 250 FOR IP): Performed by: NURSE PRACTITIONER

## 2025-05-17 PROCEDURE — 6360000002 HC RX W HCPCS

## 2025-05-17 PROCEDURE — 6370000000 HC RX 637 (ALT 250 FOR IP)

## 2025-05-17 PROCEDURE — 2500000003 HC RX 250 WO HCPCS: Performed by: STUDENT IN AN ORGANIZED HEALTH CARE EDUCATION/TRAINING PROGRAM

## 2025-05-17 PROCEDURE — 94760 N-INVAS EAR/PLS OXIMETRY 1: CPT

## 2025-05-17 PROCEDURE — 80048 BASIC METABOLIC PNL TOTAL CA: CPT

## 2025-05-17 PROCEDURE — 85025 COMPLETE CBC W/AUTO DIFF WBC: CPT

## 2025-05-17 PROCEDURE — 82962 GLUCOSE BLOOD TEST: CPT

## 2025-05-17 PROCEDURE — 93010 ELECTROCARDIOGRAM REPORT: CPT | Performed by: INTERNAL MEDICINE

## 2025-05-17 PROCEDURE — 2700000000 HC OXYGEN THERAPY PER DAY

## 2025-05-17 PROCEDURE — 36415 COLL VENOUS BLD VENIPUNCTURE: CPT

## 2025-05-17 PROCEDURE — 2500000003 HC RX 250 WO HCPCS

## 2025-05-17 PROCEDURE — 94640 AIRWAY INHALATION TREATMENT: CPT

## 2025-05-17 RX ORDER — MINERAL OIL AND WHITE PETROLATUM 150; 830 MG/G; MG/G
OINTMENT OPHTHALMIC PRN
Status: DISCONTINUED | OUTPATIENT
Start: 2025-05-17 | End: 2025-07-26 | Stop reason: HOSPADM

## 2025-05-17 RX ADMIN — INSULIN LISPRO 1 UNITS: 100 INJECTION, SOLUTION INTRAVENOUS; SUBCUTANEOUS at 22:12

## 2025-05-17 RX ADMIN — PANTOPRAZOLE SODIUM 40 MG: 40 TABLET, DELAYED RELEASE ORAL at 05:47

## 2025-05-17 RX ADMIN — POTASSIUM CHLORIDE 20 MEQ: 1500 TABLET, EXTENDED RELEASE ORAL at 09:51

## 2025-05-17 RX ADMIN — CETIRIZINE HYDROCHLORIDE 10 MG: 10 TABLET ORAL at 09:51

## 2025-05-17 RX ADMIN — IPRATROPIUM BROMIDE AND ALBUTEROL SULFATE 1 DOSE: 2.5; .5 SOLUTION RESPIRATORY (INHALATION) at 06:48

## 2025-05-17 RX ADMIN — ENOXAPARIN SODIUM 60 MG: 100 INJECTION SUBCUTANEOUS at 22:14

## 2025-05-17 RX ADMIN — LEVOFLOXACIN 750 MG: 5 INJECTION, SOLUTION INTRAVENOUS at 22:14

## 2025-05-17 RX ADMIN — ENOXAPARIN SODIUM 60 MG: 100 INJECTION SUBCUTANEOUS at 09:50

## 2025-05-17 RX ADMIN — INSULIN GLARGINE 30 UNITS: 100 INJECTION, SOLUTION SUBCUTANEOUS at 22:11

## 2025-05-17 RX ADMIN — SODIUM CHLORIDE, PRESERVATIVE FREE 10 ML: 5 INJECTION INTRAVENOUS at 22:16

## 2025-05-17 RX ADMIN — Medication: at 12:22

## 2025-05-17 RX ADMIN — METOPROLOL SUCCINATE 100 MG: 50 TABLET, EXTENDED RELEASE ORAL at 09:51

## 2025-05-17 RX ADMIN — ASPIRIN 81 MG: 81 TABLET, COATED ORAL at 09:51

## 2025-05-17 RX ADMIN — PRAVASTATIN SODIUM 20 MG: 20 TABLET ORAL at 22:11

## 2025-05-17 RX ADMIN — GUAIFENESIN 600 MG: 600 TABLET, EXTENDED RELEASE ORAL at 22:11

## 2025-05-17 RX ADMIN — MICONAZOLE NITRATE: 2 POWDER TOPICAL at 22:15

## 2025-05-17 RX ADMIN — IPRATROPIUM BROMIDE AND ALBUTEROL SULFATE 1 DOSE: 2.5; .5 SOLUTION RESPIRATORY (INHALATION) at 15:36

## 2025-05-17 RX ADMIN — IPRATROPIUM BROMIDE AND ALBUTEROL SULFATE 1 DOSE: 2.5; .5 SOLUTION RESPIRATORY (INHALATION) at 19:15

## 2025-05-17 RX ADMIN — ALLOPURINOL 300 MG: 300 TABLET ORAL at 09:51

## 2025-05-17 RX ADMIN — LOSARTAN POTASSIUM 50 MG: 50 TABLET, FILM COATED ORAL at 09:51

## 2025-05-17 RX ADMIN — ACETAZOLAMIDE SODIUM 500 MG: 500 INJECTION, POWDER, LYOPHILIZED, FOR SOLUTION INTRAVENOUS at 12:22

## 2025-05-17 RX ADMIN — IPRATROPIUM BROMIDE AND ALBUTEROL SULFATE 1 DOSE: 2.5; .5 SOLUTION RESPIRATORY (INHALATION) at 10:23

## 2025-05-17 RX ADMIN — MICONAZOLE NITRATE: 2 POWDER TOPICAL at 13:16

## 2025-05-17 RX ADMIN — GUAIFENESIN 600 MG: 600 TABLET, EXTENDED RELEASE ORAL at 09:51

## 2025-05-17 NOTE — PROGRESS NOTES
[facility]      Progress Note    Patient:  Daljit Elizondo  YOB: 1960  Date of Service: 5/17/2025  MRN: [id number]   Acct: [id number]   Primary Care Physician: Jessie Helms APRN - CNP  Advance Directive: Full Code  Admit Date: 5/14/2025       Hospital Day: 3    Portions of this note have been copied forward, however, updated to reflect the most current clinical status of this patient.     CHIEF COMPLAINT Shortness of breath     SUBJECTIVE:  Mr. Elizondo was resting in bed this morning. Reports SOB has improved some, but continues to require 5LO2 per NC. Denies chest pain.     CUMULATIVE HOSPITAL COURSE:   Patient is a 64-year-old male with past medical history of anxiety, diabetes, GERD, hyperlipidemia, and HTN who presented to [facility] ED for evaluation of increased dyspnea on exertion and weakness.  Reported chest heaviness. Increased lower extremity weakness and edema. Patient is morbidly obese and normally ambulates short distances with a walker at home.Stated he has not been unable to get out of bed for past couple of days. Therefore, he had not been taking his Bumex for past several days. Stated he is normally on 3L O2 at all times. Reported productive cough with cloudy thick pink sputum. Denied fever, chills, nausea, vomiting, or abdominal pain. Workup in ED revealed CXR with atelectasis and or infiltrate on the left, small left pleural effusion; UA unremarkable for UTI; blood cultures pending; WBC 5.7, H&H 14/47, BNP 4707, CMP unremarkable.   Patient was admitted to hospital medicine for further evaluation. IV Bumex 1 mg BID was initiated. ECHO was attempted, unable to obtain due to patient's body habitus. Bumex gtt initiated on 5/16/2025. 1 time dose of IV Diamox 500 mg given today.     Case management assisting with DC planning to SNF.           Review of Systems   Constitutional:  Negative for chills, diaphoresis, fatigue and fever.   HENT:  Positive for congestion and rhinorrhea.

## 2025-05-18 LAB
ANION GAP SERPL CALCULATED.3IONS-SCNC: 11 MMOL/L (ref 8–16)
BASOPHILS # BLD: 0.1 K/UL (ref 0–0.2)
BASOPHILS NFR BLD: 0.9 % (ref 0–1)
BNP BLD-MCNC: 4421 PG/ML (ref 0–124)
BUN SERPL-MCNC: 18 MG/DL (ref 8–23)
CALCIUM SERPL-MCNC: 9.7 MG/DL (ref 8.8–10.2)
CHLORIDE SERPL-SCNC: 93 MMOL/L (ref 98–107)
CO2 SERPL-SCNC: 40 MMOL/L (ref 22–29)
CREAT SERPL-MCNC: 1.4 MG/DL (ref 0.7–1.2)
EOSINOPHIL # BLD: 0.3 K/UL (ref 0–0.6)
EOSINOPHIL NFR BLD: 4.7 % (ref 0–5)
ERYTHROCYTE [DISTWIDTH] IN BLOOD BY AUTOMATED COUNT: 15.8 % (ref 11.5–14.5)
GLUCOSE BLD-MCNC: 118 MG/DL (ref 70–99)
GLUCOSE BLD-MCNC: 130 MG/DL (ref 70–99)
GLUCOSE BLD-MCNC: 132 MG/DL (ref 70–99)
GLUCOSE BLD-MCNC: 153 MG/DL (ref 70–99)
GLUCOSE BLD-MCNC: 169 MG/DL (ref 70–99)
GLUCOSE SERPL-MCNC: 157 MG/DL (ref 70–99)
HCT VFR BLD AUTO: 53.3 % (ref 42–52)
HGB BLD-MCNC: 16 G/DL (ref 14–18)
IMM GRANULOCYTES # BLD: 0 K/UL
LYMPHOCYTES # BLD: 1.1 K/UL (ref 1.1–4.5)
LYMPHOCYTES NFR BLD: 15.4 % (ref 20–40)
MAGNESIUM SERPL-MCNC: 1.7 MG/DL (ref 1.6–2.4)
MCH RBC QN AUTO: 29.8 PG (ref 27–31)
MCHC RBC AUTO-ENTMCNC: 30 G/DL (ref 33–37)
MCV RBC AUTO: 99.3 FL (ref 80–94)
MONOCYTES # BLD: 0.8 K/UL (ref 0–0.9)
MONOCYTES NFR BLD: 11.1 % (ref 0–10)
NEUTROPHILS # BLD: 4.7 K/UL (ref 1.5–7.5)
NEUTS SEG NFR BLD: 67.5 % (ref 50–65)
PERFORMED ON: ABNORMAL
PLATELET # BLD AUTO: 119 K/UL (ref 130–400)
PMV BLD AUTO: 13.5 FL (ref 9.4–12.4)
POTASSIUM SERPL-SCNC: 4.3 MMOL/L (ref 3.5–5.1)
RBC # BLD AUTO: 5.37 M/UL (ref 4.7–6.1)
SODIUM SERPL-SCNC: 144 MMOL/L (ref 136–145)
WBC # BLD AUTO: 7 K/UL (ref 4.8–10.8)

## 2025-05-18 PROCEDURE — 94760 N-INVAS EAR/PLS OXIMETRY 1: CPT

## 2025-05-18 PROCEDURE — 2500000003 HC RX 250 WO HCPCS

## 2025-05-18 PROCEDURE — 6370000000 HC RX 637 (ALT 250 FOR IP)

## 2025-05-18 PROCEDURE — 85025 COMPLETE CBC W/AUTO DIFF WBC: CPT

## 2025-05-18 PROCEDURE — 1200000000 HC SEMI PRIVATE

## 2025-05-18 PROCEDURE — 83880 ASSAY OF NATRIURETIC PEPTIDE: CPT

## 2025-05-18 PROCEDURE — P9047 ALBUMIN (HUMAN), 25%, 50ML: HCPCS | Performed by: STUDENT IN AN ORGANIZED HEALTH CARE EDUCATION/TRAINING PROGRAM

## 2025-05-18 PROCEDURE — 2700000000 HC OXYGEN THERAPY PER DAY

## 2025-05-18 PROCEDURE — 83735 ASSAY OF MAGNESIUM: CPT

## 2025-05-18 PROCEDURE — 6360000002 HC RX W HCPCS

## 2025-05-18 PROCEDURE — 94640 AIRWAY INHALATION TREATMENT: CPT

## 2025-05-18 PROCEDURE — 82962 GLUCOSE BLOOD TEST: CPT

## 2025-05-18 PROCEDURE — 6370000000 HC RX 637 (ALT 250 FOR IP): Performed by: NURSE PRACTITIONER

## 2025-05-18 PROCEDURE — 80048 BASIC METABOLIC PNL TOTAL CA: CPT

## 2025-05-18 PROCEDURE — 2580000003 HC RX 258

## 2025-05-18 PROCEDURE — 36415 COLL VENOUS BLD VENIPUNCTURE: CPT

## 2025-05-18 PROCEDURE — 6360000002 HC RX W HCPCS: Performed by: STUDENT IN AN ORGANIZED HEALTH CARE EDUCATION/TRAINING PROGRAM

## 2025-05-18 RX ORDER — BUMETANIDE 1 MG/1
2 TABLET ORAL DAILY
Status: DISCONTINUED | OUTPATIENT
Start: 2025-05-19 | End: 2025-06-03

## 2025-05-18 RX ORDER — LACTULOSE 10 G/15ML
20 SOLUTION ORAL 3 TIMES DAILY
Status: DISCONTINUED | OUTPATIENT
Start: 2025-05-18 | End: 2025-05-22

## 2025-05-18 RX ORDER — ALBUMIN (HUMAN) 12.5 G/50ML
25 SOLUTION INTRAVENOUS ONCE
Status: COMPLETED | OUTPATIENT
Start: 2025-05-18 | End: 2025-05-19

## 2025-05-18 RX ADMIN — ASPIRIN 81 MG: 81 TABLET, COATED ORAL at 10:14

## 2025-05-18 RX ADMIN — GUAIFENESIN 600 MG: 600 TABLET, EXTENDED RELEASE ORAL at 10:14

## 2025-05-18 RX ADMIN — INSULIN GLARGINE 30 UNITS: 100 INJECTION, SOLUTION SUBCUTANEOUS at 21:38

## 2025-05-18 RX ADMIN — SODIUM CHLORIDE, PRESERVATIVE FREE 10 ML: 5 INJECTION INTRAVENOUS at 21:00

## 2025-05-18 RX ADMIN — IPRATROPIUM BROMIDE AND ALBUTEROL SULFATE 1 DOSE: 2.5; .5 SOLUTION RESPIRATORY (INHALATION) at 10:28

## 2025-05-18 RX ADMIN — ALBUMIN (HUMAN) 25 G: 0.25 INJECTION, SOLUTION INTRAVENOUS at 21:37

## 2025-05-18 RX ADMIN — PANTOPRAZOLE SODIUM 40 MG: 40 TABLET, DELAYED RELEASE ORAL at 05:24

## 2025-05-18 RX ADMIN — POTASSIUM CHLORIDE 20 MEQ: 1500 TABLET, EXTENDED RELEASE ORAL at 10:14

## 2025-05-18 RX ADMIN — MICONAZOLE NITRATE: 2 POWDER TOPICAL at 10:16

## 2025-05-18 RX ADMIN — PRAVASTATIN SODIUM 20 MG: 20 TABLET ORAL at 21:39

## 2025-05-18 RX ADMIN — LACTULOSE 20 G: 20 SOLUTION ORAL at 11:22

## 2025-05-18 RX ADMIN — MICONAZOLE NITRATE: 2 POWDER TOPICAL at 21:40

## 2025-05-18 RX ADMIN — CETIRIZINE HYDROCHLORIDE 10 MG: 10 TABLET ORAL at 10:14

## 2025-05-18 RX ADMIN — ENOXAPARIN SODIUM 60 MG: 100 INJECTION SUBCUTANEOUS at 21:38

## 2025-05-18 RX ADMIN — ALLOPURINOL 300 MG: 300 TABLET ORAL at 10:14

## 2025-05-18 RX ADMIN — SODIUM CHLORIDE, PRESERVATIVE FREE 10 ML: 5 INJECTION INTRAVENOUS at 09:00

## 2025-05-18 RX ADMIN — ENOXAPARIN SODIUM 60 MG: 100 INJECTION SUBCUTANEOUS at 10:11

## 2025-05-18 RX ADMIN — LACTULOSE 20 G: 20 SOLUTION ORAL at 21:39

## 2025-05-18 RX ADMIN — IPRATROPIUM BROMIDE AND ALBUTEROL SULFATE 1 DOSE: 2.5; .5 SOLUTION RESPIRATORY (INHALATION) at 15:28

## 2025-05-18 RX ADMIN — SODIUM CHLORIDE: 0.9 INJECTION, SOLUTION INTRAVENOUS at 14:59

## 2025-05-18 RX ADMIN — GUAIFENESIN 600 MG: 600 TABLET, EXTENDED RELEASE ORAL at 21:39

## 2025-05-18 RX ADMIN — IPRATROPIUM BROMIDE AND ALBUTEROL SULFATE 1 DOSE: 2.5; .5 SOLUTION RESPIRATORY (INHALATION) at 18:37

## 2025-05-18 RX ADMIN — LACTULOSE 20 G: 20 SOLUTION ORAL at 14:41

## 2025-05-18 RX ADMIN — IPRATROPIUM BROMIDE AND ALBUTEROL SULFATE 1 DOSE: 2.5; .5 SOLUTION RESPIRATORY (INHALATION) at 06:38

## 2025-05-18 NOTE — PROGRESS NOTES
[facility]      Patient:  Daljit Elizondo  YOB: 1960  Date of Service: 5/18/2025  MRN: [id number]   Acct: [id number]   Primary Care Physician: Jessie Helms APRN - CNP  Advance Directive: Full Code  Admit Date: 5/14/2025       Hospital Day: 4  Portions of this note have been copied forward, however, changed to reflect the most current clinical status of this patient.    CHIEF COMPLAINT Shortness of breath    SUBJECTIVE:  He states that he has coughed up some clear sputum. Afebrile. Currently on 4LNC. Reports improvement in his SOB.     CUMULATIVE HOSPITAL STAY:  Patient is a 64-year-old male with a PMH of obesity, diabetes, GERD, HLD, HTN, lymphedema, REGI, chronic respiratory failure on 3 L nasal cannula at baseline who presented to [facility] ED on 5/14/2025 with complaints of RIVAS and weakness.  He additionally endorsed chest heaviness, increased BLE edema.  At baseline he walks short distances with a walker at home, however, due to his weakness he was unable to get out of his bed and did not take his prescribed Bumex for several days.  He reported a productive cough with frothy pink sputum.  Denied fever, chills, nausea, vomiting or abdominal pain.  Further ED workup revealed , K4.5, BUN-15 and creatinine-0.9.  He was hypoxic in the ED with an SpO2 of 83% was therefore placed on BiPAP thyroid studies within normal limits.  proBNP 4707.  CBC unremarkable UA negative for infection.  Chest x-ray atelectasis and or felt infiltrate of the left pleural effusion. He was admitted to hospital medicine for further treatment and evaluation.     He was initiated on IV Bumex 1 mg BID, however, he had poor UOP, therefore, he was placed on a Bumex gtt on 5/16/2025. He was additionally given a x1 dose of Diamox on 5/17/2025 and now has a net negative of greater than 14L. He has been transitioned back to Bumex 2mg po daily. Renal function-mild increased likely due to diuresis- BUN-18 and creatinine-1.4.  2D

## 2025-05-19 ENCOUNTER — APPOINTMENT (OUTPATIENT)
Dept: GENERAL RADIOLOGY | Age: 65
DRG: 291 | End: 2025-05-19
Payer: MEDICARE

## 2025-05-19 LAB
ANION GAP SERPL CALCULATED.3IONS-SCNC: 10 MMOL/L (ref 8–16)
BACTERIA BLD CULT ORG #2: NORMAL
BACTERIA BLD CULT: NORMAL
BASOPHILS # BLD: 0.1 K/UL (ref 0–0.2)
BASOPHILS NFR BLD: 1 % (ref 0–1)
BUN SERPL-MCNC: 22 MG/DL (ref 8–23)
CALCIUM SERPL-MCNC: 9.5 MG/DL (ref 8.8–10.2)
CHLORIDE SERPL-SCNC: 95 MMOL/L (ref 98–107)
CO2 SERPL-SCNC: 39 MMOL/L (ref 22–29)
CREAT SERPL-MCNC: 1.6 MG/DL (ref 0.7–1.2)
EOSINOPHIL # BLD: 0.2 K/UL (ref 0–0.6)
EOSINOPHIL NFR BLD: 3.9 % (ref 0–5)
ERYTHROCYTE [DISTWIDTH] IN BLOOD BY AUTOMATED COUNT: 15.8 % (ref 11.5–14.5)
GLUCOSE BLD-MCNC: 135 MG/DL (ref 70–99)
GLUCOSE BLD-MCNC: 156 MG/DL (ref 70–99)
GLUCOSE BLD-MCNC: 168 MG/DL (ref 70–99)
GLUCOSE BLD-MCNC: 206 MG/DL (ref 70–99)
GLUCOSE SERPL-MCNC: 155 MG/DL (ref 70–99)
HCT VFR BLD AUTO: 48.8 % (ref 42–52)
HGB BLD-MCNC: 14.7 G/DL (ref 14–18)
IMM GRANULOCYTES # BLD: 0 K/UL
LYMPHOCYTES # BLD: 0.9 K/UL (ref 1.1–4.5)
LYMPHOCYTES NFR BLD: 14 % (ref 20–40)
MAGNESIUM SERPL-MCNC: 1.9 MG/DL (ref 1.6–2.4)
MCH RBC QN AUTO: 29.6 PG (ref 27–31)
MCHC RBC AUTO-ENTMCNC: 30.1 G/DL (ref 33–37)
MCV RBC AUTO: 98.2 FL (ref 80–94)
MONOCYTES # BLD: 0.8 K/UL (ref 0–0.9)
MONOCYTES NFR BLD: 12.4 % (ref 0–10)
NEUTROPHILS # BLD: 4.2 K/UL (ref 1.5–7.5)
NEUTS SEG NFR BLD: 68.4 % (ref 50–65)
PERFORMED ON: ABNORMAL
PLATELET # BLD AUTO: 125 K/UL (ref 130–400)
PMV BLD AUTO: 12.9 FL (ref 9.4–12.4)
POTASSIUM SERPL-SCNC: 3.9 MMOL/L (ref 3.5–5.1)
RBC # BLD AUTO: 4.97 M/UL (ref 4.7–6.1)
SODIUM SERPL-SCNC: 144 MMOL/L (ref 136–145)
WBC # BLD AUTO: 6.1 K/UL (ref 4.8–10.8)

## 2025-05-19 PROCEDURE — 6360000002 HC RX W HCPCS

## 2025-05-19 PROCEDURE — 80048 BASIC METABOLIC PNL TOTAL CA: CPT

## 2025-05-19 PROCEDURE — 85025 COMPLETE CBC W/AUTO DIFF WBC: CPT

## 2025-05-19 PROCEDURE — 6370000000 HC RX 637 (ALT 250 FOR IP): Performed by: NURSE PRACTITIONER

## 2025-05-19 PROCEDURE — 94760 N-INVAS EAR/PLS OXIMETRY 1: CPT

## 2025-05-19 PROCEDURE — 83735 ASSAY OF MAGNESIUM: CPT

## 2025-05-19 PROCEDURE — 1200000000 HC SEMI PRIVATE

## 2025-05-19 PROCEDURE — 2580000003 HC RX 258: Performed by: STUDENT IN AN ORGANIZED HEALTH CARE EDUCATION/TRAINING PROGRAM

## 2025-05-19 PROCEDURE — 6370000000 HC RX 637 (ALT 250 FOR IP): Performed by: STUDENT IN AN ORGANIZED HEALTH CARE EDUCATION/TRAINING PROGRAM

## 2025-05-19 PROCEDURE — 6370000000 HC RX 637 (ALT 250 FOR IP)

## 2025-05-19 PROCEDURE — 71045 X-RAY EXAM CHEST 1 VIEW: CPT

## 2025-05-19 PROCEDURE — 36415 COLL VENOUS BLD VENIPUNCTURE: CPT

## 2025-05-19 PROCEDURE — 2700000000 HC OXYGEN THERAPY PER DAY

## 2025-05-19 PROCEDURE — 94640 AIRWAY INHALATION TREATMENT: CPT

## 2025-05-19 PROCEDURE — 82962 GLUCOSE BLOOD TEST: CPT

## 2025-05-19 PROCEDURE — 2500000003 HC RX 250 WO HCPCS

## 2025-05-19 RX ORDER — TETRAHYDROZOLINE HCL 0.05 %
1 DROPS OPHTHALMIC (EYE) 3 TIMES DAILY PRN
Status: DISCONTINUED | OUTPATIENT
Start: 2025-05-19 | End: 2025-07-26 | Stop reason: HOSPADM

## 2025-05-19 RX ORDER — 0.9 % SODIUM CHLORIDE 0.9 %
250 INTRAVENOUS SOLUTION INTRAVENOUS ONCE
Status: COMPLETED | OUTPATIENT
Start: 2025-05-19 | End: 2025-05-19

## 2025-05-19 RX ORDER — MIDODRINE HYDROCHLORIDE 5 MG/1
5 TABLET ORAL ONCE
Status: COMPLETED | OUTPATIENT
Start: 2025-05-19 | End: 2025-05-19

## 2025-05-19 RX ORDER — MONTELUKAST SODIUM 10 MG/1
10 TABLET ORAL NIGHTLY
Status: DISCONTINUED | OUTPATIENT
Start: 2025-05-19 | End: 2025-07-26 | Stop reason: HOSPADM

## 2025-05-19 RX ADMIN — CETIRIZINE HYDROCHLORIDE 10 MG: 10 TABLET ORAL at 11:18

## 2025-05-19 RX ADMIN — INSULIN GLARGINE 30 UNITS: 100 INJECTION, SOLUTION SUBCUTANEOUS at 20:42

## 2025-05-19 RX ADMIN — ALLOPURINOL 300 MG: 300 TABLET ORAL at 11:16

## 2025-05-19 RX ADMIN — MONTELUKAST SODIUM 10 MG: 10 TABLET, FILM COATED ORAL at 20:42

## 2025-05-19 RX ADMIN — POTASSIUM CHLORIDE 20 MEQ: 1500 TABLET, EXTENDED RELEASE ORAL at 11:16

## 2025-05-19 RX ADMIN — SODIUM CHLORIDE, PRESERVATIVE FREE 10 ML: 5 INJECTION INTRAVENOUS at 11:19

## 2025-05-19 RX ADMIN — LACTULOSE 20 G: 20 SOLUTION ORAL at 11:15

## 2025-05-19 RX ADMIN — METOPROLOL SUCCINATE 100 MG: 50 TABLET, EXTENDED RELEASE ORAL at 11:15

## 2025-05-19 RX ADMIN — SODIUM CHLORIDE 250 ML: 0.9 INJECTION, SOLUTION INTRAVENOUS at 16:29

## 2025-05-19 RX ADMIN — GUAIFENESIN 600 MG: 600 TABLET, EXTENDED RELEASE ORAL at 11:16

## 2025-05-19 RX ADMIN — MICONAZOLE NITRATE: 2 POWDER TOPICAL at 20:46

## 2025-05-19 RX ADMIN — ENOXAPARIN SODIUM 60 MG: 100 INJECTION SUBCUTANEOUS at 11:15

## 2025-05-19 RX ADMIN — SALINE NASAL SPRAY 1 SPRAY: 1.5 SOLUTION NASAL at 11:18

## 2025-05-19 RX ADMIN — SODIUM CHLORIDE 250 ML: 0.9 INJECTION, SOLUTION INTRAVENOUS at 19:24

## 2025-05-19 RX ADMIN — IPRATROPIUM BROMIDE AND ALBUTEROL SULFATE 1 DOSE: 2.5; .5 SOLUTION RESPIRATORY (INHALATION) at 06:22

## 2025-05-19 RX ADMIN — LACTULOSE 20 G: 20 SOLUTION ORAL at 20:42

## 2025-05-19 RX ADMIN — MIDODRINE HYDROCHLORIDE 5 MG: 5 TABLET ORAL at 19:25

## 2025-05-19 RX ADMIN — IPRATROPIUM BROMIDE AND ALBUTEROL SULFATE 1 DOSE: 2.5; .5 SOLUTION RESPIRATORY (INHALATION) at 14:27

## 2025-05-19 RX ADMIN — LACTULOSE 20 G: 20 SOLUTION ORAL at 16:33

## 2025-05-19 RX ADMIN — INSULIN LISPRO 2 UNITS: 100 INJECTION, SOLUTION INTRAVENOUS; SUBCUTANEOUS at 16:14

## 2025-05-19 RX ADMIN — GUAIFENESIN 600 MG: 600 TABLET, EXTENDED RELEASE ORAL at 20:42

## 2025-05-19 RX ADMIN — IPRATROPIUM BROMIDE AND ALBUTEROL SULFATE 1 DOSE: 2.5; .5 SOLUTION RESPIRATORY (INHALATION) at 10:26

## 2025-05-19 RX ADMIN — IPRATROPIUM BROMIDE AND ALBUTEROL SULFATE 1 DOSE: 2.5; .5 SOLUTION RESPIRATORY (INHALATION) at 19:00

## 2025-05-19 RX ADMIN — ENOXAPARIN SODIUM 60 MG: 100 INJECTION SUBCUTANEOUS at 20:42

## 2025-05-19 RX ADMIN — PANTOPRAZOLE SODIUM 40 MG: 40 TABLET, DELAYED RELEASE ORAL at 06:38

## 2025-05-19 RX ADMIN — PRAVASTATIN SODIUM 20 MG: 20 TABLET ORAL at 20:42

## 2025-05-19 RX ADMIN — ASPIRIN 81 MG: 81 TABLET, COATED ORAL at 11:16

## 2025-05-19 RX ADMIN — MICONAZOLE NITRATE: 2 POWDER TOPICAL at 11:18

## 2025-05-19 NOTE — PROGRESS NOTES
[facility]      Patient:  Daljit Elizondo  YOB: 1960  Date of Service: 5/19/2025  MRN: [id number]   Acct: [id number]   Primary Care Physician: Jessie Helms APRN - CNP  Advance Directive: Full Code  Admit Date: 5/14/2025       Hospital Day: 5  Portions of this note have been copied forward, however, changed to reflect the most current clinical status of this patient.    CHIEF COMPLAINT Shortness of breath    SUBJECTIVE:  C/O nasal congestion, itching, and eye irritation. Afebrile. Currently on 4LNC. Reports improvement in his SOB.     CUMULATIVE HOSPITAL STAY:  Patient is a 64-year-old male with a PMH of obesity, diabetes, GERD, HLD, HTN, lymphedema, REGI, chronic respiratory failure on 3 L nasal cannula at baseline who presented to [facility] ED on 5/14/2025 with complaints of RIVAS and weakness.  He additionally endorsed chest heaviness, increased BLE edema.  At baseline he walks short distances with a walker at home, however, due to his weakness he was unable to get out of his bed and did not take his prescribed Bumex for several days.  He reported a productive cough with frothy pink sputum.  Denied fever, chills, nausea, vomiting or abdominal pain.  Further ED workup revealed , K4.5, BUN-15 and creatinine-0.9.  He was hypoxic in the ED with an SpO2 of 83% was therefore placed on BiPAP thyroid studies within normal limits.  proBNP 4707.  CBC unremarkable UA negative for infection.  Chest x-ray atelectasis and or felt infiltrate of the left pleural effusion. He was admitted to hospital medicine for further treatment and evaluation.     He was initiated on IV Bumex 1 mg BID, however, he had poor UOP, therefore, he was placed on a Bumex gtt on 5/16/2025. He was additionally given a x1 dose of Diamox on 5/17/2025 and now has a net negative of greater than 15L. He has been transitioned back to Bumex 2mg po daily-currently on hold due to STEFANIE.  Renal function-mild decreased likely due to diuresis-

## 2025-05-19 NOTE — CARE COORDINATION
Spoke with Gracy, of all the [facility] facilities, [facility] was the only one that could have potentionally accepted him and they are having to deny at this time.  No new updated therapy notes.  Gracy stated she will follow pending further progress with therapy.  Will discuss other options with pt.  Electronically signed by Shira Tomlin RN on 5/19/2025 at 3:26 PM

## 2025-05-19 NOTE — PROGRESS NOTES
BP low this afternoon 78/45 auto; 86/52 manual.  Dr. Haji notified.  Cozaar on hold.  Held this AM

## 2025-05-19 NOTE — PROGRESS NOTES
Nutrition Assessment     Type and Reason for Visit: Reassess    Nutrition Recommendations/Plan:   Continue current POC     Malnutrition Assessment:  Malnutrition Status: At risk for malnutrition    Nutrition Assessment:  Pt waiting for breakfast at time of visit.  PO intake has remained good with intake ranging %.  No new weight available.  Glucose 155-157, Accuchek's 130-169.  No questions voiced re: diet edcucation    Estimated Daily Nutrient Needs:  Energy (kcal):  5607-1623 kcals (8-11 kcals/kg)       Protein (g):  145g Weight Used for Protein Requirements: Ideal        Fluid (ml/day):  2050-2807 ml Method Used for Fluid Requirements: 1 ml/kcal    Nutrition Related Findings:   Gllucose 155-157, Accuchek's 130-169 Wound Type: Venous Stasis, Pressure Injury    Current Nutrition Therapies:    ADULT DIET; Regular; 4 carb choices (60 gm/meal); Low Fat/Low Chol/High Fiber/2 gm Na    Anthropometric Measures:  Height: 175.3 cm (5' 9.02\")  Current Body Wt: 259.5 kg (572 lb 1.5 oz)   BMI: 84.4        Nutrition Diagnosis:   Food & nutrition-related knowledge deficit related to cardiac dysfunction as evidenced by localized or generalized fluid accumulation, lab values    Nutrition Interventions:   Food and/or Nutrient Delivery: Continue Current Diet  Nutrition Education/Counseling: Survival skills/brief education completed, Education/Counseling completed  Coordination of Nutrition Care: Continue to monitor while inpatient  Plan of Care discussed with: pt    Goals:  Goals: Meet at least 75% of estimated needs, PO intake 50% or greater  Type of Goal: Continue current goal  Previous Goal Met: Progressing toward Goal(s)    Nutrition Monitoring and Evaluation:   Behavioral-Environmental Outcomes: Knowledge or Skill, Readiness for Change  Food/Nutrient Intake Outcomes: Food and Nutrient Intake  Physical Signs/Symptoms Outcomes: Biochemical Data, Weight, Skin, Fluid Status or Edema    Discharge Planning:    Continue current

## 2025-05-19 NOTE — CONSULTS
Nurse met with patient re: referral to CHF nurse. Discussed with patient diet, activity, medications, definition of heart failure/testing, monitoring of BS and s/s, daily wt monitoring with reporting of wt gain of >2-3 lbs in 24 hours or > 5 lbs in one week, BP/HR and activity monitoring with use of daily log, f/u appointments with PCP.  HF packet given. Patient reports that it is difficult to weigh self daily due to limited mobility. He hopes to discharge to SNF when he leaves here but has not been accepted yet.

## 2025-05-20 LAB
ANION GAP SERPL CALCULATED.3IONS-SCNC: 11 MMOL/L (ref 8–16)
BASOPHILS # BLD: 0.1 K/UL (ref 0–0.2)
BASOPHILS NFR BLD: 1.1 % (ref 0–1)
BNP BLD-MCNC: 2006 PG/ML (ref 0–124)
BUN SERPL-MCNC: 28 MG/DL (ref 8–23)
CALCIUM SERPL-MCNC: 9.4 MG/DL (ref 8.8–10.2)
CHLORIDE SERPL-SCNC: 101 MMOL/L (ref 98–107)
CO2 SERPL-SCNC: 34 MMOL/L (ref 22–29)
CREAT SERPL-MCNC: 1.2 MG/DL (ref 0.7–1.2)
EOSINOPHIL # BLD: 0.3 K/UL (ref 0–0.6)
EOSINOPHIL NFR BLD: 4.3 % (ref 0–5)
ERYTHROCYTE [DISTWIDTH] IN BLOOD BY AUTOMATED COUNT: 15.9 % (ref 11.5–14.5)
GLUCOSE BLD-MCNC: 131 MG/DL (ref 70–99)
GLUCOSE BLD-MCNC: 167 MG/DL (ref 70–99)
GLUCOSE BLD-MCNC: 173 MG/DL (ref 70–99)
GLUCOSE BLD-MCNC: 179 MG/DL (ref 70–99)
GLUCOSE SERPL-MCNC: 126 MG/DL (ref 70–99)
HCT VFR BLD AUTO: 48.9 % (ref 42–52)
HGB BLD-MCNC: 14.4 G/DL (ref 14–18)
IMM GRANULOCYTES # BLD: 0 K/UL
LYMPHOCYTES # BLD: 0.8 K/UL (ref 1.1–4.5)
LYMPHOCYTES NFR BLD: 12.4 % (ref 20–40)
MCH RBC QN AUTO: 29.4 PG (ref 27–31)
MCHC RBC AUTO-ENTMCNC: 29.4 G/DL (ref 33–37)
MCV RBC AUTO: 99.8 FL (ref 80–94)
MONOCYTES # BLD: 0.7 K/UL (ref 0–0.9)
MONOCYTES NFR BLD: 10.9 % (ref 0–10)
NEUTROPHILS # BLD: 4.5 K/UL (ref 1.5–7.5)
NEUTS SEG NFR BLD: 71 % (ref 50–65)
PERFORMED ON: ABNORMAL
PLATELET # BLD AUTO: 131 K/UL (ref 130–400)
PMV BLD AUTO: 13 FL (ref 9.4–12.4)
POTASSIUM SERPL-SCNC: 3.7 MMOL/L (ref 3.5–5)
RBC # BLD AUTO: 4.9 M/UL (ref 4.7–6.1)
SODIUM SERPL-SCNC: 146 MMOL/L (ref 136–145)
WBC # BLD AUTO: 6.4 K/UL (ref 4.8–10.8)

## 2025-05-20 PROCEDURE — 2700000000 HC OXYGEN THERAPY PER DAY

## 2025-05-20 PROCEDURE — 6360000002 HC RX W HCPCS

## 2025-05-20 PROCEDURE — 36415 COLL VENOUS BLD VENIPUNCTURE: CPT

## 2025-05-20 PROCEDURE — 83880 ASSAY OF NATRIURETIC PEPTIDE: CPT

## 2025-05-20 PROCEDURE — 6370000000 HC RX 637 (ALT 250 FOR IP): Performed by: NURSE PRACTITIONER

## 2025-05-20 PROCEDURE — 97535 SELF CARE MNGMENT TRAINING: CPT

## 2025-05-20 PROCEDURE — 2500000003 HC RX 250 WO HCPCS

## 2025-05-20 PROCEDURE — 1200000000 HC SEMI PRIVATE

## 2025-05-20 PROCEDURE — 82962 GLUCOSE BLOOD TEST: CPT

## 2025-05-20 PROCEDURE — 80048 BASIC METABOLIC PNL TOTAL CA: CPT

## 2025-05-20 PROCEDURE — 97530 THERAPEUTIC ACTIVITIES: CPT

## 2025-05-20 PROCEDURE — 94760 N-INVAS EAR/PLS OXIMETRY 1: CPT

## 2025-05-20 PROCEDURE — 85025 COMPLETE CBC W/AUTO DIFF WBC: CPT

## 2025-05-20 PROCEDURE — 6370000000 HC RX 637 (ALT 250 FOR IP)

## 2025-05-20 PROCEDURE — 94640 AIRWAY INHALATION TREATMENT: CPT

## 2025-05-20 RX ADMIN — ENOXAPARIN SODIUM 60 MG: 100 INJECTION SUBCUTANEOUS at 21:24

## 2025-05-20 RX ADMIN — IPRATROPIUM BROMIDE AND ALBUTEROL SULFATE 1 DOSE: 2.5; .5 SOLUTION RESPIRATORY (INHALATION) at 14:40

## 2025-05-20 RX ADMIN — IPRATROPIUM BROMIDE AND ALBUTEROL SULFATE 1 DOSE: 2.5; .5 SOLUTION RESPIRATORY (INHALATION) at 18:28

## 2025-05-20 RX ADMIN — POTASSIUM CHLORIDE 20 MEQ: 1500 TABLET, EXTENDED RELEASE ORAL at 09:35

## 2025-05-20 RX ADMIN — CETIRIZINE HYDROCHLORIDE 10 MG: 10 TABLET ORAL at 09:35

## 2025-05-20 RX ADMIN — GUAIFENESIN 600 MG: 600 TABLET, EXTENDED RELEASE ORAL at 21:23

## 2025-05-20 RX ADMIN — PRAVASTATIN SODIUM 20 MG: 20 TABLET ORAL at 21:23

## 2025-05-20 RX ADMIN — MICONAZOLE NITRATE: 2 POWDER TOPICAL at 09:36

## 2025-05-20 RX ADMIN — PANTOPRAZOLE SODIUM 40 MG: 40 TABLET, DELAYED RELEASE ORAL at 06:24

## 2025-05-20 RX ADMIN — ALLOPURINOL 300 MG: 300 TABLET ORAL at 09:36

## 2025-05-20 RX ADMIN — MICONAZOLE NITRATE: 2 POWDER TOPICAL at 21:24

## 2025-05-20 RX ADMIN — LACTULOSE 20 G: 20 SOLUTION ORAL at 09:36

## 2025-05-20 RX ADMIN — IPRATROPIUM BROMIDE AND ALBUTEROL SULFATE 1 DOSE: 2.5; .5 SOLUTION RESPIRATORY (INHALATION) at 10:30

## 2025-05-20 RX ADMIN — ENOXAPARIN SODIUM 60 MG: 100 INJECTION SUBCUTANEOUS at 09:35

## 2025-05-20 RX ADMIN — LACTULOSE 20 G: 20 SOLUTION ORAL at 15:33

## 2025-05-20 RX ADMIN — GUAIFENESIN 600 MG: 600 TABLET, EXTENDED RELEASE ORAL at 09:35

## 2025-05-20 RX ADMIN — ASPIRIN 81 MG: 81 TABLET, COATED ORAL at 09:35

## 2025-05-20 RX ADMIN — SODIUM CHLORIDE, PRESERVATIVE FREE 10 ML: 5 INJECTION INTRAVENOUS at 09:36

## 2025-05-20 RX ADMIN — LACTULOSE 20 G: 20 SOLUTION ORAL at 21:23

## 2025-05-20 RX ADMIN — SODIUM CHLORIDE, PRESERVATIVE FREE 10 ML: 5 INJECTION INTRAVENOUS at 21:24

## 2025-05-20 RX ADMIN — INSULIN GLARGINE 30 UNITS: 100 INJECTION, SOLUTION SUBCUTANEOUS at 21:23

## 2025-05-20 RX ADMIN — MONTELUKAST SODIUM 10 MG: 10 TABLET, FILM COATED ORAL at 21:23

## 2025-05-20 RX ADMIN — IPRATROPIUM BROMIDE AND ALBUTEROL SULFATE 1 DOSE: 2.5; .5 SOLUTION RESPIRATORY (INHALATION) at 07:06

## 2025-05-20 ASSESSMENT — PAIN DESCRIPTION - FREQUENCY: FREQUENCY: CONTINUOUS

## 2025-05-20 ASSESSMENT — PAIN SCALES - GENERAL: PAINLEVEL_OUTOF10: 0

## 2025-05-20 ASSESSMENT — PAIN DESCRIPTION - LOCATION: LOCATION: LEG

## 2025-05-20 ASSESSMENT — PAIN DESCRIPTION - ONSET: ONSET: ON-GOING

## 2025-05-20 ASSESSMENT — PAIN DESCRIPTION - DESCRIPTORS: DESCRIPTORS: SORE

## 2025-05-20 ASSESSMENT — PAIN - FUNCTIONAL ASSESSMENT: PAIN_FUNCTIONAL_ASSESSMENT: ACTIVITIES ARE NOT PREVENTED

## 2025-05-20 ASSESSMENT — PAIN DESCRIPTION - PAIN TYPE: TYPE: CHRONIC PAIN

## 2025-05-20 ASSESSMENT — PAIN DESCRIPTION - ORIENTATION: ORIENTATION: RIGHT;LEFT

## 2025-05-20 NOTE — CARE COORDINATION
[facility] was not able to offer a bed.  Electronically signed by Shira Tomlin RN on 5/20/2025 at 11:07 AM      Referral sent to [facility] via [facility].  Will follow  To see if they received it  Electronically signed by Shira Tomlin RN on 5/20/2025 at 9:20 AM     F/u Neurology 2-4 weeks as needed

## 2025-05-20 NOTE — PROGRESS NOTES
Occupational Therapy     05/20/25 1500   Subjective   Subjective Pt in bed upon arrival for therapy. Pt agreeable to participate.   Pain Assessment   Pain Assessment 0-10   Pain Location Leg   Pain Orientation Right;Left   Pain Descriptors Sore   Functional Pain Assessment Activities are not prevented   Pain Type Chronic pain   Pain Frequency Continuous   Pain Onset On-going   Non-Pharmaceutical Pain Intervention(s) Ambulation/Increased Activity;Repositioned;Rest   Response to Pain Intervention Patient satisfied   Vitals   O2 Device Nasal cannula   Cognition   Overall Cognitive Status WFL   Cognition Comment Awake, alert, follows simple commands   Orientation   Overall Orientation Status WNL   Bed Mobility Training   Bed Mobility Training Yes   Overall Level of Assistance Partial/Moderate assistance;Substantial/Maximal assistance   Interventions Verbal cues;Tactile cues;Manual cues   Rolling Partial/Moderate assistance;Substantial/Maximal assistance   Transfer Training   Transfer Training Yes   Overall Level of Assistance Dependent/Total   Interventions Verbal cues;Tactile cues;Manual cues   Bed to Chair Dependent/Total   ADL   Feeding Independent;Setup   Grooming Stand by assistance   UE Bathing Moderate assistance;Maximum assistance   LE Bathing Dependent/Total   UE Dressing Moderate assistance;Maximum assistance   LE Dressing Dependent/Total   Putting On/Taking Off Footwear Dependent/Total   Toileting Maximum assistance   Toileting Skilled Clinical Factors bedlevel   Functional Mobility Dependent/Total   Functional Mobility Skilled Clinical Factors Alfredo lift to recliner.   Assessment   Assessment Tx focused on bed mobility including rolling side to side to complete toileting task and lift pad placement. Lift used to transfer patient to raffaele chair. Pt instructed on sitting balance activity in recliner including leaning foward to get back off chair. Pt encouraged to put BLEs on the floor in and attempt to stand this

## 2025-05-20 NOTE — PROGRESS NOTES
[facility]      Patient:  Daljit Elizondo  YOB: 1960  Date of Service: 5/20/2025  MRN: [id number]   Acct: [id number]   Primary Care Physician: Jessie Helms APRN - CNP  Advance Directive: Full Code  Admit Date: 5/14/2025       Hospital Day: 6  Portions of this note have been copied forward, however, changed to reflect the most current clinical status of this patient.    CHIEF COMPLAINT Shortness of breath    SUBJECTIVE:  C/O nasal congestion. Afebrile. Currently on 5LNC. Reports improvement in his SOB.     CUMULATIVE HOSPITAL STAY:  Patient is a 64-year-old male with a PMH of obesity, diabetes, GERD, HLD, HTN, lymphedema, REGI, chronic respiratory failure on 3 L nasal cannula at baseline who presented to [facility] ED on 5/14/2025 with complaints of RIVAS and weakness.  He additionally endorsed chest heaviness, increased BLE edema.  At baseline he walks short distances with a walker at home, however, due to his weakness he was unable to get out of his bed and did not take his prescribed Bumex for several days.  He reported a productive cough with frothy pink sputum.  Denied fever, chills, nausea, vomiting or abdominal pain.  Further ED workup revealed , K4.5, BUN-15 and creatinine-0.9.  He was hypoxic in the ED with an SpO2 of 83% was therefore placed on BiPAP thyroid studies within normal limits.  proBNP 4707.  CBC unremarkable UA negative for infection.  Chest x-ray atelectasis and or felt infiltrate of the left pleural effusion. He was admitted to hospital medicine for further treatment and evaluation.     He was initiated on IV Bumex 1 mg BID, however, he had poor UOP, therefore, he was placed on a Bumex gtt on 5/16/2025. He was additionally given a x1 dose of Diamox on 5/17/2025 and now has a net negative of greater than 15L. He has been transitioned back to Bumex 2mg po daily-currently on hold due to STEFANIE.  Renal function-mild decreased likely due to diuresis. He was given IVF's and  \"BILITOT\", \"ALKPHOS\" in the last 72 hours.    Invalid input(s): \"ALB\"  Troponin T: No results for input(s): \"TROPONINI\" in the last 72 hours.  Pro-BNP: No results for input(s): \"BNP\" in the last 72 hours.  INR: No results for input(s): \"INR\" in the last 72 hours.  UA:No results for input(s): \"NITRITE\", \"COLORU\", \"PHUR\", \"LABCAST\", \"WBCUA\", \"RBCUA\", \"MUCUS\", \"TRICHOMONAS\", \"YEAST\", \"BACTERIA\", \"CLARITYU\", \"SPECGRAV\", \"LEUKOCYTESUR\", \"UROBILINOGEN\", \"BILIRUBINUR\", \"BLOODU\", \"GLUCOSEU\", \"AMORPHOUS\" in the last 72 hours.    Invalid input(s): \"KETONESU\"  A1C: No results for input(s): \"LABA1C\" in the last 72 hours.  ABG:No results for input(s): \"PHART\", \"LKM8IYK\", \"PO2ART\", \"IOU5DEU\", \"BEART\", \"HGBAE\", \"F6BYELXK\", \"CARBOXHGBART\" in the last 72 hours.    RAD:   XR CHEST PORTABLE  Result Date: 5/14/2025  Atelectasis and/or infiltrate on the left. Small left pleural effusion   ______________________________________ Electronically signed by: EMILIANO JEFFERS M.D. Date:     05/14/2025 Time:    14:55        Echo:   Technically difficult study due to patient's body habitus. Nondiagnostic study due to poor image quality.  Suggest alternative cardiac imaging including RISSA or cardiac CT, if clinically indicated.       Assessment/Plan   Principal Problem:    Acute congestive heart failure, unspecified heart failure type (HCC)   -Unable to obtain accurate TTE due to body habitus   -Net negative 15,815 L   -Bumex oral 2mg-held today due to angie   -Continue GDMT with BB, ARB, and Bumex   -Strict I&O   -Daily labs   -Low Na diet   -Cardiac educator     Active Problems:  Diabetes (HCC)   -lantus 30 units at HS  -Medium dose correctional protocol   -Carb conscious diet   -Hypoglycemic protocol   -Accuchecks     Diabetic wounds-right and left great toes   -WCON following   -Continue wound care per recommendations       Essential hypertension   -Losartan held   -Toprol held   -Monitor         GERD (gastroesophageal reflux disease)   -PPI

## 2025-05-20 NOTE — PLAN OF CARE
Problem: Chronic Conditions and Co-morbidities  Goal: Patient's chronic conditions and co-morbidity symptoms are monitored and maintained or improved  5/20/2025 1555 by Magdalena Lowe RN  Outcome: Progressing  5/20/2025 0452 by Tammy Ruiz RN  Outcome: Progressing     Problem: Discharge Planning  Goal: Discharge to home or other facility with appropriate resources  5/20/2025 1555 by Magdalena Lowe RN  Outcome: Progressing  5/20/2025 0452 by Tammy Ruiz RN  Outcome: Progressing     Problem: Pain  Goal: Verbalizes/displays adequate comfort level or baseline comfort level  5/20/2025 1555 by Magdalena Lowe RN  Outcome: Progressing  5/20/2025 0452 by Tammy Ruiz RN  Outcome: Progressing     Problem: Safety - Adult  Goal: Free from fall injury  5/20/2025 1555 by Magdalena Lowe RN  Outcome: Progressing  5/20/2025 0452 by Tammy Ruiz RN  Outcome: Progressing     Problem: Skin/Tissue Integrity  Goal: Skin integrity remains intact  Description: 1.  Monitor for areas of redness and/or skin breakdown2.  Assess vascular access sites hourly3.  Every 4-6 hours minimum:  Change oxygen saturation probe site4.  Every 4-6 hours:  If on nasal continuous positive airway pressure, respiratory therapy assess nares and determine need for appliance change or resting period  5/20/2025 1555 by Magdalena Lowe RN  Outcome: Progressing  Flowsheets (Taken 5/20/2025 0935)  Skin Integrity Remains Intact: Monitor for areas of redness and/or skin breakdown  5/20/2025 0452 by Tammy Ruiz RN  Outcome: Progressing

## 2025-05-21 LAB
ANION GAP SERPL CALCULATED.3IONS-SCNC: 10 MMOL/L (ref 8–16)
BASOPHILS # BLD: 0.1 K/UL (ref 0–0.2)
BASOPHILS NFR BLD: 0.9 % (ref 0–1)
BUN SERPL-MCNC: 28 MG/DL (ref 8–23)
CALCIUM SERPL-MCNC: 9.6 MG/DL (ref 8.8–10.2)
CHLORIDE SERPL-SCNC: 100 MMOL/L (ref 98–107)
CO2 SERPL-SCNC: 34 MMOL/L (ref 22–29)
CREAT SERPL-MCNC: 1.1 MG/DL (ref 0.7–1.2)
EOSINOPHIL # BLD: 0.3 K/UL (ref 0–0.6)
EOSINOPHIL NFR BLD: 4.4 % (ref 0–5)
ERYTHROCYTE [DISTWIDTH] IN BLOOD BY AUTOMATED COUNT: 15.7 % (ref 11.5–14.5)
GLUCOSE BLD-MCNC: 119 MG/DL (ref 70–99)
GLUCOSE BLD-MCNC: 128 MG/DL (ref 70–99)
GLUCOSE BLD-MCNC: 145 MG/DL (ref 70–99)
GLUCOSE BLD-MCNC: 146 MG/DL (ref 70–99)
GLUCOSE SERPL-MCNC: 148 MG/DL (ref 70–99)
HCT VFR BLD AUTO: 50.6 % (ref 42–52)
HGB BLD-MCNC: 15.2 G/DL (ref 14–18)
IMM GRANULOCYTES # BLD: 0 K/UL
LYMPHOCYTES # BLD: 0.7 K/UL (ref 1.1–4.5)
LYMPHOCYTES NFR BLD: 9.2 % (ref 20–40)
MCH RBC QN AUTO: 29.9 PG (ref 27–31)
MCHC RBC AUTO-ENTMCNC: 30 G/DL (ref 33–37)
MCV RBC AUTO: 99.4 FL (ref 80–94)
MONOCYTES # BLD: 0.7 K/UL (ref 0–0.9)
MONOCYTES NFR BLD: 9.8 % (ref 0–10)
NEUTROPHILS # BLD: 5.6 K/UL (ref 1.5–7.5)
NEUTS SEG NFR BLD: 75.3 % (ref 50–65)
PERFORMED ON: ABNORMAL
PLATELET # BLD AUTO: 119 K/UL (ref 130–400)
PMV BLD AUTO: 12.7 FL (ref 9.4–12.4)
POTASSIUM SERPL-SCNC: 3.9 MMOL/L (ref 3.5–5)
RBC # BLD AUTO: 5.09 M/UL (ref 4.7–6.1)
SODIUM SERPL-SCNC: 144 MMOL/L (ref 136–145)
WBC # BLD AUTO: 7.5 K/UL (ref 4.8–10.8)

## 2025-05-21 PROCEDURE — 2500000003 HC RX 250 WO HCPCS

## 2025-05-21 PROCEDURE — 6370000000 HC RX 637 (ALT 250 FOR IP)

## 2025-05-21 PROCEDURE — 2700000000 HC OXYGEN THERAPY PER DAY

## 2025-05-21 PROCEDURE — 6370000000 HC RX 637 (ALT 250 FOR IP): Performed by: NURSE PRACTITIONER

## 2025-05-21 PROCEDURE — 82962 GLUCOSE BLOOD TEST: CPT

## 2025-05-21 PROCEDURE — 80048 BASIC METABOLIC PNL TOTAL CA: CPT

## 2025-05-21 PROCEDURE — 94760 N-INVAS EAR/PLS OXIMETRY 1: CPT

## 2025-05-21 PROCEDURE — 36415 COLL VENOUS BLD VENIPUNCTURE: CPT

## 2025-05-21 PROCEDURE — 1200000000 HC SEMI PRIVATE

## 2025-05-21 PROCEDURE — 85025 COMPLETE CBC W/AUTO DIFF WBC: CPT

## 2025-05-21 PROCEDURE — 6360000002 HC RX W HCPCS

## 2025-05-21 PROCEDURE — 94640 AIRWAY INHALATION TREATMENT: CPT

## 2025-05-21 PROCEDURE — 97530 THERAPEUTIC ACTIVITIES: CPT

## 2025-05-21 RX ADMIN — POTASSIUM CHLORIDE 20 MEQ: 1500 TABLET, EXTENDED RELEASE ORAL at 08:59

## 2025-05-21 RX ADMIN — ALLOPURINOL 300 MG: 300 TABLET ORAL at 08:58

## 2025-05-21 RX ADMIN — ENOXAPARIN SODIUM 60 MG: 100 INJECTION SUBCUTANEOUS at 21:32

## 2025-05-21 RX ADMIN — SODIUM CHLORIDE, PRESERVATIVE FREE 10 ML: 5 INJECTION INTRAVENOUS at 21:45

## 2025-05-21 RX ADMIN — INSULIN GLARGINE 30 UNITS: 100 INJECTION, SOLUTION SUBCUTANEOUS at 21:32

## 2025-05-21 RX ADMIN — SODIUM CHLORIDE, PRESERVATIVE FREE 10 ML: 5 INJECTION INTRAVENOUS at 08:59

## 2025-05-21 RX ADMIN — ENOXAPARIN SODIUM 60 MG: 100 INJECTION SUBCUTANEOUS at 08:59

## 2025-05-21 RX ADMIN — MONTELUKAST SODIUM 10 MG: 10 TABLET, FILM COATED ORAL at 21:32

## 2025-05-21 RX ADMIN — LACTULOSE 20 G: 20 SOLUTION ORAL at 09:00

## 2025-05-21 RX ADMIN — GUAIFENESIN 600 MG: 600 TABLET, EXTENDED RELEASE ORAL at 21:32

## 2025-05-21 RX ADMIN — CETIRIZINE HYDROCHLORIDE 10 MG: 10 TABLET ORAL at 08:59

## 2025-05-21 RX ADMIN — PRAVASTATIN SODIUM 20 MG: 20 TABLET ORAL at 21:32

## 2025-05-21 RX ADMIN — IPRATROPIUM BROMIDE AND ALBUTEROL SULFATE 1 DOSE: 2.5; .5 SOLUTION RESPIRATORY (INHALATION) at 14:55

## 2025-05-21 RX ADMIN — IPRATROPIUM BROMIDE AND ALBUTEROL SULFATE 1 DOSE: 2.5; .5 SOLUTION RESPIRATORY (INHALATION) at 19:49

## 2025-05-21 RX ADMIN — IPRATROPIUM BROMIDE AND ALBUTEROL SULFATE 1 DOSE: 2.5; .5 SOLUTION RESPIRATORY (INHALATION) at 10:13

## 2025-05-21 RX ADMIN — ASPIRIN 81 MG: 81 TABLET, COATED ORAL at 08:59

## 2025-05-21 RX ADMIN — GUAIFENESIN 600 MG: 600 TABLET, EXTENDED RELEASE ORAL at 08:59

## 2025-05-21 NOTE — PROGRESS NOTES
Occupational Therapy     05/21/25 1100   Subjective   Subjective Pt in bed upon arrival for therapy. Pt declines getting up to recliner this date. Pt states it was too painful for him to sit up in the recliner yesterday.   Pain Assessment   Pain Assessment None - Denies Pain   Cognition   Overall Cognitive Status WFL   Cognition Comment Awake, alert, follows simple commands   Orientation   Overall Orientation Status WNL   Bed Mobility Training   Bed Mobility Training Yes   Overall Level of Assistance Partial/Moderate assistance;Substantial/Maximal assistance   Interventions Verbal cues;Tactile cues;Manual cues   Rolling Partial/Moderate assistance;Substantial/Maximal assistance   Scooting Minimal assistance  (with mechanical bed, bed rails and trapeze.)   Transfer Training   Transfer Training No   ADL   Feeding Independent;Setup   Grooming Stand by assistance   UE Bathing Moderate assistance;Maximum assistance   LE Bathing Dependent/Total   UE Dressing Moderate assistance;Maximum assistance   LE Dressing Dependent/Total   Putting On/Taking Off Footwear Dependent/Total   Toileting Maximum assistance   Toileting Skilled Clinical Factors bedlevel   Functional Mobility Dependent/Total   Functional Mobility Skilled Clinical Factors Alfredo lift to recliner.   Assessment   Assessment Tx focused on repositioning in bed for comfort. Pt instructed on guided, AROM using BUEs/BLEs as able at bed level to promote mobility. Pt took rest breaks as needed.   Activity Tolerance Patient tolerated treatment well   Discharge Recommendations Patient would benefit from continued therapy after discharge;24 hour supervision or assist   Occupational Therapy Plan   Times Per Week 3-5   Times Per Day Once a day   OT Plan of Care   Wednesday X     Electronically signed by BRUNO Frausto on 5/21/2025 at 11:15 AM

## 2025-05-21 NOTE — PLAN OF CARE
Problem: Chronic Conditions and Co-morbidities  Goal: Patient's chronic conditions and co-morbidity symptoms are monitored and maintained or improved  5/20/2025 2245 by Anna Aguilera RN  Outcome: Progressing  5/20/2025 1555 by Magdalena Lowe RN  Outcome: Progressing     Problem: Discharge Planning  Goal: Discharge to home or other facility with appropriate resources  5/20/2025 2245 by Anna Aguilera RN  Outcome: Progressing  5/20/2025 1555 by Magdalena Lowe RN  Outcome: Progressing     Problem: Pain  Goal: Verbalizes/displays adequate comfort level or baseline comfort level  5/20/2025 2245 by Anna Aguilera RN  Outcome: Progressing  5/20/2025 1555 by Magdalena Lowe RN  Outcome: Progressing     Problem: Safety - Adult  Goal: Free from fall injury  5/20/2025 2245 by Anna Aguilera RN  Outcome: Progressing  5/20/2025 1555 by Magdalena Lowe RN  Outcome: Progressing     Problem: ABCDS Injury Assessment  Goal: Absence of physical injury  5/20/2025 2245 by Anna Aguilera RN  Outcome: Progressing  5/20/2025 1555 by Magdalena Lowe RN  Outcome: Progressing

## 2025-05-21 NOTE — PROGRESS NOTES
[facility]      Patient:  Daljit Elizondo  YOB: 1960  Date of Service: 5/21/2025  MRN: [id number]   Acct: [id number]   Primary Care Physician: Jessie Helms APRN - CNP  Advance Directive: Full Code  Admit Date: 5/14/2025       Hospital Day: 7  Portions of this note have been copied forward, however, changed to reflect the most current clinical status of this patient.    CHIEF COMPLAINT Shortness of breath    SUBJECTIVE:  C/O nasal congestion. Afebrile. Currently on 5LNC. Reports improvement in his SOB.     CUMULATIVE HOSPITAL STAY:  Patient is a 64-year-old male with a PMH of obesity, diabetes, GERD, HLD, HTN, lymphedema, REGI, chronic respiratory failure on 3 L nasal cannula at baseline who presented to [facility] ED on 5/14/2025 with complaints of RIVAS and weakness.  He additionally endorsed chest heaviness, increased BLE edema.  At baseline he walks short distances with a walker at home, however, due to his weakness he was unable to get out of his bed and did not take his prescribed Bumex for several days.  He reported a productive cough with frothy pink sputum.  Denied fever, chills, nausea, vomiting or abdominal pain.  Further ED workup revealed , K4.5, BUN-15 and creatinine-0.9.  He was hypoxic in the ED with an SpO2 of 83% was therefore placed on BiPAP thyroid studies within normal limits.  proBNP 4707.  CBC unremarkable UA negative for infection.  Chest x-ray atelectasis and or felt infiltrate of the left pleural effusion. He was admitted to hospital medicine for further treatment and evaluation.     He was initiated on IV Bumex 1 mg BID, however, he had poor UOP, therefore, he was placed on a Bumex gtt on 5/16/2025. He was additionally given a x1 dose of Diamox on 5/17/2025 and now has a net negative of greater than 15L. He has been transitioned back to Bumex 2mg po daily    Renal function-stable BUN-28 and creatinine-1.1.     2D echo was attempted, however, unable to be obtained due to  his body habitus. He has been weaned to 5LNC with PAP at HS and naps. Shortness of breath is improving. Nebs continue and antibiotics Dc'd as he has no evidence of infectious process such as tachycardia, fever, or leukocytosis.     Visine continues allergic conjunctivitis. Singular daily continues. Ocean spray nasal spray continues and used encouraged.    Case management involved with discharge planning-wishes to discharge to SNF. PT/OT following and reconsulted. He has been encouraged to participate with therapy and sit up in the chair. WCON following for diabetic wounds      Review of Systems:   Review of Systems   Respiratory:  Positive for shortness of breath (improved).    Skin:  Positive for wound.   Neurological:  Positive for weakness.       14 point review of systems is negative except as specifically addressed above.      Objective:   VITALS:  /71   Pulse 93   Temp 98.1 °F (36.7 °C) (Axillary)   Resp 18   Ht 1.753 m (5' 9.02\")   Wt (!) 259.5 kg (572 lb)   SpO2 98%   BMI 84.43 kg/m²   24HR INTAKE/OUTPUT:    Intake/Output Summary (Last 24 hours) at 5/21/2025 1010  Last data filed at 5/21/2025 0152  Gross per 24 hour   Intake 10 ml   Output 1550 ml   Net -1540 ml       Physical Exam  Vitals reviewed.   Constitutional:       Appearance: He is obese. He is ill-appearing.   Cardiovascular:      Rate and Rhythm: Normal rate and regular rhythm.      Pulses: Normal pulses.      Heart sounds: Normal heart sounds.   Pulmonary:      Effort: Tachypnea present.      Breath sounds: Examination of the right-lower field reveals decreased breath sounds. Examination of the left-lower field reveals decreased breath sounds. Decreased breath sounds present.      Comments: Occasional cough  Abdominal:      General: Bowel sounds are normal.      Palpations: Abdomen is soft.   Musculoskeletal:      Right lower leg: Edema present.      Left lower leg: Edema present.   Skin:     General: Skin is warm.      Capillary

## 2025-05-22 LAB
ANION GAP SERPL CALCULATED.3IONS-SCNC: 9 MMOL/L (ref 8–16)
BASOPHILS # BLD: 0.1 K/UL (ref 0–0.2)
BASOPHILS NFR BLD: 1 % (ref 0–1)
BUN SERPL-MCNC: 31 MG/DL (ref 8–23)
CALCIUM SERPL-MCNC: 9.6 MG/DL (ref 8.8–10.2)
CHLORIDE SERPL-SCNC: 101 MMOL/L (ref 98–107)
CO2 SERPL-SCNC: 34 MMOL/L (ref 22–29)
CREAT SERPL-MCNC: 1 MG/DL (ref 0.7–1.2)
EOSINOPHIL # BLD: 0.2 K/UL (ref 0–0.6)
EOSINOPHIL NFR BLD: 4 % (ref 0–5)
ERYTHROCYTE [DISTWIDTH] IN BLOOD BY AUTOMATED COUNT: 15.6 % (ref 11.5–14.5)
GLUCOSE BLD-MCNC: 103 MG/DL (ref 70–99)
GLUCOSE BLD-MCNC: 131 MG/DL (ref 70–99)
GLUCOSE BLD-MCNC: 163 MG/DL (ref 70–99)
GLUCOSE BLD-MCNC: 168 MG/DL (ref 70–99)
GLUCOSE SERPL-MCNC: 138 MG/DL (ref 70–99)
HCT VFR BLD AUTO: 47.3 % (ref 42–52)
HGB BLD-MCNC: 14 G/DL (ref 14–18)
IMM GRANULOCYTES # BLD: 0 K/UL
LYMPHOCYTES # BLD: 0.9 K/UL (ref 1.1–4.5)
LYMPHOCYTES NFR BLD: 14.9 % (ref 20–40)
MCH RBC QN AUTO: 29.3 PG (ref 27–31)
MCHC RBC AUTO-ENTMCNC: 29.6 G/DL (ref 33–37)
MCV RBC AUTO: 99 FL (ref 80–94)
MONOCYTES # BLD: 0.6 K/UL (ref 0–0.9)
MONOCYTES NFR BLD: 10.3 % (ref 0–10)
NEUTROPHILS # BLD: 4.2 K/UL (ref 1.5–7.5)
NEUTS SEG NFR BLD: 69.5 % (ref 50–65)
PERFORMED ON: ABNORMAL
PLATELET # BLD AUTO: 106 K/UL (ref 130–400)
PMV BLD AUTO: 13.8 FL (ref 9.4–12.4)
POTASSIUM SERPL-SCNC: 3.9 MMOL/L (ref 3.5–5)
RBC # BLD AUTO: 4.78 M/UL (ref 4.7–6.1)
SODIUM SERPL-SCNC: 144 MMOL/L (ref 136–145)
WBC # BLD AUTO: 6 K/UL (ref 4.8–10.8)

## 2025-05-22 PROCEDURE — 6360000002 HC RX W HCPCS

## 2025-05-22 PROCEDURE — 6370000000 HC RX 637 (ALT 250 FOR IP): Performed by: NURSE PRACTITIONER

## 2025-05-22 PROCEDURE — 82962 GLUCOSE BLOOD TEST: CPT

## 2025-05-22 PROCEDURE — 6370000000 HC RX 637 (ALT 250 FOR IP)

## 2025-05-22 PROCEDURE — 85025 COMPLETE CBC W/AUTO DIFF WBC: CPT

## 2025-05-22 PROCEDURE — 1200000000 HC SEMI PRIVATE

## 2025-05-22 PROCEDURE — 2500000003 HC RX 250 WO HCPCS

## 2025-05-22 PROCEDURE — 2700000000 HC OXYGEN THERAPY PER DAY

## 2025-05-22 PROCEDURE — 94640 AIRWAY INHALATION TREATMENT: CPT

## 2025-05-22 PROCEDURE — 80048 BASIC METABOLIC PNL TOTAL CA: CPT

## 2025-05-22 PROCEDURE — 36415 COLL VENOUS BLD VENIPUNCTURE: CPT

## 2025-05-22 PROCEDURE — 94760 N-INVAS EAR/PLS OXIMETRY 1: CPT

## 2025-05-22 PROCEDURE — 2500000003 HC RX 250 WO HCPCS: Performed by: HOSPITALIST

## 2025-05-22 RX ORDER — SENNOSIDES 8.6 MG/1
2 TABLET ORAL 2 TIMES DAILY
Status: DISCONTINUED | OUTPATIENT
Start: 2025-05-22 | End: 2025-07-26 | Stop reason: HOSPADM

## 2025-05-22 RX ORDER — METOPROLOL SUCCINATE 25 MG/1
25 TABLET, EXTENDED RELEASE ORAL DAILY
Status: DISCONTINUED | OUTPATIENT
Start: 2025-05-22 | End: 2025-06-04

## 2025-05-22 RX ADMIN — MONTELUKAST SODIUM 10 MG: 10 TABLET, FILM COATED ORAL at 20:57

## 2025-05-22 RX ADMIN — GUAIFENESIN 600 MG: 600 TABLET, EXTENDED RELEASE ORAL at 20:57

## 2025-05-22 RX ADMIN — INSULIN GLARGINE 30 UNITS: 100 INJECTION, SOLUTION SUBCUTANEOUS at 20:58

## 2025-05-22 RX ADMIN — MICONAZOLE NITRATE: 2 POWDER TOPICAL at 08:48

## 2025-05-22 RX ADMIN — GUAIFENESIN 600 MG: 600 TABLET, EXTENDED RELEASE ORAL at 08:48

## 2025-05-22 RX ADMIN — SODIUM CHLORIDE, PRESERVATIVE FREE 10 ML: 5 INJECTION INTRAVENOUS at 08:48

## 2025-05-22 RX ADMIN — BUMETANIDE 2 MG: 1 TABLET ORAL at 08:48

## 2025-05-22 RX ADMIN — IPRATROPIUM BROMIDE AND ALBUTEROL SULFATE 1 DOSE: 2.5; .5 SOLUTION RESPIRATORY (INHALATION) at 15:30

## 2025-05-22 RX ADMIN — ALLOPURINOL 300 MG: 300 TABLET ORAL at 08:48

## 2025-05-22 RX ADMIN — IPRATROPIUM BROMIDE AND ALBUTEROL SULFATE 1 DOSE: 2.5; .5 SOLUTION RESPIRATORY (INHALATION) at 18:39

## 2025-05-22 RX ADMIN — MICONAZOLE NITRATE: 2 POWDER TOPICAL at 20:58

## 2025-05-22 RX ADMIN — POTASSIUM CHLORIDE 20 MEQ: 1500 TABLET, EXTENDED RELEASE ORAL at 08:48

## 2025-05-22 RX ADMIN — ASPIRIN 81 MG: 81 TABLET, COATED ORAL at 08:48

## 2025-05-22 RX ADMIN — SENNOSIDES 17.2 MG: 8.6 TABLET, COATED ORAL at 11:52

## 2025-05-22 RX ADMIN — SODIUM CHLORIDE, PRESERVATIVE FREE 10 ML: 5 INJECTION INTRAVENOUS at 20:58

## 2025-05-22 RX ADMIN — IPRATROPIUM BROMIDE AND ALBUTEROL SULFATE 1 DOSE: 2.5; .5 SOLUTION RESPIRATORY (INHALATION) at 11:30

## 2025-05-22 RX ADMIN — SENNOSIDES 17.2 MG: 8.6 TABLET, COATED ORAL at 20:57

## 2025-05-22 RX ADMIN — IPRATROPIUM BROMIDE AND ALBUTEROL SULFATE 1 DOSE: 2.5; .5 SOLUTION RESPIRATORY (INHALATION) at 07:35

## 2025-05-22 RX ADMIN — ENOXAPARIN SODIUM 60 MG: 100 INJECTION SUBCUTANEOUS at 20:57

## 2025-05-22 RX ADMIN — PRAVASTATIN SODIUM 20 MG: 20 TABLET ORAL at 20:57

## 2025-05-22 RX ADMIN — ENOXAPARIN SODIUM 60 MG: 100 INJECTION SUBCUTANEOUS at 08:47

## 2025-05-22 RX ADMIN — CETIRIZINE HYDROCHLORIDE 10 MG: 10 TABLET ORAL at 08:48

## 2025-05-22 RX ADMIN — PANTOPRAZOLE SODIUM 40 MG: 40 TABLET, DELAYED RELEASE ORAL at 05:09

## 2025-05-22 RX ADMIN — METOPROLOL SUCCINATE 25 MG: 25 TABLET, EXTENDED RELEASE ORAL at 11:52

## 2025-05-22 RX ADMIN — POLYETHYLENE GLYCOL 3350 17 G: 17 POWDER, FOR SOLUTION ORAL at 08:52

## 2025-05-22 NOTE — PLAN OF CARE
Problem: Chronic Conditions and Co-morbidities  Goal: Patient's chronic conditions and co-morbidity symptoms are monitored and maintained or improved  Outcome: Progressing     Problem: Discharge Planning  Goal: Discharge to home or other facility with appropriate resources  Outcome: Progressing     Problem: Pain  Goal: Verbalizes/displays adequate comfort level or baseline comfort level  Outcome: Progressing     Problem: Safety - Adult  Goal: Free from fall injury  Outcome: Progressing     Problem: ABCDS Injury Assessment  Goal: Absence of physical injury  Outcome: Progressing     Problem: Skin/Tissue Integrity  Goal: Skin integrity remains intact  Description: 1.  Monitor for areas of redness and/or skin breakdown2.  Assess vascular access sites hourly3.  Every 4-6 hours minimum:  Change oxygen saturation probe site4.  Every 4-6 hours:  If on nasal continuous positive airway pressure, respiratory therapy assess nares and determine need for appliance change or resting period  Outcome: Progressing     Problem: Nutrition Deficit:  Goal: Optimize nutritional status  Outcome: Progressing     Problem: Neurosensory - Adult  Goal: Achieves stable or improved neurological status  Outcome: Progressing     Problem: Respiratory - Adult  Goal: Achieves optimal ventilation and oxygenation  Outcome: Progressing     Problem: Cardiovascular - Adult  Goal: Maintains optimal cardiac output and hemodynamic stability  Outcome: Progressing     Problem: Skin/Tissue Integrity - Adult  Goal: Skin integrity remains intact  Description: 1.  Monitor for areas of redness and/or skin breakdown2.  Assess vascular access sites hourly3.  Every 4-6 hours minimum:  Change oxygen saturation probe site4.  Every 4-6 hours:  If on nasal continuous positive airway pressure, respiratory therapy assess nares and determine need for appliance change or resting period  Outcome: Progressing  Goal: Incisions, wounds, or drain sites healing without S/S of

## 2025-05-22 NOTE — PROGRESS NOTES
[facility]      Patient:  Daljit Elizondo  YOB: 1960  Date of Service: 5/22/2025  MRN: [id number]   Acct: [id number]   Primary Care Physician: Jessie Helms APRN - CNP  Advance Directive: Full Code  Admit Date: 5/14/2025       Hospital Day: 8  Portions of this note have been copied forward, however, changed to reflect the most current clinical status of this patient.    CHIEF COMPLAINT Shortness of breath    SUBJECTIVE:  C/O nasal congestion, which he states is unchanged. Afebrile. Currently on 5LNC. SOB is unchanged.     CUMULATIVE HOSPITAL STAY:  Patient is a 64-year-old male with a PMH of obesity, diabetes, GERD, HLD, HTN, lymphedema, REGI, chronic respiratory failure on 3 L nasal cannula at baseline who presented to [facility] ED on 5/14/2025 with complaints of RIVAS and weakness.  He additionally endorsed chest heaviness, increased BLE edema.  At baseline he walks short distances with a walker at home, however, due to his weakness he was unable to get out of his bed and did not take his prescribed Bumex for several days.  He reported a productive cough with frothy pink sputum.  Denied fever, chills, nausea, vomiting or abdominal pain.  Further ED workup revealed , K4.5, BUN-15 and creatinine-0.9.  He was hypoxic in the ED with an SpO2 of 83% was therefore placed on BiPAP thyroid studies within normal limits.  proBNP 4707.  CBC unremarkable UA negative for infection.  Chest x-ray atelectasis and or felt infiltrate of the left pleural effusion. He was admitted to hospital medicine for further treatment and evaluation.     He was initiated on IV Bumex 1 mg BID, however, he had poor UOP, therefore, he was placed on a Bumex gtt on 5/16/2025. He was additionally given a x1 dose of Diamox on 5/17/2025 and now has a net negative of greater than 15L. He has been transitioned back to Bumex 2mg po daily    Renal function-stable BUN-30 and creatinine-1.0.     2D echo was attempted, however, unable to be  72 hours.  Pro-BNP: No results for input(s): \"BNP\" in the last 72 hours.  INR: No results for input(s): \"INR\" in the last 72 hours.  UA:No results for input(s): \"NITRITE\", \"COLORU\", \"PHUR\", \"LABCAST\", \"WBCUA\", \"RBCUA\", \"MUCUS\", \"TRICHOMONAS\", \"YEAST\", \"BACTERIA\", \"CLARITYU\", \"SPECGRAV\", \"LEUKOCYTESUR\", \"UROBILINOGEN\", \"BILIRUBINUR\", \"BLOODU\", \"GLUCOSEU\", \"AMORPHOUS\" in the last 72 hours.    Invalid input(s): \"KETONESU\"  A1C: No results for input(s): \"LABA1C\" in the last 72 hours.  ABG:No results for input(s): \"PHART\", \"SUT7ENF\", \"PO2ART\", \"ZCH3DHZ\", \"BEART\", \"HGBAE\", \"V6SVFTFV\", \"CARBOXHGBART\" in the last 72 hours.    RAD:   XR CHEST PORTABLE  Result Date: 5/14/2025  Atelectasis and/or infiltrate on the left. Small left pleural effusion   ______________________________________ Electronically signed by: EMILIANO JEFFERS M.D. Date:     05/14/2025 Time:    14:55        Echo:   Technically difficult study due to patient's body habitus. Nondiagnostic study due to poor image quality.  Suggest alternative cardiac imaging including RISSA or cardiac CT, if clinically indicated.       Assessment/Plan   Principal Problem:    Acute congestive heart failure, unspecified heart failure type (HCC)   -Unable to obtain accurate TTE due to body habitus   -Bumex oral 2mg   -Continue GDMT with BB, ARB, and Bumex   -Strict I&O   -Daily labs   -Low Na diet   -Cardiac educator   -Encouraged to participate with PT     Active Problems:  Diabetes (HCC)   -lantus 30 units at HS  -Medium dose correctional protocol   -Carb conscious diet   -Hypoglycemic protocol   -Accuchecks     Diabetic wounds-right and left great toes   -WCON following   -Continue wound care per recommendations       Essential hypertension   -Losartan held   -Toprol held   -Monitor         GERD (gastroesophageal reflux disease)   -PPI continued      Obesity, morbid (more than 100 lbs over ideal weight or BMI > 40) ([facility])   -Complicates care      Hyperlipemia   -Continue

## 2025-05-23 LAB
ANION GAP SERPL CALCULATED.3IONS-SCNC: 10 MMOL/L (ref 8–16)
BASOPHILS # BLD: 0.1 K/UL (ref 0–0.2)
BASOPHILS NFR BLD: 0.9 % (ref 0–1)
BUN SERPL-MCNC: 30 MG/DL (ref 8–23)
CALCIUM SERPL-MCNC: 9.4 MG/DL (ref 8.8–10.2)
CHLORIDE SERPL-SCNC: 97 MMOL/L (ref 98–107)
CO2 SERPL-SCNC: 35 MMOL/L (ref 22–29)
CREAT SERPL-MCNC: 1 MG/DL (ref 0.7–1.2)
EOSINOPHIL # BLD: 0.2 K/UL (ref 0–0.6)
EOSINOPHIL NFR BLD: 4.2 % (ref 0–5)
ERYTHROCYTE [DISTWIDTH] IN BLOOD BY AUTOMATED COUNT: 15.6 % (ref 11.5–14.5)
GLUCOSE BLD-MCNC: 111 MG/DL (ref 70–99)
GLUCOSE BLD-MCNC: 115 MG/DL (ref 70–99)
GLUCOSE BLD-MCNC: 130 MG/DL (ref 70–99)
GLUCOSE BLD-MCNC: 149 MG/DL (ref 70–99)
GLUCOSE BLD-MCNC: 163 MG/DL (ref 70–99)
GLUCOSE SERPL-MCNC: 126 MG/DL (ref 70–99)
HCT VFR BLD AUTO: 48.4 % (ref 42–52)
HGB BLD-MCNC: 14.8 G/DL (ref 14–18)
IMM GRANULOCYTES # BLD: 0 K/UL
LYMPHOCYTES # BLD: 0.9 K/UL (ref 1.1–4.5)
LYMPHOCYTES NFR BLD: 15.4 % (ref 20–40)
MCH RBC QN AUTO: 29.7 PG (ref 27–31)
MCHC RBC AUTO-ENTMCNC: 30.6 G/DL (ref 33–37)
MCV RBC AUTO: 97.2 FL (ref 80–94)
MONOCYTES # BLD: 0.6 K/UL (ref 0–0.9)
MONOCYTES NFR BLD: 10.1 % (ref 0–10)
NEUTROPHILS # BLD: 3.8 K/UL (ref 1.5–7.5)
NEUTS SEG NFR BLD: 68.9 % (ref 50–65)
PERFORMED ON: ABNORMAL
PLATELET # BLD AUTO: 114 K/UL (ref 130–400)
PMV BLD AUTO: 13.2 FL (ref 9.4–12.4)
POTASSIUM SERPL-SCNC: 4.2 MMOL/L (ref 3.5–5)
RBC # BLD AUTO: 4.98 M/UL (ref 4.7–6.1)
SODIUM SERPL-SCNC: 142 MMOL/L (ref 136–145)
WBC # BLD AUTO: 5.5 K/UL (ref 4.8–10.8)

## 2025-05-23 PROCEDURE — 94760 N-INVAS EAR/PLS OXIMETRY 1: CPT

## 2025-05-23 PROCEDURE — 97530 THERAPEUTIC ACTIVITIES: CPT

## 2025-05-23 PROCEDURE — 1200000000 HC SEMI PRIVATE

## 2025-05-23 PROCEDURE — 94640 AIRWAY INHALATION TREATMENT: CPT

## 2025-05-23 PROCEDURE — 36415 COLL VENOUS BLD VENIPUNCTURE: CPT

## 2025-05-23 PROCEDURE — 6370000000 HC RX 637 (ALT 250 FOR IP): Performed by: NURSE PRACTITIONER

## 2025-05-23 PROCEDURE — 2500000003 HC RX 250 WO HCPCS

## 2025-05-23 PROCEDURE — 6360000002 HC RX W HCPCS

## 2025-05-23 PROCEDURE — 82962 GLUCOSE BLOOD TEST: CPT

## 2025-05-23 PROCEDURE — 2700000000 HC OXYGEN THERAPY PER DAY

## 2025-05-23 PROCEDURE — 6370000000 HC RX 637 (ALT 250 FOR IP)

## 2025-05-23 PROCEDURE — 80048 BASIC METABOLIC PNL TOTAL CA: CPT

## 2025-05-23 PROCEDURE — 85025 COMPLETE CBC W/AUTO DIFF WBC: CPT

## 2025-05-23 RX ADMIN — IPRATROPIUM BROMIDE AND ALBUTEROL SULFATE 1 DOSE: 2.5; .5 SOLUTION RESPIRATORY (INHALATION) at 10:54

## 2025-05-23 RX ADMIN — GUAIFENESIN 600 MG: 600 TABLET, EXTENDED RELEASE ORAL at 21:10

## 2025-05-23 RX ADMIN — IPRATROPIUM BROMIDE AND ALBUTEROL SULFATE 1 DOSE: 2.5; .5 SOLUTION RESPIRATORY (INHALATION) at 18:59

## 2025-05-23 RX ADMIN — SENNOSIDES 17.2 MG: 8.6 TABLET, COATED ORAL at 09:21

## 2025-05-23 RX ADMIN — ENOXAPARIN SODIUM 60 MG: 100 INJECTION SUBCUTANEOUS at 21:10

## 2025-05-23 RX ADMIN — SENNOSIDES 17.2 MG: 8.6 TABLET, COATED ORAL at 21:10

## 2025-05-23 RX ADMIN — METOPROLOL SUCCINATE 25 MG: 25 TABLET, EXTENDED RELEASE ORAL at 09:21

## 2025-05-23 RX ADMIN — MONTELUKAST SODIUM 10 MG: 10 TABLET, FILM COATED ORAL at 21:10

## 2025-05-23 RX ADMIN — SODIUM CHLORIDE, PRESERVATIVE FREE 10 ML: 5 INJECTION INTRAVENOUS at 09:22

## 2025-05-23 RX ADMIN — PANTOPRAZOLE SODIUM 40 MG: 40 TABLET, DELAYED RELEASE ORAL at 05:50

## 2025-05-23 RX ADMIN — ENOXAPARIN SODIUM 60 MG: 100 INJECTION SUBCUTANEOUS at 09:22

## 2025-05-23 RX ADMIN — INSULIN GLARGINE 30 UNITS: 100 INJECTION, SOLUTION SUBCUTANEOUS at 21:10

## 2025-05-23 RX ADMIN — BUMETANIDE 2 MG: 1 TABLET ORAL at 09:21

## 2025-05-23 RX ADMIN — IPRATROPIUM BROMIDE AND ALBUTEROL SULFATE 1 DOSE: 2.5; .5 SOLUTION RESPIRATORY (INHALATION) at 15:26

## 2025-05-23 RX ADMIN — POTASSIUM CHLORIDE 20 MEQ: 1500 TABLET, EXTENDED RELEASE ORAL at 09:22

## 2025-05-23 RX ADMIN — ALLOPURINOL 300 MG: 300 TABLET ORAL at 09:21

## 2025-05-23 RX ADMIN — CETIRIZINE HYDROCHLORIDE 10 MG: 10 TABLET ORAL at 09:21

## 2025-05-23 RX ADMIN — GUAIFENESIN 600 MG: 600 TABLET, EXTENDED RELEASE ORAL at 09:22

## 2025-05-23 RX ADMIN — SODIUM CHLORIDE, PRESERVATIVE FREE 10 ML: 5 INJECTION INTRAVENOUS at 21:10

## 2025-05-23 RX ADMIN — IPRATROPIUM BROMIDE AND ALBUTEROL SULFATE 1 DOSE: 2.5; .5 SOLUTION RESPIRATORY (INHALATION) at 07:15

## 2025-05-23 RX ADMIN — ASPIRIN 81 MG: 81 TABLET, COATED ORAL at 09:22

## 2025-05-23 RX ADMIN — PRAVASTATIN SODIUM 20 MG: 20 TABLET ORAL at 21:10

## 2025-05-23 NOTE — PROGRESS NOTES
[facility]      Patient:  Daljit Elizondo  YOB: 1960  Date of Service: 5/23/2025  MRN: [id number]   Acct: [id number]   Primary Care Physician: Jessie Helms APRN - CNP  Advance Directive: Full Code  Admit Date: 5/14/2025       Hospital Day: 9  Portions of this note have been copied forward, however, changed to reflect the most current clinical status of this patient.    CHIEF COMPLAINT Shortness of breath    SUBJECTIVE:  He states that he is will to attempt to get in the chair when he finishes on the bed pain.  Afebrile. Currently on 4LNC. SOB is unchanged.     CUMULATIVE HOSPITAL STAY:  Patient is a 64-year-old male with a PMH of obesity, diabetes, GERD, HLD, HTN, lymphedema, REGI, chronic respiratory failure on 3 L nasal cannula at baseline who presented to [facility] ED on 5/14/2025 with complaints of RIVAS and weakness.  He additionally endorsed chest heaviness, increased BLE edema.  At baseline he walks short distances with a walker at home, however, due to his weakness he was unable to get out of his bed and did not take his prescribed Bumex for several days.  He reported a productive cough with frothy pink sputum.  Denied fever, chills, nausea, vomiting or abdominal pain.  Further ED workup revealed , K4.5, BUN-15 and creatinine-0.9.  He was hypoxic in the ED with an SpO2 of 83% was therefore placed on BiPAP thyroid studies within normal limits.  proBNP 4707.  CBC unremarkable UA negative for infection.  Chest x-ray atelectasis and or felt infiltrate of the left pleural effusion. He was admitted to hospital medicine for further treatment and evaluation.     He was initiated on IV Bumex 1 mg BID, however, he had poor UOP, therefore, he was placed on a Bumex gtt on 5/16/2025. He was additionally given a x1 dose of Diamox on 5/17/2025 and now has a net negative of greater than 15L. He has been transitioned back to Bumex 2mg po daily    Renal function-stable BUN-30 and creatinine-1.0.     2D  Findings: Rash and wound present. Rash is crusting.             Comments: Wound photos reviewed   Neurological:      Mental Status: He is alert.             Medications:      sodium chloride 5 mL/hr at 05/18/25 1459    dextrose        metoprolol succinate  25 mg Oral Daily    senna  2 tablet Oral BID    montelukast  10 mg Oral Nightly    bumetanide  2 mg Oral Daily    cetirizine  10 mg Oral Daily    guaiFENesin  600 mg Oral BID    ipratropium 0.5 mg-albuterol 2.5 mg  1 Dose Inhalation Q4H WA RT    miconazole   Topical BID    allopurinol  300 mg Oral Daily    aspirin EC  81 mg Oral Daily    [Held by provider] losartan  50 mg Oral Daily    pantoprazole  40 mg Oral QAM AC    pravastatin  20 mg Oral Nightly    potassium chloride  20 mEq Oral Daily    sodium chloride flush  5-40 mL IntraVENous 2 times per day    enoxaparin  60 mg SubCUTAneous BID    insulin glargine  30 Units SubCUTAneous Nightly    insulin lispro  0-8 Units SubCUTAneous 4x Daily AC & HS     sodium chloride, tetrahydrozoline, artificial tears, albuterol, sodium chloride flush, sodium chloride, ondansetron **OR** ondansetron, polyethylene glycol, acetaminophen **OR** acetaminophen, potassium chloride **OR** potassium alternative oral replacement **OR** potassium chloride, magnesium sulfate, sulfur hexafluoride microspheres, glucose, dextrose bolus **OR** dextrose bolus, glucagon (rDNA), dextrose  ADULT DIET; Regular; 4 carb choices (60 gm/meal); Low Fat/Low Chol/High Fiber/2 gm Na     Lab and other Data:     Recent Labs     05/21/25  0401 05/22/25  0414 05/23/25  0434   WBC 7.5 6.0 5.5   HGB 15.2 14.0 14.8   * 106* 114*     Recent Labs     05/21/25  0401 05/22/25  0414 05/23/25  0434    144 142   K 3.9 3.9 4.2    101 97*   CO2 34* 34* 35*   BUN 28* 31* 30*   CREATININE 1.1 1.0 1.0   GLUCOSE 148* 138* 126*     No results for input(s): \"AST\", \"ALT\", \"BILITOT\", \"ALKPHOS\" in the last 72 hours.    Invalid input(s): \"ALB\"  Troponin T: No

## 2025-05-23 NOTE — PLAN OF CARE
Problem: Chronic Conditions and Co-morbidities  Goal: Patient's chronic conditions and co-morbidity symptoms are monitored and maintained or improved  5/22/2025 2315 by Iris Hughes LPN  Outcome: Progressing  5/22/2025 1736 by Sherrell Cui RN  Outcome: Progressing     Problem: Pain  Goal: Verbalizes/displays adequate comfort level or baseline comfort level  5/22/2025 2315 by Iris Hughes LPN  Outcome: Progressing  5/22/2025 1736 by Sherrell Cui RN  Outcome: Progressing     Problem: Safety - Adult  Goal: Free from fall injury  5/22/2025 2315 by Iris Hughes LPN  Outcome: Progressing  Flowsheets (Taken 5/22/2025 2314)  Free From Fall Injury: Instruct family/caregiver on patient safety  5/22/2025 1736 by Sherrell Cui RN  Outcome: Progressing

## 2025-05-23 NOTE — PROGRESS NOTES
Physical Therapy     05/23/25 1400   Restrictions/Precautions   Restrictions/Precautions Fall Risk;Contact Precautions   Position Activity Restriction   Other Position/Activity Restrictions ESBL, MRSA   General   Diagnosis hypervolemia, cardiac arrythmia, CAP, SOB   Subjective   Subjective Pt agreed to therapy.   Bed mobility   Rolling to Left Minimal assistance;Contact guard assistance   Rolling to Right Minimal assistance;Contact guard assistance   Supine to Sit Unable to assess   Sit to Supine Unable to assess   Scooting Stand by assistance   Bed Mobility Comments pt unable to attempt sitting EOB due to limitation of equipment- raffaele bed unable to come down low enough to safely attempt task without significant risk for fall. nursing notified and trying to get pt new raffaele bed that can lower overall height closer to the floor for safety. performed multiple bilat rolling with pt able to hold full side lying with use of rails for changing of pad and linens.   PT Exercises   Exercise Treatment BLE straight leg raises in supine 2 x 10, bilat knee flexion and extension in supine 2 x 10, 10 pull ups with use of trapeze bar in supine with pt able to lift good portion of neck/shoulders/mid back off bed. rest breaks throughout for SOA.   Short Term Goals   Time Frame for Short Term Goals 2 wks   Short Term Goal 1 supine to sit Min A   Short Term Goal 2 sit to stand Min A x 2 with SW   Short Term Goal 3 bed to bariatric chair SBA   Short Term Goal 4 bed mobility Min A   Short Term Goal 5 amb. 15' Min A x 1 SW   Activity Tolerance   Activity Tolerance Other (comment)  (treatment limited by lack of proper equipment for sitting EOB task)   Assessment   Assessment pt able to demonstrate improved bed mobility by multiple bilat side rolling with occasional Shahbaz. able to scoot self towards HOB with use of trapeze and bed in trendelenburg. able to perform all active exercise in supine without assist but limited ROM due to girth and

## 2025-05-24 LAB
ANION GAP SERPL CALCULATED.3IONS-SCNC: 11 MMOL/L (ref 8–16)
BASOPHILS # BLD: 0.1 K/UL (ref 0–0.2)
BASOPHILS NFR BLD: 1.1 % (ref 0–1)
BUN SERPL-MCNC: 28 MG/DL (ref 8–23)
CALCIUM SERPL-MCNC: 9.6 MG/DL (ref 8.8–10.2)
CHLORIDE SERPL-SCNC: 97 MMOL/L (ref 98–107)
CO2 SERPL-SCNC: 32 MMOL/L (ref 22–29)
CREAT SERPL-MCNC: 1 MG/DL (ref 0.7–1.2)
EOSINOPHIL # BLD: 0.2 K/UL (ref 0–0.6)
EOSINOPHIL NFR BLD: 4.3 % (ref 0–5)
ERYTHROCYTE [DISTWIDTH] IN BLOOD BY AUTOMATED COUNT: 15.4 % (ref 11.5–14.5)
GLUCOSE BLD-MCNC: 126 MG/DL (ref 70–99)
GLUCOSE BLD-MCNC: 142 MG/DL (ref 70–99)
GLUCOSE BLD-MCNC: 148 MG/DL (ref 70–99)
GLUCOSE BLD-MCNC: 173 MG/DL (ref 70–99)
GLUCOSE SERPL-MCNC: 197 MG/DL (ref 70–99)
HCT VFR BLD AUTO: 48.6 % (ref 42–52)
HGB BLD-MCNC: 14.8 G/DL (ref 14–18)
IMM GRANULOCYTES # BLD: 0 K/UL
LYMPHOCYTES # BLD: 0.7 K/UL (ref 1.1–4.5)
LYMPHOCYTES NFR BLD: 13.5 % (ref 20–40)
MCH RBC QN AUTO: 29.2 PG (ref 27–31)
MCHC RBC AUTO-ENTMCNC: 30.5 G/DL (ref 33–37)
MCV RBC AUTO: 95.9 FL (ref 80–94)
MONOCYTES # BLD: 0.6 K/UL (ref 0–0.9)
MONOCYTES NFR BLD: 10.2 % (ref 0–10)
NEUTROPHILS # BLD: 3.8 K/UL (ref 1.5–7.5)
NEUTS SEG NFR BLD: 70.5 % (ref 50–65)
PERFORMED ON: ABNORMAL
PLATELET # BLD AUTO: 116 K/UL (ref 130–400)
PMV BLD AUTO: 13.9 FL (ref 9.4–12.4)
POTASSIUM SERPL-SCNC: 4.1 MMOL/L (ref 3.5–5)
RBC # BLD AUTO: 5.07 M/UL (ref 4.7–6.1)
SODIUM SERPL-SCNC: 140 MMOL/L (ref 136–145)
WBC # BLD AUTO: 5.4 K/UL (ref 4.8–10.8)

## 2025-05-24 PROCEDURE — 94640 AIRWAY INHALATION TREATMENT: CPT

## 2025-05-24 PROCEDURE — 85025 COMPLETE CBC W/AUTO DIFF WBC: CPT

## 2025-05-24 PROCEDURE — 6370000000 HC RX 637 (ALT 250 FOR IP)

## 2025-05-24 PROCEDURE — 80048 BASIC METABOLIC PNL TOTAL CA: CPT

## 2025-05-24 PROCEDURE — 94760 N-INVAS EAR/PLS OXIMETRY 1: CPT

## 2025-05-24 PROCEDURE — 6360000002 HC RX W HCPCS: Performed by: NURSE PRACTITIONER

## 2025-05-24 PROCEDURE — 36415 COLL VENOUS BLD VENIPUNCTURE: CPT

## 2025-05-24 PROCEDURE — 6370000000 HC RX 637 (ALT 250 FOR IP): Performed by: NURSE PRACTITIONER

## 2025-05-24 PROCEDURE — 2500000003 HC RX 250 WO HCPCS

## 2025-05-24 PROCEDURE — 2500000003 HC RX 250 WO HCPCS: Performed by: HOSPITALIST

## 2025-05-24 PROCEDURE — 1200000000 HC SEMI PRIVATE

## 2025-05-24 PROCEDURE — 6360000002 HC RX W HCPCS

## 2025-05-24 PROCEDURE — 82962 GLUCOSE BLOOD TEST: CPT

## 2025-05-24 PROCEDURE — 2700000000 HC OXYGEN THERAPY PER DAY

## 2025-05-24 RX ADMIN — SODIUM CHLORIDE, PRESERVATIVE FREE 10 ML: 5 INJECTION INTRAVENOUS at 22:43

## 2025-05-24 RX ADMIN — ASPIRIN 81 MG: 81 TABLET, COATED ORAL at 08:42

## 2025-05-24 RX ADMIN — MICONAZOLE NITRATE: 2 POWDER TOPICAL at 08:43

## 2025-05-24 RX ADMIN — BUMETANIDE 2 MG: 1 TABLET ORAL at 08:42

## 2025-05-24 RX ADMIN — GUAIFENESIN 600 MG: 600 TABLET, EXTENDED RELEASE ORAL at 08:42

## 2025-05-24 RX ADMIN — GUAIFENESIN 600 MG: 600 TABLET, EXTENDED RELEASE ORAL at 21:42

## 2025-05-24 RX ADMIN — IPRATROPIUM BROMIDE 0.5 MG: 0.5 SOLUTION RESPIRATORY (INHALATION) at 10:13

## 2025-05-24 RX ADMIN — IPRATROPIUM BROMIDE 0.5 MG: 0.5 SOLUTION RESPIRATORY (INHALATION) at 14:21

## 2025-05-24 RX ADMIN — METOPROLOL SUCCINATE 25 MG: 25 TABLET, EXTENDED RELEASE ORAL at 08:42

## 2025-05-24 RX ADMIN — CETIRIZINE HYDROCHLORIDE 10 MG: 10 TABLET ORAL at 08:42

## 2025-05-24 RX ADMIN — IPRATROPIUM BROMIDE AND ALBUTEROL SULFATE 1 DOSE: 2.5; .5 SOLUTION RESPIRATORY (INHALATION) at 06:42

## 2025-05-24 RX ADMIN — MICONAZOLE NITRATE: 2 POWDER TOPICAL at 22:43

## 2025-05-24 RX ADMIN — IPRATROPIUM BROMIDE 0.5 MG: 0.5 SOLUTION RESPIRATORY (INHALATION) at 18:42

## 2025-05-24 RX ADMIN — PRAVASTATIN SODIUM 20 MG: 20 TABLET ORAL at 21:42

## 2025-05-24 RX ADMIN — ENOXAPARIN SODIUM 60 MG: 100 INJECTION SUBCUTANEOUS at 21:42

## 2025-05-24 RX ADMIN — SENNOSIDES 17.2 MG: 8.6 TABLET, COATED ORAL at 21:42

## 2025-05-24 RX ADMIN — POTASSIUM CHLORIDE 20 MEQ: 1500 TABLET, EXTENDED RELEASE ORAL at 08:42

## 2025-05-24 RX ADMIN — MONTELUKAST SODIUM 10 MG: 10 TABLET, FILM COATED ORAL at 21:42

## 2025-05-24 RX ADMIN — PANTOPRAZOLE SODIUM 40 MG: 40 TABLET, DELAYED RELEASE ORAL at 06:07

## 2025-05-24 RX ADMIN — SODIUM CHLORIDE, PRESERVATIVE FREE 10 ML: 5 INJECTION INTRAVENOUS at 08:43

## 2025-05-24 RX ADMIN — ALLOPURINOL 300 MG: 300 TABLET ORAL at 08:42

## 2025-05-24 RX ADMIN — ENOXAPARIN SODIUM 60 MG: 100 INJECTION SUBCUTANEOUS at 08:43

## 2025-05-24 RX ADMIN — INSULIN GLARGINE 30 UNITS: 100 INJECTION, SOLUTION SUBCUTANEOUS at 21:42

## 2025-05-24 NOTE — PLAN OF CARE
Problem: Chronic Conditions and Co-morbidities  Goal: Patient's chronic conditions and co-morbidity symptoms are monitored and maintained or improved  5/23/2025 2245 by Iris Hughes LPN  Outcome: Progressing  5/23/2025 1838 by Sherrell Cui RN  Outcome: Progressing     Problem: Pain  Goal: Verbalizes/displays adequate comfort level or baseline comfort level  5/23/2025 2245 by Iris Hughes LPN  Outcome: Progressing  5/23/2025 1838 by Sherrell Cui RN  Outcome: Progressing     Problem: Safety - Adult  Goal: Free from fall injury  5/23/2025 2245 by Iris Hughes LPN  Outcome: Progressing  5/23/2025 1838 by Sherrell Cui RN  Outcome: Progressing

## 2025-05-24 NOTE — PROGRESS NOTES
[facility]      Patient:  Daljit Elizondo  YOB: 1960  Date of Service: 5/24/2025  MRN: [id number]   Acct: [id number]   Primary Care Physician: Jessie Helms APRN - CNP  Advance Directive: Full Code  Admit Date: 5/14/2025       Hospital Day: 10  Portions of this note have been copied forward, however, changed to reflect the most current clinical status of this patient.    CHIEF COMPLAINT Shortness of breath    SUBJECTIVE:  He worked well with PT/OT, unable to get out of bed due to technical difficulties.  Afebrile. Currently on 4LNC. SOB is unchanged.     CUMULATIVE HOSPITAL STAY:  Patient is a 64-year-old male with a PMH of obesity, diabetes, GERD, HLD, HTN, lymphedema, REGI, chronic respiratory failure on 3 L nasal cannula at baseline who presented to [facility] ED on 5/14/2025 with complaints of RIVAS and weakness.  He additionally endorsed chest heaviness, increased BLE edema.  At baseline he walks short distances with a walker at home, however, due to his weakness he was unable to get out of his bed and did not take his prescribed Bumex for several days.  He reported a productive cough with frothy pink sputum.  Denied fever, chills, nausea, vomiting or abdominal pain.  Further ED workup revealed , K4.5, BUN-15 and creatinine-0.9.  He was hypoxic in the ED with an SpO2 of 83% was therefore placed on BiPAP thyroid studies within normal limits.  proBNP 4707.  CBC unremarkable UA negative for infection.  Chest x-ray atelectasis and or felt infiltrate of the left pleural effusion. He was admitted to hospital medicine for further treatment and evaluation.     He was initiated on IV Bumex 1 mg BID, however, he had poor UOP, therefore, he was placed on a Bumex gtt on 5/16/2025. He was additionally given a x1 dose of Diamox on 5/17/2025 and now has a net negative of greater than 15L. He has been transitioned back to Bumex 2mg po daily    Renal function-stable BUN-28 and creatinine-1.0.     2D echo was  attempted, however, unable to be obtained due to his body habitus. He has been weaned to 4LNC with PAP at HS and naps. Shortness of breath is improving. Nebs continue and antibiotics Dc'd as he has no evidence of infectious process such as tachycardia, fever, or leukocytosis.     Visine continues allergic conjunctivitis. Singular daily continues. Ocean spray nasal spray continues and use encouraged.    Case management involved with discharge planning-wishes to discharge to SNF. PT/OT following  He has been encouraged to participate with therapy and sit up in the chair. WCON following for diabetic wounds      Review of Systems:   Review of Systems   Respiratory:  Positive for shortness of breath (improved).    Skin:  Positive for wound.   Neurological:  Positive for weakness.       14 point review of systems is negative except as specifically addressed above.      Objective:   VITALS:  /74   Pulse (!) 104   Temp 97.3 °F (36.3 °C) (Oral)   Resp 20   Ht 1.753 m (5' 9.02\")   Wt (!) 259.5 kg (572 lb)   SpO2 95%   BMI 84.43 kg/m²   24HR INTAKE/OUTPUT:    Intake/Output Summary (Last 24 hours) at 5/24/2025 0950  Last data filed at 5/24/2025 0432  Gross per 24 hour   Intake --   Output 1725 ml   Net -1725 ml         Physical Exam  Vitals reviewed.   Constitutional:       Appearance: He is obese. He is ill-appearing.   Cardiovascular:      Rate and Rhythm: Normal rate and regular rhythm.      Pulses: Normal pulses.      Heart sounds: Normal heart sounds.   Pulmonary:      Effort: Tachypnea present.      Breath sounds: Examination of the right-lower field reveals decreased breath sounds. Examination of the left-lower field reveals decreased breath sounds. Decreased breath sounds present.      Comments: Occasional cough  Abdominal:      General: Bowel sounds are normal.      Palpations: Abdomen is soft.   Musculoskeletal:      Right lower leg: Edema present.      Left lower leg: Edema present.   Skin:     General:

## 2025-05-25 LAB
ANION GAP SERPL CALCULATED.3IONS-SCNC: 11 MMOL/L (ref 8–16)
BASOPHILS # BLD: 0.1 K/UL (ref 0–0.2)
BASOPHILS NFR BLD: 1.1 % (ref 0–1)
BUN SERPL-MCNC: 26 MG/DL (ref 8–23)
CALCIUM SERPL-MCNC: 9.4 MG/DL (ref 8.8–10.2)
CHLORIDE SERPL-SCNC: 98 MMOL/L (ref 98–107)
CO2 SERPL-SCNC: 33 MMOL/L (ref 22–29)
CREAT SERPL-MCNC: 1.1 MG/DL (ref 0.7–1.2)
EOSINOPHIL # BLD: 0.3 K/UL (ref 0–0.6)
EOSINOPHIL NFR BLD: 5.4 % (ref 0–5)
ERYTHROCYTE [DISTWIDTH] IN BLOOD BY AUTOMATED COUNT: 15.2 % (ref 11.5–14.5)
GLUCOSE BLD-MCNC: 121 MG/DL (ref 70–99)
GLUCOSE BLD-MCNC: 141 MG/DL (ref 70–99)
GLUCOSE BLD-MCNC: 181 MG/DL (ref 70–99)
GLUCOSE BLD-MCNC: 190 MG/DL (ref 70–99)
GLUCOSE SERPL-MCNC: 167 MG/DL (ref 70–99)
HCT VFR BLD AUTO: 47.4 % (ref 42–52)
HGB BLD-MCNC: 14.5 G/DL (ref 14–18)
IMM GRANULOCYTES # BLD: 0 K/UL
LYMPHOCYTES # BLD: 0.8 K/UL (ref 1.1–4.5)
LYMPHOCYTES NFR BLD: 14.9 % (ref 20–40)
MCH RBC QN AUTO: 29.7 PG (ref 27–31)
MCHC RBC AUTO-ENTMCNC: 30.6 G/DL (ref 33–37)
MCV RBC AUTO: 97.1 FL (ref 80–94)
MONOCYTES # BLD: 0.6 K/UL (ref 0–0.9)
MONOCYTES NFR BLD: 11.6 % (ref 0–10)
NEUTROPHILS # BLD: 3.6 K/UL (ref 1.5–7.5)
NEUTS SEG NFR BLD: 66.4 % (ref 50–65)
PERFORMED ON: ABNORMAL
PLATELET # BLD AUTO: 116 K/UL (ref 130–400)
PMV BLD AUTO: 13.6 FL (ref 9.4–12.4)
POTASSIUM SERPL-SCNC: 4 MMOL/L (ref 3.5–5)
RBC # BLD AUTO: 4.88 M/UL (ref 4.7–6.1)
SODIUM SERPL-SCNC: 142 MMOL/L (ref 136–145)
WBC # BLD AUTO: 5.4 K/UL (ref 4.8–10.8)

## 2025-05-25 PROCEDURE — 80048 BASIC METABOLIC PNL TOTAL CA: CPT

## 2025-05-25 PROCEDURE — 2500000003 HC RX 250 WO HCPCS

## 2025-05-25 PROCEDURE — 6370000000 HC RX 637 (ALT 250 FOR IP): Performed by: NURSE PRACTITIONER

## 2025-05-25 PROCEDURE — 2700000000 HC OXYGEN THERAPY PER DAY

## 2025-05-25 PROCEDURE — 6370000000 HC RX 637 (ALT 250 FOR IP)

## 2025-05-25 PROCEDURE — 36415 COLL VENOUS BLD VENIPUNCTURE: CPT

## 2025-05-25 PROCEDURE — 6360000002 HC RX W HCPCS

## 2025-05-25 PROCEDURE — 94640 AIRWAY INHALATION TREATMENT: CPT

## 2025-05-25 PROCEDURE — 94760 N-INVAS EAR/PLS OXIMETRY 1: CPT

## 2025-05-25 PROCEDURE — 6360000002 HC RX W HCPCS: Performed by: NURSE PRACTITIONER

## 2025-05-25 PROCEDURE — 1200000000 HC SEMI PRIVATE

## 2025-05-25 PROCEDURE — 85025 COMPLETE CBC W/AUTO DIFF WBC: CPT

## 2025-05-25 PROCEDURE — 82962 GLUCOSE BLOOD TEST: CPT

## 2025-05-25 RX ADMIN — SODIUM CHLORIDE, PRESERVATIVE FREE 10 ML: 5 INJECTION INTRAVENOUS at 21:20

## 2025-05-25 RX ADMIN — GUAIFENESIN 600 MG: 600 TABLET, EXTENDED RELEASE ORAL at 20:01

## 2025-05-25 RX ADMIN — SENNOSIDES 17.2 MG: 8.6 TABLET, COATED ORAL at 08:08

## 2025-05-25 RX ADMIN — ASPIRIN 81 MG: 81 TABLET, COATED ORAL at 08:08

## 2025-05-25 RX ADMIN — SENNOSIDES 17.2 MG: 8.6 TABLET, COATED ORAL at 20:01

## 2025-05-25 RX ADMIN — PANTOPRAZOLE SODIUM 40 MG: 40 TABLET, DELAYED RELEASE ORAL at 05:05

## 2025-05-25 RX ADMIN — SODIUM CHLORIDE, PRESERVATIVE FREE 10 ML: 5 INJECTION INTRAVENOUS at 08:13

## 2025-05-25 RX ADMIN — MICONAZOLE NITRATE: 2 POWDER TOPICAL at 08:17

## 2025-05-25 RX ADMIN — IPRATROPIUM BROMIDE 0.5 MG: 0.5 SOLUTION RESPIRATORY (INHALATION) at 06:32

## 2025-05-25 RX ADMIN — METOPROLOL SUCCINATE 25 MG: 25 TABLET, EXTENDED RELEASE ORAL at 08:08

## 2025-05-25 RX ADMIN — MICONAZOLE NITRATE: 2 POWDER TOPICAL at 21:19

## 2025-05-25 RX ADMIN — IPRATROPIUM BROMIDE 0.5 MG: 0.5 SOLUTION RESPIRATORY (INHALATION) at 10:13

## 2025-05-25 RX ADMIN — INSULIN LISPRO 2 UNITS: 100 INJECTION, SOLUTION INTRAVENOUS; SUBCUTANEOUS at 17:33

## 2025-05-25 RX ADMIN — ALLOPURINOL 300 MG: 300 TABLET ORAL at 08:08

## 2025-05-25 RX ADMIN — PRAVASTATIN SODIUM 20 MG: 20 TABLET ORAL at 20:01

## 2025-05-25 RX ADMIN — INSULIN GLARGINE 30 UNITS: 100 INJECTION, SOLUTION SUBCUTANEOUS at 21:19

## 2025-05-25 RX ADMIN — ENOXAPARIN SODIUM 60 MG: 100 INJECTION SUBCUTANEOUS at 08:11

## 2025-05-25 RX ADMIN — INSULIN LISPRO 2 UNITS: 100 INJECTION, SOLUTION INTRAVENOUS; SUBCUTANEOUS at 21:19

## 2025-05-25 RX ADMIN — ENOXAPARIN SODIUM 60 MG: 100 INJECTION SUBCUTANEOUS at 20:01

## 2025-05-25 RX ADMIN — IPRATROPIUM BROMIDE 0.5 MG: 0.5 SOLUTION RESPIRATORY (INHALATION) at 14:13

## 2025-05-25 RX ADMIN — GUAIFENESIN 600 MG: 600 TABLET, EXTENDED RELEASE ORAL at 08:08

## 2025-05-25 RX ADMIN — BUMETANIDE 2 MG: 1 TABLET ORAL at 08:08

## 2025-05-25 RX ADMIN — IPRATROPIUM BROMIDE 0.5 MG: 0.5 SOLUTION RESPIRATORY (INHALATION) at 19:24

## 2025-05-25 RX ADMIN — MONTELUKAST SODIUM 10 MG: 10 TABLET, FILM COATED ORAL at 20:01

## 2025-05-25 RX ADMIN — CETIRIZINE HYDROCHLORIDE 10 MG: 10 TABLET ORAL at 08:09

## 2025-05-25 RX ADMIN — POTASSIUM CHLORIDE 20 MEQ: 1500 TABLET, EXTENDED RELEASE ORAL at 08:08

## 2025-05-25 NOTE — PROGRESS NOTES
[facility]      Patient:  Daljit Elizondo  YOB: 1960  Date of Service: 5/25/2025  MRN: [id number]   Acct: [id number]   Primary Care Physician: Jessie Helms APRN - CNP  Advance Directive: Full Code  Admit Date: 5/14/2025       Hospital Day: 11  Portions of this note have been copied forward, however, changed to reflect the most current clinical status of this patient.    CHIEF COMPLAINT Shortness of breath    SUBJECTIVE: His only complaint today is about his diet. Afebrile. Currently on 4LNC. SOB is unchanged.     CUMULATIVE HOSPITAL STAY:  Patient is a 64-year-old male with a PMH of obesity, diabetes, GERD, HLD, HTN, lymphedema, REGI, chronic respiratory failure on 3 L nasal cannula at baseline who presented to [facility] ED on 5/14/2025 with complaints of RIVAS and weakness.  He additionally endorsed chest heaviness, increased BLE edema.  At baseline he walks short distances with a walker at home, however, due to his weakness he was unable to get out of his bed and did not take his prescribed Bumex for several days.  He reported a productive cough with frothy pink sputum.  Denied fever, chills, nausea, vomiting or abdominal pain.  Further ED workup revealed , K4.5, BUN-15 and creatinine-0.9.  He was hypoxic in the ED with an SpO2 of 83% was therefore placed on BiPAP thyroid studies within normal limits.  proBNP 4707.  CBC unremarkable UA negative for infection.  Chest x-ray atelectasis and or felt infiltrate of the left pleural effusion. He was admitted to hospital medicine for further treatment and evaluation.     He was initiated on IV Bumex 1 mg BID, however, he had poor UOP, therefore, he was placed on a Bumex gtt on 5/16/2025. He was additionally given a x1 dose of Diamox on 5/17/2025. Net negative  greater than 5L. He has been transitioned back to Bumex 2mg po daily    Renal function-stable BUN-26 and creatinine-1.1.     2D echo was attempted, however, unable to be obtained due to his body

## 2025-05-26 LAB
ANION GAP SERPL CALCULATED.3IONS-SCNC: 10 MMOL/L (ref 8–16)
BASOPHILS # BLD: 0.1 K/UL (ref 0–0.2)
BASOPHILS NFR BLD: 1.1 % (ref 0–1)
BUN SERPL-MCNC: 27 MG/DL (ref 8–23)
CALCIUM SERPL-MCNC: 9.2 MG/DL (ref 8.8–10.2)
CHLORIDE SERPL-SCNC: 99 MMOL/L (ref 98–107)
CO2 SERPL-SCNC: 32 MMOL/L (ref 22–29)
CREAT SERPL-MCNC: 1 MG/DL (ref 0.7–1.2)
EOSINOPHIL # BLD: 0.2 K/UL (ref 0–0.6)
EOSINOPHIL NFR BLD: 4.6 % (ref 0–5)
ERYTHROCYTE [DISTWIDTH] IN BLOOD BY AUTOMATED COUNT: 15 % (ref 11.5–14.5)
GLUCOSE BLD-MCNC: 126 MG/DL (ref 70–99)
GLUCOSE BLD-MCNC: 129 MG/DL (ref 70–99)
GLUCOSE BLD-MCNC: 147 MG/DL (ref 70–99)
GLUCOSE BLD-MCNC: 153 MG/DL (ref 70–99)
GLUCOSE SERPL-MCNC: 162 MG/DL (ref 70–99)
HCT VFR BLD AUTO: 47.6 % (ref 42–52)
HGB BLD-MCNC: 14.6 G/DL (ref 14–18)
IMM GRANULOCYTES # BLD: 0 K/UL
LYMPHOCYTES # BLD: 0.8 K/UL (ref 1.1–4.5)
LYMPHOCYTES NFR BLD: 14.3 % (ref 20–40)
MCH RBC QN AUTO: 29.4 PG (ref 27–31)
MCHC RBC AUTO-ENTMCNC: 30.7 G/DL (ref 33–37)
MCV RBC AUTO: 96 FL (ref 80–94)
MONOCYTES # BLD: 0.6 K/UL (ref 0–0.9)
MONOCYTES NFR BLD: 10.7 % (ref 0–10)
NEUTROPHILS # BLD: 3.6 K/UL (ref 1.5–7.5)
NEUTS SEG NFR BLD: 68.9 % (ref 50–65)
PERFORMED ON: ABNORMAL
PLATELET # BLD AUTO: 117 K/UL (ref 130–400)
POTASSIUM SERPL-SCNC: 3.9 MMOL/L (ref 3.5–5)
RBC # BLD AUTO: 4.96 M/UL (ref 4.7–6.1)
SODIUM SERPL-SCNC: 141 MMOL/L (ref 136–145)
WBC # BLD AUTO: 5.2 K/UL (ref 4.8–10.8)

## 2025-05-26 PROCEDURE — 36415 COLL VENOUS BLD VENIPUNCTURE: CPT

## 2025-05-26 PROCEDURE — 94760 N-INVAS EAR/PLS OXIMETRY 1: CPT

## 2025-05-26 PROCEDURE — 6360000002 HC RX W HCPCS: Performed by: NURSE PRACTITIONER

## 2025-05-26 PROCEDURE — 6370000000 HC RX 637 (ALT 250 FOR IP): Performed by: NURSE PRACTITIONER

## 2025-05-26 PROCEDURE — 82962 GLUCOSE BLOOD TEST: CPT

## 2025-05-26 PROCEDURE — 2700000000 HC OXYGEN THERAPY PER DAY

## 2025-05-26 PROCEDURE — 6370000000 HC RX 637 (ALT 250 FOR IP)

## 2025-05-26 PROCEDURE — 2500000003 HC RX 250 WO HCPCS

## 2025-05-26 PROCEDURE — 94150 VITAL CAPACITY TEST: CPT

## 2025-05-26 PROCEDURE — 80048 BASIC METABOLIC PNL TOTAL CA: CPT

## 2025-05-26 PROCEDURE — 85025 COMPLETE CBC W/AUTO DIFF WBC: CPT

## 2025-05-26 PROCEDURE — 97110 THERAPEUTIC EXERCISES: CPT

## 2025-05-26 PROCEDURE — 6360000002 HC RX W HCPCS

## 2025-05-26 PROCEDURE — 94640 AIRWAY INHALATION TREATMENT: CPT

## 2025-05-26 PROCEDURE — 1200000000 HC SEMI PRIVATE

## 2025-05-26 PROCEDURE — 97530 THERAPEUTIC ACTIVITIES: CPT

## 2025-05-26 RX ADMIN — SODIUM CHLORIDE, PRESERVATIVE FREE 10 ML: 5 INJECTION INTRAVENOUS at 10:08

## 2025-05-26 RX ADMIN — ASPIRIN 81 MG: 81 TABLET, COATED ORAL at 10:05

## 2025-05-26 RX ADMIN — MONTELUKAST SODIUM 10 MG: 10 TABLET, FILM COATED ORAL at 19:42

## 2025-05-26 RX ADMIN — SENNOSIDES 17.2 MG: 8.6 TABLET, COATED ORAL at 19:42

## 2025-05-26 RX ADMIN — PANTOPRAZOLE SODIUM 40 MG: 40 TABLET, DELAYED RELEASE ORAL at 05:42

## 2025-05-26 RX ADMIN — ALLOPURINOL 300 MG: 300 TABLET ORAL at 10:04

## 2025-05-26 RX ADMIN — GUAIFENESIN 600 MG: 600 TABLET, EXTENDED RELEASE ORAL at 10:05

## 2025-05-26 RX ADMIN — SODIUM CHLORIDE, PRESERVATIVE FREE 10 ML: 5 INJECTION INTRAVENOUS at 19:45

## 2025-05-26 RX ADMIN — IPRATROPIUM BROMIDE 0.5 MG: 0.5 SOLUTION RESPIRATORY (INHALATION) at 19:18

## 2025-05-26 RX ADMIN — PRAVASTATIN SODIUM 20 MG: 20 TABLET ORAL at 19:42

## 2025-05-26 RX ADMIN — IPRATROPIUM BROMIDE 0.5 MG: 0.5 SOLUTION RESPIRATORY (INHALATION) at 14:44

## 2025-05-26 RX ADMIN — INSULIN GLARGINE 30 UNITS: 100 INJECTION, SOLUTION SUBCUTANEOUS at 21:00

## 2025-05-26 RX ADMIN — ENOXAPARIN SODIUM 60 MG: 100 INJECTION SUBCUTANEOUS at 19:42

## 2025-05-26 RX ADMIN — IPRATROPIUM BROMIDE 0.5 MG: 0.5 SOLUTION RESPIRATORY (INHALATION) at 06:50

## 2025-05-26 RX ADMIN — CETIRIZINE HYDROCHLORIDE 10 MG: 10 TABLET ORAL at 10:04

## 2025-05-26 RX ADMIN — ENOXAPARIN SODIUM 60 MG: 100 INJECTION SUBCUTANEOUS at 10:05

## 2025-05-26 RX ADMIN — IPRATROPIUM BROMIDE 0.5 MG: 0.5 SOLUTION RESPIRATORY (INHALATION) at 10:12

## 2025-05-26 RX ADMIN — MICONAZOLE NITRATE: 2 POWDER TOPICAL at 10:08

## 2025-05-26 RX ADMIN — METOPROLOL SUCCINATE 25 MG: 25 TABLET, EXTENDED RELEASE ORAL at 10:04

## 2025-05-26 RX ADMIN — BUMETANIDE 2 MG: 1 TABLET ORAL at 10:05

## 2025-05-26 RX ADMIN — POTASSIUM CHLORIDE 20 MEQ: 1500 TABLET, EXTENDED RELEASE ORAL at 10:05

## 2025-05-26 RX ADMIN — MICONAZOLE NITRATE: 2 POWDER TOPICAL at 19:44

## 2025-05-26 RX ADMIN — GUAIFENESIN 600 MG: 600 TABLET, EXTENDED RELEASE ORAL at 19:42

## 2025-05-26 NOTE — PROGRESS NOTES
Physical Therapy    Name: Daljit Elizondo  MRN: [id number]  Date of service: 5/26/2025 05/26/25 1500   Restrictions/Precautions   Restrictions/Precautions Fall Risk;Contact Precautions   Position Activity Restriction   Other Position/Activity Restrictions ESBL, MRSA   General   Diagnosis hypervolemia, cardiac arrythmia, CAP, SOB   General   General Comments Pt in bed   Subjective   Subjective agreed to therapy   Bed mobility   Scooting Stand by assistance   Bed Mobility Comments using all bed functions, pt can pull himself to HOB   Transfers   Comment did not transfer to chair this date but changed out for larger chair for next session. Pt agreed to lift to chair and practice standing next session   Ambulation   Comments unable at this time   PT Exercises   Exercise Treatment pull ups with trapeze bar and  SLR in bed   Patient Goals    Patient Goals  get better   Short Term Goals   Time Frame for Short Term Goals 2 wks   Short Term Goal 1 supine to sit Min A   Short Term Goal 2 sit to stand Min A x 2 with SW   Short Term Goal 3 bed to bariatric chair SBA   Short Term Goal 4 bed mobility Min A   Short Term Goal 5 amb. 15' Min A x 1 SW   Activity Tolerance   Activity Tolerance Treatment limited secondary to agitation   Assessment   Assessment Pt able to demonstrate scooting up in bed, showed pt how to raise feet in order to prevent sliding down. Pt performed some exercises showing therapist that he could. Encouraged pt to perform exercises in bed on his own. Declined lift to chair this date but changed for larger chair. Pt agreed to lift to chair next session and attempt to stand. Pt positioned in bed with all needs in reach. Notified nurse of needing diffrent bariatric bed again. Nurse called housekeeping and will follow up   Discharge Recommendations Continue to assess pending progress;24 hour supervision or assist;Patient would benefit from continued therapy after discharge   Physical Therapy Plan   General

## 2025-05-26 NOTE — PROGRESS NOTES
[facility]      [facility] Note    Patient:  Daljit Elizondo  YOB: 1960  Date of Service: 5/26/2025  MRN: [id number]   Acct: [id number]   Primary Care Physician: Jessie Helms APRN - CNP  Advance Directive: Full Code  Admit Date: 5/14/2025       Hospital Day: 12    Portions of this note have been copied forward, however, updated to reflect the most current clinical status of this patient.     CHIEF COMPLAINT Shortness of breath     SUBJECTIVE:  Mr. Elizondo was resting in bed this morning. Reports SOB has improved.       CUMULATIVE HOSPITAL COURSE:   Patient is a 64-year-old male with past medical history of anxiety, diabetes, GERD, hyperlipidemia, and HTN who presented to [facility] ED for evaluation of increased dyspnea on exertion and weakness.  Reported chest heaviness. Increased lower extremity weakness and edema. Patient is morbidly obese and normally ambulates short distances with a walker at home.Stated he has not been unable to get out of bed for past couple of days. Therefore, he had not been taking his Bumex for past several days. Stated he is normally on 3L O2 at all times. Reported productive cough with cloudy thick pink sputum. Denied fever, chills, nausea, vomiting, or abdominal pain. Workup in ED revealed CXR with atelectasis and or infiltrate on the left, small left pleural effusion; UA unremarkable for UTI; blood cultures pending; WBC 5.7, H&H 14/47, BNP 4707, CMP unremarkable.   Patient was admitted to hospital medicine for further evaluation. IV Bumex 1 mg BID was initiated. ECHO was attempted, unable to obtain due to patient's body habitus. Bumex gtt initiated on 5/16/2025. 1 time dose of IV Diamox 500 mg given on 5/17/2025. Net negative greater than 5L. Diuretics adjusted back to Bumex 2 mg po daily. Renal function remained stable. Antibiotics discontinued as no evidence of infectious process.     Case management assisting with DC planning to SNF.           Review of Systems  telemetry      Active Problems:   STEFANIE-      - Improved               - Creatinine 1.0 today               - Monitor I's and O's closely              - Monitor labs closely              - Avoid hypotension              - Avoid nephrotoxic agents       Diabetes (HCC)-    - A1c 6.7   - Continue Lantus 30 units nightly    - Continue medium dose SSI   - Accu-Checks    - Hypoglycemia treatment protocol in place       Essential hypertension-   - stable at this time    - Continue current medications      Chronic Diabetic wounds-    - Wound care following    - Continue wound care per recommendations       GERD (gastroesophageal reflux disease)-    - Continue Protonix       Hyperlipemia-    - Continue statin             DVT Prophylaxis: Lovenox      GI prophylaxis:  Protonix      Discharge planning: TBD        Further Orders per Clinical course/attending.     Electronically signed by DURAN Suazo CNP on 5/26/2025 at 4:37 PM       EMR Dragon/Transcription disclaimer:   Much of this encounter note is an electronic transcription/translation of spoken language to printed text. The electronic translation of spoken language may permit erroneous, or at times, nonsensical words or phrases to be inadvertently transcribed; although attempts have made to review the note for such errors, some may still exist.

## 2025-05-26 NOTE — PROGRESS NOTES
Occupational Therapy     05/26/25 1500   Subjective   Subjective Pt in bed upon arrival for therapy. A bigger raffaele-chair has been supplied for patient. Patient declines transfer to recliner this afternoon but agreeable to try tomorrow.   Pain Assessment   Pain Assessment None - Denies Pain   Vitals   O2 Device Nasal cannula   Cognition   Overall Cognitive Status WFL   Orientation   Overall Orientation Status WNL   Bed Mobility Training   Bed Mobility Training Yes   Overall Level of Assistance Minimal assistance;Stand by assistance   Interventions Verbal cues;Tactile cues;Manual cues   Rolling Minimal assistance;Stand by assistance   Scooting Stand by assistance  (with use of mechanical bed.)   Transfer Training   Transfer Training No   Balance   Sitting Impaired  (current Raffaele-bed will not lower to the ground enough to safetly sit patient at EOB. Nursing notified and housekeeping called to attempt to get a different Raffaele bed.)   Sitting - Static Not tested   Sitting - Dynamic Not tested   Standing Impaired   Standing - Static Not tested   Standing - Dynamic Not tested   ADL   Functional Mobility Skilled Clinical Factors Alfredo lift to recliner.   Assessment   Assessment Tx focused on repositioning in bed for comfort in which patient able to perform with Min-SBA using mechanical bed functions. Pt shows adaquate use of trapeze, using it lif back off bed and use when he rolls in bed. Pt instructed on guided, AROM using BUEs/BLEs as able at bed level to promote mobility. Pt took rest breaks as needed. Educated on benefits of getting up in bigger beri chair to be able to attempt standing from a safer height.   Activity Tolerance Treatment limited secondary to agitation  (Simultaneous filing. User may not have seen previous data.)   Discharge Recommendations Patient would benefit from continued therapy after discharge;24 hour supervision or assist   Occupational Therapy Plan   Times Per Week 3-5   Times Per Day Once a day

## 2025-05-27 LAB
ANION GAP SERPL CALCULATED.3IONS-SCNC: 10 MMOL/L (ref 8–16)
BASOPHILS # BLD: 0.1 K/UL (ref 0–0.2)
BASOPHILS NFR BLD: 1.3 % (ref 0–1)
BUN SERPL-MCNC: 27 MG/DL (ref 8–23)
CALCIUM SERPL-MCNC: 9.7 MG/DL (ref 8.8–10.2)
CHLORIDE SERPL-SCNC: 97 MMOL/L (ref 98–107)
CO2 SERPL-SCNC: 34 MMOL/L (ref 22–29)
CREAT SERPL-MCNC: 1.2 MG/DL (ref 0.7–1.2)
EOSINOPHIL # BLD: 0.3 K/UL (ref 0–0.6)
EOSINOPHIL NFR BLD: 4.7 % (ref 0–5)
ERYTHROCYTE [DISTWIDTH] IN BLOOD BY AUTOMATED COUNT: 15 % (ref 11.5–14.5)
GLUCOSE BLD-MCNC: 131 MG/DL (ref 70–99)
GLUCOSE BLD-MCNC: 138 MG/DL (ref 70–99)
GLUCOSE BLD-MCNC: 148 MG/DL (ref 70–99)
GLUCOSE BLD-MCNC: 158 MG/DL (ref 70–99)
GLUCOSE SERPL-MCNC: 159 MG/DL (ref 70–99)
HCT VFR BLD AUTO: 50.3 % (ref 42–52)
HGB BLD-MCNC: 15.3 G/DL (ref 14–18)
IMM GRANULOCYTES # BLD: 0 K/UL
LYMPHOCYTES # BLD: 1 K/UL (ref 1.1–4.5)
LYMPHOCYTES NFR BLD: 16.1 % (ref 20–40)
MCH RBC QN AUTO: 29.2 PG (ref 27–31)
MCHC RBC AUTO-ENTMCNC: 30.4 G/DL (ref 33–37)
MCV RBC AUTO: 96 FL (ref 80–94)
MONOCYTES # BLD: 0.6 K/UL (ref 0–0.9)
MONOCYTES NFR BLD: 9.2 % (ref 0–10)
NEUTROPHILS # BLD: 4.1 K/UL (ref 1.5–7.5)
NEUTS SEG NFR BLD: 68.2 % (ref 50–65)
PERFORMED ON: ABNORMAL
PLATELET # BLD AUTO: 126 K/UL (ref 130–400)
PMV BLD AUTO: 13.4 FL (ref 9.4–12.4)
POTASSIUM SERPL-SCNC: 4.3 MMOL/L (ref 3.5–5)
RBC # BLD AUTO: 5.24 M/UL (ref 4.7–6.1)
SODIUM SERPL-SCNC: 141 MMOL/L (ref 136–145)
WBC # BLD AUTO: 6 K/UL (ref 4.8–10.8)

## 2025-05-27 PROCEDURE — 36415 COLL VENOUS BLD VENIPUNCTURE: CPT

## 2025-05-27 PROCEDURE — 97530 THERAPEUTIC ACTIVITIES: CPT

## 2025-05-27 PROCEDURE — 6370000000 HC RX 637 (ALT 250 FOR IP): Performed by: NURSE PRACTITIONER

## 2025-05-27 PROCEDURE — 6360000002 HC RX W HCPCS: Performed by: NURSE PRACTITIONER

## 2025-05-27 PROCEDURE — 6370000000 HC RX 637 (ALT 250 FOR IP)

## 2025-05-27 PROCEDURE — 6360000002 HC RX W HCPCS

## 2025-05-27 PROCEDURE — 2700000000 HC OXYGEN THERAPY PER DAY

## 2025-05-27 PROCEDURE — 85025 COMPLETE CBC W/AUTO DIFF WBC: CPT

## 2025-05-27 PROCEDURE — 1200000000 HC SEMI PRIVATE

## 2025-05-27 PROCEDURE — 82962 GLUCOSE BLOOD TEST: CPT

## 2025-05-27 PROCEDURE — 94150 VITAL CAPACITY TEST: CPT

## 2025-05-27 PROCEDURE — 2500000003 HC RX 250 WO HCPCS

## 2025-05-27 PROCEDURE — 80048 BASIC METABOLIC PNL TOTAL CA: CPT

## 2025-05-27 PROCEDURE — 94640 AIRWAY INHALATION TREATMENT: CPT

## 2025-05-27 PROCEDURE — 94760 N-INVAS EAR/PLS OXIMETRY 1: CPT

## 2025-05-27 RX ADMIN — SODIUM CHLORIDE, PRESERVATIVE FREE 10 ML: 5 INJECTION INTRAVENOUS at 08:10

## 2025-05-27 RX ADMIN — GUAIFENESIN 600 MG: 600 TABLET, EXTENDED RELEASE ORAL at 08:07

## 2025-05-27 RX ADMIN — BUMETANIDE 2 MG: 1 TABLET ORAL at 08:07

## 2025-05-27 RX ADMIN — INSULIN GLARGINE 30 UNITS: 100 INJECTION, SOLUTION SUBCUTANEOUS at 21:11

## 2025-05-27 RX ADMIN — MONTELUKAST SODIUM 10 MG: 10 TABLET, FILM COATED ORAL at 20:53

## 2025-05-27 RX ADMIN — IPRATROPIUM BROMIDE 0.5 MG: 0.5 SOLUTION RESPIRATORY (INHALATION) at 19:20

## 2025-05-27 RX ADMIN — IPRATROPIUM BROMIDE 0.5 MG: 0.5 SOLUTION RESPIRATORY (INHALATION) at 15:04

## 2025-05-27 RX ADMIN — POTASSIUM CHLORIDE 20 MEQ: 1500 TABLET, EXTENDED RELEASE ORAL at 08:07

## 2025-05-27 RX ADMIN — SENNOSIDES 17.2 MG: 8.6 TABLET, COATED ORAL at 08:07

## 2025-05-27 RX ADMIN — ASPIRIN 81 MG: 81 TABLET, COATED ORAL at 08:08

## 2025-05-27 RX ADMIN — MICONAZOLE NITRATE: 2 POWDER TOPICAL at 08:10

## 2025-05-27 RX ADMIN — MICONAZOLE NITRATE: 2 POWDER TOPICAL at 20:53

## 2025-05-27 RX ADMIN — SODIUM CHLORIDE, PRESERVATIVE FREE 10 ML: 5 INJECTION INTRAVENOUS at 21:12

## 2025-05-27 RX ADMIN — ENOXAPARIN SODIUM 60 MG: 100 INJECTION SUBCUTANEOUS at 08:10

## 2025-05-27 RX ADMIN — IPRATROPIUM BROMIDE 0.5 MG: 0.5 SOLUTION RESPIRATORY (INHALATION) at 07:53

## 2025-05-27 RX ADMIN — CETIRIZINE HYDROCHLORIDE 10 MG: 10 TABLET ORAL at 08:08

## 2025-05-27 RX ADMIN — IPRATROPIUM BROMIDE 0.5 MG: 0.5 SOLUTION RESPIRATORY (INHALATION) at 11:02

## 2025-05-27 RX ADMIN — GUAIFENESIN 600 MG: 600 TABLET, EXTENDED RELEASE ORAL at 20:53

## 2025-05-27 RX ADMIN — PANTOPRAZOLE SODIUM 40 MG: 40 TABLET, DELAYED RELEASE ORAL at 05:16

## 2025-05-27 RX ADMIN — ALLOPURINOL 300 MG: 300 TABLET ORAL at 08:08

## 2025-05-27 RX ADMIN — ENOXAPARIN SODIUM 60 MG: 100 INJECTION SUBCUTANEOUS at 20:54

## 2025-05-27 RX ADMIN — PRAVASTATIN SODIUM 20 MG: 20 TABLET ORAL at 20:53

## 2025-05-27 RX ADMIN — SENNOSIDES 17.2 MG: 8.6 TABLET, COATED ORAL at 20:53

## 2025-05-27 RX ADMIN — METOPROLOL SUCCINATE 25 MG: 25 TABLET, EXTENDED RELEASE ORAL at 08:08

## 2025-05-27 NOTE — PLAN OF CARE
Problem: Chronic Conditions and Co-morbidities  Goal: Patient's chronic conditions and co-morbidity symptoms are monitored and maintained or improved  5/26/2025 2229 by Amie Hernandze RN  Outcome: Progressing  5/26/2025 1600 by Sherrell Cui RN  Outcome: Progressing     Problem: Discharge Planning  Goal: Discharge to home or other facility with appropriate resources  5/26/2025 2229 by Amie Hernandez RN  Outcome: Progressing  5/26/2025 1600 by Sherrell Cui RN  Outcome: Progressing     Problem: Pain  Goal: Verbalizes/displays adequate comfort level or baseline comfort level  5/26/2025 2229 by Amie Hernandez RN  Outcome: Progressing  5/26/2025 1600 by Sherrell Cui RN  Outcome: Progressing     Problem: Safety - Adult  Goal: Free from fall injury  5/26/2025 2229 by Amie Hernandez RN  Outcome: Progressing  5/26/2025 1600 by Sherrell Cui RN  Outcome: Progressing     Problem: ABCDS Injury Assessment  Goal: Absence of physical injury  5/26/2025 2229 by Amie Hernandez RN  Outcome: Progressing  5/26/2025 1600 by Sherrell Cui RN  Outcome: Progressing     Problem: Skin/Tissue Integrity  Goal: Skin integrity remains intact  Description: 1.  Monitor for areas of redness and/or skin breakdown2.  Assess vascular access sites hourly3.  Every 4-6 hours minimum:  Change oxygen saturation probe site4.  Every 4-6 hours:  If on nasal continuous positive airway pressure, respiratory therapy assess nares and determine need for appliance change or resting period  5/26/2025 2229 by Amie Hernandez RN  Outcome: Progressing  5/26/2025 1600 by Sherrell Cui RN  Outcome: Progressing     Problem: Nutrition Deficit:  Goal: Optimize nutritional status  5/26/2025 2229 by Amie Hernandez RN  Outcome: Progressing  5/26/2025 1600 by Sherrell Cui RN  Outcome: Progressing     Problem: Neurosensory - Adult  Goal: Achieves stable or improved neurological status  5/26/2025 2229 by Amie Hernandez RN  Outcome: Progressing  5/26/2025 1600 by Sherrell Cui RN  Outcome:

## 2025-05-27 NOTE — PROGRESS NOTES
Occupational Therapy     05/27/25 1528   Subjective   Subjective Pt in bed upon arrival for therapy. Pt states \"I have this new beri-bed and I don't want to get up to the chair since I can sit on the side of this bed\". Pt willing to participate.   Pain Assessment   Pain Assessment None - Denies Pain   Vitals   Temp 97.7 °F (36.5 °C)   Pulse 68   Heart Rate Source Monitor   Respirations 16   SpO2 95 %   O2 Device Nasal cannula   BP 99/77   MAP (Calculated) 84   BP Location Right upper arm   Patient Position Supine   Cognition   Overall Cognitive Status Exceptions   Memory Decreased short term memory;Decreased recall of recent events   Problem Solving Impaired   Insights Impaired   Initiation Impaired   Cognition Comment Awake, alert, follows simple commands, requires cues to initate tasks.   Orientation   Overall Orientation Status WFL   Bed Mobility Training   Bed Mobility Training Yes   Overall Level of Assistance Minimal assistance   Interventions Verbal cues;Tactile cues;Manual cues   Rolling Stand by assistance   Supine to Sit Contact guard assistance;Stand by assistance   Sit to Supine Minimal assistance   Scooting Stand by assistance  (using mechancial bed.)   Transfer Training   Transfer Training Yes   Overall Level of Assistance Minimal assistance;2 Person assistance   Interventions Verbal cues;Tactile cues;Manual cues   Sit to Stand Minimal assistance;2 Person assistance   Stand to Sit Minimal assistance;2 Person assistance   Balance   Sitting Intact   Sitting - Static Good (unsupported)   Sitting - Dynamic Good (unsupported)   Standing With support  (RW)   ADL   Feeding Independent;Setup   Grooming Stand by assistance   UE Bathing Moderate assistance;Maximum assistance   LE Bathing Dependent/Total   UE Dressing Moderate assistance;Maximum assistance   LE Dressing Dependent/Total   Putting On/Taking Off Footwear Dependent/Total   Toileting Maximum assistance   Toileting Skilled Clinical Factors bedlevel

## 2025-05-27 NOTE — CARE COORDINATION
Per notes, pt was able to stand on edge of bed with walker and min assist and move 2 steps towards head of bed.  Per pca, the bed was zero'd out while pt was standing and new weight documented as 446lbs.  Spoke with Gracy at [facility], they are going to re-eval to see if they would be able to accept.  Will cont to follow.      Followed up with pt this am about continuation of dc planning.  Pt is now on a new bed. Spoke with nurse about getting an accurate weight if pt able to stand.  Therapy was present in room and goals of care discussed again with pt.  Pt still agreeable to go to a nh to get stronger.  Will follow post therapy.  Electronically signed by Shira Tomlin RN on 5/27/2025 at 3:29 PM

## 2025-05-27 NOTE — PROGRESS NOTES
Nutrition Assessment     Type and Reason for Visit: Reassess    Nutrition Recommendations/Plan:   Continue current POC     Malnutrition Assessment:  Malnutrition Status: At risk for malnutrition    Nutrition Assessment:  Patient continues to have good po intake on CArb control Cardiac diet.  Intake ranging %.  New weight not available.  Glucose 159-162.  Accuchek's 129-153    Estimated Daily Nutrient Needs:  Energy (kcal):  2383-4633 kcals (8-11 kcals/kg)       Protein (g):  145g Weight Used for Protein Requirements: Ideal        Fluid (ml/day):  0373-3075 ml Method Used for Fluid Requirements: 1 ml/kcal    Nutrition Related Findings:   Glucose 159-162.  Accuchek's 129-153      +1 generalized edema.  Nonpitting BLE edema Wound Type: Venous Stasis, Pressure Injury    Current Nutrition Therapies:    ADULT DIET; Regular; 4 carb choices (60 gm/meal); Low Fat/Low Chol/High Fiber/2 gm Na    Anthropometric Measures:  Height: 175.3 cm (5' 9.02\")  Current Body Wt: 259.5 kg (572 lb 1.5 oz)   BMI: 84.4        Nutrition Diagnosis:   Food & nutrition-related knowledge deficit related to cardiac dysfunction as evidenced by localized or generalized fluid accumulation, lab values    Nutrition Interventions:   Food and/or Nutrient Delivery: Continue Current Diet  Nutrition Education/Counseling: Survival skills/brief education completed, Education/Counseling completed  Coordination of Nutrition Care: Continue to monitor while inpatient  Plan of Care discussed with: pt    Goals:  Goals: Meet at least 75% of estimated needs, PO intake 50% or greater  Type of Goal: Continue current goal  Previous Goal Met: Progressing toward Goal(s)    Nutrition Monitoring and Evaluation:   Behavioral-Environmental Outcomes: Knowledge or Skill, Readiness for Change  Food/Nutrient Intake Outcomes: Food and Nutrient Intake  Physical Signs/Symptoms Outcomes: Biochemical Data, Weight, Skin, Fluid Status or Edema    Discharge Planning:    Continue

## 2025-05-27 NOTE — PLAN OF CARE
Problem: Chronic Conditions and Co-morbidities  Goal: Patient's chronic conditions and co-morbidity symptoms are monitored and maintained or improved  5/27/2025 0822 by Jyoti Harrington RN  Outcome: Progressing  5/26/2025 2229 by Amie Hernandez RN  Outcome: Progressing     Problem: Discharge Planning  Goal: Discharge to home or other facility with appropriate resources  5/27/2025 0822 by Jyoti Harrington RN  Outcome: Progressing  5/26/2025 2229 by Amie Hernandez RN  Outcome: Progressing     Problem: Pain  Goal: Verbalizes/displays adequate comfort level or baseline comfort level  5/27/2025 0822 by Jyoti Harrington RN  Outcome: Progressing  5/26/2025 2229 by Amie Hernandez RN  Outcome: Progressing     Problem: Safety - Adult  Goal: Free from fall injury  5/27/2025 0822 by Jyoti Harrington RN  Outcome: Progressing  5/26/2025 2229 by Amie Hernandez RN  Outcome: Progressing     Problem: ABCDS Injury Assessment  Goal: Absence of physical injury  5/27/2025 0822 by Jyoti Harrington RN  Outcome: Progressing  5/26/2025 2229 by Amie Hernandez RN  Outcome: Progressing     Problem: Skin/Tissue Integrity  Goal: Skin integrity remains intact  Description: 1.  Monitor for areas of redness and/or skin breakdown2.  Assess vascular access sites hourly3.  Every 4-6 hours minimum:  Change oxygen saturation probe site4.  Every 4-6 hours:  If on nasal continuous positive airway pressure, respiratory therapy assess nares and determine need for appliance change or resting period  5/27/2025 0822 by Jyoti Harrington RN  Outcome: Progressing  5/26/2025 2229 by Amie Hernandez RN  Outcome: Progressing     Problem: Nutrition Deficit:  Goal: Optimize nutritional status  5/27/2025 0822 by Jyoti Harrington RN  Outcome: Progressing  5/26/2025 2229 by Amie Hernandez RN  Outcome: Progressing     Problem: Neurosensory - Adult  Goal: Achieves stable or improved neurological status  5/27/2025 0822 by Jyoti Harrington RN  Outcome:  Progressing  5/26/2025 2229 by Amie Hernandez RN  Outcome: Progressing     Problem: Respiratory - Adult  Goal: Achieves optimal ventilation and oxygenation  5/27/2025 0822 by Jyoti Harrington RN  Outcome: Progressing  5/26/2025 2229 by Amie Hernandez RN  Outcome: Progressing     Problem: Cardiovascular - Adult  Goal: Maintains optimal cardiac output and hemodynamic stability  5/27/2025 0822 by Jyoti Harrington RN  Outcome: Progressing  5/26/2025 2229 by Amie Hernandez RN  Outcome: Progressing     Problem: Skin/Tissue Integrity - Adult  Goal: Skin integrity remains intact  Description: 1.  Monitor for areas of redness and/or skin breakdown2.  Assess vascular access sites hourly3.  Every 4-6 hours minimum:  Change oxygen saturation probe site4.  Every 4-6 hours:  If on nasal continuous positive airway pressure, respiratory therapy assess nares and determine need for appliance change or resting period  5/27/2025 0822 by Jyoti Harrington RN  Outcome: Progressing  5/26/2025 2229 by Amie Hernandez RN  Outcome: Progressing  Goal: Incisions, wounds, or drain sites healing without S/S of infection  5/27/2025 0822 by Jyoti Harrington RN  Outcome: Progressing  5/26/2025 2229 by Amie Hernandez RN  Outcome: Progressing  Goal: Oral mucous membranes remain intact  5/27/2025 0822 by Jyoti Harrington RN  Outcome: Progressing  5/26/2025 2229 by Amie Hernandez RN  Outcome: Progressing     Problem: Musculoskeletal - Adult  Goal: Return mobility to safest level of function  5/27/2025 0822 by Jyoti Harrington RN  Outcome: Progressing  5/26/2025 2229 by Amie Hernandez RN  Outcome: Progressing  Goal: Maintain proper alignment of affected body part  5/27/2025 0822 by Jyoti Harrington RN  Outcome: Progressing  5/26/2025 2229 by Amie Hernandez RN  Outcome: Progressing  Goal: Return ADL status to a safe level of function  5/27/2025 0822 by Jyoti Harrington RN  Outcome: Progressing  5/26/2025 2229 by Amie Hernandez RN  Outcome:

## 2025-05-27 NOTE — PROGRESS NOTES
05/27/25 1200   Subjective   Subjective Patient sittting EOB.  ELSA Estes present with NSG.  Patient wants to stand EOB   Pain No C/O pain.   Cognition   Overall Cognitive Status WFL   Orientation   Overall Orientation Status WFL   Vitals   O2 Device Nasal cannula   Bed Mobility Training   Bed Mobility Training Yes   Sit to Supine Partial/Moderate assistance  (Assist with LE's)   Balance   Sitting Intact   Transfer Training   Transfer Training Yes   Sit to Stand Minimal assistance;2 Person assistance   Stand to Sit Minimal assistance;2 Person assistance   Gait Training   Gait Training Yes   Gait   Gait Training Yes   Overall Level of Assistance Minimal assistance;2 Person assistance   Distance (ft) 1 Feet  (Steps to HOB)   Assistive Device Walker, rolling  (ZINA RW)   Patient Education   Education Given To Patient   Education Provided Role of Therapy;Plan of Care;Transfer Training;Fall Prevention Strategies   Education Provided Comments Use of call light, staff  assist.   Education Method Demonstration;Verbal   Barriers to Learning None   Education Outcome Verbalized understanding;Demonstrated understanding;Continued education needed   Other Specialty Interventions   Other Treatments/Modalities BTB NSG present positioning for comfort.   Assessment   Activity Tolerance Patient tolerated treatment well   PT Plan of Care   Tuesday X       Electronically signed by Kalyan Contreras PTA on 5/27/2025 at 12:42 PM

## 2025-05-28 LAB
ANION GAP SERPL CALCULATED.3IONS-SCNC: 10 MMOL/L (ref 8–16)
BASOPHILS # BLD: 0.1 K/UL (ref 0–0.2)
BASOPHILS NFR BLD: 1 % (ref 0–1)
BUN SERPL-MCNC: 28 MG/DL (ref 8–23)
CALCIUM SERPL-MCNC: 9.4 MG/DL (ref 8.8–10.2)
CHLORIDE SERPL-SCNC: 99 MMOL/L (ref 98–107)
CO2 SERPL-SCNC: 33 MMOL/L (ref 22–29)
CREAT SERPL-MCNC: 1.2 MG/DL (ref 0.7–1.2)
EOSINOPHIL # BLD: 0.3 K/UL (ref 0–0.6)
EOSINOPHIL NFR BLD: 4.5 % (ref 0–5)
ERYTHROCYTE [DISTWIDTH] IN BLOOD BY AUTOMATED COUNT: 15 % (ref 11.5–14.5)
GLUCOSE BLD-MCNC: 117 MG/DL (ref 70–99)
GLUCOSE BLD-MCNC: 144 MG/DL (ref 70–99)
GLUCOSE BLD-MCNC: 164 MG/DL (ref 70–99)
GLUCOSE BLD-MCNC: 175 MG/DL (ref 70–99)
GLUCOSE SERPL-MCNC: 169 MG/DL (ref 70–99)
HCT VFR BLD AUTO: 46.8 % (ref 42–52)
HGB BLD-MCNC: 14.6 G/DL (ref 14–18)
IMM GRANULOCYTES # BLD: 0 K/UL
LYMPHOCYTES # BLD: 0.9 K/UL (ref 1.1–4.5)
LYMPHOCYTES NFR BLD: 15.7 % (ref 20–40)
MCH RBC QN AUTO: 29.6 PG (ref 27–31)
MCHC RBC AUTO-ENTMCNC: 31.2 G/DL (ref 33–37)
MCV RBC AUTO: 94.7 FL (ref 80–94)
MONOCYTES # BLD: 0.6 K/UL (ref 0–0.9)
MONOCYTES NFR BLD: 10.7 % (ref 0–10)
NEUTROPHILS # BLD: 3.9 K/UL (ref 1.5–7.5)
NEUTS SEG NFR BLD: 67.8 % (ref 50–65)
PERFORMED ON: ABNORMAL
PLATELET # BLD AUTO: 135 K/UL (ref 130–400)
PMV BLD AUTO: 13 FL (ref 9.4–12.4)
POTASSIUM SERPL-SCNC: 3.9 MMOL/L (ref 3.5–5)
RBC # BLD AUTO: 4.94 M/UL (ref 4.7–6.1)
SODIUM SERPL-SCNC: 142 MMOL/L (ref 136–145)
WBC # BLD AUTO: 5.8 K/UL (ref 4.8–10.8)

## 2025-05-28 PROCEDURE — 6360000002 HC RX W HCPCS

## 2025-05-28 PROCEDURE — 2700000000 HC OXYGEN THERAPY PER DAY

## 2025-05-28 PROCEDURE — 2500000003 HC RX 250 WO HCPCS

## 2025-05-28 PROCEDURE — 6370000000 HC RX 637 (ALT 250 FOR IP): Performed by: NURSE PRACTITIONER

## 2025-05-28 PROCEDURE — 94150 VITAL CAPACITY TEST: CPT

## 2025-05-28 PROCEDURE — 85025 COMPLETE CBC W/AUTO DIFF WBC: CPT

## 2025-05-28 PROCEDURE — 97530 THERAPEUTIC ACTIVITIES: CPT

## 2025-05-28 PROCEDURE — 36415 COLL VENOUS BLD VENIPUNCTURE: CPT

## 2025-05-28 PROCEDURE — 1200000000 HC SEMI PRIVATE

## 2025-05-28 PROCEDURE — 6360000002 HC RX W HCPCS: Performed by: NURSE PRACTITIONER

## 2025-05-28 PROCEDURE — 82962 GLUCOSE BLOOD TEST: CPT

## 2025-05-28 PROCEDURE — 94640 AIRWAY INHALATION TREATMENT: CPT

## 2025-05-28 PROCEDURE — 94760 N-INVAS EAR/PLS OXIMETRY 1: CPT

## 2025-05-28 PROCEDURE — 6370000000 HC RX 637 (ALT 250 FOR IP)

## 2025-05-28 PROCEDURE — 80048 BASIC METABOLIC PNL TOTAL CA: CPT

## 2025-05-28 RX ADMIN — BUMETANIDE 2 MG: 1 TABLET ORAL at 09:33

## 2025-05-28 RX ADMIN — ENOXAPARIN SODIUM 60 MG: 100 INJECTION SUBCUTANEOUS at 09:34

## 2025-05-28 RX ADMIN — METOPROLOL SUCCINATE 25 MG: 25 TABLET, EXTENDED RELEASE ORAL at 09:33

## 2025-05-28 RX ADMIN — CETIRIZINE HYDROCHLORIDE 10 MG: 10 TABLET ORAL at 09:33

## 2025-05-28 RX ADMIN — PANTOPRAZOLE SODIUM 40 MG: 40 TABLET, DELAYED RELEASE ORAL at 05:03

## 2025-05-28 RX ADMIN — SENNOSIDES 17.2 MG: 8.6 TABLET, COATED ORAL at 09:33

## 2025-05-28 RX ADMIN — GUAIFENESIN 600 MG: 600 TABLET, EXTENDED RELEASE ORAL at 09:33

## 2025-05-28 RX ADMIN — INSULIN GLARGINE 30 UNITS: 100 INJECTION, SOLUTION SUBCUTANEOUS at 20:56

## 2025-05-28 RX ADMIN — POTASSIUM CHLORIDE 20 MEQ: 1500 TABLET, EXTENDED RELEASE ORAL at 09:33

## 2025-05-28 RX ADMIN — MONTELUKAST SODIUM 10 MG: 10 TABLET, FILM COATED ORAL at 20:55

## 2025-05-28 RX ADMIN — MICONAZOLE NITRATE: 2 POWDER TOPICAL at 09:34

## 2025-05-28 RX ADMIN — MICONAZOLE NITRATE: 2 POWDER TOPICAL at 20:56

## 2025-05-28 RX ADMIN — ALLOPURINOL 300 MG: 300 TABLET ORAL at 09:33

## 2025-05-28 RX ADMIN — PRAVASTATIN SODIUM 20 MG: 20 TABLET ORAL at 20:55

## 2025-05-28 RX ADMIN — SODIUM CHLORIDE, PRESERVATIVE FREE 10 ML: 5 INJECTION INTRAVENOUS at 20:56

## 2025-05-28 RX ADMIN — SODIUM CHLORIDE, PRESERVATIVE FREE 10 ML: 5 INJECTION INTRAVENOUS at 09:33

## 2025-05-28 RX ADMIN — ASPIRIN 81 MG: 81 TABLET, COATED ORAL at 09:33

## 2025-05-28 RX ADMIN — SENNOSIDES 17.2 MG: 8.6 TABLET, COATED ORAL at 20:55

## 2025-05-28 RX ADMIN — GUAIFENESIN 600 MG: 600 TABLET, EXTENDED RELEASE ORAL at 20:55

## 2025-05-28 RX ADMIN — IPRATROPIUM BROMIDE 0.5 MG: 0.5 SOLUTION RESPIRATORY (INHALATION) at 19:20

## 2025-05-28 RX ADMIN — IPRATROPIUM BROMIDE 0.5 MG: 0.5 SOLUTION RESPIRATORY (INHALATION) at 15:14

## 2025-05-28 RX ADMIN — IPRATROPIUM BROMIDE 0.5 MG: 0.5 SOLUTION RESPIRATORY (INHALATION) at 06:40

## 2025-05-28 RX ADMIN — ENOXAPARIN SODIUM 60 MG: 100 INJECTION SUBCUTANEOUS at 20:55

## 2025-05-28 RX ADMIN — IPRATROPIUM BROMIDE 0.5 MG: 0.5 SOLUTION RESPIRATORY (INHALATION) at 10:18

## 2025-05-28 ASSESSMENT — PAIN SCALES - GENERAL
PAINLEVEL_OUTOF10: 2
PAINLEVEL_OUTOF10: 0

## 2025-05-28 ASSESSMENT — PAIN DESCRIPTION - LOCATION: LOCATION: THROAT

## 2025-05-28 ASSESSMENT — PAIN DESCRIPTION - PAIN TYPE: TYPE: ACUTE PAIN

## 2025-05-28 ASSESSMENT — PAIN - FUNCTIONAL ASSESSMENT: PAIN_FUNCTIONAL_ASSESSMENT: ACTIVITIES ARE NOT PREVENTED

## 2025-05-28 ASSESSMENT — PAIN DESCRIPTION - DESCRIPTORS: DESCRIPTORS: ACHING

## 2025-05-28 NOTE — PROGRESS NOTES
[facility]      [facility]    Patient:  Daljit Elizondo  YOB: 1960  Date of Service: 5/28/2025  MRN: [id number]   Acct: [id number]   Primary Care Physician: Jessie Helms APRN - CNP  Advance Directive: Full Code  Admit Date: 5/14/2025       Hospital Day: 14    Portions of this note have been copied forward, however, updated to reflect the most current clinical status of this patient.     CHIEF COMPLAINT Shortness of breath     SUBJECTIVE:  Mr. Elizondo was resting in bed this morning. Reports shortness of breath has improved. Worked with therapy yesterday.       CUMULATIVE HOSPITAL COURSE:   Patient is a 64-year-old male with past medical history of anxiety, diabetes, GERD, hyperlipidemia, and HTN who presented to [facility] ED for evaluation of increased dyspnea on exertion and weakness.  Reported chest heaviness. Increased lower extremity weakness and edema. Patient is morbidly obese and normally ambulates short distances with a walker at home.Stated he has not been unable to get out of bed for past couple of days. Therefore, he had not been taking his Bumex for past several days. Stated he is normally on 3L O2 at all times. Reported productive cough with cloudy thick pink sputum. Denied fever, chills, nausea, vomiting, or abdominal pain. Workup in ED revealed CXR with atelectasis and or infiltrate on the left, small left pleural effusion; UA unremarkable for UTI; blood cultures pending; WBC 5.7, H&H 14/47, BNP 4707, CMP unremarkable.   Patient was admitted to hospital medicine for further evaluation. IV Bumex 1 mg BID was initiated. ECHO was attempted, unable to obtain due to patient's body habitus. Bumex gtt initiated on 5/16/2025. 1 time dose of IV Diamox 500 mg given on 5/17/2025. Net negative greater than 5L. Diuretics adjusted back to home dose of Bumex 2 mg po daily. Renal function remained stable. Antibiotics discontinued as no evidence of infectious process. Encouraged patient to  SubCUTAneous BID    insulin glargine  30 Units SubCUTAneous Nightly    insulin lispro  0-8 Units SubCUTAneous 4x Daily AC & HS     sodium chloride, tetrahydrozoline, artificial tears, albuterol, sodium chloride flush, sodium chloride, ondansetron **OR** ondansetron, polyethylene glycol, acetaminophen **OR** acetaminophen, potassium chloride **OR** potassium alternative oral replacement **OR** potassium chloride, magnesium sulfate, sulfur hexafluoride microspheres, glucose, dextrose bolus **OR** dextrose bolus, glucagon (rDNA), dextrose  ADULT DIET; Regular; 4 carb choices (60 gm/meal); Low Fat/Low Chol/High Fiber/2 gm Na     Lab and other Data:     Recent Labs     05/26/25 0247 05/27/25 0259 05/28/25  0352   WBC 5.2 6.0 5.8   HGB 14.6 15.3 14.6   * 126* 135     Recent Labs     05/26/25 0247 05/27/25 0259 05/28/25  0352    141 142   K 3.9 4.3 3.9   CL 99 97* 99   CO2 32* 34* 33*   BUN 27* 27* 28*   CREATININE 1.0 1.2 1.2   GLUCOSE 162* 159* 169*     No results for input(s): \"AST\", \"ALT\", \"BILITOT\", \"ALKPHOS\" in the last 72 hours.    Invalid input(s): \"ALB\"      UA:  No results for input(s): \"NITRITE\", \"COLORU\", \"PHUR\", \"LABCAST\", \"WBCUA\", \"RBCUA\", \"MUCUS\", \"TRICHOMONAS\", \"YEAST\", \"BACTERIA\", \"CLARITYU\", \"SPECGRAV\", \"LEUKOCYTESUR\", \"UROBILINOGEN\", \"BILIRUBINUR\", \"BLOODU\", \"GLUCOSEU\", \"AMORPHOUS\" in the last 72 hours.    Invalid input(s): \"KETONESU\"    RAD:   Echo (TTE) complete (PRN contrast/bubble/strain/3D)  Result Date: 5/15/2025  Technically difficult study due to patient's body habitus. Nondiagnostic study due to poor image quality.  Suggest alternative cardiac imaging including RISSA or cardiac CT, if clinically indicated.     XR CHEST PORTABLE  Result Date: 5/14/2025  EXAM: XR CHEST PORTABLE  TECHNIQUE: Single frontal chest radiograph.  HISTORY: dyspnea  COMPARISON: None.  FINDINGS: Evaluation is limited by patient body habitus, portable technique, patient position Atelectasis and/or infiltrate

## 2025-05-28 NOTE — PROGRESS NOTES
Occupational Therapy     05/28/25 1600   Subjective   Subjective Pt in bed upon arrival for therapy. Pt agreeable to participate.   Pain Assessment   Pain Assessment None - Denies Pain   Vitals   O2 Device Nasal cannula   Cognition   Overall Cognitive Status Exceptions   Cognition Comment Awake, alert, follows simple commands, requires cues to initate tasks.   Orientation   Overall Orientation Status WFL   Bed Mobility Training   Bed Mobility Training Yes   Overall Level of Assistance Minimal assistance;Stand by assistance   Interventions Verbal cues;Tactile cues;Manual cues   Rolling Minimal assistance;Stand by assistance   Supine to Sit Contact guard assistance;Stand by assistance   Sit to Supine Minimal assistance   Scooting Stand by assistance   Transfer Training   Transfer Training No   Balance   Sitting Intact   Sitting - Static Good (unsupported)   Sitting - Dynamic Good (unsupported)   ADL   Feeding Independent;Setup   Grooming Stand by assistance   UE Bathing Moderate assistance;Maximum assistance   LE Bathing Maximum assistance   UE Dressing Moderate assistance;Maximum assistance   LE Dressing Maximum assistance   Putting On/Taking Off Footwear Dependent/Total   Toileting Maximum assistance   Toileting Skilled Clinical Factors bedlevel   Functional Mobility Minimal assistance   Additional Comments Based on clinical observation.   Assessment   Assessment Tx focused on sitting EOB. Pt declined standing this date because of bed rail at base of bed was pushing on his leg. This was not preventable at this time. Pt assisted with bedding change at bed level. Pt able to roll and scoot using bed rails and head board.   Activity Tolerance Patient tolerated treatment well   Discharge Recommendations Patient would benefit from continued therapy after discharge;24 hour supervision or assist   Occupational Therapy Plan   Times Per Week 3-5   Times Per Day Once a day   OT Plan of Care   Wednesday X     Electronically

## 2025-05-28 NOTE — PLAN OF CARE
Problem: Chronic Conditions and Co-morbidities  Goal: Patient's chronic conditions and co-morbidity symptoms are monitored and maintained or improved  Outcome: Progressing  Flowsheets (Taken 5/28/2025 0945)  Care Plan - Patient's Chronic Conditions and Co-Morbidity Symptoms are Monitored and Maintained or Improved:   Monitor and assess patient's chronic conditions and comorbid symptoms for stability, deterioration, or improvement   Collaborate with multidisciplinary team to address chronic and comorbid conditions and prevent exacerbation or deterioration   Update acute care plan with appropriate goals if chronic or comorbid symptoms are exacerbated and prevent overall improvement and discharge     Problem: Discharge Planning  Goal: Discharge to home or other facility with appropriate resources  Outcome: Progressing  Flowsheets (Taken 5/28/2025 0945)  Discharge to home or other facility with appropriate resources:   Identify barriers to discharge with patient and caregiver   Arrange for needed discharge resources and transportation as appropriate   Identify discharge learning needs (meds, wound care, etc)   Arrange for interpreters to assist at discharge as needed   Refer to discharge planning if patient needs post-hospital services based on physician order or complex needs related to functional status, cognitive ability or social support system     Problem: Pain  Goal: Verbalizes/displays adequate comfort level or baseline comfort level  Outcome: Progressing     Problem: Safety - Adult  Goal: Free from fall injury  Outcome: Progressing     Problem: ABCDS Injury Assessment  Goal: Absence of physical injury  Outcome: Progressing     Problem: Skin/Tissue Integrity  Goal: Skin integrity remains intact  Description: 1.  Monitor for areas of redness and/or skin breakdown2.  Assess vascular access sites hourly3.  Every 4-6 hours minimum:  Change oxygen saturation probe site4.  Every 4-6 hours:  If on nasal continuous  of daily living deficits and provide assistive devices as needed   Obtain physical therapy/occupational therapy consults as needed   Assist and instruct patient to increase activity and self care as tolerated     Problem: Gastrointestinal - Adult  Goal: Minimal or absence of nausea and vomiting  Outcome: Progressing  Flowsheets (Taken 5/28/2025 0945)  Minimal or absence of nausea and vomiting:   Administer IV fluids as ordered to ensure adequate hydration   Maintain NPO status until nausea and vomiting are resolved   Nasogastric tube to low intermittent suction as ordered   Administer ordered antiemetic medications as needed   Provide nonpharmacologic comfort measures as appropriate   Advance diet as tolerated, if ordered   Nutrition consult to assist patient with adequate nutrition and appropriate food choices  Goal: Maintains or returns to baseline bowel function  Outcome: Progressing  Flowsheets (Taken 5/28/2025 0945)  Maintains or returns to baseline bowel function:   Assess bowel function   Encourage oral fluids to ensure adequate hydration   Administer IV fluids as ordered to ensure adequate hydration   Administer ordered medications as needed   Nutrition consult to assist patient with appropriate food choices   Encourage mobilization and activity  Goal: Maintains adequate nutritional intake  Outcome: Progressing  Flowsheets (Taken 5/28/2025 0945)  Maintains adequate nutritional intake:   Monitor percentage of each meal consumed   Identify factors contributing to decreased intake, treat as appropriate   Assist with meals as needed   Monitor intake and output, weight and lab values   Obtain nutritional consult as needed     Problem: Genitourinary - Adult  Goal: Absence of urinary retention  Outcome: Progressing  Flowsheets (Taken 5/28/2025 0945)  Absence of urinary retention:   Assess patient’s ability to void and empty bladder   Monitor intake/output and perform bladder scan as needed   Place urinary catheter

## 2025-05-28 NOTE — PROGRESS NOTES
Physical Therapy  Name: Daljit Elizondo  MRN:  [id number]  Date of service:  5/28/2025 05/28/25 1551   Restrictions/Precautions   Restrictions/Precautions Fall Risk;Contact Precautions   Position Activity Restriction   Other Position/Activity Restrictions ESBL, MRSA   General   Chart Reviewed Yes   Family/Caregiver Present No   Referring Practitioner Ruben Partida, DURAN - CNP   Subjective   Subjective pt in bed, agrees to therapy   Pain Assessment   Pain Assessment None - Denies Pain   Pain Level 0   Oxygen Therapy   O2 Device Nasal cannula   O2 Flow Rate (L/min) 4 L/min   Orientation   Overall Orientation Status WFL   Bed Mobility   Rolling Moderate assistance   Supine to Sit Minimal assistance   Sit to Supine Minimal assistance   Scooting Stand by assistance   Comment pt needing verbal cues for technique   Transfers   Comment pt was attempting to stand but was unable to   Other Activities   Comment pull ups with trap bar   Patient Goals    Patient Goals  get better   Short Term Goals   Time Frame for Short Term Goals 2 wks   Short Term Goal 1 supine to sit Min A   Short Term Goal 2 sit to stand Min A x 2 with SW   Short Term Goal 3 bed to bariatric chair SBA   Short Term Goal 4 bed mobility Min A   Short Term Goal 5 amb. 15' Min A x 1 SW   Conditions Requiring Skilled Therapeutic Intervention   Body Structures, Functions, Activity Limitations Requiring Skilled Therapeutic Intervention Decreased functional mobility ;Decreased ADL status;Decreased ROM;Decreased strength;Decreased balance;Decreased endurance;Decreased sensation;Decreased posture   Assessment pt tolerating tx  pt working on bed mob rolling severalx, sup<>sit and scooting using bedrails and trap bar   pt attempted to stand after sitting eob for extended time but declined and wanting to go btb   Discharge Recommendations Continue to assess pending progress;24 hour supervision or assist;Patient would benefit from continued therapy after discharge

## 2025-05-28 NOTE — PLAN OF CARE
Problem: Chronic Conditions and Co-morbidities  Goal: Patient's chronic conditions and co-morbidity symptoms are monitored and maintained or improved  5/27/2025 2138 by Amie Hernandez RN  Outcome: Progressing  5/27/2025 0822 by Jyoti Harrington RN  Outcome: Progressing     Problem: Discharge Planning  Goal: Discharge to home or other facility with appropriate resources  5/27/2025 2138 by Amie Hernandez RN  Outcome: Progressing  5/27/2025 0822 by Jyoti Harrington RN  Outcome: Progressing     Problem: Pain  Goal: Verbalizes/displays adequate comfort level or baseline comfort level  5/27/2025 2138 by Amie Hernandez RN  Outcome: Progressing  5/27/2025 0822 by Jyoti Harrington RN  Outcome: Progressing     Problem: Safety - Adult  Goal: Free from fall injury  5/27/2025 2138 by Amie Hernandez RN  Outcome: Progressing  5/27/2025 0822 by Jyoti Harrington RN  Outcome: Progressing     Problem: ABCDS Injury Assessment  Goal: Absence of physical injury  5/27/2025 2138 by Amie Hernandez RN  Outcome: Progressing  5/27/2025 0822 by Jyoti Harrington RN  Outcome: Progressing     Problem: Skin/Tissue Integrity  Goal: Skin integrity remains intact  Description: 1.  Monitor for areas of redness and/or skin breakdown2.  Assess vascular access sites hourly3.  Every 4-6 hours minimum:  Change oxygen saturation probe site4.  Every 4-6 hours:  If on nasal continuous positive airway pressure, respiratory therapy assess nares and determine need for appliance change or resting period  5/27/2025 2138 by Amie Hrenandez RN  Outcome: Progressing  5/27/2025 0822 by Jyoti Harrington RN  Outcome: Progressing     Problem: Nutrition Deficit:  Goal: Optimize nutritional status  5/27/2025 2138 by Amie Hernandez RN  Outcome: Progressing  5/27/2025 1116 by Miriam Maldonado, MS, RD, LD  Outcome: Progressing  Flowsheets (Taken 5/27/2025 1104)  Nutrient intake appropriate for improving, restoring, or maintaining nutritional needs:   Assess nutritional

## 2025-05-29 LAB
ANION GAP SERPL CALCULATED.3IONS-SCNC: 9 MMOL/L (ref 8–16)
BASOPHILS # BLD: 0.1 K/UL (ref 0–0.2)
BASOPHILS NFR BLD: 0.9 % (ref 0–1)
BUN SERPL-MCNC: 28 MG/DL (ref 8–23)
CALCIUM SERPL-MCNC: 9.2 MG/DL (ref 8.8–10.2)
CHLORIDE SERPL-SCNC: 98 MMOL/L (ref 98–107)
CO2 SERPL-SCNC: 34 MMOL/L (ref 22–29)
CREAT SERPL-MCNC: 1.2 MG/DL (ref 0.7–1.2)
EOSINOPHIL # BLD: 0.2 K/UL (ref 0–0.6)
EOSINOPHIL NFR BLD: 4.3 % (ref 0–5)
ERYTHROCYTE [DISTWIDTH] IN BLOOD BY AUTOMATED COUNT: 15 % (ref 11.5–14.5)
GLUCOSE BLD-MCNC: 140 MG/DL (ref 70–99)
GLUCOSE BLD-MCNC: 148 MG/DL (ref 70–99)
GLUCOSE BLD-MCNC: 152 MG/DL (ref 70–99)
GLUCOSE BLD-MCNC: 154 MG/DL (ref 70–99)
GLUCOSE SERPL-MCNC: 176 MG/DL (ref 70–99)
HCT VFR BLD AUTO: 49.4 % (ref 42–52)
HGB BLD-MCNC: 15.1 G/DL (ref 14–18)
IMM GRANULOCYTES # BLD: 0 K/UL
LYMPHOCYTES # BLD: 0.8 K/UL (ref 1.1–4.5)
LYMPHOCYTES NFR BLD: 14.6 % (ref 20–40)
MCH RBC QN AUTO: 29.2 PG (ref 27–31)
MCHC RBC AUTO-ENTMCNC: 30.6 G/DL (ref 33–37)
MCV RBC AUTO: 95.4 FL (ref 80–94)
MONOCYTES # BLD: 0.6 K/UL (ref 0–0.9)
MONOCYTES NFR BLD: 10.3 % (ref 0–10)
NEUTROPHILS # BLD: 3.9 K/UL (ref 1.5–7.5)
NEUTS SEG NFR BLD: 69.4 % (ref 50–65)
PERFORMED ON: ABNORMAL
PLATELET # BLD AUTO: 131 K/UL (ref 130–400)
PMV BLD AUTO: 13.9 FL (ref 9.4–12.4)
POTASSIUM SERPL-SCNC: 3.9 MMOL/L (ref 3.5–5)
RBC # BLD AUTO: 5.18 M/UL (ref 4.7–6.1)
SODIUM SERPL-SCNC: 141 MMOL/L (ref 136–145)
WBC # BLD AUTO: 5.6 K/UL (ref 4.8–10.8)

## 2025-05-29 PROCEDURE — 94150 VITAL CAPACITY TEST: CPT

## 2025-05-29 PROCEDURE — 1200000000 HC SEMI PRIVATE

## 2025-05-29 PROCEDURE — 94640 AIRWAY INHALATION TREATMENT: CPT

## 2025-05-29 PROCEDURE — 2500000003 HC RX 250 WO HCPCS

## 2025-05-29 PROCEDURE — 82962 GLUCOSE BLOOD TEST: CPT

## 2025-05-29 PROCEDURE — 85025 COMPLETE CBC W/AUTO DIFF WBC: CPT

## 2025-05-29 PROCEDURE — 2700000000 HC OXYGEN THERAPY PER DAY

## 2025-05-29 PROCEDURE — 36415 COLL VENOUS BLD VENIPUNCTURE: CPT

## 2025-05-29 PROCEDURE — 80048 BASIC METABOLIC PNL TOTAL CA: CPT

## 2025-05-29 PROCEDURE — 6370000000 HC RX 637 (ALT 250 FOR IP)

## 2025-05-29 PROCEDURE — 94760 N-INVAS EAR/PLS OXIMETRY 1: CPT

## 2025-05-29 PROCEDURE — 97530 THERAPEUTIC ACTIVITIES: CPT

## 2025-05-29 PROCEDURE — 6360000002 HC RX W HCPCS

## 2025-05-29 PROCEDURE — 6370000000 HC RX 637 (ALT 250 FOR IP): Performed by: NURSE PRACTITIONER

## 2025-05-29 PROCEDURE — 6360000002 HC RX W HCPCS: Performed by: NURSE PRACTITIONER

## 2025-05-29 RX ADMIN — IPRATROPIUM BROMIDE 0.5 MG: 0.5 SOLUTION RESPIRATORY (INHALATION) at 06:56

## 2025-05-29 RX ADMIN — MICONAZOLE NITRATE: 2 POWDER TOPICAL at 20:25

## 2025-05-29 RX ADMIN — ALLOPURINOL 300 MG: 300 TABLET ORAL at 09:53

## 2025-05-29 RX ADMIN — ENOXAPARIN SODIUM 60 MG: 100 INJECTION SUBCUTANEOUS at 09:52

## 2025-05-29 RX ADMIN — POTASSIUM CHLORIDE 20 MEQ: 1500 TABLET, EXTENDED RELEASE ORAL at 09:52

## 2025-05-29 RX ADMIN — MICONAZOLE NITRATE: 2 POWDER TOPICAL at 09:53

## 2025-05-29 RX ADMIN — SODIUM CHLORIDE, PRESERVATIVE FREE 10 ML: 5 INJECTION INTRAVENOUS at 09:53

## 2025-05-29 RX ADMIN — IPRATROPIUM BROMIDE 0.5 MG: 0.5 SOLUTION RESPIRATORY (INHALATION) at 15:04

## 2025-05-29 RX ADMIN — ENOXAPARIN SODIUM 60 MG: 100 INJECTION SUBCUTANEOUS at 20:24

## 2025-05-29 RX ADMIN — INSULIN GLARGINE 30 UNITS: 100 INJECTION, SOLUTION SUBCUTANEOUS at 20:24

## 2025-05-29 RX ADMIN — PANTOPRAZOLE SODIUM 40 MG: 40 TABLET, DELAYED RELEASE ORAL at 06:04

## 2025-05-29 RX ADMIN — BUMETANIDE 2 MG: 1 TABLET ORAL at 09:52

## 2025-05-29 RX ADMIN — SENNOSIDES 17.2 MG: 8.6 TABLET, COATED ORAL at 20:24

## 2025-05-29 RX ADMIN — METOPROLOL SUCCINATE 25 MG: 25 TABLET, EXTENDED RELEASE ORAL at 09:53

## 2025-05-29 RX ADMIN — IPRATROPIUM BROMIDE 0.5 MG: 0.5 SOLUTION RESPIRATORY (INHALATION) at 19:11

## 2025-05-29 RX ADMIN — CETIRIZINE HYDROCHLORIDE 10 MG: 10 TABLET ORAL at 09:52

## 2025-05-29 RX ADMIN — SENNOSIDES 17.2 MG: 8.6 TABLET, COATED ORAL at 09:52

## 2025-05-29 RX ADMIN — GUAIFENESIN 600 MG: 600 TABLET, EXTENDED RELEASE ORAL at 20:24

## 2025-05-29 RX ADMIN — IPRATROPIUM BROMIDE 0.5 MG: 0.5 SOLUTION RESPIRATORY (INHALATION) at 10:26

## 2025-05-29 RX ADMIN — PRAVASTATIN SODIUM 20 MG: 20 TABLET ORAL at 20:24

## 2025-05-29 RX ADMIN — MONTELUKAST SODIUM 10 MG: 10 TABLET, FILM COATED ORAL at 20:24

## 2025-05-29 RX ADMIN — ASPIRIN 81 MG: 81 TABLET, COATED ORAL at 09:52

## 2025-05-29 RX ADMIN — GUAIFENESIN 600 MG: 600 TABLET, EXTENDED RELEASE ORAL at 09:53

## 2025-05-29 NOTE — PROGRESS NOTES
Occupational Therapy     05/29/25 1500   Subjective   Subjective Pt up in recliner and agreeable to get back to bed. Pt tolerated sitting up in recliner for 2 hours this date.   Pain Assessment   Pain Assessment None - Denies Pain   Vitals   O2 Device Nasal cannula   Cognition   Overall Cognitive Status Exceptions   Cognition Comment Awake, alert, follows simple commands, requires cues to initate all tasks.   Orientation   Overall Orientation Status WFL   Bed Mobility Training   Bed Mobility Training Yes   Overall Level of Assistance Minimal assistance   Interventions Verbal cues;Tactile cues;Manual cues   Sit to Supine Minimal assistance   Scooting Contact guard assistance;Stand by assistance   Transfer Training   Transfer Training Yes   Overall Level of Assistance Minimal assistance;Partial/Moderate assistance;2 Person assistance   Interventions Verbal cues;Tactile cues;Manual cues   Sit to Stand Minimal assistance;Partial/Moderate assistance;2 Person assistance   Stand to Sit Minimal assistance;Partial/Moderate assistance;2 Person assistance   Bed to Chair Minimal assistance;Partial/Moderate assistance;2 Person assistance   Balance   Sitting Intact   Sitting - Static Good (unsupported)   Sitting - Dynamic Good (unsupported)   Standing With support   Standing - Static Fair   Standing - Dynamic Fair   Assessment   Assessment Tx focused on transfer back to bed and bed repositioning for comfort. Pt did require Min-Mod assist x2 for STS mobility from chair height.   Activity Tolerance Patient tolerated treatment well;Patient limited by endurance   Discharge Recommendations Patient would benefit from continued therapy after discharge;24 hour supervision or assist   Occupational Therapy Plan   Times Per Week 3-5   Times Per Day Once a day   OT Plan of Care   Thursday X     Electronically signed by BRUNO Frausto on 5/29/2025 at 3:55 PM     DISPLAY PLAN FREE TEXT

## 2025-05-29 NOTE — PROGRESS NOTES
Occupational Therapy     05/29/25 1300   Subjective   Subjective Pt in bed upon arrival for therapy. Pt agreeable to get to recliner this date if the bottom railing on the hospital bed could be covered. Time spent covering railing on bed prior to getting up.   Vitals   O2 Device Nasal cannula   Cognition   Overall Cognitive Status Exceptions   Cognition Comment Awake, alert, follows simple commands, requires cues to initate all tasks.   Orientation   Overall Orientation Status WFL   Bed Mobility Training   Bed Mobility Training Yes   Overall Level of Assistance Minimal assistance;Partial/Moderate assistance   Interventions Verbal cues;Tactile cues;Manual cues   Supine to Sit Minimal assistance;Partial/Moderate assistance   Scooting Contact guard assistance;Stand by assistance   Transfer Training   Transfer Training Yes   Overall Level of Assistance Minimal assistance;2 Person assistance  (for safety)   Interventions Verbal cues;Tactile cues;Manual cues   Sit to Stand Minimal assistance;2 Person assistance   Stand to Sit Minimal assistance;2 Person assistance   Bed to Chair Minimal assistance;2 Person assistance   Balance   Sitting Intact   Sitting - Static Good (unsupported)   Sitting - Dynamic Good (unsupported)   Standing With support   Standing - Static Fair   Standing - Dynamic Fair   ADL   Feeding Independent;Setup   Grooming Stand by assistance   UE Bathing Moderate assistance;Maximum assistance   LE Bathing Maximum assistance   UE Dressing Moderate assistance;Maximum assistance   LE Dressing Maximum assistance   Putting On/Taking Off Footwear Dependent/Total   Toileting Maximum assistance;Dependent/Total   Toileting Skilled Clinical Factors bedlevel; for thorough clean.   Functional Mobility Minimal assistance   Functional Mobility Skilled Clinical Factors x2 for safety.   Assessment   Assessment Tx focused on sitting EOB, transfer to recliner and agreeable to sit up for 2 hours this date.   Activity

## 2025-05-29 NOTE — CARE COORDINATION
As of right now, all referrals that have been sent have been declined.  Electronically signed by Shira Tomlin RN on 5/29/2025 at 2:28 PM

## 2025-05-29 NOTE — CARE COORDINATION
Richardson Chacon is still re-evaulating the case, have not got a decision yet.   Asked Satish Haven to re-eval since we got an accurate weight.  [facility]  Will cont to follow up and send more referrals as needed.  Electronically signed by Shira Tomlin RN on 5/29/2025 at 11:05 AM

## 2025-05-29 NOTE — PROGRESS NOTES
[facility]      Progress Note    Patient:  Daljit Elizondo  YOB: 1960  Date of Service: 5/29/2025  MRN: [id number]   Acct: [id number]   Primary Care Physician: Jessie Helms APRN - CNP  Advance Directive: Full Code  Admit Date: 5/14/2025       Hospital Day: 15    Portions of this note have been copied forward, however, updated to reflect the most current clinical status of this patient.     CHIEF COMPLAINT Shortness of breath     SUBJECTIVE:  Mr. Elizondo was resting in bed this morning. Offers no new complaints at this time.       CUMULATIVE HOSPITAL COURSE:   Patient is a 64-year-old male with past medical history of anxiety, diabetes, GERD, hyperlipidemia, and HTN who presented to [facility] ED for evaluation of increased dyspnea on exertion and weakness.  Reported chest heaviness. Increased lower extremity weakness and edema. Patient is morbidly obese and normally ambulates short distances with a walker at home.Stated he has not been unable to get out of bed for past couple of days. Therefore, he had not been taking his Bumex for past several days. Stated he is normally on 3L O2 at all times. Reported productive cough with cloudy thick pink sputum. Denied fever, chills, nausea, vomiting, or abdominal pain. Workup in ED revealed CXR with atelectasis and or infiltrate on the left, small left pleural effusion; UA unremarkable for UTI; blood cultures pending; WBC 5.7, H&H 14/47, BNP 4707, CMP unremarkable.   Patient was admitted to hospital medicine for further evaluation. IV Bumex 1 mg BID was initiated. ECHO was attempted, unable to obtain due to patient's body habitus. Bumex gtt initiated on 5/16/2025. 1 time dose of IV Diamox 500 mg given on 5/17/2025. Net negative greater than 5L. Diuretics adjusted back to home dose of Bumex 2 mg po daily. Renal function remained stable. Antibiotics discontinued as no evidence of infectious process. Encouraged patient to participate with therapy and sit up in

## 2025-05-29 NOTE — PLAN OF CARE
Problem: Chronic Conditions and Co-morbidities  Goal: Patient's chronic conditions and co-morbidity symptoms are monitored and maintained or improved  5/28/2025 2354 by Amada Wheeler RN  Outcome: Progressing  Flowsheets (Taken 5/28/2025 1816 by Fely Randle LPN)  Care Plan - Patient's Chronic Conditions and Co-Morbidity Symptoms are Monitored and Maintained or Improved:   Monitor and assess patient's chronic conditions and comorbid symptoms for stability, deterioration, or improvement   Collaborate with multidisciplinary team to address chronic and comorbid conditions and prevent exacerbation or deterioration   Update acute care plan with appropriate goals if chronic or comorbid symptoms are exacerbated and prevent overall improvement and discharge  5/28/2025 1416 by Fely Randle LPN  Outcome: Progressing  Flowsheets (Taken 5/28/2025 0945)  Care Plan - Patient's Chronic Conditions and Co-Morbidity Symptoms are Monitored and Maintained or Improved:   Monitor and assess patient's chronic conditions and comorbid symptoms for stability, deterioration, or improvement   Collaborate with multidisciplinary team to address chronic and comorbid conditions and prevent exacerbation or deterioration   Update acute care plan with appropriate goals if chronic or comorbid symptoms are exacerbated and prevent overall improvement and discharge     Problem: Discharge Planning  Goal: Discharge to home or other facility with appropriate resources  5/28/2025 2354 by Amada Wheeler RN  Outcome: Progressing  Flowsheets (Taken 5/28/2025 1816 by Fely Randle LPN)  Discharge to home or other facility with appropriate resources:   Identify barriers to discharge with patient and caregiver   Arrange for needed discharge resources and transportation as appropriate   Identify discharge learning needs (meds, wound care, etc)   Arrange for interpreters to assist at discharge as needed   Refer to

## 2025-05-30 LAB
ANION GAP SERPL CALCULATED.3IONS-SCNC: 13 MMOL/L (ref 8–16)
BASOPHILS # BLD: 0.1 K/UL (ref 0–0.2)
BASOPHILS NFR BLD: 0.8 % (ref 0–1)
BUN SERPL-MCNC: 27 MG/DL (ref 8–23)
CALCIUM SERPL-MCNC: 9.1 MG/DL (ref 8.8–10.2)
CHLORIDE SERPL-SCNC: 97 MMOL/L (ref 98–107)
CO2 SERPL-SCNC: 30 MMOL/L (ref 22–29)
CREAT SERPL-MCNC: 1 MG/DL (ref 0.7–1.2)
EOSINOPHIL # BLD: 0.4 K/UL (ref 0–0.6)
EOSINOPHIL NFR BLD: 5.7 % (ref 0–5)
ERYTHROCYTE [DISTWIDTH] IN BLOOD BY AUTOMATED COUNT: 15.3 % (ref 11.5–14.5)
GLUCOSE BLD-MCNC: 127 MG/DL (ref 70–99)
GLUCOSE BLD-MCNC: 134 MG/DL (ref 70–99)
GLUCOSE BLD-MCNC: 147 MG/DL (ref 70–99)
GLUCOSE BLD-MCNC: 158 MG/DL (ref 70–99)
GLUCOSE SERPL-MCNC: 133 MG/DL (ref 70–99)
HCT VFR BLD AUTO: 47.6 % (ref 42–52)
HGB BLD-MCNC: 14.7 G/DL (ref 14–18)
IMM GRANULOCYTES # BLD: 0 K/UL
LYMPHOCYTES # BLD: 0.9 K/UL (ref 1.1–4.5)
LYMPHOCYTES NFR BLD: 14.9 % (ref 20–40)
MCH RBC QN AUTO: 29.4 PG (ref 27–31)
MCHC RBC AUTO-ENTMCNC: 30.9 G/DL (ref 33–37)
MCV RBC AUTO: 95.2 FL (ref 80–94)
MONOCYTES # BLD: 0.7 K/UL (ref 0–0.9)
MONOCYTES NFR BLD: 11 % (ref 0–10)
NEUTROPHILS # BLD: 4.1 K/UL (ref 1.5–7.5)
NEUTS SEG NFR BLD: 67.1 % (ref 50–65)
PERFORMED ON: ABNORMAL
PLATELET # BLD AUTO: 131 K/UL (ref 130–400)
PMV BLD AUTO: 14.2 FL (ref 9.4–12.4)
POTASSIUM SERPL-SCNC: 3.7 MMOL/L (ref 3.5–5)
RBC # BLD AUTO: 5 M/UL (ref 4.7–6.1)
SODIUM SERPL-SCNC: 140 MMOL/L (ref 136–145)
WBC # BLD AUTO: 6.2 K/UL (ref 4.8–10.8)

## 2025-05-30 PROCEDURE — 1200000000 HC SEMI PRIVATE

## 2025-05-30 PROCEDURE — 6370000000 HC RX 637 (ALT 250 FOR IP): Performed by: NURSE PRACTITIONER

## 2025-05-30 PROCEDURE — 2700000000 HC OXYGEN THERAPY PER DAY

## 2025-05-30 PROCEDURE — 6360000002 HC RX W HCPCS: Performed by: NURSE PRACTITIONER

## 2025-05-30 PROCEDURE — 94150 VITAL CAPACITY TEST: CPT

## 2025-05-30 PROCEDURE — 85025 COMPLETE CBC W/AUTO DIFF WBC: CPT

## 2025-05-30 PROCEDURE — 94760 N-INVAS EAR/PLS OXIMETRY 1: CPT

## 2025-05-30 PROCEDURE — 82962 GLUCOSE BLOOD TEST: CPT

## 2025-05-30 PROCEDURE — 6370000000 HC RX 637 (ALT 250 FOR IP)

## 2025-05-30 PROCEDURE — 36415 COLL VENOUS BLD VENIPUNCTURE: CPT

## 2025-05-30 PROCEDURE — 80048 BASIC METABOLIC PNL TOTAL CA: CPT

## 2025-05-30 PROCEDURE — 6360000002 HC RX W HCPCS

## 2025-05-30 PROCEDURE — 94640 AIRWAY INHALATION TREATMENT: CPT

## 2025-05-30 RX ADMIN — SENNOSIDES 17.2 MG: 8.6 TABLET, COATED ORAL at 07:41

## 2025-05-30 RX ADMIN — GUAIFENESIN 600 MG: 600 TABLET, EXTENDED RELEASE ORAL at 07:41

## 2025-05-30 RX ADMIN — ALLOPURINOL 300 MG: 300 TABLET ORAL at 07:41

## 2025-05-30 RX ADMIN — POTASSIUM CHLORIDE 20 MEQ: 1500 TABLET, EXTENDED RELEASE ORAL at 07:41

## 2025-05-30 RX ADMIN — BUMETANIDE 2 MG: 1 TABLET ORAL at 07:41

## 2025-05-30 RX ADMIN — INSULIN GLARGINE 30 UNITS: 100 INJECTION, SOLUTION SUBCUTANEOUS at 20:16

## 2025-05-30 RX ADMIN — MICONAZOLE NITRATE: 2 POWDER TOPICAL at 20:17

## 2025-05-30 RX ADMIN — IPRATROPIUM BROMIDE 0.5 MG: 0.5 SOLUTION RESPIRATORY (INHALATION) at 06:27

## 2025-05-30 RX ADMIN — PRAVASTATIN SODIUM 20 MG: 20 TABLET ORAL at 20:24

## 2025-05-30 RX ADMIN — IPRATROPIUM BROMIDE 0.5 MG: 0.5 SOLUTION RESPIRATORY (INHALATION) at 18:57

## 2025-05-30 RX ADMIN — IPRATROPIUM BROMIDE 0.5 MG: 0.5 SOLUTION RESPIRATORY (INHALATION) at 10:31

## 2025-05-30 RX ADMIN — GUAIFENESIN 600 MG: 600 TABLET, EXTENDED RELEASE ORAL at 20:16

## 2025-05-30 RX ADMIN — ASPIRIN 81 MG: 81 TABLET, COATED ORAL at 07:41

## 2025-05-30 RX ADMIN — MONTELUKAST SODIUM 10 MG: 10 TABLET, FILM COATED ORAL at 20:16

## 2025-05-30 RX ADMIN — IPRATROPIUM BROMIDE 0.5 MG: 0.5 SOLUTION RESPIRATORY (INHALATION) at 15:06

## 2025-05-30 RX ADMIN — PANTOPRAZOLE SODIUM 40 MG: 40 TABLET, DELAYED RELEASE ORAL at 05:42

## 2025-05-30 RX ADMIN — CETIRIZINE HYDROCHLORIDE 10 MG: 10 TABLET ORAL at 07:41

## 2025-05-30 RX ADMIN — ENOXAPARIN SODIUM 60 MG: 100 INJECTION SUBCUTANEOUS at 07:41

## 2025-05-30 RX ADMIN — ENOXAPARIN SODIUM 60 MG: 100 INJECTION SUBCUTANEOUS at 20:16

## 2025-05-30 RX ADMIN — METOPROLOL SUCCINATE 25 MG: 25 TABLET, EXTENDED RELEASE ORAL at 07:41

## 2025-05-30 RX ADMIN — MICONAZOLE NITRATE: 2 POWDER TOPICAL at 07:41

## 2025-05-30 NOTE — CARE COORDINATION
Spoke with pt at bedside about dc plans.  Will try to gt pt [facility].  [facility] not available until Monday.  PT agreeable to referral to [facility].  Referrals sent.  Will follow  Electronically signed by Shira Tomlin RN on 5/30/2025 at 12:00 PM

## 2025-05-30 NOTE — PROGRESS NOTES
[facility]      [facility]    Patient:  Daljit Elizondo  YOB: 1960  Date of Service: 5/30/2025  MRN: [id number]   Acct: [id number]   Primary Care Physician: Jessie Helms APRN - CNP  Advance Directive: Full Code  Admit Date: 5/14/2025       Hospital Day: 16    Portions of this note have been copied forward, however, updated to reflect the most current clinical status of this patient.     CHIEF COMPLAINT Shortness of breath     SUBJECTIVE:  Mr. Elizonod was resting in bed this morning. Reports SOB is baseline. Discussed arranging skilled therapy for home.       CUMULATIVE HOSPITAL COURSE:   Patient is a 64-year-old male with past medical history of anxiety, diabetes, GERD, hyperlipidemia, and HTN who presented to [facility] ED for evaluation of increased dyspnea on exertion and weakness.  Reported chest heaviness. Increased lower extremity weakness and edema. Patient is morbidly obese and normally ambulates short distances with a walker at home.Stated he has not been unable to get out of bed for past couple of days. Therefore, he had not been taking his Bumex for past several days. Stated he is normally on 3L O2 at all times. Reported productive cough with cloudy thick pink sputum. Denied fever, chills, nausea, vomiting, or abdominal pain. Workup in ED revealed CXR with atelectasis and or infiltrate on the left, small left pleural effusion; UA unremarkable for UTI; blood cultures pending; WBC 5.7, H&H 14/47, BNP 4707, CMP unremarkable.   Patient was admitted to hospital medicine for further evaluation. IV Bumex 1 mg BID was initiated. ECHO was attempted, unable to obtain due to patient's body habitus. Bumex gtt initiated on 5/16/2025. 1 time dose of IV Diamox 500 mg given on 5/17/2025. Net negative greater than 5L. Diuretics adjusted back to home dose of Bumex 2 mg po daily. Renal function remained stable. Antibiotics discontinued as no evidence of infectious process. Encouraged patient to  participate with therapy and sit up in the chair when able to. Continues to work with therapy.     Case management assisted with DC planning to SNF, however unable to place.   Case management assisting with [facility] until Monday or Tuesday.           Review of Systems   Constitutional:  Negative for chills, diaphoresis, fatigue and fever.   HENT:  Negative for congestion, ear pain and rhinorrhea.    Eyes:  Negative for visual disturbance.   Respiratory:  Positive for shortness of breath. Negative for cough and wheezing.    Cardiovascular:  Negative for chest pain, palpitations and leg swelling.   Gastrointestinal:  Negative for abdominal distention, abdominal pain, blood in stool, constipation, diarrhea, nausea and vomiting.   Endocrine: Negative for cold intolerance and heat intolerance.   Genitourinary:  Negative for difficulty urinating, flank pain, frequency and urgency.   Musculoskeletal:  Negative for arthralgias and myalgias.   Skin:  Negative for color change and wound.   Neurological:  Negative for dizziness, syncope, weakness, light-headedness, numbness and headaches.   Hematological:  Does not bruise/bleed easily.   Psychiatric/Behavioral:  Negative for agitation, confusion and dysphoric mood.         Objective:   VITALS:  /77   Pulse (!) 105   Temp 98 °F (36.7 °C) (Oral)   Resp 18   Ht 1.753 m (5' 9.02\")   Wt (!) 202.4 kg (446 lb 3.2 oz)   SpO2 94%   BMI 65.86 kg/m²   24HR INTAKE/OUTPUT:    Intake/Output Summary (Last 24 hours) at 5/30/2025 1401  Last data filed at 5/30/2025 1114  Gross per 24 hour   Intake 480 ml   Output 2675 ml   Net -2195 ml         Physical Exam  Constitutional:       General: He is not in acute distress.     Appearance: Normal appearance. He is obese. He is not ill-appearing.   HENT:      Head: Normocephalic and atraumatic.      Right Ear: External ear normal.      Left Ear: External ear normal.      Nose: Nose normal.

## 2025-05-30 NOTE — CARE COORDINATION
Spoke with Ginny at [facility].  They would not be able to do a home visit until Monday or tues.  No guarantee they could admit him if they felt pt was not safe to be home.  [facility]  has not gotten back with me, but  they are also not doing weekend Visits.  [facility]  does not have any availablity until Monday or Tues.  Spoke with EVELIN Sesay.  Will keep pt until Monday.  Pt is to keep looking for someone to assist at home. Will need therapy to see over the weekend. Pt continues to need to get up to chair.  Electronically signed by Shira Tomlin RN on 5/30/2025 at 2:55 PM

## 2025-05-31 LAB
ANION GAP SERPL CALCULATED.3IONS-SCNC: 13 MMOL/L (ref 8–16)
BASOPHILS # BLD: 0.1 K/UL (ref 0–0.2)
BASOPHILS NFR BLD: 0.9 % (ref 0–1)
BUN SERPL-MCNC: 27 MG/DL (ref 8–23)
CALCIUM SERPL-MCNC: 9.2 MG/DL (ref 8.8–10.2)
CHLORIDE SERPL-SCNC: 98 MMOL/L (ref 98–107)
CO2 SERPL-SCNC: 28 MMOL/L (ref 22–29)
CREAT SERPL-MCNC: 1 MG/DL (ref 0.7–1.2)
EOSINOPHIL # BLD: 0.3 K/UL (ref 0–0.6)
EOSINOPHIL NFR BLD: 5.2 % (ref 0–5)
ERYTHROCYTE [DISTWIDTH] IN BLOOD BY AUTOMATED COUNT: 15 % (ref 11.5–14.5)
GLUCOSE BLD-MCNC: 141 MG/DL (ref 70–99)
GLUCOSE BLD-MCNC: 141 MG/DL (ref 70–99)
GLUCOSE BLD-MCNC: 179 MG/DL (ref 70–99)
GLUCOSE BLD-MCNC: 184 MG/DL (ref 70–99)
GLUCOSE SERPL-MCNC: 154 MG/DL (ref 70–99)
HCT VFR BLD AUTO: 47.3 % (ref 42–52)
HGB BLD-MCNC: 14.8 G/DL (ref 14–18)
IMM GRANULOCYTES # BLD: 0 K/UL
LYMPHOCYTES # BLD: 1 K/UL (ref 1.1–4.5)
LYMPHOCYTES NFR BLD: 16.1 % (ref 20–40)
MCH RBC QN AUTO: 29.4 PG (ref 27–31)
MCHC RBC AUTO-ENTMCNC: 31.3 G/DL (ref 33–37)
MCV RBC AUTO: 94 FL (ref 80–94)
MONOCYTES # BLD: 0.6 K/UL (ref 0–0.9)
MONOCYTES NFR BLD: 10.1 % (ref 0–10)
NEUTROPHILS # BLD: 4.3 K/UL (ref 1.5–7.5)
NEUTS SEG NFR BLD: 67.2 % (ref 50–65)
PERFORMED ON: ABNORMAL
PLATELET # BLD AUTO: 126 K/UL (ref 130–400)
PMV BLD AUTO: 13.5 FL (ref 9.4–12.4)
POTASSIUM SERPL-SCNC: 3.8 MMOL/L (ref 3.5–5)
RBC # BLD AUTO: 5.03 M/UL (ref 4.7–6.1)
SODIUM SERPL-SCNC: 139 MMOL/L (ref 136–145)
WBC # BLD AUTO: 6.4 K/UL (ref 4.8–10.8)

## 2025-05-31 PROCEDURE — 80048 BASIC METABOLIC PNL TOTAL CA: CPT

## 2025-05-31 PROCEDURE — 2700000000 HC OXYGEN THERAPY PER DAY

## 2025-05-31 PROCEDURE — 6370000000 HC RX 637 (ALT 250 FOR IP): Performed by: NURSE PRACTITIONER

## 2025-05-31 PROCEDURE — 94760 N-INVAS EAR/PLS OXIMETRY 1: CPT

## 2025-05-31 PROCEDURE — 94640 AIRWAY INHALATION TREATMENT: CPT

## 2025-05-31 PROCEDURE — 6360000002 HC RX W HCPCS

## 2025-05-31 PROCEDURE — 6360000002 HC RX W HCPCS: Performed by: NURSE PRACTITIONER

## 2025-05-31 PROCEDURE — 1200000000 HC SEMI PRIVATE

## 2025-05-31 PROCEDURE — 94150 VITAL CAPACITY TEST: CPT

## 2025-05-31 PROCEDURE — 85025 COMPLETE CBC W/AUTO DIFF WBC: CPT

## 2025-05-31 PROCEDURE — 36415 COLL VENOUS BLD VENIPUNCTURE: CPT

## 2025-05-31 PROCEDURE — 6370000000 HC RX 637 (ALT 250 FOR IP)

## 2025-05-31 PROCEDURE — 82962 GLUCOSE BLOOD TEST: CPT

## 2025-05-31 RX ADMIN — MICONAZOLE NITRATE: 2 POWDER TOPICAL at 09:33

## 2025-05-31 RX ADMIN — GUAIFENESIN 600 MG: 600 TABLET, EXTENDED RELEASE ORAL at 09:33

## 2025-05-31 RX ADMIN — PANTOPRAZOLE SODIUM 40 MG: 40 TABLET, DELAYED RELEASE ORAL at 05:18

## 2025-05-31 RX ADMIN — IPRATROPIUM BROMIDE 0.5 MG: 0.5 SOLUTION RESPIRATORY (INHALATION) at 15:00

## 2025-05-31 RX ADMIN — METOPROLOL SUCCINATE 25 MG: 25 TABLET, EXTENDED RELEASE ORAL at 09:33

## 2025-05-31 RX ADMIN — ALLOPURINOL 300 MG: 300 TABLET ORAL at 09:33

## 2025-05-31 RX ADMIN — IPRATROPIUM BROMIDE 0.5 MG: 0.5 SOLUTION RESPIRATORY (INHALATION) at 11:15

## 2025-05-31 RX ADMIN — CETIRIZINE HYDROCHLORIDE 10 MG: 10 TABLET ORAL at 09:33

## 2025-05-31 RX ADMIN — ENOXAPARIN SODIUM 60 MG: 100 INJECTION SUBCUTANEOUS at 20:12

## 2025-05-31 RX ADMIN — MICONAZOLE NITRATE: 2 POWDER TOPICAL at 20:13

## 2025-05-31 RX ADMIN — GUAIFENESIN 600 MG: 600 TABLET, EXTENDED RELEASE ORAL at 20:12

## 2025-05-31 RX ADMIN — BUMETANIDE 2 MG: 1 TABLET ORAL at 09:33

## 2025-05-31 RX ADMIN — ENOXAPARIN SODIUM 60 MG: 100 INJECTION SUBCUTANEOUS at 09:32

## 2025-05-31 RX ADMIN — IPRATROPIUM BROMIDE 0.5 MG: 0.5 SOLUTION RESPIRATORY (INHALATION) at 19:08

## 2025-05-31 RX ADMIN — INSULIN GLARGINE 30 UNITS: 100 INJECTION, SOLUTION SUBCUTANEOUS at 20:12

## 2025-05-31 RX ADMIN — ASPIRIN 81 MG: 81 TABLET, COATED ORAL at 09:33

## 2025-05-31 RX ADMIN — PRAVASTATIN SODIUM 20 MG: 20 TABLET ORAL at 20:12

## 2025-05-31 RX ADMIN — MONTELUKAST SODIUM 10 MG: 10 TABLET, FILM COATED ORAL at 20:12

## 2025-05-31 RX ADMIN — POTASSIUM CHLORIDE 20 MEQ: 1500 TABLET, EXTENDED RELEASE ORAL at 09:33

## 2025-05-31 RX ADMIN — IPRATROPIUM BROMIDE 0.5 MG: 0.5 SOLUTION RESPIRATORY (INHALATION) at 07:19

## 2025-05-31 NOTE — PROGRESS NOTES
[facility]      Progress Note    Patient:  Daljit Elizondo  YOB: 1960  Date of Service: 5/31/2025  MRN: [id number]   Acct: [id number]   Primary Care Physician: Jessie Helms APRN - CNP  Advance Directive: Full Code  Admit Date: 5/14/2025       Hospital Day: 17    Portions of this note have been copied forward, however, updated to reflect the most current clinical status of this patient.     CHIEF COMPLAINT Shortness of breath     SUBJECTIVE:  Mr. Elizondo was resting in chair this morning. Offers no new complaints at this time.       CUMULATIVE HOSPITAL COURSE:   Patient is a 64-year-old male with past medical history of anxiety, diabetes, GERD, hyperlipidemia, and HTN who presented to [facility] ED for evaluation of increased dyspnea on exertion and weakness.  Reported chest heaviness. Increased lower extremity weakness and edema. Patient is morbidly obese and normally ambulates short distances with a walker at home.Stated he has not been unable to get out of bed for past couple of days. Therefore, he had not been taking his Bumex for past several days. Stated he is normally on 3L O2 at all times. Reported productive cough with cloudy thick pink sputum. Denied fever, chills, nausea, vomiting, or abdominal pain. Workup in ED revealed CXR with atelectasis and or infiltrate on the left, small left pleural effusion; UA unremarkable for UTI; blood cultures pending; WBC 5.7, H&H 14/47, BNP 4707, CMP unremarkable.   Patient was admitted to hospital medicine for further evaluation. IV Bumex 1 mg BID was initiated. ECHO was attempted, unable to obtain due to patient's body habitus. Bumex gtt initiated on 5/16/2025. 1 time dose of IV Diamox 500 mg given on 5/17/2025. Net negative greater than 5L. Diuretics adjusted back to home dose of Bumex 2 mg po daily. Renal function remained stable. Antibiotics discontinued as no evidence of infectious process. Encouraged patient to participate with therapy and sit up in     - Continue wound care per recommendations       GERD (gastroesophageal reflux disease)-    - Continue Protonix       Hyperlipemia-    - Continue statin           DVT Prophylaxis: Lovenox      GI prophylaxis:  Protonix      Discharge planning: Case management assisting with DC home with [facility], not available until Monday or Tuesday       Further Orders per Clinical course/attending.     Electronically signed by DURAN Suazo CNP on 5/31/2025 at 2:48 PM       EMR Dragon/Transcription disclaimer:   Much of this encounter note is an electronic transcription/translation of spoken language to printed text. The electronic translation of spoken language may permit erroneous, or at times, nonsensical words or phrases to be inadvertently transcribed; although attempts have made to review the note for such errors, some may still exist.

## 2025-05-31 NOTE — CARE COORDINATION
SW spoke with pt at bedside about d/c plan. He states he wants to go to [facility] bed    SW went to ask pt if there were other options in case he got denied and he stated \"Well its either that or I go home.\"

## 2025-06-01 LAB
ANION GAP SERPL CALCULATED.3IONS-SCNC: 10 MMOL/L (ref 8–16)
BASOPHILS # BLD: 0.1 K/UL (ref 0–0.2)
BASOPHILS NFR BLD: 1.1 % (ref 0–1)
BUN SERPL-MCNC: 27 MG/DL (ref 8–23)
CALCIUM SERPL-MCNC: 8.9 MG/DL (ref 8.8–10.2)
CHLORIDE SERPL-SCNC: 97 MMOL/L (ref 98–107)
CO2 SERPL-SCNC: 32 MMOL/L (ref 22–29)
CREAT SERPL-MCNC: 1.2 MG/DL (ref 0.7–1.2)
EOSINOPHIL # BLD: 0.4 K/UL (ref 0–0.6)
EOSINOPHIL NFR BLD: 6 % (ref 0–5)
ERYTHROCYTE [DISTWIDTH] IN BLOOD BY AUTOMATED COUNT: 14.9 % (ref 11.5–14.5)
GLUCOSE BLD-MCNC: 103 MG/DL (ref 70–99)
GLUCOSE BLD-MCNC: 155 MG/DL (ref 70–99)
GLUCOSE BLD-MCNC: 163 MG/DL (ref 70–99)
GLUCOSE BLD-MCNC: 164 MG/DL (ref 70–99)
GLUCOSE SERPL-MCNC: 139 MG/DL (ref 70–99)
HCT VFR BLD AUTO: 45.8 % (ref 42–52)
HGB BLD-MCNC: 14.2 G/DL (ref 14–18)
IMM GRANULOCYTES # BLD: 0 K/UL
LYMPHOCYTES # BLD: 1.3 K/UL (ref 1.1–4.5)
LYMPHOCYTES NFR BLD: 20.2 % (ref 20–40)
MCH RBC QN AUTO: 30 PG (ref 27–31)
MCHC RBC AUTO-ENTMCNC: 31 G/DL (ref 33–37)
MCV RBC AUTO: 96.6 FL (ref 80–94)
MONOCYTES # BLD: 0.7 K/UL (ref 0–0.9)
MONOCYTES NFR BLD: 10.4 % (ref 0–10)
NEUTROPHILS # BLD: 3.9 K/UL (ref 1.5–7.5)
NEUTS SEG NFR BLD: 61.8 % (ref 50–65)
PERFORMED ON: ABNORMAL
PLATELET # BLD AUTO: 134 K/UL (ref 130–400)
PMV BLD AUTO: 14.1 FL (ref 9.4–12.4)
POTASSIUM SERPL-SCNC: 3.7 MMOL/L (ref 3.5–5)
RBC # BLD AUTO: 4.74 M/UL (ref 4.7–6.1)
SODIUM SERPL-SCNC: 139 MMOL/L (ref 136–145)
WBC # BLD AUTO: 6.4 K/UL (ref 4.8–10.8)

## 2025-06-01 PROCEDURE — 6370000000 HC RX 637 (ALT 250 FOR IP): Performed by: NURSE PRACTITIONER

## 2025-06-01 PROCEDURE — 94760 N-INVAS EAR/PLS OXIMETRY 1: CPT

## 2025-06-01 PROCEDURE — 85025 COMPLETE CBC W/AUTO DIFF WBC: CPT

## 2025-06-01 PROCEDURE — 2700000000 HC OXYGEN THERAPY PER DAY

## 2025-06-01 PROCEDURE — 1200000000 HC SEMI PRIVATE

## 2025-06-01 PROCEDURE — 6360000002 HC RX W HCPCS: Performed by: NURSE PRACTITIONER

## 2025-06-01 PROCEDURE — 82962 GLUCOSE BLOOD TEST: CPT

## 2025-06-01 PROCEDURE — 6360000002 HC RX W HCPCS

## 2025-06-01 PROCEDURE — 80048 BASIC METABOLIC PNL TOTAL CA: CPT

## 2025-06-01 PROCEDURE — 97116 GAIT TRAINING THERAPY: CPT

## 2025-06-01 PROCEDURE — 94640 AIRWAY INHALATION TREATMENT: CPT

## 2025-06-01 PROCEDURE — 36415 COLL VENOUS BLD VENIPUNCTURE: CPT

## 2025-06-01 PROCEDURE — 6370000000 HC RX 637 (ALT 250 FOR IP)

## 2025-06-01 PROCEDURE — 97530 THERAPEUTIC ACTIVITIES: CPT

## 2025-06-01 PROCEDURE — 94150 VITAL CAPACITY TEST: CPT

## 2025-06-01 RX ADMIN — POTASSIUM CHLORIDE 20 MEQ: 1500 TABLET, EXTENDED RELEASE ORAL at 08:46

## 2025-06-01 RX ADMIN — IPRATROPIUM BROMIDE 0.5 MG: 0.5 SOLUTION RESPIRATORY (INHALATION) at 07:46

## 2025-06-01 RX ADMIN — MICONAZOLE NITRATE: 2 POWDER TOPICAL at 21:18

## 2025-06-01 RX ADMIN — SENNOSIDES 17.2 MG: 8.6 TABLET, COATED ORAL at 21:16

## 2025-06-01 RX ADMIN — ASPIRIN 81 MG: 81 TABLET, COATED ORAL at 08:46

## 2025-06-01 RX ADMIN — ENOXAPARIN SODIUM 60 MG: 100 INJECTION SUBCUTANEOUS at 08:46

## 2025-06-01 RX ADMIN — GUAIFENESIN 600 MG: 600 TABLET, EXTENDED RELEASE ORAL at 08:45

## 2025-06-01 RX ADMIN — IPRATROPIUM BROMIDE 0.5 MG: 0.5 SOLUTION RESPIRATORY (INHALATION) at 15:12

## 2025-06-01 RX ADMIN — PANTOPRAZOLE SODIUM 40 MG: 40 TABLET, DELAYED RELEASE ORAL at 04:52

## 2025-06-01 RX ADMIN — IPRATROPIUM BROMIDE 0.5 MG: 0.5 SOLUTION RESPIRATORY (INHALATION) at 10:54

## 2025-06-01 RX ADMIN — PRAVASTATIN SODIUM 20 MG: 20 TABLET ORAL at 21:16

## 2025-06-01 RX ADMIN — ENOXAPARIN SODIUM 60 MG: 100 INJECTION SUBCUTANEOUS at 21:15

## 2025-06-01 RX ADMIN — GUAIFENESIN 600 MG: 600 TABLET, EXTENDED RELEASE ORAL at 21:15

## 2025-06-01 RX ADMIN — METOPROLOL SUCCINATE 25 MG: 25 TABLET, EXTENDED RELEASE ORAL at 08:46

## 2025-06-01 RX ADMIN — BUMETANIDE 2 MG: 1 TABLET ORAL at 08:45

## 2025-06-01 RX ADMIN — MICONAZOLE NITRATE: 2 POWDER TOPICAL at 08:46

## 2025-06-01 RX ADMIN — INSULIN GLARGINE 30 UNITS: 100 INJECTION, SOLUTION SUBCUTANEOUS at 21:15

## 2025-06-01 RX ADMIN — ALLOPURINOL 300 MG: 300 TABLET ORAL at 08:46

## 2025-06-01 RX ADMIN — MONTELUKAST SODIUM 10 MG: 10 TABLET, FILM COATED ORAL at 21:16

## 2025-06-01 RX ADMIN — IPRATROPIUM BROMIDE 0.5 MG: 0.5 SOLUTION RESPIRATORY (INHALATION) at 18:54

## 2025-06-01 RX ADMIN — CETIRIZINE HYDROCHLORIDE 10 MG: 10 TABLET ORAL at 08:46

## 2025-06-01 RX ADMIN — SENNOSIDES 17.2 MG: 8.6 TABLET, COATED ORAL at 08:46

## 2025-06-01 NOTE — PLAN OF CARE
Problem: Chronic Conditions and Co-morbidities  Goal: Patient's chronic conditions and co-morbidity symptoms are monitored and maintained or improved  5/31/2025 2257 by Amada Wheeler RN  Outcome: Progressing  5/31/2025 1510 by Sahra Black RN  Outcome: Progressing     Problem: Discharge Planning  Goal: Discharge to home or other facility with appropriate resources  5/31/2025 2257 by Amada Wheeler RN  Outcome: Progressing  5/31/2025 1510 by Sahra Black RN  Outcome: Progressing     Problem: Pain  Goal: Verbalizes/displays adequate comfort level or baseline comfort level  5/31/2025 2257 by Amada Wheeler RN  Outcome: Progressing  5/31/2025 1510 by Sahra Black RN  Outcome: Progressing     Problem: Safety - Adult  Goal: Free from fall injury  5/31/2025 2257 by Amada Wheeler RN  Outcome: Progressing  5/31/2025 1510 by Sahra Black RN  Outcome: Progressing     Problem: ABCDS Injury Assessment  Goal: Absence of physical injury  5/31/2025 2257 by Amada Wheeler RN  Outcome: Progressing  5/31/2025 1510 by Sahra Black RN  Outcome: Progressing

## 2025-06-01 NOTE — PROGRESS NOTES
PHYSICAL THERAPY  Daily Treatment Note    DATE:  2025  NAME:  Daljit Elizondo  :  1960  (64 y.o.,male)  MRN:  [id number]      Subjective  General  Diagnosis: hypervolemia, cardiac arrythmia, CAP, SOB  Follows Commands: Within Functional Limits  Subjective  Subjective: pt in bed, agrees to therapy  Oxygen Therapy  O2 Device: Nasal cannula       PAIN    [x] No Pain    [] Patient rates pain at   / 10 - Nursing was notified    HOME LIVING:  Social/Functional History  Lives With: Alone  Type of Home: Mobile home  Home Layout: One level  Bathroom Shower/Tub: Walk-in shower  Bathroom Toilet: Standard  Bathroom Equipment: Shower chair, Grab bars in shower  Bathroom Accessibility: Accessible  Home Equipment: Hospital bed, Wheelchair - Electric, Walker - Standard, Walker - Rolling, Oxygen  Has the patient had two or more falls in the past year or any fall with injury in the past year?: Yes  Receives Help From: Family, Friend(s)  Prior Level of Assist for ADLs: Needs assistance  Bath: Moderate assistance  Dressing:  (Reports that he has been wearing hospital gowns at home)  Prior Level of Assist for Homemaking: Needs assistance  Homemaking Responsibilities: Yes  Prior Level of Assist for Ambulation: Independent household ambulator, with or without device (had been declining in recent weeks)  Prior Level of Assist for Transfers: Independent  Active : No  Patient's  Info: [place] care  Additional Comments: [place] care drives him    RESTRICTIONS/PRECAUTIONS:    Restrictions/Precautions  Restrictions/Precautions: Fall Risk, Contact Precautions    OVERALL  ORIENTATION STATUS:  Overall Orientation Status: Within Functional Limits    Objective  ROM  PROM RLE (degrees)  RLE PROM: WFL  PROM LLE (degrees)  LLE PROM: WFL    STRENGTH  Strength RLE  Strength RLE: WFL  Strength LLE  Strength LLE: WFL    BED MOBILITY  Bed mobility  Supine to Sit: Modified independent  Sit to Supine: Modified independent  Scooting: Modified

## 2025-06-01 NOTE — PROGRESS NOTES
[facility]      Progress Note    Patient:  Daljit Elizondo  YOB: 1960  Date of Service: 6/1/2025  MRN: [id number]   Acct: [id number]   Primary Care Physician: Jessie Helms APRN - CNP  Advance Directive: Full Code  Admit Date: 5/14/2025       Hospital Day: 18    Portions of this note have been copied forward, however, updated to reflect the most current clinical status of this patient.     CHIEF COMPLAINT Shortness of breath     SUBJECTIVE:  Mr. Elizondo was resting in chair this morning. Offers no new complaints. Discussed wanting referral to [facility], which social service assisting with.     CUMULATIVE HOSPITAL COURSE:   Patient is a 64-year-old male with past medical history of anxiety, diabetes, GERD, hyperlipidemia, and HTN who presented to [facility] ED for evaluation of increased dyspnea on exertion and weakness.  Reported chest heaviness. Increased lower extremity weakness and edema. Patient is morbidly obese and normally ambulates short distances with a walker at home.Stated he has not been unable to get out of bed for past couple of days. Therefore, he had not been taking his Bumex for past several days. Stated he is normally on 3L O2 at all times. Reported productive cough with cloudy thick pink sputum. Denied fever, chills, nausea, vomiting, or abdominal pain. Workup in ED revealed CXR with atelectasis and or infiltrate on the left, small left pleural effusion; UA unremarkable for UTI; blood cultures pending; WBC 5.7, H&H 14/47, BNP 4707, CMP unremarkable.   Patient was admitted to hospital medicine for further evaluation. IV Bumex 1 mg BID was initiated. ECHO was attempted, unable to obtain due to patient's body habitus. Bumex gtt initiated on 5/16/2025. 1 time dose of IV Diamox 500 mg given on 5/17/2025. Net negative greater than 5L. Diuretics adjusted back to home dose of Bumex 2 mg po daily. Renal function remained stable. Antibiotics discontinued as no evidence of

## 2025-06-01 NOTE — PROGRESS NOTES
Physical Therapy      Facility/Department: [facility] DUNIA/VAS/MED  Physical Therapy RE- Assessment    Name: Daljit Elizondo  : 1960  MRN: [id number]  Date of Service: 2025    Discharge Recommendations:  Continue to assess pending progress, 24 hour supervision or assist, Patient would benefit from continued therapy after discharge          Patient Diagnosis(es): The primary encounter diagnosis was Hypervolemia, unspecified hypervolemia type. Diagnoses of Cardiac arrhythmia, unspecified cardiac arrhythmia type, Community acquired pneumonia, unspecified laterality, and Shortness of breath were also pertinent to this visit.  Past Medical History:  has a past medical history of Abrasion, Anxiety, Asthma, Bell's palsy, Cataracts, bilateral, Cellulitis, Diabetes (HCC), Edema extremities, GERD (gastroesophageal reflux disease), Gout, H/O tracheostomy, H/O urinary retention, Hernia, hiatal, History of panniculitis, History of shingles, Hyperlipemia, Hypertension, Morbid obesity (HCC), Non-ST elevated myocardial infarction (HCC), Obesity, Paraphimosis, Pulmonary hypertension (HCC), Renal failure, Respiratory failure (HCC), Shoulder pain, Sleep apnea, Testosterone deficiency, Ulcer, Ulcer of calf (HCC), and Venous stasis of both lower extremities.  Past Surgical History:  has a past surgical history that includes Testicle surgery (10/1970); Foot surgery (Left, LONG AGO ); Tracheostomy tube placement; Foot surgery (Right); Mouth surgery; Appendectomy; Colonoscopy; and Endoscopy, colon, diagnostic.    Assessment   Body Structures, Functions, Activity Limitations Requiring Skilled Therapeutic Intervention: Decreased functional mobility ;Decreased ADL status;Decreased ROM;Decreased strength;Decreased balance;Decreased endurance;Decreased sensation;Decreased posture  Assessment: Patient demonstrates good technique with bed mobility and transfers. There continues to be some safety concerns with ambulation. He was issued a

## 2025-06-02 LAB
ANION GAP SERPL CALCULATED.3IONS-SCNC: 10 MMOL/L (ref 8–16)
BASOPHILS # BLD: 0.1 K/UL (ref 0–0.2)
BASOPHILS NFR BLD: 0.8 % (ref 0–1)
BUN SERPL-MCNC: 28 MG/DL (ref 8–23)
CALCIUM SERPL-MCNC: 9.5 MG/DL (ref 8.8–10.2)
CHLORIDE SERPL-SCNC: 98 MMOL/L (ref 98–107)
CO2 SERPL-SCNC: 30 MMOL/L (ref 22–29)
CREAT SERPL-MCNC: 1.1 MG/DL (ref 0.7–1.2)
EOSINOPHIL # BLD: 0.3 K/UL (ref 0–0.6)
EOSINOPHIL NFR BLD: 5.3 % (ref 0–5)
ERYTHROCYTE [DISTWIDTH] IN BLOOD BY AUTOMATED COUNT: 14.9 % (ref 11.5–14.5)
GLUCOSE BLD-MCNC: 140 MG/DL (ref 70–99)
GLUCOSE BLD-MCNC: 159 MG/DL (ref 70–99)
GLUCOSE BLD-MCNC: 164 MG/DL (ref 70–99)
GLUCOSE BLD-MCNC: 216 MG/DL (ref 70–99)
GLUCOSE SERPL-MCNC: 176 MG/DL (ref 70–99)
HCT VFR BLD AUTO: 47.3 % (ref 42–52)
HGB BLD-MCNC: 14.7 G/DL (ref 14–18)
IMM GRANULOCYTES # BLD: 0 K/UL
LYMPHOCYTES # BLD: 1.2 K/UL (ref 1.1–4.5)
LYMPHOCYTES NFR BLD: 18.5 % (ref 20–40)
MCH RBC QN AUTO: 30 PG (ref 27–31)
MCHC RBC AUTO-ENTMCNC: 31.1 G/DL (ref 33–37)
MCV RBC AUTO: 96.5 FL (ref 80–94)
MONOCYTES # BLD: 0.7 K/UL (ref 0–0.9)
MONOCYTES NFR BLD: 10.4 % (ref 0–10)
NEUTROPHILS # BLD: 4.1 K/UL (ref 1.5–7.5)
NEUTS SEG NFR BLD: 64.4 % (ref 50–65)
PERFORMED ON: ABNORMAL
PLATELET # BLD AUTO: 132 K/UL (ref 130–400)
PMV BLD AUTO: 14.4 FL (ref 9.4–12.4)
POTASSIUM SERPL-SCNC: 3.7 MMOL/L (ref 3.5–5)
RBC # BLD AUTO: 4.9 M/UL (ref 4.7–6.1)
SODIUM SERPL-SCNC: 138 MMOL/L (ref 136–145)
WBC # BLD AUTO: 6.4 K/UL (ref 4.8–10.8)

## 2025-06-02 PROCEDURE — 85025 COMPLETE CBC W/AUTO DIFF WBC: CPT

## 2025-06-02 PROCEDURE — 6370000000 HC RX 637 (ALT 250 FOR IP)

## 2025-06-02 PROCEDURE — 6360000002 HC RX W HCPCS

## 2025-06-02 PROCEDURE — 1200000000 HC SEMI PRIVATE

## 2025-06-02 PROCEDURE — 82962 GLUCOSE BLOOD TEST: CPT

## 2025-06-02 PROCEDURE — 94150 VITAL CAPACITY TEST: CPT

## 2025-06-02 PROCEDURE — 6360000002 HC RX W HCPCS: Performed by: NURSE PRACTITIONER

## 2025-06-02 PROCEDURE — 94760 N-INVAS EAR/PLS OXIMETRY 1: CPT

## 2025-06-02 PROCEDURE — 80048 BASIC METABOLIC PNL TOTAL CA: CPT

## 2025-06-02 PROCEDURE — 6370000000 HC RX 637 (ALT 250 FOR IP): Performed by: NURSE PRACTITIONER

## 2025-06-02 PROCEDURE — 94640 AIRWAY INHALATION TREATMENT: CPT

## 2025-06-02 PROCEDURE — 36415 COLL VENOUS BLD VENIPUNCTURE: CPT

## 2025-06-02 PROCEDURE — 2700000000 HC OXYGEN THERAPY PER DAY

## 2025-06-02 RX ADMIN — INSULIN LISPRO 2 UNITS: 100 INJECTION, SOLUTION INTRAVENOUS; SUBCUTANEOUS at 11:57

## 2025-06-02 RX ADMIN — PRAVASTATIN SODIUM 20 MG: 20 TABLET ORAL at 20:34

## 2025-06-02 RX ADMIN — SENNOSIDES 17.2 MG: 8.6 TABLET, COATED ORAL at 20:34

## 2025-06-02 RX ADMIN — SENNOSIDES 17.2 MG: 8.6 TABLET, COATED ORAL at 08:31

## 2025-06-02 RX ADMIN — ENOXAPARIN SODIUM 60 MG: 100 INJECTION SUBCUTANEOUS at 20:35

## 2025-06-02 RX ADMIN — ENOXAPARIN SODIUM 60 MG: 100 INJECTION SUBCUTANEOUS at 08:32

## 2025-06-02 RX ADMIN — POTASSIUM CHLORIDE 20 MEQ: 1500 TABLET, EXTENDED RELEASE ORAL at 08:31

## 2025-06-02 RX ADMIN — IPRATROPIUM BROMIDE 0.5 MG: 0.5 SOLUTION RESPIRATORY (INHALATION) at 15:23

## 2025-06-02 RX ADMIN — MICONAZOLE NITRATE: 2 POWDER TOPICAL at 08:36

## 2025-06-02 RX ADMIN — ASPIRIN 81 MG: 81 TABLET, COATED ORAL at 08:32

## 2025-06-02 RX ADMIN — INSULIN GLARGINE 30 UNITS: 100 INJECTION, SOLUTION SUBCUTANEOUS at 20:34

## 2025-06-02 RX ADMIN — GUAIFENESIN 600 MG: 600 TABLET, EXTENDED RELEASE ORAL at 20:34

## 2025-06-02 RX ADMIN — METOPROLOL SUCCINATE 25 MG: 25 TABLET, EXTENDED RELEASE ORAL at 08:32

## 2025-06-02 RX ADMIN — CETIRIZINE HYDROCHLORIDE 10 MG: 10 TABLET ORAL at 08:32

## 2025-06-02 RX ADMIN — IPRATROPIUM BROMIDE 0.5 MG: 0.5 SOLUTION RESPIRATORY (INHALATION) at 18:43

## 2025-06-02 RX ADMIN — IPRATROPIUM BROMIDE 0.5 MG: 0.5 SOLUTION RESPIRATORY (INHALATION) at 10:58

## 2025-06-02 RX ADMIN — MONTELUKAST SODIUM 10 MG: 10 TABLET, FILM COATED ORAL at 20:35

## 2025-06-02 RX ADMIN — BUMETANIDE 2 MG: 1 TABLET ORAL at 08:31

## 2025-06-02 RX ADMIN — IPRATROPIUM BROMIDE 0.5 MG: 0.5 SOLUTION RESPIRATORY (INHALATION) at 06:49

## 2025-06-02 RX ADMIN — GUAIFENESIN 600 MG: 600 TABLET, EXTENDED RELEASE ORAL at 08:32

## 2025-06-02 RX ADMIN — ALLOPURINOL 300 MG: 300 TABLET ORAL at 08:32

## 2025-06-02 RX ADMIN — PANTOPRAZOLE SODIUM 40 MG: 40 TABLET, DELAYED RELEASE ORAL at 03:34

## 2025-06-02 RX ADMIN — MICONAZOLE NITRATE: 2 POWDER TOPICAL at 20:36

## 2025-06-02 NOTE — CARE COORDINATION
Received a message from Mary Ellen at [place].  She stated that pt stated he was not sure he could do the 3 hours of required therapy a day.  There for [facility] would not be able to accept.   Another long discussion with pt in room about dc plans. Pt mentioned [facility].  This had already been tried previuosly as that is a [place] facility (there are 9 total) and all [facility] facilites denied.   All [place] facilites denied.   [place] and [facility] denied.  Writer even checked with [facility] and they were unable to accept.  Tiana from [facility].  called this am to follow up on the referral that was sent on Friday.  They were going to accept pt, at least to go out and do an in house assessment. She was not sure if they would be able to pick him up if  they felt he wasn't safe to be at home.   ANGEL Amado will follow up with pt in the morning.  Electronically signed by Shira Tomlin RN on 6/2/2025 at 3:57 PM

## 2025-06-02 NOTE — PROGRESS NOTES
[facility]      Patient:  Daljit Elizondo  YOB: 1960  Date of Service: 6/2/2025  MRN: [id number]   Acct: [id number]   Primary Care Physician: Jessie Helms APRN - CNP  Advance Directive: Full Code  Admit Date: 5/14/2025       Hospital Day: 19  Portions of this note have been copied forward, however, changed to reflect the most current clinical status of this patient.  CHIEF COMPLAINT shortness of breath     SUBJECTIVE:  no issues overnight, resting comfortably in bed    Cumulative hospital course   64-year-old male with chronic respiratory failure on 3L NC supplemental oxygen continuously, REGI on CPAP, obesity hypoventilation syndrome, COPD, diastolic heart failure, bilateral lymphedema and venous stasis, anxiety, type II DM, hyperlipidemia, GERD, HTN, morbid obesity, with multiple health complaints.  He endorsed dyspnea on exertion, chest pain, increased lower extremity edema and generalized weakness.  States for the past few days he has been unable to get out of bed and had not been taking his Bumex.  At baseline he is able to pivot into his motorized scooter home.  Workup in ER CXR atelectasis versus infiltrate, small left pleural effusion, BNP 4707.  Initially placed on antibiotics due to concern of consolidation.  Patient admitted to hospitalist service.  Initiated on Bumex twice daily, attempted echo unable to obtain due to body habitus.  Bumex drip initiated on 5/16 has had net output of 12.5 L and has been adjusted back to his home Bumex dose.  Renal function remained stable.  Antibiotics discontinued due to no evidence of infectious process.  Case management following pending referral to [facility].  If unable to accept plan for home with home health.      Objective:   VITALS:  /83   Pulse 94   Temp 97.5 °F (36.4 °C)   Resp 20   Ht 1.753 m (5' 9.02\")   Wt (!) 203.3 kg (448 lb 3.1 oz)   SpO2 95%   BMI 66.16 kg/m²   24HR INTAKE/OUTPUT:    Intake/Output

## 2025-06-02 NOTE — CARE COORDINATION
Updated clinical info faxed to Mary Ellen at [facility].  Acb  Electronically signed by Shira Tomlin RN on 6/2/2025 at 10:40 AM

## 2025-06-02 NOTE — PLAN OF CARE
Problem: Chronic Conditions and Co-morbidities  Goal: Patient's chronic conditions and co-morbidity symptoms are monitored and maintained or improved  6/1/2025 2329 by Laura Villegas RN  Outcome: Progressing  6/1/2025 1704 by Sahra Black RN  Outcome: Progressing     Problem: Discharge Planning  Goal: Discharge to home or other facility with appropriate resources  6/1/2025 2329 by Laura Villegas, RN  Outcome: Progressing  6/1/2025 1704 by Sahra Black RN  Outcome: Progressing     Problem: Pain  Goal: Verbalizes/displays adequate comfort level or baseline comfort level  6/1/2025 2329 by Laura Villegas RN  Outcome: Progressing  6/1/2025 1704 by Sahra Black RN  Outcome: Progressing     Problem: Safety - Adult  Goal: Free from fall injury  6/1/2025 2329 by Laura Villegas RN  Outcome: Progressing  6/1/2025 1704 by Sahra Black RN  Outcome: Progressing     Problem: ABCDS Injury Assessment  Goal: Absence of physical injury  6/1/2025 2329 by Laura Villegas, RN  Outcome: Progressing  6/1/2025 1704 by Sahra Black RN  Outcome: Progressing     Problem: Skin/Tissue Integrity  Goal: Skin integrity remains intact  Description: 1.  Monitor for areas of redness and/or skin breakdown2.  Assess vascular access sites hourly3.  Every 4-6 hours minimum:  Change oxygen saturation probe site4.  Every 4-6 hours:  If on nasal continuous positive airway pressure, respiratory therapy assess nares and determine need for appliance change or resting period  6/1/2025 2329 by Laura Villegas, RN  Outcome: Progressing  6/1/2025 1704 by Sahra Black RN  Outcome: Progressing     Problem: Nutrition Deficit:  Goal: Optimize nutritional status  6/1/2025 2329 by Laura Villegas RN  Outcome: Progressing  6/1/2025 1704 by Sahra Black RN  Outcome: Progressing     Problem: Neurosensory - Adult  Goal: Achieves stable or improved neurological status  6/1/2025 2329 by Laura Villegas, RN  Outcome: Progressing  6/1/2025 1704 by Wayne

## 2025-06-03 LAB
ANION GAP SERPL CALCULATED.3IONS-SCNC: 10 MMOL/L (ref 8–16)
BASOPHILS # BLD: 0 K/UL (ref 0–0.2)
BASOPHILS NFR BLD: 0.6 % (ref 0–1)
BUN SERPL-MCNC: 26 MG/DL (ref 8–23)
CALCIUM SERPL-MCNC: 9.5 MG/DL (ref 8.8–10.2)
CHLORIDE SERPL-SCNC: 97 MMOL/L (ref 98–107)
CO2 SERPL-SCNC: 33 MMOL/L (ref 22–29)
CREAT SERPL-MCNC: 1.3 MG/DL (ref 0.7–1.2)
EOSINOPHIL # BLD: 0.3 K/UL (ref 0–0.6)
EOSINOPHIL NFR BLD: 4.5 % (ref 0–5)
ERYTHROCYTE [DISTWIDTH] IN BLOOD BY AUTOMATED COUNT: 14.9 % (ref 11.5–14.5)
GLUCOSE BLD-MCNC: 128 MG/DL (ref 70–99)
GLUCOSE BLD-MCNC: 131 MG/DL (ref 70–99)
GLUCOSE BLD-MCNC: 140 MG/DL (ref 70–99)
GLUCOSE BLD-MCNC: 141 MG/DL (ref 70–99)
GLUCOSE SERPL-MCNC: 151 MG/DL (ref 70–99)
HCT VFR BLD AUTO: 47.9 % (ref 42–52)
HGB BLD-MCNC: 14.9 G/DL (ref 14–18)
IMM GRANULOCYTES # BLD: 0 K/UL
LYMPHOCYTES # BLD: 1.1 K/UL (ref 1.1–4.5)
LYMPHOCYTES NFR BLD: 16.8 % (ref 20–40)
MCH RBC QN AUTO: 29.4 PG (ref 27–31)
MCHC RBC AUTO-ENTMCNC: 31.1 G/DL (ref 33–37)
MCV RBC AUTO: 94.7 FL (ref 80–94)
MONOCYTES # BLD: 0.7 K/UL (ref 0–0.9)
MONOCYTES NFR BLD: 9.6 % (ref 0–10)
NEUTROPHILS # BLD: 4.6 K/UL (ref 1.5–7.5)
NEUTS SEG NFR BLD: 68.2 % (ref 50–65)
PERFORMED ON: ABNORMAL
PLATELET # BLD AUTO: 137 K/UL (ref 130–400)
POTASSIUM SERPL-SCNC: 4.3 MMOL/L (ref 3.5–5)
RBC # BLD AUTO: 5.06 M/UL (ref 4.7–6.1)
SODIUM SERPL-SCNC: 140 MMOL/L (ref 136–145)
WBC # BLD AUTO: 6.7 K/UL (ref 4.8–10.8)

## 2025-06-03 PROCEDURE — 82962 GLUCOSE BLOOD TEST: CPT

## 2025-06-03 PROCEDURE — 94760 N-INVAS EAR/PLS OXIMETRY 1: CPT

## 2025-06-03 PROCEDURE — 6370000000 HC RX 637 (ALT 250 FOR IP): Performed by: NURSE PRACTITIONER

## 2025-06-03 PROCEDURE — 94150 VITAL CAPACITY TEST: CPT

## 2025-06-03 PROCEDURE — 36415 COLL VENOUS BLD VENIPUNCTURE: CPT

## 2025-06-03 PROCEDURE — 6360000002 HC RX W HCPCS: Performed by: NURSE PRACTITIONER

## 2025-06-03 PROCEDURE — 80048 BASIC METABOLIC PNL TOTAL CA: CPT

## 2025-06-03 PROCEDURE — 6360000002 HC RX W HCPCS

## 2025-06-03 PROCEDURE — 85025 COMPLETE CBC W/AUTO DIFF WBC: CPT

## 2025-06-03 PROCEDURE — 1200000000 HC SEMI PRIVATE

## 2025-06-03 PROCEDURE — 97116 GAIT TRAINING THERAPY: CPT

## 2025-06-03 PROCEDURE — 2700000000 HC OXYGEN THERAPY PER DAY

## 2025-06-03 PROCEDURE — 6370000000 HC RX 637 (ALT 250 FOR IP)

## 2025-06-03 PROCEDURE — 97535 SELF CARE MNGMENT TRAINING: CPT

## 2025-06-03 PROCEDURE — 94640 AIRWAY INHALATION TREATMENT: CPT

## 2025-06-03 RX ORDER — SENNOSIDES 8.6 MG/1
2 TABLET ORAL 2 TIMES DAILY
Qty: 120 TABLET | Refills: 0 | Status: SHIPPED | OUTPATIENT
Start: 2025-06-03 | End: 2025-07-03

## 2025-06-03 RX ORDER — METOPROLOL SUCCINATE 25 MG/1
25 TABLET, EXTENDED RELEASE ORAL DAILY
Qty: 30 TABLET | Refills: 3 | Status: SHIPPED | OUTPATIENT
Start: 2025-06-04

## 2025-06-03 RX ORDER — MONTELUKAST SODIUM 10 MG/1
10 TABLET ORAL NIGHTLY
Qty: 30 TABLET | Refills: 3 | Status: SHIPPED | OUTPATIENT
Start: 2025-06-03

## 2025-06-03 RX ORDER — BUMETANIDE 1 MG/1
TABLET ORAL
Qty: 180 TABLET | Refills: 1 | Status: SHIPPED | OUTPATIENT
Start: 2025-06-03

## 2025-06-03 RX ORDER — BUMETANIDE 1 MG/1
1 TABLET ORAL DAILY
Status: DISCONTINUED | OUTPATIENT
Start: 2025-06-04 | End: 2025-06-09

## 2025-06-03 RX ADMIN — GUAIFENESIN 600 MG: 600 TABLET, EXTENDED RELEASE ORAL at 21:33

## 2025-06-03 RX ADMIN — INSULIN GLARGINE 30 UNITS: 100 INJECTION, SOLUTION SUBCUTANEOUS at 21:33

## 2025-06-03 RX ADMIN — IPRATROPIUM BROMIDE 0.5 MG: 0.5 SOLUTION RESPIRATORY (INHALATION) at 10:13

## 2025-06-03 RX ADMIN — IPRATROPIUM BROMIDE 0.5 MG: 0.5 SOLUTION RESPIRATORY (INHALATION) at 14:13

## 2025-06-03 RX ADMIN — BUMETANIDE 2 MG: 1 TABLET ORAL at 08:37

## 2025-06-03 RX ADMIN — ENOXAPARIN SODIUM 60 MG: 100 INJECTION SUBCUTANEOUS at 21:32

## 2025-06-03 RX ADMIN — METOPROLOL SUCCINATE 25 MG: 25 TABLET, EXTENDED RELEASE ORAL at 08:40

## 2025-06-03 RX ADMIN — CETIRIZINE HYDROCHLORIDE 10 MG: 10 TABLET ORAL at 08:38

## 2025-06-03 RX ADMIN — GUAIFENESIN 600 MG: 600 TABLET, EXTENDED RELEASE ORAL at 08:37

## 2025-06-03 RX ADMIN — MICONAZOLE NITRATE: 2 POWDER TOPICAL at 08:39

## 2025-06-03 RX ADMIN — MICONAZOLE NITRATE: 2 POWDER TOPICAL at 21:33

## 2025-06-03 RX ADMIN — ENOXAPARIN SODIUM 60 MG: 100 INJECTION SUBCUTANEOUS at 08:37

## 2025-06-03 RX ADMIN — ONDANSETRON 4 MG: 4 TABLET, ORALLY DISINTEGRATING ORAL at 21:33

## 2025-06-03 RX ADMIN — POTASSIUM CHLORIDE 20 MEQ: 1500 TABLET, EXTENDED RELEASE ORAL at 08:37

## 2025-06-03 RX ADMIN — PANTOPRAZOLE SODIUM 40 MG: 40 TABLET, DELAYED RELEASE ORAL at 04:37

## 2025-06-03 RX ADMIN — IPRATROPIUM BROMIDE 0.5 MG: 0.5 SOLUTION RESPIRATORY (INHALATION) at 06:14

## 2025-06-03 RX ADMIN — PRAVASTATIN SODIUM 20 MG: 20 TABLET ORAL at 21:33

## 2025-06-03 RX ADMIN — SENNOSIDES 17.2 MG: 8.6 TABLET, COATED ORAL at 21:33

## 2025-06-03 RX ADMIN — ALLOPURINOL 300 MG: 300 TABLET ORAL at 08:37

## 2025-06-03 RX ADMIN — ASPIRIN 81 MG: 81 TABLET, COATED ORAL at 08:38

## 2025-06-03 RX ADMIN — IPRATROPIUM BROMIDE 0.5 MG: 0.5 SOLUTION RESPIRATORY (INHALATION) at 19:08

## 2025-06-03 RX ADMIN — ONDANSETRON 4 MG: 4 TABLET, ORALLY DISINTEGRATING ORAL at 08:43

## 2025-06-03 RX ADMIN — MONTELUKAST SODIUM 10 MG: 10 TABLET, FILM COATED ORAL at 21:33

## 2025-06-03 NOTE — PROGRESS NOTES
Facility/Department: [facility] DUNIA/VAS/MED  Daily Treatment Note  NAME: Daljit Elizondo  : 1960  MRN: [id number]    Date of Service: 6/3/2025    Discharge Recommendations:  Patient would benefit from continued therapy after discharge, 24 hour supervision or assist       Assessment   Assessment: Would benefit from further therapy to reestablish prior level of function.  Treatment Diagnosis: Acute congestive heart failure    Activity Tolerance  Activity Tolerance: Patient Tolerated treatment well         Patient Diagnosis(es): The primary encounter diagnosis was Hypervolemia, unspecified hypervolemia type. Diagnoses of Cardiac arrhythmia, unspecified cardiac arrhythmia type, Community acquired pneumonia, unspecified laterality, Shortness of breath, and Bilateral lower extremity edema were also pertinent to this visit.    has a past medical history of Abrasion, Anxiety, Asthma, Bell's palsy, Cataracts, bilateral, Cellulitis, Diabetes (HCC), Edema extremities, GERD (gastroesophageal reflux disease), Gout, H/O tracheostomy, H/O urinary retention, Hernia, hiatal, History of panniculitis, History of shingles, Hyperlipemia, Hypertension, Morbid obesity (HCC), Non-ST elevated myocardial infarction (HCC), Obesity, Paraphimosis, Pulmonary hypertension (HCC), Renal failure, Respiratory failure (HCC), Shoulder pain, Sleep apnea, Testosterone deficiency, Ulcer, Ulcer of calf (HCC), and Venous stasis of both lower extremities.   has a past surgical history that includes Testicle surgery (10/1970); Foot surgery (Left, LONG AGO ); Tracheostomy tube placement; Foot surgery (Right); Mouth surgery; Appendectomy; Colonoscopy; and Endoscopy, colon, diagnostic.    Restrictions  Restrictions/Precautions  Restrictions/Precautions: Fall Risk, Contact Precautions  Position Activity Restriction  Other Position/Activity Restrictions: ESBL, MRSA    Subjective   General  Chart Reviewed: Yes  Patient assessed for rehabilitation services?:

## 2025-06-03 NOTE — CARE COORDINATION
ANGEL visited with Pt re: d/c order and to give IMM; he would like to try [facility] now to see if they can accept him; he indicated he wasn't aware that the required 3h/daily can be broken-up into shorter sessions/with breaks; SW advised uncertain if they will accept at this point but can ask them to eval.     ANGEL placed call to Mary Ellen with [facility]; she indicated that they would need his current PT note to review and discuss with Dr. Sigala; ANGEL asked PTA to complete tx and will send note to Mary Ellen; it is uncertain at this point if they can offer and/or if insurance will approve;     ANGEL sent PTA note for today to Mary Ellen; awaiting response.    RICHARD POLANCO  Referral ph:[phone]  Fax:[phone]    Electronically signed by JODI Garcia on 6/3/2025 at 2:13 PM

## 2025-06-03 NOTE — PLAN OF CARE
Problem: Chronic Conditions and Co-morbidities  Goal: Patient's chronic conditions and co-morbidity symptoms are monitored and maintained or improved  6/3/2025 0945 by Shira Salinas RN  Outcome: Progressing  6/2/2025 2240 by Laura Villegas RN  Outcome: Progressing     Problem: Discharge Planning  Goal: Discharge to home or other facility with appropriate resources  6/2/2025 2240 by Laura Villegas, RN  Outcome: Progressing     Problem: Pain  Goal: Verbalizes/displays adequate comfort level or baseline comfort level  6/2/2025 2240 by Laura Villegas, RN  Outcome: Progressing     Problem: Safety - Adult  Goal: Free from fall injury  6/2/2025 2240 by Laura Villegas RN  Outcome: Progressing     Problem: ABCDS Injury Assessment  Goal: Absence of physical injury  6/2/2025 2240 by Laura Villegas, RN  Outcome: Progressing     Problem: Skin/Tissue Integrity  Goal: Skin integrity remains intact  Description: 1.  Monitor for areas of redness and/or skin breakdown2.  Assess vascular access sites hourly3.  Every 4-6 hours minimum:  Change oxygen saturation probe site4.  Every 4-6 hours:  If on nasal continuous positive airway pressure, respiratory therapy assess nares and determine need for appliance change or resting period  6/2/2025 2240 by Laura Villegas RN  Outcome: Progressing     Problem: Nutrition Deficit:  Goal: Optimize nutritional status  6/2/2025 2240 by Laura Villegas, RN  Outcome: Progressing     Problem: Neurosensory - Adult  Goal: Achieves stable or improved neurological status  6/2/2025 2240 by Laura Villegas, RN  Outcome: Progressing     Problem: Respiratory - Adult  Goal: Achieves optimal ventilation and oxygenation  6/2/2025 2240 by Laura Villegas RN  Outcome: Progressing     Problem: Cardiovascular - Adult  Goal: Maintains optimal cardiac output and hemodynamic stability  6/2/2025 2240 by Laura Villegas RN  Outcome: Progressing     Problem: Skin/Tissue Integrity - Adult  Goal: Skin integrity remains intact  Description:

## 2025-06-03 NOTE — PROGRESS NOTES
06/03/25 1300   Subjective   Subjective Patient in bed agrees to short walk & BTB.   Pain No C/O pain.   Cognition   Overall Cognitive Status WFL   Orientation   Overall Orientation Status WFL   Vitals   O2 Device Nasal cannula  (3L)   Bed Mobility Training   Bed Mobility Training Yes   Rolling Modified independent   Supine to Sit Modified independent   Sit to Supine Modified independent   Balance   Sitting Intact   Transfer Training   Transfer Training Yes   Sit to Stand Supervision   Stand to Sit Supervision   Gait Training   Gait Training Yes   Gait   Gait Training Yes   Overall Level of Assistance Supervision   Distance (ft) 8 Feet  (Steps FRWD. & BKWD. & steps BS to position high in bed for sit to supine.)   Assistive Device Walker, rolling   Patient Education   Education Given To Patient   Education Provided Role of Therapy;Plan of Care;Transfer Training;Mobility Training;Fall Prevention Strategies;Equipment   Education Provided Comments Use of call light and staff assist.   Education Method Verbal   Barriers to Learning None   Education Outcome Verbalized understanding;Demonstrated understanding;Continued education needed   Other Specialty Interventions   Other Treatments/Modalities BTB call light all needs in reach O2 on & male brown to suction.   Assessment   Activity Tolerance Patient tolerated treatment well       Electronically signed by Kalyan Contreras PTA on 6/3/2025 at 1:58 PM

## 2025-06-03 NOTE — CARE COORDINATION
06/03/25 1200   IMM Letter   IMM Letter given to Patient/Family/Significant other/Guardian/POA/by: ANGEL gave letter to Pt and explained; RUSH Larios   IMM Letter date given: 06/03/25   IMM Letter time given: 1200     SW placed letter into soft chart; copy given to Pt  Electronically signed by JODI Garcia on 6/3/2025 at 2:14 PM

## 2025-06-03 NOTE — PROGRESS NOTES
Nutrition Assessment     Type and Reason for Visit: Reassess    Nutrition Recommendations/Plan:   Continue to monitor po intake, labs and weight     Malnutrition Assessment:  Malnutrition Status: At risk for malnutrition    Nutrition Assessment:  Patient refusing breakfast this morning.  \"My stomach is so upset.\"  Could not think of anything that might settle it sown.  INtake has been good prior to this morning with intake ranging %.  [employer]chek's 159-216  Weight has decreased since admission by 60# or 22.8%. Edema has decreased.  This is a significant loss, but pt still remains in morbidly obese class 3 for BMI.    Estimated Daily Nutrient Needs:  Energy (kcal):  5387-9787 kcals (8-11 kcals/kg) Weight Used for Energy Requirements: Current     Protein (g):  145g Weight Used for Protein Requirements: Ideal        Fluid (ml/day):  8071-7280 ml Method Used for Fluid Requirements: 1 ml/kcal    Nutrition Related Findings:   Nonpitting BLE Wound Type: Venous Stasis, Pressure Injury    Current Nutrition Therapies:    ADULT DIET; Regular; 4 carb choices (60 gm/meal); Low Fat/Low Chol/High Fiber/2 gm Na    Anthropometric Measures:  Height: 175.3 cm (5' 9.02\")  Current Body Wt: 203 kg (447 lb 8.5 oz)   BMI: 66.1        Nutrition Diagnosis:   Food & nutrition-related knowledge deficit related to cardiac dysfunction, endocrine dysfunction as evidenced by lab values, localized or generalized fluid accumulation    Nutrition Interventions:   Food and/or Nutrient Delivery: Continue Current Diet  Nutrition Education/Counseling: Survival skills/brief education completed  Coordination of Nutrition Care: Continue to monitor while inpatient  Plan of Care discussed with: pt    Goals:  Goals: Meet at least 75% of estimated needs, PO intake 50% or greater, Maintain adequate nutrition status  Type of Goal: Continue current goal  Previous Goal Met: Goal(s) Achieved    Nutrition Monitoring and Evaluation:   Behavioral-Environmental

## 2025-06-03 NOTE — DISCHARGE SUMMARY
Daljit Elizondo  :  1960  MRN:  [id number]    Admit date:  2025  Discharge date:  25    Discharging Physician:  DR Cam    Advance Directive: Full Code    Consults: IP CONSULT TO HEART FAILURE NURSE/COORDINATOR  IP CONSULT TO HOME CARE NEEDS     Primary Care Physician:  Jessie Helms, APRN - CNP    Discharge Diagnoses:  Principal Problem:    Acute congestive heart failure, unspecified heart failure type (HCC)  Active Problems:    Diabetes (HCC)    Essential hypertension    GERD (gastroesophageal reflux disease)    Obesity, morbid (more than 100 lbs over ideal weight or BMI > 40) (HCC)    Hyperlipemia  Resolved Problems:    * No resolved hospital problems. *      Portions of this note have been copied forward, however, changed to reflect the most current clinical status of this patient.  Hospital Course:   64-year-old male with chronic respiratory failure on 3L NC supplemental oxygen continuously, REGI on CPAP, obesity hypoventilation syndrome, COPD, diastolic heart failure, bilateral lymphedema and venous stasis, anxiety, type II DM, hyperlipidemia, GERD, HTN, morbid obesity, with multiple health complaints.  He endorsed dyspnea on exertion, chest pain, increased lower extremity edema and generalized weakness.  States for the past few days he has been unable to get out of bed and had not been taking his Bumex.  At baseline he is able to pivot into his motorized scooter home.  Workup in ER CXR atelectasis versus infiltrate, small left pleural effusion, BNP 4707.  Initially placed on antibiotics due to concern of consolidation.  Patient admitted to hospitalist service.  Initiated on Bumex twice daily, attempted echo unable to obtain due to body habitus.  Bumex drip initiated on  has had net output of 13.5 L and has been adjusted back to his oral Bumex daily. Renal function remained stable.  Antibiotics discontinued due to no evidence of infectious process.  Case management following pending  Oropharynx is clear.   Eyes:      Extraocular Movements: Extraocular movements intact.      Conjunctiva/sclera: Conjunctivae normal.      Pupils: Pupils are equal, round, and reactive to light.   Cardiovascular:      Rate and Rhythm: Normal rate and regular rhythm.      Pulses: Normal pulses.      Heart sounds: Normal heart sounds. No murmur heard.  Pulmonary:      Effort: Pulmonary effort is normal. No respiratory distress.      Breath sounds: Decreased breath sounds (throughout) present.   Abdominal:      General: Bowel sounds are normal. There is no distension.      Palpations: Abdomen is soft.      Tenderness: There is no abdominal tenderness. There is no guarding or rebound.   Musculoskeletal:         General: No swelling. Normal range of motion.      Cervical back: Normal range of motion and neck supple. No rigidity or tenderness.      Right lower leg: No edema.      Left lower leg: No edema.      Comments: Chronic venous stasis    Skin:     General: Skin is warm and dry.      Capillary Refill: Capillary refill takes less than 2 seconds.   Neurological:      General: No focal deficit present.      Mental Status: He is alert and oriented to person, place, and time.      Cranial Nerves: No cranial nerve deficit.      Motor: Weakness present.   Psychiatric:         Mood and Affect: Mood normal.         Behavior: Behavior normal.         Discharge Medications:         Medication List        PAUSE taking these medications      olmesartan 20 MG tablet  Wait to take this until: Chelsey 10, 2025  Commonly known as: BENICAR  Take 1 tablet by mouth daily            START taking these medications      metoprolol succinate 25 MG extended release tablet  Commonly known as: TOPROL XL  Take 1 tablet by mouth daily     miconazole 2 % powder  Commonly known as: MICOTIN  Apply topically 2 times daily.     montelukast 10 MG tablet  Commonly known as: SINGULAIR  Take 1 tablet by mouth nightly     senna 8.6 MG tablet  Commonly known

## 2025-06-03 NOTE — CARE COORDINATION
Salvador Hyde at [facility] rehab; they are unable to accept as they are concerned about his participation with therapy and also his ability to tolerate 3h therapy daily. Discussed with Pt; he does wish to appeal his d/c; Pt called [employer] [phone]; SW awaiting notification from [employer] of request.    Please leave d/c order in.     Electronically signed by JODI Garcia on 6/3/2025 at 2:49 PM

## 2025-06-04 LAB
ANION GAP SERPL CALCULATED.3IONS-SCNC: 11 MMOL/L (ref 8–16)
BUN SERPL-MCNC: 26 MG/DL (ref 8–23)
CALCIUM SERPL-MCNC: 9.6 MG/DL (ref 8.8–10.2)
CHLORIDE SERPL-SCNC: 97 MMOL/L (ref 98–107)
CO2 SERPL-SCNC: 32 MMOL/L (ref 22–29)
CREAT SERPL-MCNC: 1.2 MG/DL (ref 0.7–1.2)
ERYTHROCYTE [DISTWIDTH] IN BLOOD BY AUTOMATED COUNT: 14.8 % (ref 11.5–14.5)
GLUCOSE BLD-MCNC: 128 MG/DL (ref 70–99)
GLUCOSE BLD-MCNC: 135 MG/DL (ref 70–99)
GLUCOSE BLD-MCNC: 137 MG/DL (ref 70–99)
GLUCOSE BLD-MCNC: 139 MG/DL (ref 70–99)
GLUCOSE SERPL-MCNC: 151 MG/DL (ref 70–99)
HCT VFR BLD AUTO: 49.5 % (ref 42–52)
HGB BLD-MCNC: 15 G/DL (ref 14–18)
MCH RBC QN AUTO: 29.6 PG (ref 27–31)
MCHC RBC AUTO-ENTMCNC: 30.3 G/DL (ref 33–37)
MCV RBC AUTO: 97.6 FL (ref 80–94)
PERFORMED ON: ABNORMAL
PLATELET # BLD AUTO: 150 K/UL (ref 130–400)
PMV BLD AUTO: 13.8 FL (ref 9.4–12.4)
POTASSIUM SERPL-SCNC: 4.4 MMOL/L (ref 3.5–5)
RBC # BLD AUTO: 5.07 M/UL (ref 4.7–6.1)
SODIUM SERPL-SCNC: 140 MMOL/L (ref 136–145)
WBC # BLD AUTO: 7 K/UL (ref 4.8–10.8)

## 2025-06-04 PROCEDURE — 6370000000 HC RX 637 (ALT 250 FOR IP)

## 2025-06-04 PROCEDURE — 6360000002 HC RX W HCPCS: Performed by: NURSE PRACTITIONER

## 2025-06-04 PROCEDURE — 6370000000 HC RX 637 (ALT 250 FOR IP): Performed by: NURSE PRACTITIONER

## 2025-06-04 PROCEDURE — 80048 BASIC METABOLIC PNL TOTAL CA: CPT

## 2025-06-04 PROCEDURE — 6360000002 HC RX W HCPCS

## 2025-06-04 PROCEDURE — 94640 AIRWAY INHALATION TREATMENT: CPT

## 2025-06-04 PROCEDURE — 82962 GLUCOSE BLOOD TEST: CPT

## 2025-06-04 PROCEDURE — 1200000000 HC SEMI PRIVATE

## 2025-06-04 PROCEDURE — 85027 COMPLETE CBC AUTOMATED: CPT

## 2025-06-04 PROCEDURE — 36415 COLL VENOUS BLD VENIPUNCTURE: CPT

## 2025-06-04 PROCEDURE — 94760 N-INVAS EAR/PLS OXIMETRY 1: CPT

## 2025-06-04 PROCEDURE — 2700000000 HC OXYGEN THERAPY PER DAY

## 2025-06-04 RX ORDER — METOPROLOL SUCCINATE 25 MG/1
25 TABLET, EXTENDED RELEASE ORAL 2 TIMES DAILY
Status: DISCONTINUED | OUTPATIENT
Start: 2025-06-04 | End: 2025-07-26 | Stop reason: HOSPADM

## 2025-06-04 RX ADMIN — METOPROLOL SUCCINATE 25 MG: 25 TABLET, FILM COATED, EXTENDED RELEASE ORAL at 20:11

## 2025-06-04 RX ADMIN — ENOXAPARIN SODIUM 60 MG: 100 INJECTION SUBCUTANEOUS at 08:00

## 2025-06-04 RX ADMIN — GUAIFENESIN 600 MG: 600 TABLET, EXTENDED RELEASE ORAL at 20:11

## 2025-06-04 RX ADMIN — MICONAZOLE NITRATE: 2 POWDER TOPICAL at 20:12

## 2025-06-04 RX ADMIN — SENNOSIDES 17.2 MG: 8.6 TABLET, COATED ORAL at 20:11

## 2025-06-04 RX ADMIN — ALLOPURINOL 300 MG: 300 TABLET ORAL at 08:00

## 2025-06-04 RX ADMIN — METOPROLOL SUCCINATE 25 MG: 25 TABLET, EXTENDED RELEASE ORAL at 08:00

## 2025-06-04 RX ADMIN — PANTOPRAZOLE SODIUM 40 MG: 40 TABLET, DELAYED RELEASE ORAL at 05:13

## 2025-06-04 RX ADMIN — SENNOSIDES 17.2 MG: 8.6 TABLET, COATED ORAL at 08:00

## 2025-06-04 RX ADMIN — ONDANSETRON 4 MG: 4 TABLET, ORALLY DISINTEGRATING ORAL at 15:37

## 2025-06-04 RX ADMIN — IPRATROPIUM BROMIDE 0.5 MG: 0.5 SOLUTION RESPIRATORY (INHALATION) at 07:04

## 2025-06-04 RX ADMIN — ONDANSETRON 4 MG: 4 TABLET, ORALLY DISINTEGRATING ORAL at 08:15

## 2025-06-04 RX ADMIN — CETIRIZINE HYDROCHLORIDE 10 MG: 10 TABLET ORAL at 08:00

## 2025-06-04 RX ADMIN — PRAVASTATIN SODIUM 20 MG: 20 TABLET ORAL at 20:11

## 2025-06-04 RX ADMIN — MICONAZOLE NITRATE: 2 POWDER TOPICAL at 08:01

## 2025-06-04 RX ADMIN — GUAIFENESIN 600 MG: 600 TABLET, EXTENDED RELEASE ORAL at 08:00

## 2025-06-04 RX ADMIN — POTASSIUM CHLORIDE 20 MEQ: 1500 TABLET, EXTENDED RELEASE ORAL at 07:59

## 2025-06-04 RX ADMIN — INSULIN GLARGINE 30 UNITS: 100 INJECTION, SOLUTION SUBCUTANEOUS at 20:11

## 2025-06-04 RX ADMIN — ENOXAPARIN SODIUM 60 MG: 100 INJECTION SUBCUTANEOUS at 20:11

## 2025-06-04 RX ADMIN — IPRATROPIUM BROMIDE 0.5 MG: 0.5 SOLUTION RESPIRATORY (INHALATION) at 19:29

## 2025-06-04 RX ADMIN — IPRATROPIUM BROMIDE 0.5 MG: 0.5 SOLUTION RESPIRATORY (INHALATION) at 11:05

## 2025-06-04 RX ADMIN — IPRATROPIUM BROMIDE 0.5 MG: 0.5 SOLUTION RESPIRATORY (INHALATION) at 14:57

## 2025-06-04 RX ADMIN — MONTELUKAST SODIUM 10 MG: 10 TABLET, FILM COATED ORAL at 20:11

## 2025-06-04 RX ADMIN — ASPIRIN 81 MG: 81 TABLET, COATED ORAL at 08:00

## 2025-06-04 RX ADMIN — BUMETANIDE 1 MG: 1 TABLET ORAL at 08:00

## 2025-06-04 NOTE — CARE COORDINATION
Pt appealed his d/c again this afternoon when [employer] called to report the decision. Notified attending.     Case ID: [id number]    Electronically signed by JODI Garcia on 6/4/2025 at 5:52 PM

## 2025-06-04 NOTE — CARE COORDINATION
Received a voicemail and fax from [employer] about the appeal.  [facility] physician reviewer has agreed with the dc plan and that pt no longer requires inpatient level of care.   All paperwork placed in pts soft chart. [facility] was to notify the pt.  Electronically signed by Shira Tomlin RN on 6/4/2025 at 3:11 PM

## 2025-06-04 NOTE — CARE COORDINATION
Called and spoke with Chasity at [facility]. They are aware that pt will be discharging home tomorrow.  They have put him on the schedule for admit assessment for Friday.  [facility]    P:[phone]  F:[phone]    Electronically signed by Shira Tomlin RN on 6/4/25 at 3:30 PM CDT

## 2025-06-04 NOTE — PROGRESS NOTES
[facility]      Patient:  Daljit Elizondo  YOB: 1960  Date of Service: 6/4/2025  MRN: [id number]   Acct: [id number]   Primary Care Physician: Jessie Helms APRN - CNP  Advance Directive: Full Code  Admit Date: 5/14/2025       Hospital Day: 21  Portions of this note have been copied forward, however, changed to reflect the most current clinical status of this patient.  CHIEF COMPLAINT shortness of breath     SUBJECTIVE:  no issues overnight, resting comfortably in bed    Cumulative hospital course   64-year-old male with chronic respiratory failure on 3L NC supplemental oxygen continuously, REGI on CPAP, obesity hypoventilation syndrome, COPD, diastolic heart failure, bilateral lymphedema and venous stasis, anxiety, type II DM, hyperlipidemia, GERD, HTN, morbid obesity, with multiple health complaints.  He endorsed dyspnea on exertion, chest pain, increased lower extremity edema and generalized weakness.  States for the past few days he has been unable to get out of bed and had not been taking his Bumex.  At baseline he is able to pivot into his motorized scooter home.  Workup in ER CXR atelectasis versus infiltrate, small left pleural effusion, BNP 4707.  Initially placed on antibiotics due to concern of consolidation.  Patient admitted to hospitalist service.  Initiated on Bumex twice daily, attempted echo unable to obtain due to body habitus.  Bumex drip initiated on 5/16 has had net output of 12.5 L and has been adjusted back to his home Bumex dose.  Renal function remained stable.  Antibiotics discontinued due to no evidence of infectious process. Have attempted multiple facilities and rehab declined. Plan for home with home health. Patient agitated and frustrated regarding plan.     Objective:   VITALS:  /81   Pulse (!) 112   Temp (!) 96.6 °F (35.9 °C) (Temporal)   Resp 18   Ht 1.753 m (5' 9.02\")   Wt (!) 203 kg (447 lb 8.5 oz)   SpO2 90%   BMI 66.06 kg/m²   24HR  INTAKE/OUTPUT:    Intake/Output Summary (Last 24 hours) at 6/4/2025 1343  Last data filed at 6/4/2025 1018  Gross per 24 hour   Intake 840 ml   Output 1800 ml   Net -960 ml       Physical Exam  Vitals and nursing note reviewed.   Constitutional:       Appearance: Normal appearance. He is obese. He is ill-appearing (chronically).   HENT:      Mouth/Throat:      Mouth: Mucous membranes are moist.      Pharynx: Oropharynx is clear.   Eyes:      Extraocular Movements: Extraocular movements intact.      Conjunctiva/sclera: Conjunctivae normal.      Pupils: Pupils are equal, round, and reactive to light.   Cardiovascular:      Rate and Rhythm: Normal rate and regular rhythm.      Pulses: Normal pulses.      Heart sounds: Normal heart sounds. No murmur heard.  Pulmonary:      Effort: Pulmonary effort is normal. No respiratory distress.      Breath sounds: Decreased breath sounds (throughout) present.   Abdominal:      General: Bowel sounds are normal. There is no distension.      Palpations: Abdomen is soft.      Tenderness: There is no abdominal tenderness. There is no guarding or rebound.   Musculoskeletal:         General: No swelling. Normal range of motion.      Cervical back: Normal range of motion and neck supple. No rigidity or tenderness.      Right lower leg: No edema.      Left lower leg: No edema.      Comments: Chronic venous statis BLE    Skin:     General: Skin is warm and dry.      Capillary Refill: Capillary refill takes less than 2 seconds.      Coloration: Skin is pale.   Neurological:      General: No focal deficit present.      Mental Status: He is alert and oriented to person, place, and time.      Cranial Nerves: No cranial nerve deficit.      Motor: Weakness present.   Psychiatric:         Mood and Affect: Mood normal.         Behavior: Behavior normal.       Medications:      sodium chloride 5 mL/hr at 05/18/25 1459    dextrose        metoprolol succinate  25 mg Oral BID    bumetanide  1 mg Oral

## 2025-06-04 NOTE — PLAN OF CARE
Problem: Chronic Conditions and Co-morbidities  Goal: Patient's chronic conditions and co-morbidity symptoms are monitored and maintained or improved  6/4/2025 0917 by Shira Salinas RN  Outcome: Progressing  6/3/2025 2325 by Danita Aguero LPN  Outcome: Progressing     Problem: Discharge Planning  Goal: Discharge to home or other facility with appropriate resources  6/4/2025 0917 by Shira Salinas RN  Outcome: Progressing  6/3/2025 2325 by Danita Aguero LPN  Outcome: Progressing     Problem: Pain  Goal: Verbalizes/displays adequate comfort level or baseline comfort level  6/4/2025 0917 by Shira Salinas RN  Outcome: Progressing  6/3/2025 2325 by Danita Aguero LPN  Outcome: Progressing     Problem: Safety - Adult  Goal: Free from fall injury  6/4/2025 0917 by Shira Salinas RN  Outcome: Progressing  6/3/2025 2325 by Danita Aguero LPN  Outcome: Progressing     Problem: ABCDS Injury Assessment  Goal: Absence of physical injury  6/4/2025 0917 by Shira Salinas RN  Outcome: Progressing  6/3/2025 2325 by Danita Aguero LPN  Outcome: Progressing     Problem: Skin/Tissue Integrity  Goal: Skin integrity remains intact  Description: 1.  Monitor for areas of redness and/or skin breakdown2.  Assess vascular access sites hourly3.  Every 4-6 hours minimum:  Change oxygen saturation probe site4.  Every 4-6 hours:  If on nasal continuous positive airway pressure, respiratory therapy assess nares and determine need for appliance change or resting period  6/4/2025 0917 by Shira Salinas RN  Outcome: Progressing  6/3/2025 2325 by Danita Aguero LPN  Outcome: Progressing     Problem: Nutrition Deficit:  Goal: Optimize nutritional status  6/4/2025 0917 by Shira Salinas RN  Outcome: Progressing  6/3/2025 2325 by Danita Aguero LPN  Outcome: Progressing     Problem: Neurosensory - Adult  Goal: Achieves stable or improved neurological status  6/4/2025 0917 by Shira Salinas RN  Outcome: Progressing  6/3/2025 2325 by  Danita Aguero LPN  Outcome: Progressing     Problem: Respiratory - Adult  Goal: Achieves optimal ventilation and oxygenation  6/4/2025 0917 by Shira Salinas RN  Outcome: Progressing  6/3/2025 2325 by Danita Aguero LPN  Outcome: Progressing     Problem: Cardiovascular - Adult  Goal: Maintains optimal cardiac output and hemodynamic stability  6/4/2025 0917 by Shira Salinas RN  Outcome: Progressing  6/3/2025 2325 by Danita Aguero LPN  Outcome: Progressing     Problem: Skin/Tissue Integrity - Adult  Goal: Skin integrity remains intact  Description: 1.  Monitor for areas of redness and/or skin breakdown2.  Assess vascular access sites hourly3.  Every 4-6 hours minimum:  Change oxygen saturation probe site4.  Every 4-6 hours:  If on nasal continuous positive airway pressure, respiratory therapy assess nares and determine need for appliance change or resting period  6/4/2025 0917 by Shira Salinas RN  Outcome: Progressing  6/3/2025 2325 by Danita Aguero LPN  Outcome: Progressing  Goal: Incisions, wounds, or drain sites healing without S/S of infection  6/4/2025 0917 by Shira Salinas RN  Outcome: Progressing  6/3/2025 2325 by Danita Aguero LPN  Outcome: Progressing  Goal: Oral mucous membranes remain intact  6/4/2025 0917 by Shira Salinas RN  Outcome: Progressing  6/3/2025 2325 by Danita Aguero LPN  Outcome: Progressing     Problem: Musculoskeletal - Adult  Goal: Return mobility to safest level of function  6/4/2025 0917 by Shira Salinas RN  Outcome: Progressing  6/3/2025 2325 by Danita Aguero LPN  Outcome: Progressing  Goal: Maintain proper alignment of affected body part  6/4/2025 0917 by Shira Salinas RN  Outcome: Progressing  6/3/2025 2325 by Danita Aguero LPN  Outcome: Progressing  Goal: Return ADL status to a safe level of function  6/4/2025 0917 by Shira Salinas RN  Outcome: Progressing  6/3/2025 2325 by Danita Aguero LPN  Outcome: Progressing     Problem: Gastrointestinal -

## 2025-06-04 NOTE — CARE COORDINATION
Pt's appeal documents have been uploaded to [employer]  Electronically signed by JODI Garcia on 6/4/2025 at 10:07 AM      For further status updates, please go to [email or web address]. Case Status will be updated within 60 minutes.  Confirmation #  [id number]  The files have been received for the Appeals Case [id number]. This does not confirm they are timely.    Upload another document

## 2025-06-04 NOTE — PROGRESS NOTES
06/04/25 1400   Subjective   Subjective Checked on patient in PM patient still nauseated.  \" Every time I try to eat I feel like I am going to throw up. \"   PT Plan of Care   Wednesday D       Electronically signed by Kalyan Contreras PTA on 6/4/2025 at 2:41 PM

## 2025-06-04 NOTE — PROGRESS NOTES
Occupational Therapy  Facility/Department: [facility]/VAS/MED    NAME: Daljit Elizondo  : 1960  MRN: [id number]    Date of Service: 2025    Attempt x2. Pt nauseated this AM and PM. Pt reported that he feels sick every time he moves and asked to wait on therapy. Will follow up at a later time.     Tracy Lynn OT  Electronically signed by Tracy Lynn OT on 2025 at 3:31 PM

## 2025-06-04 NOTE — PROGRESS NOTES
06/04/25 1000   Subjective   Subjective I am nauseated.  (Patient declined OOB at this time due to nausea. Therapy will check back in PM.)   PT Plan of Care   Wednesday D       Electronically signed by Kalyan Contreras PTA on 6/4/2025 at 10:41 AM

## 2025-06-05 LAB
GLUCOSE BLD-MCNC: 135 MG/DL (ref 70–99)
GLUCOSE BLD-MCNC: 149 MG/DL (ref 70–99)
GLUCOSE BLD-MCNC: 149 MG/DL (ref 70–99)
GLUCOSE BLD-MCNC: 161 MG/DL (ref 70–99)
PERFORMED ON: ABNORMAL

## 2025-06-05 PROCEDURE — 6370000000 HC RX 637 (ALT 250 FOR IP)

## 2025-06-05 PROCEDURE — 2500000003 HC RX 250 WO HCPCS: Performed by: HOSPITALIST

## 2025-06-05 PROCEDURE — 94760 N-INVAS EAR/PLS OXIMETRY 1: CPT

## 2025-06-05 PROCEDURE — 1200000000 HC SEMI PRIVATE

## 2025-06-05 PROCEDURE — 6370000000 HC RX 637 (ALT 250 FOR IP): Performed by: NURSE PRACTITIONER

## 2025-06-05 PROCEDURE — 2700000000 HC OXYGEN THERAPY PER DAY

## 2025-06-05 PROCEDURE — 82962 GLUCOSE BLOOD TEST: CPT

## 2025-06-05 PROCEDURE — 6360000002 HC RX W HCPCS: Performed by: NURSE PRACTITIONER

## 2025-06-05 PROCEDURE — 94640 AIRWAY INHALATION TREATMENT: CPT

## 2025-06-05 PROCEDURE — 6360000002 HC RX W HCPCS

## 2025-06-05 RX ADMIN — MONTELUKAST SODIUM 10 MG: 10 TABLET, FILM COATED ORAL at 20:01

## 2025-06-05 RX ADMIN — CETIRIZINE HYDROCHLORIDE 10 MG: 10 TABLET ORAL at 08:34

## 2025-06-05 RX ADMIN — POTASSIUM CHLORIDE 20 MEQ: 1500 TABLET, EXTENDED RELEASE ORAL at 08:34

## 2025-06-05 RX ADMIN — PANTOPRAZOLE SODIUM 40 MG: 40 TABLET, DELAYED RELEASE ORAL at 08:34

## 2025-06-05 RX ADMIN — METOPROLOL SUCCINATE 25 MG: 25 TABLET, FILM COATED, EXTENDED RELEASE ORAL at 08:34

## 2025-06-05 RX ADMIN — PRAVASTATIN SODIUM 20 MG: 20 TABLET ORAL at 20:01

## 2025-06-05 RX ADMIN — ALLOPURINOL 300 MG: 300 TABLET ORAL at 08:37

## 2025-06-05 RX ADMIN — METOPROLOL SUCCINATE 25 MG: 25 TABLET, FILM COATED, EXTENDED RELEASE ORAL at 20:01

## 2025-06-05 RX ADMIN — IPRATROPIUM BROMIDE 0.5 MG: 0.5 SOLUTION RESPIRATORY (INHALATION) at 14:57

## 2025-06-05 RX ADMIN — GUAIFENESIN 600 MG: 600 TABLET, EXTENDED RELEASE ORAL at 20:00

## 2025-06-05 RX ADMIN — ASPIRIN 81 MG: 81 TABLET, COATED ORAL at 08:34

## 2025-06-05 RX ADMIN — IPRATROPIUM BROMIDE 0.5 MG: 0.5 SOLUTION RESPIRATORY (INHALATION) at 11:30

## 2025-06-05 RX ADMIN — BUMETANIDE 1 MG: 1 TABLET ORAL at 08:34

## 2025-06-05 RX ADMIN — IPRATROPIUM BROMIDE 0.5 MG: 0.5 SOLUTION RESPIRATORY (INHALATION) at 07:36

## 2025-06-05 RX ADMIN — MICONAZOLE NITRATE: 2 POWDER TOPICAL at 20:01

## 2025-06-05 RX ADMIN — SENNOSIDES 17.2 MG: 8.6 TABLET, COATED ORAL at 20:00

## 2025-06-05 RX ADMIN — ENOXAPARIN SODIUM 60 MG: 100 INJECTION SUBCUTANEOUS at 08:34

## 2025-06-05 RX ADMIN — ENOXAPARIN SODIUM 60 MG: 100 INJECTION SUBCUTANEOUS at 20:00

## 2025-06-05 RX ADMIN — GUAIFENESIN 600 MG: 600 TABLET, EXTENDED RELEASE ORAL at 08:34

## 2025-06-05 RX ADMIN — IPRATROPIUM BROMIDE 0.5 MG: 0.5 SOLUTION RESPIRATORY (INHALATION) at 18:21

## 2025-06-05 RX ADMIN — SENNOSIDES 17.2 MG: 8.6 TABLET, COATED ORAL at 08:34

## 2025-06-05 RX ADMIN — INSULIN GLARGINE 30 UNITS: 100 INJECTION, SOLUTION SUBCUTANEOUS at 20:00

## 2025-06-05 NOTE — PROGRESS NOTES
[facility]      Patient:  Daljit Elizondo  YOB: 1960  Date of Service: 6/5/2025  MRN: [id number]   Acct: [id number]   Primary Care Physician: Jessie Helms APRN - CNP  Advance Directive: Full Code  Admit Date: 5/14/2025       Hospital Day: 22  Portions of this note have been copied forward, however, changed to reflect the most current clinical status of this patient.  CHIEF COMPLAINT shortness of breath     SUBJECTIVE:  no issues overnight, resting comfortably in bed    Cumulative hospital course   64-year-old male with chronic respiratory failure on 3L NC supplemental oxygen continuously, REGI on CPAP, obesity hypoventilation syndrome, COPD, diastolic heart failure, bilateral lymphedema and venous stasis, anxiety, type II DM, hyperlipidemia, GERD, HTN, morbid obesity, with multiple health complaints.  He endorsed dyspnea on exertion, chest pain, increased lower extremity edema and generalized weakness.  States for the past few days he has been unable to get out of bed and had not been taking his Bumex.  At baseline he is able to pivot into his motorized scooter home.  Workup in ER CXR atelectasis versus infiltrate, small left pleural effusion, BNP 4707.  Initially placed on antibiotics due to concern of consolidation.  Patient admitted to hospitalist service.  Initiated on Bumex twice daily, attempted echo unable to obtain due to body habitus.  Bumex drip initiated on 5/16 has had net output of 12.5 L and has been adjusted back to his home Bumex dose.  Renal function remained stable.  Antibiotics discontinued due to no evidence of infectious process. Have attempted multiple facilities and rehab declined. Plan for home with home health. Patient agitated and frustrated regarding plan. Appeal denied and appeal again on 6/4.     Objective:   VITALS:  BP (!) 109/97   Pulse (!) 109   Temp 98.4 °F (36.9 °C) (Temporal)   Resp 16   Ht 1.753 m (5' 9.02\")   Wt (!) 203 kg (447 lb 8.5 oz)   SpO2 93%

## 2025-06-05 NOTE — CARE COORDINATION
Seth BOSS - Chasity, notified that pt is still in hospital and has appealed his dc for the second time.  Will notifiy HH once pt is ready to dc.  Electronically signed by Shira Tomlin RN on 6/5/2025 at 3:18 PM

## 2025-06-05 NOTE — CARE COORDINATION
06/05/25 1510   IMM Letter   Notice of Medicare Non-Coverage Letter date given: 06/05/25   Notice of Medicare Non-Coverage Time Given 1349   Notice of Medicare Non-Coverage Letter Given By ALLIE 12.  signed by pt. given by Kim ORTIZ

## 2025-06-05 NOTE — PLAN OF CARE
Problem: Chronic Conditions and Co-morbidities  Goal: Patient's chronic conditions and co-morbidity symptoms are monitored and maintained or improved  6/4/2025 2219 by Brigido Garcia RN  Outcome: Progressing  6/4/2025 0917 by Shira Salinas RN  Outcome: Progressing     Problem: Discharge Planning  Goal: Discharge to home or other facility with appropriate resources  6/4/2025 2219 by Brigido Garcia RN  Outcome: Progressing  6/4/2025 0917 by Shira Salinas RN  Outcome: Progressing     Problem: Pain  Goal: Verbalizes/displays adequate comfort level or baseline comfort level  6/4/2025 2219 by Brigido Garcia RN  Outcome: Progressing  6/4/2025 0917 by Shira Salinas RN  Outcome: Progressing     Problem: Safety - Adult  Goal: Free from fall injury  6/4/2025 2219 by Brigido Garcia RN  Outcome: Progressing  6/4/2025 0917 by Shira Salinas RN  Outcome: Progressing     Problem: ABCDS Injury Assessment  Goal: Absence of physical injury  6/4/2025 2219 by Brigido Garcia RN  Outcome: Progressing  6/4/2025 0917 by Shira Salinas RN  Outcome: Progressing     Problem: Skin/Tissue Integrity  Goal: Skin integrity remains intact  Description: 1.  Monitor for areas of redness and/or skin breakdown2.  Assess vascular access sites hourly3.  Every 4-6 hours minimum:  Change oxygen saturation probe site4.  Every 4-6 hours:  If on nasal continuous positive airway pressure, respiratory therapy assess nares and determine need for appliance change or resting period  6/4/2025 2219 by Brigido Garcia RN  Outcome: Progressing  6/4/2025 0917 by Shira Salinas RN  Outcome: Progressing     Problem: Nutrition Deficit:  Goal: Optimize nutritional status  6/4/2025 2219 by Brigido Garcia RN  Outcome: Progressing  6/4/2025 0917 by Shira Salinas RN  Outcome: Progressing     Problem: Neurosensory - Adult  Goal: Achieves stable or improved neurological status  6/4/2025 2219 by Brigido Garcia RN  Outcome: Progressing  6/4/2025 0917 by Shira Salinas

## 2025-06-05 NOTE — PROGRESS NOTES
Physician Progress Note      PATIENT:               MADELYN GORE  [facility] #:                  [id number]  :                       1960  ADMIT DATE:       2025 1:50 PM  DISCH DATE:  RESPONDING  PROVIDER #:        MARCK ELDER          QUERY TEXT:    Acute on chronic respiratory failure is documented in the medical record   Progress Notes by Marck Elder APRN at 2025. Please provide   additional clinical indicators supportive of your documentation. Or please   document if the diagnosis of acute respiratory failure has been ruled out   after study.    The clinical indicators include:  64 years old male with chronic respiratory failure , CHF  exacerbation ,HTN ,    -\"Pulmonary: Effort: Pulmonary effort is normal. No respiratory distress.   Breath sounds: Normal breath sounds.\"  ED Provider Notes by Freddy Rosa MD at 2025    \"Chronic respiratory failure on 3L NC supplemental oxygen continuously,   worsening shortness of breath . increased dyspnea on exertion. 3 L of oxygen   at home however it was bumped up to 6 L by EMS.\"- H&P by Renay Thorne APRN - CNP at 2025    \"Pulmonary Effort: Pulmonary effort is normal. No respiratory distress. Breath   sounds: Normal breath sounds. No wheezing, rhonchi or rales.\"-Progress Notes   by Ruben Partida APRN - CNP at 2025    \"He was hypoxic in the ED with an SpO2 of 83% was therefore placed on BiPAP,   Pulmonary: Effort: Tachypnea present.\"-Progress Notes by Ruben Partida APRN - CNP at 2025 10:50 AM    \"Acute on chronic hypoxic respiratory failure. Secondary to volume   overload-Currently on 4LNC. Based on clinical evidence, no pneumonia was/is   present-no tachycardia, leukocytosis, or fever\"-Progress Notes by Marck Elder APRN at 2025      no ABG    SPO2-83 , 93 , 94 , 95, 100    RR-28, 21, 22    - BIPAP , 6L O2 , telemetry monitoring , image studies , IV Bumex, IV   levofloxacin ,albuterol

## 2025-06-06 LAB
GLUCOSE BLD-MCNC: 126 MG/DL (ref 70–99)
GLUCOSE BLD-MCNC: 156 MG/DL (ref 70–99)
GLUCOSE BLD-MCNC: 159 MG/DL (ref 70–99)
GLUCOSE BLD-MCNC: 170 MG/DL (ref 70–99)
PERFORMED ON: ABNORMAL

## 2025-06-06 PROCEDURE — 94640 AIRWAY INHALATION TREATMENT: CPT

## 2025-06-06 PROCEDURE — 6370000000 HC RX 637 (ALT 250 FOR IP): Performed by: NURSE PRACTITIONER

## 2025-06-06 PROCEDURE — 6360000002 HC RX W HCPCS: Performed by: NURSE PRACTITIONER

## 2025-06-06 PROCEDURE — 97530 THERAPEUTIC ACTIVITIES: CPT

## 2025-06-06 PROCEDURE — 97116 GAIT TRAINING THERAPY: CPT

## 2025-06-06 PROCEDURE — 6370000000 HC RX 637 (ALT 250 FOR IP)

## 2025-06-06 PROCEDURE — 6360000002 HC RX W HCPCS

## 2025-06-06 PROCEDURE — 1200000000 HC SEMI PRIVATE

## 2025-06-06 PROCEDURE — 82962 GLUCOSE BLOOD TEST: CPT

## 2025-06-06 PROCEDURE — 97535 SELF CARE MNGMENT TRAINING: CPT

## 2025-06-06 PROCEDURE — 2700000000 HC OXYGEN THERAPY PER DAY

## 2025-06-06 RX ADMIN — GUAIFENESIN 600 MG: 600 TABLET, EXTENDED RELEASE ORAL at 09:05

## 2025-06-06 RX ADMIN — POTASSIUM CHLORIDE 20 MEQ: 1500 TABLET, EXTENDED RELEASE ORAL at 09:05

## 2025-06-06 RX ADMIN — INSULIN GLARGINE 30 UNITS: 100 INJECTION, SOLUTION SUBCUTANEOUS at 21:12

## 2025-06-06 RX ADMIN — METOPROLOL SUCCINATE 25 MG: 25 TABLET, FILM COATED, EXTENDED RELEASE ORAL at 21:11

## 2025-06-06 RX ADMIN — BUMETANIDE 1 MG: 1 TABLET ORAL at 09:05

## 2025-06-06 RX ADMIN — IPRATROPIUM BROMIDE 0.5 MG: 0.5 SOLUTION RESPIRATORY (INHALATION) at 07:03

## 2025-06-06 RX ADMIN — SENNOSIDES 17.2 MG: 8.6 TABLET, COATED ORAL at 21:12

## 2025-06-06 RX ADMIN — ASPIRIN 81 MG: 81 TABLET, COATED ORAL at 09:05

## 2025-06-06 RX ADMIN — PRAVASTATIN SODIUM 20 MG: 20 TABLET ORAL at 21:12

## 2025-06-06 RX ADMIN — PANTOPRAZOLE SODIUM 40 MG: 40 TABLET, DELAYED RELEASE ORAL at 09:05

## 2025-06-06 RX ADMIN — SENNOSIDES 17.2 MG: 8.6 TABLET, COATED ORAL at 09:05

## 2025-06-06 RX ADMIN — IPRATROPIUM BROMIDE 0.5 MG: 0.5 SOLUTION RESPIRATORY (INHALATION) at 14:37

## 2025-06-06 RX ADMIN — ENOXAPARIN SODIUM 60 MG: 100 INJECTION SUBCUTANEOUS at 09:05

## 2025-06-06 RX ADMIN — MICONAZOLE NITRATE: 2 POWDER TOPICAL at 21:12

## 2025-06-06 RX ADMIN — CETIRIZINE HYDROCHLORIDE 10 MG: 10 TABLET ORAL at 09:05

## 2025-06-06 RX ADMIN — ALLOPURINOL 300 MG: 300 TABLET ORAL at 09:37

## 2025-06-06 RX ADMIN — MICONAZOLE NITRATE: 2 POWDER TOPICAL at 09:36

## 2025-06-06 RX ADMIN — IPRATROPIUM BROMIDE 0.5 MG: 0.5 SOLUTION RESPIRATORY (INHALATION) at 19:22

## 2025-06-06 RX ADMIN — ENOXAPARIN SODIUM 60 MG: 100 INJECTION SUBCUTANEOUS at 21:11

## 2025-06-06 RX ADMIN — MONTELUKAST SODIUM 10 MG: 10 TABLET, FILM COATED ORAL at 21:12

## 2025-06-06 RX ADMIN — METOPROLOL SUCCINATE 25 MG: 25 TABLET, FILM COATED, EXTENDED RELEASE ORAL at 09:05

## 2025-06-06 RX ADMIN — GUAIFENESIN 600 MG: 600 TABLET, EXTENDED RELEASE ORAL at 21:12

## 2025-06-06 NOTE — PROGRESS NOTES
Occupational Therapy     06/06/25 1500   Subjective   Subjective Pt back in bed this pm due to needing to use bedpan with PCA. Pt agreeable to try BSC this pm with encouragement. Pt has not been able to have a BM yet.   Pain Assessment   Pain Assessment None - Denies Pain   Vitals   O2 Device Nasal cannula   Cognition   Overall Cognitive Status Exceptions   Cognition Comment Awake, alert, follows simple commands, requires cues to initate all tasks.   Orientation   Overall Orientation Status WFL   Bed Mobility Training   Bed Mobility Training Yes   Overall Level of Assistance Contact guard assistance   Interventions Verbal cues;Tactile cues   Rolling Modified independent   Supine to Sit Contact guard assistance   Sit to Supine Minimal assistance   Scooting Contact guard assistance   Transfer Training   Transfer Training Yes   Overall Level of Assistance Contact guard assistance   Interventions Verbal cues;Tactile cues   Toilet Transfer Contact guard assistance   Balance   Sitting Intact   Sitting - Static Good (unsupported)   Sitting - Dynamic Good (unsupported)   Standing With support;High guard   Standing - Static Fair   Standing - Dynamic Fair   ADL   Toileting Maximum assistance   Toileting Skilled Clinical Factors Agreeable to use BSC this pm 6/6/25. Max assist for clean up.   Functional Mobility Contact guard assistance   Functional Mobility Skilled Clinical Factors x2 for safety.   Assessment   Assessment Tx focused on functional mobility from bed to BSC at RW level with CGA. Pt unsuccessful to have BM this pm after trying for increased time. Nursing notified. Pt returned to bed after task.   Activity Tolerance Patient tolerated treatment well   Discharge Recommendations Patient would benefit from continued therapy after discharge   Occupational Therapy Plan   Times Per Week 3-5   Times Per Day Once a day   OT Plan of Care   Friday X     Electronically signed by BRUNO Frausto on 6/6/2025 at 3:52

## 2025-06-06 NOTE — PROGRESS NOTES
Physical Therapy  Name: Daljit Elizondo  MRN:  [id number]  Date of service:  6/6/2025 06/06/25 1541   Restrictions/Precautions   Restrictions/Precautions Fall Risk;Contact Precautions   Position Activity Restriction   Other Position/Activity Restrictions ESBL, MRSA   General   Family/Caregiver Present No   Referring Practitioner Ruben Partida, APRN - CNP   Subjective   Subjective agreeable to attempt short gait to BSC   Oxygen Therapy   O2 Device Nasal cannula   Bed Mobility   Supine to Sit Stand by assistance   Sit to Supine Contact guard assistance   Transfers   Sit to Stand Contact guard assistance   Stand to Sit Contact guard assistance   Bed to Chair Contact guard assistance   Ambulation   Device Rolling Walker   Assistance Contact guard assistance;Stand by assistance   Quality of Gait Safe   Gait Deviations Slow Lesley;Decreased step length;Decreased step height   Distance 6 feet + 10 feet   Comments .   Other Activities   Comment sat on BSC.  Stood twice for therapist to wipe bottom.   Short Term Goals   Time Frame for Short Term Goals 2 wks   Short Term Goal 1 Independent with bed mobility   Short Term Goal 2 Independent with transfers   Short Term Goal 3 Ambulate 50 feet independently with assistive device   Short Term Goal 4 bed mobility Min A   Short Term Goal 5 amb. 15' Min A x 1 SW   Conditions Requiring Skilled Therapeutic Intervention   Body Structures, Functions, Activity Limitations Requiring Skilled Therapeutic Intervention Decreased functional mobility ;Decreased ADL status;Decreased ROM;Decreased strength;Decreased balance;Decreased endurance;Decreased sensation;Decreased posture   Treatment Diagnosis impaired gait and mobility   Discharge Recommendations Continue to assess pending progress;Home with Home health PT;Home with nursing aide;Patient would benefit from continued therapy after discharge;Therapy recommended at discharge   Activity Tolerance   Activity Tolerance Patient

## 2025-06-06 NOTE — PROGRESS NOTES
Physical Therapy  Name: Daljit Elizondo  MRN:  [id number]  Date of service:  6/6/2025 06/06/25 1123   Restrictions/Precautions   Restrictions/Precautions Fall Risk;Contact Precautions   Position Activity Restriction   Other Position/Activity Restrictions ESBL, MRSA   General   Family/Caregiver Present No   Referring Practitioner Ruben Partida, APRN - CNP   Subjective   Subjective pt in bed, agrees to therapy   Oxygen Therapy   O2 Device Nasal cannula   Bed Mobility   Supine to Sit Contact guard assistance   Transfers   Sit to Stand Contact guard assistance   Stand to Sit Contact guard assistance   Bed to Chair Contact guard assistance   Ambulation   Device Rolling Walker   Assistance Contact guard assistance;Stand by assistance   Gait Deviations Slow Lesley;Decreased step length;Decreased step height   Distance 4 feet to chair   Other Activities   Comment sat EOB taking about what he has done with therapy.   Short Term Goals   Time Frame for Short Term Goals 2 wks   Short Term Goal 1 Independent with bed mobility   Short Term Goal 2 Independent with transfers   Short Term Goal 3 Ambulate 50 feet independently with assistive device   Short Term Goal 4 bed mobility Min A   Short Term Goal 5 amb. 15' Min A x 1 SW   Conditions Requiring Skilled Therapeutic Intervention   Body Structures, Functions, Activity Limitations Requiring Skilled Therapeutic Intervention Decreased functional mobility ;Decreased ADL status;Decreased ROM;Decreased strength;Decreased balance;Decreased endurance;Decreased sensation;Decreased posture   Assessment patient is very independent.  He does not like assist with any mobility.  He has certain ways he like to do things that are functional for him.   Treatment Diagnosis impaired gait and mobility   Discharge Recommendations Continue to assess pending progress;24 hour supervision or assist;Patient would benefit from continued therapy after discharge   Activity Tolerance   Activity

## 2025-06-06 NOTE — PROGRESS NOTES
[facility]      Patient:  Daljit Elizondo  YOB: 1960  Date of Service: 6/6/2025  MRN: [id number]   Acct: [id number]   Primary Care Physician: Jessie Helms APRN - CNP  Advance Directive: Full Code  Admit Date: 5/14/2025       Hospital Day: 23  Portions of this note have been copied forward, however, changed to reflect the most current clinical status of this patient.  CHIEF COMPLAINT shortness of breath     SUBJECTIVE:  no issues overnight, resting comfortably in bed    Cumulative hospital course   64-year-old male with chronic respiratory failure on 3L NC supplemental oxygen continuously, REGI on CPAP, obesity hypoventilation syndrome, COPD, diastolic heart failure, bilateral lymphedema and venous stasis, anxiety, type II DM, hyperlipidemia, GERD, HTN, morbid obesity, with multiple health complaints.  He endorsed dyspnea on exertion, chest pain, increased lower extremity edema and generalized weakness.  States for the past few days he has been unable to get out of bed and had not been taking his Bumex.  At baseline he is able to pivot into his motorized scooter home.  Workup in ER CXR atelectasis versus infiltrate, small left pleural effusion, BNP 4707.  Initially placed on antibiotics due to concern of consolidation.  Patient admitted to hospitalist service.  Initiated on Bumex twice daily, attempted echo unable to obtain due to body habitus.  Bumex drip initiated on 5/16 has had net output of 12.5 L and has been adjusted back to his home Bumex dose.  Renal function remained stable.  Antibiotics discontinued due to no evidence of infectious process. Have attempted multiple facilities and rehab declined. Plan for home with home health. Patient agitated and frustrated regarding plan. Appeal denied and appeal again on 6/4 awaiting second decision.    Objective:   VITALS:  /74   Pulse (!) 105   Temp 98.1 °F (36.7 °C) (Temporal)   Resp 18   Ht 1.753 m (5' 9.02\")   Wt (!) 204.6 kg (451

## 2025-06-06 NOTE — PROGRESS NOTES
Occupational Therapy     06/06/25 1200   Subjective   Subjective Pt in bed upon arrival for therapy. Pt agreeable to participate.   Pain Assessment   Pain Assessment None - Denies Pain   Vitals   O2 Device Nasal cannula   Cognition   Overall Cognitive Status Exceptions   Cognition Comment Awake, alert, follows simple commands, requires cues to initate all tasks.   Orientation   Overall Orientation Status WFL   Bed Mobility Training   Bed Mobility Training Yes   Overall Level of Assistance Contact guard assistance   Interventions Verbal cues;Tactile cues   Supine to Sit Contact guard assistance   Scooting Contact guard assistance   Transfer Training   Transfer Training Yes   Overall Level of Assistance Contact guard assistance  (x2 for safety; moving lines and providing equipment in place)   Interventions Verbal cues;Tactile cues   Sit to Stand Contact guard assistance   Stand to Sit Contact guard assistance   Bed to Chair Contact guard assistance   Balance   Sitting Intact   Standing With support;High guard  (RW)   ADL   Feeding Independent   Grooming Independent   UE Bathing Minimal assistance;Moderate assistance   LE Bathing Moderate assistance;Maximum assistance   UE Dressing Minimal assistance   LE Dressing Minimal assistance;Moderate assistance   LE Dressing Skilled Clinical Factors hx of AE experience   Putting On/Taking Off Footwear Dependent/Total   Toileting Maximum assistance   Toileting Skilled Clinical Factors bedlevel; for thorough clean; declining use of BSC and using purewick continuously.   Functional Mobility Contact guard assistance   Functional Mobility Skilled Clinical Factors x2 for safety.   Additional Comments Based on clinical observation.   Assessment   Assessment Tx focused on sitting EOB, STS mobility at RW level, steps to recliner and t/f to recliner. Pt required recliner to be reclined to assist with repsoitioning and scooting back in recliner. Pt completes all mobility tasks with CGA x2  for moving lines, position of equipment, and cues for safety. Pt agreeable to try to sit up in recliner for 3 hours this date if possible.   Activity Tolerance Patient tolerated treatment well   Discharge Recommendations Patient would benefit from continued therapy after discharge   Occupational Therapy Plan   Times Per Week 3-5   Times Per Day Once a day   OT Plan of Care   Friday X     Electronically signed by BRUNO Frausto on 6/6/2025 at 12:58 PM

## 2025-06-06 NOTE — CARE COORDINATION
Writer has followed up on the [employer] website multiple times today and case is still showing as pending clinical review.  Electronically signed by Shira Tomlin RN on 6/6/2025 at 3:22 PM

## 2025-06-07 LAB
ANION GAP SERPL CALCULATED.3IONS-SCNC: 9 MMOL/L (ref 8–16)
BASOPHILS # BLD: 0.1 K/UL (ref 0–0.2)
BASOPHILS NFR BLD: 0.9 % (ref 0–1)
BUN SERPL-MCNC: 31 MG/DL (ref 8–23)
CALCIUM SERPL-MCNC: 9.6 MG/DL (ref 8.8–10.2)
CHLORIDE SERPL-SCNC: 97 MMOL/L (ref 98–107)
CO2 SERPL-SCNC: 34 MMOL/L (ref 22–29)
CREAT SERPL-MCNC: 1.2 MG/DL (ref 0.7–1.2)
EOSINOPHIL # BLD: 0.3 K/UL (ref 0–0.6)
EOSINOPHIL NFR BLD: 3.5 % (ref 0–5)
ERYTHROCYTE [DISTWIDTH] IN BLOOD BY AUTOMATED COUNT: 14.5 % (ref 11.5–14.5)
GLUCOSE BLD-MCNC: 122 MG/DL (ref 70–99)
GLUCOSE BLD-MCNC: 133 MG/DL (ref 70–99)
GLUCOSE BLD-MCNC: 147 MG/DL (ref 70–99)
GLUCOSE BLD-MCNC: 170 MG/DL (ref 70–99)
GLUCOSE SERPL-MCNC: 131 MG/DL (ref 70–99)
HCT VFR BLD AUTO: 50.7 % (ref 42–52)
HGB BLD-MCNC: 15.7 G/DL (ref 14–18)
IMM GRANULOCYTES # BLD: 0 K/UL
LYMPHOCYTES # BLD: 1.1 K/UL (ref 1.1–4.5)
LYMPHOCYTES NFR BLD: 12.9 % (ref 20–40)
MCH RBC QN AUTO: 29.5 PG (ref 27–31)
MCHC RBC AUTO-ENTMCNC: 31 G/DL (ref 33–37)
MCV RBC AUTO: 95.3 FL (ref 80–94)
MONOCYTES # BLD: 0.7 K/UL (ref 0–0.9)
MONOCYTES NFR BLD: 8.1 % (ref 0–10)
NEUTROPHILS # BLD: 6.1 K/UL (ref 1.5–7.5)
NEUTS SEG NFR BLD: 74.2 % (ref 50–65)
PERFORMED ON: ABNORMAL
PLATELET # BLD AUTO: 156 K/UL (ref 130–400)
PMV BLD AUTO: 13.5 FL (ref 9.4–12.4)
POTASSIUM SERPL-SCNC: 4.3 MMOL/L (ref 3.5–5)
RBC # BLD AUTO: 5.32 M/UL (ref 4.7–6.1)
SODIUM SERPL-SCNC: 140 MMOL/L (ref 136–145)
WBC # BLD AUTO: 8.2 K/UL (ref 4.8–10.8)

## 2025-06-07 PROCEDURE — 6370000000 HC RX 637 (ALT 250 FOR IP): Performed by: NURSE PRACTITIONER

## 2025-06-07 PROCEDURE — 82962 GLUCOSE BLOOD TEST: CPT

## 2025-06-07 PROCEDURE — 80048 BASIC METABOLIC PNL TOTAL CA: CPT

## 2025-06-07 PROCEDURE — 97530 THERAPEUTIC ACTIVITIES: CPT

## 2025-06-07 PROCEDURE — 6370000000 HC RX 637 (ALT 250 FOR IP)

## 2025-06-07 PROCEDURE — 36415 COLL VENOUS BLD VENIPUNCTURE: CPT

## 2025-06-07 PROCEDURE — 6360000002 HC RX W HCPCS

## 2025-06-07 PROCEDURE — 85025 COMPLETE CBC W/AUTO DIFF WBC: CPT

## 2025-06-07 PROCEDURE — 1200000000 HC SEMI PRIVATE

## 2025-06-07 PROCEDURE — 6360000002 HC RX W HCPCS: Performed by: NURSE PRACTITIONER

## 2025-06-07 PROCEDURE — 94640 AIRWAY INHALATION TREATMENT: CPT

## 2025-06-07 PROCEDURE — 94150 VITAL CAPACITY TEST: CPT

## 2025-06-07 PROCEDURE — 94760 N-INVAS EAR/PLS OXIMETRY 1: CPT

## 2025-06-07 PROCEDURE — 2700000000 HC OXYGEN THERAPY PER DAY

## 2025-06-07 RX ORDER — POLYETHYLENE GLYCOL 3350 17 G/17G
17 POWDER, FOR SOLUTION ORAL 2 TIMES DAILY
Status: DISCONTINUED | OUTPATIENT
Start: 2025-06-07 | End: 2025-07-26 | Stop reason: HOSPADM

## 2025-06-07 RX ORDER — LACTULOSE 10 G/15ML
20 SOLUTION ORAL 3 TIMES DAILY
Status: DISCONTINUED | OUTPATIENT
Start: 2025-06-07 | End: 2025-07-02

## 2025-06-07 RX ADMIN — METOPROLOL SUCCINATE 25 MG: 25 TABLET, FILM COATED, EXTENDED RELEASE ORAL at 21:49

## 2025-06-07 RX ADMIN — CETIRIZINE HYDROCHLORIDE 10 MG: 10 TABLET ORAL at 09:51

## 2025-06-07 RX ADMIN — SENNOSIDES 17.2 MG: 8.6 TABLET, COATED ORAL at 21:50

## 2025-06-07 RX ADMIN — IPRATROPIUM BROMIDE 0.5 MG: 0.5 SOLUTION RESPIRATORY (INHALATION) at 14:05

## 2025-06-07 RX ADMIN — ASPIRIN 81 MG: 81 TABLET, COATED ORAL at 09:51

## 2025-06-07 RX ADMIN — POTASSIUM CHLORIDE 20 MEQ: 1500 TABLET, EXTENDED RELEASE ORAL at 09:51

## 2025-06-07 RX ADMIN — BUMETANIDE 1 MG: 1 TABLET ORAL at 09:51

## 2025-06-07 RX ADMIN — POLYETHYLENE GLYCOL 3350 17 G: 17 POWDER, FOR SOLUTION ORAL at 10:53

## 2025-06-07 RX ADMIN — IPRATROPIUM BROMIDE 0.5 MG: 0.5 SOLUTION RESPIRATORY (INHALATION) at 10:26

## 2025-06-07 RX ADMIN — INSULIN GLARGINE 30 UNITS: 100 INJECTION, SOLUTION SUBCUTANEOUS at 21:51

## 2025-06-07 RX ADMIN — IPRATROPIUM BROMIDE 0.5 MG: 0.5 SOLUTION RESPIRATORY (INHALATION) at 18:22

## 2025-06-07 RX ADMIN — MICONAZOLE NITRATE: 2 POWDER TOPICAL at 21:52

## 2025-06-07 RX ADMIN — PANTOPRAZOLE SODIUM 40 MG: 40 TABLET, DELAYED RELEASE ORAL at 06:18

## 2025-06-07 RX ADMIN — MONTELUKAST SODIUM 10 MG: 10 TABLET, FILM COATED ORAL at 21:49

## 2025-06-07 RX ADMIN — IPRATROPIUM BROMIDE 0.5 MG: 0.5 SOLUTION RESPIRATORY (INHALATION) at 06:12

## 2025-06-07 RX ADMIN — ALLOPURINOL 300 MG: 300 TABLET ORAL at 09:51

## 2025-06-07 RX ADMIN — ACETAMINOPHEN 650 MG: 325 TABLET ORAL at 14:45

## 2025-06-07 RX ADMIN — GUAIFENESIN 600 MG: 600 TABLET, EXTENDED RELEASE ORAL at 09:51

## 2025-06-07 RX ADMIN — PRAVASTATIN SODIUM 20 MG: 20 TABLET ORAL at 21:49

## 2025-06-07 RX ADMIN — POLYETHYLENE GLYCOL 3350 17 G: 17 POWDER, FOR SOLUTION ORAL at 06:18

## 2025-06-07 RX ADMIN — MICONAZOLE NITRATE: 2 POWDER TOPICAL at 09:52

## 2025-06-07 RX ADMIN — GUAIFENESIN 600 MG: 600 TABLET, EXTENDED RELEASE ORAL at 21:49

## 2025-06-07 RX ADMIN — METOPROLOL SUCCINATE 25 MG: 25 TABLET, FILM COATED, EXTENDED RELEASE ORAL at 09:51

## 2025-06-07 RX ADMIN — SENNOSIDES 17.2 MG: 8.6 TABLET, COATED ORAL at 09:51

## 2025-06-07 RX ADMIN — ENOXAPARIN SODIUM 60 MG: 100 INJECTION SUBCUTANEOUS at 21:49

## 2025-06-07 RX ADMIN — LACTULOSE 20 G: 20 SOLUTION ORAL at 10:53

## 2025-06-07 RX ADMIN — ENOXAPARIN SODIUM 60 MG: 100 INJECTION SUBCUTANEOUS at 09:52

## 2025-06-07 ASSESSMENT — PAIN DESCRIPTION - DESCRIPTORS: DESCRIPTORS: JABBING

## 2025-06-07 ASSESSMENT — PAIN DESCRIPTION - ORIENTATION: ORIENTATION: MID

## 2025-06-07 ASSESSMENT — PAIN DESCRIPTION - LOCATION: LOCATION: BUTTOCKS

## 2025-06-07 ASSESSMENT — PAIN SCALES - GENERAL: PAINLEVEL_OUTOF10: 3

## 2025-06-07 NOTE — PROGRESS NOTES
Physical Therapy  Name: Daljit Elizondo  MRN:  [id number]  Date of service:  6/7/2025 06/07/25 1535   Restrictions/Precautions   Restrictions/Precautions Fall Risk;Contact Precautions   Position Activity Restriction   Other Position/Activity Restrictions ESBL, MRSA   General   Family/Caregiver Present No   Referring Practitioner Ruben Partida, DURAN - CNP   Subjective   Subjective ready to go back to bed   Oxygen Therapy   O2 Device Nasal cannula   Bed Mobility   Sit to Supine Stand by assistance   Transfers   Sit to Stand Stand by assistance   Stand to Sit Stand by assistance   Bed to Chair Stand by assistance   Ambulation   Device Rolling Walker   Assistance Stand by assistance   Quality of Gait Safe   Gait Deviations Slow Lesley;Decreased step length;Decreased step height   Distance 6 feet   Comments .   Other Activities   Comment stood for clean up   Short Term Goals   Time Frame for Short Term Goals 2 wks   Short Term Goal 1 Independent with bed mobility   Short Term Goal 2 Independent with transfers   Short Term Goal 3 Ambulate 50 feet independently with assistive device   Short Term Goal 4 bed mobility Min A   Short Term Goal 5 amb. 15' Min A x 1 SW   Conditions Requiring Skilled Therapeutic Intervention   Body Structures, Functions, Activity Limitations Requiring Skilled Therapeutic Intervention Decreased functional mobility ;Decreased ADL status;Decreased ROM;Decreased strength;Decreased balance;Decreased endurance;Decreased sensation;Decreased posture   Treatment Diagnosis impaired gait and mobility   Discharge Recommendations Continue to assess pending progress;Home with Home health PT;Home with nursing aide;Patient would benefit from continued therapy after discharge;Therapy recommended at discharge   Activity Tolerance   Activity Tolerance Patient tolerated treatment well   Physical Therapy Plan   General Plan 3-5 times per week   Therapy Duration 2 Weeks   Current Treatment Recommendations

## 2025-06-07 NOTE — PROGRESS NOTES
[facility]      Patient:  Daljit Elizondo  YOB: 1960  Date of Service: 6/7/2025  MRN: [id number]   Acct: [id number]   Primary Care Physician: Jessie Helms APRN - CNP  Advance Directive: Full Code  Admit Date: 5/14/2025       Hospital Day: 24  Portions of this note have been copied forward, however, changed to reflect the most current clinical status of this patient.  CHIEF COMPLAINT shortness of breath     SUBJECTIVE:  no issues overnight, resting comfortably in bed    Cumulative hospital course   64-year-old male with chronic respiratory failure on 3L NC supplemental oxygen continuously, REGI on CPAP, obesity hypoventilation syndrome, COPD, diastolic heart failure, bilateral lymphedema and venous stasis, anxiety, type II DM, hyperlipidemia, GERD, HTN, morbid obesity, with multiple health complaints.  He endorsed dyspnea on exertion, chest pain, increased lower extremity edema and generalized weakness.  States for the past few days he has been unable to get out of bed and had not been taking his Bumex.  At baseline he is able to pivot into his motorized scooter home.  Workup in ER CXR atelectasis versus infiltrate, small left pleural effusion, BNP 4707.  Initially placed on antibiotics due to concern of consolidation.  Patient admitted to hospitalist service.  Initiated on Bumex twice daily, attempted echo unable to obtain due to body habitus.  Bumex drip initiated on 5/16 has had net output of 15 L and has been adjusted back to his home Bumex dose.  Renal function remained stable.  Antibiotics discontinued due to no evidence of infectious process. Have attempted multiple facilities and rehab declined. Plan for home with home health. Patient agitated and frustrated regarding plan. Has been working with therapy. Appeal denied and appeal again on 6/4 awaiting second decision.    Objective:   VITALS:  /85   Pulse (!) 103   Temp 97.9 °F (36.6 °C) (Oral)   Resp 20   Ht 1.753 m (5' 9.02\")

## 2025-06-07 NOTE — PROGRESS NOTES
Physical Therapy  Name: Daljit Elizondo  MRN:  [id number]  Date of service:  6/7/2025 06/07/25 1208   Restrictions/Precautions   Restrictions/Precautions Fall Risk;Contact Precautions   Position Activity Restriction   Other Position/Activity Restrictions ESBL, MRSA   General   Family/Caregiver Present No   Referring Practitioner Ruben Partida, DURAN - CNP   Subjective   Subjective agreeable to transfer to chair   Oxygen Therapy   O2 Device Nasal cannula   Bed Mobility   Supine to Sit Stand by assistance   Sit to Supine Contact guard assistance   Transfers   Sit to Stand Contact guard assistance   Stand to Sit Contact guard assistance   Bed to Chair Contact guard assistance   Ambulation   Device Rolling Walker   Assistance Contact guard assistance;Stand by assistance   Quality of Gait Safe   Gait Deviations Slow Lesley;Decreased step length;Decreased step height   Distance 6 feet   Comments .   Other Activities   Comment stood for clean up after attempt to use bedpan   Short Term Goals   Time Frame for Short Term Goals 2 wks   Short Term Goal 1 Independent with bed mobility   Short Term Goal 2 Independent with transfers   Short Term Goal 3 Ambulate 50 feet independently with assistive device   Short Term Goal 4 bed mobility Min A   Short Term Goal 5 amb. 15' Min A x 1 SW   Conditions Requiring Skilled Therapeutic Intervention   Body Structures, Functions, Activity Limitations Requiring Skilled Therapeutic Intervention Decreased functional mobility ;Decreased ADL status;Decreased ROM;Decreased strength;Decreased balance;Decreased endurance;Decreased sensation;Decreased posture   Treatment Diagnosis impaired gait and mobility   Discharge Recommendations Continue to assess pending progress;Home with Home health PT;Home with nursing aide;Patient would benefit from continued therapy after discharge;Therapy recommended at discharge   Activity Tolerance   Activity Tolerance Patient tolerated treatment well

## 2025-06-08 LAB
GLUCOSE BLD-MCNC: 164 MG/DL (ref 70–99)
GLUCOSE BLD-MCNC: 166 MG/DL (ref 70–99)
GLUCOSE BLD-MCNC: 177 MG/DL (ref 70–99)
GLUCOSE BLD-MCNC: 185 MG/DL (ref 70–99)
PERFORMED ON: ABNORMAL

## 2025-06-08 PROCEDURE — 82962 GLUCOSE BLOOD TEST: CPT

## 2025-06-08 PROCEDURE — 6360000002 HC RX W HCPCS

## 2025-06-08 PROCEDURE — 6360000002 HC RX W HCPCS: Performed by: NURSE PRACTITIONER

## 2025-06-08 PROCEDURE — 94150 VITAL CAPACITY TEST: CPT

## 2025-06-08 PROCEDURE — 1200000000 HC SEMI PRIVATE

## 2025-06-08 PROCEDURE — 2700000000 HC OXYGEN THERAPY PER DAY

## 2025-06-08 PROCEDURE — 97530 THERAPEUTIC ACTIVITIES: CPT

## 2025-06-08 PROCEDURE — 6370000000 HC RX 637 (ALT 250 FOR IP): Performed by: NURSE PRACTITIONER

## 2025-06-08 PROCEDURE — 94640 AIRWAY INHALATION TREATMENT: CPT

## 2025-06-08 PROCEDURE — 6370000000 HC RX 637 (ALT 250 FOR IP)

## 2025-06-08 PROCEDURE — 94760 N-INVAS EAR/PLS OXIMETRY 1: CPT

## 2025-06-08 RX ADMIN — SENNOSIDES 17.2 MG: 8.6 TABLET, COATED ORAL at 09:43

## 2025-06-08 RX ADMIN — METOPROLOL SUCCINATE 25 MG: 25 TABLET, FILM COATED, EXTENDED RELEASE ORAL at 09:44

## 2025-06-08 RX ADMIN — ENOXAPARIN SODIUM 60 MG: 100 INJECTION SUBCUTANEOUS at 21:34

## 2025-06-08 RX ADMIN — ENOXAPARIN SODIUM 60 MG: 100 INJECTION SUBCUTANEOUS at 09:46

## 2025-06-08 RX ADMIN — IPRATROPIUM BROMIDE 0.5 MG: 0.5 SOLUTION RESPIRATORY (INHALATION) at 11:02

## 2025-06-08 RX ADMIN — BUMETANIDE 1 MG: 1 TABLET ORAL at 09:44

## 2025-06-08 RX ADMIN — ASPIRIN 81 MG: 81 TABLET, COATED ORAL at 09:44

## 2025-06-08 RX ADMIN — PANTOPRAZOLE SODIUM 40 MG: 40 TABLET, DELAYED RELEASE ORAL at 09:43

## 2025-06-08 RX ADMIN — METOPROLOL SUCCINATE 25 MG: 25 TABLET, FILM COATED, EXTENDED RELEASE ORAL at 21:33

## 2025-06-08 RX ADMIN — GUAIFENESIN 600 MG: 600 TABLET, EXTENDED RELEASE ORAL at 21:33

## 2025-06-08 RX ADMIN — CETIRIZINE HYDROCHLORIDE 10 MG: 10 TABLET ORAL at 09:43

## 2025-06-08 RX ADMIN — ALLOPURINOL 300 MG: 300 TABLET ORAL at 09:43

## 2025-06-08 RX ADMIN — IPRATROPIUM BROMIDE 0.5 MG: 0.5 SOLUTION RESPIRATORY (INHALATION) at 06:33

## 2025-06-08 RX ADMIN — MICONAZOLE NITRATE: 2 POWDER TOPICAL at 21:40

## 2025-06-08 RX ADMIN — INSULIN GLARGINE 30 UNITS: 100 INJECTION, SOLUTION SUBCUTANEOUS at 21:35

## 2025-06-08 RX ADMIN — INSULIN LISPRO 2 UNITS: 100 INJECTION, SOLUTION INTRAVENOUS; SUBCUTANEOUS at 21:35

## 2025-06-08 RX ADMIN — POTASSIUM CHLORIDE 20 MEQ: 1500 TABLET, EXTENDED RELEASE ORAL at 09:44

## 2025-06-08 RX ADMIN — MICONAZOLE NITRATE: 2 POWDER TOPICAL at 09:45

## 2025-06-08 RX ADMIN — MONTELUKAST SODIUM 10 MG: 10 TABLET, FILM COATED ORAL at 21:33

## 2025-06-08 RX ADMIN — PRAVASTATIN SODIUM 20 MG: 20 TABLET ORAL at 21:33

## 2025-06-08 RX ADMIN — GUAIFENESIN 600 MG: 600 TABLET, EXTENDED RELEASE ORAL at 09:44

## 2025-06-08 RX ADMIN — ACETAMINOPHEN 650 MG: 325 TABLET ORAL at 21:37

## 2025-06-08 RX ADMIN — IPRATROPIUM BROMIDE 0.5 MG: 0.5 SOLUTION RESPIRATORY (INHALATION) at 19:27

## 2025-06-08 ASSESSMENT — PAIN SCALES - GENERAL
PAINLEVEL_OUTOF10: 0
PAINLEVEL_OUTOF10: 0
PAINLEVEL_OUTOF10: 6

## 2025-06-08 ASSESSMENT — PAIN DESCRIPTION - LOCATION: LOCATION: BACK

## 2025-06-08 ASSESSMENT — PAIN DESCRIPTION - ORIENTATION: ORIENTATION: POSTERIOR

## 2025-06-08 ASSESSMENT — PAIN DESCRIPTION - DESCRIPTORS: DESCRIPTORS: ACHING

## 2025-06-08 NOTE — PROGRESS NOTES
Physical Therapy  Name: Daljit Elizondo  MRN:  [id number]  Date of service:  6/8/2025    Patient was transferring from bed to chair with nursing staff.  Patient was witnessed lowering himself to the floor with bed rail and chair arm.  This therapist assisted in obtaining correct patient transport sling to use with skylift in room.  Assisted with lift from floor to bed.  Patient more concerned with appealing his discharge for the third time than talking with therapist this am.  Will attempt again in the afternoon.     Electronically signed by Gillian Hamlin PTA on 6/8/2025 at 11:14 AM

## 2025-06-08 NOTE — PROGRESS NOTES
[facility]      Patient:  Daljit Elizondo  YOB: 1960  Date of Service: 6/8/2025  MRN: [id number]   Acct: [id number]   Primary Care Physician: Jessie Helms APRN - CNP  Advance Directive: Full Code  Admit Date: 5/14/2025       Hospital Day: 25  Portions of this note have been copied forward, however, changed to reflect the most current clinical status of this patient.  CHIEF COMPLAINT shortness of breath     SUBJECTIVE:  no issues overnight, resting comfortably in bed    Cumulative hospital course   64-year-old male with chronic respiratory failure on 3L NC supplemental oxygen continuously, REGI on CPAP, obesity hypoventilation syndrome, COPD, diastolic heart failure, bilateral lymphedema and venous stasis, anxiety, type II DM, hyperlipidemia, GERD, HTN, morbid obesity, with multiple health complaints.  He endorsed dyspnea on exertion, chest pain, increased lower extremity edema and generalized weakness.  States for the past few days he has been unable to get out of bed and had not been taking his Bumex.  At baseline he is able to pivot into his motorized scooter home.  Workup in ER CXR atelectasis versus infiltrate, small left pleural effusion, BNP 4707.  Initially placed on antibiotics due to concern of consolidation.  Patient admitted to hospitalist service.  Initiated on Bumex twice daily, attempted echo unable to obtain due to body habitus.  Bumex drip initiated on 5/16 has had net output of 15 L and has been adjusted back to his home Bumex dose.  Renal function remained stable.  Antibiotics discontinued due to no evidence of infectious process. Have attempted multiple facilities and rehab declined. Plan for home with home health. Patient agitated and frustrated regarding plan. Has been working with therapy. Appeal denied and appeal again on 6/4 awaiting second decision again denied. Patient frustrated, attempted to discuss overall plan with patient. Vague would not further discuss. 3rd

## 2025-06-08 NOTE — CARE COORDINATION
Pt's second appeal was denied    Overall Status  [employer] Appeal Case: [id number] is complete.    1. Appeal Started  06/04/2025 17:12:55    2. Medical Record Requested  06/04/2025 18:25:57    4. In Clinical Review  06/04/2025 18:25:57    5. Physician Review Completed  06/07/2025 11:18:19    6. Determination  The Physician agreed with the notice.  Beneficiary Liability Starts on 6/5/2025.    7. Notification  Provider: 06/07/2025 13:34:41  Caller: 06/07/2025 13:29:05    8. Case Complete  Yes           SW met with Pt and he stated he was appealing for the third time. SW provided the number for the Pt to follow up on his appeal.    Electronically signed by Heath Griffin on 6/8/2025 at 10:57 AM

## 2025-06-09 LAB
ANION GAP SERPL CALCULATED.3IONS-SCNC: 14 MMOL/L (ref 8–16)
BUN SERPL-MCNC: 56 MG/DL (ref 8–23)
CALCIUM SERPL-MCNC: 9.8 MG/DL (ref 8.8–10.2)
CHLORIDE SERPL-SCNC: 97 MMOL/L (ref 98–107)
CO2 SERPL-SCNC: 28 MMOL/L (ref 22–29)
CREAT SERPL-MCNC: 3 MG/DL (ref 0.7–1.2)
GLUCOSE BLD-MCNC: 119 MG/DL (ref 70–99)
GLUCOSE BLD-MCNC: 127 MG/DL (ref 70–99)
GLUCOSE BLD-MCNC: 139 MG/DL (ref 70–99)
GLUCOSE BLD-MCNC: 145 MG/DL (ref 70–99)
GLUCOSE SERPL-MCNC: 150 MG/DL (ref 70–99)
PERFORMED ON: ABNORMAL
POTASSIUM SERPL-SCNC: 4.1 MMOL/L (ref 3.5–5)
SODIUM SERPL-SCNC: 139 MMOL/L (ref 136–145)

## 2025-06-09 PROCEDURE — 36415 COLL VENOUS BLD VENIPUNCTURE: CPT

## 2025-06-09 PROCEDURE — 6370000000 HC RX 637 (ALT 250 FOR IP): Performed by: STUDENT IN AN ORGANIZED HEALTH CARE EDUCATION/TRAINING PROGRAM

## 2025-06-09 PROCEDURE — 6370000000 HC RX 637 (ALT 250 FOR IP): Performed by: NURSE PRACTITIONER

## 2025-06-09 PROCEDURE — 80048 BASIC METABOLIC PNL TOTAL CA: CPT

## 2025-06-09 PROCEDURE — 6370000000 HC RX 637 (ALT 250 FOR IP)

## 2025-06-09 PROCEDURE — 6360000002 HC RX W HCPCS: Performed by: NURSE PRACTITIONER

## 2025-06-09 PROCEDURE — 82962 GLUCOSE BLOOD TEST: CPT

## 2025-06-09 PROCEDURE — 2700000000 HC OXYGEN THERAPY PER DAY

## 2025-06-09 PROCEDURE — 94640 AIRWAY INHALATION TREATMENT: CPT

## 2025-06-09 PROCEDURE — 6360000002 HC RX W HCPCS

## 2025-06-09 PROCEDURE — 94150 VITAL CAPACITY TEST: CPT

## 2025-06-09 PROCEDURE — 1200000000 HC SEMI PRIVATE

## 2025-06-09 PROCEDURE — 94760 N-INVAS EAR/PLS OXIMETRY 1: CPT

## 2025-06-09 RX ORDER — BUMETANIDE 1 MG/1
1 TABLET ORAL 2 TIMES DAILY
Status: DISCONTINUED | OUTPATIENT
Start: 2025-06-09 | End: 2025-07-06

## 2025-06-09 RX ADMIN — ALLOPURINOL 300 MG: 300 TABLET ORAL at 09:23

## 2025-06-09 RX ADMIN — CETIRIZINE HYDROCHLORIDE 10 MG: 10 TABLET ORAL at 09:23

## 2025-06-09 RX ADMIN — METOPROLOL SUCCINATE 25 MG: 25 TABLET, FILM COATED, EXTENDED RELEASE ORAL at 09:23

## 2025-06-09 RX ADMIN — LACTULOSE 20 G: 20 SOLUTION ORAL at 09:23

## 2025-06-09 RX ADMIN — MICONAZOLE NITRATE: 2 POWDER TOPICAL at 09:24

## 2025-06-09 RX ADMIN — PRAVASTATIN SODIUM 20 MG: 20 TABLET ORAL at 20:34

## 2025-06-09 RX ADMIN — SENNOSIDES 17.2 MG: 8.6 TABLET, COATED ORAL at 09:23

## 2025-06-09 RX ADMIN — POLYETHYLENE GLYCOL 3350 17 G: 17 POWDER, FOR SOLUTION ORAL at 09:23

## 2025-06-09 RX ADMIN — INSULIN GLARGINE 30 UNITS: 100 INJECTION, SOLUTION SUBCUTANEOUS at 20:37

## 2025-06-09 RX ADMIN — MICONAZOLE NITRATE: 2 POWDER TOPICAL at 21:00

## 2025-06-09 RX ADMIN — POTASSIUM CHLORIDE 20 MEQ: 1500 TABLET, EXTENDED RELEASE ORAL at 09:23

## 2025-06-09 RX ADMIN — GUAIFENESIN 600 MG: 600 TABLET, EXTENDED RELEASE ORAL at 20:34

## 2025-06-09 RX ADMIN — METOPROLOL SUCCINATE 25 MG: 25 TABLET, FILM COATED, EXTENDED RELEASE ORAL at 20:36

## 2025-06-09 RX ADMIN — IPRATROPIUM BROMIDE 0.5 MG: 0.5 SOLUTION RESPIRATORY (INHALATION) at 19:17

## 2025-06-09 RX ADMIN — BUMETANIDE 1 MG: 1 TABLET ORAL at 09:23

## 2025-06-09 RX ADMIN — BUMETANIDE 1 MG: 1 TABLET ORAL at 17:52

## 2025-06-09 RX ADMIN — MONTELUKAST SODIUM 10 MG: 10 TABLET, FILM COATED ORAL at 20:34

## 2025-06-09 RX ADMIN — IPRATROPIUM BROMIDE 0.5 MG: 0.5 SOLUTION RESPIRATORY (INHALATION) at 06:57

## 2025-06-09 RX ADMIN — ENOXAPARIN SODIUM 60 MG: 100 INJECTION SUBCUTANEOUS at 09:23

## 2025-06-09 RX ADMIN — ASPIRIN 81 MG: 81 TABLET, COATED ORAL at 09:23

## 2025-06-09 RX ADMIN — GUAIFENESIN 600 MG: 600 TABLET, EXTENDED RELEASE ORAL at 09:23

## 2025-06-09 RX ADMIN — ACETAMINOPHEN 650 MG: 325 TABLET ORAL at 20:38

## 2025-06-09 RX ADMIN — ENOXAPARIN SODIUM 60 MG: 100 INJECTION SUBCUTANEOUS at 21:00

## 2025-06-09 RX ADMIN — ONDANSETRON 4 MG: 4 TABLET, ORALLY DISINTEGRATING ORAL at 17:54

## 2025-06-09 RX ADMIN — IPRATROPIUM BROMIDE 0.5 MG: 0.5 SOLUTION RESPIRATORY (INHALATION) at 11:14

## 2025-06-09 RX ADMIN — IPRATROPIUM BROMIDE 0.5 MG: 0.5 SOLUTION RESPIRATORY (INHALATION) at 15:02

## 2025-06-09 ASSESSMENT — PAIN DESCRIPTION - ORIENTATION: ORIENTATION: MID

## 2025-06-09 ASSESSMENT — PAIN SCALES - GENERAL: PAINLEVEL_OUTOF10: 5

## 2025-06-09 ASSESSMENT — PAIN DESCRIPTION - LOCATION: LOCATION: BACK

## 2025-06-09 ASSESSMENT — PAIN DESCRIPTION - DESCRIPTORS: DESCRIPTORS: ACHING

## 2025-06-09 ASSESSMENT — PAIN SCALES - WONG BAKER: WONGBAKER_NUMERICALRESPONSE: NO HURT

## 2025-06-09 NOTE — CARE COORDINATION
Also called and spoke with Alyson at [facility],  has scheduled pt pickup at 10 am on Tuesday.  Electronically signed by Shira Tomlin RN on 6/9/2025 at 3:33 PM

## 2025-06-09 NOTE — CARE COORDINATION
Have called and spoke with Deborah at [facility] twice now. Ninfa was not available.  Deborah is aware of everything that has tried to be done while pt has been in the hospital.  She is aware that pt will be dcing home with HH.  Deborah wants pt tp make and appt to see the PCP as soon as possible post dc.  Electronically signed by Shira Tomlin RN on 6/9/2025 at 3:32 PM

## 2025-06-09 NOTE — PROGRESS NOTES
Pt Noted to have Dressing to left foot and was saturated with blood. Left foot 2nd, 3rd, and 4th toes noted to be lacerated on the undersides of toes and in between. Pt had kurlex wrap with 4x4 guaze underneath. Pt L foot dressing was changed with quick clot applied to toes with 4x4 guaze applied  and kurlex wrap.

## 2025-06-09 NOTE — CARE COORDINATION
Called and spoke with Rina Argueta with [employer] about this case.  Pt has appealed twice and lost both times.   Pt still has DC orders but pt does not want to discharge home.  Per Rina, the hospital can not force him to leave nor call the police.  Pt requires EMS transport for dc. Rian recommended that we reiterate to him that fact that he is medically stable and that he will owe the hospital $2315.00 per day and will be billed.  Rina also stated to call pts emergency contact as this is considered emergent.  Writer called and spoke with pts sister Gabriella.  Gabriella was going to call and talk to the pt and see if she can get writer some phone #'s and to check on if pt was able to get additional caregivers in the home.  Seth BOSS had accepted the pt and was planning on seeing the pt once he got home, HH is still following for dc as was aware that pt had appealed x 2.  Will cont to follow case.  Electronically signed by Shira Tomlin RN on 6/9/2025 at 11:17 AM

## 2025-06-09 NOTE — CARE COORDINATION
Called and spoke with Chasity at [facility].  She confirmed that pt is on the schedule to be seen on Wednesday 6/11  Electronically signed by Shira Tomlin RN on 6/9/2025 at 3:03 PM

## 2025-06-09 NOTE — PROGRESS NOTES
Pt dressing on left foot checked for bloody saturaation and was found to be saturated. Fresh quick clot applied with 4x4 gauze and kurlex wrap.  Will continue to monitor for fresh bleeding.

## 2025-06-09 NOTE — CARE COORDINATION
ANGEL and KATARZYNA Christian visited with Pt to notify of 2nd appeal denied per Shauna; letter also presented to Pt indicating denial and his option to contact JASWANT () at [phone] or [email or web address] (he is also aware that he became responsible for daily hospital charges on 6/5/25 as notified in prior visit.     Pt denied this option and stated to \"call EMS\"  and just \"send me home\".   KATARZYNA Silva discussed [facility] and also will give list of sitter/IND caregiver options; see also her note re: talking to who Pt contacted at [facility] also.    Electronically signed by JODI Garcia on 6/9/2025 at 3:54 PM

## 2025-06-09 NOTE — PROGRESS NOTES
Daily Progress Note    Date:2025  Patient: Daljit Elizondo  : 1960  MRN:[id number]  CODE:Full Code No additional code details  PCP:Jessie Helms APRN - CNP    Admit Date: 2025 12:50 PM   LOS: 26 days     Chief Complaint   Patient presents with    Shortness of Breath     Since     Scrotal Pain         Subjective: Pt had a fall yesterday. He made sure to inform me that he did not fall intentionally. He developed some bleeding from his left foot which was treated with quick clot overnight and no longer actively bleeding.   He still does not plan to return to his home. He wants to go to a nursing home, although I explained that he has been rejected from multiple facilities and at this point is not eligible for short term skilled rehab as he is at his functional baseline.         Hospital Summary: 65 yo M with chronic respiratory failure on 3L NC, REGI and obesity hypoventilation syndrome on CPAP, COPD, chronic diastolic heart failure, BLE lymphedema, T2DM, morbid obesity who initially presented to [facility] ED with dyspnea and BLE edema.  Pt had stopped taking his home Bumex over the past week because he was tired of having to get up and go to the bathroom often.   Initial labs showed elevated BNP at 4,707.   CXR showed atelectasis with small left pleural effusion.    Pt admitted to the hospitalist service for further management. He did not have good UOP with bolus dosing of IV Bumex and was started on Bumex gtt. He has had net negative 13.5 L output.  He has since been transitioned back to PO Bumex 1 mg BID.    Case management was consulted due to pt difficulty caring for himself at home. PT/OT ordered. Pt has not been consistently working with therapy throughout his hospitalization.  SNF referrals were sent, but pt was declined from multiple facilities due to concerns with the level of care he requires and his inconsistency when working with physical therapy.  Pt appears to be back to his baseline mobility  failure, unspecified heart failure type (HCC)  Active Problems:    Diabetes (HCC)    Essential hypertension    GERD (gastroesophageal reflux disease)    Obesity, morbid (more than 100 lbs over ideal weight or BMI > 40) (HCC)    Hyperlipemia  Resolved Problems:    * No resolved hospital problems. *     Acute on chronic diastolic heart failure  -- 13.5 L UOP while on Bumex gtt, now discontinued  -- Increase Bumex to 1 mg BID  -- 2 g Na diet  -- Strict Is/Os, daily weights    T2DM  -- Lantus 30 units QHS and SSI    HTN  -- Toprol Xl    GERD  -- Protonix    Morbid obesity  -- Calorie restriction  -- Exercise as able     REGI  Obesity hypoventilation syndrome  -- CPAP at night      DVT prophylaxis: Lovenox    DISPOSITION:  Pt is medically stable for discharge home as he is at his functional baseline. His appeals have been denied thus far and he is responsible for the cost of his hospitalization beginning 6/5.   Case management is reaching out to family to try and arrange extra caregivers so pt will feel comfortable returning home. Home health has already been arranged.   Case management discussed case with risk management, see note from 6/9/25.    Full Code No additional code details         Complexity of medical decision making: Moderate  Risk of complications and/or morbidity/mortality: Moderate      Jori Haji MD 6/9/2025 12:50 PM

## 2025-06-10 ENCOUNTER — APPOINTMENT (OUTPATIENT)
Dept: ULTRASOUND IMAGING | Age: 65
DRG: 291 | End: 2025-06-10
Payer: MEDICARE

## 2025-06-10 LAB
ANION GAP SERPL CALCULATED.3IONS-SCNC: 15 MMOL/L (ref 8–16)
ANION GAP SERPL CALCULATED.3IONS-SCNC: 15 MMOL/L (ref 8–16)
BACTERIA #/AREA URNS HPF: ABNORMAL /HPF
BILIRUB UR QL STRIP: NEGATIVE
BUN SERPL-MCNC: 60 MG/DL (ref 8–23)
BUN SERPL-MCNC: 65 MG/DL (ref 8–23)
CALCIUM SERPL-MCNC: 8.8 MG/DL (ref 8.8–10.2)
CALCIUM SERPL-MCNC: 9.6 MG/DL (ref 8.8–10.2)
CHLORIDE SERPL-SCNC: 95 MMOL/L (ref 98–107)
CHLORIDE SERPL-SCNC: 97 MMOL/L (ref 98–107)
CK SERPL-CCNC: 8 U/L (ref 39–308)
CLARITY UR: ABNORMAL
CO2 SERPL-SCNC: 25 MMOL/L (ref 22–29)
CO2 SERPL-SCNC: 26 MMOL/L (ref 22–29)
COLOR UR: ABNORMAL
CREAT SERPL-MCNC: 3 MG/DL (ref 0.7–1.2)
CREAT SERPL-MCNC: 3 MG/DL (ref 0.7–1.2)
CREAT UR-MCNC: 130 MG/DL (ref 39–259)
CRYSTALS URNS MICRO: ABNORMAL /HPF
GLUCOSE BLD-MCNC: 112 MG/DL (ref 70–99)
GLUCOSE BLD-MCNC: 113 MG/DL (ref 70–99)
GLUCOSE BLD-MCNC: 116 MG/DL (ref 70–99)
GLUCOSE BLD-MCNC: 146 MG/DL (ref 70–99)
GLUCOSE SERPL-MCNC: 124 MG/DL (ref 70–99)
GLUCOSE SERPL-MCNC: 129 MG/DL (ref 70–99)
GLUCOSE UR STRIP.AUTO-MCNC: NEGATIVE MG/DL
HGB UR STRIP.AUTO-MCNC: ABNORMAL MG/L
KETONES UR STRIP.AUTO-MCNC: ABNORMAL MG/DL
LEUKOCYTE ESTERASE UR QL STRIP.AUTO: ABNORMAL
NITRITE UR QL STRIP.AUTO: NEGATIVE
PERFORMED ON: ABNORMAL
PH UR STRIP.AUTO: 5 [PH] (ref 5–8)
POTASSIUM SERPL-SCNC: 3.8 MMOL/L (ref 3.5–5)
POTASSIUM SERPL-SCNC: 4.1 MMOL/L (ref 3.5–5.1)
PROT UR STRIP.AUTO-MCNC: ABNORMAL MG/DL
RBC #/AREA URNS HPF: ABNORMAL /HPF (ref 0–2)
SODIUM SERPL-SCNC: 135 MMOL/L (ref 136–145)
SODIUM SERPL-SCNC: 138 MMOL/L (ref 136–145)
SP GR UR STRIP.AUTO: 1.02 (ref 1–1.03)
SQUAMOUS #/AREA URNS HPF: ABNORMAL /HPF
UROBILINOGEN UR STRIP.AUTO-MCNC: 1 E.U./DL
UUN UR-MCNC: 461 MG/DL
WBC #/AREA URNS HPF: ABNORMAL /HPF (ref 0–5)

## 2025-06-10 PROCEDURE — 94760 N-INVAS EAR/PLS OXIMETRY 1: CPT

## 2025-06-10 PROCEDURE — 2580000003 HC RX 258: Performed by: STUDENT IN AN ORGANIZED HEALTH CARE EDUCATION/TRAINING PROGRAM

## 2025-06-10 PROCEDURE — 94640 AIRWAY INHALATION TREATMENT: CPT

## 2025-06-10 PROCEDURE — 6360000002 HC RX W HCPCS

## 2025-06-10 PROCEDURE — 94150 VITAL CAPACITY TEST: CPT

## 2025-06-10 PROCEDURE — 82570 ASSAY OF URINE CREATININE: CPT

## 2025-06-10 PROCEDURE — 6360000002 HC RX W HCPCS: Performed by: NURSE PRACTITIONER

## 2025-06-10 PROCEDURE — 51798 US URINE CAPACITY MEASURE: CPT

## 2025-06-10 PROCEDURE — 82962 GLUCOSE BLOOD TEST: CPT

## 2025-06-10 PROCEDURE — 84540 ASSAY OF URINE/UREA-N: CPT

## 2025-06-10 PROCEDURE — 82550 ASSAY OF CK (CPK): CPT

## 2025-06-10 PROCEDURE — 81001 URINALYSIS AUTO W/SCOPE: CPT

## 2025-06-10 PROCEDURE — 36415 COLL VENOUS BLD VENIPUNCTURE: CPT

## 2025-06-10 PROCEDURE — 6370000000 HC RX 637 (ALT 250 FOR IP): Performed by: NURSE PRACTITIONER

## 2025-06-10 PROCEDURE — 2700000000 HC OXYGEN THERAPY PER DAY

## 2025-06-10 PROCEDURE — 6370000000 HC RX 637 (ALT 250 FOR IP)

## 2025-06-10 PROCEDURE — 80048 BASIC METABOLIC PNL TOTAL CA: CPT

## 2025-06-10 PROCEDURE — 1200000000 HC SEMI PRIVATE

## 2025-06-10 PROCEDURE — 76770 US EXAM ABDO BACK WALL COMP: CPT

## 2025-06-10 RX ORDER — SODIUM CHLORIDE 9 MG/ML
INJECTION, SOLUTION INTRAVENOUS CONTINUOUS
Status: DISCONTINUED | OUTPATIENT
Start: 2025-06-10 | End: 2025-06-28

## 2025-06-10 RX ADMIN — ENOXAPARIN SODIUM 60 MG: 100 INJECTION SUBCUTANEOUS at 09:12

## 2025-06-10 RX ADMIN — ALLOPURINOL 300 MG: 300 TABLET ORAL at 09:11

## 2025-06-10 RX ADMIN — PANTOPRAZOLE SODIUM 40 MG: 40 TABLET, DELAYED RELEASE ORAL at 09:12

## 2025-06-10 RX ADMIN — IPRATROPIUM BROMIDE 0.5 MG: 0.5 SOLUTION RESPIRATORY (INHALATION) at 18:20

## 2025-06-10 RX ADMIN — SODIUM CHLORIDE: 0.9 INJECTION, SOLUTION INTRAVENOUS at 09:41

## 2025-06-10 RX ADMIN — GUAIFENESIN 600 MG: 600 TABLET, EXTENDED RELEASE ORAL at 09:12

## 2025-06-10 RX ADMIN — CETIRIZINE HYDROCHLORIDE 10 MG: 10 TABLET ORAL at 09:12

## 2025-06-10 RX ADMIN — GUAIFENESIN 600 MG: 600 TABLET, EXTENDED RELEASE ORAL at 21:22

## 2025-06-10 RX ADMIN — IPRATROPIUM BROMIDE 0.5 MG: 0.5 SOLUTION RESPIRATORY (INHALATION) at 15:11

## 2025-06-10 RX ADMIN — IPRATROPIUM BROMIDE 0.5 MG: 0.5 SOLUTION RESPIRATORY (INHALATION) at 06:14

## 2025-06-10 RX ADMIN — METOPROLOL SUCCINATE 25 MG: 25 TABLET, FILM COATED, EXTENDED RELEASE ORAL at 09:12

## 2025-06-10 RX ADMIN — IPRATROPIUM BROMIDE 0.5 MG: 0.5 SOLUTION RESPIRATORY (INHALATION) at 10:21

## 2025-06-10 RX ADMIN — MICONAZOLE NITRATE: 2 POWDER TOPICAL at 21:21

## 2025-06-10 RX ADMIN — ENOXAPARIN SODIUM 60 MG: 100 INJECTION SUBCUTANEOUS at 21:22

## 2025-06-10 RX ADMIN — ASPIRIN 81 MG: 81 TABLET, COATED ORAL at 09:11

## 2025-06-10 RX ADMIN — PRAVASTATIN SODIUM 20 MG: 20 TABLET ORAL at 21:21

## 2025-06-10 RX ADMIN — POTASSIUM CHLORIDE 20 MEQ: 1500 TABLET, EXTENDED RELEASE ORAL at 09:12

## 2025-06-10 RX ADMIN — MICONAZOLE NITRATE: 2 POWDER TOPICAL at 09:13

## 2025-06-10 RX ADMIN — INSULIN GLARGINE 30 UNITS: 100 INJECTION, SOLUTION SUBCUTANEOUS at 21:21

## 2025-06-10 RX ADMIN — SENNOSIDES 17.2 MG: 8.6 TABLET, COATED ORAL at 09:11

## 2025-06-10 RX ADMIN — MONTELUKAST SODIUM 10 MG: 10 TABLET, FILM COATED ORAL at 21:21

## 2025-06-10 RX ADMIN — METOPROLOL SUCCINATE 25 MG: 25 TABLET, FILM COATED, EXTENDED RELEASE ORAL at 21:22

## 2025-06-10 NOTE — PROGRESS NOTES
Daily Progress Note    Date:6/10/2025  Patient: Daljit Elizondo  : 1960  MRN:[id number]  CODE:Full Code No additional code details  PCP:Jessie Helms APRN - CNP    Admit Date: 2025 12:50 PM   LOS: 27 days     Chief Complaint   Patient presents with    Shortness of Breath     Since     Scrotal Pain         Subjective: Nursing reported urine became dark yesterday, previously clear. Pt reports decreased UOP. BP has been stable. No further bleeding from left foot.         Hospital Summary: 65 yo M with chronic respiratory failure on 3L NC, REGI and obesity hypoventilation syndrome on CPAP, COPD, chronic diastolic heart failure, BLE lymphedema, T2DM, morbid obesity who initially presented to [facility] ED with dyspnea and BLE edema.  Pt had stopped taking his home Bumex over the past week because he was tired of having to get up and go to the bathroom often.   Initial labs showed elevated BNP at 4,707.   CXR showed atelectasis with small left pleural effusion.    Pt admitted to the hospitalist service for further management. He did not have good UOP with bolus dosing of IV Bumex and was started on Bumex gtt. He has had net negative 13.5 L output.  He has since been transitioned back to PO Bumex 1 mg BID.    Case management was consulted due to pt difficulty caring for himself at home. PT/OT ordered. Pt has not been consistently working with therapy throughout his hospitalization.  SNF referrals were sent, but pt was declined from multiple facilities due to concerns with the level of care he requires and his inconsistency when working with physical therapy.  Pt appears to be back to his baseline mobility as he typically only stands to transfer from bed/chair to his motorized scooter at home. There have been times here where he has walked up to 8 steps. Even if a facility were to accept him, he wouldn't get insurance approval at this point due to being at his baseline functional status.    Attempted to discharge pt

## 2025-06-10 NOTE — PLAN OF CARE
Problem: Chronic Conditions and Co-morbidities  Goal: Patient's chronic conditions and co-morbidity symptoms are monitored and maintained or improved  6/10/2025 1115 by Fely Randle LPN  Outcome: Progressing  Flowsheets (Taken 6/10/2025 0941)  Care Plan - Patient's Chronic Conditions and Co-Morbidity Symptoms are Monitored and Maintained or Improved:   Monitor and assess patient's chronic conditions and comorbid symptoms for stability, deterioration, or improvement   Collaborate with multidisciplinary team to address chronic and comorbid conditions and prevent exacerbation or deterioration   Update acute care plan with appropriate goals if chronic or comorbid symptoms are exacerbated and prevent overall improvement and discharge  6/10/2025 0029 by Jose Urban RN  Outcome: Progressing     Problem: Discharge Planning  Goal: Discharge to home or other facility with appropriate resources  6/10/2025 1115 by Fely Randle LPN  Outcome: Progressing  Flowsheets (Taken 6/10/2025 0941)  Discharge to home or other facility with appropriate resources:   Identify barriers to discharge with patient and caregiver   Arrange for needed discharge resources and transportation as appropriate   Identify discharge learning needs (meds, wound care, etc)   Arrange for interpreters to assist at discharge as needed   Refer to discharge planning if patient needs post-hospital services based on physician order or complex needs related to functional status, cognitive ability or social support system  6/10/2025 0029 by Jose Urban, RN  Outcome: Progressing     Problem: Pain  Goal: Verbalizes/displays adequate comfort level or baseline comfort level  6/10/2025 1115 by Fely Randle LPN  Outcome: Progressing  6/10/2025 0029 by Jose Urban, RN  Outcome: Progressing     Problem: Safety - Adult  Goal: Free from fall injury  6/10/2025 1115 by Fely Randle LPN  Outcome:

## 2025-06-10 NOTE — PROGRESS NOTES
CRITICAL CARE PROGRESS NOTE    Name: Nida Graves   : 1977   MRN: [id number]   Date: 2025      Diagnoses/problem list:   Acute hypoxic and hypercapnic respiratory failure  Acute kidney injury  Atrial flutter with slow ventricular response  Complete heart block  GI bleed  Nosebleed, resolved  NSTEMI  Symptomatic bradycardia  Biventricular failure  Acute HFrEF, 25 to 30%  Diabetes  Morbid obesity  Acute metabolic/septic encephalopathy    24-hour events:   Mucus plugged overnight needing BVM. Mucolytic regimen increased. Now back on ventilator. Fio2 weaned back down. Otherwise increased FWF to also help thin secretions.  Otherwise, she has been stable for and is awaiting acceptance and discharge to rehab; care coordination working on approval.    Assessment and plan:   Ms. Graves is a 47-year-old female with PMH chronic debility and bedbound status, diabetes, asthma, obesity, who presented for shortness of breath.  Shortness of breath worsening over the past few days.  Upon presentation ED patient found to be hypoxic necessita.  Ting O2 via NC.  She was also found to be in A-fib with slow ventricular response.  She was transferred out of ICU on . Early morning  upgraded to ICU again due to severe hypoxia and hypercapnia requiring intubation. Now s/p trach on . Leadless pacemaker . PEG     NEUROLOGICAL:    Continue Bupropion and Venlafaxine   Delirium precautions  PT/OT  Recommend day-night adherence and encourage more mobility    PULMONOLOGY:   Extubated on , reintubated on  due to inability to protect airway  ENT placed trach on   Continue to wean FiO2 as tolerated  Pressure support and trach collar trials as tolerated  Daily SBT with multiple failures due to apnea   Recurrent mucus plugging, increasing mucolytic regimen to mucomyst q6h  Continue nebs, budesonide q12h, duonebs q6h    CARDIOVASCULAR:   Midodrine to keep MAP above 65   S/p AUGUSTIN/DCCV on   LVEF 20-30%,  Nutrition Assessment     Type and Reason for Visit: Reassess    Nutrition Recommendations/Plan:   Continue current POC     Malnutrition Assessment:  Malnutrition Status: At risk for malnutrition    Nutrition Assessment:  PO intake remains variable with intake ranging 25-50%.  Biggest c/o for decreased po intake---the food has no taste.  Upon futher questioning, pt does use salt.  Pt says not much, but with further questioning uses more than he realizes.  Explained about Herb seasoning on tray--will try but wants salt.  Weight has increased 11# in the past week--edema is now +1 generalized and nonpitting BLE edema.    Estimated Daily Nutrient Needs:  Energy (kcal):  3865-6392 kcals (8-11 kcals/kg) Weight Used for Energy Requirements: Current     Protein (g):  145g Weight Used for Protein Requirements: Ideal        Fluid (ml/day):  2233-0678 ml Method Used for Fluid Requirements: 1 ml/kcal    Nutrition Related Findings:   +1 generalizd edema.,  nonpitting BLE edema Wound Type: Venous Stasis, Pressure Injury (lacerations)    Current Nutrition Therapies:    ADULT DIET; Regular; 3 carb choices (45 gm/meal); Low Fat/Low Chol/High Fiber/2 gm Na; 1500 ml    Anthropometric Measures:  Height: 175.3 cm (5' 9.02\")  Current Body Wt: 208.2 kg (459 lb)   BMI: 67.8        Nutrition Diagnosis:   Food & nutrition-related knowledge deficit, Limited adherence to nutrition-related recommendations related to cardiac dysfunction, endocrine dysfunction as evidenced by lab values, localized or generalized fluid accumulation, intake 26-50%    Nutrition Interventions:   Food and/or Nutrient Delivery: Continue Current Diet  Nutrition Education/Counseling: Survival skills/brief education completed  Coordination of Nutrition Care: Continue to monitor while inpatient  Plan of Care discussed with: pt    Goals:  Goals: Meet at least 75% of estimated needs, PO intake 50% or greater, Maintain adequate nutrition status  Type of Goal: Continue current  right ventricle overload, moderate TR   GDMT for HFrEF as tolerated  Diuresis as needed  LHC this admission: nonobstructive CAD; luminal RCA, Lcx; 40% mLAD   Continue ASA and Statin   Cardiology recs noted   Leadless pacemaker 2/12    GASTROINTESTINAL:   Dark stools on 1/22 w/ FOBT positive. However CTA abdomen/pelvis without evidence of bleeding. Prior anemia responded to PRBCs. No recurrence of dark stools since restarting eliquis  Bowel regimen  Continue tube feeds as tolerated via PEG; increased FWF    RENAL/ELECTROLYTE:   KIZZY d/t ATN from shock that has since improved.  S/p intermittent diuretics (last 2/14); will assess need daily and administer as indicated   Of note, multiple fallopian/uterine cysts making bladder scan difficult for interpretation    ENDOCRINE:   Insulin sliding scale  Keep blood glucose 140-180  Discontinued lantus    HEMATOLOGY/ONCOLOGY:   Eliquis for A-fib temporarily held due to drop in hemoglobin on 2/10 which improved s/p 2 unit PRBC; no evidence of bleeding and eliquis since restarted (2/17)  No recurrence of dark stools since restarting eliquis    ID/MICRO:   Initially completed a course of Vanco and Zosyn for CAP  Completed 7 day course of Cefepime on 1/27 for possible HAP  MRSA PCR negative on 1/28    ICU DAILY CHECKLIST     Code Status: DNR  DVT Prophylaxis: SCDs, eliquis  SUP: Pepcid  T/L/D: PIV  Diet: Tube feeds  Activity Level: Bedrest  ABCDEF Bundle/Checklist Completed: Yes  Disposition: Stay in ICU  Multidisciplinary Rounds Completed:  Yes    OBJECTIVE:     Blood pressure 104/60, pulse 67, temperature 98.6 °F (37 °C), temperature source Oral, resp. rate 20, height 1.702 m (5' 7.01\"), weight (!) 162.3 kg (357 lb 12.9 oz), SpO2 98%.  Physical Exam  Gen: On MV via trach  HEENT: NC/AT, supple  Cardiac: Paced at 60bpm  Pulm: ronchus bilaterally, but no respiratory distress  ABD: Soft, mildly distended, non tender, PEG tube in place  Extremities: Mild LE edema noted. Right groin

## 2025-06-10 NOTE — CARE COORDINATION
Called and spoke with Alyson at [facility].  The ambulance transport for today was cancelled.  Electronically signed by Shira Tomlin RN on 6/10/2025 at 8:15 AM

## 2025-06-11 LAB
ADV 40+41 DNA STL QL NAA+NON-PROBE: NOT DETECTED
ANION GAP SERPL CALCULATED.3IONS-SCNC: 13 MMOL/L (ref 8–16)
BUN SERPL-MCNC: 69 MG/DL (ref 8–23)
C CAYETANENSIS DNA STL QL NAA+NON-PROBE: NOT DETECTED
C COLI+JEJ+UPSA DNA STL QL NAA+NON-PROBE: NOT DETECTED
CALCIUM SERPL-MCNC: 9 MG/DL (ref 8.8–10.2)
CHLORIDE SERPL-SCNC: 96 MMOL/L (ref 98–107)
CO2 SERPL-SCNC: 27 MMOL/L (ref 22–29)
CREAT SERPL-MCNC: 3.3 MG/DL (ref 0.7–1.2)
CRYPTOSP DNA STL QL NAA+NON-PROBE: NOT DETECTED
E HISTOLYT DNA STL QL NAA+NON-PROBE: NOT DETECTED
EAEC PAA PLAS AGGR+AATA ST NAA+NON-PRB: NOT DETECTED
EC STX1+STX2 GENES STL QL NAA+NON-PROBE: NOT DETECTED
EPEC EAE GENE STL QL NAA+NON-PROBE: NOT DETECTED
ETEC LTA+ST1A+ST1B TOX ST NAA+NON-PROBE: NOT DETECTED
G LAMBLIA DNA STL QL NAA+NON-PROBE: NOT DETECTED
GI PATH DNA+RNA PNL STL NAA+NON-PROBE: NOT DETECTED
GLUCOSE BLD-MCNC: 116 MG/DL (ref 70–99)
GLUCOSE BLD-MCNC: 131 MG/DL (ref 70–99)
GLUCOSE BLD-MCNC: 81 MG/DL (ref 70–99)
GLUCOSE BLD-MCNC: 86 MG/DL (ref 70–99)
GLUCOSE SERPL-MCNC: 125 MG/DL (ref 70–99)
NOROVIRUS GI+II RNA STL QL NAA+NON-PROBE: NOT DETECTED
P SHIGELLOIDES DNA STL QL NAA+NON-PROBE: NOT DETECTED
PERFORMED ON: ABNORMAL
PERFORMED ON: ABNORMAL
PERFORMED ON: NORMAL
PERFORMED ON: NORMAL
POTASSIUM SERPL-SCNC: 3.9 MMOL/L (ref 3.5–5)
RVA RNA STL QL NAA+NON-PROBE: NOT DETECTED
S ENT+BONG DNA STL QL NAA+NON-PROBE: NOT DETECTED
SAPO I+II+IV+V RNA STL QL NAA+NON-PROBE: NOT DETECTED
SHIGELLA SP+EIEC IPAH ST NAA+NON-PROBE: NOT DETECTED
SODIUM SERPL-SCNC: 136 MMOL/L (ref 136–145)
V CHOL+PARA+VUL DNA STL QL NAA+NON-PROBE: NOT DETECTED
V CHOLERAE DNA STL QL NAA+NON-PROBE: NOT DETECTED
Y ENTEROCOL DNA STL QL NAA+NON-PROBE: NOT DETECTED

## 2025-06-11 PROCEDURE — 6370000000 HC RX 637 (ALT 250 FOR IP)

## 2025-06-11 PROCEDURE — 6360000002 HC RX W HCPCS: Performed by: NURSE PRACTITIONER

## 2025-06-11 PROCEDURE — 2700000000 HC OXYGEN THERAPY PER DAY

## 2025-06-11 PROCEDURE — 6360000002 HC RX W HCPCS

## 2025-06-11 PROCEDURE — 6370000000 HC RX 637 (ALT 250 FOR IP): Performed by: NURSE PRACTITIONER

## 2025-06-11 PROCEDURE — 94150 VITAL CAPACITY TEST: CPT

## 2025-06-11 PROCEDURE — 94640 AIRWAY INHALATION TREATMENT: CPT

## 2025-06-11 PROCEDURE — 82962 GLUCOSE BLOOD TEST: CPT

## 2025-06-11 PROCEDURE — 6370000000 HC RX 637 (ALT 250 FOR IP): Performed by: HOSPITALIST

## 2025-06-11 PROCEDURE — 1200000000 HC SEMI PRIVATE

## 2025-06-11 PROCEDURE — 36415 COLL VENOUS BLD VENIPUNCTURE: CPT

## 2025-06-11 PROCEDURE — 87507 IADNA-DNA/RNA PROBE TQ 12-25: CPT

## 2025-06-11 PROCEDURE — 80048 BASIC METABOLIC PNL TOTAL CA: CPT

## 2025-06-11 RX ORDER — LOPERAMIDE HYDROCHLORIDE 2 MG/1
2 CAPSULE ORAL ONCE
Status: COMPLETED | OUTPATIENT
Start: 2025-06-11 | End: 2025-06-11

## 2025-06-11 RX ORDER — INSULIN LISPRO 100 [IU]/ML
0-4 INJECTION, SOLUTION INTRAVENOUS; SUBCUTANEOUS
Status: DISCONTINUED | OUTPATIENT
Start: 2025-06-11 | End: 2025-07-26 | Stop reason: HOSPADM

## 2025-06-11 RX ADMIN — GUAIFENESIN 600 MG: 600 TABLET, EXTENDED RELEASE ORAL at 09:31

## 2025-06-11 RX ADMIN — METOPROLOL SUCCINATE 25 MG: 25 TABLET, FILM COATED, EXTENDED RELEASE ORAL at 21:02

## 2025-06-11 RX ADMIN — GUAIFENESIN 600 MG: 600 TABLET, EXTENDED RELEASE ORAL at 21:02

## 2025-06-11 RX ADMIN — MONTELUKAST SODIUM 10 MG: 10 TABLET, FILM COATED ORAL at 21:02

## 2025-06-11 RX ADMIN — IPRATROPIUM BROMIDE 0.5 MG: 0.5 SOLUTION RESPIRATORY (INHALATION) at 09:55

## 2025-06-11 RX ADMIN — MICONAZOLE NITRATE: 2 POWDER TOPICAL at 21:03

## 2025-06-11 RX ADMIN — PANTOPRAZOLE SODIUM 40 MG: 40 TABLET, DELAYED RELEASE ORAL at 09:35

## 2025-06-11 RX ADMIN — PRAVASTATIN SODIUM 20 MG: 20 TABLET ORAL at 21:02

## 2025-06-11 RX ADMIN — INSULIN GLARGINE 30 UNITS: 100 INJECTION, SOLUTION SUBCUTANEOUS at 21:02

## 2025-06-11 RX ADMIN — IPRATROPIUM BROMIDE 0.5 MG: 0.5 SOLUTION RESPIRATORY (INHALATION) at 14:34

## 2025-06-11 RX ADMIN — CETIRIZINE HYDROCHLORIDE 10 MG: 10 TABLET ORAL at 09:31

## 2025-06-11 RX ADMIN — ALLOPURINOL 300 MG: 300 TABLET ORAL at 09:31

## 2025-06-11 RX ADMIN — POTASSIUM CHLORIDE 20 MEQ: 1500 TABLET, EXTENDED RELEASE ORAL at 09:31

## 2025-06-11 RX ADMIN — MICONAZOLE NITRATE: 2 POWDER TOPICAL at 09:32

## 2025-06-11 RX ADMIN — ENOXAPARIN SODIUM 60 MG: 100 INJECTION SUBCUTANEOUS at 09:31

## 2025-06-11 RX ADMIN — IPRATROPIUM BROMIDE 0.5 MG: 0.5 SOLUTION RESPIRATORY (INHALATION) at 06:17

## 2025-06-11 RX ADMIN — METOPROLOL SUCCINATE 25 MG: 25 TABLET, FILM COATED, EXTENDED RELEASE ORAL at 09:31

## 2025-06-11 RX ADMIN — ENOXAPARIN SODIUM 60 MG: 100 INJECTION SUBCUTANEOUS at 21:02

## 2025-06-11 RX ADMIN — LOPERAMIDE HYDROCHLORIDE 2 MG: 2 CAPSULE ORAL at 21:26

## 2025-06-11 RX ADMIN — ASPIRIN 81 MG: 81 TABLET, COATED ORAL at 09:31

## 2025-06-11 RX ADMIN — IPRATROPIUM BROMIDE 0.5 MG: 0.5 SOLUTION RESPIRATORY (INHALATION) at 18:47

## 2025-06-11 NOTE — CONSULTS
Nephrology ([facility]) Consult Note      Patient:  Daljit Elizondo  YOB: 1960  Date of Service: 6/11/2025  MRN: [id number]   Acct: [id number]   Primary Care Physician: Jessie Helms APRN - CNP  Advance Directive: Full Code  Admit Date: 5/14/2025       Hospital Day: 28  Referring Provider: Cody Howell MD    Patient independently seen and examined, Chart, Consults, Notes, Operative notes, Labs, Cardiology, and Radiology studies reviewed as available.        Subjective:  Daljit Elizondo is a 65 y.o. male for whom we were consulted for evaluation and treatment of acute kidney injury.  We are consulted on hospital day 28 for evaluation of acute kidney injury.  Patient was a poor historian but recalled no prior nephrologic evaluations or problems.  He appears to have had another kidney injury back in 2011 by our hospital records.  More recently for the first 24 days or so with his hospitalization his renal function had been fairly stable but increased on 6/9 after previously at baseline on 6/7.  It has remained there for the next 2 days and we were consulted on 6/11 for further evaluation.  Chart review demonstrates a 64 kg weight loss since admission (but still had a weight of 458 pounds on consult) with only approximately 12 L net negative.  He had been on a Bumex infusion and did better with that than the IV bolus dosing.  He was then transitioned to oral Bumex dosing.  He denies specific complaints upon questioning.  Denied current chest pain, shortness of air at rest, nausea vomiting, dysuria hematuria.  Denied rash or hemoptysis as well.    Allergies:  Penicillamine, Silvadene [silver sulfadiazine], Aerohist [chlorpheniramine-methscop er], Cephalosporins, Hemp seed oil, Neosporin [neomycin-polymyxin-gramicidin], and Penicillins    Medicines:  Current Facility-Administered Medications   Medication Dose Route Frequency Provider Last Rate Last Admin    0.9 % sodium    CALCIUM 9.6 8.8 9.0     CBC: No results for input(s): \"WBC\", \"HGB\", \"HCT\", \"MCV\", \"PLT\" in the last 72 hours.  LIVER PROFILE: No results for input(s): \"AST\", \"ALT\", \"LIPASE\", \"AMYLASE\", \"BILIDIR\", \"BILITOT\", \"ALKPHOS\" in the last 72 hours.    Invalid input(s): \"ALB\"  PT/INR: No results for input(s): \"PROTIME\", \"INR\" in the last 72 hours.  APTT: No results for input(s): \"APTT\" in the last 72 hours.  BNP:  No results for input(s): \"BNP\" in the last 72 hours.  Ionized Calcium:Invalid input(s): \"IONCA\"  Magnesium:No results for input(s): \"MG\" in the last 72 hours.  Phosphorus:No results for input(s): \"PHOS\" in the last 72 hours.  HgbA1C: No results for input(s): \"LABA1C\" in the last 72 hours.  Hepatic: No results for input(s): \"ALKPHOS\", \"ALT\", \"AST\", \"BILITOT\", \"BILIDIR\", \"LABALBU\" in the last 72 hours.    Invalid input(s): \"PROT\"  Lactic Acid: No results for input(s): \"LACTA\" in the last 72 hours.  Troponin:   Recent Labs     06/10/25  0448   CKTOTAL 8*     ABGs: No results for input(s): \"PH\", \"PCO2\", \"PO2\", \"HCO3\", \"O2SAT\" in the last 72 hours.  CRP:  No results for input(s): \"CRP\" in the last 72 hours.  Sed Rate:  No results for input(s): \"SEDRATE\" in the last 72 hours.      Cultures:   No results for input(s): \"CULTURE\" in the last 72 hours.  No results for input(s): \"BC\", \"BLOODCULT2\" in the last 72 hours.  No results for input(s): \"CXSURG\" in the last 72 hours.    Radiology reports as per the Radiologist  Radiology: Echo (TTE) complete (PRN contrast/bubble/strain/3D)  Result Date: 5/15/2025  Technically difficult study due to patient's body habitus. Nondiagnostic study due to poor image quality.  Suggest alternative cardiac imaging including RISSA or cardiac CT, if clinically indicated.     XR CHEST PORTABLE  Result Date: 5/14/2025  EXAM: XR CHEST PORTABLE  TECHNIQUE: Single frontal chest radiograph.  HISTORY: dyspnea  COMPARISON: None.  FINDINGS: Evaluation is limited by patient body habitus, portable technique,

## 2025-06-11 NOTE — PLAN OF CARE
consumed   Identify factors contributing to decreased intake, treat as appropriate   Assist with meals as needed   Obtain nutritional consult as needed   Monitor intake and output, weight and lab values     Problem: Genitourinary - Adult  Goal: Absence of urinary retention  6/10/2025 2259 by Brigido Garcia RN  Outcome: Progressing  6/10/2025 1115 by Fely Randle LPN  Outcome: Progressing  Flowsheets (Taken 6/10/2025 0941)  Absence of urinary retention:   Assess patient’s ability to void and empty bladder   Monitor intake/output and perform bladder scan as needed   Place urinary catheter per Licensed Independent Practitioner order if needed   Discuss with Licensed Independent Practitioner  medications to alleviate retention as needed   Discuss catheterization for long term situations as appropriate     Problem: Infection - Adult  Goal: Absence of infection at discharge  6/10/2025 2259 by Brigido Garcia RN  Outcome: Progressing  6/10/2025 1115 by Fely Randle LPN  Outcome: Progressing  Flowsheets (Taken 6/10/2025 0941)  Absence of infection at discharge:   Assess and monitor for signs and symptoms of infection   Monitor lab/diagnostic results   Monitor all insertion sites i.e., indwelling lines, tubes and drains   Monitor endotracheal (as able) and nasal secretions for changes in amount and color   Administer medications as ordered   Instruct and encourage patient and family to use good hand hygiene technique   Identify and instruct in appropriate isolation precautions for identified infection/condition   [place] appropriate cooling/warming therapies per order  Goal: Absence of infection during hospitalization  6/10/2025 2259 by Brigido Garcia RN  Outcome: Progressing  6/10/2025 1115 by Fely Randle LPN  Outcome: Progressing  Flowsheets (Taken 6/10/2025 0941)  Absence of infection during hospitalization:   Assess and monitor for signs and symptoms of infection   Monitor  lab/diagnostic results   Monitor all insertion sites i.e., indwelling lines, tubes and drains   Monitor endotracheal (as able) and nasal secretions for changes in amount and color   Administer medications as ordered   [place] appropriate cooling/warming therapies per order   Instruct and encourage patient and family to use good hand hygiene technique   Identify and instruct in appropriate isolation precautions for identified infection/condition  Goal: Absence of fever/infection during anticipated neutropenic period  6/10/2025 2259 by Brigido Garcia RN  Outcome: Progressing  6/10/2025 1115 by Fely Randle LPN  Outcome: Progressing  Flowsheets (Taken 6/10/2025 0941)  Absence of fever/infection during anticipated neutropenic period:   Monitor white blood cell count   Administer growth factors as ordered   Implement neutropenic guidelines     Problem: Metabolic/Fluid and Electrolytes - Adult  Goal: Electrolytes maintained within normal limits  6/10/2025 2259 by Brigido Garcia RN  Outcome: Progressing  6/10/2025 1115 by Fely Randle LPN  Outcome: Progressing  Flowsheets (Taken 6/10/2025 0941)  Electrolytes maintained within normal limits:   Monitor labs and assess patient for signs and symptoms of electrolyte imbalances   Monitor response to electrolyte replacements, including repeat lab results as appropriate   Fluid restriction as ordered   Administer electrolyte replacement as ordered   Instruct patient on fluid and nutrition restrictions as appropriate  Goal: Hemodynamic stability and optimal renal function maintained  6/10/2025 2259 by Brigido Garcia RN  Outcome: Progressing  6/10/2025 1115 by Fely Randle LPN  Outcome: Progressing  Flowsheets (Taken 6/10/2025 0941)  Hemodynamic stability and optimal renal function maintained:   Monitor labs and assess for signs and symptoms of volume excess or deficit   Monitor urine specific gravity, serum osmolarity and serum sodium

## 2025-06-11 NOTE — PLAN OF CARE
Problem: Chronic Conditions and Co-morbidities  Goal: Patient's chronic conditions and co-morbidity symptoms are monitored and maintained or improved  6/11/2025 1058 by Fely Randle LPN  Outcome: Progressing  Flowsheets (Taken 6/11/2025 0931)  Care Plan - Patient's Chronic Conditions and Co-Morbidity Symptoms are Monitored and Maintained or Improved:   Monitor and assess patient's chronic conditions and comorbid symptoms for stability, deterioration, or improvement   Collaborate with multidisciplinary team to address chronic and comorbid conditions and prevent exacerbation or deterioration   Update acute care plan with appropriate goals if chronic or comorbid symptoms are exacerbated and prevent overall improvement and discharge  6/10/2025 2259 by Brigido Garcia RN  Outcome: Progressing     Problem: Discharge Planning  Goal: Discharge to home or other facility with appropriate resources  6/11/2025 1058 by Fely Randle LPN  Outcome: Progressing  Flowsheets (Taken 6/11/2025 0931)  Discharge to home or other facility with appropriate resources:   Identify barriers to discharge with patient and caregiver   Arrange for needed discharge resources and transportation as appropriate   Identify discharge learning needs (meds, wound care, etc)   Refer to discharge planning if patient needs post-hospital services based on physician order or complex needs related to functional status, cognitive ability or social support system   Arrange for interpreters to assist at discharge as needed  6/10/2025 2259 by Brigido Garcia, RN  Outcome: Progressing     Problem: Pain  Goal: Verbalizes/displays adequate comfort level or baseline comfort level  6/11/2025 1058 by Fely Randle LPN  Outcome: Progressing  6/10/2025 2259 by Brigido Garcia, RN  Outcome: Progressing     Problem: Safety - Adult  Goal: Free from fall injury  6/11/2025 1058 by Fely Randle LPN  Outcome:  decreased intake, treat as appropriate   Assist with meals as needed   Obtain nutritional consult as needed   Monitor intake and output, weight and lab values  6/10/2025 2259 by Brigido Garcia RN  Outcome: Progressing     Problem: Genitourinary - Adult  Goal: Absence of urinary retention  6/11/2025 1058 by Fely Randle LPN  Outcome: Progressing  Flowsheets (Taken 6/11/2025 0931)  Absence of urinary retention:   Monitor intake/output and perform bladder scan as needed   Assess patient’s ability to void and empty bladder   Place urinary catheter per Licensed Independent Practitioner order if needed   Discuss with Licensed Independent Practitioner  medications to alleviate retention as needed   Discuss catheterization for long term situations as appropriate  6/10/2025 2259 by Brigido Garcia RN  Outcome: Progressing     Problem: Infection - Adult  Goal: Absence of infection at discharge  6/11/2025 1058 by Fely Randle LPN  Outcome: Progressing  Flowsheets (Taken 6/11/2025 0931)  Absence of infection at discharge:   Assess and monitor for signs and symptoms of infection   Monitor lab/diagnostic results   Monitor all insertion sites i.e., indwelling lines, tubes and drains   Monitor endotracheal (as able) and nasal secretions for changes in amount and color   [place] appropriate cooling/warming therapies per order   Administer medications as ordered   Instruct and encourage patient and family to use good hand hygiene technique   Identify and instruct in appropriate isolation precautions for identified infection/condition  6/10/2025 2259 by Brigido Garcia RN  Outcome: Progressing  Goal: Absence of infection during hospitalization  6/11/2025 1058 by Fely Randle LPN  Outcome: Progressing  Flowsheets (Taken 6/11/2025 0931)  Absence of infection during hospitalization:   Assess and monitor for signs and symptoms of infection   Monitor lab/diagnostic results   Monitor endotracheal

## 2025-06-12 LAB
ANION GAP SERPL CALCULATED.3IONS-SCNC: 12 MMOL/L (ref 8–16)
BASOPHILS # BLD: 0.1 K/UL (ref 0–0.2)
BASOPHILS NFR BLD: 0.7 % (ref 0–1)
BUN SERPL-MCNC: 71 MG/DL (ref 8–23)
CALCIUM SERPL-MCNC: 9 MG/DL (ref 8.8–10.2)
CHLORIDE SERPL-SCNC: 99 MMOL/L (ref 98–107)
CO2 SERPL-SCNC: 28 MMOL/L (ref 22–29)
CREAT SERPL-MCNC: 3.4 MG/DL (ref 0.7–1.2)
EOSINOPHIL # BLD: 0.4 K/UL (ref 0–0.6)
EOSINOPHIL NFR BLD: 4.3 % (ref 0–5)
ERYTHROCYTE [DISTWIDTH] IN BLOOD BY AUTOMATED COUNT: 14.8 % (ref 11.5–14.5)
GLUCOSE BLD-MCNC: 115 MG/DL (ref 70–99)
GLUCOSE BLD-MCNC: 135 MG/DL (ref 70–99)
GLUCOSE BLD-MCNC: 172 MG/DL (ref 70–99)
GLUCOSE BLD-MCNC: 91 MG/DL (ref 70–99)
GLUCOSE SERPL-MCNC: 96 MG/DL (ref 70–99)
HCT VFR BLD AUTO: 43.2 % (ref 42–52)
HGB BLD-MCNC: 13.8 G/DL (ref 14–18)
IMM GRANULOCYTES # BLD: 0 K/UL
LYMPHOCYTES # BLD: 1 K/UL (ref 1.1–4.5)
LYMPHOCYTES NFR BLD: 11.9 % (ref 20–40)
MCH RBC QN AUTO: 29.9 PG (ref 27–31)
MCHC RBC AUTO-ENTMCNC: 31.9 G/DL (ref 33–37)
MCV RBC AUTO: 93.7 FL (ref 80–94)
MONOCYTES # BLD: 1 K/UL (ref 0–0.9)
MONOCYTES NFR BLD: 11.9 % (ref 0–10)
NEUTROPHILS # BLD: 5.7 K/UL (ref 1.5–7.5)
NEUTS SEG NFR BLD: 70.7 % (ref 50–65)
PERFORMED ON: ABNORMAL
PERFORMED ON: NORMAL
PLATELET # BLD AUTO: 151 K/UL (ref 130–400)
PMV BLD AUTO: 13.3 FL (ref 9.4–12.4)
POTASSIUM SERPL-SCNC: 3.9 MMOL/L (ref 3.5–5)
RBC # BLD AUTO: 4.61 M/UL (ref 4.7–6.1)
SODIUM SERPL-SCNC: 139 MMOL/L (ref 136–145)
WBC # BLD AUTO: 8.1 K/UL (ref 4.8–10.8)

## 2025-06-12 PROCEDURE — 2500000003 HC RX 250 WO HCPCS

## 2025-06-12 PROCEDURE — 82962 GLUCOSE BLOOD TEST: CPT

## 2025-06-12 PROCEDURE — 6360000002 HC RX W HCPCS: Performed by: NURSE PRACTITIONER

## 2025-06-12 PROCEDURE — 6370000000 HC RX 637 (ALT 250 FOR IP): Performed by: NURSE PRACTITIONER

## 2025-06-12 PROCEDURE — 2700000000 HC OXYGEN THERAPY PER DAY

## 2025-06-12 PROCEDURE — 94760 N-INVAS EAR/PLS OXIMETRY 1: CPT

## 2025-06-12 PROCEDURE — 1200000000 HC SEMI PRIVATE

## 2025-06-12 PROCEDURE — 6370000000 HC RX 637 (ALT 250 FOR IP)

## 2025-06-12 PROCEDURE — 36415 COLL VENOUS BLD VENIPUNCTURE: CPT

## 2025-06-12 PROCEDURE — 85025 COMPLETE CBC W/AUTO DIFF WBC: CPT

## 2025-06-12 PROCEDURE — 94640 AIRWAY INHALATION TREATMENT: CPT

## 2025-06-12 PROCEDURE — 80048 BASIC METABOLIC PNL TOTAL CA: CPT

## 2025-06-12 PROCEDURE — 6360000002 HC RX W HCPCS

## 2025-06-12 PROCEDURE — 6370000000 HC RX 637 (ALT 250 FOR IP): Performed by: HOSPITALIST

## 2025-06-12 PROCEDURE — 94150 VITAL CAPACITY TEST: CPT

## 2025-06-12 RX ORDER — LOPERAMIDE HYDROCHLORIDE 2 MG/1
2 CAPSULE ORAL 4 TIMES DAILY PRN
Status: DISCONTINUED | OUTPATIENT
Start: 2025-06-12 | End: 2025-07-26 | Stop reason: HOSPADM

## 2025-06-12 RX ORDER — LOPERAMIDE HYDROCHLORIDE 2 MG/1
2 CAPSULE ORAL ONCE
Status: COMPLETED | OUTPATIENT
Start: 2025-06-12 | End: 2025-06-12

## 2025-06-12 RX ADMIN — POTASSIUM CHLORIDE 20 MEQ: 1500 TABLET, EXTENDED RELEASE ORAL at 08:14

## 2025-06-12 RX ADMIN — IPRATROPIUM BROMIDE 0.5 MG: 0.5 SOLUTION RESPIRATORY (INHALATION) at 07:45

## 2025-06-12 RX ADMIN — ASPIRIN 81 MG: 81 TABLET, COATED ORAL at 08:14

## 2025-06-12 RX ADMIN — ENOXAPARIN SODIUM 60 MG: 100 INJECTION SUBCUTANEOUS at 08:14

## 2025-06-12 RX ADMIN — GUAIFENESIN 600 MG: 600 TABLET, EXTENDED RELEASE ORAL at 08:15

## 2025-06-12 RX ADMIN — IPRATROPIUM BROMIDE 0.5 MG: 0.5 SOLUTION RESPIRATORY (INHALATION) at 10:54

## 2025-06-12 RX ADMIN — IPRATROPIUM BROMIDE 0.5 MG: 0.5 SOLUTION RESPIRATORY (INHALATION) at 18:41

## 2025-06-12 RX ADMIN — ENOXAPARIN SODIUM 60 MG: 100 INJECTION SUBCUTANEOUS at 21:59

## 2025-06-12 RX ADMIN — GUAIFENESIN 600 MG: 600 TABLET, EXTENDED RELEASE ORAL at 21:59

## 2025-06-12 RX ADMIN — MONTELUKAST SODIUM 10 MG: 10 TABLET, FILM COATED ORAL at 21:59

## 2025-06-12 RX ADMIN — PANTOPRAZOLE SODIUM 40 MG: 40 TABLET, DELAYED RELEASE ORAL at 08:15

## 2025-06-12 RX ADMIN — METOPROLOL SUCCINATE 25 MG: 25 TABLET, FILM COATED, EXTENDED RELEASE ORAL at 21:59

## 2025-06-12 RX ADMIN — LOPERAMIDE HYDROCHLORIDE 2 MG: 2 CAPSULE ORAL at 04:45

## 2025-06-12 RX ADMIN — CETIRIZINE HYDROCHLORIDE 10 MG: 10 TABLET ORAL at 08:15

## 2025-06-12 RX ADMIN — SODIUM CHLORIDE, PRESERVATIVE FREE 10 ML: 5 INJECTION INTRAVENOUS at 22:00

## 2025-06-12 RX ADMIN — MICONAZOLE NITRATE: 2 POWDER TOPICAL at 08:16

## 2025-06-12 RX ADMIN — PRAVASTATIN SODIUM 20 MG: 20 TABLET ORAL at 21:59

## 2025-06-12 RX ADMIN — METOPROLOL SUCCINATE 25 MG: 25 TABLET, FILM COATED, EXTENDED RELEASE ORAL at 08:15

## 2025-06-12 RX ADMIN — IPRATROPIUM BROMIDE 0.5 MG: 0.5 SOLUTION RESPIRATORY (INHALATION) at 14:51

## 2025-06-12 RX ADMIN — MICONAZOLE NITRATE: 2 POWDER TOPICAL at 22:01

## 2025-06-12 RX ADMIN — ALLOPURINOL 300 MG: 300 TABLET ORAL at 08:15

## 2025-06-12 RX ADMIN — INSULIN GLARGINE 30 UNITS: 100 INJECTION, SOLUTION SUBCUTANEOUS at 21:59

## 2025-06-12 NOTE — PROGRESS NOTES
Nephrology ([facility]) Consult Note      Patient:  Daljit Elizondo  YOB: 1960  Date of Service: 6/12/2025  MRN: [id number]   Acct: [id number]   Primary Care Physician: Jessie Helms APRN - CNP  Advance Directive: Full Code  Admit Date: 5/14/2025       Hospital Day: 29  Referring Provider: Cody Howell MD    Patient independently seen and examined, Chart, Consults, Notes, Operative notes, Labs, Cardiology, and Radiology studies reviewed as available.        Subjective:  Daljit Elizondo is a 65 y.o. male for whom we were consulted for evaluation and treatment of acute kidney injury.  We are consulted on hospital day 28 for evaluation of acute kidney injury.  Patient was a poor historian but recalled no prior nephrologic evaluations or problems.  He appears to have had another kidney injury back in 2011 by our hospital records.  More recently for the first 24 days or so with his hospitalization his renal function had been fairly stable but increased on 6/9 after previously at baseline on 6/7.  It has remained there for the next 2 days and we were consulted on 6/11 for further evaluation.  Chart review demonstrates a 64 kg weight loss since admission (but still had a weight of 458 pounds on consult) with only approximately 12 L net negative.  He had been on a Bumex infusion and did better with that than the IV bolus dosing.  He was then transitioned to oral Bumex dosing.  He denies specific complaints upon questioning.  Denied current chest pain, shortness of air at rest, nausea vomiting, dysuria hematuria.  Denied rash or hemoptysis as well.    Today, no overnight events.  He continues to note diarrhea.  Denied other new complaints upon questioning    Allergies:  Penicillamine, Silvadene [silver sulfadiazine], Aerohist [chlorpheniramine-methscop er], Cephalosporins, Hemp seed oil, Neosporin [neomycin-polymyxin-gramicidin], and Penicillins    Medicines:  Current

## 2025-06-12 NOTE — PROGRESS NOTES
[facility]    Patient:  Daljit Elizondo  YOB: 1960  Date of Service: 6/12/2025  MRN: [id number]   Acct: [id number]   Primary Care Physician: Jessie Helms APRN - CNP  Advance Directive: Full Code  Admit Date: 5/14/2025       Hospital Day: 29     NOTE: Portions of this note have been copied forward, however, changes made to reflect the most current clinical status of this patient.    CHIEF COMPLAINT:     Chief Complaint   Patient presents with    Shortness of Breath     Since Jan 1    Scrotal Pain       6/12/2025 8:48 AM  Subjective / Interval History:   06/12/2025  Patient seen and examined this a.m.   No new complaints.    Continues to have diarrhea, however improved.  No acute changes or acute overnight event reported.   Laying comfortably in bed in no apparent acute distress.    Denies any acute complaints or distress.    Endorses overall improvement.          06/11/2025  Patient seen and examined this a.m.   No new complaints.    Patient continues to endorse persistent watery diarrhea.    No acute changes or acute overnight event reported.   Laying comfortably in bed in no apparent acute distress.    Denies any acute complaints or distress.        Review of Systems:   Review of Systems  ROS: 14 point review of systems is negative except as specifically addressed above.    ADULT DIET; Regular; 3 carb choices (45 gm/meal); Low Fat/Low Chol/High Fiber/2 gm Na; 1500 ml    Intake/Output Summary (Last 24 hours) at 6/12/2025 0848  Last data filed at 6/12/2025 0343  Gross per 24 hour   Intake 240 ml   Output 850 ml   Net -610 ml       Medications:   sodium chloride 50 mL/hr at 06/10/25 1840    sodium chloride 5 mL/hr at 05/18/25 1459    dextrose       Current Facility-Administered Medications   Medication Dose Route Frequency Provider Last Rate Last Admin    insulin lispro (HUMALOG,ADMELOG) injection vial 0-4 Units  0-4 Units SubCUTAneous 4x Daily AC & HS Cody Howell MD   APRN - CNP   300 mg at 06/12/25 0815    aspirin EC tablet 81 mg  81 mg Oral Daily Renay Thorne APRN - CNP   81 mg at 06/12/25 0814    [Held by provider] losartan (COZAAR) tablet 50 mg  50 mg Oral Daily Renay Thorne APRN - CNP   50 mg at 05/17/25 0951    pantoprazole (PROTONIX) tablet 40 mg  40 mg Oral QAM AC Renay Thorne APRN - CNP   40 mg at 06/12/25 0815    pravastatin (PRAVACHOL) tablet 20 mg  20 mg Oral Nightly Renay Thorne APRN - CNP   20 mg at 06/11/25 2102    potassium chloride (KLOR-CON M) extended release tablet 20 mEq  20 mEq Oral Daily Renay Thorne APRN - CNP   20 mEq at 06/12/25 0814    sodium chloride flush 0.9 % injection 5-40 mL  5-40 mL IntraVENous 2 times per day Renay Thorne APRN - CNP   10 mL at 05/29/25 0953    sodium chloride flush 0.9 % injection 5-40 mL  5-40 mL IntraVENous PRN Renay Thorne APRN - CNP        0.9 % sodium chloride infusion   IntraVENous PRN Renay Thorne APRN - CNP 5 mL/hr at 05/18/25 1459 New Bag at 05/18/25 1459    ondansetron (ZOFRAN-ODT) disintegrating tablet 4 mg  4 mg Oral Q8H PRN Renay Thorne APRN - CNP   4 mg at 06/09/25 1754    Or    ondansetron (ZOFRAN) injection 4 mg  4 mg IntraVENous Q6H PRN Renay Thorne APRN - CNP        [Held by provider] polyethylene glycol (GLYCOLAX) packet 17 g  17 g Oral Daily PRN Renay Thorne APRN - CNP   17 g at 06/07/25 0618    acetaminophen (TYLENOL) tablet 650 mg  650 mg Oral Q6H PRN Renay Thorne APRN - CNP   650 mg at 06/09/25 2038    Or    acetaminophen (TYLENOL) suppository 650 mg  650 mg Rectal Q6H PRN Renay Thorne APRN - CNP        enoxaparin (LOVENOX) injection 60 mg  60 mg SubCUTAneous BID Renay Thorne APRN - CNP   60 mg at 06/12/25 0814    potassium chloride (KLOR-CON M) extended release tablet 40 mEq  40 mEq Oral PRN Renay Thorne APRN - CNP        Or    potassium bicarb-citric acid (EFFER-K) effervescent tablet 40 mEq  40 mEq Oral PRN Renay Thorne APRN - CNP        Or

## 2025-06-12 NOTE — PLAN OF CARE
Problem: Chronic Conditions and Co-morbidities  Goal: Patient's chronic conditions and co-morbidity symptoms are monitored and maintained or improved  6/11/2025 2146 by Brigido Garcia RN  Outcome: Progressing  6/11/2025 1058 by Fely Randle LPN  Outcome: Progressing  Flowsheets (Taken 6/11/2025 0931)  Care Plan - Patient's Chronic Conditions and Co-Morbidity Symptoms are Monitored and Maintained or Improved:   Monitor and assess patient's chronic conditions and comorbid symptoms for stability, deterioration, or improvement   Collaborate with multidisciplinary team to address chronic and comorbid conditions and prevent exacerbation or deterioration   Update acute care plan with appropriate goals if chronic or comorbid symptoms are exacerbated and prevent overall improvement and discharge     Problem: Discharge Planning  Goal: Discharge to home or other facility with appropriate resources  6/11/2025 2146 by Brigido Garcia RN  Outcome: Progressing  6/11/2025 1058 by Fely Randle LPN  Outcome: Progressing  Flowsheets (Taken 6/11/2025 0931)  Discharge to home or other facility with appropriate resources:   Identify barriers to discharge with patient and caregiver   Arrange for needed discharge resources and transportation as appropriate   Identify discharge learning needs (meds, wound care, etc)   Refer to discharge planning if patient needs post-hospital services based on physician order or complex needs related to functional status, cognitive ability or social support system   Arrange for interpreters to assist at discharge as needed     Problem: Pain  Goal: Verbalizes/displays adequate comfort level or baseline comfort level  6/11/2025 2146 by Brigido Garcia RN  Outcome: Progressing  6/11/2025 1058 by Fely Randle LPN  Outcome: Progressing     Problem: Safety - Adult  Goal: Free from fall injury  6/11/2025 2146 by Brigido Garcia RN  Outcome: Progressing  6/11/2025 1058 by  Assess oral mucosa and hygiene practices   Implement preventative oral hygiene regimen   Implement oral medicated treatments as ordered     Problem: Musculoskeletal - Adult  Goal: Return mobility to safest level of function  6/11/2025 2146 by Brigido Garcia RN  Outcome: Progressing  6/11/2025 1058 by Fely Randle LPN  Outcome: Progressing  Flowsheets (Taken 6/11/2025 0931)  Return Mobility to Safest Level of Function:   Assess patient stability and activity tolerance for standing, transferring and ambulating with or without assistive devices   Assist with transfers and ambulation using safe patient handling equipment as needed   Obtain physical therapy/occupational therapy consults as needed   Ensure adequate protection for wounds/incisions during mobilization   Apply continuous passive motion per provider or physical therapy orders to increase flexion toward goal   Instruct patient/family in ordered activity level  Goal: Maintain proper alignment of affected body part  6/11/2025 2146 by Brigido Garcia RN  Outcome: Progressing  6/11/2025 1058 by Fely Randle LPN  Outcome: Progressing  Flowsheets (Taken 6/11/2025 0931)  Maintain proper alignment of affected body part:   Support and protect limb and body alignment per provider's orders   Instruct and reinforce with patient and family use of appropriate assistive device and precautions (e.g. spinal or hip dislocation precautions)  Goal: Return ADL status to a safe level of function  6/11/2025 2146 by Brigido Garcia RN  Outcome: Progressing  6/11/2025 1058 by Fely Randle LPN  Outcome: Progressing  Flowsheets (Taken 6/11/2025 0931)  Return ADL Status to a Safe Level of Function:   Administer medication as ordered   Assess activities of daily living deficits and provide assistive devices as needed   Obtain physical therapy/occupational therapy consults as needed   Assist and instruct patient to increase activity and self care

## 2025-06-13 LAB
ANION GAP SERPL CALCULATED.3IONS-SCNC: 10 MMOL/L (ref 8–16)
BASOPHILS # BLD: 0.1 K/UL (ref 0–0.2)
BASOPHILS NFR BLD: 0.7 % (ref 0–1)
BUN SERPL-MCNC: 71 MG/DL (ref 8–23)
CALCIUM SERPL-MCNC: 8.7 MG/DL (ref 8.8–10.2)
CHLORIDE SERPL-SCNC: 99 MMOL/L (ref 98–107)
CO2 SERPL-SCNC: 28 MMOL/L (ref 22–29)
CREAT SERPL-MCNC: 3.4 MG/DL (ref 0.7–1.2)
EOSINOPHIL # BLD: 0.4 K/UL (ref 0–0.6)
EOSINOPHIL NFR BLD: 5.5 % (ref 0–5)
ERYTHROCYTE [DISTWIDTH] IN BLOOD BY AUTOMATED COUNT: 15.1 % (ref 11.5–14.5)
GLUCOSE BLD-MCNC: 102 MG/DL (ref 70–99)
GLUCOSE BLD-MCNC: 106 MG/DL (ref 70–99)
GLUCOSE BLD-MCNC: 113 MG/DL (ref 70–99)
GLUCOSE BLD-MCNC: 128 MG/DL (ref 70–99)
GLUCOSE SERPL-MCNC: 149 MG/DL (ref 70–99)
HCT VFR BLD AUTO: 43.6 % (ref 42–52)
HGB BLD-MCNC: 13.9 G/DL (ref 14–18)
IMM GRANULOCYTES # BLD: 0 K/UL
LYMPHOCYTES # BLD: 0.9 K/UL (ref 1.1–4.5)
LYMPHOCYTES NFR BLD: 11.9 % (ref 20–40)
MCH RBC QN AUTO: 30 PG (ref 27–31)
MCHC RBC AUTO-ENTMCNC: 31.9 G/DL (ref 33–37)
MCV RBC AUTO: 94.2 FL (ref 80–94)
MONOCYTES # BLD: 0.9 K/UL (ref 0–0.9)
MONOCYTES NFR BLD: 11.6 % (ref 0–10)
NEUTROPHILS # BLD: 5.3 K/UL (ref 1.5–7.5)
NEUTS SEG NFR BLD: 69.9 % (ref 50–65)
PERFORMED ON: ABNORMAL
PLATELET # BLD AUTO: 135 K/UL (ref 130–400)
PMV BLD AUTO: 12.4 FL (ref 9.4–12.4)
POTASSIUM SERPL-SCNC: 4.1 MMOL/L (ref 3.5–5)
RBC # BLD AUTO: 4.63 M/UL (ref 4.7–6.1)
SODIUM SERPL-SCNC: 137 MMOL/L (ref 136–145)
WBC # BLD AUTO: 7.6 K/UL (ref 4.8–10.8)

## 2025-06-13 PROCEDURE — 85025 COMPLETE CBC W/AUTO DIFF WBC: CPT

## 2025-06-13 PROCEDURE — 6370000000 HC RX 637 (ALT 250 FOR IP)

## 2025-06-13 PROCEDURE — 80048 BASIC METABOLIC PNL TOTAL CA: CPT

## 2025-06-13 PROCEDURE — 6370000000 HC RX 637 (ALT 250 FOR IP): Performed by: NURSE PRACTITIONER

## 2025-06-13 PROCEDURE — 2500000003 HC RX 250 WO HCPCS

## 2025-06-13 PROCEDURE — 94760 N-INVAS EAR/PLS OXIMETRY 1: CPT

## 2025-06-13 PROCEDURE — 94150 VITAL CAPACITY TEST: CPT

## 2025-06-13 PROCEDURE — 82962 GLUCOSE BLOOD TEST: CPT

## 2025-06-13 PROCEDURE — 2700000000 HC OXYGEN THERAPY PER DAY

## 2025-06-13 PROCEDURE — 6360000002 HC RX W HCPCS: Performed by: NURSE PRACTITIONER

## 2025-06-13 PROCEDURE — 6370000000 HC RX 637 (ALT 250 FOR IP): Performed by: INTERNAL MEDICINE

## 2025-06-13 PROCEDURE — 94640 AIRWAY INHALATION TREATMENT: CPT

## 2025-06-13 PROCEDURE — 1200000000 HC SEMI PRIVATE

## 2025-06-13 PROCEDURE — 36415 COLL VENOUS BLD VENIPUNCTURE: CPT

## 2025-06-13 PROCEDURE — 6360000002 HC RX W HCPCS

## 2025-06-13 PROCEDURE — 2580000003 HC RX 258: Performed by: INTERNAL MEDICINE

## 2025-06-13 PROCEDURE — 51798 US URINE CAPACITY MEASURE: CPT

## 2025-06-13 RX ADMIN — ENOXAPARIN SODIUM 60 MG: 100 INJECTION SUBCUTANEOUS at 22:10

## 2025-06-13 RX ADMIN — PANTOPRAZOLE SODIUM 40 MG: 40 TABLET, DELAYED RELEASE ORAL at 05:17

## 2025-06-13 RX ADMIN — ALLOPURINOL 300 MG: 300 TABLET ORAL at 08:46

## 2025-06-13 RX ADMIN — LOPERAMIDE HYDROCHLORIDE 2 MG: 2 CAPSULE ORAL at 14:55

## 2025-06-13 RX ADMIN — IPRATROPIUM BROMIDE 0.5 MG: 0.5 SOLUTION RESPIRATORY (INHALATION) at 06:18

## 2025-06-13 RX ADMIN — GUAIFENESIN 600 MG: 600 TABLET, EXTENDED RELEASE ORAL at 08:46

## 2025-06-13 RX ADMIN — GUAIFENESIN 600 MG: 600 TABLET, EXTENDED RELEASE ORAL at 22:11

## 2025-06-13 RX ADMIN — CETIRIZINE HYDROCHLORIDE 10 MG: 10 TABLET ORAL at 08:46

## 2025-06-13 RX ADMIN — METOPROLOL SUCCINATE 25 MG: 25 TABLET, FILM COATED, EXTENDED RELEASE ORAL at 08:46

## 2025-06-13 RX ADMIN — PRAVASTATIN SODIUM 20 MG: 20 TABLET ORAL at 22:11

## 2025-06-13 RX ADMIN — ENOXAPARIN SODIUM 60 MG: 100 INJECTION SUBCUTANEOUS at 08:46

## 2025-06-13 RX ADMIN — MICONAZOLE NITRATE: 2 POWDER TOPICAL at 08:47

## 2025-06-13 RX ADMIN — ACETAMINOPHEN 650 MG: 325 TABLET ORAL at 22:10

## 2025-06-13 RX ADMIN — POTASSIUM CHLORIDE 20 MEQ: 1500 TABLET, EXTENDED RELEASE ORAL at 08:46

## 2025-06-13 RX ADMIN — SODIUM CHLORIDE, PRESERVATIVE FREE 10 ML: 5 INJECTION INTRAVENOUS at 08:46

## 2025-06-13 RX ADMIN — METOPROLOL SUCCINATE 25 MG: 25 TABLET, FILM COATED, EXTENDED RELEASE ORAL at 22:11

## 2025-06-13 RX ADMIN — IPRATROPIUM BROMIDE 0.5 MG: 0.5 SOLUTION RESPIRATORY (INHALATION) at 14:37

## 2025-06-13 RX ADMIN — MONTELUKAST SODIUM 10 MG: 10 TABLET, FILM COATED ORAL at 22:11

## 2025-06-13 RX ADMIN — SODIUM CHLORIDE: 0.9 INJECTION, SOLUTION INTRAVENOUS at 05:19

## 2025-06-13 RX ADMIN — IPRATROPIUM BROMIDE 0.5 MG: 0.5 SOLUTION RESPIRATORY (INHALATION) at 18:43

## 2025-06-13 RX ADMIN — ASPIRIN 81 MG: 81 TABLET, COATED ORAL at 08:46

## 2025-06-13 RX ADMIN — IPRATROPIUM BROMIDE 0.5 MG: 0.5 SOLUTION RESPIRATORY (INHALATION) at 10:21

## 2025-06-13 RX ADMIN — SODIUM CHLORIDE: 0.9 INJECTION, SOLUTION INTRAVENOUS at 18:00

## 2025-06-13 RX ADMIN — MICONAZOLE NITRATE: 2 POWDER TOPICAL at 22:14

## 2025-06-13 ASSESSMENT — PAIN DESCRIPTION - LOCATION: LOCATION: ABDOMEN

## 2025-06-13 ASSESSMENT — PAIN DESCRIPTION - DESCRIPTORS: DESCRIPTORS: ACHING;THROBBING

## 2025-06-13 ASSESSMENT — PAIN SCALES - GENERAL: PAINLEVEL_OUTOF10: 5

## 2025-06-13 ASSESSMENT — PAIN DESCRIPTION - ORIENTATION: ORIENTATION: MID

## 2025-06-13 NOTE — PROGRESS NOTES
Nephrology ([facility]) Consult Note      Patient:  Daljit Elizondo  YOB: 1960  Date of Service: 6/13/2025  MRN: [id number]   Acct: [id number]   Primary Care Physician: Jessie Helms APRN - CNP  Advance Directive: Full Code  Admit Date: 5/14/2025       Hospital Day: 30  Referring Provider: Cody Howell MD    Patient independently seen and examined, Chart, Consults, Notes, Operative notes, Labs, Cardiology, and Radiology studies reviewed as available.        Subjective:  Daljit Elizondo is a 65 y.o. male for whom we were consulted for evaluation and treatment of acute kidney injury.  We are consulted on hospital day 28 for evaluation of acute kidney injury.  Patient was a poor historian but recalled no prior nephrologic evaluations or problems.  He appears to have had another kidney injury back in 2011 by our hospital records.  More recently for the first 24 days or so with his hospitalization his renal function had been fairly stable but increased on 6/9 after previously at baseline on 6/7.  It has remained there for the next 2 days and we were consulted on 6/11 for further evaluation.  Chart review demonstrates a 64 kg weight loss since admission (but still had a weight of 458 pounds on consult) with only approximately 12 L net negative.  He had been on a Bumex infusion and did better with that than the IV bolus dosing.  He was then transitioned to oral Bumex dosing.  He denies specific complaints upon questioning.  Denied current chest pain, shortness of air at rest, nausea vomiting, dysuria hematuria.  Denied rash or hemoptysis as well.    Today, no overnight events.  He continues to note diarrhea.  Denied other new complaints upon questioning.  Using BiPAP for rest    Allergies:  Penicillamine, Silvadene [silver sulfadiazine], Aerohist [chlorpheniramine-methscop er], Cephalosporins, Hemp seed oil, Neosporin [neomycin-polymyxin-gramicidin], and  changes      Labs:  BMP:   Recent Labs     06/11/25  0229 06/12/25  0446 06/13/25  0301    139 137   K 3.9 3.9 4.1   CL 96* 99 99   CO2 27 28 28   BUN 69* 71* 71*   CREATININE 3.3* 3.4* 3.4*   CALCIUM 9.0 9.0 8.7*     CBC:   Recent Labs     06/12/25  0446 06/13/25  0301   WBC 8.1 7.6   HGB 13.8* 13.9*   HCT 43.2 43.6   MCV 93.7 94.2*    135     LIVER PROFILE: No results for input(s): \"AST\", \"ALT\", \"LIPASE\", \"AMYLASE\", \"BILIDIR\", \"BILITOT\", \"ALKPHOS\" in the last 72 hours.    Invalid input(s): \"ALB\"  PT/INR: No results for input(s): \"PROTIME\", \"INR\" in the last 72 hours.  APTT: No results for input(s): \"APTT\" in the last 72 hours.  BNP:  No results for input(s): \"BNP\" in the last 72 hours.  Ionized Calcium:Invalid input(s): \"IONCA\"  Magnesium:No results for input(s): \"MG\" in the last 72 hours.  Phosphorus:No results for input(s): \"PHOS\" in the last 72 hours.  HgbA1C: No results for input(s): \"LABA1C\" in the last 72 hours.  Hepatic: No results for input(s): \"ALKPHOS\", \"ALT\", \"AST\", \"BILITOT\", \"BILIDIR\", \"LABALBU\" in the last 72 hours.    Invalid input(s): \"PROT\"  Lactic Acid: No results for input(s): \"LACTA\" in the last 72 hours.  Troponin:   No results for input(s): \"CKTOTAL\", \"CKMB\", \"TROPONINT\" in the last 72 hours.    ABGs: No results for input(s): \"PH\", \"PCO2\", \"PO2\", \"HCO3\", \"O2SAT\" in the last 72 hours.  CRP:  No results for input(s): \"CRP\" in the last 72 hours.  Sed Rate:  No results for input(s): \"SEDRATE\" in the last 72 hours.      Cultures:   No results for input(s): \"CULTURE\" in the last 72 hours.  No results for input(s): \"BC\", \"BLOODCULT2\" in the last 72 hours.  No results for input(s): \"CXSURG\" in the last 72 hours.    Radiology reports as per the Radiologist  Radiology: Echo (TTE) complete (PRN contrast/bubble/strain/3D)  Result Date: 5/15/2025  Technically difficult study due to patient's body habitus. Nondiagnostic study due to poor image quality.  Suggest alternative cardiac imaging

## 2025-06-13 NOTE — PROGRESS NOTES
Occupational Therapy  Attempted to see patient 2x this pm. Pt states \"I need to get cleaned up\" on first attempt. On second attempt, nursing present helping patient get cleaned up and states patient is having continuous bowel movements and that he has been on/ off the bedpan many times in the past 40 mins. Will continue to follow as able. Electronically signed by BRUNO Frausto on 6/13/2025 at 3:51 PM

## 2025-06-13 NOTE — PLAN OF CARE
Problem: Chronic Conditions and Co-morbidities  Goal: Patient's chronic conditions and co-morbidity symptoms are monitored and maintained or improved  Outcome: Progressing     Problem: Discharge Planning  Goal: Discharge to home or other facility with appropriate resources  Outcome: Progressing     Problem: Pain  Goal: Verbalizes/displays adequate comfort level or baseline comfort level  Outcome: Progressing     Problem: Safety - Adult  Goal: Free from fall injury  Outcome: Progressing     Problem: ABCDS Injury Assessment  Goal: Absence of physical injury  Outcome: Progressing     Problem: Skin/Tissue Integrity  Goal: Skin integrity remains intact  Description: 1.  Monitor for areas of redness and/or skin breakdown2.  Assess vascular access sites hourly3.  Every 4-6 hours minimum:  Change oxygen saturation probe site4.  Every 4-6 hours:  If on nasal continuous positive airway pressure, respiratory therapy assess nares and determine need for appliance change or resting period  Outcome: Progressing     Problem: Nutrition Deficit:  Goal: Optimize nutritional status  Outcome: Progressing     Problem: Neurosensory - Adult  Goal: Achieves stable or improved neurological status  Outcome: Progressing     Problem: Respiratory - Adult  Goal: Achieves optimal ventilation and oxygenation  Outcome: Progressing     Problem: Cardiovascular - Adult  Goal: Maintains optimal cardiac output and hemodynamic stability  Outcome: Progressing     Problem: Skin/Tissue Integrity - Adult  Goal: Skin integrity remains intact  Description: 1.  Monitor for areas of redness and/or skin breakdown2.  Assess vascular access sites hourly3.  Every 4-6 hours minimum:  Change oxygen saturation probe site4.  Every 4-6 hours:  If on nasal continuous positive airway pressure, respiratory therapy assess nares and determine need for appliance change or resting period  Outcome: Progressing  Goal: Incisions, wounds, or drain sites healing without S/S of  infection  Outcome: Progressing  Goal: Oral mucous membranes remain intact  Outcome: Progressing     Problem: Musculoskeletal - Adult  Goal: Return mobility to safest level of function  Outcome: Progressing  Goal: Maintain proper alignment of affected body part  Outcome: Progressing  Goal: Return ADL status to a safe level of function  Outcome: Progressing     Problem: Gastrointestinal - Adult  Goal: Minimal or absence of nausea and vomiting  Outcome: Progressing  Goal: Maintains or returns to baseline bowel function  Outcome: Progressing  Goal: Maintains adequate nutritional intake  Outcome: Progressing     Problem: Genitourinary - Adult  Goal: Absence of urinary retention  Outcome: Progressing     Problem: Infection - Adult  Goal: Absence of infection at discharge  Outcome: Progressing  Goal: Absence of infection during hospitalization  Outcome: Progressing  Goal: Absence of fever/infection during anticipated neutropenic period  Outcome: Progressing     Problem: Metabolic/Fluid and Electrolytes - Adult  Goal: Electrolytes maintained within normal limits  Outcome: Progressing  Goal: Hemodynamic stability and optimal renal function maintained  Outcome: Progressing  Goal: Glucose maintained within prescribed range  Outcome: Progressing     Problem: Hematologic - Adult  Goal: Maintains hematologic stability  Outcome: Progressing

## 2025-06-13 NOTE — PROGRESS NOTES
Physical Therapy  Name: Daljit Elizondo  MRN:  [id number]  Date of service:  6/13/2025    Nsg declined therapy due to pt worn out from being on bed pan for numerous times and pts left heel/foot with wound and bleeding   Therapy will cont to follow    Electronically signed by Cinthia Cummings PTA on 6/13/2025 at 3:13 PM

## 2025-06-13 NOTE — PLAN OF CARE
Problem: Chronic Conditions and Co-morbidities  Goal: Patient's chronic conditions and co-morbidity symptoms are monitored and maintained or improved  6/13/2025 1113 by Grace Carlton RN  Outcome: Progressing  6/13/2025 0046 by Rosamaria Moss RN  Outcome: Progressing     Problem: Discharge Planning  Goal: Discharge to home or other facility with appropriate resources  6/13/2025 1113 by Grace Carlton RN  Outcome: Progressing  Flowsheets (Taken 6/13/2025 0828)  Discharge to home or other facility with appropriate resources: Identify barriers to discharge with patient and caregiver  6/13/2025 0046 by Rosamaria Moss, RN  Outcome: Progressing     Problem: Pain  Goal: Verbalizes/displays adequate comfort level or baseline comfort level  6/13/2025 1113 by Grace Carlton RN  Outcome: Progressing  6/13/2025 0046 by Rosamaria Moss RN  Outcome: Progressing     Problem: Safety - Adult  Goal: Free from fall injury  6/13/2025 1113 by Grace Carlton RN  Outcome: Progressing  6/13/2025 0046 by Rosamaria Moss RN  Outcome: Progressing     Problem: ABCDS Injury Assessment  Goal: Absence of physical injury  6/13/2025 1113 by Grace Carlton RN  Outcome: Progressing  6/13/2025 0046 by Rosamaria Moss RN  Outcome: Progressing     Problem: Skin/Tissue Integrity  Goal: Skin integrity remains intact  Description: 1.  Monitor for areas of redness and/or skin breakdown2.  Assess vascular access sites hourly3.  Every 4-6 hours minimum:  Change oxygen saturation probe site4.  Every 4-6 hours:  If on nasal continuous positive airway pressure, respiratory therapy assess nares and determine need for appliance change or resting period  6/13/2025 1113 by Grace Carlton RN  Outcome: Progressing  6/13/2025 0046 by Rosamaria Moss RN  Outcome: Progressing     Problem: Nutrition Deficit:  Goal: Optimize nutritional status  6/13/2025 1113 by Grace Carlton RN  Outcome: Progressing  6/13/2025 0046 by Rosamaria Moss  RN  Outcome: Progressing     Problem: Neurosensory - Adult  Goal: Achieves stable or improved neurological status  6/13/2025 1113 by Grace Carlton RN  Outcome: Progressing  6/13/2025 0046 by Rosamaria Moss RN  Outcome: Progressing     Problem: Respiratory - Adult  Goal: Achieves optimal ventilation and oxygenation  6/13/2025 1113 by Grace Carlton RN  Outcome: Progressing  6/13/2025 0046 by Rosamaria Moss RN  Outcome: Progressing     Problem: Cardiovascular - Adult  Goal: Maintains optimal cardiac output and hemodynamic stability  6/13/2025 1113 by Grace Carlton RN  Outcome: Progressing  6/13/2025 0046 by Rosamaria Moss RN  Outcome: Progressing     Problem: Skin/Tissue Integrity - Adult  Goal: Skin integrity remains intact  Description: 1.  Monitor for areas of redness and/or skin breakdown2.  Assess vascular access sites hourly3.  Every 4-6 hours minimum:  Change oxygen saturation probe site4.  Every 4-6 hours:  If on nasal continuous positive airway pressure, respiratory therapy assess nares and determine need for appliance change or resting period  6/13/2025 1113 by Grace Carlton RN  Outcome: Progressing  6/13/2025 0046 by Rosamaria Moss RN  Outcome: Progressing  Goal: Incisions, wounds, or drain sites healing without S/S of infection  6/13/2025 1113 by Grace Carlton RN  Outcome: Progressing  6/13/2025 0046 by Rosamaria Moss RN  Outcome: Progressing  Goal: Oral mucous membranes remain intact  6/13/2025 1113 by Grace Carlton RN  Outcome: Progressing  6/13/2025 0046 by Rosamaria Moss RN  Outcome: Progressing     Problem: Musculoskeletal - Adult  Goal: Return mobility to safest level of function  6/13/2025 1113 by Grace Carlton RN  Outcome: Progressing  6/13/2025 0046 by Rosamaria Moss RN  Outcome: Progressing  Goal: Maintain proper alignment of affected body part  6/13/2025 1113 by Grace Carlton RN  Outcome: Progressing  6/13/2025 0046 by Rosamaria Moss

## 2025-06-13 NOTE — PROGRESS NOTES
CNP   10 mg at 06/12/25 0815    guaiFENesin (MUCINEX) extended release tablet 600 mg  600 mg Oral BID Ruben Partida APRN - CNP   600 mg at 06/12/25 2159    miconazole (MICOTIN) 2 % powder   Topical BID Lambert Ramirez MD   Given at 06/12/25 2201    albuterol (PROVENTIL) (2.5 MG/3ML) 0.083% nebulizer solution 2.5 mg  2.5 mg Nebulization Q6H PRN Renay Thorne APRN - CNP        allopurinol (ZYLOPRIM) tablet 300 mg  300 mg Oral Daily Renay Thorne APRN - CNP   300 mg at 06/12/25 0815    aspirin EC tablet 81 mg  81 mg Oral Daily Renay Thorne APRN - CNP   81 mg at 06/12/25 0814    [Held by provider] losartan (COZAAR) tablet 50 mg  50 mg Oral Daily Renay Thorne APRN - CNP   50 mg at 05/17/25 0951    pantoprazole (PROTONIX) tablet 40 mg  40 mg Oral QAM AC Renay Thorne APRN - CNP   40 mg at 06/13/25 0517    pravastatin (PRAVACHOL) tablet 20 mg  20 mg Oral Nightly Renay Thorne APRN - CNP   20 mg at 06/12/25 2159    potassium chloride (KLOR-CON M) extended release tablet 20 mEq  20 mEq Oral Daily Renay Thorne APRN - CNP   20 mEq at 06/12/25 0814    sodium chloride flush 0.9 % injection 5-40 mL  5-40 mL IntraVENous 2 times per day Renay Thorne APRN - CNP   10 mL at 06/12/25 2200    sodium chloride flush 0.9 % injection 5-40 mL  5-40 mL IntraVENous PRN Renay Thorne APRN - CNP        0.9 % sodium chloride infusion   IntraVENous PRN Renay Thorne APRN - CNP 5 mL/hr at 05/18/25 1459 New Bag at 05/18/25 1459    ondansetron (ZOFRAN-ODT) disintegrating tablet 4 mg  4 mg Oral Q8H PRN Renay Thorne APRN - CNP   4 mg at 06/09/25 1754    Or    ondansetron (ZOFRAN) injection 4 mg  4 mg IntraVENous Q6H PRN Renay Thorne APRN - CNP        [Held by provider] polyethylene glycol (GLYCOLAX) packet 17 g  17 g Oral Daily PRN Renay Thorne APRN - CNP   17 g at 06/07/25 0618    acetaminophen (TYLENOL) tablet 650 mg  650 mg Oral Q6H PRN Renay Thorne APRN - CNP   650 mg at 06/09/25 2038    Or     Palpations: Abdomen is soft.      Tenderness: There is no abdominal tenderness. There is no guarding or rebound.   Musculoskeletal:         General: No swelling, tenderness, deformity or signs of injury. Normal range of motion.      Cervical back: Normal range of motion and neck supple. No rigidity. No muscular tenderness.      Right lower leg: No edema.      Left lower leg: No edema.   Skin:     General: Skin is warm and dry.      Capillary Refill: Capillary refill takes less than 2 seconds.      Coloration: Skin is not jaundiced or pale.      Findings: No bruising, erythema, lesion or rash.   Neurological:      General: No focal deficit present.      Mental Status: He is alert and oriented to person, place, and time.      Cranial Nerves: No cranial nerve deficit.      Sensory: No sensory deficit.      Motor: No weakness.      Coordination: Coordination normal.   Psychiatric:         Mood and Affect: Mood normal.         Behavior: Behavior normal.         Thought Content: Thought content normal.         Judgment: Judgment normal.         Assessment/plan:       Hospital Problems           Last Modified POA    * (Principal) Acute congestive heart failure, unspecified heart failure type (HCC) 5/14/2025 Yes    Diabetes (HCC) 5/14/2025 Yes    Essential hypertension 5/14/2025 Yes    GERD (gastroesophageal reflux disease) 5/14/2025 Yes    Obesity, morbid (more than 100 lbs over ideal weight or BMI > 40) (HCC) (Chronic) 5/18/2025 Yes    Hyperlipemia 5/14/2025 Yes        Principal Problem:    Acute congestive heart failure, unspecified heart failure type (HCC)  Active Problems:    Diabetes (HCC)    Essential hypertension    GERD (gastroesophageal reflux disease)    Obesity, morbid (more than 100 lbs over ideal weight or BMI > 40) (HCC)    Hyperlipemia  Resolved Problems:    * No resolved hospital problems. *          Brief History/Summary:   As per Initial admission HPI 5/14/2025, quoted below;  Mr Elizondo, a \"63 yo M with

## 2025-06-14 LAB
ANION GAP SERPL CALCULATED.3IONS-SCNC: 13 MMOL/L (ref 8–16)
BASOPHILS # BLD: 0.1 K/UL (ref 0–0.2)
BASOPHILS NFR BLD: 0.7 % (ref 0–1)
BUN SERPL-MCNC: 68 MG/DL (ref 8–23)
CALCIUM SERPL-MCNC: 8.6 MG/DL (ref 8.8–10.2)
CHLORIDE SERPL-SCNC: 102 MMOL/L (ref 98–107)
CO2 SERPL-SCNC: 23 MMOL/L (ref 22–29)
CREAT SERPL-MCNC: 3.2 MG/DL (ref 0.7–1.2)
EOSINOPHIL # BLD: 0.4 K/UL (ref 0–0.6)
EOSINOPHIL NFR BLD: 5.1 % (ref 0–5)
ERYTHROCYTE [DISTWIDTH] IN BLOOD BY AUTOMATED COUNT: 15 % (ref 11.5–14.5)
GLUCOSE BLD-MCNC: 109 MG/DL (ref 70–99)
GLUCOSE BLD-MCNC: 117 MG/DL (ref 70–99)
GLUCOSE BLD-MCNC: 158 MG/DL (ref 70–99)
GLUCOSE BLD-MCNC: 166 MG/DL (ref 70–99)
GLUCOSE SERPL-MCNC: 125 MG/DL (ref 70–99)
HCT VFR BLD AUTO: 42.6 % (ref 42–52)
HGB BLD-MCNC: 13.4 G/DL (ref 14–18)
IMM GRANULOCYTES # BLD: 0 K/UL
LYMPHOCYTES # BLD: 0.8 K/UL (ref 1.1–4.5)
LYMPHOCYTES NFR BLD: 11.2 % (ref 20–40)
MCH RBC QN AUTO: 29.7 PG (ref 27–31)
MCHC RBC AUTO-ENTMCNC: 31.5 G/DL (ref 33–37)
MCV RBC AUTO: 94.5 FL (ref 80–94)
MONOCYTES # BLD: 0.8 K/UL (ref 0–0.9)
MONOCYTES NFR BLD: 11.4 % (ref 0–10)
NEUTROPHILS # BLD: 5.1 K/UL (ref 1.5–7.5)
NEUTS SEG NFR BLD: 71 % (ref 50–65)
PERFORMED ON: ABNORMAL
PLATELET # BLD AUTO: 147 K/UL (ref 130–400)
PMV BLD AUTO: 13 FL (ref 9.4–12.4)
POTASSIUM SERPL-SCNC: 4.4 MMOL/L (ref 3.5–5)
RBC # BLD AUTO: 4.51 M/UL (ref 4.7–6.1)
SODIUM SERPL-SCNC: 138 MMOL/L (ref 136–145)
WBC # BLD AUTO: 7.2 K/UL (ref 4.8–10.8)

## 2025-06-14 PROCEDURE — 2700000000 HC OXYGEN THERAPY PER DAY

## 2025-06-14 PROCEDURE — 6360000002 HC RX W HCPCS: Performed by: NURSE PRACTITIONER

## 2025-06-14 PROCEDURE — 94640 AIRWAY INHALATION TREATMENT: CPT

## 2025-06-14 PROCEDURE — 51798 US URINE CAPACITY MEASURE: CPT

## 2025-06-14 PROCEDURE — 6370000000 HC RX 637 (ALT 250 FOR IP)

## 2025-06-14 PROCEDURE — 36415 COLL VENOUS BLD VENIPUNCTURE: CPT

## 2025-06-14 PROCEDURE — 85025 COMPLETE CBC W/AUTO DIFF WBC: CPT

## 2025-06-14 PROCEDURE — 6370000000 HC RX 637 (ALT 250 FOR IP): Performed by: NURSE PRACTITIONER

## 2025-06-14 PROCEDURE — 80048 BASIC METABOLIC PNL TOTAL CA: CPT

## 2025-06-14 PROCEDURE — 94760 N-INVAS EAR/PLS OXIMETRY 1: CPT

## 2025-06-14 PROCEDURE — 1200000000 HC SEMI PRIVATE

## 2025-06-14 PROCEDURE — 6360000002 HC RX W HCPCS

## 2025-06-14 PROCEDURE — 82962 GLUCOSE BLOOD TEST: CPT

## 2025-06-14 RX ADMIN — ENOXAPARIN SODIUM 60 MG: 100 INJECTION SUBCUTANEOUS at 21:18

## 2025-06-14 RX ADMIN — METOPROLOL SUCCINATE 25 MG: 25 TABLET, FILM COATED, EXTENDED RELEASE ORAL at 21:18

## 2025-06-14 RX ADMIN — ENOXAPARIN SODIUM 60 MG: 100 INJECTION SUBCUTANEOUS at 06:30

## 2025-06-14 RX ADMIN — METOPROLOL SUCCINATE 25 MG: 25 TABLET, FILM COATED, EXTENDED RELEASE ORAL at 06:29

## 2025-06-14 RX ADMIN — ASPIRIN 81 MG: 81 TABLET, COATED ORAL at 06:29

## 2025-06-14 RX ADMIN — ALLOPURINOL 300 MG: 300 TABLET ORAL at 07:36

## 2025-06-14 RX ADMIN — CETIRIZINE HYDROCHLORIDE 10 MG: 10 TABLET ORAL at 06:30

## 2025-06-14 RX ADMIN — PANTOPRAZOLE SODIUM 40 MG: 40 TABLET, DELAYED RELEASE ORAL at 06:29

## 2025-06-14 RX ADMIN — IPRATROPIUM BROMIDE 0.5 MG: 0.5 SOLUTION RESPIRATORY (INHALATION) at 10:47

## 2025-06-14 RX ADMIN — GUAIFENESIN 600 MG: 600 TABLET, EXTENDED RELEASE ORAL at 21:18

## 2025-06-14 RX ADMIN — MICONAZOLE NITRATE 1 APPLICATION: 2 POWDER TOPICAL at 21:00

## 2025-06-14 RX ADMIN — INSULIN GLARGINE 30 UNITS: 100 INJECTION, SOLUTION SUBCUTANEOUS at 21:18

## 2025-06-14 RX ADMIN — GUAIFENESIN 600 MG: 600 TABLET, EXTENDED RELEASE ORAL at 06:29

## 2025-06-14 RX ADMIN — MICONAZOLE NITRATE: 2 POWDER TOPICAL at 07:37

## 2025-06-14 RX ADMIN — MONTELUKAST SODIUM 10 MG: 10 TABLET, FILM COATED ORAL at 21:18

## 2025-06-14 RX ADMIN — IPRATROPIUM BROMIDE 0.5 MG: 0.5 SOLUTION RESPIRATORY (INHALATION) at 18:43

## 2025-06-14 RX ADMIN — POTASSIUM CHLORIDE 20 MEQ: 1500 TABLET, EXTENDED RELEASE ORAL at 06:29

## 2025-06-14 RX ADMIN — IPRATROPIUM BROMIDE 0.5 MG: 0.5 SOLUTION RESPIRATORY (INHALATION) at 07:46

## 2025-06-14 RX ADMIN — PRAVASTATIN SODIUM 20 MG: 20 TABLET ORAL at 21:18

## 2025-06-14 NOTE — PLAN OF CARE
Problem: Chronic Conditions and Co-morbidities  Goal: Patient's chronic conditions and co-morbidity symptoms are monitored and maintained or improved  6/14/2025 0045 by Jose Urban RN  Outcome: Progressing  6/13/2025 1113 by Grace Carlton RN  Outcome: Progressing     Problem: Discharge Planning  Goal: Discharge to home or other facility with appropriate resources  6/14/2025 0045 by Jose Urban RN  Outcome: Progressing  6/13/2025 1113 by Grace Carlton RN  Outcome: Progressing  Flowsheets (Taken 6/13/2025 0828)  Discharge to home or other facility with appropriate resources: Identify barriers to discharge with patient and caregiver     Problem: Pain  Goal: Verbalizes/displays adequate comfort level or baseline comfort level  6/14/2025 0045 by Jose Urban RN  Outcome: Progressing  6/13/2025 1113 by Grace Carlton RN  Outcome: Progressing     Problem: Safety - Adult  Goal: Free from fall injury  6/14/2025 0045 by Jose Urban RN  Outcome: Progressing  6/13/2025 1113 by Grace Carlton RN  Outcome: Progressing     Problem: ABCDS Injury Assessment  Goal: Absence of physical injury  6/14/2025 0045 by Jose Urban RN  Outcome: Progressing  6/13/2025 1113 by Grace Carlton RN  Outcome: Progressing     Problem: Skin/Tissue Integrity  Goal: Skin integrity remains intact  Description: 1.  Monitor for areas of redness and/or skin breakdown2.  Assess vascular access sites hourly3.  Every 4-6 hours minimum:  Change oxygen saturation probe site4.  Every 4-6 hours:  If on nasal continuous positive airway pressure, respiratory therapy assess nares and determine need for appliance change or resting period  6/14/2025 0045 by Jose Urban RN  Outcome: Progressing  6/13/2025 1113 by Grace Carlton RN  Outcome: Progressing     Problem: Nutrition Deficit:  Goal: Optimize nutritional status  6/14/2025 0045 by Jose Urban RN  Outcome: Progressing  6/13/2025 1113 by Grace Carlton RN  Outcome:

## 2025-06-14 NOTE — PLAN OF CARE
Problem: Chronic Conditions and Co-morbidities  Goal: Patient's chronic conditions and co-morbidity symptoms are monitored and maintained or improved  6/14/2025 1402 by Shira Salinas RN  Outcome: Progressing  6/14/2025 0045 by Jose Urban RN  Outcome: Progressing     Problem: Discharge Planning  Goal: Discharge to home or other facility with appropriate resources  6/14/2025 1402 by Shira Salinas RN  Outcome: Progressing  6/14/2025 0045 by Jose Urban RN  Outcome: Progressing     Problem: Pain  Goal: Verbalizes/displays adequate comfort level or baseline comfort level  6/14/2025 1402 by Shira Salinas RN  Outcome: Progressing  6/14/2025 0045 by Jose Urban RN  Outcome: Progressing     Problem: Safety - Adult  Goal: Free from fall injury  6/14/2025 1402 by Shira Salinas RN  Outcome: Progressing  6/14/2025 0045 by Jose Urban RN  Outcome: Progressing     Problem: ABCDS Injury Assessment  Goal: Absence of physical injury  6/14/2025 1402 by Shira Salinas RN  Outcome: Progressing  6/14/2025 0045 by Jose Urban RN  Outcome: Progressing     Problem: Skin/Tissue Integrity  Goal: Skin integrity remains intact  Description: 1.  Monitor for areas of redness and/or skin breakdown2.  Assess vascular access sites hourly3.  Every 4-6 hours minimum:  Change oxygen saturation probe site4.  Every 4-6 hours:  If on nasal continuous positive airway pressure, respiratory therapy assess nares and determine need for appliance change or resting period  6/14/2025 1402 by Shira Salinas RN  Outcome: Progressing  6/14/2025 0045 by Jose Urban RN  Outcome: Progressing     Problem: Nutrition Deficit:  Goal: Optimize nutritional status  6/14/2025 1402 by Shira Salinas RN  Outcome: Progressing  6/14/2025 0045 by Jose Urban RN  Outcome: Progressing     Problem: Neurosensory - Adult  Goal: Achieves stable or improved neurological status  6/14/2025 1402 by Shira Salinas RN  Outcome: Progressing  6/14/2025

## 2025-06-14 NOTE — PROGRESS NOTES
Nephrology ([facility]) Consult Note      Patient:  Daljit Elizondo  YOB: 1960  Date of Service: 6/14/2025  MRN: [id number]   Acct: [id number]   Primary Care Physician: Jessie Helms APRN - CNP  Advance Directive: Full Code  Admit Date: 5/14/2025       Hospital Day: 31  Referring Provider: Cody Howell MD    Patient independently seen and examined, Chart, Consults, Notes, Operative notes, Labs, Cardiology, and Radiology studies reviewed as available.        Subjective:  Daljit Elizondo is a 65 y.o. male for whom we were consulted for evaluation and treatment of acute kidney injury.  We are consulted on hospital day 28 for evaluation of acute kidney injury.  Patient was a poor historian but recalled no prior nephrologic evaluations or problems.  He appears to have had another kidney injury back in 2011 by our hospital records.  More recently for the first 24 days or so with his hospitalization his renal function had been fairly stable but increased on 6/9 after previously at baseline on 6/7.  It has remained there for the next 2 days and we were consulted on 6/11 for further evaluation.  Chart review demonstrates a 64 kg weight loss since admission (but still had a weight of 458 pounds on consult) with only approximately 12 L net negative.  He had been on a Bumex infusion and did better with that than the IV bolus dosing.  He was then transitioned to oral Bumex dosing.  He denies specific complaints upon questioning.  Denied current chest pain, shortness of air at rest, nausea vomiting, dysuria hematuria.  Denied rash or hemoptysis as well.    Today, no overnight events.  He continues to note diarrhea but had reported some improvement.  Denied other new complaints upon questioning.  Using BiPAP for rest    Allergies:  Penicillamine, Silvadene [silver sulfadiazine], Aerohist [chlorpheniramine-methscop er], Cephalosporins, Hemp seed oil, Neosporin

## 2025-06-14 NOTE — PROGRESS NOTES
[facility]    Patient:  Daljit Elizondo  YOB: 1960  Date of Service: 6/14/2025  MRN: [id number]   Acct: [id number]   Primary Care Physician: Jessie Helms APRN - CNP  Advance Directive: Full Code  Admit Date: 5/14/2025       Hospital Day: 31     NOTE: Portions of this note have been copied forward, however, changes made to reflect the most current clinical status of this patient.    CHIEF COMPLAINT:     Chief Complaint   Patient presents with    Shortness of Breath     Since Jan 1    Scrotal Pain       6/14/2025 9:55 AM  Subjective / Interval History:   06/14/2025  Patient seen and examined this a.m.   No new complaints.    Frequency of diarrhea improved.  No acute changes or acute overnight event reported.   Laying comfortably in bed in no apparent acute distress.    Denies any acute complaints or distress.          06/13/2025  Patient seen and examined this a.m.   No new complaints.    Continues to endorse watery diarrhea, although with improvement/control with as needed loperamide.  No acute changes or acute overnight event reported.   Laying comfortably in bed in no apparent acute distress.  Denies any acute complaints or distress.   Endorses overall improvement.      06/12/2025  Patient seen and examined this a.m.   No new complaints.    Continues to have diarrhea, however improved.  No acute changes or acute overnight event reported.   Laying comfortably in bed in no apparent acute distress.    Denies any acute complaints or distress.    Endorses overall improvement.          06/11/2025  Patient seen and examined this a.m.   No new complaints.    Patient continues to endorse persistent watery diarrhea.    No acute changes or acute overnight event reported.   Laying comfortably in bed in no apparent acute distress.    Denies any acute complaints or distress.        Review of Systems:   Review of Systems  ROS: 14 point review of systems is negative except as specifically  guarding or rebound.   Musculoskeletal:         General: No swelling, tenderness, deformity or signs of injury. Normal range of motion.      Cervical back: Normal range of motion and neck supple. No rigidity. No muscular tenderness.      Right lower leg: No edema.      Left lower leg: No edema.   Skin:     General: Skin is warm and dry.      Capillary Refill: Capillary refill takes less than 2 seconds.      Coloration: Skin is not jaundiced or pale.      Findings: No bruising, erythema, lesion or rash.   Neurological:      General: No focal deficit present.      Mental Status: He is alert and oriented to person, place, and time.      Cranial Nerves: No cranial nerve deficit.      Sensory: No sensory deficit.      Motor: No weakness.      Coordination: Coordination normal.   Psychiatric:         Mood and Affect: Mood normal.         Behavior: Behavior normal.         Thought Content: Thought content normal.         Judgment: Judgment normal.         Assessment/plan:       Hospital Problems           Last Modified POA    * (Principal) Acute congestive heart failure, unspecified heart failure type (HCC) 5/14/2025 Yes    Diabetes (HCC) 5/14/2025 Yes    Essential hypertension 5/14/2025 Yes    GERD (gastroesophageal reflux disease) 5/14/2025 Yes    Obesity, morbid (more than 100 lbs over ideal weight or BMI > 40) (HCC) (Chronic) 5/18/2025 Yes    Hyperlipemia 5/14/2025 Yes        Principal Problem:    Acute congestive heart failure, unspecified heart failure type (HCC)  Active Problems:    Diabetes (HCC)    Essential hypertension    GERD (gastroesophageal reflux disease)    Obesity, morbid (more than 100 lbs over ideal weight or BMI > 40) (HCC)    Hyperlipemia  Resolved Problems:    * No resolved hospital problems. *          Brief History/Summary:   As per Initial admission HPI 5/14/2025, quoted below;  Mr Elizondo, a \"65 yo M with chronic respiratory failure on 3L NC, REGI and obesity hypoventilation syndrome on CPAP,

## 2025-06-15 LAB
ANION GAP SERPL CALCULATED.3IONS-SCNC: 11 MMOL/L (ref 8–16)
BASOPHILS # BLD: 0.1 K/UL (ref 0–0.2)
BASOPHILS NFR BLD: 0.7 % (ref 0–1)
BUN SERPL-MCNC: 67 MG/DL (ref 8–23)
CALCIUM SERPL-MCNC: 8.9 MG/DL (ref 8.8–10.2)
CHLORIDE SERPL-SCNC: 104 MMOL/L (ref 98–107)
CO2 SERPL-SCNC: 24 MMOL/L (ref 22–29)
CREAT SERPL-MCNC: 3.3 MG/DL (ref 0.7–1.2)
EOSINOPHIL # BLD: 0.5 K/UL (ref 0–0.6)
EOSINOPHIL NFR BLD: 6.5 % (ref 0–5)
ERYTHROCYTE [DISTWIDTH] IN BLOOD BY AUTOMATED COUNT: 15 % (ref 11.5–14.5)
GLUCOSE BLD-MCNC: 113 MG/DL (ref 70–99)
GLUCOSE BLD-MCNC: 130 MG/DL (ref 70–99)
GLUCOSE BLD-MCNC: 140 MG/DL (ref 70–99)
GLUCOSE BLD-MCNC: 173 MG/DL (ref 70–99)
GLUCOSE SERPL-MCNC: 155 MG/DL (ref 70–99)
HCT VFR BLD AUTO: 38.5 % (ref 42–52)
HGB BLD-MCNC: 12.1 G/DL (ref 14–18)
IMM GRANULOCYTES # BLD: 0 K/UL
LYMPHOCYTES # BLD: 0.9 K/UL (ref 1.1–4.5)
LYMPHOCYTES NFR BLD: 11.9 % (ref 20–40)
MCH RBC QN AUTO: 29.5 PG (ref 27–31)
MCHC RBC AUTO-ENTMCNC: 31.4 G/DL (ref 33–37)
MCV RBC AUTO: 93.9 FL (ref 80–94)
MONOCYTES # BLD: 1.1 K/UL (ref 0–0.9)
MONOCYTES NFR BLD: 13.7 % (ref 0–10)
NEUTROPHILS # BLD: 5.1 K/UL (ref 1.5–7.5)
NEUTS SEG NFR BLD: 66.7 % (ref 50–65)
PERFORMED ON: ABNORMAL
PLATELET # BLD AUTO: 146 K/UL (ref 130–400)
PMV BLD AUTO: 12.7 FL (ref 9.4–12.4)
POTASSIUM SERPL-SCNC: 4.4 MMOL/L (ref 3.5–5)
RBC # BLD AUTO: 4.1 M/UL (ref 4.7–6.1)
SODIUM SERPL-SCNC: 139 MMOL/L (ref 136–145)
WBC # BLD AUTO: 7.7 K/UL (ref 4.8–10.8)

## 2025-06-15 PROCEDURE — 2580000003 HC RX 258: Performed by: INTERNAL MEDICINE

## 2025-06-15 PROCEDURE — 94640 AIRWAY INHALATION TREATMENT: CPT

## 2025-06-15 PROCEDURE — 6360000002 HC RX W HCPCS: Performed by: NURSE PRACTITIONER

## 2025-06-15 PROCEDURE — 2700000000 HC OXYGEN THERAPY PER DAY

## 2025-06-15 PROCEDURE — 2500000003 HC RX 250 WO HCPCS

## 2025-06-15 PROCEDURE — 6370000000 HC RX 637 (ALT 250 FOR IP)

## 2025-06-15 PROCEDURE — 80048 BASIC METABOLIC PNL TOTAL CA: CPT

## 2025-06-15 PROCEDURE — 94760 N-INVAS EAR/PLS OXIMETRY 1: CPT

## 2025-06-15 PROCEDURE — 94150 VITAL CAPACITY TEST: CPT

## 2025-06-15 PROCEDURE — 6370000000 HC RX 637 (ALT 250 FOR IP): Performed by: NURSE PRACTITIONER

## 2025-06-15 PROCEDURE — 85025 COMPLETE CBC W/AUTO DIFF WBC: CPT

## 2025-06-15 PROCEDURE — 1200000000 HC SEMI PRIVATE

## 2025-06-15 PROCEDURE — 6360000002 HC RX W HCPCS

## 2025-06-15 PROCEDURE — 36415 COLL VENOUS BLD VENIPUNCTURE: CPT

## 2025-06-15 PROCEDURE — 82962 GLUCOSE BLOOD TEST: CPT

## 2025-06-15 RX ADMIN — ALLOPURINOL 300 MG: 300 TABLET ORAL at 08:21

## 2025-06-15 RX ADMIN — GUAIFENESIN 600 MG: 600 TABLET, EXTENDED RELEASE ORAL at 08:21

## 2025-06-15 RX ADMIN — MICONAZOLE NITRATE: 2 POWDER TOPICAL at 22:19

## 2025-06-15 RX ADMIN — SODIUM CHLORIDE, PRESERVATIVE FREE 10 ML: 5 INJECTION INTRAVENOUS at 22:20

## 2025-06-15 RX ADMIN — IPRATROPIUM BROMIDE 0.5 MG: 0.5 SOLUTION RESPIRATORY (INHALATION) at 19:49

## 2025-06-15 RX ADMIN — CETIRIZINE HYDROCHLORIDE 10 MG: 10 TABLET ORAL at 08:21

## 2025-06-15 RX ADMIN — ENOXAPARIN SODIUM 60 MG: 100 INJECTION SUBCUTANEOUS at 08:21

## 2025-06-15 RX ADMIN — METOPROLOL SUCCINATE 25 MG: 25 TABLET, FILM COATED, EXTENDED RELEASE ORAL at 22:15

## 2025-06-15 RX ADMIN — ENOXAPARIN SODIUM 60 MG: 100 INJECTION SUBCUTANEOUS at 22:20

## 2025-06-15 RX ADMIN — SODIUM CHLORIDE: 0.9 INJECTION, SOLUTION INTRAVENOUS at 23:53

## 2025-06-15 RX ADMIN — IPRATROPIUM BROMIDE 0.5 MG: 0.5 SOLUTION RESPIRATORY (INHALATION) at 07:34

## 2025-06-15 RX ADMIN — MONTELUKAST SODIUM 10 MG: 10 TABLET, FILM COATED ORAL at 22:15

## 2025-06-15 RX ADMIN — POTASSIUM CHLORIDE 20 MEQ: 1500 TABLET, EXTENDED RELEASE ORAL at 08:21

## 2025-06-15 RX ADMIN — METOPROLOL SUCCINATE 25 MG: 25 TABLET, FILM COATED, EXTENDED RELEASE ORAL at 08:21

## 2025-06-15 RX ADMIN — ASPIRIN 81 MG: 81 TABLET, COATED ORAL at 08:21

## 2025-06-15 RX ADMIN — PRAVASTATIN SODIUM 20 MG: 20 TABLET ORAL at 22:15

## 2025-06-15 RX ADMIN — IPRATROPIUM BROMIDE 0.5 MG: 0.5 SOLUTION RESPIRATORY (INHALATION) at 15:16

## 2025-06-15 RX ADMIN — INSULIN GLARGINE 30 UNITS: 100 INJECTION, SOLUTION SUBCUTANEOUS at 22:16

## 2025-06-15 RX ADMIN — GUAIFENESIN 600 MG: 600 TABLET, EXTENDED RELEASE ORAL at 22:15

## 2025-06-15 RX ADMIN — PANTOPRAZOLE SODIUM 40 MG: 40 TABLET, DELAYED RELEASE ORAL at 08:21

## 2025-06-15 RX ADMIN — IPRATROPIUM BROMIDE 0.5 MG: 0.5 SOLUTION RESPIRATORY (INHALATION) at 10:44

## 2025-06-15 RX ADMIN — MICONAZOLE NITRATE: 2 POWDER TOPICAL at 08:22

## 2025-06-15 NOTE — PLAN OF CARE
Problem: Chronic Conditions and Co-morbidities  Goal: Patient's chronic conditions and co-morbidity symptoms are monitored and maintained or improved  6/15/2025 0956 by Shira Salinas RN  Outcome: Progressing  6/15/2025 0052 by Jose Urban RN  Outcome: Progressing     Problem: Discharge Planning  Goal: Discharge to home or other facility with appropriate resources  6/15/2025 0956 by Shira Salinas RN  Outcome: Progressing  6/15/2025 0052 by Jose Urban RN  Outcome: Progressing     Problem: Pain  Goal: Verbalizes/displays adequate comfort level or baseline comfort level  6/15/2025 0956 by Shira Salinas RN  Outcome: Progressing  6/15/2025 0052 by Jose Urban RN  Outcome: Progressing     Problem: Safety - Adult  Goal: Free from fall injury  6/15/2025 0956 by Shira Salinas RN  Outcome: Progressing  6/15/2025 0052 by Jose Urban RN  Outcome: Progressing     Problem: ABCDS Injury Assessment  Goal: Absence of physical injury  6/15/2025 0956 by Shira Salinas RN  Outcome: Progressing  6/15/2025 0052 by Jose Urban RN  Outcome: Progressing     Problem: Skin/Tissue Integrity  Goal: Skin integrity remains intact  Description: 1.  Monitor for areas of redness and/or skin breakdown2.  Assess vascular access sites hourly3.  Every 4-6 hours minimum:  Change oxygen saturation probe site4.  Every 4-6 hours:  If on nasal continuous positive airway pressure, respiratory therapy assess nares and determine need for appliance change or resting period  6/15/2025 0956 by Shira Salinas RN  Outcome: Progressing  6/15/2025 0052 by Jose Urban RN  Outcome: Progressing     Problem: Nutrition Deficit:  Goal: Optimize nutritional status  6/15/2025 0956 by Shira Salinas RN  Outcome: Progressing  6/15/2025 0052 by Jose Urban RN  Outcome: Progressing     Problem: Neurosensory - Adult  Goal: Achieves stable or improved neurological status  6/15/2025 0956 by Shira Salinas RN  Outcome: Progressing  6/15/2025  0052 by Rajni, Jose, RN  Outcome: Progressing     Problem: Respiratory - Adult  Goal: Achieves optimal ventilation and oxygenation  6/15/2025 0956 by Shira Salinas RN  Outcome: Progressing  6/15/2025 0052 by Jose Urban RN  Outcome: Progressing     Problem: Cardiovascular - Adult  Goal: Maintains optimal cardiac output and hemodynamic stability  6/15/2025 0956 by Shira Salinas RN  Outcome: Progressing  6/15/2025 0052 by Jose Urban RN  Outcome: Progressing     Problem: Skin/Tissue Integrity - Adult  Goal: Skin integrity remains intact  Description: 1.  Monitor for areas of redness and/or skin breakdown2.  Assess vascular access sites hourly3.  Every 4-6 hours minimum:  Change oxygen saturation probe site4.  Every 4-6 hours:  If on nasal continuous positive airway pressure, respiratory therapy assess nares and determine need for appliance change or resting period  6/15/2025 0956 by Shira Salinas RN  Outcome: Progressing  6/15/2025 0052 by Jose Urban RN  Outcome: Progressing  Goal: Incisions, wounds, or drain sites healing without S/S of infection  6/15/2025 0956 by Shira Salinas RN  Outcome: Progressing  6/15/2025 0052 by Jose Urban RN  Outcome: Progressing  Goal: Oral mucous membranes remain intact  6/15/2025 0956 by Shira Salinas RN  Outcome: Progressing  6/15/2025 0052 by Jose Urban RN  Outcome: Progressing     Problem: Musculoskeletal - Adult  Goal: Return mobility to safest level of function  6/15/2025 0956 by Shira Salinas RN  Outcome: Progressing  6/15/2025 0052 by Jose Urban RN  Outcome: Progressing  Goal: Maintain proper alignment of affected body part  6/15/2025 0956 by Shira Salinas RN  Outcome: Progressing  6/15/2025 0052 by Jose Urban RN  Outcome: Progressing  Goal: Return ADL status to a safe level of function  6/15/2025 0956 by Shira Salinas RN  Outcome: Progressing  6/15/2025 0052 by Jose Urban RN  Outcome: Progressing     Problem:

## 2025-06-15 NOTE — PLAN OF CARE
Problem: Chronic Conditions and Co-morbidities  Goal: Patient's chronic conditions and co-morbidity symptoms are monitored and maintained or improved  6/15/2025 0052 by Jose Urban RN  Outcome: Progressing  6/14/2025 1402 by Shira Salinas RN  Outcome: Progressing     Problem: Discharge Planning  Goal: Discharge to home or other facility with appropriate resources  6/15/2025 0052 by Jose Urban RN  Outcome: Progressing  6/14/2025 1402 by Shira Salinas RN  Outcome: Progressing     Problem: Pain  Goal: Verbalizes/displays adequate comfort level or baseline comfort level  6/15/2025 0052 by Jsoe Urban RN  Outcome: Progressing  6/14/2025 1402 by Shira Salinas RN  Outcome: Progressing     Problem: Safety - Adult  Goal: Free from fall injury  6/15/2025 0052 by Jose Urban RN  Outcome: Progressing  6/14/2025 1402 by Shira Salinas RN  Outcome: Progressing     Problem: ABCDS Injury Assessment  Goal: Absence of physical injury  6/15/2025 0052 by Jose Urban RN  Outcome: Progressing  6/14/2025 1402 by Shira Salinas RN  Outcome: Progressing     Problem: Skin/Tissue Integrity  Goal: Skin integrity remains intact  Description: 1.  Monitor for areas of redness and/or skin breakdown2.  Assess vascular access sites hourly3.  Every 4-6 hours minimum:  Change oxygen saturation probe site4.  Every 4-6 hours:  If on nasal continuous positive airway pressure, respiratory therapy assess nares and determine need for appliance change or resting period  6/15/2025 0052 by Jose Urban RN  Outcome: Progressing  6/14/2025 1402 by Shira Salinas RN  Outcome: Progressing     Problem: Nutrition Deficit:  Goal: Optimize nutritional status  6/15/2025 0052 by Jose Urban RN  Outcome: Progressing  6/14/2025 1402 by Shira Salinas RN  Outcome: Progressing     Problem: Neurosensory - Adult  Goal: Achieves stable or improved neurological status  6/15/2025 0052 by Jose Urban RN  Outcome: Progressing  6/14/2025

## 2025-06-15 NOTE — PROGRESS NOTES
sodium chloride 75 mL/hr at 06/13/25 1800    sodium chloride 5 mL/hr at 05/18/25 1459    dextrose        insulin lispro  0-4 Units SubCUTAneous 4x Daily AC & HS    [Held by provider] bumetanide  1 mg Oral BID    [Held by provider] lactulose  20 g Oral TID    [Held by provider] polyethylene glycol  17 g Oral BID    metoprolol succinate  25 mg Oral BID    ipratropium  0.5 mg Nebulization 4x Daily RT    [Held by provider] senna  2 tablet Oral BID    montelukast  10 mg Oral Nightly    cetirizine  10 mg Oral Daily    guaiFENesin  600 mg Oral BID    miconazole   Topical BID    allopurinol  300 mg Oral Daily    aspirin EC  81 mg Oral Daily    [Held by provider] losartan  50 mg Oral Daily    pantoprazole  40 mg Oral QAM AC    pravastatin  20 mg Oral Nightly    potassium chloride  20 mEq Oral Daily    sodium chloride flush  5-40 mL IntraVENous 2 times per day    enoxaparin  60 mg SubCUTAneous BID    insulin glargine  30 Units SubCUTAneous Nightly     loperamide, sodium chloride, tetrahydrozoline, artificial tears, albuterol, sodium chloride flush, sodium chloride, ondansetron **OR** ondansetron, [Held by provider] polyethylene glycol, acetaminophen **OR** acetaminophen, potassium chloride **OR** potassium alternative oral replacement **OR** potassium chloride, magnesium sulfate, sulfur hexafluoride microspheres, glucose, dextrose bolus **OR** dextrose bolus, glucagon (rDNA), dextrose  ADULT DIET; Regular; 3 carb choices (45 gm/meal); Low Fat/Low Chol/High Fiber/2 gm Na; 1500 ml       Labs:   CBC with DIFF:   Recent Labs     06/13/25  0301 06/14/25  0230 06/15/25  0221   WBC 7.6 7.2 7.7   RBC 4.63* 4.51* 4.10*   HGB 13.9* 13.4* 12.1*   HCT 43.6 42.6 38.5*   MCV 94.2* 94.5* 93.9   MCH 30.0 29.7 29.5   MCHC 31.9* 31.5* 31.4*   RDW 15.1* 15.0* 15.0*    147 146   MPV 12.4 13.0* 12.7*   NEUTOPHILPCT 69.9* 71.0* 66.7*   LYMPHOPCT 11.9* 11.2* 11.9*   MONOPCT 11.6* 11.4* 13.7*   BASOPCT 0.7 0.7 0.7   NEUTROABS 5.3    US RENAL COMPLETE  Result Date: 6/11/2025  ULTRASOUND RENAL COMPLETE  INDICATION: 65-year-old male being seen with acute kidney injury  COMPARISON: None  TECHNIQUE: Sonography of the kidneys and urinary bladder was performed. All 3 plane dimensions are given in length by height by width or craniocaudad / anterior-posterior /medial-lateral unless otherwise stated.  FINDINGS: The right kidney measures 11.9 x 5.3 x 5.4 cm normal parenchymal thickness with no hydronephrosis or nephrolithiasis. No lesion.  The urinary bladder was not visualized.  The left kidney measures 12.0 x 4.8 x 5.1 cm. There is mild parenchymal thinning. No hydronephrosis nephrolithiasis or lesion.       Mild parenchymal thinning of the left kidney. Kidneys otherwise normal.  The urinary bladder was not visualized.    ______________________________________ Electronically signed by: JANUSZ MORSE M.D. Date:     06/11/2025 Time:    07:04     XR CHEST PORTABLE  Result Date: 5/19/2025  EXAM: CHEST RADIOGRAPH (1 VIEW)  TECHNIQUE: Frontal Chest Radiograph.  HISTORY: Possible aspiration  COMPARISON: 05/14/2025.  FINDINGS:  Lines, Tubes, Devices:  None  Lungs and Pleura:  Limited inspiration.  This limits evaluation of the lungs, particularly the left lower lung. The study is also limited by patient positioning. Hazy appearance to the right lung and  interstitial prominence.  Cardiac silhouette: Enlarged.  Bones: No acute abnormality.        Limited as above.  Possible persistent left pleural effusion with adjacent consolidation.  Interstitial prominence, possibly interstitial edema or vascular crowding related to degree of inspiration and positioning.    ______________________________________ Electronically signed by: JOELLE CARDENAS D.O. Date:     05/19/2025 Time:    19:38             Objective:   Vitals:   /77   Pulse 90   Temp 97.9 °F (36.6 °C) (Axillary)   Resp 16   Ht 1.753 m (5' 9.02\")   Wt (!) 204.8 kg (451 lb 6.4 oz)   SpO2 96%   BMI

## 2025-06-15 NOTE — PROGRESS NOTES
Nephrology ([facility]) Consult Note      Patient:  Daljit Elizondo  YOB: 1960  Date of Service: 6/15/2025  MRN: [id number]   Acct: [id number]   Primary Care Physician: Jessie Helms APRN - CNP  Advance Directive: Full Code  Admit Date: 5/14/2025       Hospital Day: 32  Referring Provider: Cody Howell MD    Patient independently seen and examined, Chart, Consults, Notes, Operative notes, Labs, Cardiology, and Radiology studies reviewed as available.        Subjective:  Daljit Elizondo is a 65 y.o. male for whom we were consulted for evaluation and treatment of acute kidney injury.  We are consulted on hospital day 28 for evaluation of acute kidney injury.  Patient was a poor historian but recalled no prior nephrologic evaluations or problems.  He appears to have had another kidney injury back in 2011 by our hospital records.  More recently for the first 24 days or so with his hospitalization his renal function had been fairly stable but increased on 6/9 after previously at baseline on 6/7.  It has remained there for the next 2 days and we were consulted on 6/11 for further evaluation.  Chart review demonstrates a 64 kg weight loss since admission (but still had a weight of 458 pounds on consult) with only approximately 12 L net negative.  He had been on a Bumex infusion and did better with that than the IV bolus dosing.  He was then transitioned to oral Bumex dosing.  He denies specific complaints upon questioning.  Denied current chest pain, shortness of air at rest, nausea vomiting, dysuria hematuria.  Denied rash or hemoptysis as well.    Today, no overnight events.  He continues to note diarrhea but had reported some improvement in the preceding days.  Denied other new complaints upon questioning.  Using BiPAP for rest.  Not had therapy in several days.    Allergies:  Penicillamine, Silvadene [silver sulfadiazine], Aerohist [chlorpheniramine-methscop er],

## 2025-06-16 LAB
25(OH)D3 SERPL-MCNC: 15.6 NG/ML
ANION GAP SERPL CALCULATED.3IONS-SCNC: 9 MMOL/L (ref 8–16)
BACTERIA #/AREA URNS HPF: ABNORMAL /HPF
BASOPHILS # BLD: 0 K/UL (ref 0–0.2)
BASOPHILS NFR BLD: 0.6 % (ref 0–1)
BILIRUB UR QL STRIP: NEGATIVE
BUN SERPL-MCNC: 60 MG/DL (ref 8–23)
CALCIUM SERPL-MCNC: 9 MG/DL (ref 8.8–10.2)
CHLORIDE SERPL-SCNC: 107 MMOL/L (ref 98–107)
CLARITY UR: ABNORMAL
CO2 SERPL-SCNC: 23 MMOL/L (ref 22–29)
COLOR UR: YELLOW
CREAT SERPL-MCNC: 3.2 MG/DL (ref 0.7–1.2)
CREAT UR-MCNC: 71 MG/DL (ref 39–259)
CRYSTALS URNS MICRO: ABNORMAL /HPF
EOSINOPHIL # BLD: 0.4 K/UL (ref 0–0.6)
EOSINOPHIL NFR BLD: 6.3 % (ref 0–5)
ERYTHROCYTE [DISTWIDTH] IN BLOOD BY AUTOMATED COUNT: 14.9 % (ref 11.5–14.5)
GLUCOSE BLD-MCNC: 103 MG/DL (ref 70–99)
GLUCOSE BLD-MCNC: 104 MG/DL (ref 70–99)
GLUCOSE BLD-MCNC: 120 MG/DL (ref 70–99)
GLUCOSE BLD-MCNC: 96 MG/DL (ref 70–99)
GLUCOSE SERPL-MCNC: 112 MG/DL (ref 70–99)
GLUCOSE UR STRIP.AUTO-MCNC: NEGATIVE MG/DL
HCT VFR BLD AUTO: 38.1 % (ref 42–52)
HGB BLD-MCNC: 12.1 G/DL (ref 14–18)
HGB UR STRIP.AUTO-MCNC: ABNORMAL MG/L
HYALINE CASTS #/AREA URNS LPF: ABNORMAL /LPF (ref 0–5)
IMM GRANULOCYTES # BLD: 0 K/UL
KETONES UR STRIP.AUTO-MCNC: NEGATIVE MG/DL
LEUKOCYTE ESTERASE UR QL STRIP.AUTO: ABNORMAL
LYMPHOCYTES # BLD: 0.9 K/UL (ref 1.1–4.5)
LYMPHOCYTES NFR BLD: 13.7 % (ref 20–40)
MCH RBC QN AUTO: 30.3 PG (ref 27–31)
MCHC RBC AUTO-ENTMCNC: 31.8 G/DL (ref 33–37)
MCV RBC AUTO: 95.5 FL (ref 80–94)
MICROALBUMIN UR-MCNC: 1.35 MG/DL (ref 0–1.99)
MICROALBUMIN/CREAT UR-RTO: 19 MG/G (ref 0–29)
MONOCYTES # BLD: 0.8 K/UL (ref 0–0.9)
MONOCYTES NFR BLD: 11.5 % (ref 0–10)
MUCOUS THREADS URNS QL MICRO: ABNORMAL /LPF
NEUTROPHILS # BLD: 4.6 K/UL (ref 1.5–7.5)
NEUTS SEG NFR BLD: 67.3 % (ref 50–65)
NITRITE UR QL STRIP.AUTO: NEGATIVE
PERFORMED ON: ABNORMAL
PERFORMED ON: NORMAL
PH UR STRIP.AUTO: 6 [PH] (ref 5–8)
PLATELET # BLD AUTO: 136 K/UL (ref 130–400)
PMV BLD AUTO: 12.3 FL (ref 9.4–12.4)
POTASSIUM SERPL-SCNC: 4.3 MMOL/L (ref 3.5–5)
PROT UR STRIP.AUTO-MCNC: NEGATIVE MG/DL
PTH-INTACT SERPL-MCNC: 85.6 PG/ML (ref 15–65)
RBC # BLD AUTO: 3.99 M/UL (ref 4.7–6.1)
RBC #/AREA URNS HPF: ABNORMAL /HPF (ref 0–2)
SODIUM SERPL-SCNC: 139 MMOL/L (ref 136–145)
SODIUM UR-SCNC: <20 MMOL/L
SP GR UR STRIP.AUTO: 1.01 (ref 1–1.03)
SQUAMOUS #/AREA URNS HPF: ABNORMAL /HPF
UROBILINOGEN UR STRIP.AUTO-MCNC: 1 E.U./DL
WBC # BLD AUTO: 6.9 K/UL (ref 4.8–10.8)
WBC #/AREA URNS HPF: ABNORMAL /HPF (ref 0–5)

## 2025-06-16 PROCEDURE — 82306 VITAMIN D 25 HYDROXY: CPT

## 2025-06-16 PROCEDURE — 6370000000 HC RX 637 (ALT 250 FOR IP)

## 2025-06-16 PROCEDURE — 1200000000 HC SEMI PRIVATE

## 2025-06-16 PROCEDURE — 2700000000 HC OXYGEN THERAPY PER DAY

## 2025-06-16 PROCEDURE — 94760 N-INVAS EAR/PLS OXIMETRY 1: CPT

## 2025-06-16 PROCEDURE — 94150 VITAL CAPACITY TEST: CPT

## 2025-06-16 PROCEDURE — 6360000002 HC RX W HCPCS

## 2025-06-16 PROCEDURE — 82570 ASSAY OF URINE CREATININE: CPT

## 2025-06-16 PROCEDURE — 6360000002 HC RX W HCPCS: Performed by: NURSE PRACTITIONER

## 2025-06-16 PROCEDURE — 84300 ASSAY OF URINE SODIUM: CPT

## 2025-06-16 PROCEDURE — 82043 UR ALBUMIN QUANTITATIVE: CPT

## 2025-06-16 PROCEDURE — 82962 GLUCOSE BLOOD TEST: CPT

## 2025-06-16 PROCEDURE — 83970 ASSAY OF PARATHORMONE: CPT

## 2025-06-16 PROCEDURE — 81001 URINALYSIS AUTO W/SCOPE: CPT

## 2025-06-16 PROCEDURE — 80048 BASIC METABOLIC PNL TOTAL CA: CPT

## 2025-06-16 PROCEDURE — 36415 COLL VENOUS BLD VENIPUNCTURE: CPT

## 2025-06-16 PROCEDURE — 6370000000 HC RX 637 (ALT 250 FOR IP): Performed by: NURSE PRACTITIONER

## 2025-06-16 PROCEDURE — 85025 COMPLETE CBC W/AUTO DIFF WBC: CPT

## 2025-06-16 PROCEDURE — 6370000000 HC RX 637 (ALT 250 FOR IP): Performed by: INTERNAL MEDICINE

## 2025-06-16 PROCEDURE — 94640 AIRWAY INHALATION TREATMENT: CPT

## 2025-06-16 RX ADMIN — METOPROLOL SUCCINATE 25 MG: 25 TABLET, FILM COATED, EXTENDED RELEASE ORAL at 22:11

## 2025-06-16 RX ADMIN — CETIRIZINE HYDROCHLORIDE 10 MG: 10 TABLET ORAL at 08:39

## 2025-06-16 RX ADMIN — MONTELUKAST SODIUM 10 MG: 10 TABLET, FILM COATED ORAL at 21:00

## 2025-06-16 RX ADMIN — GUAIFENESIN 600 MG: 600 TABLET, EXTENDED RELEASE ORAL at 08:39

## 2025-06-16 RX ADMIN — IPRATROPIUM BROMIDE 0.5 MG: 0.5 SOLUTION RESPIRATORY (INHALATION) at 10:24

## 2025-06-16 RX ADMIN — METOPROLOL SUCCINATE 25 MG: 25 TABLET, FILM COATED, EXTENDED RELEASE ORAL at 08:39

## 2025-06-16 RX ADMIN — LOPERAMIDE HYDROCHLORIDE 2 MG: 2 CAPSULE ORAL at 17:38

## 2025-06-16 RX ADMIN — ENOXAPARIN SODIUM 60 MG: 100 INJECTION SUBCUTANEOUS at 08:39

## 2025-06-16 RX ADMIN — MICONAZOLE NITRATE: 2 POWDER TOPICAL at 21:00

## 2025-06-16 RX ADMIN — ALLOPURINOL 300 MG: 300 TABLET ORAL at 08:39

## 2025-06-16 RX ADMIN — GUAIFENESIN 600 MG: 600 TABLET, EXTENDED RELEASE ORAL at 21:00

## 2025-06-16 RX ADMIN — PANTOPRAZOLE SODIUM 40 MG: 40 TABLET, DELAYED RELEASE ORAL at 06:04

## 2025-06-16 RX ADMIN — IPRATROPIUM BROMIDE 0.5 MG: 0.5 SOLUTION RESPIRATORY (INHALATION) at 15:02

## 2025-06-16 RX ADMIN — IPRATROPIUM BROMIDE 0.5 MG: 0.5 SOLUTION RESPIRATORY (INHALATION) at 19:12

## 2025-06-16 RX ADMIN — POTASSIUM CHLORIDE 20 MEQ: 1500 TABLET, EXTENDED RELEASE ORAL at 08:41

## 2025-06-16 RX ADMIN — ASPIRIN 81 MG: 81 TABLET, COATED ORAL at 08:39

## 2025-06-16 RX ADMIN — PRAVASTATIN SODIUM 20 MG: 20 TABLET ORAL at 21:00

## 2025-06-16 RX ADMIN — ENOXAPARIN SODIUM 60 MG: 100 INJECTION SUBCUTANEOUS at 21:00

## 2025-06-16 RX ADMIN — IPRATROPIUM BROMIDE 0.5 MG: 0.5 SOLUTION RESPIRATORY (INHALATION) at 06:52

## 2025-06-16 NOTE — PROGRESS NOTES
Nephrology ([facility]) Progress Note    Patient:  Daljit Elizondo  YOB: 1960  Date of Service: 6/16/2025  MRN: [id number]   Acct: [id number]   Primary Care Physician: Jessie Helms APRN - CNP  Advance Directive: Full Code  Admit Date: 5/14/2025       Hospital Day: 33  Referring Provider: Cody Howell MD    Patient independently seen and examined, Chart, Consults, Notes, Operative notes, Labs, Cardiology, and Radiology studies reviewed as available.    Chief complaint: Abnormal labs.    Subjective:  Daljit Elizondo is a 65 y.o. male for whom we were consulted for evaluation and treatment of acute kidney injury.  Patient denies any history of chronic kidney disease.  He has been in the hospital for the last month, poor historian and has never seen nephrology in the past.  Patient has severe abdominal obesity, hypertension, sleep apnea, type 2 diabetes, chronic diastolic CHF, sleep apnea.  Presented with increasing shortness of breath/dyspnea on exertion.  Hospital course remarkable for treatment with intravenous diuretics for the treatment of volume overload, CHF exacerbation.  His serum creatinine was 0.9 mg with estimated GFR more than 90 mL on admission.  Patient has significant drop in GFR and nephrology is consulted.    This morning patient is fully alert and awake, denies any shortness of breath.  He denies any swelling of legs.  Renal ultrasound consistent with normal study    Allergies:  Penicillamine, Silvadene [silver sulfadiazine], Aerohist [chlorpheniramine-methscop er], Cephalosporins, Hemp seed oil, Neosporin [neomycin-polymyxin-gramicidin], and Penicillins    Medicines:  Current Facility-Administered Medications   Medication Dose Route Frequency Provider Last Rate Last Admin    loperamide (IMODIUM) capsule 2 mg  2 mg Oral 4x Daily PRN Cody Howell MD   2 mg at 06/13/25 5125    insulin lispro (HUMALOG,ADMELOG) injection vial 0-4 Units  0-4 Units

## 2025-06-16 NOTE — PROGRESS NOTES
[facility]    Patient:  Daljit Elizondo  YOB: 1960  Date of Service: 6/16/2025  MRN: [id number]   Acct: [id number]   Primary Care Physician: Jessie Helms APRN - CNP  Advance Directive: Full Code  Admit Date: 5/14/2025       Hospital Day: 33     NOTE: Portions of this note have been copied forward, however, changes made to reflect the most current clinical status of this patient.    CHIEF COMPLAINT:     Chief Complaint   Patient presents with    Shortness of Breath     Since Jan 1    Scrotal Pain       6/16/2025 7:47 AM  Subjective / Interval History:   06/16/2025  Patient seen and examined this a.m.   No new complaints.    No acute changes or acute overnight event reported.   Laying comfortably in bed in no apparent acute distress.  Denies any acute complaints or distress.    Endorses overall improvement.        06/15/2025  Patient seen and examined this a.m.   No new complaints.    No acute changes or acute overnight event reported.   Laying comfortably in bed in no apparent acute distress.  Denies any acute complaints or distress.    Endorses overall improvement.        06/14/2025  Patient seen and examined this a.m.   No new complaints.    Frequency of diarrhea improved.  No acute changes or acute overnight event reported.   Laying comfortably in bed in no apparent acute distress.    Denies any acute complaints or distress.          06/13/2025  Patient seen and examined this a.m.   No new complaints.    Continues to endorse watery diarrhea, although with improvement/control with as needed loperamide.  No acute changes or acute overnight event reported.   Laying comfortably in bed in no apparent acute distress.  Denies any acute complaints or distress.   Endorses overall improvement.      06/12/2025  Patient seen and examined this a.m.   No new complaints.    Continues to have diarrhea, however improved.  No acute changes or acute overnight event reported.   Laying

## 2025-06-16 NOTE — PLAN OF CARE
Problem: Chronic Conditions and Co-morbidities  Goal: Patient's chronic conditions and co-morbidity symptoms are monitored and maintained or improved  6/16/2025 1423 by Fely Randle LPN  Outcome: Progressing  Flowsheets (Taken 6/16/2025 1103)  Care Plan - Patient's Chronic Conditions and Co-Morbidity Symptoms are Monitored and Maintained or Improved:   Monitor and assess patient's chronic conditions and comorbid symptoms for stability, deterioration, or improvement   Collaborate with multidisciplinary team to address chronic and comorbid conditions and prevent exacerbation or deterioration   Update acute care plan with appropriate goals if chronic or comorbid symptoms are exacerbated and prevent overall improvement and discharge  6/16/2025 0049 by Jose Urban RN  Outcome: Progressing     Problem: Discharge Planning  Goal: Discharge to home or other facility with appropriate resources  6/16/2025 1423 by Fely Randle LPN  Outcome: Progressing  Flowsheets (Taken 6/16/2025 1103)  Discharge to home or other facility with appropriate resources:   Identify barriers to discharge with patient and caregiver   Arrange for needed discharge resources and transportation as appropriate   Identify discharge learning needs (meds, wound care, etc)   Refer to discharge planning if patient needs post-hospital services based on physician order or complex needs related to functional status, cognitive ability or social support system   Arrange for interpreters to assist at discharge as needed  6/16/2025 0049 by Jose Urban, RN  Outcome: Progressing     Problem: Pain  Goal: Verbalizes/displays adequate comfort level or baseline comfort level  6/16/2025 1423 by Fely Randle LPN  Outcome: Progressing  6/16/2025 0049 by Joes Urban, RN  Outcome: Progressing     Problem: Safety - Adult  Goal: Free from fall injury  6/16/2025 1423 by Fely Randle LPN  Outcome:

## 2025-06-17 LAB
ALBUMIN SERPL-MCNC: 3.1 G/DL (ref 3.5–5.2)
ALP SERPL-CCNC: 117 U/L (ref 40–129)
ALT SERPL-CCNC: 12 U/L (ref 10–50)
ANION GAP SERPL CALCULATED.3IONS-SCNC: 13 MMOL/L (ref 8–16)
AST SERPL-CCNC: 15 U/L (ref 10–50)
BASOPHILS # BLD: 0 K/UL (ref 0–0.2)
BASOPHILS NFR BLD: 0.5 % (ref 0–1)
BILIRUB SERPL-MCNC: 0.9 MG/DL (ref 0.2–1.2)
BUN SERPL-MCNC: 57 MG/DL (ref 8–23)
CALCIUM SERPL-MCNC: 8.9 MG/DL (ref 8.8–10.2)
CHLORIDE SERPL-SCNC: 109 MMOL/L (ref 98–107)
CO2 SERPL-SCNC: 18 MMOL/L (ref 22–29)
CREAT SERPL-MCNC: 3.4 MG/DL (ref 0.7–1.2)
EOSINOPHIL # BLD: 0.4 K/UL (ref 0–0.6)
EOSINOPHIL NFR BLD: 5.5 % (ref 0–5)
ERYTHROCYTE [DISTWIDTH] IN BLOOD BY AUTOMATED COUNT: 15.1 % (ref 11.5–14.5)
GLUCOSE BLD-MCNC: 113 MG/DL (ref 70–99)
GLUCOSE BLD-MCNC: 114 MG/DL (ref 70–99)
GLUCOSE BLD-MCNC: 155 MG/DL (ref 70–99)
GLUCOSE BLD-MCNC: 166 MG/DL (ref 70–99)
GLUCOSE SERPL-MCNC: 126 MG/DL (ref 70–99)
HCT VFR BLD AUTO: 41.5 % (ref 42–52)
HGB BLD-MCNC: 12.8 G/DL (ref 14–18)
IMM GRANULOCYTES # BLD: 0 K/UL
LYMPHOCYTES # BLD: 0.9 K/UL (ref 1.1–4.5)
LYMPHOCYTES NFR BLD: 12.8 % (ref 20–40)
MCH RBC QN AUTO: 29.8 PG (ref 27–31)
MCHC RBC AUTO-ENTMCNC: 30.8 G/DL (ref 33–37)
MCV RBC AUTO: 96.5 FL (ref 80–94)
MONOCYTES # BLD: 0.7 K/UL (ref 0–0.9)
MONOCYTES NFR BLD: 9.9 % (ref 0–10)
NEUTROPHILS # BLD: 5.2 K/UL (ref 1.5–7.5)
NEUTS SEG NFR BLD: 70.9 % (ref 50–65)
PERFORMED ON: ABNORMAL
PLATELET # BLD AUTO: 154 K/UL (ref 130–400)
PMV BLD AUTO: 12.8 FL (ref 9.4–12.4)
POTASSIUM SERPL-SCNC: 4.9 MMOL/L (ref 3.5–5.1)
PROT SERPL-MCNC: 5.4 G/DL (ref 6.4–8.3)
RBC # BLD AUTO: 4.3 M/UL (ref 4.7–6.1)
SODIUM SERPL-SCNC: 140 MMOL/L (ref 136–145)
WBC # BLD AUTO: 7.3 K/UL (ref 4.8–10.8)

## 2025-06-17 PROCEDURE — 94760 N-INVAS EAR/PLS OXIMETRY 1: CPT

## 2025-06-17 PROCEDURE — 6360000002 HC RX W HCPCS

## 2025-06-17 PROCEDURE — 6370000000 HC RX 637 (ALT 250 FOR IP)

## 2025-06-17 PROCEDURE — 36415 COLL VENOUS BLD VENIPUNCTURE: CPT

## 2025-06-17 PROCEDURE — 6370000000 HC RX 637 (ALT 250 FOR IP): Performed by: NURSE PRACTITIONER

## 2025-06-17 PROCEDURE — 85025 COMPLETE CBC W/AUTO DIFF WBC: CPT

## 2025-06-17 PROCEDURE — 94640 AIRWAY INHALATION TREATMENT: CPT

## 2025-06-17 PROCEDURE — 94150 VITAL CAPACITY TEST: CPT

## 2025-06-17 PROCEDURE — 2580000003 HC RX 258: Performed by: INTERNAL MEDICINE

## 2025-06-17 PROCEDURE — 2700000000 HC OXYGEN THERAPY PER DAY

## 2025-06-17 PROCEDURE — 80053 COMPREHEN METABOLIC PANEL: CPT

## 2025-06-17 PROCEDURE — 51798 US URINE CAPACITY MEASURE: CPT

## 2025-06-17 PROCEDURE — 6360000002 HC RX W HCPCS: Performed by: NURSE PRACTITIONER

## 2025-06-17 PROCEDURE — 1200000000 HC SEMI PRIVATE

## 2025-06-17 PROCEDURE — 82962 GLUCOSE BLOOD TEST: CPT

## 2025-06-17 RX ADMIN — IPRATROPIUM BROMIDE 0.5 MG: 0.5 SOLUTION RESPIRATORY (INHALATION) at 19:19

## 2025-06-17 RX ADMIN — MICONAZOLE NITRATE: 2 POWDER TOPICAL at 21:27

## 2025-06-17 RX ADMIN — ENOXAPARIN SODIUM 60 MG: 100 INJECTION SUBCUTANEOUS at 08:43

## 2025-06-17 RX ADMIN — PANTOPRAZOLE SODIUM 40 MG: 40 TABLET, DELAYED RELEASE ORAL at 05:41

## 2025-06-17 RX ADMIN — GUAIFENESIN 600 MG: 600 TABLET, EXTENDED RELEASE ORAL at 08:43

## 2025-06-17 RX ADMIN — POTASSIUM CHLORIDE 20 MEQ: 1500 TABLET, EXTENDED RELEASE ORAL at 08:43

## 2025-06-17 RX ADMIN — METOPROLOL SUCCINATE 25 MG: 25 TABLET, FILM COATED, EXTENDED RELEASE ORAL at 08:43

## 2025-06-17 RX ADMIN — CETIRIZINE HYDROCHLORIDE 10 MG: 10 TABLET ORAL at 08:43

## 2025-06-17 RX ADMIN — MONTELUKAST SODIUM 10 MG: 10 TABLET, FILM COATED ORAL at 21:26

## 2025-06-17 RX ADMIN — IPRATROPIUM BROMIDE 0.5 MG: 0.5 SOLUTION RESPIRATORY (INHALATION) at 11:02

## 2025-06-17 RX ADMIN — PRAVASTATIN SODIUM 20 MG: 20 TABLET ORAL at 21:26

## 2025-06-17 RX ADMIN — GUAIFENESIN 600 MG: 600 TABLET, EXTENDED RELEASE ORAL at 21:26

## 2025-06-17 RX ADMIN — MICONAZOLE NITRATE: 2 POWDER TOPICAL at 08:43

## 2025-06-17 RX ADMIN — ALLOPURINOL 300 MG: 300 TABLET ORAL at 08:43

## 2025-06-17 RX ADMIN — IPRATROPIUM BROMIDE 0.5 MG: 0.5 SOLUTION RESPIRATORY (INHALATION) at 14:52

## 2025-06-17 RX ADMIN — ASPIRIN 81 MG: 81 TABLET, COATED ORAL at 08:43

## 2025-06-17 RX ADMIN — IPRATROPIUM BROMIDE 0.5 MG: 0.5 SOLUTION RESPIRATORY (INHALATION) at 07:10

## 2025-06-17 RX ADMIN — ENOXAPARIN SODIUM 60 MG: 100 INJECTION SUBCUTANEOUS at 21:26

## 2025-06-17 RX ADMIN — SODIUM CHLORIDE: 0.9 INJECTION, SOLUTION INTRAVENOUS at 14:37

## 2025-06-17 RX ADMIN — METOPROLOL SUCCINATE 25 MG: 25 TABLET, FILM COATED, EXTENDED RELEASE ORAL at 21:26

## 2025-06-17 NOTE — PLAN OF CARE
practices   Implement preventative oral hygiene regimen   Implement oral medicated treatments as ordered     Problem: Musculoskeletal - Adult  Goal: Return mobility to safest level of function  6/16/2025 2241 by Anders Moy RN  Outcome: Progressing  6/16/2025 1423 by Fely Randle LPN  Outcome: Progressing  Flowsheets (Taken 6/16/2025 1103)  Return Mobility to Safest Level of Function:   Assess patient stability and activity tolerance for standing, transferring and ambulating with or without assistive devices   Assist with transfers and ambulation using safe patient handling equipment as needed   Ensure adequate protection for wounds/incisions during mobilization   Obtain physical therapy/occupational therapy consults as needed   Apply continuous passive motion per provider or physical therapy orders to increase flexion toward goal   Instruct patient/family in ordered activity level  Goal: Maintain proper alignment of affected body part  6/16/2025 2241 by Anders Moy RN  Outcome: Progressing  6/16/2025 1423 by Fely Randle LPN  Outcome: Progressing  Flowsheets (Taken 6/16/2025 1103)  Maintain proper alignment of affected body part:   Support and protect limb and body alignment per provider's orders   Instruct and reinforce with patient and family use of appropriate assistive device and precautions (e.g. spinal or hip dislocation precautions)  Goal: Return ADL status to a safe level of function  6/16/2025 2241 by Anders Moy RN  Outcome: Progressing  6/16/2025 1423 by Fely Randle LPN  Outcome: Progressing  Flowsheets (Taken 6/16/2025 1103)  Return ADL Status to a Safe Level of Function:   Administer medication as ordered   Assess activities of daily living deficits and provide assistive devices as needed   Obtain physical therapy/occupational therapy consults as needed   Assist and instruct patient to increase activity and self care as tolerated     Problem:  LPN  Outcome: Progressing  Flowsheets (Taken 6/16/2025 1103)  Absence of urinary retention:   Assess patient’s ability to void and empty bladder   Monitor intake/output and perform bladder scan as needed   Place urinary catheter per Licensed Independent Practitioner order if needed   Discuss with Licensed Independent Practitioner  medications to alleviate retention as needed   Discuss catheterization for long term situations as appropriate     Problem: Infection - Adult  Goal: Absence of infection at discharge  6/16/2025 2241 by Anders Moy RN  Outcome: Progressing  6/16/2025 1423 by Fely Randle LPN  Outcome: Progressing  Flowsheets (Taken 6/16/2025 1103)  Absence of infection at discharge:   Assess and monitor for signs and symptoms of infection   Monitor lab/diagnostic results   Monitor all insertion sites i.e., indwelling lines, tubes and drains   Monitor endotracheal (as able) and nasal secretions for changes in amount and color   Administer medications as ordered   [place] appropriate cooling/warming therapies per order   Instruct and encourage patient and family to use good hand hygiene technique   Identify and instruct in appropriate isolation precautions for identified infection/condition  Goal: Absence of infection during hospitalization  6/16/2025 2241 by Anders Moy RN  Outcome: Progressing  6/16/2025 1423 by Fley Randle LPN  Outcome: Progressing  Flowsheets (Taken 6/16/2025 1103)  Absence of infection during hospitalization:   Assess and monitor for signs and symptoms of infection   Monitor lab/diagnostic results   Monitor all insertion sites i.e., indwelling lines, tubes and drains   Monitor endotracheal (as able) and nasal secretions for changes in amount and color   Administer medications as ordered   [place] appropriate cooling/warming therapies per order   Instruct and encourage patient and family to use good hand hygiene technique   Identify and

## 2025-06-17 NOTE — PLAN OF CARE
Problem: Nutrition Deficit:  Goal: Optimize nutritional status  6/17/2025 1004 by Miriam Maldonado, MS, RD, LD  Outcome: Not Progressing  Flowsheets (Taken 6/17/2025 0997)  Nutrient intake appropriate for improving, restoring, or maintaining nutritional needs:   Assess nutritional status and recommend course of action   Monitor oral intake, labs, and treatment plans   Recommend appropriate diets, oral nutritional supplements, and vitamin/mineral supplements  6/16/2025 2781 by Anders Moy, RN  Outcome: Progressing

## 2025-06-17 NOTE — PROGRESS NOTES
Nephrology ([facility]) Progress Note    Patient:  Daljit Elizondo  YOB: 1960  Date of Service: 6/17/2025  MRN: [id number]   Acct: [id number]   Primary Care Physician: Jessie Helms APRN - CNP  Advance Directive: Full Code  Admit Date: 5/14/2025       Hospital Day: 34  Referring Provider: Cody Howell MD    Patient independently seen and examined, Chart, Consults, Notes, Operative notes, Labs, Cardiology, and Radiology studies reviewed as available.    Chief complaint: Abnormal labs.    Subjective:  Daljit Elizondo is a 65 y.o. male for whom we were consulted for evaluation and treatment of acute kidney injury.  Patient denies any history of chronic kidney disease.  He has been in the hospital for the last month, poor historian and has never seen nephrology in the past.  Patient has severe abdominal obesity, hypertension, sleep apnea, type 2 diabetes, chronic diastolic CHF, sleep apnea.  Presented with increasing shortness of breath/dyspnea on exertion.  Hospital course remarkable for treatment with intravenous diuretics for the treatment of volume overload, CHF exacerbation.  His serum creatinine was 0.9 mg with estimated GFR more than 90 mL on admission.  Patient has significant drop in GFR and nephrology is consulted.    This morning patient is fully alert and awake, denies any shortness of breath.  He denies any swelling of legs.  Renal ultrasound consistent with normal study    Allergies:  Penicillamine, Silvadene [silver sulfadiazine], Aerohist [chlorpheniramine-methscop er], Cephalosporins, Hemp seed oil, Neosporin [neomycin-polymyxin-gramicidin], and Penicillins    Medicines:  Current Facility-Administered Medications   Medication Dose Route Frequency Provider Last Rate Last Admin    loperamide (IMODIUM) capsule 2 mg  2 mg Oral 4x Daily PRN Cody Howell MD   2 mg at 06/16/25 1130    insulin lispro (HUMALOG,ADMELOG) injection vial 0-4 Units  0-4 Units

## 2025-06-17 NOTE — PROGRESS NOTES
Nutrition Assessment     Type and Reason for Visit: Reassess    Nutrition Recommendations/Plan:   Modify breakfast choices.  Follow for better po intakae     Malnutrition Assessment:  Malnutrition Status: At risk for malnutrition    Nutrition Assessment:  Patient continues to complain about the food.  \"It tastes awful.  Don't like it.  I only want cold cereal at breakfast.\"  Request for breakfast choice has been added to diet order.  Unable to find many suitable choices for pt's meal.  No ONS desired.  Weight has decreased approx 8# or 1.7% in the past week.    Nonopitting BLE edema and trace generalized edema.  BUN, Creat have increased and GFR has declined.    Estimated Daily Nutrient Needs:  Energy (kcal):  4956-8386 kcals (8-11 kcals/kg) Weight Used for Energy Requirements: Current     Protein (g):  145g Weight Used for Protein Requirements: Ideal        Fluid (ml/day):  5045-7309 ml Method Used for Fluid Requirements: 1 ml/kcal    Nutrition Related Findings:   trace generalized edema., nonpitting BLE edema Wound Type: Pressure Injury (lacerations)    Current Nutrition Therapies:    ADULT DIET; Regular; 3 carb choices (45 gm/meal); Low Fat/Low Chol/High Fiber/2 gm Na; 1500 ml; NO Hot cereal----SEND RICE KRISPIES AT BREAKFAST    Anthropometric Measures:  Height: 175.3 cm (5' 9.02\")  Current Body Wt: 204.8 kg (451 lb 8 oz)   BMI: 66.6        Nutrition Diagnosis:   Inadequate oral intake, Altered nutrition-related lab values related to limited food acceptance, renal dysfunction, endocrine dysfunction, increase demand for energy/nutrients as evidenced by intake 0-25%, intake 26-50%, weight loss, wounds    Nutrition Interventions:   Food and/or Nutrient Delivery: Continue Current Diet  Nutrition Education/Counseling: Survival skills/brief education completed  Coordination of Nutrition Care: Continue to monitor while inpatient  Plan of Care discussed with: pt    Goals:  Goals: Meet at least 75% of estimated needs, PO

## 2025-06-17 NOTE — PLAN OF CARE
Problem: Chronic Conditions and Co-morbidities  Goal: Patient's chronic conditions and co-morbidity symptoms are monitored and maintained or improved  Outcome: Progressing  Flowsheets (Taken 6/17/2025 0843)  Care Plan - Patient's Chronic Conditions and Co-Morbidity Symptoms are Monitored and Maintained or Improved:   Monitor and assess patient's chronic conditions and comorbid symptoms for stability, deterioration, or improvement   Collaborate with multidisciplinary team to address chronic and comorbid conditions and prevent exacerbation or deterioration   Update acute care plan with appropriate goals if chronic or comorbid symptoms are exacerbated and prevent overall improvement and discharge     Problem: Discharge Planning  Goal: Discharge to home or other facility with appropriate resources  Outcome: Progressing  Flowsheets (Taken 6/17/2025 0843)  Discharge to home or other facility with appropriate resources:   Identify barriers to discharge with patient and caregiver   Arrange for needed discharge resources and transportation as appropriate   Identify discharge learning needs (meds, wound care, etc)   Arrange for interpreters to assist at discharge as needed   Refer to discharge planning if patient needs post-hospital services based on physician order or complex needs related to functional status, cognitive ability or social support system     Problem: Pain  Goal: Verbalizes/displays adequate comfort level or baseline comfort level  Outcome: Progressing     Problem: Safety - Adult  Goal: Free from fall injury  Outcome: Progressing     Problem: ABCDS Injury Assessment  Goal: Absence of physical injury  Outcome: Progressing     Problem: Skin/Tissue Integrity  Goal: Skin integrity remains intact  Description: 1.  Monitor for areas of redness and/or skin breakdown2.  Assess vascular access sites hourly3.  Every 4-6 hours minimum:  Change oxygen saturation probe site4.  Every 4-6 hours:  If on nasal continuous

## 2025-06-17 NOTE — PROGRESS NOTES
(PRAVACHOL) tablet 20 mg  20 mg Oral Nightly Renay Thorne APRN - CNP   20 mg at 06/16/25 2100    potassium chloride (KLOR-CON M) extended release tablet 20 mEq  20 mEq Oral Daily Renay Thorne APRN - CNP   20 mEq at 06/16/25 0841    sodium chloride flush 0.9 % injection 5-40 mL  5-40 mL IntraVENous 2 times per day Renay Thorne APRN - CNP   10 mL at 06/15/25 2220    sodium chloride flush 0.9 % injection 5-40 mL  5-40 mL IntraVENous PRN Renay Thorne APRN - CNP        0.9 % sodium chloride infusion   IntraVENous PRN Renay Thorne APRN - CNP 5 mL/hr at 05/18/25 1459 New Bag at 05/18/25 1459    ondansetron (ZOFRAN-ODT) disintegrating tablet 4 mg  4 mg Oral Q8H PRN Renay Thorne APRN - CNP   4 mg at 06/09/25 1754    Or    ondansetron (ZOFRAN) injection 4 mg  4 mg IntraVENous Q6H PRN Renay Thorne APRN - CNP        [Held by provider] polyethylene glycol (GLYCOLAX) packet 17 g  17 g Oral Daily PRN Renay Thorne APRN - CNP   17 g at 06/07/25 0618    acetaminophen (TYLENOL) tablet 650 mg  650 mg Oral Q6H PRN Renay Thorne APRN - CNP   650 mg at 06/13/25 2210    Or    acetaminophen (TYLENOL) suppository 650 mg  650 mg Rectal Q6H PRN Renay Thorne APRN - CNP        enoxaparin (LOVENOX) injection 60 mg  60 mg SubCUTAneous BID Renay Thorne APRN - CNP   60 mg at 06/16/25 2100    potassium chloride (KLOR-CON M) extended release tablet 40 mEq  40 mEq Oral PRN Renay Thorne APRN - CNP        Or    potassium bicarb-citric acid (EFFER-K) effervescent tablet 40 mEq  40 mEq Oral PRN Renay Thorne APRN - CNP        Or    potassium chloride 10 mEq/100 mL IVPB (Peripheral Line)  10 mEq IntraVENous PRN Renay Thorne APRN - CNP        magnesium sulfate 2000 mg in 50 mL IVPB premix  2,000 mg IntraVENous PRN Renay Thorne APRN - CNP        sulfur hexafluoride microspheres (LUMASON) 60.7-25 MG injection 2 mL  2 mL IntraVENous ONCE PRN Renay Thorne APRN - CNP        insulin glargine (LANTUS)  his baseline functional status.     Attempted to discharge pt home with home health, but he has declined to leave the hospital. He has appealed his discharge thrice, as the first 2 have been denied.   At this point, he is responsible for the cost of continued hospitalization which is $2,315.00 daily.   Case management discussed the situation with risk management, see note from 6/9/25.      Pt had a discharge order in place from 6/3/25 to 6/9/25.   On 6/9, RN noted pt's urine appears brown.  Labs showed STEFANIE with Cr up to 3.0 (previously 1.2).   Started on IVF.   Workup ordered. Discharge cancelled. \"    STEFANIE   Creatinine trended up: 1.2 (06/07/2025) --> 3.0 --> 3.0 --> 3.0 --> 3.3 --> 3.4 --> 3.4 --> 3.2  --> 3.3 --> 3.2 --> 3.4  (06/17/2025)   Likely prerenal due to volume depletion from diuretics  Bladder scan Qshift - normal thus far  CK normal  Gentle IV hydration  Renal Ultrasound Complete (06/10/2025): Mild parenchymal thinning of the left kidney. Kidneys otherwise normal. The urinary bladder was not visualized.  Avoid hypotension & Nephrotoxins   Nephrology on board-appreciate recommendation     Acute on chronic diastolic heart failure  13.5 L UOP while on Bumex gtt, now discontinued  Hold Bumetanide   2 g Na diet  Monitor  Is/Os, daily weights    Diabetes Mellitus II, with Hyperglycemia  Hemoglobin A1C: 6.7% (05/15/2025)  Inpatient Regimens to include;  - Insulin Glargine (Lantus) 30 units subcu nightly  - Insulin Lispro (Humalog) on a low dose sliding scale  Monitor POC glucose, and adjust insulin regimen accordingly based on daily insulin requirement.     HTN  Toprol Xl     GERD  Protonix     Morbid obesity  Calorie restriction     REGI  Obesity hypoventilation syndrome  CPAP at night       Continue management of other chronic medical conditions - see above and orders.          Advance Directive: Full Code    ADULT DIET; Regular; 3 carb choices (45 gm/meal); Low Fat/Low Chol/High Fiber/2 gm Na; 1500 ml

## 2025-06-17 NOTE — CARE COORDINATION
Pt not medically stable for dc at this time.  Still following lab work. Had reached out to some of the facilites that were contacted previously for nhp to see if any had or would be able to accept.    Spoke with Jessica at [facility], they are willing to accept pt, however they had to order a specific type of jason lift that would accommodate pt if needed.  Jessica was not for sure how long it would take for it to gt delivered.  They are continuing to follow case and will advise when the needed equipment would be available.  Pt insurance will also require a precert again.  Will cont to follow.  Electronically signed by Shira Tomlin RN on 6/17/2025 at 2:31 PM

## 2025-06-18 LAB
ALBUMIN SERPL-MCNC: 3 G/DL (ref 3.5–5.2)
ALP SERPL-CCNC: 112 U/L (ref 40–129)
ALT SERPL-CCNC: 10 U/L (ref 10–50)
ANION GAP SERPL CALCULATED.3IONS-SCNC: 10 MMOL/L (ref 8–16)
AST SERPL-CCNC: 17 U/L (ref 10–50)
BASOPHILS # BLD: 0 K/UL (ref 0–0.2)
BASOPHILS NFR BLD: 0.6 % (ref 0–1)
BILIRUB SERPL-MCNC: 0.9 MG/DL (ref 0.2–1.2)
BUN SERPL-MCNC: 52 MG/DL (ref 8–23)
CALCIUM SERPL-MCNC: 9.1 MG/DL (ref 8.8–10.2)
CHLORIDE SERPL-SCNC: 110 MMOL/L (ref 98–107)
CO2 SERPL-SCNC: 23 MMOL/L (ref 22–29)
CREAT SERPL-MCNC: 3.3 MG/DL (ref 0.7–1.2)
EOSINOPHIL # BLD: 0.3 K/UL (ref 0–0.6)
EOSINOPHIL NFR BLD: 5.2 % (ref 0–5)
ERYTHROCYTE [DISTWIDTH] IN BLOOD BY AUTOMATED COUNT: 15.3 % (ref 11.5–14.5)
GLUCOSE BLD-MCNC: 121 MG/DL (ref 70–99)
GLUCOSE BLD-MCNC: 122 MG/DL (ref 70–99)
GLUCOSE BLD-MCNC: 158 MG/DL (ref 70–99)
GLUCOSE BLD-MCNC: 177 MG/DL (ref 70–99)
GLUCOSE SERPL-MCNC: 136 MG/DL (ref 70–99)
HCT VFR BLD AUTO: 40.1 % (ref 42–52)
HGB BLD-MCNC: 12.2 G/DL (ref 14–18)
IMM GRANULOCYTES # BLD: 0 K/UL
LYMPHOCYTES # BLD: 0.9 K/UL (ref 1.1–4.5)
LYMPHOCYTES NFR BLD: 13.2 % (ref 20–40)
MCH RBC QN AUTO: 29.8 PG (ref 27–31)
MCHC RBC AUTO-ENTMCNC: 30.4 G/DL (ref 33–37)
MCV RBC AUTO: 97.8 FL (ref 80–94)
MONOCYTES # BLD: 0.6 K/UL (ref 0–0.9)
MONOCYTES NFR BLD: 8.6 % (ref 0–10)
NEUTROPHILS # BLD: 4.7 K/UL (ref 1.5–7.5)
NEUTS SEG NFR BLD: 71.9 % (ref 50–65)
PERFORMED ON: ABNORMAL
PLATELET # BLD AUTO: 160 K/UL (ref 130–400)
PMV BLD AUTO: 12.8 FL (ref 9.4–12.4)
POTASSIUM SERPL-SCNC: 5.5 MMOL/L (ref 3.5–5.1)
PROT SERPL-MCNC: 6.1 G/DL (ref 6.4–8.3)
RBC # BLD AUTO: 4.1 M/UL (ref 4.7–6.1)
SODIUM SERPL-SCNC: 143 MMOL/L (ref 136–145)
WBC # BLD AUTO: 6.6 K/UL (ref 4.8–10.8)

## 2025-06-18 PROCEDURE — 6360000002 HC RX W HCPCS

## 2025-06-18 PROCEDURE — 2580000003 HC RX 258: Performed by: INTERNAL MEDICINE

## 2025-06-18 PROCEDURE — 2700000000 HC OXYGEN THERAPY PER DAY

## 2025-06-18 PROCEDURE — 94640 AIRWAY INHALATION TREATMENT: CPT

## 2025-06-18 PROCEDURE — 1200000000 HC SEMI PRIVATE

## 2025-06-18 PROCEDURE — 97535 SELF CARE MNGMENT TRAINING: CPT

## 2025-06-18 PROCEDURE — 51798 US URINE CAPACITY MEASURE: CPT

## 2025-06-18 PROCEDURE — 6370000000 HC RX 637 (ALT 250 FOR IP): Performed by: NURSE PRACTITIONER

## 2025-06-18 PROCEDURE — 80053 COMPREHEN METABOLIC PANEL: CPT

## 2025-06-18 PROCEDURE — 36415 COLL VENOUS BLD VENIPUNCTURE: CPT

## 2025-06-18 PROCEDURE — 6360000002 HC RX W HCPCS: Performed by: NURSE PRACTITIONER

## 2025-06-18 PROCEDURE — 82962 GLUCOSE BLOOD TEST: CPT

## 2025-06-18 PROCEDURE — 85025 COMPLETE CBC W/AUTO DIFF WBC: CPT

## 2025-06-18 PROCEDURE — 97530 THERAPEUTIC ACTIVITIES: CPT

## 2025-06-18 PROCEDURE — 6370000000 HC RX 637 (ALT 250 FOR IP)

## 2025-06-18 PROCEDURE — 6370000000 HC RX 637 (ALT 250 FOR IP): Performed by: INTERNAL MEDICINE

## 2025-06-18 RX ADMIN — SODIUM CHLORIDE: 0.9 INJECTION, SOLUTION INTRAVENOUS at 23:08

## 2025-06-18 RX ADMIN — MONTELUKAST SODIUM 10 MG: 10 TABLET, FILM COATED ORAL at 21:51

## 2025-06-18 RX ADMIN — MICONAZOLE NITRATE: 2 POWDER TOPICAL at 09:13

## 2025-06-18 RX ADMIN — INSULIN GLARGINE 30 UNITS: 100 INJECTION, SOLUTION SUBCUTANEOUS at 21:54

## 2025-06-18 RX ADMIN — ENOXAPARIN SODIUM 60 MG: 100 INJECTION SUBCUTANEOUS at 09:13

## 2025-06-18 RX ADMIN — ASPIRIN 81 MG: 81 TABLET, COATED ORAL at 09:13

## 2025-06-18 RX ADMIN — SODIUM ZIRCONIUM CYCLOSILICATE 10 G: 10 POWDER, FOR SUSPENSION ORAL at 09:13

## 2025-06-18 RX ADMIN — PANTOPRAZOLE SODIUM 40 MG: 40 TABLET, DELAYED RELEASE ORAL at 05:31

## 2025-06-18 RX ADMIN — SODIUM CHLORIDE: 0.9 INJECTION, SOLUTION INTRAVENOUS at 14:01

## 2025-06-18 RX ADMIN — IPRATROPIUM BROMIDE 0.5 MG: 0.5 SOLUTION RESPIRATORY (INHALATION) at 10:50

## 2025-06-18 RX ADMIN — PRAVASTATIN SODIUM 20 MG: 20 TABLET ORAL at 21:51

## 2025-06-18 RX ADMIN — IPRATROPIUM BROMIDE 0.5 MG: 0.5 SOLUTION RESPIRATORY (INHALATION) at 19:22

## 2025-06-18 RX ADMIN — CETIRIZINE HYDROCHLORIDE 10 MG: 10 TABLET ORAL at 09:13

## 2025-06-18 RX ADMIN — ENOXAPARIN SODIUM 60 MG: 100 INJECTION SUBCUTANEOUS at 21:53

## 2025-06-18 RX ADMIN — GUAIFENESIN 600 MG: 600 TABLET, EXTENDED RELEASE ORAL at 09:13

## 2025-06-18 RX ADMIN — GUAIFENESIN 600 MG: 600 TABLET, EXTENDED RELEASE ORAL at 21:52

## 2025-06-18 RX ADMIN — IPRATROPIUM BROMIDE 0.5 MG: 0.5 SOLUTION RESPIRATORY (INHALATION) at 07:02

## 2025-06-18 RX ADMIN — METOPROLOL SUCCINATE 25 MG: 25 TABLET, FILM COATED, EXTENDED RELEASE ORAL at 21:52

## 2025-06-18 RX ADMIN — MICONAZOLE NITRATE: 2 POWDER TOPICAL at 21:54

## 2025-06-18 RX ADMIN — METOPROLOL SUCCINATE 25 MG: 25 TABLET, FILM COATED, EXTENDED RELEASE ORAL at 09:13

## 2025-06-18 RX ADMIN — IPRATROPIUM BROMIDE 0.5 MG: 0.5 SOLUTION RESPIRATORY (INHALATION) at 14:50

## 2025-06-18 RX ADMIN — ALLOPURINOL 300 MG: 300 TABLET ORAL at 09:13

## 2025-06-18 NOTE — PROGRESS NOTES
Nephrology ([facility]) Progress Note    Patient:  Daljit Elizondo  YOB: 1960  Date of Service: 6/18/2025  MRN: [id number]   Acct: [id number]   Primary Care Physician: Jessie Helms APRN - CNP  Advance Directive: Full Code  Admit Date: 5/14/2025       Hospital Day: 35  Referring Provider: Cody Howell MD    Patient independently seen and examined, Chart, Consults, Notes, Operative notes, Labs, Cardiology, and Radiology studies reviewed as available.    Chief complaint: Abnormal labs.    Subjective:  Daljit Elizondo is a 65 y.o. male for whom we were consulted for evaluation and treatment of acute kidney injury.  Patient denies any history of chronic kidney disease.  He has been in the hospital for the last month, poor historian and has never seen nephrology in the past.  Patient has severe abdominal obesity, hypertension, sleep apnea, type 2 diabetes, chronic diastolic CHF, sleep apnea.  Presented with increasing shortness of breath/dyspnea on exertion.  Hospital course remarkable for treatment with intravenous diuretics for the treatment of volume overload, CHF exacerbation.  His serum creatinine was 0.9 mg with estimated GFR more than 90 mL on admission.  Patient has significant drop in GFR and nephrology is consulted.    Patient has shown improvement of renal function despite IV fluid continuation.  He remains bedridden, denies any shortness of breath.  However he has good urine output    Allergies:  Penicillamine, Silvadene [silver sulfadiazine], Aerohist [chlorpheniramine-methscop er], Cephalosporins, Hemp seed oil, Neosporin [neomycin-polymyxin-gramicidin], and Penicillins    Medicines:  Current Facility-Administered Medications   Medication Dose Route Frequency Provider Last Rate Last Admin    loperamide (IMODIUM) capsule 2 mg  2 mg Oral 4x Daily PRN Cody Howell MD   2 mg at 06/16/25 2088    insulin lispro (HUMALOG,ADMELOG) injection vial 0-4 Units  0-4

## 2025-06-18 NOTE — PROGRESS NOTES
Physical Therapy  Facility/Department: [facility] 3 DUNIA/VAS/MED  Physical Therapy 14 Day Reassessment    Name: Daljit Elizondo  : 1960  MRN: [id number]  Date of Service: 2025    Discharge Recommendations:  Continue to assess pending progress, Patient would benefit from continued therapy after discharge (ANTICIPATE DC TO SNF)          Patient Diagnosis(es): The primary encounter diagnosis was Hypervolemia, unspecified hypervolemia type. Diagnoses of Cardiac arrhythmia, unspecified cardiac arrhythmia type, Community acquired pneumonia, unspecified laterality, Shortness of breath, and Bilateral lower extremity edema were also pertinent to this visit.  Past Medical History:  has a past medical history of Abrasion, Anxiety, Asthma, Bell's palsy, Cataracts, bilateral, Cellulitis, Diabetes (HCC), Edema extremities, GERD (gastroesophageal reflux disease), Gout, H/O tracheostomy, H/O urinary retention, Hernia, hiatal, History of panniculitis, History of shingles, Hyperlipemia, Hypertension, Morbid obesity (HCC), Non-ST elevated myocardial infarction (HCC), Obesity, Paraphimosis, Pulmonary hypertension (HCC), Renal failure, Respiratory failure (HCC), Shoulder pain, Sleep apnea, Testosterone deficiency, Ulcer, Ulcer of calf (HCC), and Venous stasis of both lower extremities.  Past Surgical History:  has a past surgical history that includes Testicle surgery (10/1970); Foot surgery (Left, LONG AGO ); Tracheostomy tube placement; Foot surgery (Right); Mouth surgery; Appendectomy; Colonoscopy; and Endoscopy, colon, diagnostic.    Assessment  Body Structures, Functions, Activity Limitations Requiring Skilled Therapeutic Intervention: Decreased functional mobility ;Decreased ADL status;Decreased ROM;Decreased strength;Decreased balance;Decreased endurance;Decreased sensation;Decreased posture  Assessment: ANTICIPATE pt TO MAKE FURTHER FUNCTIONAL GAINS WITH CONTINUED SKILLED PT SERVICES IN THIS SETTING. MORBID OBESITY AND  SPECIALITY MATTRESS CAUSE INCREASED DIFFICULTY WITH MOBILITY  Therapy Prognosis: Fair  Decision Making: Medium Complexity  Activity Tolerance  Activity Tolerance: Patient tolerated treatment well    Plan  Physical Therapy Plan  General Plan: 3-5 times per week  Therapy Duration: 2 Weeks  Current Treatment Recommendations: Strengthening, ROM, Balance training, Functional mobility training, Transfer training, Gait training, Endurance training, Patient/Caregiver education & training, Safety education & training, Positioning, Equipment evaluation, education, & procurement, Therapeutic activities  Additional Comments: CO TREAT WITH OT. USE LIFT TO PLACE IN RECLINER AND THEN WORK ON SIT TO STAND AND TAKING STEPS. PROGRESS TO TF'S WITHOUT LIFT AS INDICATED  Safety Devices  Type of Devices: Call light within reach    Restrictions  Restrictions/Precautions  Restrictions/Precautions: Fall Risk, Contact Precautions  Position Activity Restriction  Other Position/Activity Restrictions: ESBL, MRSA     Subjective  General  Patient assessed for rehabilitation services?: Yes  Diagnosis: hypervolemia, cardiac arrythmia, CAP, SOB, morbid obesity  Subjective  Subjective: Pt states he is willing to work with therapy. reports he is not able to tolerate sitting in recliner longer than an hour.         Social/Functional History  Social/Functional History  Lives With: Alone  Type of Home: Mobile home  Home Layout: One level  Bathroom Shower/Tub: Walk-in shower  Bathroom Toilet: Standard  Bathroom Equipment: Shower chair, Grab bars in shower  Bathroom Accessibility: Accessible  Home Equipment: Hospital bed, Wheelchair - Electric, Walker - Standard, Walker - Rolling, Oxygen  Has the patient had two or more falls in the past year or any fall with injury in the past year?: Yes  Receives Help From: Family, Friend(s)  Prior Level of Assist for ADLs: Needs assistance  [place]ath: Moderate assistance  Dressing:  (Reports that he has been wearing hospital

## 2025-06-18 NOTE — PROGRESS NOTES
[facility] Progress Note    Patient:  Daljit Elizondo  YOB: 1960  Date of Service: 6/18/2025  MRN: [id number]   Acct: [id number]   Primary Care Physician: Jessie Helms APRN - CNP  Advance Directive: Full Code  Admit Date: 5/14/2025       Hospital Day: 35     NOTE: Portions of this note have been copied forward, however, changes made to reflect the most current clinical status of this patient.    CHIEF COMPLAINT:     Chief Complaint   Patient presents with    Shortness of Breath     Since Jan 1    Scrotal Pain       6/18/2025 11:28 AM  Subjective / Interval History:   06/18/2025  Patient seen and examined this a.m.   No new complaints.    No acute changes or acute overnight event reported.   Laying comfortably in bed in no apparent acute distress.  Denies any acute complaints or distress.    Endorses overall improvement.    Potassium of 5.5  Creatinine remains elevated at 3.3.      06/17/2025  Patient seen and examined this a.m.   No new complaints.    No acute changes or acute overnight event reported.   Laying comfortably in bed in no apparent acute distress.    Denies any acute complaints or distress.     Endorses overall improvement.        06/16/2025  Patient seen and examined this a.m.   No new complaints.    No acute changes or acute overnight event reported.   Laying comfortably in bed in no apparent acute distress.  Denies any acute complaints or distress.    Endorses overall improvement.        06/15/2025  Patient seen and examined this a.m.   No new complaints.    No acute changes or acute overnight event reported.   Laying comfortably in bed in no apparent acute distress.  Denies any acute complaints or distress.    Endorses overall improvement.        06/14/2025  Patient seen and examined this a.m.   No new complaints.    Frequency of diarrhea improved.  No acute changes or acute overnight event reported.   Laying comfortably in bed in no apparent acute distress.    Denies any

## 2025-06-18 NOTE — PLAN OF CARE
Problem: Chronic Conditions and Co-morbidities  Goal: Patient's chronic conditions and co-morbidity symptoms are monitored and maintained or improved  6/18/2025 1428 by Fely Randle LPN  Outcome: Progressing  Flowsheets (Taken 6/18/2025 0913)  Care Plan - Patient's Chronic Conditions and Co-Morbidity Symptoms are Monitored and Maintained or Improved:   Monitor and assess patient's chronic conditions and comorbid symptoms for stability, deterioration, or improvement   Update acute care plan with appropriate goals if chronic or comorbid symptoms are exacerbated and prevent overall improvement and discharge   Collaborate with multidisciplinary team to address chronic and comorbid conditions and prevent exacerbation or deterioration  6/18/2025 0259 by Anders Moy, RN  Outcome: Progressing     Problem: Discharge Planning  Goal: Discharge to home or other facility with appropriate resources  6/18/2025 1428 by Fely Randle LPN  Outcome: Progressing  Flowsheets (Taken 6/18/2025 0913)  Discharge to home or other facility with appropriate resources:   Arrange for needed discharge resources and transportation as appropriate   Identify barriers to discharge with patient and caregiver   Arrange for interpreters to assist at discharge as needed   Identify discharge learning needs (meds, wound care, etc)   Refer to discharge planning if patient needs post-hospital services based on physician order or complex needs related to functional status, cognitive ability or social support system  6/18/2025 0259 by Anders Moy, RN  Outcome: Progressing     Problem: Pain  Goal: Verbalizes/displays adequate comfort level or baseline comfort level  6/18/2025 1428 by Fely Randle LPN  Outcome: Progressing  6/18/2025 0259 by Anders Moy, RN  Outcome: Progressing     Problem: Safety - Adult  Goal: Free from fall injury  6/18/2025 1428 by Fely Randle LPN  Outcome:

## 2025-06-18 NOTE — PROGRESS NOTES
Facility/Department: [facility] 3 DUNIA/VAS/MED  Daily Treatment Note/Re Assessment  NAME: Daljit Elizondo  : 1960  MRN: [id number]    Date of Service: 2025    Discharge Recommendations:  Patient would benefit from continued therapy after discharge       Assessment   Assessment: The patient was reassessed and tx plan modified. Recommending use of lift to recliner and performing mobility tasks from there. The patient has a fear of being left in a recliner too long.  It would be an option to lift him back to bed end of session if needed. It is of note that the patient has performed transfers and ambulation with RW with as good as CGA, but now and environment needs to be created where he can safely demonstrate this more consistently as there appears to be subtle issues associated with where he lands at the edge of the bed upon supine to sit. feel the patient has potential to significantly upgrade his skills.  Treatment Diagnosis: Acute congestive heart failure    Activity Tolerance  Activity Tolerance Comments: Fully participated in therapy but the semantics of the bed combined with his morbid obesity created safety issues this session.  Feel this will be adequately resolved with use of lift to recliner and performing tasks from there.         Patient Diagnosis(es): The primary encounter diagnosis was Hypervolemia, unspecified hypervolemia type. Diagnoses of Cardiac arrhythmia, unspecified cardiac arrhythmia type, Community acquired pneumonia, unspecified laterality, Shortness of breath, and Bilateral lower extremity edema were also pertinent to this visit.    has a past medical history of Abrasion, Anxiety, Asthma, Bell's palsy, Cataracts, bilateral, Cellulitis, Diabetes (HCC), Edema extremities, GERD (gastroesophageal reflux disease), Gout, H/O tracheostomy, H/O urinary retention, Hernia, hiatal, History of panniculitis, History of shingles, Hyperlipemia, Hypertension, Morbid obesity ([facility]), Non-ST elevated

## 2025-06-18 NOTE — PLAN OF CARE
Problem: Chronic Conditions and Co-morbidities  Goal: Patient's chronic conditions and co-morbidity symptoms are monitored and maintained or improved  6/18/2025 0259 by Anders Moy RN  Outcome: Progressing  6/17/2025 1351 by Fely Randle LPN  Outcome: Progressing  Flowsheets (Taken 6/17/2025 0843)  Care Plan - Patient's Chronic Conditions and Co-Morbidity Symptoms are Monitored and Maintained or Improved:   Monitor and assess patient's chronic conditions and comorbid symptoms for stability, deterioration, or improvement   Collaborate with multidisciplinary team to address chronic and comorbid conditions and prevent exacerbation or deterioration   Update acute care plan with appropriate goals if chronic or comorbid symptoms are exacerbated and prevent overall improvement and discharge     Problem: Discharge Planning  Goal: Discharge to home or other facility with appropriate resources  6/18/2025 0259 by Anders Moy RN  Outcome: Progressing  6/17/2025 1351 by Fely Ranlde LPN  Outcome: Progressing  Flowsheets (Taken 6/17/2025 0843)  Discharge to home or other facility with appropriate resources:   Identify barriers to discharge with patient and caregiver   Arrange for needed discharge resources and transportation as appropriate   Identify discharge learning needs (meds, wound care, etc)   Arrange for interpreters to assist at discharge as needed   Refer to discharge planning if patient needs post-hospital services based on physician order or complex needs related to functional status, cognitive ability or social support system     Problem: Pain  Goal: Verbalizes/displays adequate comfort level or baseline comfort level  6/18/2025 0259 by Anders Moy RN  Outcome: Progressing  6/17/2025 1351 by Fely Rnadle LPN  Outcome: Progressing     Problem: Safety - Adult  Goal: Free from fall injury  6/18/2025 0259 by Anders Moy RN  Outcome: Progressing  6/17/2025  1351 by Fely Randle LPN  Outcome: Progressing     Problem: ABCDS Injury Assessment  Goal: Absence of physical injury  6/18/2025 0259 by Anders Moy RN  Outcome: Progressing  6/17/2025 1351 by Fely Randle LPN  Outcome: Progressing     Problem: Skin/Tissue Integrity  Goal: Skin integrity remains intact  Description: 1.  Monitor for areas of redness and/or skin breakdown2.  Assess vascular access sites hourly3.  Every 4-6 hours minimum:  Change oxygen saturation probe site4.  Every 4-6 hours:  If on nasal continuous positive airway pressure, respiratory therapy assess nares and determine need for appliance change or resting period  6/18/2025 0259 by Anders Moy RN  Outcome: Progressing  6/17/2025 1351 by Fely Randle LPN  Outcome: Progressing  Flowsheets (Taken 6/17/2025 0843)  Skin Integrity Remains Intact:   Monitor for areas of redness and/or skin breakdown   Assess vascular access sites hourly   Every 4-6 hours minimum:  Change oxygen saturation probe site   Every 4-6 hours:  If on nasal continuous positive airway pressure, assess nares and determine need for appliance change or resting period   Turn and reposition as indicated   Assess need for specialty bed   Positioning devices   Pressure redistribution bed/mattress (bed type)   Check visual cues for pain   Monitor skin under medical devices     Problem: Nutrition Deficit:  Goal: Optimize nutritional status  6/18/2025 0259 by Anders Moy RN  Outcome: Progressing  6/17/2025 1351 by Fely Randle LPN  Outcome: Progressing     Problem: Neurosensory - Adult  Goal: Achieves stable or improved neurological status  6/18/2025 0259 by Anders Moy RN  Outcome: Progressing  6/17/2025 1351 by Fely Randle LPN  Outcome: Progressing  Flowsheets (Taken 6/17/2025 0843)  Achieves stable or improved neurological status:   Assess for and report changes in neurological status   Initiate

## 2025-06-19 LAB
ALBUMIN SERPL-MCNC: 2.9 G/DL (ref 3.5–5.2)
ALP SERPL-CCNC: 115 U/L (ref 40–129)
ALT SERPL-CCNC: 7 U/L (ref 10–50)
ANION GAP SERPL CALCULATED.3IONS-SCNC: 10 MMOL/L (ref 8–16)
AST SERPL-CCNC: 12 U/L (ref 10–50)
BASOPHILS # BLD: 0 K/UL (ref 0–0.2)
BASOPHILS NFR BLD: 0.6 % (ref 0–1)
BILIRUB SERPL-MCNC: 0.9 MG/DL (ref 0.2–1.2)
BUN SERPL-MCNC: 48 MG/DL (ref 8–23)
CALCIUM SERPL-MCNC: 9.2 MG/DL (ref 8.8–10.2)
CHLORIDE SERPL-SCNC: 111 MMOL/L (ref 98–107)
CO2 SERPL-SCNC: 22 MMOL/L (ref 22–29)
CREAT SERPL-MCNC: 3.3 MG/DL (ref 0.7–1.2)
EOSINOPHIL # BLD: 0.4 K/UL (ref 0–0.6)
EOSINOPHIL NFR BLD: 5.4 % (ref 0–5)
ERYTHROCYTE [DISTWIDTH] IN BLOOD BY AUTOMATED COUNT: 15 % (ref 11.5–14.5)
GLUCOSE BLD-MCNC: 121 MG/DL (ref 70–99)
GLUCOSE BLD-MCNC: 125 MG/DL (ref 70–99)
GLUCOSE BLD-MCNC: 126 MG/DL (ref 70–99)
GLUCOSE SERPL-MCNC: 136 MG/DL (ref 70–99)
HCT VFR BLD AUTO: 39.3 % (ref 42–52)
HGB BLD-MCNC: 12.1 G/DL (ref 14–18)
IMM GRANULOCYTES # BLD: 0 K/UL
LYMPHOCYTES # BLD: 0.9 K/UL (ref 1.1–4.5)
LYMPHOCYTES NFR BLD: 13 % (ref 20–40)
MCH RBC QN AUTO: 30 PG (ref 27–31)
MCHC RBC AUTO-ENTMCNC: 30.8 G/DL (ref 33–37)
MCV RBC AUTO: 97.3 FL (ref 80–94)
MONOCYTES # BLD: 0.6 K/UL (ref 0–0.9)
MONOCYTES NFR BLD: 8.3 % (ref 0–10)
NEUTROPHILS # BLD: 5.1 K/UL (ref 1.5–7.5)
NEUTS SEG NFR BLD: 72.1 % (ref 50–65)
PERFORMED ON: ABNORMAL
PLATELET # BLD AUTO: 154 K/UL (ref 130–400)
PMV BLD AUTO: 12.8 FL (ref 9.4–12.4)
POTASSIUM SERPL-SCNC: 4.9 MMOL/L (ref 3.5–5.1)
PROT SERPL-MCNC: 6 G/DL (ref 6.4–8.3)
RBC # BLD AUTO: 4.04 M/UL (ref 4.7–6.1)
SODIUM SERPL-SCNC: 143 MMOL/L (ref 136–145)
WBC # BLD AUTO: 7.1 K/UL (ref 4.8–10.8)

## 2025-06-19 PROCEDURE — 97530 THERAPEUTIC ACTIVITIES: CPT

## 2025-06-19 PROCEDURE — 6370000000 HC RX 637 (ALT 250 FOR IP): Performed by: NURSE PRACTITIONER

## 2025-06-19 PROCEDURE — 6360000002 HC RX W HCPCS: Performed by: NURSE PRACTITIONER

## 2025-06-19 PROCEDURE — 97535 SELF CARE MNGMENT TRAINING: CPT

## 2025-06-19 PROCEDURE — 2580000003 HC RX 258: Performed by: INTERNAL MEDICINE

## 2025-06-19 PROCEDURE — 82962 GLUCOSE BLOOD TEST: CPT

## 2025-06-19 PROCEDURE — 6370000000 HC RX 637 (ALT 250 FOR IP)

## 2025-06-19 PROCEDURE — 85025 COMPLETE CBC W/AUTO DIFF WBC: CPT

## 2025-06-19 PROCEDURE — 1200000000 HC SEMI PRIVATE

## 2025-06-19 PROCEDURE — 94640 AIRWAY INHALATION TREATMENT: CPT

## 2025-06-19 PROCEDURE — 94760 N-INVAS EAR/PLS OXIMETRY 1: CPT

## 2025-06-19 PROCEDURE — 6360000002 HC RX W HCPCS

## 2025-06-19 PROCEDURE — 2700000000 HC OXYGEN THERAPY PER DAY

## 2025-06-19 PROCEDURE — 36415 COLL VENOUS BLD VENIPUNCTURE: CPT

## 2025-06-19 PROCEDURE — 80053 COMPREHEN METABOLIC PANEL: CPT

## 2025-06-19 RX ADMIN — ASPIRIN 81 MG: 81 TABLET, COATED ORAL at 08:30

## 2025-06-19 RX ADMIN — IPRATROPIUM BROMIDE 0.5 MG: 0.5 SOLUTION RESPIRATORY (INHALATION) at 10:38

## 2025-06-19 RX ADMIN — GUAIFENESIN 600 MG: 600 TABLET, EXTENDED RELEASE ORAL at 08:30

## 2025-06-19 RX ADMIN — INSULIN GLARGINE 30 UNITS: 100 INJECTION, SOLUTION SUBCUTANEOUS at 21:35

## 2025-06-19 RX ADMIN — IPRATROPIUM BROMIDE 0.5 MG: 0.5 SOLUTION RESPIRATORY (INHALATION) at 14:27

## 2025-06-19 RX ADMIN — ENOXAPARIN SODIUM 60 MG: 100 INJECTION SUBCUTANEOUS at 08:30

## 2025-06-19 RX ADMIN — IPRATROPIUM BROMIDE 0.5 MG: 0.5 SOLUTION RESPIRATORY (INHALATION) at 06:50

## 2025-06-19 RX ADMIN — PANTOPRAZOLE SODIUM 40 MG: 40 TABLET, DELAYED RELEASE ORAL at 05:51

## 2025-06-19 RX ADMIN — MONTELUKAST SODIUM 10 MG: 10 TABLET, FILM COATED ORAL at 21:41

## 2025-06-19 RX ADMIN — CETIRIZINE HYDROCHLORIDE 10 MG: 10 TABLET ORAL at 08:31

## 2025-06-19 RX ADMIN — ALLOPURINOL 300 MG: 300 TABLET ORAL at 08:30

## 2025-06-19 RX ADMIN — METOPROLOL SUCCINATE 25 MG: 25 TABLET, FILM COATED, EXTENDED RELEASE ORAL at 08:30

## 2025-06-19 RX ADMIN — SODIUM CHLORIDE: 0.9 INJECTION, SOLUTION INTRAVENOUS at 16:34

## 2025-06-19 RX ADMIN — MICONAZOLE NITRATE: 2 POWDER TOPICAL at 08:31

## 2025-06-19 RX ADMIN — PRAVASTATIN SODIUM 20 MG: 20 TABLET ORAL at 21:35

## 2025-06-19 RX ADMIN — ENOXAPARIN SODIUM 60 MG: 100 INJECTION SUBCUTANEOUS at 21:34

## 2025-06-19 RX ADMIN — GUAIFENESIN 600 MG: 600 TABLET, EXTENDED RELEASE ORAL at 21:41

## 2025-06-19 RX ADMIN — SODIUM CHLORIDE: 0.9 INJECTION, SOLUTION INTRAVENOUS at 08:27

## 2025-06-19 RX ADMIN — METOPROLOL SUCCINATE 25 MG: 25 TABLET, FILM COATED, EXTENDED RELEASE ORAL at 21:35

## 2025-06-19 RX ADMIN — MICONAZOLE NITRATE: 2 POWDER TOPICAL at 21:34

## 2025-06-19 RX ADMIN — IPRATROPIUM BROMIDE 0.5 MG: 0.5 SOLUTION RESPIRATORY (INHALATION) at 18:32

## 2025-06-19 NOTE — PROGRESS NOTES
Physical Therapy     06/19/25 1300   Restrictions/Precautions   Restrictions/Precautions Fall Risk;Contact Precautions   Position Activity Restriction   Other Position/Activity Restrictions ESBL, MRSA   General   Diagnosis hypervolemia, cardiac arrythmia, CAP, SOB, morbid obesity   Subjective   Subjective pt agreeable to therapy   Observation/Palpation   Observation class 3 morbid obesity. male pure wick. 02, IV   Body Mechanics B hips in ER   Bed mobility   Rolling to Left Contact guard assistance;Minimal assistance   Rolling to Right Contact guard assistance;Minimal assistance   Bed Mobility Comments Pt utilizes bed functions and trapez to assist with mobility in bed. multiple rolling side to side in preparation for anne lift.   Transfers   Sit to Stand Contact guard assistance   Stand to Sit Contact guard assistance   Comment 3 STS from recliner after getting there with anne lift. pt unable to tolerate more than a couple of seconds in static standing at [facility] walker due to pt stating his LLE was about to buckle.   Short Term Goals   Time Frame for Short Term Goals 2 wks   Short Term Goal 1 IND, ROLLING R AND L   Short Term Goal 2 IND WITH SUP<>SIT   Short Term Goal 3 TF'S WITH RW, MIN/CGA   Activity Tolerance   Activity Tolerance Patient limited by fatigue;Patient limited by endurance   Assessment   Assessment pt able to use rails and trapeze for mostly CGA bed mobility. limited endurance in standing due to weak LLE. left in chair with heels floated and all needs in reach.   PT Plan of Care   Thursday X   Safety Devices   Type of Devices Call light within reach;Gait belt;Left in chair     Electronically signed by Haim Nicholson PTA on 6/19/2025 at 1:39 PM

## 2025-06-19 NOTE — PROGRESS NOTES
DIS Nurse Note  SUBJECTIVE: Patient assessed secondary to elevated Deterioration Index Score.      Deterioration Index Score:  Predictive Model Details          52 (Warning)  Factor Value    Calculated 6/19/2025 03:10 21% Supplemental oxygen Oxygen    Deterioration Index Model 21% Age 65 years old     18% Neurological exam X     11% Potassium abnormal (5.5 mmol/L)     8% Pulse 109     8% Cardiac rhythm Sinus tachy     5% Respiratory rate 18     4% Sodium 143 mmol/L     2% BUN abnormal (52 mg/dL)     2% Systolic 121     1% Pulse oximetry 94 %     1% Hematocrit abnormal (40.1 %)     0% Temperature 98.2 °F (36.8 °C)     0% WBC count 6.6 K/uL        Vital Signs:  Vitals:    06/18/25 0704 06/18/25 0749 06/18/25 1743 06/18/25 2022   BP:  (!) 146/97 112/74 121/78   Pulse:  95 (!) 111 (!) 109   Resp:  16 18 18   Temp:  97.3 °F (36.3 °C) 97.9 °F (36.6 °C) 98.2 °F (36.8 °C)   TempSrc:  Axillary Oral Oral   SpO2: 97% 97% 96% 94%   Weight:       Height:            Provider Notified: Reviewed patient status  & DIS score with Provider Dr. Ramirez    ASSESSMENT:  no signs or symptom of pain or discomfort voiced or noted    Plan of Care: Will notify doctor of any changes    Electronically signed by Leticia Patrick LPN

## 2025-06-19 NOTE — PROGRESS NOTES
Facility/Department: [facility] 3 DUNIA/VAS/MED  Daily Treatment Note  NAME: Daljit Elizondo  : 1960  MRN: [id number]    Date of Service: 2025    Discharge Recommendations:  Patient would benefit from continued therapy after discharge       Assessment   Assessment: Lift used to transfer patient to recliner.  Worked on standing and short sit from the recliner.  The patient stayed OOB in recliner for an hour and was lifted back to bed.  Seen for two 2 visits.  Treatment Diagnosis: Acute congestive heart failure    Activity Tolerance  Activity Tolerance: Patient Tolerated treatment well         Patient Diagnosis(es): The primary encounter diagnosis was Hypervolemia, unspecified hypervolemia type. Diagnoses of Cardiac arrhythmia, unspecified cardiac arrhythmia type, Community acquired pneumonia, unspecified laterality, Shortness of breath, and Bilateral lower extremity edema were also pertinent to this visit.    has a past medical history of Abrasion, Anxiety, Asthma, Bell's palsy, Cataracts, bilateral, Cellulitis, Diabetes (HCC), Edema extremities, GERD (gastroesophageal reflux disease), Gout, H/O tracheostomy, H/O urinary retention, Hernia, hiatal, History of panniculitis, History of shingles, Hyperlipemia, Hypertension, Morbid obesity (HCC), Non-ST elevated myocardial infarction (HCC), Obesity, Paraphimosis, Pulmonary hypertension (HCC), Renal failure, Respiratory failure (HCC), Shoulder pain, Sleep apnea, Testosterone deficiency, Ulcer, Ulcer of calf (HCC), and Venous stasis of both lower extremities.   has a past surgical history that includes Testicle surgery (10/1970); Foot surgery (Left, LONG AGO ); Tracheostomy tube placement; Foot surgery (Right); Mouth surgery; Appendectomy; Colonoscopy; and Endoscopy, colon, diagnostic.    Restrictions  Restrictions/Precautions  Restrictions/Precautions: Fall Risk, Contact Precautions  Position Activity Restriction  Other Position/Activity Restrictions: ESBL,

## 2025-06-19 NOTE — PROGRESS NOTES
[facility] Progress Note    Patient:  Daljit Elizondo  YOB: 1960  Date of Service: 6/19/2025  MRN: [id number]   Acct: [id number]   Primary Care Physician: Jessie Helms APRN - CNP  Advance Directive: Full Code  Admit Date: 5/14/2025       Hospital Day: 36     NOTE: Portions of this note have been copied forward, however, changes made to reflect the most current clinical status of this patient.    CHIEF COMPLAINT:     Chief Complaint   Patient presents with    Shortness of Breath     Since Jan 1    Scrotal Pain       6/19/2025 7:28 AM  Subjective / Interval History:   06/19/2025  Patient seen and examined this a.m.   No new complaints.  No acute changes or acute overnight event reported.   Laying comfortably in bed in no apparent acute distress.    Denies any acute complaints or distress.    Endorses overall improvement.        06/18/2025  Patient seen and examined this a.m.   No new complaints.    No acute changes or acute overnight event reported.   Laying comfortably in bed in no apparent acute distress.  Denies any acute complaints or distress.    Endorses overall improvement.    Potassium of 5.5  Creatinine remains elevated at 3.3.      06/17/2025  Patient seen and examined this a.m.   No new complaints.    No acute changes or acute overnight event reported.   Laying comfortably in bed in no apparent acute distress.    Denies any acute complaints or distress.     Endorses overall improvement.        06/16/2025  Patient seen and examined this a.m.   No new complaints.    No acute changes or acute overnight event reported.   Laying comfortably in bed in no apparent acute distress.  Denies any acute complaints or distress.    Endorses overall improvement.        06/15/2025  Patient seen and examined this a.m.   No new complaints.    No acute changes or acute overnight event reported.   Laying comfortably in bed in no apparent acute distress.  Denies any acute complaints or distress.

## 2025-06-19 NOTE — PROGRESS NOTES
DIS Nurse Note  SUBJECTIVE: Patient assessed secondary to elevated Deterioration Index Score.      Deterioration Index Score:  Predictive Model Details          51 (Warning)  Factor Value    Calculated 6/18/2025 19:00 22% Supplemental oxygen Nasal cannula    Deterioration Index Model 21% Age 65 years old     18% Neurological exam X     11% Potassium abnormal (5.5 mmol/L)     9% Pulse 111     8% Cardiac rhythm Sinus tachy     5% Respiratory rate 18     4% Sodium 143 mmol/L     2% BUN abnormal (52 mg/dL)     1% Hematocrit abnormal (40.1 %)     0% Temperature 97.9 °F (36.6 °C)     0% Pulse oximetry 96 %     0% WBC count 6.6 K/uL     0% Systolic 112        Vital Signs:  Vitals:    06/18/25 0436 06/18/25 0704 06/18/25 0749 06/18/25 1743   BP: (!) 128/91  (!) 146/97 112/74   Pulse: (!) 101  95 (!) 111   Resp: 18  16 18   Temp: 98.1 °F (36.7 °C)  97.3 °F (36.3 °C) 97.9 °F (36.6 °C)   TempSrc: Oral  Axillary Oral   SpO2: 98% 97% 97% 96%   Weight:       Height:            Provider Notified: Reviewed patient status  & DIS score with Provider Dr. Ramirez    ASSESSMENT:  no signs or symptoms of distress or discomfort voiced or noted    Plan of Care: will notify doctor of any changes    Electronically signed by Leticia Patrick LPN

## 2025-06-19 NOTE — PROGRESS NOTES
Nephrology ([facility]) Progress Note    Patient:  Daljit Elizondo  YOB: 1960  Date of Service: 6/19/2025  MRN: [id number]   Acct: [id number]   Primary Care Physician: Jessie Helms APRN - CNP  Advance Directive: Full Code  Admit Date: 5/14/2025       Hospital Day: 36  Referring Provider: Cody Howell MD    Patient independently seen and examined, Chart, Consults, Notes, Operative notes, Labs, Cardiology, and Radiology studies reviewed as available.    Chief complaint: Abnormal labs.    Subjective:  Daljit Elizondo is a 65 y.o. male for whom we were consulted for evaluation and treatment of acute kidney injury.  Patient denies any history of chronic kidney disease.  He has been in the hospital for the last month, poor historian and has never seen nephrology in the past.  Patient has severe abdominal obesity, hypertension, sleep apnea, type 2 diabetes, chronic diastolic CHF, sleep apnea.  Presented with increasing shortness of breath/dyspnea on exertion.  Hospital course remarkable for treatment with intravenous diuretics for the treatment of volume overload, CHF exacerbation.  His serum creatinine was 0.9 mg with estimated GFR more than 90 mL on admission.  Patient has significant drop in GFR and nephrology is consulted.    Patient has shown improvement of renal function despite IV fluid continuation.  He remains bedridden, denies any shortness of breath.  However he has good urine output    Allergies:  Penicillamine, Silvadene [silver sulfadiazine], Aerohist [chlorpheniramine-methscop er], Cephalosporins, Hemp seed oil, Neosporin [neomycin-polymyxin-gramicidin], Penicillins, and Zinc    Medicines:  Current Facility-Administered Medications   Medication Dose Route Frequency Provider Last Rate Last Admin    loperamide (IMODIUM) capsule 2 mg  2 mg Oral 4x Daily PRN Cody Howell MD   2 mg at 06/16/25 4865    insulin lispro (HUMALOG,ADMELOG) injection vial 0-4

## 2025-06-19 NOTE — PLAN OF CARE
Problem: Chronic Conditions and Co-morbidities  Goal: Patient's chronic conditions and co-morbidity symptoms are monitored and maintained or improved  6/19/2025 0044 by Leticia Patrick LPN  Outcome: Progressing  Flowsheets (Taken 6/18/2025 2313)  Care Plan - Patient's Chronic Conditions and Co-Morbidity Symptoms are Monitored and Maintained or Improved: Monitor and assess patient's chronic conditions and comorbid symptoms for stability, deterioration, or improvement  6/18/2025 1428 by Fely Randle LPN  Outcome: Progressing  Flowsheets (Taken 6/18/2025 0913)  Care Plan - Patient's Chronic Conditions and Co-Morbidity Symptoms are Monitored and Maintained or Improved:   Monitor and assess patient's chronic conditions and comorbid symptoms for stability, deterioration, or improvement   Update acute care plan with appropriate goals if chronic or comorbid symptoms are exacerbated and prevent overall improvement and discharge   Collaborate with multidisciplinary team to address chronic and comorbid conditions and prevent exacerbation or deterioration     Problem: Discharge Planning  Goal: Discharge to home or other facility with appropriate resources  6/19/2025 0044 by Leticia Patrick LPN  Outcome: Progressing  Flowsheets (Taken 6/18/2025 2313)  Discharge to home or other facility with appropriate resources: Identify barriers to discharge with patient and caregiver  6/18/2025 1428 by Fely Randle LPN  Outcome: Progressing  Flowsheets (Taken 6/18/2025 0913)  Discharge to home or other facility with appropriate resources:   Arrange for needed discharge resources and transportation as appropriate   Identify barriers to discharge with patient and caregiver   Arrange for interpreters to assist at discharge as needed   Identify discharge learning needs (meds, wound care, etc)   Refer to discharge planning if patient needs post-hospital services based on physician order or complex

## 2025-06-19 NOTE — PROGRESS NOTES
The patient was working with PT.  will follow-up.    Electronically signed by Chaplain Marisol Rose on 6/19/2025 at 2:59 PM

## 2025-06-20 LAB
ALBUMIN SERPL-MCNC: 2.8 G/DL (ref 3.5–5.2)
ALP SERPL-CCNC: 114 U/L (ref 40–129)
ALT SERPL-CCNC: 9 U/L (ref 10–50)
ANION GAP SERPL CALCULATED.3IONS-SCNC: 11 MMOL/L (ref 8–16)
AST SERPL-CCNC: 14 U/L (ref 10–50)
BASOPHILS # BLD: 0.1 K/UL (ref 0–0.2)
BASOPHILS NFR BLD: 0.7 % (ref 0–1)
BILIRUB SERPL-MCNC: 0.9 MG/DL (ref 0.2–1.2)
BUN SERPL-MCNC: 44 MG/DL (ref 8–23)
CALCIUM SERPL-MCNC: 8.8 MG/DL (ref 8.8–10.2)
CHLORIDE SERPL-SCNC: 112 MMOL/L (ref 98–107)
CO2 SERPL-SCNC: 19 MMOL/L (ref 22–29)
CREAT SERPL-MCNC: 3.1 MG/DL (ref 0.7–1.2)
EOSINOPHIL # BLD: 0.3 K/UL (ref 0–0.6)
EOSINOPHIL NFR BLD: 4.1 % (ref 0–5)
ERYTHROCYTE [DISTWIDTH] IN BLOOD BY AUTOMATED COUNT: 15 % (ref 11.5–14.5)
GLUCOSE BLD-MCNC: 100 MG/DL (ref 70–99)
GLUCOSE BLD-MCNC: 102 MG/DL (ref 70–99)
GLUCOSE BLD-MCNC: 113 MG/DL (ref 70–99)
GLUCOSE BLD-MCNC: 89 MG/DL (ref 70–99)
GLUCOSE SERPL-MCNC: 127 MG/DL (ref 70–99)
HCT VFR BLD AUTO: 38.6 % (ref 42–52)
HGB BLD-MCNC: 11.8 G/DL (ref 14–18)
IMM GRANULOCYTES # BLD: 0 K/UL
LYMPHOCYTES # BLD: 0.9 K/UL (ref 1.1–4.5)
LYMPHOCYTES NFR BLD: 12 % (ref 20–40)
MCH RBC QN AUTO: 29.6 PG (ref 27–31)
MCHC RBC AUTO-ENTMCNC: 30.6 G/DL (ref 33–37)
MCV RBC AUTO: 97 FL (ref 80–94)
MONOCYTES # BLD: 0.5 K/UL (ref 0–0.9)
MONOCYTES NFR BLD: 7.5 % (ref 0–10)
NEUTROPHILS # BLD: 5.3 K/UL (ref 1.5–7.5)
NEUTS SEG NFR BLD: 75.3 % (ref 50–65)
PERFORMED ON: ABNORMAL
PERFORMED ON: NORMAL
PLATELET # BLD AUTO: 151 K/UL (ref 130–400)
PMV BLD AUTO: 12.3 FL (ref 9.4–12.4)
POTASSIUM SERPL-SCNC: 4.8 MMOL/L (ref 3.5–5.1)
PROT SERPL-MCNC: 6 G/DL (ref 6.4–8.3)
RBC # BLD AUTO: 3.98 M/UL (ref 4.7–6.1)
SODIUM SERPL-SCNC: 142 MMOL/L (ref 136–145)
WBC # BLD AUTO: 7.1 K/UL (ref 4.8–10.8)

## 2025-06-20 PROCEDURE — 80053 COMPREHEN METABOLIC PANEL: CPT

## 2025-06-20 PROCEDURE — 85025 COMPLETE CBC W/AUTO DIFF WBC: CPT

## 2025-06-20 PROCEDURE — 6370000000 HC RX 637 (ALT 250 FOR IP): Performed by: NURSE PRACTITIONER

## 2025-06-20 PROCEDURE — 94760 N-INVAS EAR/PLS OXIMETRY 1: CPT

## 2025-06-20 PROCEDURE — 1200000000 HC SEMI PRIVATE

## 2025-06-20 PROCEDURE — 6360000002 HC RX W HCPCS: Performed by: NURSE PRACTITIONER

## 2025-06-20 PROCEDURE — 2500000003 HC RX 250 WO HCPCS

## 2025-06-20 PROCEDURE — 6370000000 HC RX 637 (ALT 250 FOR IP)

## 2025-06-20 PROCEDURE — 97530 THERAPEUTIC ACTIVITIES: CPT

## 2025-06-20 PROCEDURE — 97535 SELF CARE MNGMENT TRAINING: CPT

## 2025-06-20 PROCEDURE — 2580000003 HC RX 258: Performed by: INTERNAL MEDICINE

## 2025-06-20 PROCEDURE — 2700000000 HC OXYGEN THERAPY PER DAY

## 2025-06-20 PROCEDURE — 94640 AIRWAY INHALATION TREATMENT: CPT

## 2025-06-20 PROCEDURE — 36415 COLL VENOUS BLD VENIPUNCTURE: CPT

## 2025-06-20 PROCEDURE — 82962 GLUCOSE BLOOD TEST: CPT

## 2025-06-20 PROCEDURE — 6360000002 HC RX W HCPCS

## 2025-06-20 RX ADMIN — PRAVASTATIN SODIUM 20 MG: 20 TABLET ORAL at 22:11

## 2025-06-20 RX ADMIN — SODIUM CHLORIDE: 0.9 INJECTION, SOLUTION INTRAVENOUS at 18:26

## 2025-06-20 RX ADMIN — SODIUM CHLORIDE: 0.9 INJECTION, SOLUTION INTRAVENOUS at 09:02

## 2025-06-20 RX ADMIN — IPRATROPIUM BROMIDE 0.5 MG: 0.5 SOLUTION RESPIRATORY (INHALATION) at 10:35

## 2025-06-20 RX ADMIN — METOPROLOL SUCCINATE 25 MG: 25 TABLET, FILM COATED, EXTENDED RELEASE ORAL at 08:58

## 2025-06-20 RX ADMIN — CETIRIZINE HYDROCHLORIDE 10 MG: 10 TABLET ORAL at 08:58

## 2025-06-20 RX ADMIN — MICONAZOLE NITRATE: 2 POWDER TOPICAL at 08:58

## 2025-06-20 RX ADMIN — ASPIRIN 81 MG: 81 TABLET, COATED ORAL at 08:58

## 2025-06-20 RX ADMIN — SODIUM CHLORIDE: 0.9 INJECTION, SOLUTION INTRAVENOUS at 01:10

## 2025-06-20 RX ADMIN — SODIUM CHLORIDE, PRESERVATIVE FREE 10 ML: 5 INJECTION INTRAVENOUS at 09:00

## 2025-06-20 RX ADMIN — ALLOPURINOL 300 MG: 300 TABLET ORAL at 08:58

## 2025-06-20 RX ADMIN — METOPROLOL SUCCINATE 25 MG: 25 TABLET, FILM COATED, EXTENDED RELEASE ORAL at 22:11

## 2025-06-20 RX ADMIN — PANTOPRAZOLE SODIUM 40 MG: 40 TABLET, DELAYED RELEASE ORAL at 05:48

## 2025-06-20 RX ADMIN — ENOXAPARIN SODIUM 60 MG: 100 INJECTION SUBCUTANEOUS at 09:04

## 2025-06-20 RX ADMIN — SODIUM CHLORIDE, PRESERVATIVE FREE 10 ML: 5 INJECTION INTRAVENOUS at 22:13

## 2025-06-20 RX ADMIN — INSULIN GLARGINE 30 UNITS: 100 INJECTION, SOLUTION SUBCUTANEOUS at 22:20

## 2025-06-20 RX ADMIN — GUAIFENESIN 600 MG: 600 TABLET, EXTENDED RELEASE ORAL at 08:58

## 2025-06-20 RX ADMIN — IPRATROPIUM BROMIDE 0.5 MG: 0.5 SOLUTION RESPIRATORY (INHALATION) at 07:22

## 2025-06-20 RX ADMIN — MONTELUKAST SODIUM 10 MG: 10 TABLET, FILM COATED ORAL at 22:11

## 2025-06-20 RX ADMIN — IPRATROPIUM BROMIDE 0.5 MG: 0.5 SOLUTION RESPIRATORY (INHALATION) at 14:30

## 2025-06-20 RX ADMIN — ENOXAPARIN SODIUM 60 MG: 100 INJECTION SUBCUTANEOUS at 22:20

## 2025-06-20 RX ADMIN — IPRATROPIUM BROMIDE 0.5 MG: 0.5 SOLUTION RESPIRATORY (INHALATION) at 18:03

## 2025-06-20 RX ADMIN — MICONAZOLE NITRATE: 2 POWDER TOPICAL at 22:12

## 2025-06-20 RX ADMIN — ACETAMINOPHEN 650 MG: 325 TABLET ORAL at 22:10

## 2025-06-20 RX ADMIN — GUAIFENESIN 600 MG: 600 TABLET, EXTENDED RELEASE ORAL at 22:11

## 2025-06-20 ASSESSMENT — PAIN DESCRIPTION - ORIENTATION: ORIENTATION: MID

## 2025-06-20 ASSESSMENT — PAIN SCALES - WONG BAKER: WONGBAKER_NUMERICALRESPONSE: NO HURT

## 2025-06-20 ASSESSMENT — PAIN DESCRIPTION - LOCATION: LOCATION: BACK

## 2025-06-20 ASSESSMENT — PAIN DESCRIPTION - DESCRIPTORS: DESCRIPTORS: ACHING;THROBBING

## 2025-06-20 ASSESSMENT — PAIN SCALES - GENERAL
PAINLEVEL_OUTOF10: 0
PAINLEVEL_OUTOF10: 0
PAINLEVEL_OUTOF10: 6

## 2025-06-20 NOTE — PROGRESS NOTES
Nephrology ([facility]) Progress Note    Patient:  Daljit Elizondo  YOB: 1960  Date of Service: 6/20/2025  MRN: [id number]   Acct: [id number]   Primary Care Physician: Jessie Helms APRN - CNP  Advance Directive: Full Code  Admit Date: 5/14/2025       Hospital Day: 37  Referring Provider: Cody Howell MD    Patient independently seen and examined, Chart, Consults, Notes, Operative notes, Labs, Cardiology, and Radiology studies reviewed as available.    Chief complaint: Abnormal labs.    Subjective:  Daljit Elizondo is a 65 y.o. male for whom we were consulted for evaluation and treatment of acute kidney injury.  Patient denies any history of chronic kidney disease.  He has been in the hospital for the last month, poor historian and has never seen nephrology in the past.  Patient has severe abdominal obesity, hypertension, sleep apnea, type 2 diabetes, chronic diastolic CHF, sleep apnea.  Presented with increasing shortness of breath/dyspnea on exertion.  Hospital course remarkable for treatment with intravenous diuretics for the treatment of volume overload, CHF exacerbation.  His serum creatinine was 0.9 mg with estimated GFR more than 90 mL on admission.  Patient has significant drop in GFR and nephrology is consulted.    This morning patient feels well.  He has noticed more urine output.  He is not responding to IV fluid, serum creatinine is slowly improving    Allergies:  Penicillamine, Silvadene [silver sulfadiazine], Aerohist [chlorpheniramine-methscop er], Cephalosporins, Hemp seed oil, Neosporin [neomycin-polymyxin-gramicidin], Penicillins, and Zinc    Medicines:  Current Facility-Administered Medications   Medication Dose Route Frequency Provider Last Rate Last Admin    loperamide (IMODIUM) capsule 2 mg  2 mg Oral 4x Daily PRN Cody Howell MD   2 mg at 06/16/25 7398    insulin lispro (HUMALOG,ADMELOG) injection vial 0-4 Units  0-4 Units SubCUTAneous 4x  06/18/25 0448 06/19/25 0329 06/20/25  0259   AST 17 12 14   ALT 10 7* 9*   BILITOT 0.9 0.9 0.9   ALKPHOS 112 115 114     PT/INR: No results for input(s): \"PROTIME\", \"INR\" in the last 72 hours.  APTT: No results for input(s): \"APTT\" in the last 72 hours.  BNP:  No results for input(s): \"BNP\" in the last 72 hours.  Ionized Calcium:Invalid input(s): \"IONCA\"  Magnesium:No results for input(s): \"MG\" in the last 72 hours.  Phosphorus:No results for input(s): \"PHOS\" in the last 72 hours.  HgbA1C: No results for input(s): \"LABA1C\" in the last 72 hours.  Hepatic:   Recent Labs     06/18/25 0448 06/19/25 0329 06/20/25  0259   ALKPHOS 112 115 114   ALT 10 7* 9*   AST 17 12 14   BILITOT 0.9 0.9 0.9     Lactic Acid: No results for input(s): \"LACTA\" in the last 72 hours.  Troponin: No results for input(s): \"CKTOTAL\", \"CKMB\", \"TROPONINT\" in the last 72 hours.  ABGs: No results found for: \"PHART\", \"PO2ART\", \"AIU5SOQ\"  CRP:  No results for input(s): \"CRP\" in the last 72 hours.  Sed Rate:  No results for input(s): \"SEDRATE\" in the last 72 hours.    Culture Results:   Blood Culture Recent: No results for input(s): \"BC\" in the last 720 hours.    Urine Culture Recent : No results for input(s): \"LABURIN\" in the last 720 hours.    Radiology reports as per the Radiologist  Radiology: US RENAL COMPLETE  Result Date: 6/11/2025  ULTRASOUND RENAL COMPLETE  INDICATION: 65-year-old male being seen with acute kidney injury  COMPARISON: None  TECHNIQUE: Sonography of the kidneys and urinary bladder was performed. All 3 plane dimensions are given in length by height by width or craniocaudad / anterior-posterior /medial-lateral unless otherwise stated.  FINDINGS: The right kidney measures 11.9 x 5.3 x 5.4 cm normal parenchymal thickness with no hydronephrosis or nephrolithiasis. No lesion.  The urinary bladder was not visualized.  The left kidney measures 12.0 x 4.8 x 5.1 cm. There is mild parenchymal thinning. No hydronephrosis nephrolithiasis

## 2025-06-20 NOTE — PROGRESS NOTES
[facility] Progress Note    Patient:  Daljit Elizondo  YOB: 1960  Date of Service: 6/20/2025  MRN: [id number]   Acct: [id number]   Primary Care Physician: Jessie Helms APRN - CNP  Advance Directive: Full Code  Admit Date: 5/14/2025       Hospital Day: 37     NOTE: Portions of this note have been copied forward, however, changes made to reflect the most current clinical status of this patient.    CHIEF COMPLAINT:     Chief Complaint   Patient presents with    Shortness of Breath     Since Jan 1    Scrotal Pain       6/20/2025 7:45 AM  Subjective / Interval History:   06/20/2025  Patient seen and examined this a.m.   No new complaints.    No acute changes or acute overnight event reported.   Laying comfortably in bed in no apparent acute distress.    Denies any acute complaints or distress.    Endorses overall improvement.    Creatinine remains elevated at 3.1 this am      06/19/2025  Patient seen and examined this a.m.   No new complaints.  No acute changes or acute overnight event reported.   Laying comfortably in bed in no apparent acute distress.    Denies any acute complaints or distress.    Endorses overall improvement.        06/18/2025  Patient seen and examined this a.m.   No new complaints.    No acute changes or acute overnight event reported.   Laying comfortably in bed in no apparent acute distress.  Denies any acute complaints or distress.    Endorses overall improvement.    Potassium of 5.5  Creatinine remains elevated at 3.3      06/17/2025  Patient seen and examined this a.m.   No new complaints.    No acute changes or acute overnight event reported.   Laying comfortably in bed in no apparent acute distress.    Denies any acute complaints or distress.     Endorses overall improvement.        06/16/2025  Patient seen and examined this a.m.   No new complaints.    No acute changes or acute overnight event reported.   Laying comfortably in bed in no apparent acute distress.   6/20/2025 0745  Last data filed at 6/19/2025 2359  Gross per 24 hour   Intake --   Output 1750 ml   Net -1750 ml       Medications:   sodium chloride 125 mL/hr at 06/20/25 0110    sodium chloride 5 mL/hr at 05/18/25 1459    dextrose       Current Facility-Administered Medications   Medication Dose Route Frequency Provider Last Rate Last Admin    loperamide (IMODIUM) capsule 2 mg  2 mg Oral 4x Daily PRN Cody Howell MD   2 mg at 06/16/25 1738    insulin lispro (HUMALOG,ADMELOG) injection vial 0-4 Units  0-4 Units SubCUTAneous 4x Daily AC & HS Cody Howell MD        0.9 % sodium chloride infusion   IntraVENous Continuous Jose Kamara  mL/hr at 06/20/25 0110 New Bag at 06/20/25 0110    [Held by provider] bumetanide (BUMEX) tablet 1 mg  1 mg Oral BID Jori Haji MD   1 mg at 06/09/25 1752    [Held by provider] lactulose (CHRONULAC) 10 GM/15ML solution 20 g  20 g Oral TID Juan Nicolas APRN - CNP   20 g at 06/09/25 0923    [Held by provider] polyethylene glycol (GLYCOLAX) packet 17 g  17 g Oral BID Juan Nicolas APRN - CNP   17 g at 06/09/25 0923    metoprolol succinate (TOPROL XL) extended release tablet 25 mg  25 mg Oral BID Juan Nicolas APRN - CNP   25 mg at 06/19/25 2135    ipratropium (ATROVENT) 0.02 % nebulizer solution 0.5 mg  0.5 mg Nebulization 4x Daily RT Ashleigh Jamil APRN   0.5 mg at 06/20/25 0722    [Held by provider] senna (SENOKOT) tablet 17.2 mg  2 tablet Oral BID Ashleigh Jamil APRN   17.2 mg at 06/10/25 0911    montelukast (SINGULAIR) tablet 10 mg  10 mg Oral Nightly Ashleigh Jamil APRN   10 mg at 06/19/25 2141    sodium chloride (OCEAN, BABY AYR) 0.65 % nasal spray 1 spray  1 spray Each Nostril Q2H PRN Ashleigh Jamil APRN   1 spray at 05/19/25 1118    tetrahydrozoline 0.05 % ophthalmic solution 1 drop  1 drop Both Eyes TID PRN Ashleigh Jamil APRN        lubrifresh P.M. (artificial tears) ophthalmic ointment   Both Eyes PRN Ruben Partida

## 2025-06-20 NOTE — PROGRESS NOTES
Nutrition Assessment     Type and Reason for Visit: Reassess    Nutrition Recommendations/Plan:   Continue current POC     Malnutrition Assessment:  Malnutrition Status: Severe malnutrition    Nutrition Assessment:  Patient continues to refuse to make choices for meal selections and intake remains poor when he dose choose to eat,  Weight has increaed in the past 3 days-- edema is now trace BLE and generalized.    Estimated Daily Nutrient Needs:  Energy (kcal):  5405-9933 kcals (8-11 kcals/kg) Weight Used for Energy Requirements: Current     Protein (g):  145g Weight Used for Protein Requirements: Ideal        Fluid (ml/day):  8519-2517 ml Method Used for Fluid Requirements: 1 ml/kcal    Nutrition Related Findings:   trace generalized edema., nonpitting BLE edema Wound Type: Pressure Injury    Current Nutrition Therapies:    ADULT DIET; Regular; 3 carb choices (45 gm/meal); Low Fat/Low Chol/High Fiber/2 gm Na; Low Potassium (Less than 3000 mg/day); 1500 ml; NO Hot cereal----SEND RICE KRISPIES AT BREAKFAST    Anthropometric Measures:  Height: 175.3 cm (5' 9.02\")  Current Body Wt: 207 kg (456 lb 5.6 oz)   BMI: 67.4        Nutrition Diagnosis:   Inadequate oral intake, Altered nutrition-related lab values related to limited food acceptance, renal dysfunction, increase demand for energy/nutrients as evidenced by intake 0-25%, intake 26-50%, weight loss, wounds    Nutrition Interventions:   Food and/or Nutrient Delivery: Continue Current Diet  Nutrition Education/Counseling: Survival skills/brief education completed  Coordination of Nutrition Care: Continue to monitor while inpatient  Plan of Care discussed with: pt    Goals:  Goals: Meet at least 75% of estimated needs, PO intake 50% or greater, Maintain adequate nutrition status  Type of Goal: Continue current goal  Previous Goal Met: No Progress toward Goal(s)    Nutrition Monitoring and Evaluation:   Behavioral-Environmental Outcomes: Readiness for

## 2025-06-20 NOTE — PLAN OF CARE
Problem: Nutrition Deficit:  Goal: Optimize nutritional status  Recent Flowsheet Documentation  Taken 6/20/2025 1139 by Miriam Maldonado, MS, RD, LD  Nutrient intake appropriate for improving, restoring, or maintaining nutritional needs:   Assess nutritional status and recommend course of action   Monitor oral intake, labs, and treatment plans   Recommend appropriate diets, oral nutritional supplements, and vitamin/mineral supplements  6/20/2025 0607 by Mindi Petit, RN  Outcome: Progressing

## 2025-06-20 NOTE — PROGRESS NOTES
Facility/Department: [facility] 3 DUNIA/VAS/MED  Daily Treatment Note  NAME: Daljit Elizondo  : 1960  MRN: [id number]    Date of Service: 2025    Discharge Recommendations:  Patient would benefit from continued therapy after discharge       Assessment   Assessment: Lift used to transfer out of bed.  The patient was able to participate in short sit, standing, and beginning level stepping activities from bariatric recliner in a safe method.  Assisted back to bed.  Due to body habitus in the recliner, a therapeutic reclined position is not achieved,  so did not facilitate longer time out of bed.  Short sit positions in the recliner are very therapeutic for short periods so this has been incorporated in to therapeutic activity  Treatment Diagnosis: Acute congestive heart failure    Activity Tolerance  Activity Tolerance: Patient Tolerated treatment well  Activity Tolerance Comments: Safer method of delivering treatment identified         Patient Diagnosis(es): The primary encounter diagnosis was Hypervolemia, unspecified hypervolemia type. Diagnoses of Cardiac arrhythmia, unspecified cardiac arrhythmia type, Community acquired pneumonia, unspecified laterality, Shortness of breath, and Bilateral lower extremity edema were also pertinent to this visit.    has a past medical history of Abrasion, Anxiety, Asthma, Bell's palsy, Cataracts, bilateral, Cellulitis, Diabetes (HCC), Edema extremities, GERD (gastroesophageal reflux disease), Gout, H/O tracheostomy, H/O urinary retention, Hernia, hiatal, History of panniculitis, History of shingles, Hyperlipemia, Hypertension, Morbid obesity (HCC), Non-ST elevated myocardial infarction (HCC), Obesity, Paraphimosis, Pulmonary hypertension (HCC), Renal failure, Respiratory failure (HCC), Shoulder pain, Sleep apnea, Testosterone deficiency, Ulcer, Ulcer of calf (HCC), and Venous stasis of both lower extremities.   has a past surgical history that includes Testicle surgery (10/1970);  activity EOB in unsupported sitting with supervision for 5 mins  Short Term Goal 5: Independent with therapeutic activity recommendations  Long Term Goals  Long Term Goal 1: Upgrade as tolerated to modified independent for personal care routine and transfers       Tx Time  Minutes  45             Denise Kingsley OT/[place]  Electronically signed by Denise Kingsley OT on 6/20/2025 at 2:49 PM

## 2025-06-20 NOTE — PROGRESS NOTES
Physical Therapy  Name: Daljit Elizondo  MRN:  [id number]  Date of service:  6/20/2025 06/20/25 1415   Restrictions/Precautions   Restrictions/Precautions Fall Risk;Contact Precautions   Position Activity Restriction   Other Position/Activity Restrictions ESBL, MRSA   General   Family/Caregiver Present No   Referring Practitioner Ruben Partida, APRN - CNP   Subjective   Subjective agreeable to transfer to chair with lift   Oxygen Therapy   O2 Device Nasal cannula   Transfers   Sit to Stand Partial/Moderate assistance   Stand to Sit Minimal Assistance   Comment 3 STS from recliner after getting there with anne lift. pt able to tolerate static standing at walker. sits quickly   Exercises   Knee Long Arc Quad 5   Short Term Goals   Time Frame for Short Term Goals 2 wks   Short Term Goal 1 Independent with bed mobility   Short Term Goal 2 Independent with transfers   Short Term Goal 3 Ambulate 50 feet independently with assistive device   Short Term Goal 4 bed mobility Min A   Short Term Goal 5 amb. 15' Min A x 1 SW   Conditions Requiring Skilled Therapeutic Intervention   Body Structures, Functions, Activity Limitations Requiring Skilled Therapeutic Intervention Decreased functional mobility ;Decreased ADL status;Decreased ROM;Decreased strength;Decreased balance;Decreased endurance;Decreased sensation;Decreased posture   Treatment Diagnosis impaired gait and mobility   Discharge Recommendations Continue to assess pending progress;Home with Home health PT;Home with nursing aide;Patient would benefit from continued therapy after discharge;Therapy recommended at discharge   Activity Tolerance   Activity Tolerance Patient tolerated treatment well   Physical Therapy Plan   General Plan 3-5 times per week   Therapy Duration 2 Weeks   Current Treatment Recommendations Strengthening;ROM;Balance training;Functional mobility training;Transfer training;Gait training;Endurance training;Patient/Caregiver education &

## 2025-06-20 NOTE — PLAN OF CARE
Problem: Chronic Conditions and Co-morbidities  Goal: Patient's chronic conditions and co-morbidity symptoms are monitored and maintained or improved  Outcome: Progressing     Problem: Discharge Planning  Goal: Discharge to home or other facility with appropriate resources  Outcome: Progressing     Problem: Pain  Goal: Verbalizes/displays adequate comfort level or baseline comfort level  Outcome: Progressing     Problem: Safety - Adult  Goal: Free from fall injury  Outcome: Progressing     Problem: ABCDS Injury Assessment  Goal: Absence of physical injury  Outcome: Progressing     Problem: Skin/Tissue Integrity  Goal: Skin integrity remains intact  Description: 1.  Monitor for areas of redness and/or skin breakdown2.  Assess vascular access sites hourly3.  Every 4-6 hours minimum:  Change oxygen saturation probe site4.  Every 4-6 hours:  If on nasal continuous positive airway pressure, respiratory therapy assess nares and determine need for appliance change or resting period  Outcome: Progressing     Problem: Nutrition Deficit:  Goal: Optimize nutritional status  Outcome: Progressing     Problem: Neurosensory - Adult  Goal: Achieves stable or improved neurological status  Outcome: Progressing  Flowsheets (Taken 6/19/2025 2116)  Achieves stable or improved neurological status: Assess for and report changes in neurological status     Problem: Respiratory - Adult  Goal: Achieves optimal ventilation and oxygenation  Outcome: Progressing     Problem: Cardiovascular - Adult  Goal: Maintains optimal cardiac output and hemodynamic stability  Outcome: Progressing     Problem: Skin/Tissue Integrity - Adult  Goal: Skin integrity remains intact  Description: 1.  Monitor for areas of redness and/or skin breakdown2.  Assess vascular access sites hourly3.  Every 4-6 hours minimum:  Change oxygen saturation probe site4.  Every 4-6 hours:  If on nasal continuous positive airway pressure, respiratory therapy assess nares and

## 2025-06-20 NOTE — PROGRESS NOTES
Physical Therapy    Name: Daljit Elizondo  MRN: [id number]  Date of service: 6/20/2025      PT/OT attempt this AM. Pt is currently on bedpan. Stated pt has been using it several times today so far. Will continue to follow    Electronically signed by Gregor Naranjo PTA on 6/20/2025 at 12:54 PM

## 2025-06-21 LAB
ALBUMIN SERPL-MCNC: 2.8 G/DL (ref 3.5–5.2)
ALP SERPL-CCNC: 112 U/L (ref 40–129)
ALT SERPL-CCNC: 8 U/L (ref 10–50)
ANION GAP SERPL CALCULATED.3IONS-SCNC: 8 MMOL/L (ref 8–16)
AST SERPL-CCNC: 11 U/L (ref 10–50)
BASOPHILS # BLD: 0.1 K/UL (ref 0–0.2)
BASOPHILS NFR BLD: 0.7 % (ref 0–1)
BILIRUB SERPL-MCNC: 0.8 MG/DL (ref 0.2–1.2)
BUN SERPL-MCNC: 42 MG/DL (ref 8–23)
CALCIUM SERPL-MCNC: 8.9 MG/DL (ref 8.8–10.2)
CHLORIDE SERPL-SCNC: 114 MMOL/L (ref 98–107)
CO2 SERPL-SCNC: 21 MMOL/L (ref 22–29)
CREAT SERPL-MCNC: 3.3 MG/DL (ref 0.7–1.2)
EOSINOPHIL # BLD: 0.3 K/UL (ref 0–0.6)
EOSINOPHIL NFR BLD: 4.4 % (ref 0–5)
ERYTHROCYTE [DISTWIDTH] IN BLOOD BY AUTOMATED COUNT: 15.1 % (ref 11.5–14.5)
GLUCOSE BLD-MCNC: 101 MG/DL (ref 70–99)
GLUCOSE BLD-MCNC: 105 MG/DL (ref 70–99)
GLUCOSE BLD-MCNC: 110 MG/DL (ref 70–99)
GLUCOSE BLD-MCNC: 62 MG/DL (ref 70–99)
GLUCOSE BLD-MCNC: 68 MG/DL (ref 70–99)
GLUCOSE BLD-MCNC: 86 MG/DL (ref 70–99)
GLUCOSE SERPL-MCNC: 82 MG/DL (ref 70–99)
HCT VFR BLD AUTO: 37.3 % (ref 42–52)
HGB BLD-MCNC: 11.4 G/DL (ref 14–18)
IMM GRANULOCYTES # BLD: 0 K/UL
LYMPHOCYTES # BLD: 1 K/UL (ref 1.1–4.5)
LYMPHOCYTES NFR BLD: 13.8 % (ref 20–40)
MCH RBC QN AUTO: 29.9 PG (ref 27–31)
MCHC RBC AUTO-ENTMCNC: 30.6 G/DL (ref 33–37)
MCV RBC AUTO: 97.9 FL (ref 80–94)
MONOCYTES # BLD: 0.6 K/UL (ref 0–0.9)
MONOCYTES NFR BLD: 8 % (ref 0–10)
NEUTROPHILS # BLD: 5.2 K/UL (ref 1.5–7.5)
NEUTS SEG NFR BLD: 72.7 % (ref 50–65)
PERFORMED ON: ABNORMAL
PERFORMED ON: NORMAL
PLATELET # BLD AUTO: 154 K/UL (ref 130–400)
PMV BLD AUTO: 12.4 FL (ref 9.4–12.4)
POTASSIUM SERPL-SCNC: 4.9 MMOL/L (ref 3.5–5.1)
PROT SERPL-MCNC: 5.7 G/DL (ref 6.4–8.3)
RBC # BLD AUTO: 3.81 M/UL (ref 4.7–6.1)
SODIUM SERPL-SCNC: 143 MMOL/L (ref 136–145)
WBC # BLD AUTO: 7.1 K/UL (ref 4.8–10.8)

## 2025-06-21 PROCEDURE — 6370000000 HC RX 637 (ALT 250 FOR IP): Performed by: NURSE PRACTITIONER

## 2025-06-21 PROCEDURE — 94760 N-INVAS EAR/PLS OXIMETRY 1: CPT

## 2025-06-21 PROCEDURE — 6370000000 HC RX 637 (ALT 250 FOR IP)

## 2025-06-21 PROCEDURE — 80053 COMPREHEN METABOLIC PANEL: CPT

## 2025-06-21 PROCEDURE — 1200000000 HC SEMI PRIVATE

## 2025-06-21 PROCEDURE — 85025 COMPLETE CBC W/AUTO DIFF WBC: CPT

## 2025-06-21 PROCEDURE — 94150 VITAL CAPACITY TEST: CPT

## 2025-06-21 PROCEDURE — 2700000000 HC OXYGEN THERAPY PER DAY

## 2025-06-21 PROCEDURE — 6360000002 HC RX W HCPCS

## 2025-06-21 PROCEDURE — 2580000003 HC RX 258: Performed by: INTERNAL MEDICINE

## 2025-06-21 PROCEDURE — 2500000003 HC RX 250 WO HCPCS

## 2025-06-21 PROCEDURE — 94640 AIRWAY INHALATION TREATMENT: CPT

## 2025-06-21 PROCEDURE — 6360000002 HC RX W HCPCS: Performed by: NURSE PRACTITIONER

## 2025-06-21 PROCEDURE — 82962 GLUCOSE BLOOD TEST: CPT

## 2025-06-21 PROCEDURE — 36415 COLL VENOUS BLD VENIPUNCTURE: CPT

## 2025-06-21 RX ORDER — 0.9 % SODIUM CHLORIDE 0.9 %
500 INTRAVENOUS SOLUTION INTRAVENOUS ONCE
Status: COMPLETED | OUTPATIENT
Start: 2025-06-21 | End: 2025-06-21

## 2025-06-21 RX ADMIN — IPRATROPIUM BROMIDE 0.5 MG: 0.5 SOLUTION RESPIRATORY (INHALATION) at 15:41

## 2025-06-21 RX ADMIN — ACETAMINOPHEN 650 MG: 325 TABLET ORAL at 21:32

## 2025-06-21 RX ADMIN — GUAIFENESIN 600 MG: 600 TABLET, EXTENDED RELEASE ORAL at 21:29

## 2025-06-21 RX ADMIN — PRAVASTATIN SODIUM 20 MG: 20 TABLET ORAL at 21:31

## 2025-06-21 RX ADMIN — METOPROLOL SUCCINATE 25 MG: 25 TABLET, FILM COATED, EXTENDED RELEASE ORAL at 21:29

## 2025-06-21 RX ADMIN — SODIUM CHLORIDE, PRESERVATIVE FREE 10 ML: 5 INJECTION INTRAVENOUS at 07:32

## 2025-06-21 RX ADMIN — SODIUM CHLORIDE 500 ML: 0.9 INJECTION, SOLUTION INTRAVENOUS at 12:39

## 2025-06-21 RX ADMIN — GUAIFENESIN 600 MG: 600 TABLET, EXTENDED RELEASE ORAL at 07:30

## 2025-06-21 RX ADMIN — IPRATROPIUM BROMIDE 0.5 MG: 0.5 SOLUTION RESPIRATORY (INHALATION) at 07:53

## 2025-06-21 RX ADMIN — SODIUM CHLORIDE: 0.9 INJECTION, SOLUTION INTRAVENOUS at 21:42

## 2025-06-21 RX ADMIN — MONTELUKAST SODIUM 10 MG: 10 TABLET, FILM COATED ORAL at 21:34

## 2025-06-21 RX ADMIN — ASPIRIN 81 MG: 81 TABLET, COATED ORAL at 07:30

## 2025-06-21 RX ADMIN — MICONAZOLE NITRATE: 2 POWDER TOPICAL at 21:33

## 2025-06-21 RX ADMIN — IPRATROPIUM BROMIDE 0.5 MG: 0.5 SOLUTION RESPIRATORY (INHALATION) at 12:01

## 2025-06-21 RX ADMIN — ALLOPURINOL 300 MG: 300 TABLET ORAL at 07:30

## 2025-06-21 RX ADMIN — ENOXAPARIN SODIUM 60 MG: 100 INJECTION SUBCUTANEOUS at 07:32

## 2025-06-21 RX ADMIN — SODIUM CHLORIDE: 0.9 INJECTION, SOLUTION INTRAVENOUS at 10:25

## 2025-06-21 RX ADMIN — IPRATROPIUM BROMIDE 0.5 MG: 0.5 SOLUTION RESPIRATORY (INHALATION) at 19:22

## 2025-06-21 RX ADMIN — METOPROLOL SUCCINATE 25 MG: 25 TABLET, FILM COATED, EXTENDED RELEASE ORAL at 07:30

## 2025-06-21 RX ADMIN — ENOXAPARIN SODIUM 60 MG: 100 INJECTION SUBCUTANEOUS at 21:34

## 2025-06-21 RX ADMIN — PANTOPRAZOLE SODIUM 40 MG: 40 TABLET, DELAYED RELEASE ORAL at 07:30

## 2025-06-21 RX ADMIN — CETIRIZINE HYDROCHLORIDE 10 MG: 10 TABLET ORAL at 07:30

## 2025-06-21 ASSESSMENT — PAIN DESCRIPTION - ORIENTATION: ORIENTATION: LEFT

## 2025-06-21 ASSESSMENT — PAIN SCALES - GENERAL
PAINLEVEL_OUTOF10: 5
PAINLEVEL_OUTOF10: 0

## 2025-06-21 ASSESSMENT — PAIN DESCRIPTION - DESCRIPTORS: DESCRIPTORS: ACHING;THROBBING

## 2025-06-21 ASSESSMENT — PAIN DESCRIPTION - LOCATION: LOCATION: BACK

## 2025-06-21 ASSESSMENT — PAIN SCALES - WONG BAKER: WONGBAKER_NUMERICALRESPONSE: NO HURT

## 2025-06-21 NOTE — PROGRESS NOTES
[facility] Progress Note    Patient:  Daljit Elizondo  YOB: 1960  Date of Service: 6/21/2025  MRN: [id number]   Acct: [id number]   Primary Care Physician: Jessie Helms APRN - CNP  Advance Directive: Full Code  Admit Date: 5/14/2025       Hospital Day: 38     NOTE: Portions of this note have been copied forward, however, changes made to reflect the most current clinical status of this patient.    CHIEF COMPLAINT:     Chief Complaint   Patient presents with    Shortness of Breath     Since Jan 1    Scrotal Pain       6/21/2025 9:09 AM  Subjective / Interval History:   06/21/2025  Patient seen and examined this a.m.   No new complaints.    No acute changes or acute overnight event reported.   Laying comfortably in bed in no apparent acute distress.    Denies any acute complaints or distress.   Endorses overall improvement.    SCr 3.3 this am      06/20/2025  Patient seen and examined this a.m.   No new complaints.    No acute changes or acute overnight event reported.   Laying comfortably in bed in no apparent acute distress.    Denies any acute complaints or distress.    Endorses overall improvement.    Creatinine remains elevated at 3.1 this am      06/19/2025  Patient seen and examined this a.m.   No new complaints.  No acute changes or acute overnight event reported.   Laying comfortably in bed in no apparent acute distress.    Denies any acute complaints or distress.    Endorses overall improvement.        06/18/2025  Patient seen and examined this a.m.   No new complaints.    No acute changes or acute overnight event reported.   Laying comfortably in bed in no apparent acute distress.  Denies any acute complaints or distress.    Endorses overall improvement.    Potassium of 5.5  Creatinine remains elevated at 3.3      06/17/2025  Patient seen and examined this a.m.   No new complaints.    No acute changes or acute overnight event reported.   Laying comfortably in bed in no apparent  spray Each Nostril Q2H PRN Ashleigh Jamil APRN   1 spray at 05/19/25 1118    tetrahydrozoline 0.05 % ophthalmic solution 1 drop  1 drop Both Eyes TID PRN Ashleigh Jamil APRN        lubrifresh P.M. (artificial tears) ophthalmic ointment   Both Eyes PRN Ruben Partida APRN - CNP   Given at 05/17/25 1222    cetirizine (ZYRTEC) tablet 10 mg  10 mg Oral Daily Ruben Partida APRN - CNP   10 mg at 06/21/25 0730    guaiFENesin (MUCINEX) extended release tablet 600 mg  600 mg Oral BID Ruben Partida APRN - CNP   600 mg at 06/21/25 0730    miconazole (MICOTIN) 2 % powder   Topical BID Lambert Ramirez MD   Given at 06/20/25 2212    albuterol (PROVENTIL) (2.5 MG/3ML) 0.083% nebulizer solution 2.5 mg  2.5 mg Nebulization Q6H PRN Renay Thorne APRN - CNP        allopurinol (ZYLOPRIM) tablet 300 mg  300 mg Oral Daily Renay Thorne APRN - CNP   300 mg at 06/21/25 0730    aspirin EC tablet 81 mg  81 mg Oral Daily Renay Thorne APRN - CNP   81 mg at 06/21/25 0730    [Held by provider] losartan (COZAAR) tablet 50 mg  50 mg Oral Daily Renay Thorne APRN - CNP   50 mg at 05/17/25 0951    pantoprazole (PROTONIX) tablet 40 mg  40 mg Oral QAM AC Renay Thorne APRN - CNP   40 mg at 06/21/25 0730    pravastatin (PRAVACHOL) tablet 20 mg  20 mg Oral Nightly Renay Thorne APRN - CNP   20 mg at 06/20/25 2211    sodium chloride flush 0.9 % injection 5-40 mL  5-40 mL IntraVENous 2 times per day Renay Thorne APRN - CNP   10 mL at 06/21/25 0732    sodium chloride flush 0.9 % injection 5-40 mL  5-40 mL IntraVENous PRN Renay Thorne APRN - CNP        0.9 % sodium chloride infusion   IntraVENous PRN Renay Thorne APRN - CNP 5 mL/hr at 05/18/25 1459 New Bag at 05/18/25 1459    ondansetron (ZOFRAN-ODT) disintegrating tablet 4 mg  4 mg Oral Q8H PRN Renay Thorne APRN - CNP   4 mg at 06/09/25 1754    Or    ondansetron (ZOFRAN) injection 4 mg  4 mg IntraVENous Q6H PRN Renay Thorne APRN - CNP

## 2025-06-21 NOTE — PROGRESS NOTES
Nephrology ([facility]) Progress Note    Patient:  Daljit Elizondo  YOB: 1960  Date of Service: 6/21/2025  MRN: [id number]   Acct: [id number]   Primary Care Physician: Jessie Helms APRN - CNP  Advance Directive: Full Code  Admit Date: 5/14/2025       Hospital Day: 38  Referring Provider: Cody Howell MD    Patient independently seen and examined, Chart, Consults, Notes, Operative notes, Labs, Cardiology, and Radiology studies reviewed as available.    Chief complaint: Abnormal labs.    Subjective:  Daljit Elizondo is a 65 y.o. male for whom we were consulted for evaluation and treatment of acute kidney injury.  Patient denies any history of chronic kidney disease.  He has been in the hospital for the last month, poor historian and has never seen nephrology in the past.  Patient has severe abdominal obesity, hypertension, sleep apnea, type 2 diabetes, chronic diastolic CHF, sleep apnea.  Presented with increasing shortness of breath/dyspnea on exertion.  Hospital course remarkable for treatment with intravenous diuretics for the treatment of volume overload, CHF exacerbation.  His serum creatinine was 0.9 mg with estimated GFR more than 90 mL on admission.  Patient has significant drop in GFR and nephrology is consulted.    This morning patient remained bedridden on CPAP.  He is very weak but denies any shortness of breath.  His renal function has shown minimal to no improvement.    Allergies:  Penicillamine, Silvadene [silver sulfadiazine], Aerohist [chlorpheniramine-methscop er], Cephalosporins, Hemp seed oil, Neosporin [neomycin-polymyxin-gramicidin], Penicillins, and Zinc    Medicines:  Current Facility-Administered Medications   Medication Dose Route Frequency Provider Last Rate Last Admin    loperamide (IMODIUM) capsule 2 mg  2 mg Oral 4x Daily PRN Cody Howell MD   2 mg at 06/16/25 0493    insulin lispro (HUMALOG,ADMELOG) injection vial 0-4 Units  0-4

## 2025-06-22 LAB
ALBUMIN SERPL-MCNC: 2.6 G/DL (ref 3.5–5.2)
ALP SERPL-CCNC: 114 U/L (ref 40–129)
ALT SERPL-CCNC: 8 U/L (ref 10–50)
ANION GAP SERPL CALCULATED.3IONS-SCNC: 9 MMOL/L (ref 8–16)
AST SERPL-CCNC: 14 U/L (ref 10–50)
BASOPHILS # BLD: 0 K/UL (ref 0–0.2)
BASOPHILS NFR BLD: 0.4 % (ref 0–1)
BILIRUB SERPL-MCNC: 0.8 MG/DL (ref 0.2–1.2)
BUN SERPL-MCNC: 39 MG/DL (ref 8–23)
CALCIUM SERPL-MCNC: 8.5 MG/DL (ref 8.8–10.2)
CHLORIDE SERPL-SCNC: 114 MMOL/L (ref 98–107)
CO2 SERPL-SCNC: 18 MMOL/L (ref 22–29)
CREAT SERPL-MCNC: 3.2 MG/DL (ref 0.7–1.2)
EOSINOPHIL # BLD: 0.3 K/UL (ref 0–0.6)
EOSINOPHIL NFR BLD: 3.5 % (ref 0–5)
ERYTHROCYTE [DISTWIDTH] IN BLOOD BY AUTOMATED COUNT: 14.9 % (ref 11.5–14.5)
GLUCOSE BLD-MCNC: 114 MG/DL (ref 70–99)
GLUCOSE BLD-MCNC: 125 MG/DL (ref 70–99)
GLUCOSE BLD-MCNC: 78 MG/DL (ref 70–99)
GLUCOSE BLD-MCNC: 89 MG/DL (ref 70–99)
GLUCOSE SERPL-MCNC: 103 MG/DL (ref 70–99)
HCT VFR BLD AUTO: 36.3 % (ref 42–52)
HGB BLD-MCNC: 10.9 G/DL (ref 14–18)
IMM GRANULOCYTES # BLD: 0 K/UL
LYMPHOCYTES # BLD: 0.9 K/UL (ref 1.1–4.5)
LYMPHOCYTES NFR BLD: 11.3 % (ref 20–40)
MCH RBC QN AUTO: 29.9 PG (ref 27–31)
MCHC RBC AUTO-ENTMCNC: 30 G/DL (ref 33–37)
MCV RBC AUTO: 99.7 FL (ref 80–94)
MONOCYTES # BLD: 0.6 K/UL (ref 0–0.9)
MONOCYTES NFR BLD: 7 % (ref 0–10)
NEUTROPHILS # BLD: 6 K/UL (ref 1.5–7.5)
NEUTS SEG NFR BLD: 77.3 % (ref 50–65)
PERFORMED ON: ABNORMAL
PERFORMED ON: ABNORMAL
PERFORMED ON: NORMAL
PERFORMED ON: NORMAL
PLATELET # BLD AUTO: 154 K/UL (ref 130–400)
PMV BLD AUTO: 12.8 FL (ref 9.4–12.4)
POTASSIUM SERPL-SCNC: 4.7 MMOL/L (ref 3.5–5.1)
PROT SERPL-MCNC: 5.4 G/DL (ref 6.4–8.3)
RBC # BLD AUTO: 3.64 M/UL (ref 4.7–6.1)
SODIUM SERPL-SCNC: 141 MMOL/L (ref 136–145)
WBC # BLD AUTO: 7.8 K/UL (ref 4.8–10.8)

## 2025-06-22 PROCEDURE — 1200000000 HC SEMI PRIVATE

## 2025-06-22 PROCEDURE — 80053 COMPREHEN METABOLIC PANEL: CPT

## 2025-06-22 PROCEDURE — 2700000000 HC OXYGEN THERAPY PER DAY

## 2025-06-22 PROCEDURE — 85025 COMPLETE CBC W/AUTO DIFF WBC: CPT

## 2025-06-22 PROCEDURE — 94640 AIRWAY INHALATION TREATMENT: CPT

## 2025-06-22 PROCEDURE — 82962 GLUCOSE BLOOD TEST: CPT

## 2025-06-22 PROCEDURE — 2580000003 HC RX 258: Performed by: INTERNAL MEDICINE

## 2025-06-22 PROCEDURE — 6370000000 HC RX 637 (ALT 250 FOR IP)

## 2025-06-22 PROCEDURE — 36415 COLL VENOUS BLD VENIPUNCTURE: CPT

## 2025-06-22 PROCEDURE — 6370000000 HC RX 637 (ALT 250 FOR IP): Performed by: NURSE PRACTITIONER

## 2025-06-22 PROCEDURE — 6360000002 HC RX W HCPCS

## 2025-06-22 PROCEDURE — 94760 N-INVAS EAR/PLS OXIMETRY 1: CPT

## 2025-06-22 PROCEDURE — 2500000003 HC RX 250 WO HCPCS

## 2025-06-22 PROCEDURE — 6360000002 HC RX W HCPCS: Performed by: NURSE PRACTITIONER

## 2025-06-22 RX ADMIN — METOPROLOL SUCCINATE 25 MG: 25 TABLET, FILM COATED, EXTENDED RELEASE ORAL at 22:32

## 2025-06-22 RX ADMIN — MONTELUKAST SODIUM 10 MG: 10 TABLET, FILM COATED ORAL at 22:32

## 2025-06-22 RX ADMIN — ACETAMINOPHEN 650 MG: 325 TABLET ORAL at 19:11

## 2025-06-22 RX ADMIN — IPRATROPIUM BROMIDE 0.5 MG: 0.5 SOLUTION RESPIRATORY (INHALATION) at 06:16

## 2025-06-22 RX ADMIN — IPRATROPIUM BROMIDE 0.5 MG: 0.5 SOLUTION RESPIRATORY (INHALATION) at 14:11

## 2025-06-22 RX ADMIN — ALLOPURINOL 300 MG: 300 TABLET ORAL at 07:15

## 2025-06-22 RX ADMIN — ASPIRIN 81 MG: 81 TABLET, COATED ORAL at 07:15

## 2025-06-22 RX ADMIN — GUAIFENESIN 600 MG: 600 TABLET, EXTENDED RELEASE ORAL at 22:32

## 2025-06-22 RX ADMIN — IPRATROPIUM BROMIDE 0.5 MG: 0.5 SOLUTION RESPIRATORY (INHALATION) at 10:01

## 2025-06-22 RX ADMIN — IPRATROPIUM BROMIDE 0.5 MG: 0.5 SOLUTION RESPIRATORY (INHALATION) at 18:26

## 2025-06-22 RX ADMIN — GUAIFENESIN 600 MG: 600 TABLET, EXTENDED RELEASE ORAL at 07:15

## 2025-06-22 RX ADMIN — CETIRIZINE HYDROCHLORIDE 10 MG: 10 TABLET ORAL at 07:15

## 2025-06-22 RX ADMIN — ENOXAPARIN SODIUM 60 MG: 100 INJECTION SUBCUTANEOUS at 07:15

## 2025-06-22 RX ADMIN — MICONAZOLE NITRATE: 2 POWDER TOPICAL at 22:33

## 2025-06-22 RX ADMIN — SODIUM CHLORIDE, PRESERVATIVE FREE 10 ML: 5 INJECTION INTRAVENOUS at 22:33

## 2025-06-22 RX ADMIN — PANTOPRAZOLE SODIUM 40 MG: 40 TABLET, DELAYED RELEASE ORAL at 07:15

## 2025-06-22 RX ADMIN — METOPROLOL SUCCINATE 25 MG: 25 TABLET, FILM COATED, EXTENDED RELEASE ORAL at 07:15

## 2025-06-22 RX ADMIN — ENOXAPARIN SODIUM 60 MG: 100 INJECTION SUBCUTANEOUS at 23:02

## 2025-06-22 RX ADMIN — SODIUM CHLORIDE: 0.9 INJECTION, SOLUTION INTRAVENOUS at 04:53

## 2025-06-22 RX ADMIN — SODIUM CHLORIDE: 0.9 INJECTION, SOLUTION INTRAVENOUS at 12:23

## 2025-06-22 RX ADMIN — PRAVASTATIN SODIUM 20 MG: 20 TABLET ORAL at 22:32

## 2025-06-22 RX ADMIN — SODIUM CHLORIDE: 0.9 INJECTION, SOLUTION INTRAVENOUS at 19:51

## 2025-06-22 ASSESSMENT — PAIN DESCRIPTION - DESCRIPTORS: DESCRIPTORS: ACHING;THROBBING

## 2025-06-22 ASSESSMENT — PAIN DESCRIPTION - LOCATION: LOCATION: OTHER (COMMENT)

## 2025-06-22 ASSESSMENT — PAIN SCALES - GENERAL
PAINLEVEL_OUTOF10: 5
PAINLEVEL_OUTOF10: 0

## 2025-06-22 ASSESSMENT — PAIN DESCRIPTION - ORIENTATION: ORIENTATION: LEFT

## 2025-06-22 NOTE — PROGRESS NOTES
Nephrology ([facility]) Progress Note    Patient:  Daljit Elizondo  YOB: 1960  Date of Service: 6/22/2025  MRN: [id number]   Acct: [id number]   Primary Care Physician: Jessie Helms APRN - CNP  Advance Directive: Full Code  Admit Date: 5/14/2025       Hospital Day: 39  Referring Provider: Cody Howell MD    Patient independently seen and examined, Chart, Consults, Notes, Operative notes, Labs, Cardiology, and Radiology studies reviewed as available.    Chief complaint: Abnormal labs.    Subjective:  Daljit Elizondo is a 65 y.o. male for whom we were consulted for evaluation and treatment of acute kidney injury.  Patient denies any history of chronic kidney disease.  He has been in the hospital for the last month, poor historian and has never seen nephrology in the past.  Patient has severe abdominal obesity, hypertension, sleep apnea, type 2 diabetes, chronic diastolic CHF, sleep apnea.  Presented with increasing shortness of breath/dyspnea on exertion.  Hospital course remarkable for treatment with intravenous diuretics for the treatment of volume overload, CHF exacerbation.  His serum creatinine was 0.9 mg with estimated GFR more than 90 mL on admission.  Patient has significant drop in GFR and nephrology is consulted.    This morning patient remained bedridden.  He remained on CPAP.  He denies any shortness of breath or swelling.  He has good urine output but no improvement of renal function yet.    Allergies:  Penicillamine, Silvadene [silver sulfadiazine], Aerohist [chlorpheniramine-methscop er], Cephalosporins, Hemp seed oil, Neosporin [neomycin-polymyxin-gramicidin], Penicillins, and Zinc    Medicines:  Current Facility-Administered Medications   Medication Dose Route Frequency Provider Last Rate Last Admin    loperamide (IMODIUM) capsule 2 mg  2 mg Oral 4x Daily PRN Cody Howell MD   2 mg at 06/16/25 4282    insulin lispro (HUMALOG,ADMELOG) injection

## 2025-06-22 NOTE — PROGRESS NOTES
[facility] Progress Note    Patient:  Daljit Elizondo  YOB: 1960  Date of Service: 6/22/2025  MRN: [id number]   Acct: [id number]   Primary Care Physician: Jessie Helms APRN - CNP  Advance Directive: Full Code  Admit Date: 5/14/2025       Hospital Day: 39     NOTE: Portions of this note have been copied forward, however, changes made to reflect the most current clinical status of this patient.    CHIEF COMPLAINT:     Chief Complaint   Patient presents with    Shortness of Breath     Since Jan 1    Scrotal Pain       6/22/2025 7:26 AM  Subjective / Interval History:   06/22/2025  Patient seen and examined this a.m.   No new complaints.    No acute changes or acute overnight event reported.   Laying comfortably in bed in no apparent acute distress.    Denies any acute complaints or distress.    Endorses overall improvement.    Cr 3.2 this am      06/21/2025  Patient seen and examined this a.m.   No new complaints.    No acute changes or acute overnight event reported.   Laying comfortably in bed in no apparent acute distress.    Denies any acute complaints or distress.   Endorses overall improvement.    SCr 3.3 this am      06/20/2025  Patient seen and examined this a.m.   No new complaints.    No acute changes or acute overnight event reported.   Laying comfortably in bed in no apparent acute distress.    Denies any acute complaints or distress.    Endorses overall improvement.    Creatinine remains elevated at 3.1 this am      06/19/2025  Patient seen and examined this a.m.   No new complaints.  No acute changes or acute overnight event reported.   Laying comfortably in bed in no apparent acute distress.    Denies any acute complaints or distress.    Endorses overall improvement.        06/18/2025  Patient seen and examined this a.m.   No new complaints.    No acute changes or acute overnight event reported.   Laying comfortably in bed in no apparent acute distress.  Denies any acute

## 2025-06-23 LAB
ALBUMIN SERPL-MCNC: 2.8 G/DL (ref 3.5–5.2)
ALP SERPL-CCNC: 124 U/L (ref 40–129)
ALT SERPL-CCNC: 8 U/L (ref 10–50)
ANION GAP SERPL CALCULATED.3IONS-SCNC: 11 MMOL/L (ref 8–16)
AST SERPL-CCNC: 12 U/L (ref 10–50)
BASOPHILS # BLD: 0 K/UL (ref 0–0.2)
BASOPHILS NFR BLD: 0.5 % (ref 0–1)
BILIRUB SERPL-MCNC: 0.9 MG/DL (ref 0.2–1.2)
BUN SERPL-MCNC: 36 MG/DL (ref 8–23)
CALCIUM SERPL-MCNC: 8.5 MG/DL (ref 8.8–10.2)
CHLORIDE SERPL-SCNC: 113 MMOL/L (ref 98–107)
CO2 SERPL-SCNC: 17 MMOL/L (ref 22–29)
CREAT SERPL-MCNC: 3.1 MG/DL (ref 0.7–1.2)
EOSINOPHIL # BLD: 0.3 K/UL (ref 0–0.6)
EOSINOPHIL NFR BLD: 3.4 % (ref 0–5)
ERYTHROCYTE [DISTWIDTH] IN BLOOD BY AUTOMATED COUNT: 15.1 % (ref 11.5–14.5)
GLUCOSE BLD-MCNC: 101 MG/DL (ref 70–99)
GLUCOSE BLD-MCNC: 103 MG/DL (ref 70–99)
GLUCOSE BLD-MCNC: 93 MG/DL (ref 70–99)
GLUCOSE BLD-MCNC: 98 MG/DL (ref 70–99)
GLUCOSE SERPL-MCNC: 112 MG/DL (ref 70–99)
HCT VFR BLD AUTO: 39.6 % (ref 42–52)
HGB BLD-MCNC: 11.7 G/DL (ref 14–18)
IMM GRANULOCYTES # BLD: 0 K/UL
LYMPHOCYTES # BLD: 0.8 K/UL (ref 1.1–4.5)
LYMPHOCYTES NFR BLD: 9.5 % (ref 20–40)
MCH RBC QN AUTO: 29.5 PG (ref 27–31)
MCHC RBC AUTO-ENTMCNC: 29.5 G/DL (ref 33–37)
MCV RBC AUTO: 99.7 FL (ref 80–94)
MONOCYTES # BLD: 0.5 K/UL (ref 0–0.9)
MONOCYTES NFR BLD: 6.3 % (ref 0–10)
NEUTROPHILS # BLD: 6.5 K/UL (ref 1.5–7.5)
NEUTS SEG NFR BLD: 79.9 % (ref 50–65)
PERFORMED ON: ABNORMAL
PERFORMED ON: ABNORMAL
PERFORMED ON: NORMAL
PERFORMED ON: NORMAL
PLATELET # BLD AUTO: 172 K/UL (ref 130–400)
PMV BLD AUTO: 12.7 FL (ref 9.4–12.4)
POTASSIUM SERPL-SCNC: 4.8 MMOL/L (ref 3.5–5.1)
PROT SERPL-MCNC: 6 G/DL (ref 6.4–8.3)
RBC # BLD AUTO: 3.97 M/UL (ref 4.7–6.1)
SODIUM SERPL-SCNC: 141 MMOL/L (ref 136–145)
WBC # BLD AUTO: 8.1 K/UL (ref 4.8–10.8)

## 2025-06-23 PROCEDURE — 6370000000 HC RX 637 (ALT 250 FOR IP)

## 2025-06-23 PROCEDURE — 94760 N-INVAS EAR/PLS OXIMETRY 1: CPT

## 2025-06-23 PROCEDURE — 36415 COLL VENOUS BLD VENIPUNCTURE: CPT

## 2025-06-23 PROCEDURE — 6360000002 HC RX W HCPCS

## 2025-06-23 PROCEDURE — 85025 COMPLETE CBC W/AUTO DIFF WBC: CPT

## 2025-06-23 PROCEDURE — 94150 VITAL CAPACITY TEST: CPT

## 2025-06-23 PROCEDURE — 6360000002 HC RX W HCPCS: Performed by: NURSE PRACTITIONER

## 2025-06-23 PROCEDURE — 1200000000 HC SEMI PRIVATE

## 2025-06-23 PROCEDURE — 94640 AIRWAY INHALATION TREATMENT: CPT

## 2025-06-23 PROCEDURE — 82962 GLUCOSE BLOOD TEST: CPT

## 2025-06-23 PROCEDURE — 80053 COMPREHEN METABOLIC PANEL: CPT

## 2025-06-23 PROCEDURE — 6370000000 HC RX 637 (ALT 250 FOR IP): Performed by: NURSE PRACTITIONER

## 2025-06-23 PROCEDURE — 2580000003 HC RX 258: Performed by: INTERNAL MEDICINE

## 2025-06-23 PROCEDURE — 2700000000 HC OXYGEN THERAPY PER DAY

## 2025-06-23 RX ADMIN — MONTELUKAST SODIUM 10 MG: 10 TABLET, FILM COATED ORAL at 20:19

## 2025-06-23 RX ADMIN — ENOXAPARIN SODIUM 60 MG: 100 INJECTION SUBCUTANEOUS at 20:19

## 2025-06-23 RX ADMIN — ENOXAPARIN SODIUM 60 MG: 100 INJECTION SUBCUTANEOUS at 09:50

## 2025-06-23 RX ADMIN — GUAIFENESIN 600 MG: 600 TABLET, EXTENDED RELEASE ORAL at 20:19

## 2025-06-23 RX ADMIN — PANTOPRAZOLE SODIUM 40 MG: 40 TABLET, DELAYED RELEASE ORAL at 09:50

## 2025-06-23 RX ADMIN — ASPIRIN 81 MG: 81 TABLET, COATED ORAL at 09:49

## 2025-06-23 RX ADMIN — IPRATROPIUM BROMIDE 0.5 MG: 0.5 SOLUTION RESPIRATORY (INHALATION) at 18:08

## 2025-06-23 RX ADMIN — METOPROLOL SUCCINATE 25 MG: 25 TABLET, FILM COATED, EXTENDED RELEASE ORAL at 09:50

## 2025-06-23 RX ADMIN — IPRATROPIUM BROMIDE 0.5 MG: 0.5 SOLUTION RESPIRATORY (INHALATION) at 10:23

## 2025-06-23 RX ADMIN — METOPROLOL SUCCINATE 25 MG: 25 TABLET, FILM COATED, EXTENDED RELEASE ORAL at 20:19

## 2025-06-23 RX ADMIN — MICONAZOLE NITRATE: 2 POWDER TOPICAL at 09:50

## 2025-06-23 RX ADMIN — ACETAMINOPHEN 650 MG: 325 TABLET ORAL at 20:18

## 2025-06-23 RX ADMIN — SODIUM CHLORIDE: 0.9 INJECTION, SOLUTION INTRAVENOUS at 09:51

## 2025-06-23 RX ADMIN — IPRATROPIUM BROMIDE 0.5 MG: 0.5 SOLUTION RESPIRATORY (INHALATION) at 14:23

## 2025-06-23 RX ADMIN — ALLOPURINOL 300 MG: 300 TABLET ORAL at 09:50

## 2025-06-23 RX ADMIN — CETIRIZINE HYDROCHLORIDE 10 MG: 10 TABLET ORAL at 09:50

## 2025-06-23 RX ADMIN — IPRATROPIUM BROMIDE 0.5 MG: 0.5 SOLUTION RESPIRATORY (INHALATION) at 06:19

## 2025-06-23 RX ADMIN — MICONAZOLE NITRATE: 2 POWDER TOPICAL at 20:22

## 2025-06-23 RX ADMIN — GUAIFENESIN 600 MG: 600 TABLET, EXTENDED RELEASE ORAL at 09:49

## 2025-06-23 RX ADMIN — PRAVASTATIN SODIUM 20 MG: 20 TABLET ORAL at 20:19

## 2025-06-23 ASSESSMENT — PAIN DESCRIPTION - LOCATION: LOCATION: TEETH

## 2025-06-23 ASSESSMENT — PAIN DESCRIPTION - ORIENTATION: ORIENTATION: LEFT

## 2025-06-23 ASSESSMENT — PAIN SCALES - GENERAL: PAINLEVEL_OUTOF10: 4

## 2025-06-23 ASSESSMENT — PAIN - FUNCTIONAL ASSESSMENT: PAIN_FUNCTIONAL_ASSESSMENT: ACTIVITIES ARE NOT PREVENTED

## 2025-06-23 ASSESSMENT — PAIN DESCRIPTION - DESCRIPTORS: DESCRIPTORS: ACHING

## 2025-06-23 NOTE — PROGRESS NOTES
96 %       24HR INTAKE/OUTPUT:    Intake/Output Summary (Last 24 hours) at 6/23/2025 0859  Last data filed at 6/23/2025 0422  Gross per 24 hour   Intake --   Output 1500 ml   Net -1500 ml       Physical Exam  Vitals and nursing note reviewed.   Constitutional:       General: He is not in acute distress.     Appearance: Normal appearance. He is obese. He is not ill-appearing, toxic-appearing or diaphoretic.   HENT:      Head: Normocephalic and atraumatic.      Right Ear: External ear normal.      Left Ear: External ear normal.      Nose: Nose normal. No congestion or rhinorrhea.      Mouth/Throat:      Mouth: Mucous membranes are moist.      Pharynx: Oropharynx is clear. No oropharyngeal exudate or posterior oropharyngeal erythema.   Eyes:      General: No scleral icterus.        Right eye: No discharge.         Left eye: No discharge.      Extraocular Movements: Extraocular movements intact.      Conjunctiva/sclera: Conjunctivae normal.      Pupils: Pupils are equal, round, and reactive to light.   Cardiovascular:      Rate and Rhythm: Normal rate and regular rhythm.      Pulses: Normal pulses.      Heart sounds: Normal heart sounds. No murmur heard.     No friction rub. No gallop.   Pulmonary:      Effort: Pulmonary effort is normal. No respiratory distress.      Breath sounds: Normal breath sounds. No stridor. No wheezing, rhonchi or rales.   Chest:      Chest wall: No tenderness.   Abdominal:      General: Bowel sounds are normal. There is no distension.      Palpations: Abdomen is soft.      Tenderness: There is no abdominal tenderness. There is no guarding or rebound.   Musculoskeletal:         General: No swelling, tenderness, deformity or signs of injury. Normal range of motion.      Cervical back: Normal range of motion and neck supple. No rigidity. No muscular tenderness.      Right lower leg: No edema.      Left lower leg: No edema.   Skin:     General: Skin is warm and dry.      Capillary Refill:  otherwise normal. The urinary bladder was not visualized.  Avoid hypotension & Nephrotoxins   Nephrology on board-appreciate recommendation  Further workup and management as per nephrology     Acute on chronic diastolic heart failure  13.5 L UOP while on Bumex gtt, now discontinued  Hold Bumetanide   2 g Na diet  Monitor  Is/Os, daily weights    Diabetes Mellitus II, with Hyperglycemia  Hemoglobin A1C: 6.7% (05/15/2025)  Inpatient Regimens to include;  - Insulin Glargine (Lantus) 30 units subcu nightly  - Insulin Lispro (Humalog) on a low dose sliding scale  Monitor POC glucose, and adjust insulin regimen accordingly based on daily insulin requirement.     HTN  Toprol Xl     GERD  Protonix     Morbid obesity  Calorie restriction     REGI  Obesity hypoventilation syndrome  CPAP at night     Hyperkalemia  Potassium 5.5 (06/18/2025)  Sodium zirconium cyclosilicate 10 g p.o. x 1 dose (06/18/2025)  Monitor BMP    Continue management of other chronic medical conditions - see above and orders.          Advance Directive: Full Code    ADULT DIET; Regular; 3 carb choices (45 gm/meal); Low Fat/Low Chol/High Fiber/2 gm Na; Low Potassium (Less than 3000 mg/day); 1500 ml; NO Hot cereal----SEND RICE KRISPIES AT BREAKFAST         Consults Made:   IP CONSULT TO HEART FAILURE NURSE/COORDINATOR  IP CONSULT TO HOME CARE NEEDS  IP CONSULT TO NEPHROLOGY      DVT prophylaxis: Enoxaparin      Current medications reviewed  Lab work reviewed  Radiology  films reviewed  Much appreciated treatment recommendations from suspecialities reviewed  Discussed treatment plan with the nurse and addressed all questions/concerns  Discussed with Patient at the bedside        Discharge planning: tbd  Continue management as noted above.  DENISE/ANGEL on board, for rehab placement  Reportedly [facility], pending pre-CERT and availability of left at the facility.      Multiple complex medical problems  Morbidity mortality high risk  Medical decision

## 2025-06-23 NOTE — PROGRESS NOTES
Nephrology ([facility]) Progress Note    Patient:  Daljit Elizondo  YOB: 1960  Date of Service: 6/23/2025  MRN: [id number]   Acct: [id number]   Primary Care Physician: Jessie Helms APRN - CNP  Advance Directive: Full Code  Admit Date: 5/14/2025       Hospital Day: 40  Referring Provider: Cody Howell MD    Patient independently seen and examined, Chart, Consults, Notes, Operative notes, Labs, Cardiology, and Radiology studies reviewed as available.    Chief complaint: Abnormal labs.    Subjective:  Daljit Elizondo is a 65 y.o. male for whom we were consulted for evaluation and treatment of acute kidney injury.  Patient denies any history of chronic kidney disease.  He has been in the hospital for the last month, poor historian and has never seen nephrology in the past.  Patient has severe abdominal obesity, hypertension, sleep apnea, type 2 diabetes, chronic diastolic CHF, sleep apnea.  Presented with increasing shortness of breath/dyspnea on exertion.  Hospital course remarkable for treatment with intravenous diuretics for the treatment of volume overload, CHF exacerbation.  His serum creatinine was 0.9 mg with estimated GFR more than 90 on admission.  Patient has significant drop in GFR and nephrology is consulted.    This morning, patient remained bedridden.  He uses CPAP.  He denies any shortness of breath or swelling.  He has good urine output but no improvement of renal function yet.  He was asking to get more oral fluids      Allergies:  Penicillamine, Silvadene [silver sulfadiazine], Aerohist [chlorpheniramine-methscop er], Cephalosporins, Hemp seed oil, Neosporin [neomycin-polymyxin-gramicidin], Penicillins, and Zinc    Medicines:  Current Facility-Administered Medications   Medication Dose Route Frequency Provider Last Rate Last Admin    loperamide (IMODIUM) capsule 2 mg  2 mg Oral 4x Daily PRN Cody Howell MD   2 mg at 06/16/25 1214    insulin

## 2025-06-24 LAB
ANION GAP SERPL CALCULATED.3IONS-SCNC: 11 MMOL/L (ref 8–16)
BASOPHILS # BLD: 0.1 K/UL (ref 0–0.2)
BASOPHILS NFR BLD: 0.6 % (ref 0–1)
BUN SERPL-MCNC: 36 MG/DL (ref 8–23)
CALCIUM SERPL-MCNC: 8.5 MG/DL (ref 8.8–10.2)
CHLORIDE SERPL-SCNC: 114 MMOL/L (ref 98–107)
CO2 SERPL-SCNC: 16 MMOL/L (ref 22–29)
CREAT SERPL-MCNC: 3.1 MG/DL (ref 0.7–1.2)
EOSINOPHIL # BLD: 0.3 K/UL (ref 0–0.6)
EOSINOPHIL NFR BLD: 3.4 % (ref 0–5)
ERYTHROCYTE [DISTWIDTH] IN BLOOD BY AUTOMATED COUNT: 15.1 % (ref 11.5–14.5)
GLUCOSE BLD-MCNC: 115 MG/DL (ref 70–99)
GLUCOSE BLD-MCNC: 123 MG/DL (ref 70–99)
GLUCOSE BLD-MCNC: 91 MG/DL (ref 70–99)
GLUCOSE BLD-MCNC: 96 MG/DL (ref 70–99)
GLUCOSE SERPL-MCNC: 105 MG/DL (ref 70–99)
HCT VFR BLD AUTO: 38.8 % (ref 42–52)
HGB BLD-MCNC: 11.9 G/DL (ref 14–18)
IMM GRANULOCYTES # BLD: 0.1 K/UL
LYMPHOCYTES # BLD: 0.9 K/UL (ref 1.1–4.5)
LYMPHOCYTES NFR BLD: 10.9 % (ref 20–40)
MCH RBC QN AUTO: 30.1 PG (ref 27–31)
MCHC RBC AUTO-ENTMCNC: 30.7 G/DL (ref 33–37)
MCV RBC AUTO: 98 FL (ref 80–94)
MONOCYTES # BLD: 0.5 K/UL (ref 0–0.9)
MONOCYTES NFR BLD: 6.2 % (ref 0–10)
NEUTROPHILS # BLD: 6.4 K/UL (ref 1.5–7.5)
NEUTS SEG NFR BLD: 78.3 % (ref 50–65)
PERFORMED ON: ABNORMAL
PERFORMED ON: ABNORMAL
PERFORMED ON: NORMAL
PERFORMED ON: NORMAL
PLATELET # BLD AUTO: 174 K/UL (ref 130–400)
PMV BLD AUTO: 12.1 FL (ref 9.4–12.4)
POTASSIUM SERPL-SCNC: 4.9 MMOL/L (ref 3.5–5.1)
RBC # BLD AUTO: 3.96 M/UL (ref 4.7–6.1)
SODIUM SERPL-SCNC: 141 MMOL/L (ref 136–145)
WBC # BLD AUTO: 8.2 K/UL (ref 4.8–10.8)

## 2025-06-24 PROCEDURE — 2700000000 HC OXYGEN THERAPY PER DAY

## 2025-06-24 PROCEDURE — 1200000000 HC SEMI PRIVATE

## 2025-06-24 PROCEDURE — 6370000000 HC RX 637 (ALT 250 FOR IP)

## 2025-06-24 PROCEDURE — 94640 AIRWAY INHALATION TREATMENT: CPT

## 2025-06-24 PROCEDURE — 6360000002 HC RX W HCPCS

## 2025-06-24 PROCEDURE — 2580000003 HC RX 258: Performed by: INTERNAL MEDICINE

## 2025-06-24 PROCEDURE — 82962 GLUCOSE BLOOD TEST: CPT

## 2025-06-24 PROCEDURE — 6360000002 HC RX W HCPCS: Performed by: NURSE PRACTITIONER

## 2025-06-24 PROCEDURE — 85025 COMPLETE CBC W/AUTO DIFF WBC: CPT

## 2025-06-24 PROCEDURE — 94760 N-INVAS EAR/PLS OXIMETRY 1: CPT

## 2025-06-24 PROCEDURE — 6370000000 HC RX 637 (ALT 250 FOR IP): Performed by: NURSE PRACTITIONER

## 2025-06-24 PROCEDURE — 80048 BASIC METABOLIC PNL TOTAL CA: CPT

## 2025-06-24 PROCEDURE — 36415 COLL VENOUS BLD VENIPUNCTURE: CPT

## 2025-06-24 PROCEDURE — 2500000003 HC RX 250 WO HCPCS

## 2025-06-24 RX ADMIN — ACETAMINOPHEN 650 MG: 325 TABLET ORAL at 18:16

## 2025-06-24 RX ADMIN — CETIRIZINE HYDROCHLORIDE 10 MG: 10 TABLET ORAL at 08:11

## 2025-06-24 RX ADMIN — METOPROLOL SUCCINATE 25 MG: 25 TABLET, FILM COATED, EXTENDED RELEASE ORAL at 21:44

## 2025-06-24 RX ADMIN — GUAIFENESIN 600 MG: 600 TABLET, EXTENDED RELEASE ORAL at 21:44

## 2025-06-24 RX ADMIN — IPRATROPIUM BROMIDE 0.5 MG: 0.5 SOLUTION RESPIRATORY (INHALATION) at 11:58

## 2025-06-24 RX ADMIN — PANTOPRAZOLE SODIUM 40 MG: 40 TABLET, DELAYED RELEASE ORAL at 08:11

## 2025-06-24 RX ADMIN — ENOXAPARIN SODIUM 60 MG: 100 INJECTION SUBCUTANEOUS at 08:11

## 2025-06-24 RX ADMIN — IPRATROPIUM BROMIDE 0.5 MG: 0.5 SOLUTION RESPIRATORY (INHALATION) at 19:45

## 2025-06-24 RX ADMIN — GUAIFENESIN 600 MG: 600 TABLET, EXTENDED RELEASE ORAL at 08:10

## 2025-06-24 RX ADMIN — ALLOPURINOL 300 MG: 300 TABLET ORAL at 08:11

## 2025-06-24 RX ADMIN — MICONAZOLE NITRATE: 2 POWDER TOPICAL at 08:10

## 2025-06-24 RX ADMIN — SODIUM CHLORIDE: 0.9 INJECTION, SOLUTION INTRAVENOUS at 08:13

## 2025-06-24 RX ADMIN — MONTELUKAST SODIUM 10 MG: 10 TABLET, FILM COATED ORAL at 21:44

## 2025-06-24 RX ADMIN — PRAVASTATIN SODIUM 20 MG: 20 TABLET ORAL at 21:44

## 2025-06-24 RX ADMIN — SODIUM CHLORIDE, PRESERVATIVE FREE 10 ML: 5 INJECTION INTRAVENOUS at 21:53

## 2025-06-24 RX ADMIN — IPRATROPIUM BROMIDE 0.5 MG: 0.5 SOLUTION RESPIRATORY (INHALATION) at 07:48

## 2025-06-24 RX ADMIN — ASPIRIN 81 MG: 81 TABLET, COATED ORAL at 08:10

## 2025-06-24 RX ADMIN — MICONAZOLE NITRATE: 2 POWDER TOPICAL at 21:49

## 2025-06-24 RX ADMIN — IPRATROPIUM BROMIDE 0.5 MG: 0.5 SOLUTION RESPIRATORY (INHALATION) at 15:09

## 2025-06-24 RX ADMIN — ENOXAPARIN SODIUM 60 MG: 100 INJECTION SUBCUTANEOUS at 21:43

## 2025-06-24 RX ADMIN — METOPROLOL SUCCINATE 25 MG: 25 TABLET, FILM COATED, EXTENDED RELEASE ORAL at 08:11

## 2025-06-24 NOTE — PLAN OF CARE
Problem: Chronic Conditions and Co-morbidities  Goal: Patient's chronic conditions and co-morbidity symptoms are monitored and maintained or improved  Outcome: Progressing     Problem: Discharge Planning  Goal: Discharge to home or other facility with appropriate resources  Outcome: Progressing     Problem: Pain  Goal: Verbalizes/displays adequate comfort level or baseline comfort level  Outcome: Progressing     Problem: Safety - Adult  Goal: Free from fall injury  Outcome: Progressing  Flowsheets (Taken 6/23/2025 2041)  Free From Fall Injury: Instruct family/caregiver on patient safety     Problem: ABCDS Injury Assessment  Goal: Absence of physical injury  Outcome: Progressing  Flowsheets (Taken 6/23/2025 2041)  Absence of Physical Injury: Implement safety measures based on patient assessment     Problem: Skin/Tissue Integrity  Goal: Skin integrity remains intact  Description: 1.  Monitor for areas of redness and/or skin breakdown2.  Assess vascular access sites hourly3.  Every 4-6 hours minimum:  Change oxygen saturation probe site4.  Every 4-6 hours:  If on nasal continuous positive airway pressure, respiratory therapy assess nares and determine need for appliance change or resting period  Outcome: Progressing  Flowsheets  Taken 6/23/2025 2041  Skin Integrity Remains Intact: Monitor for areas of redness and/or skin breakdown  Taken 6/23/2025 2036  Skin Integrity Remains Intact: Monitor for areas of redness and/or skin breakdown     Problem: Nutrition Deficit:  Goal: Optimize nutritional status  Outcome: Progressing     Problem: Neurosensory - Adult  Goal: Achieves stable or improved neurological status  Outcome: Progressing     Problem: Respiratory - Adult  Goal: Achieves optimal ventilation and oxygenation  Outcome: Progressing     Problem: Cardiovascular - Adult  Goal: Maintains optimal cardiac output and hemodynamic stability  Outcome: Progressing     Problem: Skin/Tissue Integrity - Adult  Goal: Skin

## 2025-06-24 NOTE — PROGRESS NOTES
[facility]    Patient:  Daljit Elizondo  YOB: 1960  Date of Service: 6/24/2025  MRN: [id number]   Acct: [id number]   Primary Care Physician: Jessie Helms APRN - CNP  Advance Directive: Full Code  Admit Date: 5/14/2025       Hospital Day: 41     NOTE: Portions of this note have been copied forward, however, changes made to reflect the most current clinical status of this patient.    CHIEF COMPLAINT:     Chief Complaint   Patient presents with    Shortness of Breath     Since Jan 1    Scrotal Pain       6/24/2025 8:11 AM  Subjective / Interval History:   06/24/2025  Patient seen and examined this a.m.   No new complaints.    No acute changes or acute overnight event reported.   Laying comfortably in bed in no apparent acute distress.  Denies any acute complaints or distress.   Endorses overall improvement.        06/23/2025  Patient seen and examined this a.m.   Patient reports some nausea this a.m.  No acute changes or acute overnight event reported.   Laying comfortably in bed in no apparent acute distress.    Denies any acute complaints or distress.    Endorses overall improvement.        06/22/2025  Patient seen and examined this a.m.   No new complaints.    No acute changes or acute overnight event reported.   Laying comfortably in bed in no apparent acute distress.    Denies any acute complaints or distress.    Endorses overall improvement.    Cr 3.2 this am      06/21/2025  Patient seen and examined this a.m.   No new complaints.    No acute changes or acute overnight event reported.   Laying comfortably in bed in no apparent acute distress.    Denies any acute complaints or distress.   Endorses overall improvement.    SCr 3.3 this am      06/20/2025  Patient seen and examined this a.m.   No new complaints.    No acute changes or acute overnight event reported.   Laying comfortably in bed in no apparent acute distress.    Denies any acute complaints or distress.

## 2025-06-24 NOTE — PROGRESS NOTES
Cody Howell MD        0.9 % sodium chloride infusion   IntraVENous Continuous Jose Kamara  mL/hr at 06/24/25 0813 New Bag at 06/24/25 0813    [Held by provider] bumetanide (BUMEX) tablet 1 mg  1 mg Oral BID Jori Haji MD   1 mg at 06/09/25 1752    [Held by provider] lactulose (CHRONULAC) 10 GM/15ML solution 20 g  20 g Oral TID Juan Nicolas APRN - CNP   20 g at 06/09/25 0923    [Held by provider] polyethylene glycol (GLYCOLAX) packet 17 g  17 g Oral BID Juan Nicolas APRN - CNP   17 g at 06/09/25 0923    metoprolol succinate (TOPROL XL) extended release tablet 25 mg  25 mg Oral BID Juan Nicolas APRN - CNP   25 mg at 06/24/25 0811    ipratropium (ATROVENT) 0.02 % nebulizer solution 0.5 mg  0.5 mg Nebulization 4x Daily RT Ashleigh Jamil APRN   0.5 mg at 06/24/25 0748    [Held by provider] senna (SENOKOT) tablet 17.2 mg  2 tablet Oral BID Ashleigh Jamil APRN   17.2 mg at 06/10/25 0911    montelukast (SINGULAIR) tablet 10 mg  10 mg Oral Nightly Ashleigh Jamil APRN   10 mg at 06/23/25 2019    sodium chloride (OCEAN, BABY AYR) 0.65 % nasal spray 1 spray  1 spray Each Nostril Q2H PRN Ashleigh Jamil APRN   1 spray at 05/19/25 1118    tetrahydrozoline 0.05 % ophthalmic solution 1 drop  1 drop Both Eyes TID PRN Ashleigh Jamil APRN        lubrifresh P.M. (artificial tears) ophthalmic ointment   Both Eyes PRN Ruben Partida APRN - CNP   Given at 05/17/25 1222    cetirizine (ZYRTEC) tablet 10 mg  10 mg Oral Daily Ruben Partida APRN - CNP   10 mg at 06/24/25 0811    guaiFENesin (MUCINEX) extended release tablet 600 mg  600 mg Oral BID Ruben Partida APRN - CNP   600 mg at 06/24/25 0810    miconazole (MICOTIN) 2 % powder   Topical BID Lambert Ramirez MD   Given at 06/24/25 0810    albuterol (PROVENTIL) (2.5 MG/3ML) 0.083% nebulizer solution 2.5 mg  2.5 mg Nebulization Q6H PRN Renay Thorne APRN - CNP        allopurinol (ZYLOPRIM) tablet 300 mg  300 mg Oral  otherwise normal.  The urinary bladder was not visualized.    ______________________________________ Electronically signed by: JANUSZ MORSE M.D. Date:     06/11/2025 Time:    07:04        Assessment   1.  Acute kidney injury/stable.  2.  Left renal thinning per ultrasound  3.  Severe abdominal obesity.  4.  Acute on chronic diastolic CHF.  5.  Obstructive sleep apnea syndrome.  6.  Type 2 diabetes/poorly controlled      Plan:  1.  His renal function has been stable  2.  Follow up labs  3.  Patient has no proteinuria or hematuria, no biopsy is now planned  4.  Continue some fluids restriction  5.  Plan was discussed with the patient      Maciej Sun MD  06/24/25  11:24 AM

## 2025-06-25 LAB
ALLENS TEST: ABNORMAL
ANION GAP SERPL CALCULATED.3IONS-SCNC: 10 MMOL/L (ref 8–16)
BASE EXCESS ARTERIAL: -13.7 MMOL/L (ref -2–2)
BASOPHILS # BLD: 0 K/UL (ref 0–0.2)
BASOPHILS NFR BLD: 0.4 % (ref 0–1)
BUN SERPL-MCNC: 36 MG/DL (ref 8–23)
CALCIUM SERPL-MCNC: 8.9 MG/DL (ref 8.8–10.2)
CARBOXYHEMOGLOBIN ARTERIAL: 2.1 % (ref 0–5)
CHLORIDE SERPL-SCNC: 115 MMOL/L (ref 98–107)
CO2 SERPL-SCNC: 17 MMOL/L (ref 22–29)
CREAT SERPL-MCNC: 3.1 MG/DL (ref 0.7–1.2)
EOSINOPHIL # BLD: 0.3 K/UL (ref 0–0.6)
EOSINOPHIL NFR BLD: 3.8 % (ref 0–5)
ERYTHROCYTE [DISTWIDTH] IN BLOOD BY AUTOMATED COUNT: 15.6 % (ref 11.5–14.5)
GLUCOSE BLD-MCNC: 100 MG/DL (ref 70–99)
GLUCOSE BLD-MCNC: 127 MG/DL (ref 70–99)
GLUCOSE BLD-MCNC: 127 MG/DL (ref 70–99)
GLUCOSE BLD-MCNC: 145 MG/DL (ref 70–99)
GLUCOSE SERPL-MCNC: 139 MG/DL (ref 70–99)
HCO3 ARTERIAL: 15.3 MMOL/L (ref 22–26)
HCT VFR BLD AUTO: 38.1 % (ref 42–52)
HEMOGLOBIN, ART, EXTENDED: 11.9 G/DL (ref 14–18)
HGB BLD-MCNC: 11.4 G/DL (ref 14–18)
IMM GRANULOCYTES # BLD: 0.1 K/UL
LACTATE BLDV-SCNC: 0.8 MMOL/L (ref 0.5–1.9)
LYMPHOCYTES # BLD: 0.9 K/UL (ref 1.1–4.5)
LYMPHOCYTES NFR BLD: 11.6 % (ref 20–40)
MCH RBC QN AUTO: 29.4 PG (ref 27–31)
MCHC RBC AUTO-ENTMCNC: 29.9 G/DL (ref 33–37)
MCV RBC AUTO: 98.2 FL (ref 80–94)
METHEMOGLOBIN ARTERIAL: 1.3 %
MONOCYTES # BLD: 0.5 K/UL (ref 0–0.9)
MONOCYTES NFR BLD: 6.6 % (ref 0–10)
NEUTROPHILS # BLD: 5.9 K/UL (ref 1.5–7.5)
NEUTS SEG NFR BLD: 77 % (ref 50–65)
O2 CONTENT ARTERIAL: 15.7 ML/DL
O2 DELIVERY DEVICE: ABNORMAL
O2 SAT, ARTERIAL: 93.4 %
O2 THERAPY: ABNORMAL
OXYGEN FLOW: 3
PCO2 ARTERIAL: 47 MMHG (ref 35–45)
PERFORMED ON: ABNORMAL
PH ARTERIAL: 7.12 (ref 7.35–7.45)
PLATELET # BLD AUTO: 164 K/UL (ref 130–400)
PMV BLD AUTO: 12.1 FL (ref 9.4–12.4)
PO2 ARTERIAL: 74 MMHG (ref 80–100)
POTASSIUM BLD-SCNC: 4.7 MMOL/L
POTASSIUM SERPL-SCNC: 4.8 MMOL/L (ref 3.5–5)
RBC # BLD AUTO: 3.88 M/UL (ref 4.7–6.1)
SAMPLE SOURCE: ABNORMAL
SODIUM SERPL-SCNC: 142 MMOL/L (ref 136–145)
WBC # BLD AUTO: 7.7 K/UL (ref 4.8–10.8)

## 2025-06-25 PROCEDURE — 83605 ASSAY OF LACTIC ACID: CPT

## 2025-06-25 PROCEDURE — 1200000000 HC SEMI PRIVATE

## 2025-06-25 PROCEDURE — 2500000003 HC RX 250 WO HCPCS: Performed by: HOSPITALIST

## 2025-06-25 PROCEDURE — 36415 COLL VENOUS BLD VENIPUNCTURE: CPT

## 2025-06-25 PROCEDURE — 6370000000 HC RX 637 (ALT 250 FOR IP)

## 2025-06-25 PROCEDURE — 85025 COMPLETE CBC W/AUTO DIFF WBC: CPT

## 2025-06-25 PROCEDURE — 6360000002 HC RX W HCPCS

## 2025-06-25 PROCEDURE — 6370000000 HC RX 637 (ALT 250 FOR IP): Performed by: NURSE PRACTITIONER

## 2025-06-25 PROCEDURE — 2500000003 HC RX 250 WO HCPCS

## 2025-06-25 PROCEDURE — 6370000000 HC RX 637 (ALT 250 FOR IP): Performed by: INTERNAL MEDICINE

## 2025-06-25 PROCEDURE — 36600 WITHDRAWAL OF ARTERIAL BLOOD: CPT

## 2025-06-25 PROCEDURE — 2700000000 HC OXYGEN THERAPY PER DAY

## 2025-06-25 PROCEDURE — 82962 GLUCOSE BLOOD TEST: CPT

## 2025-06-25 PROCEDURE — 94760 N-INVAS EAR/PLS OXIMETRY 1: CPT

## 2025-06-25 PROCEDURE — 82803 BLOOD GASES ANY COMBINATION: CPT

## 2025-06-25 PROCEDURE — 94640 AIRWAY INHALATION TREATMENT: CPT

## 2025-06-25 PROCEDURE — 80048 BASIC METABOLIC PNL TOTAL CA: CPT

## 2025-06-25 PROCEDURE — 6360000002 HC RX W HCPCS: Performed by: NURSE PRACTITIONER

## 2025-06-25 RX ORDER — SODIUM BICARBONATE 650 MG/1
650 TABLET ORAL 3 TIMES DAILY
Status: DISCONTINUED | OUTPATIENT
Start: 2025-06-25 | End: 2025-06-27

## 2025-06-25 RX ADMIN — CETIRIZINE HYDROCHLORIDE 10 MG: 10 TABLET ORAL at 08:53

## 2025-06-25 RX ADMIN — IPRATROPIUM BROMIDE 0.5 MG: 0.5 SOLUTION RESPIRATORY (INHALATION) at 19:46

## 2025-06-25 RX ADMIN — ENOXAPARIN SODIUM 60 MG: 100 INJECTION SUBCUTANEOUS at 20:06

## 2025-06-25 RX ADMIN — SODIUM CHLORIDE, PRESERVATIVE FREE 10 ML: 5 INJECTION INTRAVENOUS at 08:54

## 2025-06-25 RX ADMIN — ACETAMINOPHEN 650 MG: 325 TABLET ORAL at 17:25

## 2025-06-25 RX ADMIN — METOPROLOL SUCCINATE 25 MG: 25 TABLET, FILM COATED, EXTENDED RELEASE ORAL at 20:06

## 2025-06-25 RX ADMIN — ASPIRIN 81 MG: 81 TABLET, COATED ORAL at 08:53

## 2025-06-25 RX ADMIN — ENOXAPARIN SODIUM 60 MG: 100 INJECTION SUBCUTANEOUS at 08:54

## 2025-06-25 RX ADMIN — PRAVASTATIN SODIUM 20 MG: 20 TABLET ORAL at 20:06

## 2025-06-25 RX ADMIN — INSULIN GLARGINE 30 UNITS: 100 INJECTION, SOLUTION SUBCUTANEOUS at 20:06

## 2025-06-25 RX ADMIN — IPRATROPIUM BROMIDE 0.5 MG: 0.5 SOLUTION RESPIRATORY (INHALATION) at 11:25

## 2025-06-25 RX ADMIN — METOPROLOL SUCCINATE 25 MG: 25 TABLET, FILM COATED, EXTENDED RELEASE ORAL at 08:54

## 2025-06-25 RX ADMIN — MONTELUKAST SODIUM 10 MG: 10 TABLET, FILM COATED ORAL at 20:06

## 2025-06-25 RX ADMIN — ACETAMINOPHEN 650 MG: 325 TABLET ORAL at 08:53

## 2025-06-25 RX ADMIN — PANTOPRAZOLE SODIUM 40 MG: 40 TABLET, DELAYED RELEASE ORAL at 06:05

## 2025-06-25 RX ADMIN — MICONAZOLE NITRATE: 2 POWDER TOPICAL at 20:06

## 2025-06-25 RX ADMIN — ALLOPURINOL 300 MG: 300 TABLET ORAL at 08:57

## 2025-06-25 RX ADMIN — GUAIFENESIN 600 MG: 600 TABLET, EXTENDED RELEASE ORAL at 20:06

## 2025-06-25 RX ADMIN — IPRATROPIUM BROMIDE 0.5 MG: 0.5 SOLUTION RESPIRATORY (INHALATION) at 08:01

## 2025-06-25 RX ADMIN — SODIUM BICARBONATE 650 MG: 650 TABLET ORAL at 17:25

## 2025-06-25 RX ADMIN — MICONAZOLE NITRATE: 2 POWDER TOPICAL at 09:15

## 2025-06-25 RX ADMIN — IPRATROPIUM BROMIDE 0.5 MG: 0.5 SOLUTION RESPIRATORY (INHALATION) at 15:02

## 2025-06-25 RX ADMIN — GUAIFENESIN 600 MG: 600 TABLET, EXTENDED RELEASE ORAL at 08:53

## 2025-06-25 ASSESSMENT — PAIN SCALES - GENERAL
PAINLEVEL_OUTOF10: 3
PAINLEVEL_OUTOF10: 2

## 2025-06-25 NOTE — PROGRESS NOTES
Housing Stability Vital Sign     Unable to Pay for Housing in the Last Year: No     Number of Times Moved in the Last Year: 0     Homeless in the Last Year: No         Review of Systems:  History obtained from chart review and the patient  General ROS: No fever or chills  Respiratory ROS: positive for - shortness of breath  Cardiovascular ROS: No chest pain or palpitations  Gastrointestinal ROS: No abdominal pain or melena  Genito-Urinary ROS: No dysuria or hematuria  Musculoskeletal ROS: No joint pain or swelling         Objective:  Patient Vitals for the past 24 hrs:   BP Temp Temp src Pulse Resp SpO2 Weight   06/25/25 0853 (!) 143/99 -- -- (!) 104 -- -- --   06/25/25 0803 -- -- -- -- -- 96 % --   06/25/25 0734 (!) 143/99 97.7 °F (36.5 °C) -- (!) 104 18 96 % --   06/25/25 0614 -- -- -- -- -- -- (!) 193.9 kg (427 lb 9 oz)   06/25/25 0404 103/79 97.5 °F (36.4 °C) -- 100 20 90 % --   06/24/25 2014 118/80 98.2 °F (36.8 °C) -- (!) 110 20 97 % --   06/24/25 1606 128/86 97.5 °F (36.4 °C) Axillary (!) 123 18 96 % --       Intake/Output Summary (Last 24 hours) at 6/25/2025 1042  Last data filed at 6/25/2025 1029  Gross per 24 hour   Intake 360 ml   Output 1000 ml   Net -640 ml     General: awake/alert   HEENT: Normocephalic atraumatic head  Neck: Supple without JVD or carotid bruits  Chest:  clear to auscultation bilaterally  CVS: regular rate and rhythm  Abdominal: soft, nontender, normal bowel sounds/severe abdominal obesity  Extremities: no cyanosis or edema  Skin: warm and dry without rash    Labs:  BMP:   Recent Labs     06/23/25  0334 06/24/25  0457    141   K 4.8 4.9   * 114*   CO2 17* 16*   BUN 36* 36*   CREATININE 3.1* 3.1*   CALCIUM 8.5* 8.5*     CBC:   Recent Labs     06/23/25  0334 06/24/25  0457 06/25/25  0440   WBC 8.1 8.2 7.7   HGB 11.7* 11.9* 11.4*   HCT 39.6* 38.8* 38.1*   MCV 99.7* 98.0* 98.2*    174 164     LIVER PROFILE:   Recent Labs     06/23/25  0334   AST 12   ALT 8*   BILITOT 0.9  ______________________________________ Electronically signed by: JANUSZ MORSE M.D. Date:     06/11/2025 Time:    07:04        Assessment   1.  Acute kidney injury/stable.  2.  Left renal thinning per ultrasound  3.  Severe abdominal obesity  4.  Acute on chronic diastolic CHF  5.  Obstructive sleep apnea syndrome.  6.  Type 2 diabetes/poorly controlled  7.  Hypocarbia      Plan:  1.  His renal function has been stable  2.  Follow up labs, will get ABGs to further understand acid-base disorders  3.  Patient has no proteinuria or hematuria, no biopsy is now planned  4.  Avoid overhydration to prevent fluid overload and further respiratory distress  5.  Plan was discussed with the patient and nurse      Maciej Sun MD  06/25/25  10:42 AM

## 2025-06-25 NOTE — CARE COORDINATION
Continuing to follow case. Pt still not medically stable.    Spoke with Jessica at [facility], the lift still has not been obtained yet.  Have spoken with Elena with [facility] [place] pt happens to be put on HD in the future.  Electronically signed by Shira Tomlin RN on 6/25/2025 at 3:39 PM

## 2025-06-25 NOTE — PLAN OF CARE
Problem: Chronic Conditions and Co-morbidities  Goal: Patient's chronic conditions and co-morbidity symptoms are monitored and maintained or improved  Outcome: Progressing  Flowsheets (Taken 6/25/2025 0053)  Care Plan - Patient's Chronic Conditions and Co-Morbidity Symptoms are Monitored and Maintained or Improved:   Monitor and assess patient's chronic conditions and comorbid symptoms for stability, deterioration, or improvement   Collaborate with multidisciplinary team to address chronic and comorbid conditions and prevent exacerbation or deterioration   Update acute care plan with appropriate goals if chronic or comorbid symptoms are exacerbated and prevent overall improvement and discharge     Problem: Discharge Planning  Goal: Discharge to home or other facility with appropriate resources  Outcome: Progressing  Flowsheets (Taken 6/25/2025 0053)  Discharge to home or other facility with appropriate resources:   Identify barriers to discharge with patient and caregiver   Arrange for needed discharge resources and transportation as appropriate   Identify discharge learning needs (meds, wound care, etc)   Refer to discharge planning if patient needs post-hospital services based on physician order or complex needs related to functional status, cognitive ability or social support system     Problem: Pain  Goal: Verbalizes/displays adequate comfort level or baseline comfort level  Outcome: Progressing     Problem: Safety - Adult  Goal: Free from fall injury  Outcome: Progressing     Problem: ABCDS Injury Assessment  Goal: Absence of physical injury  Outcome: Progressing     Problem: Skin/Tissue Integrity  Goal: Skin integrity remains intact  Description: 1.  Monitor for areas of redness and/or skin breakdown2.  Assess vascular access sites hourly3.  Every 4-6 hours minimum:  Change oxygen saturation probe site4.  Every 4-6 hours:  If on nasal continuous positive airway pressure, respiratory therapy assess nares and

## 2025-06-25 NOTE — PROGRESS NOTES
[facility] Progress Note    Patient:  Daljit Elizondo  YOB: 1960  Date of Service: 6/25/2025  MRN: [id number]  Acct: [id number]   Primary Care Physician: Jessie Helms APRN - CNP  Advance Directive: Full Code  Admit Date: 5/14/2025       Hospital Day: 42     NOTE: Portions of this note have been copied forward, however, changes made to reflect the most current clinical status of this patient.    CHIEF COMPLAINT:     Chief Complaint   Patient presents with    Shortness of Breath     Since Jan 1    Scrotal Pain       6/25/2025 7:34 AM  Subjective / Interval History:   6/25/2025  Patient seen and examined this a.m.   No new complaints.    No acute changes or acute overnight event reported.   Laying comfortably in bed in no apparent acute distress.    Denies any acute complaints or distress.    Endorses overall improvement.        06/24/2025  Patient seen and examined this a.m.   No new complaints.    No acute changes or acute overnight event reported.   Laying comfortably in bed in no apparent acute distress.  Denies any acute complaints or distress.   Endorses overall improvement.        06/23/2025  Patient seen and examined this a.m.   Patient reports some nausea this a.m.  No acute changes or acute overnight event reported.   Laying comfortably in bed in no apparent acute distress.    Denies any acute complaints or distress.    Endorses overall improvement.        06/22/2025  Patient seen and examined this a.m.   No new complaints.    No acute changes or acute overnight event reported.   Laying comfortably in bed in no apparent acute distress.    Denies any acute complaints or distress.    Endorses overall improvement.    Cr 3.2 this am      06/21/2025  Patient seen and examined this a.m.   No new complaints.    No acute changes or acute overnight event reported.   Laying comfortably in bed in no apparent acute distress.    Denies any acute complaints or distress.   Endorses overall

## 2025-06-25 NOTE — PROGRESS NOTES
Nutrition Assessment     Type and Reason for Visit: Reassess    Nutrition Recommendations/Plan:   Continue to monitor po intake, weight, labs     Malnutrition Assessment:  Malnutrition Status: Severe malnutrition    Nutrition Assessment:  Patient working with staff at time of visit.  Wearing BiPap.  Aware po intake remains decreased.  Does well with his breakfast.  Aware pt has new bed that has been zeroed out--weight has decreased from previous weight by approx 28#.  Remains in morbidly obese range. Will follow weights with new bed scale    Estimated Daily Nutrient Needs:  Energy (kcal):  3202-9699 kcals (8-11 kcals/kg) Weight Used for Energy Requirements: Current     Protein (g):  145g Weight Used for Protein Requirements: Ideal        Fluid (ml/day):  9585-7322 ml Method Used for Fluid Requirements: 1 ml/kcal    Nutrition Related Findings:   trace generalized and BLE edema Wound Type: Pressure Injury    Current Nutrition Therapies:    ADULT DIET; Regular; 3 carb choices (45 gm/meal); Low Fat/Low Chol/High Fiber/2 gm Na; Low Potassium (Less than 3000 mg/day); 2000 ml; NO Hot cereal----SEND RICE KRISPIES AT BREAKFAST    Anthropometric Measures:  Height: 175.3 cm (5' 9.02\")  Current Body Wt: 193.9 kg (427 lb 7.6 oz)   BMI: 63.1        Nutrition Diagnosis:   Inadequate oral intake related to limited food acceptance, increase demand for energy/nutrients as evidenced by intake 0-25%, weight loss, wounds    Nutrition Interventions:   Food and/or Nutrient Delivery: Continue Current Diet  Nutrition Education/Counseling: Survival skills/brief education completed  Coordination of Nutrition Care: Continue to monitor while inpatient  Plan of Care discussed with: pt    Goals:  Goals: Meet at least 75% of estimated needs, PO intake 50% or greater, Maintain adequate nutrition status  Type of Goal: Continue current goal  Previous Goal Met: Progressing toward Goal(s)    Nutrition Monitoring and Evaluation:

## 2025-06-26 LAB
ALLENS TEST: ABNORMAL
ANION GAP SERPL CALCULATED.3IONS-SCNC: 10 MMOL/L (ref 8–16)
BASE EXCESS ARTERIAL: -11.8 MMOL/L (ref -2–2)
BASOPHILS # BLD: 0.1 K/UL (ref 0–0.2)
BASOPHILS NFR BLD: 0.7 % (ref 0–1)
BUN SERPL-MCNC: 35 MG/DL (ref 8–23)
CALCIUM SERPL-MCNC: 8.8 MG/DL (ref 8.8–10.2)
CARBOXYHEMOGLOBIN ARTERIAL: 2 % (ref 0–5)
CHLORIDE SERPL-SCNC: 116 MMOL/L (ref 98–107)
CO2 SERPL-SCNC: 16 MMOL/L (ref 22–29)
CREAT SERPL-MCNC: 3 MG/DL (ref 0.7–1.2)
EOSINOPHIL # BLD: 0.4 K/UL (ref 0–0.6)
EOSINOPHIL NFR BLD: 5.5 % (ref 0–5)
ERYTHROCYTE [DISTWIDTH] IN BLOOD BY AUTOMATED COUNT: 15.8 % (ref 11.5–14.5)
GLUCOSE BLD-MCNC: 101 MG/DL (ref 70–99)
GLUCOSE BLD-MCNC: 113 MG/DL (ref 70–99)
GLUCOSE BLD-MCNC: 114 MG/DL (ref 70–99)
GLUCOSE BLD-MCNC: 124 MG/DL (ref 70–99)
GLUCOSE SERPL-MCNC: 108 MG/DL (ref 70–99)
HCO3 ARTERIAL: 15.6 MMOL/L (ref 22–26)
HCT VFR BLD AUTO: 37 % (ref 42–52)
HEMOGLOBIN, ART, EXTENDED: 11.8 G/DL (ref 14–18)
HGB BLD-MCNC: 11.1 G/DL (ref 14–18)
IMM GRANULOCYTES # BLD: 0.1 K/UL
LYMPHOCYTES # BLD: 1 K/UL (ref 1.1–4.5)
LYMPHOCYTES NFR BLD: 13.8 % (ref 20–40)
MCH RBC QN AUTO: 29.4 PG (ref 27–31)
MCHC RBC AUTO-ENTMCNC: 30 G/DL (ref 33–37)
MCV RBC AUTO: 98.1 FL (ref 80–94)
METHEMOGLOBIN ARTERIAL: 1.3 %
MODE: ABNORMAL
MONOCYTES # BLD: 0.5 K/UL (ref 0–0.9)
MONOCYTES NFR BLD: 7.5 % (ref 0–10)
NEUTROPHILS # BLD: 5.1 K/UL (ref 1.5–7.5)
NEUTS SEG NFR BLD: 71.8 % (ref 50–65)
O2 CONTENT ARTERIAL: 15.8 ML/DL
O2 SAT, ARTERIAL: 94.9 %
O2 THERAPY: ABNORMAL
PCO2 ARTERIAL: 40 MMHG (ref 35–45)
PERFORMED ON: ABNORMAL
PH ARTERIAL: 7.2 (ref 7.35–7.45)
PLATELET # BLD AUTO: 152 K/UL (ref 130–400)
PMV BLD AUTO: 12.5 FL (ref 9.4–12.4)
PO2 ARTERIAL: 81 MMHG (ref 80–100)
POTASSIUM BLD-SCNC: 4.3 MMOL/L
POTASSIUM SERPL-SCNC: 4.5 MMOL/L (ref 3.5–5)
RBC # BLD AUTO: 3.77 M/UL (ref 4.7–6.1)
SAMPLE SOURCE: ABNORMAL
SODIUM SERPL-SCNC: 142 MMOL/L (ref 136–145)
WBC # BLD AUTO: 7.1 K/UL (ref 4.8–10.8)

## 2025-06-26 PROCEDURE — 2700000000 HC OXYGEN THERAPY PER DAY

## 2025-06-26 PROCEDURE — 36415 COLL VENOUS BLD VENIPUNCTURE: CPT

## 2025-06-26 PROCEDURE — 97530 THERAPEUTIC ACTIVITIES: CPT

## 2025-06-26 PROCEDURE — 6370000000 HC RX 637 (ALT 250 FOR IP): Performed by: INTERNAL MEDICINE

## 2025-06-26 PROCEDURE — 6370000000 HC RX 637 (ALT 250 FOR IP): Performed by: NURSE PRACTITIONER

## 2025-06-26 PROCEDURE — 6360000002 HC RX W HCPCS

## 2025-06-26 PROCEDURE — 85025 COMPLETE CBC W/AUTO DIFF WBC: CPT

## 2025-06-26 PROCEDURE — 82803 BLOOD GASES ANY COMBINATION: CPT

## 2025-06-26 PROCEDURE — 80048 BASIC METABOLIC PNL TOTAL CA: CPT

## 2025-06-26 PROCEDURE — 6370000000 HC RX 637 (ALT 250 FOR IP)

## 2025-06-26 PROCEDURE — 6360000002 HC RX W HCPCS: Performed by: NURSE PRACTITIONER

## 2025-06-26 PROCEDURE — 1200000000 HC SEMI PRIVATE

## 2025-06-26 PROCEDURE — 36600 WITHDRAWAL OF ARTERIAL BLOOD: CPT

## 2025-06-26 PROCEDURE — 2580000003 HC RX 258: Performed by: INTERNAL MEDICINE

## 2025-06-26 PROCEDURE — 94640 AIRWAY INHALATION TREATMENT: CPT

## 2025-06-26 PROCEDURE — 2500000003 HC RX 250 WO HCPCS

## 2025-06-26 PROCEDURE — 82962 GLUCOSE BLOOD TEST: CPT

## 2025-06-26 PROCEDURE — 94760 N-INVAS EAR/PLS OXIMETRY 1: CPT

## 2025-06-26 RX ADMIN — IPRATROPIUM BROMIDE 0.5 MG: 0.5 SOLUTION RESPIRATORY (INHALATION) at 19:40

## 2025-06-26 RX ADMIN — SODIUM BICARBONATE 650 MG: 650 TABLET ORAL at 18:03

## 2025-06-26 RX ADMIN — IPRATROPIUM BROMIDE 0.5 MG: 0.5 SOLUTION RESPIRATORY (INHALATION) at 14:17

## 2025-06-26 RX ADMIN — MONTELUKAST SODIUM 10 MG: 10 TABLET, FILM COATED ORAL at 21:06

## 2025-06-26 RX ADMIN — SODIUM CHLORIDE: 0.9 INJECTION, SOLUTION INTRAVENOUS at 03:51

## 2025-06-26 RX ADMIN — IPRATROPIUM BROMIDE 0.5 MG: 0.5 SOLUTION RESPIRATORY (INHALATION) at 10:35

## 2025-06-26 RX ADMIN — PANTOPRAZOLE SODIUM 40 MG: 40 TABLET, DELAYED RELEASE ORAL at 05:21

## 2025-06-26 RX ADMIN — IPRATROPIUM BROMIDE 0.5 MG: 0.5 SOLUTION RESPIRATORY (INHALATION) at 06:19

## 2025-06-26 RX ADMIN — GUAIFENESIN 600 MG: 600 TABLET, EXTENDED RELEASE ORAL at 21:06

## 2025-06-26 RX ADMIN — MICONAZOLE NITRATE: 2 POWDER TOPICAL at 21:05

## 2025-06-26 RX ADMIN — ACETAMINOPHEN 650 MG: 325 TABLET ORAL at 09:00

## 2025-06-26 RX ADMIN — CETIRIZINE HYDROCHLORIDE 10 MG: 10 TABLET ORAL at 08:53

## 2025-06-26 RX ADMIN — ALLOPURINOL 300 MG: 300 TABLET ORAL at 08:53

## 2025-06-26 RX ADMIN — ASPIRIN 81 MG: 81 TABLET, COATED ORAL at 08:53

## 2025-06-26 RX ADMIN — METOPROLOL SUCCINATE 25 MG: 25 TABLET, FILM COATED, EXTENDED RELEASE ORAL at 21:06

## 2025-06-26 RX ADMIN — GUAIFENESIN 600 MG: 600 TABLET, EXTENDED RELEASE ORAL at 08:53

## 2025-06-26 RX ADMIN — ENOXAPARIN SODIUM 60 MG: 100 INJECTION SUBCUTANEOUS at 21:06

## 2025-06-26 RX ADMIN — INSULIN GLARGINE 30 UNITS: 100 INJECTION, SOLUTION SUBCUTANEOUS at 21:06

## 2025-06-26 RX ADMIN — SODIUM BICARBONATE 650 MG: 650 TABLET ORAL at 08:53

## 2025-06-26 RX ADMIN — SODIUM CHLORIDE: 0.9 INJECTION, SOLUTION INTRAVENOUS at 14:27

## 2025-06-26 RX ADMIN — METOPROLOL SUCCINATE 25 MG: 25 TABLET, FILM COATED, EXTENDED RELEASE ORAL at 08:53

## 2025-06-26 RX ADMIN — ENOXAPARIN SODIUM 60 MG: 100 INJECTION SUBCUTANEOUS at 08:53

## 2025-06-26 RX ADMIN — SODIUM CHLORIDE, PRESERVATIVE FREE 10 ML: 5 INJECTION INTRAVENOUS at 21:05

## 2025-06-26 RX ADMIN — PRAVASTATIN SODIUM 20 MG: 20 TABLET ORAL at 21:06

## 2025-06-26 RX ADMIN — SODIUM BICARBONATE 650 MG: 650 TABLET ORAL at 14:25

## 2025-06-26 ASSESSMENT — PAIN DESCRIPTION - ORIENTATION: ORIENTATION: RIGHT;LEFT

## 2025-06-26 ASSESSMENT — PAIN SCALES - GENERAL: PAINLEVEL_OUTOF10: 3

## 2025-06-26 ASSESSMENT — PAIN DESCRIPTION - DESCRIPTORS: DESCRIPTORS: ACHING

## 2025-06-26 ASSESSMENT — PAIN DESCRIPTION - LOCATION: LOCATION: GENERALIZED

## 2025-06-26 ASSESSMENT — PAIN - FUNCTIONAL ASSESSMENT: PAIN_FUNCTIONAL_ASSESSMENT: PREVENTS OR INTERFERES SOME ACTIVE ACTIVITIES AND ADLS

## 2025-06-26 NOTE — PLAN OF CARE
Problem: Chronic Conditions and Co-morbidities  Goal: Patient's chronic conditions and co-morbidity symptoms are monitored and maintained or improved  Outcome: Progressing  Flowsheets (Taken 6/25/2025 0800 by Gay Lira RN)  Care Plan - Patient's Chronic Conditions and Co-Morbidity Symptoms are Monitored and Maintained or Improved:   Monitor and assess patient's chronic conditions and comorbid symptoms for stability, deterioration, or improvement   Collaborate with multidisciplinary team to address chronic and comorbid conditions and prevent exacerbation or deterioration   Update acute care plan with appropriate goals if chronic or comorbid symptoms are exacerbated and prevent overall improvement and discharge     Problem: Discharge Planning  Goal: Discharge to home or other facility with appropriate resources  Outcome: Progressing  Flowsheets (Taken 6/25/2025 0800 by Gay Lira RN)  Discharge to home or other facility with appropriate resources:   Identify barriers to discharge with patient and caregiver   Arrange for needed discharge resources and transportation as appropriate   Identify discharge learning needs (meds, wound care, etc)   Refer to discharge planning if patient needs post-hospital services based on physician order or complex needs related to functional status, cognitive ability or social support system     Problem: Pain  Goal: Verbalizes/displays adequate comfort level or baseline comfort level  Outcome: Progressing     Problem: Safety - Adult  Goal: Free from fall injury  Outcome: Progressing     Problem: ABCDS Injury Assessment  Goal: Absence of physical injury  Outcome: Progressing     Problem: Skin/Tissue Integrity  Goal: Skin integrity remains intact  Description: 1.  Monitor for areas of redness and/or skin breakdown2.  Assess vascular access sites hourly3.  Every 4-6 hours minimum:  Change oxygen saturation probe site4.  Every 4-6 hours:  If on nasal continuous positive airway

## 2025-06-26 NOTE — PROGRESS NOTES
DIS Nurse Note  SUBJECTIVE: Patient assessed secondary to elevated Deterioration Index Score.      Deterioration Index Score:  Predictive Model Details          58 (Warning)  Factor Value    Calculated 6/26/2025 06:49 24% Blood pH abnormal (7.120)    Deterioration Index Model 19% Supplemental oxygen PAP (positive airway pressure)     19% Age 65 years old     16% Neurological exam X     7% Cardiac rhythm Sinus tachy     5% Potassium 4.5 mmol/L     5% Pulse 103     2% Hematocrit abnormal (37.0 %)     2% BUN abnormal (35 mg/dL)     1% Sodium 142 mmol/L     0% Temperature 97.5 °F (36.4 °C)     0% Pulse oximetry 95 %     0% Respiratory rate 16     0% Systolic 111     0% WBC count 7.1 K/uL        Vital Signs:  Vitals:    06/25/25 1944 06/26/25 0013 06/26/25 0417 06/26/25 0619   BP: 119/76 112/80 111/76    Pulse: (!) 104 (!) 106 (!) 103    Resp: 16 16 16    Temp: 97.7 °F (36.5 °C) 97 °F (36.1 °C) 97.5 °F (36.4 °C)    TempSrc: Temporal Axillary Temporal    SpO2: 95% 96% 95% 95%   Weight:       Height:            Provider Notified: Reviewed patient status  & DIS score with Provider Repeat ABG test    ASSESSMENT:  Patient is currently stable. AxO x4    Plan of Care: Repeat ABG    Electronically signed by Amie Hernandez RN

## 2025-06-26 NOTE — PROGRESS NOTES
Physical Therapy     06/26/25 1417   Restrictions/Precautions   Restrictions/Precautions Fall Risk;Contact Precautions   Position Activity Restriction   Other Position/Activity Restrictions ESBL, MRSA   General   Diagnosis hypervolemia, cardiac arrythmia, CAP, SOB, morbid obesity   Subjective   Subjective pt agreeable to tx- stated he needed to be cleaned up.   Vitals   O2 Device PAP (positive airway pressure)   Observation/Palpation   Observation class 3 morbid obesity. male pure wick. 02, IV   Body Mechanics B hips in ER   Bed mobility   Rolling to Left Minimal assistance;Partial/Moderate assistance   Rolling to Right Minimal assistance;Partial/Moderate assistance   Bed Mobility Comments multiple rolls in bilat directions for clean up and change of pad. pt limited by strength and SOA. use of rails and min/modA to maintain side lying.   PT Exercises   Exercise Treatment pull ups with trapeze bar and  SLR in bed. x 10 each with rest breaks in between due to SOA and fatigue.   Short Term Goals   Time Frame for Short Term Goals 2 wks   Short Term Goal 1 Independent with bed mobility   Short Term Goal 2 Independent with transfers   Short Term Goal 3 Ambulate 50 feet independently with assistive device   Short Term Goal 4 bed mobility Min A   Short Term Goal 5 amb. 15' Min A x 1 SW   Activity Tolerance   Activity Tolerance Patient limited by fatigue;Patient limited by endurance   Assessment   Assessment pt has had decline in overall mobility as noted with min/modA needed for bilat rolling. very limited pull up strength with trapeze compared to previous treatments. pt agreed to attemp sitting EOB next tx as his bed as been swapped for a different one.   Patient Education   Education Given To Patient   Education Provided Role of Therapy   Education Provided Comments pt educated on importance of moving in bed- performing pull ups with trapeze and SLR every hour at least 10 reps due to pt loss in overall strength. pt

## 2025-06-26 NOTE — PROGRESS NOTES
Nephrology ([facility]) Progress Note    Patient:  Daljit Elizondo  YOB: 1960  Date of Service: 6/26/2025  MRN: [id number]   Acct: [id number]   Primary Care Physician: Jessie Helms APRN - CNP  Advance Directive: Full Code  Admit Date: 5/14/2025       Hospital Day: 43  Referring Provider: Cody Howell MD    Patient independently seen and examined, Chart, Consults, Notes, Operative notes, Labs, Cardiology, and Radiology studies reviewed as available.    Chief complaint: Abnormal labs.    Subjective:  Daljit Elizondo is a 65 y.o. male for whom we were consulted for evaluation and treatment of acute kidney injury.  Patient denies any history of chronic kidney disease.  He has been in the hospital for the last month, poor historian and has never seen nephrology in the past.  Patient has severe abdominal obesity, hypertension, sleep apnea, type 2 diabetes, chronic diastolic CHF, sleep apnea.  Presented with increasing shortness of breath/dyspnea on exertion.  Hospital course remarkable for treatment with intravenous diuretics for the treatment of volume overload, CHF exacerbation.  His serum creatinine was 0.9 mg with estimated GFR more than 90 on admission.  Patient has significant drop in GFR and nephrology is consulted.    Today, patient denies any major shortness of breath or swelling.  He has good urine output. ABGs showed acidosis and sodium bicarbonate was added.      Allergies:  Penicillamine, Silvadene [silver sulfadiazine], Aerohist [chlorpheniramine-methscop er], Cephalosporins, Hemp seed oil, Neosporin [neomycin-polymyxin-gramicidin], Penicillins, and Zinc    Medicines:  Current Facility-Administered Medications   Medication Dose Route Frequency Provider Last Rate Last Admin    sodium bicarbonate tablet 650 mg  650 mg Oral TID Maciej Sun MD   650 mg at 06/26/25 0853    loperamide (IMODIUM) capsule 2 mg  2 mg Oral 4x Daily PRN Cody Howell MD   2

## 2025-06-26 NOTE — PROGRESS NOTES
[facility]    Patient:  Daljit Elizondo  YOB: 1960  Date of Service: 6/26/2025  MRN: [id number]  Acct: [id number]   Primary Care Physician: Jessie Helms APRN - CNP  Advance Directive: Full Code  Admit Date: 5/14/2025       Hospital Day: 43     NOTE: Portions of this note have been copied forward, however, changes made to reflect the most current clinical status of this patient.    CHIEF COMPLAINT:     Chief Complaint   Patient presents with    Shortness of Breath     Since Jan 1    Scrotal Pain       6/26/2025 11:46 AM  Subjective / Interval History:   06/26/2025  Patient seen and examined this a.m.   No new complaints.    No acute changes or acute overnight event reported.   Laying comfortably in bed in no apparent acute distress.  Denies any acute complaints or distress.    Endorses overall improvement.   Patient with severe resp & metabolic acidosis noted on ABG yesterday with pH of 7.12, which has briefly improved to 7.2 this a.m.      6/25/2025  Patient seen and examined this a.m.   No new complaints.    No acute changes or acute overnight event reported.   Laying comfortably in bed in no apparent acute distress.    Denies any acute complaints or distress.    Endorses overall improvement.        06/24/2025  Patient seen and examined this a.m.   No new complaints.    No acute changes or acute overnight event reported.   Laying comfortably in bed in no apparent acute distress.  Denies any acute complaints or distress.   Endorses overall improvement.        06/23/2025  Patient seen and examined this a.m.   Patient reports some nausea this a.m.  No acute changes or acute overnight event reported.   Laying comfortably in bed in no apparent acute distress.    Denies any acute complaints or distress.    Endorses overall improvement.        06/22/2025  Patient seen and examined this a.m.   No new complaints.    No acute changes or acute overnight event reported.   Laying  06/26/25 0351    sodium chloride 5 mL/hr at 05/18/25 1459    dextrose       Current Facility-Administered Medications   Medication Dose Route Frequency Provider Last Rate Last Admin    sodium bicarbonate tablet 650 mg  650 mg Oral TID Maciej Sun MD   650 mg at 06/26/25 0853    loperamide (IMODIUM) capsule 2 mg  2 mg Oral 4x Daily PRN Cody Howell MD   2 mg at 06/16/25 1738    insulin lispro (HUMALOG,ADMELOG) injection vial 0-4 Units  0-4 Units SubCUTAneous 4x Daily AC & HS Cody Howell MD        0.9 % sodium chloride infusion   IntraVENous Continuous Maciej Sun  mL/hr at 06/26/25 0351 New Bag at 06/26/25 0351    [Held by provider] bumetanide (BUMEX) tablet 1 mg  1 mg Oral BID Jori Haji MD   1 mg at 06/09/25 1752    [Held by provider] lactulose (CHRONULAC) 10 GM/15ML solution 20 g  20 g Oral TID Juan Nicolas APRN - CNP   20 g at 06/09/25 0923    [Held by provider] polyethylene glycol (GLYCOLAX) packet 17 g  17 g Oral BID Juan Nicolas APRN - CNP   17 g at 06/09/25 0923    metoprolol succinate (TOPROL XL) extended release tablet 25 mg  25 mg Oral BID Juan Nicolas APRN - CNP   25 mg at 06/26/25 0853    ipratropium (ATROVENT) 0.02 % nebulizer solution 0.5 mg  0.5 mg Nebulization 4x Daily RT Ashleigh Jamil APRN   0.5 mg at 06/26/25 1035    [Held by provider] senna (SENOKOT) tablet 17.2 mg  2 tablet Oral BID Ashleigh Jamil APRN   17.2 mg at 06/10/25 0911    montelukast (SINGULAIR) tablet 10 mg  10 mg Oral Nightly Ashleigh Jamil APRN   10 mg at 06/25/25 2006    sodium chloride (OCEAN, BABY AYR) 0.65 % nasal spray 1 spray  1 spray Each Nostril Q2H PRN Ashleigh Jamil APRN   1 spray at 05/19/25 1118    tetrahydrozoline 0.05 % ophthalmic solution 1 drop  1 drop Both Eyes TID PRN Ashleigh Jamil APRN        lubrifresh P.M. (artificial tears) ophthalmic ointment   Both Eyes PRN Ruben Partida APRN - CNP   Given at 05/17/25 1222    cetirizine

## 2025-06-27 ENCOUNTER — APPOINTMENT (OUTPATIENT)
Dept: GENERAL RADIOLOGY | Age: 65
DRG: 291 | End: 2025-06-27
Payer: MEDICARE

## 2025-06-27 ENCOUNTER — APPOINTMENT (OUTPATIENT)
Dept: VASCULAR LAB | Age: 65
DRG: 291 | End: 2025-06-27
Payer: MEDICARE

## 2025-06-27 LAB
ALLENS TEST: ABNORMAL
ANION GAP SERPL CALCULATED.3IONS-SCNC: 10 MMOL/L (ref 8–16)
BASE EXCESS ARTERIAL: -12 MMOL/L (ref -2–2)
BASE EXCESS ARTERIAL: -12 MMOL/L (ref -2–2)
BASE EXCESS ARTERIAL: -12.3 MMOL/L (ref -2–2)
BASOPHILS # BLD: 0.1 K/UL (ref 0–0.2)
BASOPHILS NFR BLD: 0.7 % (ref 0–1)
BI-LEVEL POS AIRWAY PRESSURE(EXPIRATORY): 18
BI-LEVEL POS AIRWAY PRESSURE(EXPIRATORY): 9
BI-LEVEL POS AIRWAY PRESSURE(INSPIRATORY): 18
BI-LEVEL POS AIRWAY PRESSURE(INSPIRATORY): 9
BNP BLD-MCNC: ABNORMAL PG/ML (ref 0–124)
BUN SERPL-MCNC: 33 MG/DL (ref 8–23)
CALCIUM SERPL-MCNC: 8.8 MG/DL (ref 8.8–10.2)
CARBOXYHEMOGLOBIN ARTERIAL: 2.2 % (ref 0–5)
CARBOXYHEMOGLOBIN ARTERIAL: 2.3 % (ref 0–5)
CARBOXYHEMOGLOBIN ARTERIAL: 2.4 % (ref 0–5)
CHLORIDE SERPL-SCNC: 116 MMOL/L (ref 98–107)
CO2 SERPL-SCNC: 16 MMOL/L (ref 22–29)
CREAT SERPL-MCNC: 2.9 MG/DL (ref 0.7–1.2)
EOSINOPHIL # BLD: 0.3 K/UL (ref 0–0.6)
EOSINOPHIL NFR BLD: 3.7 % (ref 0–5)
ERYTHROCYTE [DISTWIDTH] IN BLOOD BY AUTOMATED COUNT: 15.9 % (ref 11.5–14.5)
GLUCOSE BLD-MCNC: 107 MG/DL (ref 70–99)
GLUCOSE BLD-MCNC: 112 MG/DL (ref 70–99)
GLUCOSE BLD-MCNC: 119 MG/DL (ref 70–99)
GLUCOSE BLD-MCNC: 121 MG/DL (ref 70–99)
GLUCOSE SERPL-MCNC: 122 MG/DL (ref 70–99)
HCO3 ARTERIAL: 15.3 MMOL/L (ref 22–26)
HCO3 ARTERIAL: 15.7 MMOL/L (ref 22–26)
HCO3 ARTERIAL: 16.4 MMOL/L (ref 22–26)
HCT VFR BLD AUTO: 37.6 % (ref 42–52)
HEMOGLOBIN, ART, EXTENDED: 12.3 G/DL (ref 14–18)
HEMOGLOBIN, ART, EXTENDED: 12.4 G/DL (ref 14–18)
HEMOGLOBIN, ART, EXTENDED: 12.9 G/DL (ref 14–18)
HGB BLD-MCNC: 11.5 G/DL (ref 14–18)
IMM GRANULOCYTES # BLD: 0 K/UL
LYMPHOCYTES # BLD: 0.8 K/UL (ref 1.1–4.5)
LYMPHOCYTES NFR BLD: 11.1 % (ref 20–40)
MCH RBC QN AUTO: 29.6 PG (ref 27–31)
MCHC RBC AUTO-ENTMCNC: 30.6 G/DL (ref 33–37)
MCV RBC AUTO: 96.9 FL (ref 80–94)
METHEMOGLOBIN ARTERIAL: 0.9 %
MODE: ABNORMAL
MODE: ABNORMAL
MONOCYTES # BLD: 0.5 K/UL (ref 0–0.9)
MONOCYTES NFR BLD: 7.8 % (ref 0–10)
NEUTROPHILS # BLD: 5.2 K/UL (ref 1.5–7.5)
NEUTS SEG NFR BLD: 76.3 % (ref 50–65)
O2 CONTENT ARTERIAL: 15.8 ML/DL
O2 CONTENT ARTERIAL: 15.8 ML/DL
O2 CONTENT ARTERIAL: 16.3 ML/DL
O2 DELIVERY DEVICE: ABNORMAL
O2 SAT, ARTERIAL: 90.1 %
O2 SAT, ARTERIAL: 90.5 %
O2 SAT, ARTERIAL: 91.4 %
O2 THERAPY: ABNORMAL
OXYGEN FLOW: 2
PCO2 ARTERIAL: 40 MMHG (ref 35–45)
PCO2 ARTERIAL: 41 MMHG (ref 35–45)
PCO2 ARTERIAL: 46 MMHG (ref 35–45)
PERFORMED ON: ABNORMAL
PH ARTERIAL: 7.16 (ref 7.35–7.45)
PH ARTERIAL: 7.19 (ref 7.35–7.45)
PH ARTERIAL: 7.19 (ref 7.35–7.45)
PLATELET # BLD AUTO: 163 K/UL (ref 130–400)
PMV BLD AUTO: 12.4 FL (ref 9.4–12.4)
PO2 ARTERIAL: 56 MMHG (ref 80–100)
PO2 ARTERIAL: 57 MMHG (ref 80–100)
PO2 ARTERIAL: 60 MMHG (ref 80–100)
POTASSIUM BLD-SCNC: 4.3 MMOL/L
POTASSIUM BLD-SCNC: 4.4 MMOL/L
POTASSIUM BLD-SCNC: 4.4 MMOL/L
POTASSIUM SERPL-SCNC: 4.3 MMOL/L (ref 3.5–5)
PROCALCITONIN: 0.14 NG/ML (ref 0–0.09)
RBC # BLD AUTO: 3.88 M/UL (ref 4.7–6.1)
SAMPLE SOURCE: ABNORMAL
SODIUM SERPL-SCNC: 142 MMOL/L (ref 136–145)
WBC # BLD AUTO: 6.8 K/UL (ref 4.8–10.8)

## 2025-06-27 PROCEDURE — 93922 UPR/L XTREMITY ART 2 LEVELS: CPT

## 2025-06-27 PROCEDURE — 6370000000 HC RX 637 (ALT 250 FOR IP): Performed by: NURSE PRACTITIONER

## 2025-06-27 PROCEDURE — 71045 X-RAY EXAM CHEST 1 VIEW: CPT

## 2025-06-27 PROCEDURE — 97530 THERAPEUTIC ACTIVITIES: CPT

## 2025-06-27 PROCEDURE — 94150 VITAL CAPACITY TEST: CPT

## 2025-06-27 PROCEDURE — 5A09557 ASSISTANCE WITH RESPIRATORY VENTILATION, GREATER THAN 96 CONSECUTIVE HOURS, CONTINUOUS POSITIVE AIRWAY PRESSURE: ICD-10-PCS | Performed by: INTERNAL MEDICINE

## 2025-06-27 PROCEDURE — 94640 AIRWAY INHALATION TREATMENT: CPT

## 2025-06-27 PROCEDURE — 83880 ASSAY OF NATRIURETIC PEPTIDE: CPT

## 2025-06-27 PROCEDURE — 76937 US GUIDE VASCULAR ACCESS: CPT

## 2025-06-27 PROCEDURE — 36415 COLL VENOUS BLD VENIPUNCTURE: CPT

## 2025-06-27 PROCEDURE — 1200000000 HC SEMI PRIVATE

## 2025-06-27 PROCEDURE — 6370000000 HC RX 637 (ALT 250 FOR IP)

## 2025-06-27 PROCEDURE — 6370000000 HC RX 637 (ALT 250 FOR IP): Performed by: INTERNAL MEDICINE

## 2025-06-27 PROCEDURE — 80048 BASIC METABOLIC PNL TOTAL CA: CPT

## 2025-06-27 PROCEDURE — 2700000000 HC OXYGEN THERAPY PER DAY

## 2025-06-27 PROCEDURE — 2500000003 HC RX 250 WO HCPCS

## 2025-06-27 PROCEDURE — 99222 1ST HOSP IP/OBS MODERATE 55: CPT | Performed by: PHYSICIAN ASSISTANT

## 2025-06-27 PROCEDURE — 6360000002 HC RX W HCPCS: Performed by: INTERNAL MEDICINE

## 2025-06-27 PROCEDURE — 94660 CPAP INITIATION&MGMT: CPT

## 2025-06-27 PROCEDURE — 85025 COMPLETE CBC W/AUTO DIFF WBC: CPT

## 2025-06-27 PROCEDURE — 6360000002 HC RX W HCPCS

## 2025-06-27 PROCEDURE — 82803 BLOOD GASES ANY COMBINATION: CPT

## 2025-06-27 PROCEDURE — 36600 WITHDRAWAL OF ARTERIAL BLOOD: CPT

## 2025-06-27 PROCEDURE — 36410 VNPNXR 3YR/> PHY/QHP DX/THER: CPT

## 2025-06-27 PROCEDURE — 82962 GLUCOSE BLOOD TEST: CPT

## 2025-06-27 PROCEDURE — 84145 PROCALCITONIN (PCT): CPT

## 2025-06-27 PROCEDURE — 94760 N-INVAS EAR/PLS OXIMETRY 1: CPT

## 2025-06-27 PROCEDURE — 94761 N-INVAS EAR/PLS OXIMETRY MLT: CPT

## 2025-06-27 PROCEDURE — C1751 CATH, INF, PER/CENT/MIDLINE: HCPCS

## 2025-06-27 PROCEDURE — 6360000002 HC RX W HCPCS: Performed by: NURSE PRACTITIONER

## 2025-06-27 RX ORDER — BUMETANIDE 0.25 MG/ML
2 INJECTION, SOLUTION INTRAMUSCULAR; INTRAVENOUS 2 TIMES DAILY
Status: DISCONTINUED | OUTPATIENT
Start: 2025-06-27 | End: 2025-07-01

## 2025-06-27 RX ORDER — SODIUM BICARBONATE 650 MG/1
1300 TABLET ORAL 3 TIMES DAILY
Status: DISCONTINUED | OUTPATIENT
Start: 2025-06-27 | End: 2025-07-02

## 2025-06-27 RX ADMIN — GUAIFENESIN 600 MG: 600 TABLET, EXTENDED RELEASE ORAL at 08:02

## 2025-06-27 RX ADMIN — SODIUM BICARBONATE 1300 MG: 650 TABLET ORAL at 17:17

## 2025-06-27 RX ADMIN — IPRATROPIUM BROMIDE 0.5 MG: 0.5 SOLUTION RESPIRATORY (INHALATION) at 14:50

## 2025-06-27 RX ADMIN — METOPROLOL SUCCINATE 25 MG: 25 TABLET, FILM COATED, EXTENDED RELEASE ORAL at 20:33

## 2025-06-27 RX ADMIN — GUAIFENESIN 600 MG: 600 TABLET, EXTENDED RELEASE ORAL at 20:33

## 2025-06-27 RX ADMIN — ALLOPURINOL 300 MG: 300 TABLET ORAL at 08:05

## 2025-06-27 RX ADMIN — ACETAMINOPHEN 650 MG: 325 TABLET ORAL at 20:37

## 2025-06-27 RX ADMIN — ENOXAPARIN SODIUM 60 MG: 100 INJECTION SUBCUTANEOUS at 20:33

## 2025-06-27 RX ADMIN — CETIRIZINE HYDROCHLORIDE 10 MG: 10 TABLET ORAL at 08:02

## 2025-06-27 RX ADMIN — MICONAZOLE NITRATE: 2 POWDER TOPICAL at 20:34

## 2025-06-27 RX ADMIN — BUMETANIDE 2 MG: 0.25 INJECTION INTRAMUSCULAR; INTRAVENOUS at 11:39

## 2025-06-27 RX ADMIN — IPRATROPIUM BROMIDE 0.5 MG: 0.5 SOLUTION RESPIRATORY (INHALATION) at 19:42

## 2025-06-27 RX ADMIN — SODIUM CHLORIDE, PRESERVATIVE FREE 10 ML: 5 INJECTION INTRAVENOUS at 08:03

## 2025-06-27 RX ADMIN — SODIUM CHLORIDE, PRESERVATIVE FREE 10 ML: 5 INJECTION INTRAVENOUS at 20:34

## 2025-06-27 RX ADMIN — MONTELUKAST SODIUM 10 MG: 10 TABLET, FILM COATED ORAL at 20:33

## 2025-06-27 RX ADMIN — INSULIN GLARGINE 30 UNITS: 100 INJECTION, SOLUTION SUBCUTANEOUS at 20:33

## 2025-06-27 RX ADMIN — IPRATROPIUM BROMIDE 0.5 MG: 0.5 SOLUTION RESPIRATORY (INHALATION) at 07:28

## 2025-06-27 RX ADMIN — MICONAZOLE NITRATE: 2 POWDER TOPICAL at 08:04

## 2025-06-27 RX ADMIN — SODIUM BICARBONATE 650 MG: 650 TABLET ORAL at 08:03

## 2025-06-27 RX ADMIN — PANTOPRAZOLE SODIUM 40 MG: 40 TABLET, DELAYED RELEASE ORAL at 08:02

## 2025-06-27 RX ADMIN — ENOXAPARIN SODIUM 60 MG: 100 INJECTION SUBCUTANEOUS at 08:03

## 2025-06-27 RX ADMIN — SODIUM BICARBONATE 1300 MG: 650 TABLET ORAL at 14:11

## 2025-06-27 RX ADMIN — PRAVASTATIN SODIUM 20 MG: 20 TABLET ORAL at 20:33

## 2025-06-27 RX ADMIN — ASPIRIN 81 MG: 81 TABLET, COATED ORAL at 08:02

## 2025-06-27 RX ADMIN — METOPROLOL SUCCINATE 25 MG: 25 TABLET, FILM COATED, EXTENDED RELEASE ORAL at 08:02

## 2025-06-27 RX ADMIN — IPRATROPIUM BROMIDE 0.5 MG: 0.5 SOLUTION RESPIRATORY (INHALATION) at 10:06

## 2025-06-27 RX ADMIN — BUMETANIDE 2 MG: 0.25 INJECTION INTRAMUSCULAR; INTRAVENOUS at 17:15

## 2025-06-27 ASSESSMENT — PAIN DESCRIPTION - DESCRIPTORS: DESCRIPTORS: ACHING

## 2025-06-27 ASSESSMENT — PAIN SCALES - GENERAL
PAINLEVEL_OUTOF10: 0
PAINLEVEL_OUTOF10: 5

## 2025-06-27 ASSESSMENT — PAIN DESCRIPTION - FREQUENCY: FREQUENCY: CONTINUOUS

## 2025-06-27 ASSESSMENT — PAIN DESCRIPTION - LOCATION: LOCATION: FOOT

## 2025-06-27 ASSESSMENT — PAIN DESCRIPTION - PAIN TYPE: TYPE: ACUTE PAIN

## 2025-06-27 ASSESSMENT — PAIN DESCRIPTION - ONSET: ONSET: ON-GOING

## 2025-06-27 ASSESSMENT — PAIN DESCRIPTION - ORIENTATION: ORIENTATION: RIGHT;LEFT

## 2025-06-27 ASSESSMENT — PAIN - FUNCTIONAL ASSESSMENT: PAIN_FUNCTIONAL_ASSESSMENT: PREVENTS OR INTERFERES SOME ACTIVE ACTIVITIES AND ADLS

## 2025-06-27 NOTE — CONSULTS
[facility] Vascular Surgery    CONSULT    Patient:  Daljit Elizondo  YOB: 1960  Date of Service: 6/27/2025  MRN: [id number]   Acct: [id number]   Primary Care Physician: Jessie Helms APRN - CNP    Reason for consult: \"non-healing left necrotic foot wound\"    Requesting Physician: Dr. Howell    History Obtained From: Patient and chart review    HISTORY OF PRESENT ILLNESS:  Mr. Daljit Elizondo is a 65 y.o. male with PMH of severe abdominal obesity, hypertension, sleep apnea, type 2 diabetes, COPD, chronic diastolic CHF, CKD, sleep apnea. Pt presented to [facility] ED on 5/14/2025 with SOB after stopping his Bumex at home. He has been treated since inpatient with intravenous diuretics for the treatment of volume overload, CHF exacerbation. Noted difficulty finding discharge placement for patient. The patient was found to have wound on bottom of his left foot concerning for necrosis, therefore vascular surgery consulted.     Past Medical History:       Diagnosis Date    Abrasion     Anxiety     Asthma     Bell's palsy     Cataracts, bilateral     Cellulitis     Diabetes (HCC)     Edema extremities     GERD (gastroesophageal reflux disease)     Gout     H/O tracheostomy     2015    H/O urinary retention     Hernia, hiatal     History of panniculitis     History of shingles     Hyperlipemia     Hypertension     Morbid obesity (HCC)     Non-ST elevated myocardial infarction (HCC)     11/2015    Obesity     Paraphimosis     tx w circumcision 2015    Pulmonary hypertension (HCC)     Renal failure     Respiratory failure (HCC)     2015    Shoulder pain     Sleep apnea     Testosterone deficiency     Ulcer     Ulcer of calf (HCC)     Venous stasis of both lower extremities        Past Surgical History:        Procedure Laterality Date    APPENDECTOMY      PT THINKS AS A CHILD, REMOVED     COLONOSCOPY      ENDOSCOPY, COLON, DIAGNOSTIC      FOOT SURGERY Left LONG AGO 1980'S    REMOVED FOREIGN BODY AND CLOSED WOUND    FOOT

## 2025-06-27 NOTE — CONSULTS
[facility] Vascular Access Team:  PowerGlide Midline/Extended Dwell IV Catheter  Insertion Procedure Note      Patient: Daljit Elizondo  YOB: 1960   MRN: [id number]  Room: [address] [place] [address]     Attending Physician - Cody Howell MD  Ordering Physician - Cody Howell MD    Diagnosis:   Shortness of breath [R06.02]  Cardiac arrhythmia, unspecified cardiac arrhythmia type [I49.9]  Hypervolemia, unspecified hypervolemia type [E87.70]  Community acquired pneumonia, unspecified laterality [J18.9]  Acute congestive heart failure, unspecified heart failure type (HCC) [I50.9]       Procedure(s):   Insertion of a 20 gauge 10 cm Single Lumen PowerGlide Catheter    Indication(s):  Poor Venous Access  (Previous ultrasound guided PIV is no longer functioning)    Date of Procedure: 6/27/2025     Performed by: Sunday Bush RN    Complications: None      Findings:   1. Successful insertion of PowerGlide Catheter.  2. PowerGlide is ready to be used. Please change tubing prior to using the new line. Make sure to label, date and use alcohol caps on new tubing and alcohol caps on unused ports.   3. Patient may be changed to a unit draw for lab work.        Detailed Description of Procedure:   The Left Cephalic vein was visualized using the ultrasound and deemed a suitable vessel. The area was prepped with Chlorhexidine and draped in sterile fashion using partial barrier draping. The vein was accessed using US guidance. The 20 gauge 10 cm Single Lumen PowerGlide catheter was advanced into the vein using ANTT. Approximately 0 cm exposed. All lumens had brisk blood return and was flushed with 10 cc of NS. The catheter was secured using a securement device. A 3M CHG Tegaderm was placed over the securement device and insertion site. Dressing was dated and initialed with external measurement marked. Patient did tolerate procedure well.    Electronically signed by Sunday Bush RN on 6/27/2025 at 4:25 PM

## 2025-06-27 NOTE — PROGRESS NOTES
1459    dextrose        sodium bicarbonate  650 mg Oral TID    insulin lispro  0-4 Units SubCUTAneous 4x Daily AC & HS    [Held by provider] bumetanide  1 mg Oral BID    [Held by provider] lactulose  20 g Oral TID    [Held by provider] polyethylene glycol  17 g Oral BID    metoprolol succinate  25 mg Oral BID    ipratropium  0.5 mg Nebulization 4x Daily RT    [Held by provider] senna  2 tablet Oral BID    montelukast  10 mg Oral Nightly    cetirizine  10 mg Oral Daily    guaiFENesin  600 mg Oral BID    miconazole   Topical BID    allopurinol  300 mg Oral Daily    aspirin EC  81 mg Oral Daily    [Held by provider] losartan  50 mg Oral Daily    pantoprazole  40 mg Oral QAM AC    pravastatin  20 mg Oral Nightly    sodium chloride flush  5-40 mL IntraVENous 2 times per day    enoxaparin  60 mg SubCUTAneous BID    insulin glargine  30 Units SubCUTAneous Nightly     loperamide, sodium chloride, tetrahydrozoline, artificial tears, albuterol, sodium chloride flush, sodium chloride, ondansetron **OR** ondansetron, [Held by provider] polyethylene glycol, acetaminophen **OR** acetaminophen, potassium chloride **OR** potassium alternative oral replacement **OR** potassium chloride, magnesium sulfate, sulfur hexafluoride microspheres, glucose, dextrose bolus **OR** dextrose bolus, glucagon (rDNA), dextrose  ADULT DIET; Regular; 3 carb choices (45 gm/meal); Low Fat/Low Chol/High Fiber/2 gm Na; Low Potassium (Less than 3000 mg/day); 2000 ml; NO Hot cereal----SEND RICE KRISPIES AT BREAKFAST       Labs:   CBC with DIFF:   Recent Labs     06/25/25  0440 06/26/25  0409 06/27/25  0436   WBC 7.7 7.1 6.8   RBC 3.88* 3.77* 3.88*   HGB 11.4* 11.1* 11.5*   HCT 38.1* 37.0* 37.6*   MCV 98.2* 98.1* 96.9*   MCH 29.4 29.4 29.6   MCHC 29.9* 30.0* 30.6*   RDW 15.6* 15.8* 15.9*    152 163   MPV 12.1 12.5* 12.4   NEUTOPHILPCT 77.0* 71.8* 76.3*   LYMPHOPCT 11.6* 13.8* 11.1*   MONOPCT 6.6 7.5 7.8   BASOPCT 0.4 0.7 0.7   NEUTROABS 5.9 5.1 5.2

## 2025-06-27 NOTE — PROGRESS NOTES
Occupational Therapy  Facility/Department: [facility] 3 DUNIA/VAS/MED  Daily Treatment Note    Name: Daljit Elizondo  : 1960  MRN: [id number]  Date of Service: 2025    Discharge Recommendations:  Patient would benefit from continued therapy after discharge          Patient Diagnosis(es): The primary encounter diagnosis was Hypervolemia, unspecified hypervolemia type. Diagnoses of Cardiac arrhythmia, unspecified cardiac arrhythmia type, Community acquired pneumonia, unspecified laterality, Shortness of breath, Bilateral lower extremity edema, Cellulitis and abscess of left lower extremity, and Non-pressure chronic ulcer of left calf with fat layer exposed (HCC) were also pertinent to this visit.  Past Medical History:  has a past medical history of Abrasion, Anxiety, Asthma, Bell's palsy, Cataracts, bilateral, Cellulitis, Diabetes (HCC), Edema extremities, GERD (gastroesophageal reflux disease), Gout, H/O tracheostomy, H/O urinary retention, Hernia, hiatal, History of panniculitis, History of shingles, Hyperlipemia, Hypertension, Morbid obesity (HCC), Non-ST elevated myocardial infarction (HCC), Obesity, Paraphimosis, Pulmonary hypertension (HCC), Renal failure, Respiratory failure (HCC), Shoulder pain, Sleep apnea, Testosterone deficiency, Ulcer, Ulcer of calf (HCC), and Venous stasis of both lower extremities.  Past Surgical History:  has a past surgical history that includes Testicle surgery (10/1970); Foot surgery (Left, LONG AGO ); Tracheostomy tube placement; Foot surgery (Right); Mouth surgery; Appendectomy; Colonoscopy; and Endoscopy, colon, diagnostic.    Treatment Diagnosis: Acute congestive heart failure      Assessment  Assessment: Tx focused on bed mobility. Attempted to sit EOB with 2 therapists physically blocking pt knees and nurse guarding pt back. Task aborted due to safety concerns and significant posterior lean. Education provided to pt on importance of full participation in therapy for

## 2025-06-27 NOTE — PROGRESS NOTES
Physical Therapy     06/27/25 1300   Restrictions/Precautions   Restrictions/Precautions Fall Risk;Contact Precautions   Position Activity Restriction   Other Position/Activity Restrictions ESBL, MRSA   General   Diagnosis hypervolemia, cardiac arrythmia, CAP, SOB, morbid obesity   Subjective   Subjective pt agreeable to sit EOB   Observation/Palpation   Body Mechanics B hips in ER   Bed mobility   Rolling to Left Minimal assistance;Partial/Moderate assistance   Rolling to Right Minimal assistance;Partial/Moderate assistance   Supine to Sit Substantial/Maximal assistance;2 Person assistance  (plus nurse)   Sit to Supine Substantial/Maximal assistance;2 Person assistance  (plus nurse)   Bed Mobility Comments multiple bilat rolling prior to supine to sit attempt. got to 80% sitting EOB with HOB elevated before aborting task due to significant safety concerns. pt with decreased strength unable to maintain fwd weight shift during task with increased risk despite physical blocking by therapists for sliding off EOB. pt becomes anxious/perseverates during task and requires redirection to task at hand.   Short Term Goals   Time Frame for Short Term Goals 2 wks   Short Term Goal 1 Independent with bed mobility   Short Term Goal 2 Independent with transfers   Short Term Goal 3 Ambulate 50 feet independently with assistive device   Short Term Goal 4 bed mobility Min A   Short Term Goal 5 amb. 15' Min A x 1 SW   Activity Tolerance   Activity Tolerance Patient limited by fatigue;Patient limited by endurance;Other (comment)  (bariatric beds not conducive to sitting EOB safely for morbidly obese patients due to placement of rails and lack of hard surface/support.)   Assessment   Assessment pt limited by weakness- unable to fully complete sitting EOB before having to be heavily assisted back into bed due to increasing risk for fall into floor despite blocking of two therapists at knees. educated pt on use of theraband and

## 2025-06-27 NOTE — PROGRESS NOTES
Nephrology ([facility]) Progress Note    Patient:  Daljit Elizondo  YOB: 1960  Date of Service: 6/27/2025  MRN: [id number]   Acct: [id number]   Primary Care Physician: Jessie Helms APRN - CNP  Advance Directive: Full Code  Admit Date: 5/14/2025       Hospital Day: 44  Referring Provider: Cody Howell MD    Patient independently seen and examined, Chart, Consults, Notes, Operative notes, Labs, Cardiology, and Radiology studies reviewed as available.    Chief complaint: Abnormal labs.    Subjective:  Daljit Elizondo is a 65 y.o. male for whom we were consulted for evaluation and treatment of acute kidney injury.  Patient denies any history of chronic kidney disease.  He has been in the hospital for the last month, poor historian and has never seen nephrology in the past.  Patient has severe abdominal obesity, hypertension, sleep apnea, type 2 diabetes, chronic diastolic CHF, sleep apnea.  Presented with increasing shortness of breath/dyspnea on exertion.  Hospital course remarkable for treatment with intravenous diuretics for the treatment of volume overload, CHF exacerbation.  His serum creatinine was 0.9 mg with estimated GFR more than 90 on admission.  Patient has significant drop in GFR and nephrology is consulted.    Patient was a bit dyspneic today..  He has good urine output.       Allergies:  Penicillamine, Silvadene [silver sulfadiazine], Aerohist [chlorpheniramine-methscop er], Cephalosporins, Hemp seed oil, Neosporin [neomycin-polymyxin-gramicidin], Penicillins, and Zinc    Medicines:  Current Facility-Administered Medications   Medication Dose Route Frequency Provider Last Rate Last Admin    bumetanide (BUMEX) injection 2 mg  2 mg IntraVENous BID Cody Howell MD        sodium bicarbonate tablet 650 mg  650 mg Oral TID Maciej Sun MD   650 mg at 06/27/25 0803    loperamide (IMODIUM) capsule 2 mg  2 mg Oral 4x Daily PRN Cody Howell MD

## 2025-06-27 NOTE — PROGRESS NOTES
Placed pt on bipap and  per drs orders.   Clinical house said to keep pt on 3rd due to ICU is full per nurse.   Pt is tolerating and is stable at this time

## 2025-06-28 ENCOUNTER — APPOINTMENT (OUTPATIENT)
Dept: GENERAL RADIOLOGY | Age: 65
DRG: 291 | End: 2025-06-28
Payer: MEDICARE

## 2025-06-28 LAB
ALLENS TEST: ABNORMAL
ANION GAP SERPL CALCULATED.3IONS-SCNC: 10 MMOL/L (ref 8–16)
BASE EXCESS ARTERIAL: -10.2 MMOL/L (ref -2–2)
BASE EXCESS ARTERIAL: -10.6 MMOL/L (ref -2–2)
BASE EXCESS ARTERIAL: -8.9 MMOL/L (ref -2–2)
BASE EXCESS ARTERIAL: -9.6 MMOL/L (ref -2–2)
BASOPHILS # BLD: 0.1 K/UL (ref 0–0.2)
BASOPHILS NFR BLD: 0.8 % (ref 0–1)
BI-LEVEL POS AIRWAY PRESSURE(EXPIRATORY): 9
BI-LEVEL POS AIRWAY PRESSURE(INSPIRATORY): 18
BUN SERPL-MCNC: 35 MG/DL (ref 8–23)
CALCIUM SERPL-MCNC: 8.5 MG/DL (ref 8.8–10.2)
CARBOXYHEMOGLOBIN ARTERIAL: 2.4 % (ref 0–5)
CARBOXYHEMOGLOBIN ARTERIAL: 2.6 % (ref 0–5)
CHLORIDE SERPL-SCNC: 117 MMOL/L (ref 98–107)
CO2 SERPL-SCNC: 17 MMOL/L (ref 22–29)
CREAT SERPL-MCNC: 3.2 MG/DL (ref 0.7–1.2)
EOSINOPHIL # BLD: 0.3 K/UL (ref 0–0.6)
EOSINOPHIL NFR BLD: 5.3 % (ref 0–5)
ERYTHROCYTE [DISTWIDTH] IN BLOOD BY AUTOMATED COUNT: 15.9 % (ref 11.5–14.5)
GLUCOSE BLD-MCNC: 56 MG/DL (ref 70–99)
GLUCOSE BLD-MCNC: 65 MG/DL (ref 70–99)
GLUCOSE BLD-MCNC: 82 MG/DL (ref 70–99)
GLUCOSE BLD-MCNC: 83 MG/DL (ref 70–99)
GLUCOSE BLD-MCNC: 95 MG/DL (ref 70–99)
GLUCOSE SERPL-MCNC: 79 MG/DL (ref 70–99)
HCO3 ARTERIAL: 17.5 MMOL/L (ref 22–26)
HCO3 ARTERIAL: 18 MMOL/L (ref 22–26)
HCO3 ARTERIAL: 18.4 MMOL/L (ref 22–26)
HCO3 ARTERIAL: 19.1 MMOL/L (ref 22–26)
HCT VFR BLD AUTO: 36.2 % (ref 42–52)
HEMOGLOBIN, ART, EXTENDED: 12.1 G/DL (ref 14–18)
HEMOGLOBIN, ART, EXTENDED: 12.2 G/DL (ref 14–18)
HEMOGLOBIN, ART, EXTENDED: 12.6 G/DL (ref 14–18)
HEMOGLOBIN, ART, EXTENDED: 12.8 G/DL (ref 14–18)
HGB BLD-MCNC: 11 G/DL (ref 14–18)
IMM GRANULOCYTES # BLD: 0 K/UL
LYMPHOCYTES # BLD: 0.9 K/UL (ref 1.1–4.5)
LYMPHOCYTES NFR BLD: 15 % (ref 20–40)
MCH RBC QN AUTO: 29.4 PG (ref 27–31)
MCHC RBC AUTO-ENTMCNC: 30.4 G/DL (ref 33–37)
MCV RBC AUTO: 96.8 FL (ref 80–94)
METHEMOGLOBIN ARTERIAL: 1 %
METHEMOGLOBIN ARTERIAL: 1.1 %
MODE: ABNORMAL
MONOCYTES # BLD: 0.5 K/UL (ref 0–0.9)
MONOCYTES NFR BLD: 8.6 % (ref 0–10)
NEUTROPHILS # BLD: 4.4 K/UL (ref 1.5–7.5)
NEUTS SEG NFR BLD: 69.7 % (ref 50–65)
O2 CONTENT ARTERIAL: 14.9 ML/DL
O2 CONTENT ARTERIAL: 15.2 ML/DL
O2 CONTENT ARTERIAL: 15.7 ML/DL
O2 CONTENT ARTERIAL: 16 ML/DL
O2 DELIVERY DEVICE: ABNORMAL
O2 SAT, ARTERIAL: 87.1 %
O2 SAT, ARTERIAL: 88.5 %
O2 SAT, ARTERIAL: 89.1 %
O2 SAT, ARTERIAL: 89.2 %
O2 THERAPY: ABNORMAL
OXYGEN FLOW: 0
OXYGEN FLOW: 2
PCO2 ARTERIAL: 44 MMHG (ref 35–45)
PCO2 ARTERIAL: 45 MMHG (ref 35–45)
PCO2 ARTERIAL: 47 MMHG (ref 35–45)
PCO2 ARTERIAL: 56 MMHG (ref 35–45)
PERFORMED ON: ABNORMAL
PERFORMED ON: ABNORMAL
PERFORMED ON: NORMAL
PH ARTERIAL: 7.14 (ref 7.35–7.45)
PH ARTERIAL: 7.18 (ref 7.35–7.45)
PH ARTERIAL: 7.21 (ref 7.35–7.45)
PH ARTERIAL: 7.23 (ref 7.35–7.45)
PLATELET # BLD AUTO: 153 K/UL (ref 130–400)
PMV BLD AUTO: 12.6 FL (ref 9.4–12.4)
PO2 ARTERIAL: 52 MMHG (ref 80–100)
PO2 ARTERIAL: 54 MMHG (ref 80–100)
PO2 ARTERIAL: 55 MMHG (ref 80–100)
PO2 ARTERIAL: 55 MMHG (ref 80–100)
POTASSIUM BLD-SCNC: 3.8 MMOL/L
POTASSIUM BLD-SCNC: 4.1 MMOL/L
POTASSIUM BLD-SCNC: 4.2 MMOL/L
POTASSIUM BLD-SCNC: 4.3 MMOL/L
POTASSIUM SERPL-SCNC: 4.1 MMOL/L (ref 3.5–5)
RBC # BLD AUTO: 3.74 M/UL (ref 4.7–6.1)
SAMPLE SOURCE: ABNORMAL
SODIUM SERPL-SCNC: 144 MMOL/L (ref 136–145)
WBC # BLD AUTO: 6.3 K/UL (ref 4.8–10.8)

## 2025-06-28 PROCEDURE — 6360000002 HC RX W HCPCS

## 2025-06-28 PROCEDURE — 6360000002 HC RX W HCPCS: Performed by: INTERNAL MEDICINE

## 2025-06-28 PROCEDURE — 6370000000 HC RX 637 (ALT 250 FOR IP)

## 2025-06-28 PROCEDURE — 85025 COMPLETE CBC W/AUTO DIFF WBC: CPT

## 2025-06-28 PROCEDURE — 36600 WITHDRAWAL OF ARTERIAL BLOOD: CPT

## 2025-06-28 PROCEDURE — 2700000000 HC OXYGEN THERAPY PER DAY

## 2025-06-28 PROCEDURE — 2500000003 HC RX 250 WO HCPCS: Performed by: HOSPITALIST

## 2025-06-28 PROCEDURE — 36415 COLL VENOUS BLD VENIPUNCTURE: CPT

## 2025-06-28 PROCEDURE — 6370000000 HC RX 637 (ALT 250 FOR IP): Performed by: NURSE PRACTITIONER

## 2025-06-28 PROCEDURE — 94640 AIRWAY INHALATION TREATMENT: CPT

## 2025-06-28 PROCEDURE — 99231 SBSQ HOSP IP/OBS SF/LOW 25: CPT | Performed by: SURGERY

## 2025-06-28 PROCEDURE — 82962 GLUCOSE BLOOD TEST: CPT

## 2025-06-28 PROCEDURE — 6360000002 HC RX W HCPCS: Performed by: NURSE PRACTITIONER

## 2025-06-28 PROCEDURE — 80048 BASIC METABOLIC PNL TOTAL CA: CPT

## 2025-06-28 PROCEDURE — 94660 CPAP INITIATION&MGMT: CPT

## 2025-06-28 PROCEDURE — 94761 N-INVAS EAR/PLS OXIMETRY MLT: CPT

## 2025-06-28 PROCEDURE — 6370000000 HC RX 637 (ALT 250 FOR IP): Performed by: INTERNAL MEDICINE

## 2025-06-28 PROCEDURE — 82803 BLOOD GASES ANY COMBINATION: CPT

## 2025-06-28 PROCEDURE — 2580000003 HC RX 258

## 2025-06-28 PROCEDURE — 73620 X-RAY EXAM OF FOOT: CPT

## 2025-06-28 PROCEDURE — 2500000003 HC RX 250 WO HCPCS

## 2025-06-28 PROCEDURE — 1200000000 HC SEMI PRIVATE

## 2025-06-28 RX ORDER — INDOMETHACIN 25 MG/1
50 CAPSULE ORAL ONCE
Status: COMPLETED | OUTPATIENT
Start: 2025-06-28 | End: 2025-06-28

## 2025-06-28 RX ADMIN — ALLOPURINOL 300 MG: 300 TABLET ORAL at 09:05

## 2025-06-28 RX ADMIN — DEXTROSE MONOHYDRATE 125 ML: 10 INJECTION, SOLUTION INTRAVENOUS at 07:46

## 2025-06-28 RX ADMIN — MICONAZOLE NITRATE: 2 POWDER TOPICAL at 09:01

## 2025-06-28 RX ADMIN — IPRATROPIUM BROMIDE 0.5 MG: 0.5 SOLUTION RESPIRATORY (INHALATION) at 19:49

## 2025-06-28 RX ADMIN — SODIUM BICARBONATE 1300 MG: 650 TABLET ORAL at 18:03

## 2025-06-28 RX ADMIN — IPRATROPIUM BROMIDE 0.5 MG: 0.5 SOLUTION RESPIRATORY (INHALATION) at 11:10

## 2025-06-28 RX ADMIN — ENOXAPARIN SODIUM 60 MG: 100 INJECTION SUBCUTANEOUS at 08:59

## 2025-06-28 RX ADMIN — GUAIFENESIN 600 MG: 600 TABLET, EXTENDED RELEASE ORAL at 08:59

## 2025-06-28 RX ADMIN — METOPROLOL SUCCINATE 25 MG: 25 TABLET, FILM COATED, EXTENDED RELEASE ORAL at 21:35

## 2025-06-28 RX ADMIN — MONTELUKAST SODIUM 10 MG: 10 TABLET, FILM COATED ORAL at 21:35

## 2025-06-28 RX ADMIN — BUMETANIDE 2 MG: 0.25 INJECTION INTRAMUSCULAR; INTRAVENOUS at 09:00

## 2025-06-28 RX ADMIN — PANTOPRAZOLE SODIUM 40 MG: 40 TABLET, DELAYED RELEASE ORAL at 05:50

## 2025-06-28 RX ADMIN — PRAVASTATIN SODIUM 20 MG: 20 TABLET ORAL at 21:35

## 2025-06-28 RX ADMIN — SODIUM CHLORIDE, PRESERVATIVE FREE 10 ML: 5 INJECTION INTRAVENOUS at 21:30

## 2025-06-28 RX ADMIN — IPRATROPIUM BROMIDE 0.5 MG: 0.5 SOLUTION RESPIRATORY (INHALATION) at 14:43

## 2025-06-28 RX ADMIN — METOPROLOL SUCCINATE 25 MG: 25 TABLET, FILM COATED, EXTENDED RELEASE ORAL at 08:59

## 2025-06-28 RX ADMIN — SODIUM BICARBONATE 1300 MG: 650 TABLET ORAL at 08:59

## 2025-06-28 RX ADMIN — GUAIFENESIN 600 MG: 600 TABLET, EXTENDED RELEASE ORAL at 21:35

## 2025-06-28 RX ADMIN — CETIRIZINE HYDROCHLORIDE 10 MG: 10 TABLET ORAL at 08:59

## 2025-06-28 RX ADMIN — BUMETANIDE 2 MG: 0.25 INJECTION INTRAMUSCULAR; INTRAVENOUS at 18:04

## 2025-06-28 RX ADMIN — ACETAMINOPHEN 650 MG: 325 TABLET ORAL at 09:23

## 2025-06-28 RX ADMIN — IPRATROPIUM BROMIDE 0.5 MG: 0.5 SOLUTION RESPIRATORY (INHALATION) at 07:29

## 2025-06-28 RX ADMIN — ASPIRIN 81 MG: 81 TABLET, COATED ORAL at 08:59

## 2025-06-28 RX ADMIN — ENOXAPARIN SODIUM 60 MG: 100 INJECTION SUBCUTANEOUS at 21:30

## 2025-06-28 RX ADMIN — SODIUM BICARBONATE 1300 MG: 650 TABLET ORAL at 13:09

## 2025-06-28 RX ADMIN — SODIUM BICARBONATE 50 MEQ: 84 INJECTION, SOLUTION INTRAVENOUS at 06:32

## 2025-06-28 ASSESSMENT — PAIN DESCRIPTION - DESCRIPTORS: DESCRIPTORS: ACHING;DISCOMFORT

## 2025-06-28 ASSESSMENT — PAIN DESCRIPTION - LOCATION: LOCATION: MOUTH

## 2025-06-28 ASSESSMENT — PAIN SCALES - GENERAL: PAINLEVEL_OUTOF10: 0

## 2025-06-28 ASSESSMENT — PAIN SCALES - WONG BAKER: WONGBAKER_NUMERICALRESPONSE: NO HURT

## 2025-06-28 NOTE — PROGRESS NOTES
Dr. Howell notified of patient most recent ABG, new orders placed for patient to go back on Bipap and made NPO. Recheck ABGs and 2000.

## 2025-06-28 NOTE — PLAN OF CARE
Problem: Safety - Adult  Goal: Free from fall injury  6/27/2025 1925 by Darrion Dixon, RN  Outcome: Progressing  6/27/2025 1609 by Mary Polo, RN  Outcome: Progressing

## 2025-06-28 NOTE — PROGRESS NOTES
Nephrology ([facility]) Progress Note    Patient:  Daljit Elizondo  YOB: 1960  Date of Service: 6/28/2025  MRN: [id number]   Acct: [id number]   Primary Care Physician: Jessie Helms APRN - CNP  Advance Directive: Full Code  Admit Date: 5/14/2025       Hospital Day: 45  Referring Provider: Cody Howell MD    Patient independently seen and examined, Chart, Consults, Notes, Operative notes, Labs, Cardiology, and Radiology studies reviewed as available.    Chief complaint: Abnormal labs.    Subjective:  Daljit Elizondo is a 65 y.o. male for whom we were consulted for evaluation and treatment of acute kidney injury.  Patient denies any history of chronic kidney disease.  He has been in the hospital for the last month, poor historian and has never seen nephrology in the past.  Patient has severe abdominal obesity, hypertension, sleep apnea, type 2 diabetes, chronic diastolic CHF, sleep apnea.  Presented with increasing shortness of breath/dyspnea on exertion.  Hospital course remarkable for treatment with intravenous diuretics for the treatment of volume overload, CHF exacerbation.  His serum creatinine was 0.9 mg with estimated GFR more than 90 on admission.  Patient has significant drop in GFR and nephrology is consulted.    Patient was tired, didn't sleep well last night..  He has good urine output.       Allergies:  Penicillamine, Silvadene [silver sulfadiazine], Aerohist [chlorpheniramine-methscop er], Cephalosporins, Hemp seed oil, Neosporin [neomycin-polymyxin-gramicidin], Penicillins, and Zinc    Medicines:  Current Facility-Administered Medications   Medication Dose Route Frequency Provider Last Rate Last Admin    bumetanide (BUMEX) injection 2 mg  2 mg IntraVENous BID Cody Howell MD   2 mg at 06/28/25 0900    sodium bicarbonate tablet 1,300 mg  1,300 mg Oral TID Maciej Sun MD   1,300 mg at 06/28/25 0859    loperamide (IMODIUM) capsule 2 mg  2 mg

## 2025-06-28 NOTE — PROGRESS NOTES
06/19/2025              Assessment/plan:       Hospital Problems           Last Modified POA    * (Principal) Acute congestive heart failure, unspecified heart failure type (HCC) 5/14/2025 Yes    Diabetes (HCC) 5/14/2025 Yes    Essential hypertension 5/14/2025 Yes    GERD (gastroesophageal reflux disease) 5/14/2025 Yes    Obesity, morbid (more than 100 lbs over ideal weight or BMI > 40) (HCC) (Chronic) 5/18/2025 Yes    Hyperlipemia 5/14/2025 Yes        Principal Problem:    Acute congestive heart failure, unspecified heart failure type (HCC)  Active Problems:    Diabetes (HCC)    Essential hypertension    GERD (gastroesophageal reflux disease)    Obesity, morbid (more than 100 lbs over ideal weight or BMI > 40) (HCC)    Hyperlipemia  Resolved Problems:    * No resolved hospital problems. *          Brief History/Summary:   As per Initial admission HPI 5/14/2025, quoted below;  Mr Elizondo, a \"63 yo M with chronic respiratory failure on 3L NC, REGI and obesity hypoventilation syndrome on CPAP, COPD, chronic diastolic heart failure, BLE lymphedema, T2DM, morbid obesity who initially presented to [facility] ED with dyspnea and BLE edema.  Pt had stopped taking his home Bumex over the past week because he was tired of having to get up and go to the bathroom often.   Initial labs showed elevated BNP at 4,707.   CXR showed atelectasis with small left pleural effusion.     Pt admitted to the hospitalist service for further management. He did not have good UOP with bolus dosing of IV Bumex and was started on Bumex gtt. He has had net negative 13.5 L output.  He has since been transitioned back to PO Bumex 1 mg BID.     Case management was consulted due to pt difficulty caring for himself at home. PT/OT ordered. Pt has not been consistently working with therapy throughout his hospitalization.  SNF referrals were sent, but pt was declined from multiple facilities due to concerns with the level of care he requires and his inconsistency  when working with physical therapy.  Pt appears to be back to his baseline mobility as he typically only stands to transfer from bed/chair to his motorized scooter at home. There have been times here where he has walked up to 8 steps. Even if a facility were to accept him, he wouldn't get insurance approval at this point due to being at his baseline functional status.     Attempted to discharge pt home with home health, but he has declined to leave the hospital. He has appealed his discharge thrice, as the first 2 have been denied.   At this point, he is responsible for the cost of continued hospitalization which is $2,315.00 daily.   Case management discussed the situation with risk management, see note from 6/9/25.      Pt had a discharge order in place from 6/3/25 to 6/9/25.   On 6/9, RN noted pt's urine appears brown.  Labs showed STEFANIE with Cr up to 3.0 (previously 1.2).   Started on IVF.   Workup ordered. Discharge cancelled. \"    Left necrotic foot wound  Wound care on board  Vascular surgery consult    Metabolic Acidosis  STEFANIE   Creatinine trended up: 1.2 (06/07/2025) --> 3.0 --> 3.0 --> 3.0 --> 3.3 --> 3.4 --> 3.4 --> 3.2  --> 3.3 --> 3.2 --> 3.4 --> 3.3 --> 3.3 --> 3.1 --> 3.3 --> 3.2 --> 3.1 --> 3.0 --> 2.9 (06/27/2025)   ABG; 7.12/47/74/15.3 (06/25/2025)  ABG; 7.20/40/81/15.6 (06/26/2025)  ABG; 7.16/46/60/16.4 (06/27/2025)  Bladder scan Qshift   CK normal  Gentle IV hydration as needed  Renal Ultrasound Complete (06/10/2025): Mild parenchymal thinning of the left kidney. Kidneys otherwise normal. The urinary bladder was not visualized.  Avoid hypotension & Nephrotoxins   Nephrology on board-appreciate recommendation  Further workup and management as per nephrology     Acute on chronic diastolic heart failure  13.5 L UOP while on Bumex gtt, - now discontinued  Continue Na diet  Monitor  Is/Os, daily weights  Chest x-ray -06/27/2025 \"Continued low lung volumes, cardiomegaly, central pulmonary vascular

## 2025-06-28 NOTE — PROGRESS NOTES
Patient seen at the bedside.  Left foot seen.  Cracks at the base of the toes.  Slightly larger second toe.  Plan to get left foot x-ray.

## 2025-06-28 NOTE — PROGRESS NOTES
Upon rounding on the patient, patient was found in bed with his bipap on with watermelon and pistachios in the bed. Patient instructed this nurse he was eating and just placing his bipap off and on while eating. Patient was educated by this RN that he was NPO and could not be eating while requiring the bipap. Patient became very frustrated and said \"fine I won't eat a damn thing.\" Food removed from patients reach and notified patient once his ABGs improved, he would be able to have food.

## 2025-06-29 LAB
ANION GAP SERPL CALCULATED.3IONS-SCNC: 12 MMOL/L (ref 8–16)
BASOPHILS # BLD: 0.1 K/UL (ref 0–0.2)
BASOPHILS NFR BLD: 0.8 % (ref 0–1)
BUN SERPL-MCNC: 34 MG/DL (ref 8–23)
CALCIUM SERPL-MCNC: 8.9 MG/DL (ref 8.8–10.2)
CHLORIDE SERPL-SCNC: 115 MMOL/L (ref 98–107)
CO2 SERPL-SCNC: 18 MMOL/L (ref 22–29)
CREAT SERPL-MCNC: 3.4 MG/DL (ref 0.7–1.2)
CRP SERPL-MCNC: 38.8 MG/L (ref 0–5)
ECHO BSA: 3.55 M2
EOSINOPHIL # BLD: 0.3 K/UL (ref 0–0.6)
EOSINOPHIL NFR BLD: 5.2 % (ref 0–5)
ERYTHROCYTE [DISTWIDTH] IN BLOOD BY AUTOMATED COUNT: 16.3 % (ref 11.5–14.5)
ERYTHROCYTE [SEDIMENTATION RATE] IN BLOOD BY WESTERGREN METHOD: 28 MM/HR (ref 0–15)
GLUCOSE BLD-MCNC: 102 MG/DL (ref 70–99)
GLUCOSE BLD-MCNC: 88 MG/DL (ref 70–99)
GLUCOSE BLD-MCNC: 92 MG/DL (ref 70–99)
GLUCOSE BLD-MCNC: 99 MG/DL (ref 70–99)
GLUCOSE SERPL-MCNC: 101 MG/DL (ref 70–99)
HCT VFR BLD AUTO: 39.8 % (ref 42–52)
HGB BLD-MCNC: 11.8 G/DL (ref 14–18)
IMM GRANULOCYTES # BLD: 0 K/UL
LACTATE BLDV-SCNC: 0.8 MMOL/L (ref 0.5–1.9)
LYMPHOCYTES # BLD: 0.9 K/UL (ref 1.1–4.5)
LYMPHOCYTES NFR BLD: 14 % (ref 20–40)
MCH RBC QN AUTO: 29.1 PG (ref 27–31)
MCHC RBC AUTO-ENTMCNC: 29.6 G/DL (ref 33–37)
MCV RBC AUTO: 98 FL (ref 80–94)
MONOCYTES # BLD: 0.4 K/UL (ref 0–0.9)
MONOCYTES NFR BLD: 7.2 % (ref 0–10)
NEUTROPHILS # BLD: 4.4 K/UL (ref 1.5–7.5)
NEUTS SEG NFR BLD: 72.1 % (ref 50–65)
PERFORMED ON: ABNORMAL
PERFORMED ON: NORMAL
PLATELET # BLD AUTO: 170 K/UL (ref 130–400)
PMV BLD AUTO: 12.4 FL (ref 9.4–12.4)
POTASSIUM SERPL-SCNC: 4.4 MMOL/L (ref 3.5–5)
PROCALCITONIN: 0.13 NG/ML (ref 0–0.09)
RBC # BLD AUTO: 4.06 M/UL (ref 4.7–6.1)
SODIUM SERPL-SCNC: 145 MMOL/L (ref 136–145)
VAS LEFT ABI: 1.62
VAS LEFT ARM BP: 136 MMHG
VAS LEFT DORSALIS PEDIS BP: 190 MMHG
VAS LEFT PTA BP: 220 MMHG
VAS LEFT TBI: 1.88
VAS LEFT TOE PRESSURE: 255 MMHG
VAS RIGHT DORSALIS PEDIS BP: 255 MMHG
VAS RIGHT PTA BP: 255 MMHG
VAS RIGHT TBI: 1.56
VAS RIGHT TOE PRESSURE: 212 MMHG
WBC # BLD AUTO: 6.1 K/UL (ref 4.8–10.8)

## 2025-06-29 PROCEDURE — 6370000000 HC RX 637 (ALT 250 FOR IP): Performed by: INTERNAL MEDICINE

## 2025-06-29 PROCEDURE — 2500000003 HC RX 250 WO HCPCS

## 2025-06-29 PROCEDURE — 82962 GLUCOSE BLOOD TEST: CPT

## 2025-06-29 PROCEDURE — 85025 COMPLETE CBC W/AUTO DIFF WBC: CPT

## 2025-06-29 PROCEDURE — 6360000002 HC RX W HCPCS: Performed by: NURSE PRACTITIONER

## 2025-06-29 PROCEDURE — 6360000002 HC RX W HCPCS

## 2025-06-29 PROCEDURE — 83605 ASSAY OF LACTIC ACID: CPT

## 2025-06-29 PROCEDURE — 6370000000 HC RX 637 (ALT 250 FOR IP): Performed by: NURSE PRACTITIONER

## 2025-06-29 PROCEDURE — 36415 COLL VENOUS BLD VENIPUNCTURE: CPT

## 2025-06-29 PROCEDURE — 85652 RBC SED RATE AUTOMATED: CPT

## 2025-06-29 PROCEDURE — 84145 PROCALCITONIN (PCT): CPT

## 2025-06-29 PROCEDURE — 2700000000 HC OXYGEN THERAPY PER DAY

## 2025-06-29 PROCEDURE — 6370000000 HC RX 637 (ALT 250 FOR IP)

## 2025-06-29 PROCEDURE — 94660 CPAP INITIATION&MGMT: CPT

## 2025-06-29 PROCEDURE — 93922 UPR/L XTREMITY ART 2 LEVELS: CPT | Performed by: SURGERY

## 2025-06-29 PROCEDURE — 80048 BASIC METABOLIC PNL TOTAL CA: CPT

## 2025-06-29 PROCEDURE — 6360000002 HC RX W HCPCS: Performed by: INTERNAL MEDICINE

## 2025-06-29 PROCEDURE — 86140 C-REACTIVE PROTEIN: CPT

## 2025-06-29 PROCEDURE — 94640 AIRWAY INHALATION TREATMENT: CPT

## 2025-06-29 PROCEDURE — 94761 N-INVAS EAR/PLS OXIMETRY MLT: CPT

## 2025-06-29 PROCEDURE — 1200000000 HC SEMI PRIVATE

## 2025-06-29 RX ADMIN — PRAVASTATIN SODIUM 20 MG: 20 TABLET ORAL at 20:36

## 2025-06-29 RX ADMIN — MICONAZOLE NITRATE: 2 POWDER TOPICAL at 20:37

## 2025-06-29 RX ADMIN — BUMETANIDE 2 MG: 0.25 INJECTION INTRAMUSCULAR; INTRAVENOUS at 18:41

## 2025-06-29 RX ADMIN — MICONAZOLE NITRATE: 2 POWDER TOPICAL at 07:51

## 2025-06-29 RX ADMIN — ENOXAPARIN SODIUM 60 MG: 100 INJECTION SUBCUTANEOUS at 07:48

## 2025-06-29 RX ADMIN — CETIRIZINE HYDROCHLORIDE 10 MG: 10 TABLET ORAL at 07:49

## 2025-06-29 RX ADMIN — SODIUM BICARBONATE 1300 MG: 650 TABLET ORAL at 07:49

## 2025-06-29 RX ADMIN — MONTELUKAST SODIUM 10 MG: 10 TABLET, FILM COATED ORAL at 20:36

## 2025-06-29 RX ADMIN — IPRATROPIUM BROMIDE 0.5 MG: 0.5 SOLUTION RESPIRATORY (INHALATION) at 14:38

## 2025-06-29 RX ADMIN — ACETAMINOPHEN 650 MG: 325 TABLET ORAL at 14:47

## 2025-06-29 RX ADMIN — BUMETANIDE 2 MG: 0.25 INJECTION INTRAMUSCULAR; INTRAVENOUS at 07:49

## 2025-06-29 RX ADMIN — PANTOPRAZOLE SODIUM 40 MG: 40 TABLET, DELAYED RELEASE ORAL at 07:49

## 2025-06-29 RX ADMIN — ALLOPURINOL 300 MG: 300 TABLET ORAL at 07:51

## 2025-06-29 RX ADMIN — SODIUM CHLORIDE, PRESERVATIVE FREE 10 ML: 5 INJECTION INTRAVENOUS at 20:39

## 2025-06-29 RX ADMIN — ENOXAPARIN SODIUM 60 MG: 100 INJECTION SUBCUTANEOUS at 20:36

## 2025-06-29 RX ADMIN — GUAIFENESIN 600 MG: 600 TABLET, EXTENDED RELEASE ORAL at 20:36

## 2025-06-29 RX ADMIN — SODIUM CHLORIDE, PRESERVATIVE FREE 10 ML: 5 INJECTION INTRAVENOUS at 07:49

## 2025-06-29 RX ADMIN — METOPROLOL SUCCINATE 25 MG: 25 TABLET, FILM COATED, EXTENDED RELEASE ORAL at 07:49

## 2025-06-29 RX ADMIN — SODIUM BICARBONATE 1300 MG: 650 TABLET ORAL at 18:42

## 2025-06-29 RX ADMIN — GUAIFENESIN 600 MG: 600 TABLET, EXTENDED RELEASE ORAL at 07:49

## 2025-06-29 RX ADMIN — IPRATROPIUM BROMIDE 0.5 MG: 0.5 SOLUTION RESPIRATORY (INHALATION) at 07:40

## 2025-06-29 RX ADMIN — ASPIRIN 81 MG: 81 TABLET, COATED ORAL at 07:49

## 2025-06-29 RX ADMIN — SODIUM BICARBONATE 1300 MG: 650 TABLET ORAL at 14:48

## 2025-06-29 RX ADMIN — IPRATROPIUM BROMIDE 0.5 MG: 0.5 SOLUTION RESPIRATORY (INHALATION) at 11:09

## 2025-06-29 RX ADMIN — METOPROLOL SUCCINATE 25 MG: 25 TABLET, FILM COATED, EXTENDED RELEASE ORAL at 20:36

## 2025-06-29 ASSESSMENT — PAIN SCALES - GENERAL: PAINLEVEL_OUTOF10: 5

## 2025-06-29 ASSESSMENT — PAIN DESCRIPTION - DESCRIPTORS: DESCRIPTORS: ACHING;DISCOMFORT

## 2025-06-29 ASSESSMENT — PAIN DESCRIPTION - ORIENTATION: ORIENTATION: RIGHT;LEFT;MID;LOWER

## 2025-06-29 ASSESSMENT — PAIN DESCRIPTION - LOCATION: LOCATION: LEG;BACK

## 2025-06-29 NOTE — PROGRESS NOTES
DIS Nurse Note  SUBJECTIVE: Patient assessed secondary to elevated Deterioration Index Score.      Deterioration Index Score:  Predictive Model Details          55 (Warning)  Factor Value    Calculated 6/28/2025 20:03 20% Supplemental oxygen Oxygen    Deterioration Index Model 20% Age 65 years old     17% Neurological exam X     11% Blood pH abnormal (7.230)     7% Cardiac rhythm Sinus tachy     7% Sodium 144 mmol/L     5% Pulse 104     4% Respiratory rate 18     3% Pulse oximetry 92 %     3% Hematocrit abnormal (36.2 %)     2% BUN abnormal (35 mg/dL)     2% Systolic 126     0% Temperature 97.4 °F (36.3 °C)     0% WBC count 6.3 K/uL        Vital Signs:  Vitals:    06/27/25 2015 06/28/25 0332 06/28/25 0737 06/28/25 1641   BP: 114/78 103/76 119/62 126/83   Pulse: (!) 115 (!) 114 97 (!) 104   Resp: 16 18 16 18   Temp: 96.8 °F (36 °C) 97 °F (36.1 °C) 97 °F (36.1 °C) 97.4 °F (36.3 °C)   TempSrc: Temporal Temporal Temporal Temporal   SpO2: 94% 90% (!) 88% 92%   Weight:       Height:            Provider Notified: Not Indicated    ASSESSMENT:  Stable, has been known to be acidotic since 6/25 and his latest pH is actually his best. NAD, impaired renal function which has been stable for 3 weeks now. Adequate urine output. A&O, wearing PAP    Plan of Care: continue    Electronically signed by Brigdio Garcia RN

## 2025-06-29 NOTE — PROGRESS NOTES
Nephrology ([facility]) Progress Note    Patient:  Daljit Elizondo  YOB: 1960  Date of Service: 6/29/2025  MRN: [id number]   Acct: [id number]   Primary Care Physician: Jessie Helms APRN - CNP  Advance Directive: Full Code  Admit Date: 5/14/2025       Hospital Day: 46  Referring Provider: Cody Howell MD    Patient independently seen and examined, Chart, Consults, Notes, Operative notes, Labs, Cardiology, and Radiology studies reviewed as available.    Chief complaint: Abnormal labs.    Subjective:  Daljit Elizondo is a 65 y.o. male for whom we were consulted for evaluation and treatment of acute kidney injury.  Patient denies any history of chronic kidney disease.  He has been in the hospital for the last month, poor historian and has never seen nephrology in the past.  Patient has severe abdominal obesity, hypertension, sleep apnea, type 2 diabetes, chronic diastolic CHF, sleep apnea.  Presented with increasing shortness of breath/dyspnea on exertion.  Hospital course remarkable for treatment with intravenous diuretics for the treatment of volume overload, CHF exacerbation.  His serum creatinine was 0.9 mg with estimated GFR more than 90 on admission.  Patient has significant drop in GFR and nephrology is consulted.    Patient offers no new complaints today.  He has good urine output.       Allergies:  Penicillamine, Silvadene [silver sulfadiazine], Aerohist [chlorpheniramine-methscop er], Cephalosporins, Hemp seed oil, Neosporin [neomycin-polymyxin-gramicidin], Penicillins, and Zinc    Medicines:  Current Facility-Administered Medications   Medication Dose Route Frequency Provider Last Rate Last Admin    bumetanide (BUMEX) injection 2 mg  2 mg IntraVENous BID Cody Howell MD   2 mg at 06/29/25 0749    sodium bicarbonate tablet 1,300 mg  1,300 mg Oral TID Maciej Sun MD   1,300 mg at 06/29/25 0749    loperamide (IMODIUM) capsule 2 mg  2 mg Oral 4x

## 2025-06-29 NOTE — PROGRESS NOTES
Patient upset tonight over his state of health. He is upset that he \"isn't getting any better, I'm getting worse\". He says he has constantly been lied to this whole time, no one is telling him a discharge plan, and he is being told other various lies that have to do with the aspect of his care.    Relayed to him that as far as I knew, [facility] was willing to accept him, but they were waiting on a lift to be delivered to their facility(Case management notes from 6/17 & 6/25). He says I am the first person to ever tell him that.    He upset over the way ABGs have been obtained, and everyone is roughly jabbing his wrist.    Per pt., he hasn't had PT since \"last Thursday or Friday\", \"they just quit showing up, they said it was a waste of the hospital's time\".    Upset that Doctors and nurses have promised that they would get his room warmer, but nothing has been done about it. Facilities called at this time to see if they can remedy this, and they will be up shortly.    Patient also says multiple times that he is \"ready to just give up and quit trying\". Adamantly denies SI and displays no concerns for SI, just seems to be in psychological distress regarding the last 45 days of his life. He could possibly benefit from a consult with palliative care.      Electronically signed by Brigido Garcia RN on 6/28/2025 at 9:54 PM

## 2025-06-29 NOTE — PLAN OF CARE
Problem: Chronic Conditions and Co-morbidities  Goal: Patient's chronic conditions and co-morbidity symptoms are monitored and maintained or improved  6/28/2025 2342 by Brigido Garcia RN  Outcome: Progressing  6/28/2025 1515 by Maci Marquez RN  Outcome: Progressing     Problem: Discharge Planning  Goal: Discharge to home or other facility with appropriate resources  6/28/2025 2342 by Brigido Garcia RN  Outcome: Progressing  6/28/2025 1515 by Maci Marquez RN  Outcome: Progressing     Problem: Pain  Goal: Verbalizes/displays adequate comfort level or baseline comfort level  6/28/2025 2342 by Brigido Garcia RN  Outcome: Progressing  6/28/2025 1515 by Maci Marquez RN  Outcome: Progressing     Problem: Safety - Adult  Goal: Free from fall injury  6/28/2025 2342 by Brigido Garcia RN  Outcome: Progressing  6/28/2025 1515 by Maci Marquez RN  Outcome: Progressing     Problem: ABCDS Injury Assessment  Goal: Absence of physical injury  6/28/2025 2342 by Brigido Garcia RN  Outcome: Progressing  6/28/2025 1515 by Maci Marquez RN  Outcome: Progressing     Problem: Skin/Tissue Integrity  Goal: Skin integrity remains intact  Description: 1.  Monitor for areas of redness and/or skin breakdown2.  Assess vascular access sites hourly3.  Every 4-6 hours minimum:  Change oxygen saturation probe site4.  Every 4-6 hours:  If on nasal continuous positive airway pressure, respiratory therapy assess nares and determine need for appliance change or resting period  6/28/2025 2342 by Brigido Garcia RN  Outcome: Progressing  6/28/2025 1515 by Maci Marquez RN  Outcome: Progressing     Problem: Nutrition Deficit:  Goal: Optimize nutritional status  6/28/2025 2342 by Brigido Garcia RN  Outcome: Progressing  6/28/2025 1515 by Maci Marquez RN  Outcome: Progressing     Problem: Neurosensory - Adult  Goal: Achieves stable or improved neurological status  6/28/2025 2342 by Brigido Garcia RN  Outcome:

## 2025-06-29 NOTE — PLAN OF CARE
Problem: Chronic Conditions and Co-morbidities  Goal: Patient's chronic conditions and co-morbidity symptoms are monitored and maintained or improved  6/28/2025 2343 by Brigido Garcia RN  Outcome: Progressing  6/28/2025 2342 by Brigido Garcia RN  Outcome: Progressing  6/28/2025 1515 by Maci Marquez RN  Outcome: Progressing     Problem: Discharge Planning  Goal: Discharge to home or other facility with appropriate resources  6/28/2025 2343 by Brigido Garcia RN  Outcome: Progressing  6/28/2025 2342 by Brigido Garcia RN  Outcome: Progressing  6/28/2025 1515 by Maci Marquez RN  Outcome: Progressing     Problem: Pain  Goal: Verbalizes/displays adequate comfort level or baseline comfort level  6/28/2025 2343 by Brigido Garcia RN  Outcome: Progressing  6/28/2025 2342 by Brigido Garcia RN  Outcome: Progressing  6/28/2025 1515 by Maci Marquez RN  Outcome: Progressing     Problem: Safety - Adult  Goal: Free from fall injury  6/28/2025 2343 by Brigido Garcia RN  Outcome: Progressing  6/28/2025 2342 by Brigido Garcia RN  Outcome: Progressing  6/28/2025 1515 by Maci Marquez RN  Outcome: Progressing     Problem: ABCDS Injury Assessment  Goal: Absence of physical injury  6/28/2025 2343 by Brigido Garcia RN  Outcome: Progressing  6/28/2025 2342 by Brigido Garcia RN  Outcome: Progressing  6/28/2025 1515 by Maci Marquez RN  Outcome: Progressing     Problem: Skin/Tissue Integrity  Goal: Skin integrity remains intact  Description: 1.  Monitor for areas of redness and/or skin breakdown2.  Assess vascular access sites hourly3.  Every 4-6 hours minimum:  Change oxygen saturation probe site4.  Every 4-6 hours:  If on nasal continuous positive airway pressure, respiratory therapy assess nares and determine need for appliance change or resting period  6/28/2025 2343 by Brigido Garcia RN  Outcome: Progressing  6/28/2025 2342 by rBigido Garcia RN  Outcome: Progressing  6/28/2025 1515 by Maci Marquez RN  Outcome:

## 2025-06-29 NOTE — PROGRESS NOTES
mg Oral BID Juan Nicolas APRN - CNP   25 mg at 06/29/25 0749    ipratropium (ATROVENT) 0.02 % nebulizer solution 0.5 mg  0.5 mg Nebulization 4x Daily RT Ashleigh Jamil APRN   0.5 mg at 06/29/25 0740    [Held by provider] senna (SENOKOT) tablet 17.2 mg  2 tablet Oral BID Ashleigh Jamil APRN   17.2 mg at 06/10/25 0911    montelukast (SINGULAIR) tablet 10 mg  10 mg Oral Nightly Ashleigh Jamil APRN   10 mg at 06/28/25 2135    sodium chloride (OCEAN, BABY AYR) 0.65 % nasal spray 1 spray  1 spray Each Nostril Q2H PRN Ashleigh Jamil APRN   1 spray at 05/19/25 1118    tetrahydrozoline 0.05 % ophthalmic solution 1 drop  1 drop Both Eyes TID PRN Ashleigh Jamil APRN        lubrifresh P.M. (artificial tears) ophthalmic ointment   Both Eyes PRN Ruben Partida APRN - CNP   Given at 05/17/25 1222    cetirizine (ZYRTEC) tablet 10 mg  10 mg Oral Daily Ruben Partida APRN - CNP   10 mg at 06/29/25 0749    guaiFENesin (MUCINEX) extended release tablet 600 mg  600 mg Oral BID Ruben Partida APRN - CNP   600 mg at 06/29/25 0749    miconazole (MICOTIN) 2 % powder   Topical BID Lambert Ramirez MD   Given at 06/29/25 0751    albuterol (PROVENTIL) (2.5 MG/3ML) 0.083% nebulizer solution 2.5 mg  2.5 mg Nebulization Q6H PRN Renay Thorne APRN - CNP        allopurinol (ZYLOPRIM) tablet 300 mg  300 mg Oral Daily Renay Thorne APRN - CNP   300 mg at 06/29/25 0751    aspirin EC tablet 81 mg  81 mg Oral Daily Renay Thorne APRN - CNP   81 mg at 06/29/25 0749    [Held by provider] losartan (COZAAR) tablet 50 mg  50 mg Oral Daily Renay Thorne APRN - CNP   50 mg at 05/17/25 0951    pantoprazole (PROTONIX) tablet 40 mg  40 mg Oral QAM AC Renay Thorne APRN - CNP   40 mg at 06/29/25 0749    pravastatin (PRAVACHOL) tablet 20 mg  20 mg Oral Nightly Renay Thorne APRN - CNP   20 mg at 06/28/25 2138    sodium chloride flush 0.9 % injection 5-40 mL  5-40 mL IntraVENous 2 times per day Renay Thorne  Recent:   No results for input(s): \"BC\" in the last 720 hours.      No results for input(s): \"BC\", \"BLOODCULT2\", \"ORG\" in the last 72 hours.          Cultures:   No results for input(s): \"CULTURE\" in the last 72 hours.  No results for input(s): \"BC\", \"BLOODCULT2\" in the last 72 hours.  No results for input(s): \"CXSURG\" in the last 72 hours.      No results for input(s): \"MG\", \"PHOS\" in the last 72 hours.  No results for input(s): \"AST\", \"ALT\", \"BILITOT\", \"ALKPHOS\" in the last 72 hours.    Invalid input(s): \"ALB\"        RAD:   Vascular ankle brachial index (SHEILA) PROCEDURE DONE IN [facility]  Addendum Date: 6/29/2025    Right side findings: This is within the normal range. Resting TBI is 1.56.   Left side findings: Resting SHEILA is 1.62. This is within the normal range. Resting TBI is 1.88.     Result Date: 6/29/2025    Right side findings: This is within the normal range. Resting TBI is 1.56.   Left side findings: Resting SHEILA is 1.62. This is within the normal range. Resting TBI is 1.88.     XR FOOT LEFT (2 VIEWS)  Result Date: 6/28/2025  EXAM: 2 VIEWS OF THE LEFT FOOT.  HISTORY: left foot wound  COMPARISON: None.  FINDINGS: There is no acute fracture or dislocation. No definite osseous erosions are seen. Joint spaces and alignment are maintained.  Diffuse soft swelling of the foot is seen.       No acute osseous abnormality.  Soft tissue swelling of the foot.   ______________________________________ Electronically signed by: MOIRA ROSE M.D. Date:     06/28/2025 Time:    15:16     XR CHEST PORTABLE  Result Date: 6/27/2025  EXAM: CHEST RADIOGRAPH  TECHNIQUE: Single frontal chest radiograph.  HISTORY: Shortness of air  COMPARISON: 05/19/2025  FINDINGS:  Patient is rotated to the left. Continued low lung volumes cardiomegaly and central pulmonary vascular congestion with diffuse increased interstitial markings and alveolar opacities. Limited diagnostic sensitivity due to rotation, body habitus sand technique. Continued

## 2025-06-30 PROBLEM — Z51.5 PALLIATIVE CARE PATIENT: Status: ACTIVE | Noted: 2025-06-30

## 2025-06-30 LAB
ANION GAP SERPL CALCULATED.3IONS-SCNC: 12 MMOL/L (ref 8–16)
BASOPHILS # BLD: 0.1 K/UL (ref 0–0.2)
BASOPHILS NFR BLD: 0.9 % (ref 0–1)
BUN SERPL-MCNC: 37 MG/DL (ref 8–23)
CALCIUM SERPL-MCNC: 9 MG/DL (ref 8.8–10.2)
CHLORIDE SERPL-SCNC: 113 MMOL/L (ref 98–107)
CO2 SERPL-SCNC: 20 MMOL/L (ref 22–29)
CREAT SERPL-MCNC: 3.6 MG/DL (ref 0.7–1.2)
EOSINOPHIL # BLD: 0.3 K/UL (ref 0–0.6)
EOSINOPHIL NFR BLD: 5.6 % (ref 0–5)
ERYTHROCYTE [DISTWIDTH] IN BLOOD BY AUTOMATED COUNT: 16.2 % (ref 11.5–14.5)
GLUCOSE BLD-MCNC: 136 MG/DL (ref 70–99)
GLUCOSE BLD-MCNC: 144 MG/DL (ref 70–99)
GLUCOSE BLD-MCNC: 94 MG/DL (ref 70–99)
GLUCOSE BLD-MCNC: 96 MG/DL (ref 70–99)
GLUCOSE SERPL-MCNC: 114 MG/DL (ref 70–99)
HCT VFR BLD AUTO: 38 % (ref 42–52)
HGB BLD-MCNC: 11.5 G/DL (ref 14–18)
IMM GRANULOCYTES # BLD: 0 K/UL
LYMPHOCYTES # BLD: 0.7 K/UL (ref 1.1–4.5)
LYMPHOCYTES NFR BLD: 12 % (ref 20–40)
MCH RBC QN AUTO: 29.4 PG (ref 27–31)
MCHC RBC AUTO-ENTMCNC: 30.3 G/DL (ref 33–37)
MCV RBC AUTO: 97.2 FL (ref 80–94)
MONOCYTES # BLD: 0.4 K/UL (ref 0–0.9)
MONOCYTES NFR BLD: 7 % (ref 0–10)
NEUTROPHILS # BLD: 4.1 K/UL (ref 1.5–7.5)
NEUTS SEG NFR BLD: 74 % (ref 50–65)
PERFORMED ON: ABNORMAL
PERFORMED ON: ABNORMAL
PERFORMED ON: NORMAL
PERFORMED ON: NORMAL
PLATELET # BLD AUTO: 166 K/UL (ref 130–400)
PMV BLD AUTO: 12 FL (ref 9.4–12.4)
POTASSIUM SERPL-SCNC: 4.4 MMOL/L (ref 3.5–5)
RBC # BLD AUTO: 3.91 M/UL (ref 4.7–6.1)
SODIUM SERPL-SCNC: 145 MMOL/L (ref 136–145)
WBC # BLD AUTO: 5.6 K/UL (ref 4.8–10.8)

## 2025-06-30 PROCEDURE — 94640 AIRWAY INHALATION TREATMENT: CPT

## 2025-06-30 PROCEDURE — 36415 COLL VENOUS BLD VENIPUNCTURE: CPT

## 2025-06-30 PROCEDURE — 6370000000 HC RX 637 (ALT 250 FOR IP): Performed by: INTERNAL MEDICINE

## 2025-06-30 PROCEDURE — 2700000000 HC OXYGEN THERAPY PER DAY

## 2025-06-30 PROCEDURE — 6370000000 HC RX 637 (ALT 250 FOR IP)

## 2025-06-30 PROCEDURE — 80048 BASIC METABOLIC PNL TOTAL CA: CPT

## 2025-06-30 PROCEDURE — 1200000000 HC SEMI PRIVATE

## 2025-06-30 PROCEDURE — 99223 1ST HOSP IP/OBS HIGH 75: CPT | Performed by: PHYSICIAN ASSISTANT

## 2025-06-30 PROCEDURE — 6370000000 HC RX 637 (ALT 250 FOR IP): Performed by: NURSE PRACTITIONER

## 2025-06-30 PROCEDURE — 82962 GLUCOSE BLOOD TEST: CPT

## 2025-06-30 PROCEDURE — 6360000002 HC RX W HCPCS: Performed by: NURSE PRACTITIONER

## 2025-06-30 PROCEDURE — 2500000003 HC RX 250 WO HCPCS

## 2025-06-30 PROCEDURE — 85025 COMPLETE CBC W/AUTO DIFF WBC: CPT

## 2025-06-30 PROCEDURE — 94761 N-INVAS EAR/PLS OXIMETRY MLT: CPT

## 2025-06-30 PROCEDURE — 6360000002 HC RX W HCPCS: Performed by: INTERNAL MEDICINE

## 2025-06-30 PROCEDURE — 94660 CPAP INITIATION&MGMT: CPT

## 2025-06-30 PROCEDURE — 6360000002 HC RX W HCPCS

## 2025-06-30 RX ADMIN — GUAIFENESIN 600 MG: 600 TABLET, EXTENDED RELEASE ORAL at 09:14

## 2025-06-30 RX ADMIN — BUMETANIDE 2 MG: 0.25 INJECTION INTRAMUSCULAR; INTRAVENOUS at 09:14

## 2025-06-30 RX ADMIN — IPRATROPIUM BROMIDE 0.5 MG: 0.5 SOLUTION RESPIRATORY (INHALATION) at 06:31

## 2025-06-30 RX ADMIN — SODIUM CHLORIDE, PRESERVATIVE FREE 10 ML: 5 INJECTION INTRAVENOUS at 09:13

## 2025-06-30 RX ADMIN — CETIRIZINE HYDROCHLORIDE 10 MG: 10 TABLET ORAL at 09:14

## 2025-06-30 RX ADMIN — GUAIFENESIN 600 MG: 600 TABLET, EXTENDED RELEASE ORAL at 21:34

## 2025-06-30 RX ADMIN — PRAVASTATIN SODIUM 20 MG: 20 TABLET ORAL at 21:34

## 2025-06-30 RX ADMIN — MONTELUKAST SODIUM 10 MG: 10 TABLET, FILM COATED ORAL at 21:34

## 2025-06-30 RX ADMIN — ALLOPURINOL 300 MG: 300 TABLET ORAL at 09:14

## 2025-06-30 RX ADMIN — BUMETANIDE 2 MG: 0.25 INJECTION INTRAMUSCULAR; INTRAVENOUS at 18:17

## 2025-06-30 RX ADMIN — ENOXAPARIN SODIUM 60 MG: 100 INJECTION SUBCUTANEOUS at 21:33

## 2025-06-30 RX ADMIN — PANTOPRAZOLE SODIUM 40 MG: 40 TABLET, DELAYED RELEASE ORAL at 06:15

## 2025-06-30 RX ADMIN — METOPROLOL SUCCINATE 25 MG: 25 TABLET, FILM COATED, EXTENDED RELEASE ORAL at 21:33

## 2025-06-30 RX ADMIN — MICONAZOLE NITRATE: 2 POWDER TOPICAL at 21:39

## 2025-06-30 RX ADMIN — SODIUM BICARBONATE 1300 MG: 650 TABLET ORAL at 09:14

## 2025-06-30 RX ADMIN — IPRATROPIUM BROMIDE 0.5 MG: 0.5 SOLUTION RESPIRATORY (INHALATION) at 14:54

## 2025-06-30 RX ADMIN — SODIUM BICARBONATE 1300 MG: 650 TABLET ORAL at 14:11

## 2025-06-30 RX ADMIN — SODIUM BICARBONATE 1300 MG: 650 TABLET ORAL at 18:18

## 2025-06-30 RX ADMIN — METOPROLOL SUCCINATE 25 MG: 25 TABLET, FILM COATED, EXTENDED RELEASE ORAL at 09:14

## 2025-06-30 RX ADMIN — IPRATROPIUM BROMIDE 0.5 MG: 0.5 SOLUTION RESPIRATORY (INHALATION) at 10:19

## 2025-06-30 RX ADMIN — MICONAZOLE NITRATE: 2 POWDER TOPICAL at 09:13

## 2025-06-30 RX ADMIN — ASPIRIN 81 MG: 81 TABLET, COATED ORAL at 09:14

## 2025-06-30 RX ADMIN — INSULIN GLARGINE 30 UNITS: 100 INJECTION, SOLUTION SUBCUTANEOUS at 21:31

## 2025-06-30 RX ADMIN — ACETAMINOPHEN 650 MG: 325 TABLET ORAL at 14:14

## 2025-06-30 ASSESSMENT — PAIN DESCRIPTION - DESCRIPTORS: DESCRIPTORS: ACHING

## 2025-06-30 ASSESSMENT — PAIN DESCRIPTION - LOCATION: LOCATION: TEETH

## 2025-06-30 ASSESSMENT — PAIN DESCRIPTION - ORIENTATION: ORIENTATION: LEFT

## 2025-06-30 ASSESSMENT — PAIN SCALES - GENERAL: PAINLEVEL_OUTOF10: 4

## 2025-06-30 ASSESSMENT — PAIN - FUNCTIONAL ASSESSMENT: PAIN_FUNCTIONAL_ASSESSMENT: ACTIVITIES ARE NOT PREVENTED

## 2025-06-30 NOTE — PROGRESS NOTES
Nutrition Assessment     Type and Reason for Visit: Reassess    Nutrition Recommendations/Plan:   Continue current POC     Malnutrition Assessment:  Malnutrition Status: Severe malnutrition    Nutrition Assessment:  Patint sound asleep at time of visit.  Reentered breakfast requests in diet order.  New weight not aailable.  PO intake still variable and usually decreased.  Glucose 101-114    Estimated Daily Nutrient Needs:  Energy (kcal):  5375-6143 kcals (8-11 kcals/kg) Weight Used for Energy Requirements: Current     Protein (g):  145g Weight Used for Protein Requirements: Ideal        Fluid (ml/day):  7465-1459 ml Method Used for Fluid Requirements: 1 ml/kcal    Nutrition Related Findings:   trace generalized and BLE edema Wound Type: Pressure Injury    Current Nutrition Therapies:    ADULT DIET; Regular; 3 carb choices (45 gm/meal); Low Sodium (2 gm); Low Potassium (Less than 3000 mg/day); Low Phosphorus (Less than 1000 mg); NO OATMEAL.  SEND RICE KRISPIES AT BREAKFAST    Anthropometric Measures:  Height: 175.3 cm (5' 9.02\")  Current Body Wt: 193.9 kg (427 lb 7.6 oz)   BMI: 63.1        Nutrition Diagnosis:   Inadequate oral intake related to limited food acceptance, increase demand for energy/nutrients as evidenced by intake 0-25%, weight loss, wounds    Nutrition Interventions:   Food and/or Nutrient Delivery: Continue Current Diet  Nutrition Education/Counseling: Survival skills/brief education completed  Coordination of Nutrition Care: Continue to monitor while inpatient  Plan of Care discussed with: pt    Goals:  Goals: Meet at least 75% of estimated needs, PO intake 50% or greater, Maintain adequate nutrition status  Type of Goal: Continue current goal  Previous Goal Met: No Progress toward Goal(s)    Nutrition Monitoring and Evaluation:   Behavioral-Environmental Outcomes: Readiness for Change  Food/Nutrient Intake Outcomes: Food and Nutrient Intake  Physical Signs/Symptoms Outcomes: Biochemical Data,

## 2025-06-30 NOTE — PROGRESS NOTES
[facility]    Patient:  Daljit Elizondo  YOB: 1960  Date of Service: 2025  MRN: [id number]  Acct: [id number]   Primary Care Physician: Jessie Helms APRN - CNP  Advance Directive: Full Code  Admit Date: 2025       Hospital Day: 47     NOTE: Portions of this note have been copied forward, however, changes made to reflect the most current clinical status of this patient.    CHIEF COMPLAINT:     Chief Complaint   Patient presents with    Shortness of Breath     Since     Scrotal Pain       2025 7:55 AM  Subjective / Interval History:   2025  Patient seen and examined this a.m.   No new complaints.    No acute changes or acute overnight event reported.   Laying comfortably in bed in no apparent acute distress.    Denies any acute complaints or distress.    Endorses overall improvement.   Creatinine trending up      2025  Patient seen and examined this a.m.   No new complaints.    No acute changes or acute overnight event reported.   Laying comfortably in bed in no apparent acute distress.    Denies any acute complaints or distress.    Endorses overall improvement.      AB.230/44/54/18.4     2025  Patient seen and examined this a.m.   No new complaints.    No acute changes or acute overnight event reported.   Laying comfortably in bed in no apparent acute distress.    Denies any acute complaints or distress.   AB.21/45/55/18.0       2025  Patient seen and examined this a.m.   Patient reports dyspnea this a.m..  No acute changes or acute overnight event reported.   Laying comfortably in bed in no apparent acute distress.  Denies any acute complaints or distress.    Endorses overall improvement.      AB.16/46/60/16.4   Elevated ProBNP level 11,537   Chest x-ray with Central pulmonary vascular congestion and interstitial edema consistent with congestive failure or fluid overload. Left basilar consolidation and probable

## 2025-06-30 NOTE — PLAN OF CARE
Xray to left foot 6/28/25:   XR FOOT LEFT (2 VIEWS)  Order: [id number]   Status: Final result       Details    Reading Physician Reading Date Result Priority   Felipe Gomez MD  [phone] 6/28/2025      Narrative & Impression  EXAM: 2 VIEWS OF THE LEFT FOOT.     HISTORY: left foot wound     COMPARISON: None.     FINDINGS: There is no acute fracture or dislocation. No definite osseous erosions are seen. Joint spaces and alignment are maintained.  Diffuse soft swelling of the foot is seen.     IMPRESSION:     No acute osseous abnormality.     Soft tissue swelling of the foot.      ______________________________________   Electronically signed by: FELIPE GOMEZ M.D.  Date:     06/28/2025  Time:    15:16         Exam Ended: 06/28/25 13:28 CDT Last Resulted: 06/28/25 15:17 CDT     Left foot xray with no acute abnormalities. Continue with current wound care, Vascular will sign off for now.

## 2025-06-30 NOTE — PLAN OF CARE
Problem: Nutrition Deficit:  Goal: Optimize nutritional status  6/30/2025 1149 by Miriam Maldonado, MS, RD, LD  Outcome: Not Progressing  Flowsheets (Taken 6/30/2025 1144)  Nutrient intake appropriate for improving, restoring, or maintaining nutritional needs:   Assess nutritional status and recommend course of action   Monitor oral intake, labs, and treatment plans   Recommend appropriate diets, oral nutritional supplements, and vitamin/mineral supplements  6/30/2025 0039 by Deirdre Romano, RN  Outcome: Progressing

## 2025-06-30 NOTE — CONSULTS
Palliative Care Consult Note    6/30/2025 8:59 AM  Subjective:  Admit Date: 5/14/2025  PCP: Jessie Helms APRN - CNP    Date of Service: 6/30/2025    Reason for Consultation:  Goals of Care, Code Status, Family Support     History Obtained From: EMR/Patient and their Family    History Of Present Illness:   The patient is a 65 y.o. male with PMH pulmonary hypertension, chronic respiratory failure dependent on 3 L nasal cannula O2, REGI with obesity hypoventilation syndrome dependent on CPAP, diastolic CHF, bilateral lymphedema, diabetes mellitus type 2, HLD, HTN, GERD, morbid obesity, anxiety, Bell's palsy who presented to [facility] ED on 5/14/2025 complaining of dyspnea with increased oxygen demand from 3 L to 6 L and worsening lower extremity and scrotal edema, abdominal pain.  Chest x-ray on 514 reported atelectasis and/or infiltrate in the left with small left pleural effusion.  BNP was elevated at 4707.  EKG reported an irregular rhythm.  He was admitted to hospitalist service with concern for acute CHF and community-acquired pneumonia.  He was placed on IV Bumex in addition to Levaquin.  Was given a dose of Diamox on 517 with reported improvement in urine output and diuresis of about 14 L.  Echo reported as unable to obtain due to body habitus. Wound care following for diabetic LE wounds in addition to coccyx wound noted on admission. He continued to work w/ therapy and case management w/ hope for discharge to swing bed vs SNF. He was denied at multiple facilities w/ concern for inability to consistently work w/ therapy and had multiple appeals to his insurance denied as well. Plans for discharge home w/ Home health 06/08/2025, however, he suffered a fall and developed bleeding from his L foot which was reported as resolved the following day. On 06/10/2025 developed STEFAINE w/ decrease in urine output noted and was noted to have diarrhea. Nephrology consulted for STEFANIE w/ recommendation for trial of IVF. Renal US reported as

## 2025-06-30 NOTE — PLAN OF CARE
Problem: Chronic Conditions and Co-morbidities  Goal: Patient's chronic conditions and co-morbidity symptoms are monitored and maintained or improved  6/30/2025 0039 by Deirdre Romano RN  Outcome: Progressing  Flowsheets (Taken 6/29/2025 2239)  Care Plan - Patient's Chronic Conditions and Co-Morbidity Symptoms are Monitored and Maintained or Improved:   Monitor and assess patient's chronic conditions and comorbid symptoms for stability, deterioration, or improvement   Collaborate with multidisciplinary team to address chronic and comorbid conditions and prevent exacerbation or deterioration   Update acute care plan with appropriate goals if chronic or comorbid symptoms are exacerbated and prevent overall improvement and discharge  6/29/2025 1526 by Tish Chowdary LPN  Outcome: Progressing     Problem: Discharge Planning  Goal: Discharge to home or other facility with appropriate resources  6/30/2025 0039 by Deirdre Romano RN  Outcome: Progressing  Flowsheets (Taken 6/29/2025 2239)  Discharge to home or other facility with appropriate resources:   Identify barriers to discharge with patient and caregiver   Arrange for needed discharge resources and transportation as appropriate   Identify discharge learning needs (meds, wound care, etc)   Refer to discharge planning if patient needs post-hospital services based on physician order or complex needs related to functional status, cognitive ability or social support system  6/29/2025 1526 by Tish Chowdary LPN  Outcome: Progressing     Problem: Pain  Goal: Verbalizes/displays adequate comfort level or baseline comfort level  6/30/2025 0039 by Deirdre Romano RN  Outcome: Progressing  6/29/2025 1526 by Tish Chowdary LPN  Outcome: Progressing     Problem: Safety - Adult  Goal: Free from fall injury  6/30/2025 0039 by Deirdre Romano RN  Outcome: Progressing  6/29/2025 1526 by Tish Chowdary LPN  Outcome: Progressing     Problem:

## 2025-06-30 NOTE — PROGRESS NOTES
The pt completed a Living Will naming his mother Gabriella Urbano as his Health Care Surrogate.     Advance Care Planning     Advance Care Planning Inpatient Note  [facility] Department    Today's Date: 6/30/2025  Unit: [facility]    Received request from HealthCare Provider.  Upon review of chart and communication with care team, patient's decision making abilities are not in question.. Patient was/were present in the room during visit.    Goals of ACP Conversation:  Discuss advance care planning documents  Facilitate a discussion related to patient's goals of care as they align with the patient's values and beliefs.    Health Care Decision Makers:       Primary Decision Maker: Gabriella Urbano - Brother/Sister - [phone]  Summary:  Completed New Documents  Verified Healthcare Decision Maker    Advance Care Planning Documents (Patient Wishes):  Living Will/Advance Directive     Assessment:      Interventions:  Provided education on documents for clarity and greater understanding  Discussed and provided education on state decision maker hierarchy  Assisted in the completion of documents according to patient's wishes at this time    Care Preferences Communicated:   No    Outcomes/Plan:  New advance directive completed.    Electronically signed by Chaplain Pepito on 6/30/2025 at 12:36 PM            soft/nondistended

## 2025-06-30 NOTE — PROGRESS NOTES
Nephrology ([facility]) Progress Note    Patient:  Daljit Elizondo  YOB: 1960  Date of Service: 6/30/2025  MRN: [id number]   Acct: [id number]   Primary Care Physician: Jessie Helms APRN - CNP  Advance Directive: Full Code  Admit Date: 5/14/2025       Hospital Day: 47  Referring Provider: Cody Howell MD    Patient independently seen and examined, Chart, Consults, Notes, Operative notes, Labs, Cardiology, and Radiology studies reviewed as available.    Chief complaint: Abnormal labs.    Subjective:  Daljit Elizondo is a 65 y.o. male for whom we were consulted for evaluation and treatment of acute kidney injury.  Patient denies any history of chronic kidney disease.  He has been in the hospital for the last month, poor historian and has never seen nephrology in the past.  Patient has severe abdominal obesity, hypertension, sleep apnea, type 2 diabetes, chronic diastolic CHF, sleep apnea.  Presented with increasing shortness of breath/dyspnea on exertion.  Hospital course remarkable for treatment with intravenous diuretics for the treatment of volume overload, CHF exacerbation.  His serum creatinine was 0.9 mg with estimated GFR more than 90 mL on admission.  Patient has significant drop in GFR and nephrology is consulted.  He was initially treated with IV fluid with minimal to no improvement of renal function.    He is currently receiving intermittent loop diuretics for the treatment of anemia/worsening of renal function.  He is bedridden, frustrated being here for disease many days.  I have offered initiation of dialysis, he has refused    Allergies:  Penicillamine, Silvadene [silver sulfadiazine], Aerohist [chlorpheniramine-methscop er], Cephalosporins, Hemp seed oil, Neosporin [neomycin-polymyxin-gramicidin], Penicillins, and Zinc    Medicines:  Current Facility-Administered Medications   Medication Dose Route Frequency Provider Last Rate Last Admin    bumetanide

## 2025-07-01 LAB
ALBUMIN SERPL-MCNC: 2.6 G/DL (ref 3.5–5.2)
ALP SERPL-CCNC: 111 U/L (ref 40–129)
ALT SERPL-CCNC: 5 U/L (ref 10–50)
ANION GAP SERPL CALCULATED.3IONS-SCNC: 12 MMOL/L (ref 8–16)
AST SERPL-CCNC: 8 U/L (ref 10–50)
BASOPHILS # BLD: 0.1 K/UL (ref 0–0.2)
BASOPHILS NFR BLD: 0.9 % (ref 0–1)
BILIRUB SERPL-MCNC: 0.7 MG/DL (ref 0.2–1.2)
BUN SERPL-MCNC: 38 MG/DL (ref 8–23)
CALCIUM SERPL-MCNC: 8.5 MG/DL (ref 8.8–10.2)
CHLORIDE SERPL-SCNC: 111 MMOL/L (ref 98–107)
CO2 SERPL-SCNC: 21 MMOL/L (ref 22–29)
CREAT SERPL-MCNC: 3.7 MG/DL (ref 0.7–1.2)
EOSINOPHIL # BLD: 0.3 K/UL (ref 0–0.6)
EOSINOPHIL NFR BLD: 5.6 % (ref 0–5)
ERYTHROCYTE [DISTWIDTH] IN BLOOD BY AUTOMATED COUNT: 16.1 % (ref 11.5–14.5)
GLUCOSE BLD-MCNC: 132 MG/DL (ref 70–99)
GLUCOSE BLD-MCNC: 142 MG/DL (ref 70–99)
GLUCOSE BLD-MCNC: 92 MG/DL (ref 70–99)
GLUCOSE BLD-MCNC: 92 MG/DL (ref 70–99)
GLUCOSE SERPL-MCNC: 99 MG/DL (ref 70–99)
HCT VFR BLD AUTO: 34.4 % (ref 42–52)
HGB BLD-MCNC: 10.6 G/DL (ref 14–18)
IMM GRANULOCYTES # BLD: 0 K/UL
LYMPHOCYTES # BLD: 0.8 K/UL (ref 1.1–4.5)
LYMPHOCYTES NFR BLD: 14.6 % (ref 20–40)
MCH RBC QN AUTO: 29.4 PG (ref 27–31)
MCHC RBC AUTO-ENTMCNC: 30.8 G/DL (ref 33–37)
MCV RBC AUTO: 95.3 FL (ref 80–94)
MONOCYTES # BLD: 0.5 K/UL (ref 0–0.9)
MONOCYTES NFR BLD: 9.1 % (ref 0–10)
NEUTROPHILS # BLD: 4 K/UL (ref 1.5–7.5)
NEUTS SEG NFR BLD: 69.3 % (ref 50–65)
PERFORMED ON: ABNORMAL
PERFORMED ON: ABNORMAL
PERFORMED ON: NORMAL
PERFORMED ON: NORMAL
PLATELET # BLD AUTO: 165 K/UL (ref 130–400)
PMV BLD AUTO: 12.5 FL (ref 9.4–12.4)
POTASSIUM SERPL-SCNC: 3.8 MMOL/L (ref 3.5–5.1)
PROT SERPL-MCNC: 5.4 G/DL (ref 6.4–8.3)
RBC # BLD AUTO: 3.61 M/UL (ref 4.7–6.1)
SODIUM SERPL-SCNC: 144 MMOL/L (ref 136–145)
WBC # BLD AUTO: 5.7 K/UL (ref 4.8–10.8)

## 2025-07-01 PROCEDURE — 36415 COLL VENOUS BLD VENIPUNCTURE: CPT

## 2025-07-01 PROCEDURE — 6370000000 HC RX 637 (ALT 250 FOR IP): Performed by: NURSE PRACTITIONER

## 2025-07-01 PROCEDURE — 2500000003 HC RX 250 WO HCPCS

## 2025-07-01 PROCEDURE — 85025 COMPLETE CBC W/AUTO DIFF WBC: CPT

## 2025-07-01 PROCEDURE — 2700000000 HC OXYGEN THERAPY PER DAY

## 2025-07-01 PROCEDURE — 94761 N-INVAS EAR/PLS OXIMETRY MLT: CPT

## 2025-07-01 PROCEDURE — 6360000002 HC RX W HCPCS: Performed by: NURSE PRACTITIONER

## 2025-07-01 PROCEDURE — 6360000002 HC RX W HCPCS

## 2025-07-01 PROCEDURE — 94640 AIRWAY INHALATION TREATMENT: CPT

## 2025-07-01 PROCEDURE — 94150 VITAL CAPACITY TEST: CPT

## 2025-07-01 PROCEDURE — 6370000000 HC RX 637 (ALT 250 FOR IP)

## 2025-07-01 PROCEDURE — 94660 CPAP INITIATION&MGMT: CPT

## 2025-07-01 PROCEDURE — 82962 GLUCOSE BLOOD TEST: CPT

## 2025-07-01 PROCEDURE — 1200000000 HC SEMI PRIVATE

## 2025-07-01 PROCEDURE — 80053 COMPREHEN METABOLIC PANEL: CPT

## 2025-07-01 PROCEDURE — 6370000000 HC RX 637 (ALT 250 FOR IP): Performed by: INTERNAL MEDICINE

## 2025-07-01 RX ADMIN — ENOXAPARIN SODIUM 60 MG: 100 INJECTION SUBCUTANEOUS at 09:12

## 2025-07-01 RX ADMIN — IPRATROPIUM BROMIDE 0.5 MG: 0.5 SOLUTION RESPIRATORY (INHALATION) at 10:26

## 2025-07-01 RX ADMIN — ACETAMINOPHEN 650 MG: 325 TABLET ORAL at 17:37

## 2025-07-01 RX ADMIN — METOPROLOL SUCCINATE 25 MG: 25 TABLET, FILM COATED, EXTENDED RELEASE ORAL at 20:50

## 2025-07-01 RX ADMIN — IPRATROPIUM BROMIDE 0.5 MG: 0.5 SOLUTION RESPIRATORY (INHALATION) at 18:23

## 2025-07-01 RX ADMIN — SODIUM CHLORIDE, PRESERVATIVE FREE 10 ML: 5 INJECTION INTRAVENOUS at 09:13

## 2025-07-01 RX ADMIN — PRAVASTATIN SODIUM 20 MG: 20 TABLET ORAL at 20:50

## 2025-07-01 RX ADMIN — SODIUM CHLORIDE, PRESERVATIVE FREE 10 ML: 5 INJECTION INTRAVENOUS at 20:51

## 2025-07-01 RX ADMIN — SODIUM BICARBONATE 1300 MG: 650 TABLET ORAL at 09:12

## 2025-07-01 RX ADMIN — SODIUM BICARBONATE 1300 MG: 650 TABLET ORAL at 17:38

## 2025-07-01 RX ADMIN — MICONAZOLE NITRATE: 2 POWDER TOPICAL at 20:51

## 2025-07-01 RX ADMIN — MONTELUKAST SODIUM 10 MG: 10 TABLET, FILM COATED ORAL at 20:50

## 2025-07-01 RX ADMIN — IPRATROPIUM BROMIDE 0.5 MG: 0.5 SOLUTION RESPIRATORY (INHALATION) at 06:45

## 2025-07-01 RX ADMIN — METOPROLOL SUCCINATE 25 MG: 25 TABLET, FILM COATED, EXTENDED RELEASE ORAL at 09:12

## 2025-07-01 RX ADMIN — ALLOPURINOL 300 MG: 300 TABLET ORAL at 09:12

## 2025-07-01 RX ADMIN — PANTOPRAZOLE SODIUM 40 MG: 40 TABLET, DELAYED RELEASE ORAL at 06:16

## 2025-07-01 RX ADMIN — ENOXAPARIN SODIUM 60 MG: 100 INJECTION SUBCUTANEOUS at 20:50

## 2025-07-01 RX ADMIN — MICONAZOLE NITRATE: 2 POWDER TOPICAL at 09:14

## 2025-07-01 RX ADMIN — ASPIRIN 81 MG: 81 TABLET, COATED ORAL at 09:12

## 2025-07-01 RX ADMIN — GUAIFENESIN 600 MG: 600 TABLET, EXTENDED RELEASE ORAL at 09:12

## 2025-07-01 RX ADMIN — CETIRIZINE HYDROCHLORIDE 10 MG: 10 TABLET ORAL at 09:12

## 2025-07-01 RX ADMIN — SODIUM BICARBONATE 1300 MG: 650 TABLET ORAL at 14:09

## 2025-07-01 RX ADMIN — IPRATROPIUM BROMIDE 0.5 MG: 0.5 SOLUTION RESPIRATORY (INHALATION) at 14:33

## 2025-07-01 RX ADMIN — GUAIFENESIN 600 MG: 600 TABLET, EXTENDED RELEASE ORAL at 20:50

## 2025-07-01 ASSESSMENT — PAIN SCALES - GENERAL: PAINLEVEL_OUTOF10: 5

## 2025-07-01 ASSESSMENT — PAIN - FUNCTIONAL ASSESSMENT: PAIN_FUNCTIONAL_ASSESSMENT: ACTIVITIES ARE NOT PREVENTED

## 2025-07-01 ASSESSMENT — PAIN DESCRIPTION - LOCATION: LOCATION: TEETH

## 2025-07-01 ASSESSMENT — PAIN DESCRIPTION - DESCRIPTORS: DESCRIPTORS: ACHING;DISCOMFORT

## 2025-07-01 ASSESSMENT — PAIN DESCRIPTION - ORIENTATION: ORIENTATION: LEFT

## 2025-07-01 NOTE — PROGRESS NOTES
Nephrology ([facility]) Progress Note    Patient:  Daljit Elizondo  YOB: 1960  Date of Service: 7/1/2025  MRN: [id number]   Acct: [id number]   Primary Care Physician: Jessie Helms APRN - CNP  Advance Directive: Full Code  Admit Date: 5/14/2025       Hospital Day: 48  Referring Provider: Cody Howell MD    Patient independently seen and examined, Chart, Consults, Notes, Operative notes, Labs, Cardiology, and Radiology studies reviewed as available.    Chief complaint: Abnormal labs.    Subjective:  Daljit Elizondo is a 65 y.o. male for whom we were consulted for evaluation and treatment of acute kidney injury.  Patient denies any history of chronic kidney disease.  He has been in the hospital for the last month, poor historian and has never seen nephrology in the past.  Patient has severe abdominal obesity, hypertension, sleep apnea, type 2 diabetes, chronic diastolic CHF, sleep apnea.  Presented with increasing shortness of breath/dyspnea on exertion.  Hospital course remarkable for treatment with intravenous diuretics for the treatment of volume overload, CHF exacerbation.  His serum creatinine was 0.9 mg with estimated GFR more than 90 mL on admission.  Patient has significant drop in GFR and nephrology is consulted.  He was initially treated with IV fluid with minimal to no improvement of renal function.    This morning patient remained bedridden, currently on loop diuretics for the treatment of volume overload.  He has slow decline in renal function.  He is miserable and desperately want to be discharged to go home.    Allergies:  Penicillamine, Silvadene [silver sulfadiazine], Aerohist [chlorpheniramine-methscop er], Cephalosporins, Hemp seed oil, Neosporin [neomycin-polymyxin-gramicidin], Penicillins, and Zinc    Medicines:  Current Facility-Administered Medications   Medication Dose Route Frequency Provider Last Rate Last Admin    sodium bicarbonate tablet  (2.5 MG/3ML) 0.083% nebulizer solution 2.5 mg  2.5 mg Nebulization Q6H PRN Renay Thorne APRN - CNP        allopurinol (ZYLOPRIM) tablet 300 mg  300 mg Oral Daily Renay Thorne APRN - CNP   300 mg at 07/01/25 0912    aspirin EC tablet 81 mg  81 mg Oral Daily Renay Thorne APRN - CNP   81 mg at 07/01/25 0912    [Held by provider] losartan (COZAAR) tablet 50 mg  50 mg Oral Daily Renay Thoren APRN - CNP   50 mg at 05/17/25 0951    pantoprazole (PROTONIX) tablet 40 mg  40 mg Oral QAM AC Renay Thorne APRN - CNP   40 mg at 07/01/25 0616    pravastatin (PRAVACHOL) tablet 20 mg  20 mg Oral Nightly Renay Thorne APRN - CNP   20 mg at 06/30/25 2134    sodium chloride flush 0.9 % injection 5-40 mL  5-40 mL IntraVENous 2 times per day Renay Thorne APRN - CNP   10 mL at 07/01/25 0913    sodium chloride flush 0.9 % injection 5-40 mL  5-40 mL IntraVENous PRN Renay Thorne APRN - CNP        0.9 % sodium chloride infusion   IntraVENous PRN Renay Thorne APRN - CNP 5 mL/hr at 05/18/25 1459 New Bag at 05/18/25 1459    ondansetron (ZOFRAN-ODT) disintegrating tablet 4 mg  4 mg Oral Q8H PRN Renay Thorne APRN - CNP   4 mg at 06/09/25 1754    Or    ondansetron (ZOFRAN) injection 4 mg  4 mg IntraVENous Q6H PRN Renay Thorne APRN - CNP        [Held by provider] polyethylene glycol (GLYCOLAX) packet 17 g  17 g Oral Daily PRN Renay Thorne APRN - CNP   17 g at 06/07/25 0618    acetaminophen (TYLENOL) tablet 650 mg  650 mg Oral Q6H PRN Renay Thorne APRN - CNP   650 mg at 06/30/25 1414    Or    acetaminophen (TYLENOL) suppository 650 mg  650 mg Rectal Q6H PRN Renay Thorne APRN - CNP        enoxaparin (LOVENOX) injection 60 mg  60 mg SubCUTAneous BID Renay Thorne APRN - CNP   60 mg at 07/01/25 0912    potassium chloride (KLOR-CON M) extended release tablet 40 mEq  40 mEq Oral PRN Renay Thorne APRN - CNP        Or    potassium bicarb-citric acid (EFFER-K) effervescent tablet 40 mEq  40 mEq

## 2025-07-01 NOTE — PLAN OF CARE
Problem: Nutrition Deficit:  Goal: Optimize nutritional status  6/30/2025 1911 by Teresita Aguero LPN  Outcome: Progressing  6/30/2025 1149 by Miriam Maldonado, MS, RD, LD  Outcome: Not Progressing  Flowsheets (Taken 6/30/2025 1144)  Nutrient intake appropriate for improving, restoring, or maintaining nutritional needs:   Assess nutritional status and recommend course of action   Monitor oral intake, labs, and treatment plans   Recommend appropriate diets, oral nutritional supplements, and vitamin/mineral supplements     Problem: Chronic Conditions and Co-morbidities  Goal: Patient's chronic conditions and co-morbidity symptoms are monitored and maintained or improved  Outcome: Progressing     Problem: Discharge Planning  Goal: Discharge to home or other facility with appropriate resources  Outcome: Progressing     Problem: Pain  Goal: Verbalizes/displays adequate comfort level or baseline comfort level  Outcome: Progressing     Problem: Safety - Adult  Goal: Free from fall injury  Outcome: Progressing     Problem: ABCDS Injury Assessment  Goal: Absence of physical injury  Outcome: Progressing     Problem: Skin/Tissue Integrity  Goal: Skin integrity remains intact  Description: 1.  Monitor for areas of redness and/or skin breakdown2.  Assess vascular access sites hourly3.  Every 4-6 hours minimum:  Change oxygen saturation probe site4.  Every 4-6 hours:  If on nasal continuous positive airway pressure, respiratory therapy assess nares and determine need for appliance change or resting period  Outcome: Progressing     Problem: Nutrition Deficit:  Goal: Optimize nutritional status  6/30/2025 1911 by Teresita Aguero LPN  Outcome: Progressing  6/30/2025 1149 by Miriam Maldonado, MS, RD, LD  Outcome: Not Progressing  Flowsheets (Taken 6/30/2025 1144)  Nutrient intake appropriate for improving, restoring, or maintaining nutritional needs:   Assess nutritional status and recommend course of action

## 2025-07-01 NOTE — PROGRESS NOTES
DIS Nurse Note  SUBJECTIVE: Patient assessed secondary to elevated Deterioration Index Score.      Deterioration Index Score:  Predictive Model Details          51 (Warning)  Factor Value    Calculated 7/1/2025 04:31 22% Supplemental oxygen PAP (positive airway pressure)    Deterioration Index Model 22% Age 65 years old     16% Respiratory rate 22     12% Blood pH abnormal (7.230)     10% Sodium 145 mmol/L     8% Cardiac rhythm Sinus tachy     5% Potassium 4.4 mmol/L     2% Systolic 134     2% BUN abnormal (37 mg/dL)     2% Hematocrit abnormal (38.0 %)     1% Pulse oximetry 94 %     0% Pulse 82     0% WBC count 5.6 K/uL     0% Temperature 98.6 °F (37 °C)        Vital Signs:  Vitals:    06/30/25 1729 06/30/25 1923 06/30/25 1954 07/01/25 0426   BP: (!) 140/93 113/74  134/63   Pulse: (!) 113 99  82   Resp: 17 20  22   Temp: 97.7 °F (36.5 °C) 98.2 °F (36.8 °C)  98.6 °F (37 °C)   TempSrc: Oral      SpO2: 95% 94% 94% 94%   Weight:       Height:            Provider Notified: Not Indicated    ASSESSMENT:  Patient is alert and oriented x4, No acute distress noted at this time. Will continue to monitor vital signs and lab work up.    Plan of Care: Continue plan of care    Electronically signed by Danita Aguero LPN

## 2025-07-01 NOTE — PROGRESS NOTES
potassium chloride 10 mEq/100 mL IVPB (Peripheral Line)  10 mEq IntraVENous PRN Renay Thorne APRN - CNP        magnesium sulfate 2000 mg in 50 mL IVPB premix  2,000 mg IntraVENous PRN Renay Thorne APRN - CNP        sulfur hexafluoride microspheres (LUMASON) 60.7-25 MG injection 2 mL  2 mL IntraVENous ONCE PRN Renay Thorne APRN - CNP        insulin glargine (LANTUS) injection vial 30 Units  30 Units SubCUTAneous Nightly Renay Thorne APRN - CNP   30 Units at 06/30/25 2131    glucose chewable tablet 16 g  4 tablet Oral PRN Renay Thorne APRN - CNP        dextrose bolus 10% 125 mL  125 mL IntraVENous PRN Renay Thorne APRN - CNP   Stopped at 06/28/25 0806    Or    dextrose bolus 10% 250 mL  250 mL IntraVENous PRN Renay Thorne APRN - CNP        glucagon injection 1 mg  1 mg IntraMUSCular PRN Renay Thorne APRN - CNP        dextrose 10 % infusion   IntraVENous Continuous PRN Renay Thorne APRN - CNP             sodium chloride 5 mL/hr at 05/18/25 1459    dextrose        sodium bicarbonate  1,300 mg Oral TID    insulin lispro  0-4 Units SubCUTAneous 4x Daily AC & HS    [Held by provider] bumetanide  1 mg Oral BID    [Held by provider] lactulose  20 g Oral TID    [Held by provider] polyethylene glycol  17 g Oral BID    metoprolol succinate  25 mg Oral BID    ipratropium  0.5 mg Nebulization 4x Daily RT    [Held by provider] senna  2 tablet Oral BID    montelukast  10 mg Oral Nightly    cetirizine  10 mg Oral Daily    guaiFENesin  600 mg Oral BID    miconazole   Topical BID    allopurinol  300 mg Oral Daily    aspirin EC  81 mg Oral Daily    [Held by provider] losartan  50 mg Oral Daily    pantoprazole  40 mg Oral QAM AC    pravastatin  20 mg Oral Nightly    sodium chloride flush  5-40 mL IntraVENous 2 times per day    enoxaparin  60 mg SubCUTAneous BID    insulin glargine  30 Units SubCUTAneous Nightly     loperamide, sodium chloride, tetrahydrozoline, artificial tears, albuterol, sodium  7.18/47/52/17.5 (2025)  AB.21/45/55/18.0 (2025)  AB.230/44/54/18.4 (2025)  Continue intermittent BiPAP   Strict I's and O  Fluid restriction  Continue optimized medical management.    Diabetes Mellitus II, with Hyperglycemia  Hemoglobin A1C: 6.7% (05/15/2025)  Inpatient Regimens to include;  - Insulin Glargine (Lantus) 30 units subcu nightly  - Insulin Lispro (Humalog) on a low dose sliding scale  Monitor POC glucose, and adjust insulin regimen accordingly based on daily insulin requirement.     HTN  Toprol Xl     GERD  Protonix     Morbid obesity  Calorie restriction     REGI  Obesity hypoventilation syndrome  CPAP at night     Hyperkalemia- resolved  Potassium 5.5 (2025)  Sodium zirconium cyclosilicate 10 g p.o. x 1 dose (2025)  Potassium: 3.8  (2025)  Monitor BMP      Continue management of other chronic medical conditions - see above and orders.          Advance Directive: Full Code    ADULT DIET; Regular; 3 carb choices (45 gm/meal); Low Sodium (2 gm); Low Potassium (Less than 3000 mg/day); Low Phosphorus (Less than 1000 mg); NO OATMEAL.  SEND RICE KRISPIES AT BREAKFAST         Consults Made:   IP CONSULT TO HEART FAILURE NURSE/COORDINATOR  IP CONSULT TO HOME CARE NEEDS  IP CONSULT TO NEPHROLOGY  IP CONSULT TO VASCULAR SURGERY  IP CONSULT TO VASCULAR ACCESS TEAM  IP CONSULT TO PALLIATIVE CARE      DVT prophylaxis: Enoxaparin      Current medications reviewed  Lab work reviewed  Radiology  films reviewed  Much appreciated treatment recommendations from suspecialities reviewed  Discussed treatment plan with the nurse and addressed all questions/concerns  Discussed with Patient at the bedside        Discharge planning: tbd  Continue management as noted above.  CM/ANGEL on board, for rehab placement  Reportedly accepted to [facility], pending pre-CERT and bed availability at the facility.    Patient requested to be transferred to [place]  Transfer process to [place]

## 2025-07-01 NOTE — PLAN OF CARE
Problem: Chronic Conditions and Co-morbidities  Goal: Patient's chronic conditions and co-morbidity symptoms are monitored and maintained or improved  6/30/2025 2210 by Danita Aguero LPN  Outcome: Progressing  Flowsheets (Taken 6/30/2025 2200)  Care Plan - Patient's Chronic Conditions and Co-Morbidity Symptoms are Monitored and Maintained or Improved: Monitor and assess patient's chronic conditions and comorbid symptoms for stability, deterioration, or improvement  6/30/2025 1911 by Teresita Aguero LPN  Outcome: Progressing     Problem: Discharge Planning  Goal: Discharge to home or other facility with appropriate resources  6/30/2025 2210 by Danita Aguero LPN  Outcome: Progressing  Flowsheets (Taken 6/30/2025 2200)  Discharge to home or other facility with appropriate resources: Identify barriers to discharge with patient and caregiver  6/30/2025 1911 by Teresita Aguero LPN  Outcome: Progressing     Problem: Pain  Goal: Verbalizes/displays adequate comfort level or baseline comfort level  6/30/2025 2210 by Danita Aguero LPN  Outcome: Progressing  6/30/2025 1911 by Teresita Aguero LPN  Outcome: Progressing     Problem: Safety - Adult  Goal: Free from fall injury  6/30/2025 2210 by Danita Aguero LPN  Outcome: Progressing  6/30/2025 1911 by Teresita Aguero LPN  Outcome: Progressing     Problem: ABCDS Injury Assessment  Goal: Absence of physical injury  6/30/2025 2210 by Danita Aguero LPN  Outcome: Progressing  6/30/2025 1911 by Teresita Aguero LPN  Outcome: Progressing     Problem: Skin/Tissue Integrity  Goal: Skin integrity remains intact  Description: 1.  Monitor for areas of redness and/or skin breakdown2.  Assess vascular access sites hourly3.  Every 4-6 hours minimum:  Change oxygen saturation probe site4.  Every 4-6 hours:  If on nasal continuous positive airway pressure, respiratory therapy assess nares and determine need for appliance change or resting

## 2025-07-02 PROBLEM — I50.23 ACUTE ON CHRONIC SYSTOLIC (CONGESTIVE) HEART FAILURE (HCC): Status: ACTIVE | Noted: 2025-05-14

## 2025-07-02 LAB
ALBUMIN SERPL-MCNC: 2.6 G/DL (ref 3.5–5.2)
ALP SERPL-CCNC: 112 U/L (ref 40–129)
ALT SERPL-CCNC: <5 U/L (ref 10–50)
ANION GAP SERPL CALCULATED.3IONS-SCNC: 11 MMOL/L (ref 8–16)
AST SERPL-CCNC: 9 U/L (ref 10–50)
BASOPHILS # BLD: 0 K/UL (ref 0–0.2)
BASOPHILS NFR BLD: 0.7 % (ref 0–1)
BILIRUB SERPL-MCNC: 0.8 MG/DL (ref 0.2–1.2)
BUN SERPL-MCNC: 38 MG/DL (ref 8–23)
CALCIUM SERPL-MCNC: 8.4 MG/DL (ref 8.8–10.2)
CHLORIDE SERPL-SCNC: 111 MMOL/L (ref 98–107)
CO2 SERPL-SCNC: 22 MMOL/L (ref 22–29)
CREAT SERPL-MCNC: 3.7 MG/DL (ref 0.7–1.2)
EOSINOPHIL # BLD: 0.3 K/UL (ref 0–0.6)
EOSINOPHIL NFR BLD: 5.1 % (ref 0–5)
ERYTHROCYTE [DISTWIDTH] IN BLOOD BY AUTOMATED COUNT: 15.9 % (ref 11.5–14.5)
GLUCOSE BLD-MCNC: 107 MG/DL (ref 70–99)
GLUCOSE BLD-MCNC: 111 MG/DL (ref 70–99)
GLUCOSE BLD-MCNC: 116 MG/DL (ref 70–99)
GLUCOSE BLD-MCNC: 91 MG/DL (ref 70–99)
GLUCOSE SERPL-MCNC: 106 MG/DL (ref 70–99)
HCT VFR BLD AUTO: 33.6 % (ref 42–52)
HGB BLD-MCNC: 10.6 G/DL (ref 14–18)
IMM GRANULOCYTES # BLD: 0 K/UL
LYMPHOCYTES # BLD: 0.8 K/UL (ref 1.1–4.5)
LYMPHOCYTES NFR BLD: 15.3 % (ref 20–40)
MCH RBC QN AUTO: 29.6 PG (ref 27–31)
MCHC RBC AUTO-ENTMCNC: 31.5 G/DL (ref 33–37)
MCV RBC AUTO: 93.9 FL (ref 80–94)
MONOCYTES # BLD: 0.5 K/UL (ref 0–0.9)
MONOCYTES NFR BLD: 8.6 % (ref 0–10)
NEUTROPHILS # BLD: 3.8 K/UL (ref 1.5–7.5)
NEUTS SEG NFR BLD: 69.6 % (ref 50–65)
PERFORMED ON: ABNORMAL
PERFORMED ON: NORMAL
PLATELET # BLD AUTO: 156 K/UL (ref 130–400)
PMV BLD AUTO: 12.3 FL (ref 9.4–12.4)
POTASSIUM SERPL-SCNC: 3.6 MMOL/L (ref 3.5–5.1)
PROT SERPL-MCNC: 5.1 G/DL (ref 6.4–8.3)
RBC # BLD AUTO: 3.58 M/UL (ref 4.7–6.1)
SODIUM SERPL-SCNC: 144 MMOL/L (ref 136–145)
WBC # BLD AUTO: 5.5 K/UL (ref 4.8–10.8)

## 2025-07-02 PROCEDURE — 6370000000 HC RX 637 (ALT 250 FOR IP): Performed by: INTERNAL MEDICINE

## 2025-07-02 PROCEDURE — 6370000000 HC RX 637 (ALT 250 FOR IP)

## 2025-07-02 PROCEDURE — 6370000000 HC RX 637 (ALT 250 FOR IP): Performed by: NURSE PRACTITIONER

## 2025-07-02 PROCEDURE — 2700000000 HC OXYGEN THERAPY PER DAY

## 2025-07-02 PROCEDURE — 8010000000 HC HEMODIALYSIS ACUTE INPT

## 2025-07-02 PROCEDURE — 82962 GLUCOSE BLOOD TEST: CPT

## 2025-07-02 PROCEDURE — 6370000000 HC RX 637 (ALT 250 FOR IP): Performed by: STUDENT IN AN ORGANIZED HEALTH CARE EDUCATION/TRAINING PROGRAM

## 2025-07-02 PROCEDURE — 94150 VITAL CAPACITY TEST: CPT

## 2025-07-02 PROCEDURE — 80053 COMPREHEN METABOLIC PANEL: CPT

## 2025-07-02 PROCEDURE — 1200000000 HC SEMI PRIVATE

## 2025-07-02 PROCEDURE — 6360000002 HC RX W HCPCS

## 2025-07-02 PROCEDURE — 94640 AIRWAY INHALATION TREATMENT: CPT

## 2025-07-02 PROCEDURE — 36415 COLL VENOUS BLD VENIPUNCTURE: CPT

## 2025-07-02 PROCEDURE — 2500000003 HC RX 250 WO HCPCS

## 2025-07-02 PROCEDURE — 94761 N-INVAS EAR/PLS OXIMETRY MLT: CPT

## 2025-07-02 PROCEDURE — 94660 CPAP INITIATION&MGMT: CPT

## 2025-07-02 PROCEDURE — 6360000002 HC RX W HCPCS: Performed by: NURSE PRACTITIONER

## 2025-07-02 PROCEDURE — 85025 COMPLETE CBC W/AUTO DIFF WBC: CPT

## 2025-07-02 RX ORDER — LACTULOSE 10 G/15ML
20 SOLUTION ORAL DAILY
Status: DISCONTINUED | OUTPATIENT
Start: 2025-07-02 | End: 2025-07-08

## 2025-07-02 RX ORDER — SODIUM BICARBONATE 325 MG/1
325 TABLET ORAL DAILY
Status: DISCONTINUED | OUTPATIENT
Start: 2025-07-03 | End: 2025-07-26 | Stop reason: HOSPADM

## 2025-07-02 RX ADMIN — IPRATROPIUM BROMIDE 0.5 MG: 0.5 SOLUTION RESPIRATORY (INHALATION) at 20:21

## 2025-07-02 RX ADMIN — METOPROLOL SUCCINATE 25 MG: 25 TABLET, FILM COATED, EXTENDED RELEASE ORAL at 07:11

## 2025-07-02 RX ADMIN — LACTULOSE 20 G: 20 SOLUTION ORAL at 15:49

## 2025-07-02 RX ADMIN — IPRATROPIUM BROMIDE 0.5 MG: 0.5 SOLUTION RESPIRATORY (INHALATION) at 11:28

## 2025-07-02 RX ADMIN — GUAIFENESIN 600 MG: 600 TABLET, EXTENDED RELEASE ORAL at 21:24

## 2025-07-02 RX ADMIN — ALLOPURINOL 300 MG: 300 TABLET ORAL at 07:11

## 2025-07-02 RX ADMIN — MICONAZOLE NITRATE: 2 POWDER TOPICAL at 21:25

## 2025-07-02 RX ADMIN — PRAVASTATIN SODIUM 20 MG: 20 TABLET ORAL at 21:24

## 2025-07-02 RX ADMIN — ENOXAPARIN SODIUM 60 MG: 100 INJECTION SUBCUTANEOUS at 07:11

## 2025-07-02 RX ADMIN — ENOXAPARIN SODIUM 60 MG: 100 INJECTION SUBCUTANEOUS at 21:25

## 2025-07-02 RX ADMIN — PANTOPRAZOLE SODIUM 40 MG: 40 TABLET, DELAYED RELEASE ORAL at 06:19

## 2025-07-02 RX ADMIN — IPRATROPIUM BROMIDE 0.5 MG: 0.5 SOLUTION RESPIRATORY (INHALATION) at 14:42

## 2025-07-02 RX ADMIN — ASPIRIN 81 MG: 81 TABLET, COATED ORAL at 07:11

## 2025-07-02 RX ADMIN — GUAIFENESIN 600 MG: 600 TABLET, EXTENDED RELEASE ORAL at 07:11

## 2025-07-02 RX ADMIN — MICONAZOLE NITRATE: 2 POWDER TOPICAL at 15:49

## 2025-07-02 RX ADMIN — METOPROLOL SUCCINATE 25 MG: 25 TABLET, FILM COATED, EXTENDED RELEASE ORAL at 21:24

## 2025-07-02 RX ADMIN — SODIUM CHLORIDE, PRESERVATIVE FREE 10 ML: 5 INJECTION INTRAVENOUS at 07:11

## 2025-07-02 RX ADMIN — SODIUM CHLORIDE, PRESERVATIVE FREE 10 ML: 5 INJECTION INTRAVENOUS at 22:20

## 2025-07-02 RX ADMIN — IPRATROPIUM BROMIDE 0.5 MG: 0.5 SOLUTION RESPIRATORY (INHALATION) at 07:03

## 2025-07-02 RX ADMIN — MONTELUKAST SODIUM 10 MG: 10 TABLET, FILM COATED ORAL at 21:24

## 2025-07-02 RX ADMIN — SODIUM BICARBONATE 1300 MG: 650 TABLET ORAL at 07:11

## 2025-07-02 RX ADMIN — CETIRIZINE HYDROCHLORIDE 10 MG: 10 TABLET ORAL at 07:11

## 2025-07-02 NOTE — PROGRESS NOTES
Nephrology ([facility]) Progress Note    Patient:  Daljit Elizondo  YOB: 1960  Date of Service: 7/2/2025  MRN: [id number]   Acct: [id number]   Primary Care Physician: Jessie Helms APRN - CNP  Advance Directive: Full Code  Admit Date: 5/14/2025       Hospital Day: 49  Referring Provider: Rhys Pacheco MD    Patient independently seen and examined, Chart, Consults, Notes, Operative notes, Labs, Cardiology, and Radiology studies reviewed as available.    Chief complaint: Abnormal labs.    Subjective:  Daljit Elizondo is a 65 y.o. male for whom we were consulted for evaluation and treatment of acute kidney injury.  Patient denies any history of chronic kidney disease.  He has been in the hospital for the last month, poor historian and has never seen nephrology in the past.  Patient has severe abdominal obesity, hypertension, sleep apnea, type 2 diabetes, chronic diastolic CHF, sleep apnea.  Presented with increasing shortness of breath/dyspnea on exertion.  Hospital course remarkable for treatment with intravenous diuretics for the treatment of volume overload, CHF exacerbation.  His serum creatinine was 0.9 mg with estimated GFR more than 90 mL on admission.  Patient has significant drop in GFR and nephrology is consulted.  He was initially treated with IV fluid with minimal to no improvement of renal function.    This morning patient is more alert and awake.  He remained bedridden.  Today he has family members including his sister and brother-in-law to discuss possibility of dialysis.    Allergies:  Penicillamine, Silvadene [silver sulfadiazine], Aerohist [chlorpheniramine-methscop er], Cephalosporins, Hemp seed oil, Neosporin [neomycin-polymyxin-gramicidin], Penicillins, and Zinc    Medicines:  Current Facility-Administered Medications   Medication Dose Route Frequency Provider Last Rate Last Admin    [START ON 7/3/2025] sodium bicarbonate tablet 325 mg  325 mg Oral Daily  potassium chloride 10 mEq/100 mL IVPB (Peripheral Line)  10 mEq IntraVENous PRN Renay Thorne APRN - CNP        magnesium sulfate 2000 mg in 50 mL IVPB premix  2,000 mg IntraVENous PRN Renay Thorne APRN - CNP        sulfur hexafluoride microspheres (LUMASON) 60.7-25 MG injection 2 mL  2 mL IntraVENous ONCE PRN Renay Thorne APRN - CNP        insulin glargine (LANTUS) injection vial 30 Units  30 Units SubCUTAneous Nightly Renay Thorne APRN - CNP   30 Units at 06/30/25 2131    glucose chewable tablet 16 g  4 tablet Oral PRN Renay Thorne APRN - CNP        dextrose bolus 10% 125 mL  125 mL IntraVENous PRN Renay Thorne APRN - CNP   Stopped at 06/28/25 0806    Or    dextrose bolus 10% 250 mL  250 mL IntraVENous PRN Renay Thorne APRN - CNP        glucagon injection 1 mg  1 mg IntraMUSCular PRN Renay Thorne APRN - CNP        dextrose 10 % infusion   IntraVENous Continuous PRN Renay Thorne APRN - CNP           Past Medical History:  Past Medical History:   Diagnosis Date    Abrasion     Anxiety     Asthma     Bell's palsy     Cataracts, bilateral     Cellulitis     Diabetes (HCC)     Edema extremities     GERD (gastroesophageal reflux disease)     Gout     H/O tracheostomy     2015    H/O urinary retention     Hernia, hiatal     History of panniculitis     History of shingles     Hyperlipemia     Hypertension     Morbid obesity (HCC)     Non-ST elevated myocardial infarction (HCC)     11/2015    Obesity     Palliative care patient 06/30/2025    Paraphimosis     tx w circumcision 2015    Pulmonary hypertension (HCC)     Renal failure     Respiratory failure (HCC)     2015    Shoulder pain     Sleep apnea     Testosterone deficiency     Ulcer     Ulcer of calf (HCC)     Venous stasis of both lower extremities        Past Surgical History:  Past Surgical History:   Procedure Laterality Date    APPENDECTOMY      PT THINKS AS A CHILD, REMOVED     COLONOSCOPY      ENDOSCOPY, COLON, DIAGNOSTIC

## 2025-07-02 NOTE — PLAN OF CARE
Problem: Chronic Conditions and Co-morbidities  Goal: Patient's chronic conditions and co-morbidity symptoms are monitored and maintained or improved  7/2/2025 0045 by Deirdre Romano RN  Outcome: Progressing  Flowsheets (Taken 7/1/2025 2328)  Care Plan - Patient's Chronic Conditions and Co-Morbidity Symptoms are Monitored and Maintained or Improved:   Monitor and assess patient's chronic conditions and comorbid symptoms for stability, deterioration, or improvement   Collaborate with multidisciplinary team to address chronic and comorbid conditions and prevent exacerbation or deterioration   Update acute care plan with appropriate goals if chronic or comorbid symptoms are exacerbated and prevent overall improvement and discharge  7/1/2025 1719 by Teresita Aguero LPN  Outcome: Progressing     Problem: Discharge Planning  Goal: Discharge to home or other facility with appropriate resources  7/2/2025 0045 by Deirdre Romano RN  Outcome: Progressing  Flowsheets (Taken 7/1/2025 2328)  Discharge to home or other facility with appropriate resources:   Identify barriers to discharge with patient and caregiver   Arrange for needed discharge resources and transportation as appropriate   Identify discharge learning needs (meds, wound care, etc)   Refer to discharge planning if patient needs post-hospital services based on physician order or complex needs related to functional status, cognitive ability or social support system  7/1/2025 1719 by Teresita Aguero LPN  Outcome: Progressing     Problem: Pain  Goal: Verbalizes/displays adequate comfort level or baseline comfort level  7/2/2025 0045 by Deirdre Romano RN  Outcome: Progressing  7/1/2025 1719 by Teresita Aguero LPN  Outcome: Progressing     Problem: Safety - Adult  Goal: Free from fall injury  7/2/2025 0045 by Deirdre Romano RN  Outcome: Progressing  7/1/2025 1719 by Teresita Aguero LPN  Outcome: Progressing     Problem: ABCDS Injury  Oral mucous membranes remain intact  7/2/2025 0045 by Deirdre Romano RN  Outcome: Progressing  Flowsheets (Taken 7/1/2025 2328)  Oral Mucous Membranes Remain Intact:   Assess oral mucosa and hygiene practices   Implement preventative oral hygiene regimen   Implement oral medicated treatments as ordered  7/1/2025 1719 by Teresita Aguero LPN  Outcome: Progressing     Problem: Musculoskeletal - Adult  Goal: Return mobility to safest level of function  7/2/2025 0045 by Deirdre Romano RN  Outcome: Progressing  Flowsheets (Taken 7/1/2025 2328)  Return Mobility to Safest Level of Function:   Assess patient stability and activity tolerance for standing, transferring and ambulating with or without assistive devices   Assist with transfers and ambulation using safe patient handling equipment as needed   Ensure adequate protection for wounds/incisions during mobilization   Instruct patient/family in ordered activity level  7/1/2025 1719 by Teresita Aguero LPN  Outcome: Progressing  Goal: Maintain proper alignment of affected body part  7/2/2025 0045 by Deirdre Romano RN  Outcome: Progressing  Flowsheets (Taken 7/1/2025 2328)  Maintain proper alignment of affected body part:   Support and protect limb and body alignment per provider's orders   Instruct and reinforce with patient and family use of appropriate assistive device and precautions (e.g. spinal or hip dislocation precautions)  7/1/2025 1719 by Teresita Aguero LPN  Outcome: Progressing  Goal: Return ADL status to a safe level of function  7/2/2025 0045 by Deirdre Romano RN  Outcome: Progressing  Flowsheets (Taken 7/1/2025 2328)  Return ADL Status to a Safe Level of Function:   Administer medication as ordered   Assess activities of daily living deficits and provide assistive devices as needed   Assist and instruct patient to increase activity and self care as tolerated  7/1/2025 1719 by Teresita Aguero LPN  Outcome: Progressing     Problem:

## 2025-07-02 NOTE — PROGRESS NOTES
Daily Progress Note    Date:2025  Patient: Daljit Elizondo  : 1960  MRN:[id number]  CODE:Full Code No additional code details  PCP:Jessie Helms APRN - CNP    Admit Date: 2025 12:50 PM   LOS: 49 days       Subjective:    Wearing CPAP.  No worsening dyspnea.  No improvement in renal function, otherwise no significant interval change in renal function past few days.      Summary: 65-year-old male with morbid obesity, restrictive lung disease, sleep apnea, chronic hypoxic respiratory failure on supplemental O2 and CPAP, chronic systolic heart failure (EF 26-30% by echo 2024), diabetes with chronic venous stasis with lower extremity wounds, prolonged hospital course originally admitted  for volume overload, decompensated heart failure treated with IV loop diuretics, improved then transition to oral diuretics, due to significant debility considered for SNF placement but was declined by multiple facilities due to concern for the level of care he would require and inconsistently worked with PT (reported baseline mobility includes standing only to transfer from bed to scooter/chair), plan was then to discharge home with home health however he appealed his discharge multiple times.  He later developed a change in urine output noting dark brown urine with repeat labs showing acute renal failure with creatinine trending above 3, which did not improve despite IV fluid trial, consistent with acute tubular necrosis.  Followed in-house by nephrology, discussed possibility of dialysis which he declined.          Review of Systems  Negative      Objective:      Vital signs in last 24 hours:  Patient Vitals for the past 24 hrs:   BP Temp Temp src Pulse Resp SpO2   25 0721 127/85 96.8 °F (36 °C) Temporal 52 16 91 %   25 0703 -- -- -- 96 -- 94 %   25 0338 (!) 156/80 97.7 °F (36.5 °C) Temporal 50 18 95 %   25 1857 130/79 98.1 °F (36.7 °C) Oral (!) 103 16 94 %   25 1824 -- -- -- -- --  IntraVENous, Q6H PRN, Renay Thorne APRN - CNP    [Held by provider] polyethylene glycol (GLYCOLAX) packet 17 g, 17 g, Oral, Daily PRN, Renay Thorne APRN - CNP, 17 g at 06/07/25 0618    acetaminophen (TYLENOL) tablet 650 mg, 650 mg, Oral, Q6H PRN, 650 mg at 07/01/25 1737 **OR** acetaminophen (TYLENOL) suppository 650 mg, 650 mg, Rectal, Q6H PRN, Renay Thorne APRN - CNP    enoxaparin (LOVENOX) injection 60 mg, 60 mg, SubCUTAneous, BID, Renay Thorne APRN - CNP, 60 mg at 07/02/25 0711    potassium chloride (KLOR-CON M) extended release tablet 40 mEq, 40 mEq, Oral, PRN **OR** potassium bicarb-citric acid (EFFER-K) effervescent tablet 40 mEq, 40 mEq, Oral, PRN **OR** potassium chloride 10 mEq/100 mL IVPB (Peripheral Line), 10 mEq, IntraVENous, PRN, Renay Thorne APRN - CNP    magnesium sulfate 2000 mg in 50 mL IVPB premix, 2,000 mg, IntraVENous, PRN, Renay Thorne APRN - CNP    sulfur hexafluoride microspheres (LUMASON) 60.7-25 MG injection 2 mL, 2 mL, IntraVENous, ONCE PRN, Renay Thorne APRN - CNP    insulin glargine (LANTUS) injection vial 30 Units, 30 Units, SubCUTAneous, Nightly, Renay Thorne APRN - CNP, 30 Units at 06/30/25 2131    glucose chewable tablet 16 g, 4 tablet, Oral, PRN, Renay Thorne APRN - CNP    dextrose bolus 10% 125 mL, 125 mL, IntraVENous, PRN, Stopped at 06/28/25 0806 **OR** dextrose bolus 10% 250 mL, 250 mL, IntraVENous, PRN, Renay Thorne APRN - CNP    glucagon injection 1 mg, 1 mg, IntraMUSCular, PRN, Renay Thorne APRN - CNP    dextrose 10 % infusion, , IntraVENous, Continuous PRN, Renay Thorne APRN - CNP      Assessment/plan  Principal Problem:    Acute congestive heart failure, unspecified heart failure type ([facility])  Active Problems:    Diabetes (HCC)    Essential hypertension    GERD (gastroesophageal reflux disease)    Obesity, morbid (more than 100 lbs over ideal weight or BMI > 40) ([facility])    Hyperlipemia    Palliative care patient  Resolved

## 2025-07-02 NOTE — CARE COORDINATION
Was called to pts room to talk with pt and his sister Gabriella.  Nurse Ko was present for conversation.  Discussed with pt all that has transpired and what the current plan is.    As far as a dc plan, it is still uncertain. Pt has agreed to do dialysis as per nephrology indications. Explained to pt/Gabriella that there are a lot of uncertainties at this point, but if pt was able to get a permcath placed he would have to have outpt HD set up.  Where he would have to go would be dependent on his mobility status at that time.  It would be likely that he would have to go to a nursing rehab that has in house dialysis.  Those are more towards the [facility] area. Pt/family would be agreeable to going that far if that what was needed.   If HD ends up not being an option, [facility] in [place] was going to be able to accept him dependent upon getting a lift in the building that could support the pt.  As of right now they have not received that piece of equipment.  Encouraged pt to actively participate in therapy or any activity required to help him move towards the goal of getting better to eventually go home.  Gabriella was supportive and agreeable to plan thus far.  Both are aware that there are going to be challenges along the way.  Will keep pt and Gabriella updated.  Of note, Writer did contact the transfer center, a request had been called in a day or so ago per Dr. Howell.  This request ws for [facility], and [place] had declined due to capacity and not medically nes at that time.    Will cont to follow.  Electronically signed by Shira Tomlin RN on 7/2/2025 at 1:30 PM

## 2025-07-02 NOTE — PLAN OF CARE
[facility] Vascular Surgery     Vascular surgery was asked to evaluate patient for permcath placement for HD. It was noted that the patient's weight exceeds the weight capacity for the OR table. It is unsafe to continue with the permcath placement at this facility. Consider transfer to a facility that can accommodate that patient's body habitus in the OR.     Rocio Ku PA-C  7/2/2025

## 2025-07-03 LAB
ALBUMIN SERPL-MCNC: 2.6 G/DL (ref 3.5–5.2)
ALP SERPL-CCNC: 114 U/L (ref 40–129)
ALT SERPL-CCNC: <5 U/L (ref 10–50)
ANION GAP SERPL CALCULATED.3IONS-SCNC: 11 MMOL/L (ref 8–16)
AST SERPL-CCNC: 9 U/L (ref 10–50)
BASOPHILS # BLD: 0 K/UL (ref 0–0.2)
BASOPHILS NFR BLD: 0.5 % (ref 0–1)
BILIRUB SERPL-MCNC: 0.9 MG/DL (ref 0.2–1.2)
BUN SERPL-MCNC: 37 MG/DL (ref 8–23)
CALCIUM SERPL-MCNC: 8.2 MG/DL (ref 8.8–10.2)
CHLORIDE SERPL-SCNC: 110 MMOL/L (ref 98–107)
CO2 SERPL-SCNC: 22 MMOL/L (ref 22–29)
CREAT SERPL-MCNC: 3.6 MG/DL (ref 0.7–1.2)
EOSINOPHIL # BLD: 0.3 K/UL (ref 0–0.6)
EOSINOPHIL NFR BLD: 4.7 % (ref 0–5)
ERYTHROCYTE [DISTWIDTH] IN BLOOD BY AUTOMATED COUNT: 16.1 % (ref 11.5–14.5)
GLUCOSE BLD-MCNC: 127 MG/DL (ref 70–99)
GLUCOSE BLD-MCNC: 142 MG/DL (ref 70–99)
GLUCOSE BLD-MCNC: 145 MG/DL (ref 70–99)
GLUCOSE BLD-MCNC: 90 MG/DL (ref 70–99)
GLUCOSE SERPL-MCNC: 106 MG/DL (ref 70–99)
HCT VFR BLD AUTO: 35.1 % (ref 42–52)
HGB BLD-MCNC: 11 G/DL (ref 14–18)
IMM GRANULOCYTES # BLD: 0 K/UL
LYMPHOCYTES # BLD: 0.8 K/UL (ref 1.1–4.5)
LYMPHOCYTES NFR BLD: 13.6 % (ref 20–40)
MAGNESIUM SERPL-MCNC: 1.2 MG/DL (ref 1.6–2.4)
MCH RBC QN AUTO: 29.6 PG (ref 27–31)
MCHC RBC AUTO-ENTMCNC: 31.3 G/DL (ref 33–37)
MCV RBC AUTO: 94.4 FL (ref 80–94)
MONOCYTES # BLD: 0.5 K/UL (ref 0–0.9)
MONOCYTES NFR BLD: 8.3 % (ref 0–10)
NEUTROPHILS # BLD: 4.2 K/UL (ref 1.5–7.5)
NEUTS SEG NFR BLD: 72.2 % (ref 50–65)
PERFORMED ON: ABNORMAL
PERFORMED ON: NORMAL
PLATELET # BLD AUTO: 156 K/UL (ref 130–400)
PMV BLD AUTO: 12 FL (ref 9.4–12.4)
POTASSIUM SERPL-SCNC: 3.5 MMOL/L (ref 3.5–5)
POTASSIUM SERPL-SCNC: 3.5 MMOL/L (ref 3.5–5.1)
PROT SERPL-MCNC: 5.5 G/DL (ref 6.4–8.3)
RBC # BLD AUTO: 3.72 M/UL (ref 4.7–6.1)
SODIUM SERPL-SCNC: 143 MMOL/L (ref 136–145)
WBC # BLD AUTO: 5.8 K/UL (ref 4.8–10.8)

## 2025-07-03 PROCEDURE — 94640 AIRWAY INHALATION TREATMENT: CPT

## 2025-07-03 PROCEDURE — 82962 GLUCOSE BLOOD TEST: CPT

## 2025-07-03 PROCEDURE — 36415 COLL VENOUS BLD VENIPUNCTURE: CPT

## 2025-07-03 PROCEDURE — 94761 N-INVAS EAR/PLS OXIMETRY MLT: CPT

## 2025-07-03 PROCEDURE — 6370000000 HC RX 637 (ALT 250 FOR IP)

## 2025-07-03 PROCEDURE — 6370000000 HC RX 637 (ALT 250 FOR IP): Performed by: STUDENT IN AN ORGANIZED HEALTH CARE EDUCATION/TRAINING PROGRAM

## 2025-07-03 PROCEDURE — 2580000003 HC RX 258

## 2025-07-03 PROCEDURE — 85025 COMPLETE CBC W/AUTO DIFF WBC: CPT

## 2025-07-03 PROCEDURE — 2500000003 HC RX 250 WO HCPCS

## 2025-07-03 PROCEDURE — 83735 ASSAY OF MAGNESIUM: CPT

## 2025-07-03 PROCEDURE — 6370000000 HC RX 637 (ALT 250 FOR IP): Performed by: NURSE PRACTITIONER

## 2025-07-03 PROCEDURE — 1200000000 HC SEMI PRIVATE

## 2025-07-03 PROCEDURE — 6360000002 HC RX W HCPCS: Performed by: NURSE PRACTITIONER

## 2025-07-03 PROCEDURE — 6360000002 HC RX W HCPCS

## 2025-07-03 PROCEDURE — 80053 COMPREHEN METABOLIC PANEL: CPT

## 2025-07-03 PROCEDURE — 2700000000 HC OXYGEN THERAPY PER DAY

## 2025-07-03 PROCEDURE — 94660 CPAP INITIATION&MGMT: CPT

## 2025-07-03 PROCEDURE — 6360000002 HC RX W HCPCS: Performed by: STUDENT IN AN ORGANIZED HEALTH CARE EDUCATION/TRAINING PROGRAM

## 2025-07-03 RX ORDER — MAGNESIUM SULFATE IN WATER 40 MG/ML
4000 INJECTION, SOLUTION INTRAVENOUS ONCE
Status: COMPLETED | OUTPATIENT
Start: 2025-07-03 | End: 2025-07-03

## 2025-07-03 RX ADMIN — IPRATROPIUM BROMIDE 0.5 MG: 0.5 SOLUTION RESPIRATORY (INHALATION) at 15:00

## 2025-07-03 RX ADMIN — SODIUM CHLORIDE: 0.9 INJECTION, SOLUTION INTRAVENOUS at 09:39

## 2025-07-03 RX ADMIN — METOPROLOL SUCCINATE 25 MG: 25 TABLET, FILM COATED, EXTENDED RELEASE ORAL at 09:29

## 2025-07-03 RX ADMIN — ALLOPURINOL 300 MG: 300 TABLET ORAL at 09:29

## 2025-07-03 RX ADMIN — SODIUM BICARBONATE 325 MG: 650 TABLET ORAL at 09:29

## 2025-07-03 RX ADMIN — PRAVASTATIN SODIUM 20 MG: 20 TABLET ORAL at 22:24

## 2025-07-03 RX ADMIN — PANTOPRAZOLE SODIUM 40 MG: 40 TABLET, DELAYED RELEASE ORAL at 05:19

## 2025-07-03 RX ADMIN — MICONAZOLE NITRATE: 2 POWDER TOPICAL at 22:31

## 2025-07-03 RX ADMIN — METOPROLOL SUCCINATE 25 MG: 25 TABLET, FILM COATED, EXTENDED RELEASE ORAL at 22:24

## 2025-07-03 RX ADMIN — IPRATROPIUM BROMIDE 0.5 MG: 0.5 SOLUTION RESPIRATORY (INHALATION) at 07:51

## 2025-07-03 RX ADMIN — SODIUM CHLORIDE, PRESERVATIVE FREE 10 ML: 5 INJECTION INTRAVENOUS at 09:29

## 2025-07-03 RX ADMIN — MAGNESIUM SULFATE HEPTAHYDRATE 4000 MG: 40 INJECTION, SOLUTION INTRAVENOUS at 09:45

## 2025-07-03 RX ADMIN — CETIRIZINE HYDROCHLORIDE 10 MG: 10 TABLET ORAL at 09:29

## 2025-07-03 RX ADMIN — MICONAZOLE NITRATE: 2 POWDER TOPICAL at 09:45

## 2025-07-03 RX ADMIN — GUAIFENESIN 600 MG: 600 TABLET, EXTENDED RELEASE ORAL at 22:24

## 2025-07-03 RX ADMIN — IPRATROPIUM BROMIDE 0.5 MG: 0.5 SOLUTION RESPIRATORY (INHALATION) at 20:10

## 2025-07-03 RX ADMIN — ASPIRIN 81 MG: 81 TABLET, COATED ORAL at 09:29

## 2025-07-03 RX ADMIN — LACTULOSE 20 G: 20 SOLUTION ORAL at 09:29

## 2025-07-03 RX ADMIN — ENOXAPARIN SODIUM 60 MG: 100 INJECTION SUBCUTANEOUS at 09:33

## 2025-07-03 RX ADMIN — ENOXAPARIN SODIUM 60 MG: 100 INJECTION SUBCUTANEOUS at 22:24

## 2025-07-03 RX ADMIN — IPRATROPIUM BROMIDE 0.5 MG: 0.5 SOLUTION RESPIRATORY (INHALATION) at 10:46

## 2025-07-03 RX ADMIN — MONTELUKAST SODIUM 10 MG: 10 TABLET, FILM COATED ORAL at 22:24

## 2025-07-03 RX ADMIN — GUAIFENESIN 600 MG: 600 TABLET, EXTENDED RELEASE ORAL at 09:29

## 2025-07-03 RX ADMIN — SODIUM CHLORIDE, PRESERVATIVE FREE 10 ML: 5 INJECTION INTRAVENOUS at 22:31

## 2025-07-03 RX ADMIN — ONDANSETRON 4 MG: 4 TABLET, ORALLY DISINTEGRATING ORAL at 18:34

## 2025-07-03 NOTE — PLAN OF CARE
Problem: Nutrition Deficit:  Goal: Optimize nutritional status  Recent Flowsheet Documentation  Taken 7/3/2025 1306 by Miriam Maldonado, MS, RD, LD  Nutrient intake appropriate for improving, restoring, or maintaining nutritional needs:   Assess nutritional status and recommend course of action   Monitor oral intake, labs, and treatment plans   Recommend appropriate diets, oral nutritional supplements, and vitamin/mineral supplements  7/2/2025 4937 by Amie Hernandez, RN  Outcome: Progressing

## 2025-07-03 NOTE — PROGRESS NOTES
Nephrology ([facility]) Progress Note    Patient:  Daljit Elizondo  YOB: 1960  Date of Service: 7/3/2025  MRN: [id number]   Acct: [id number]   Primary Care Physician: Jessie Helms APRN - CNP  Advance Directive: Full Code  Admit Date: 5/14/2025       Hospital Day: 50  Referring Provider: Rhys Pacheco MD    Patient independently seen and examined, Chart, Consults, Notes, Operative notes, Labs, Cardiology, and Radiology studies reviewed as available.    Chief complaint: Abnormal labs.    Subjective:  Daljit Elizondo is a 65 y.o. male for whom we were consulted for evaluation and treatment of acute kidney injury.  Patient denies any history of chronic kidney disease.  He has been in the hospital for the last month, poor historian and has never seen nephrology in the past.  Patient has severe abdominal obesity, hypertension, sleep apnea, type 2 diabetes, chronic diastolic CHF, sleep apnea.  Presented with increasing shortness of breath/dyspnea on exertion.  Hospital course remarkable for treatment with intravenous diuretics for the treatment of volume overload, CHF exacerbation.  His serum creatinine was 0.9 mg with estimated GFR more than 90 mL on admission.  Patient has significant drop in GFR and nephrology is consulted.  He was initially treated with IV fluid with minimal to no improvement of renal function.  Patient now agreed to proceed with dialysis if needed.  I have consulted vascular surgery, unfortunately permacatheter cannot be done here as he is much heavier for operating room table in this institute.    This morning patient is feeling well.  He denies any shortness of breath.    Allergies:  Penicillamine, Silvadene [silver sulfadiazine], Aerohist [chlorpheniramine-methscop er], Cephalosporins, Hemp seed oil, Neosporin [neomycin-polymyxin-gramicidin], Penicillins, and Zinc    Medicines:  Current Facility-Administered Medications   Medication Dose Route Frequency

## 2025-07-03 NOTE — PROGRESS NOTES
Daily Progress Note    Date:7/3/2025  Patient: Daljit Elizondo  : 1960  MRN:[id number]  CODE:Full Code No additional code details  PCP:Jessie Helms APRN - CNP    Admit Date: 2025 12:50 PM   LOS: 50 days       Subjective:    No acute events overnight.  Denies any worsening dyspnea since yesterday.  Still having some urine output.      Summary: 65-year-old male with morbid obesity, restrictive lung disease, sleep apnea, chronic hypoxic respiratory failure on supplemental O2 and CPAP, chronic systolic heart failure (EF 26-30% by echo 2024), diabetes with chronic venous stasis with lower extremity wounds, prolonged hospital course originally admitted  for volume overload, decompensated heart failure treated with IV loop diuretics, improved then transition to oral diuretics, due to significant debility considered for SNF placement but was declined by multiple facilities due to concern for the level of care he would require and inconsistently worked with PT (reported baseline mobility includes standing only to transfer from bed to scooter/chair), plan was then to discharge home with home health however he appealed his discharge multiple times.  He later developed a change in urine output noting dark brown urine with repeat labs showing acute renal failure with creatinine trending above 3, which did not improve despite IV fluid trial, consistent with acute tubular necrosis.  Followed in-house by nephrology, discussed possibility of dialysis which he declined.          Review of Systems  Negative      Objective:      Vital signs in last 24 hours:  Patient Vitals for the past 24 hrs:   BP Temp Temp src Pulse Resp SpO2 Weight   25 0757 (!) 138/98 97.2 °F (36.2 °C) Temporal 100 16 96 % --   25 0751 -- -- -- -- -- 91 % --   25 0357 133/84 97.9 °F (36.6 °C) Axillary (!) 111 22 94 % (!) 216 kg (476 lb 3.1 oz)   25 (!) 141/89 -- -- (!) 110 -- -- --   25 -- -- -- -- -- 94

## 2025-07-03 NOTE — PLAN OF CARE
Problem: Chronic Conditions and Co-morbidities  Goal: Patient's chronic conditions and co-morbidity symptoms are monitored and maintained or improved  7/2/2025 2345 by Amie Hernandez RN  Outcome: Progressing  7/2/2025 1756 by Teresiat Aguero LPN  Outcome: Progressing     Problem: Discharge Planning  Goal: Discharge to home or other facility with appropriate resources  7/2/2025 2345 by Amie Hernandez RN  Outcome: Progressing  7/2/2025 1756 by Teresita Aguero LPN  Outcome: Progressing     Problem: Pain  Goal: Verbalizes/displays adequate comfort level or baseline comfort level  7/2/2025 2345 by Amie Hernandez RN  Outcome: Progressing  7/2/2025 1756 by Teresita Aguero LPN  Outcome: Progressing     Problem: Safety - Adult  Goal: Free from fall injury  7/2/2025 2345 by Amie Hernandez RN  Outcome: Progressing  7/2/2025 1756 by Teresita Aguero LPN  Outcome: Progressing     Problem: ABCDS Injury Assessment  Goal: Absence of physical injury  7/2/2025 2345 by Amie Hernandez RN  Outcome: Progressing  7/2/2025 1756 by Teresita Aguero LPN  Outcome: Progressing     Problem: Skin/Tissue Integrity  Goal: Skin integrity remains intact  Description: 1.  Monitor for areas of redness and/or skin breakdown2.  Assess vascular access sites hourly3.  Every 4-6 hours minimum:  Change oxygen saturation probe site4.  Every 4-6 hours:  If on nasal continuous positive airway pressure, respiratory therapy assess nares and determine need for appliance change or resting period  7/2/2025 2345 by Amie Hernandez RN  Outcome: Progressing  7/2/2025 1756 by Teresita Aguero LPN  Outcome: Progressing     Problem: Nutrition Deficit:  Goal: Optimize nutritional status  7/2/2025 2345 by Amie Hernandez RN  Outcome: Progressing  7/2/2025 1756 by Tereista Aguero LPN  Outcome: Progressing     Problem: Neurosensory - Adult  Goal: Achieves stable or improved neurological status  7/2/2025 2345 by Amie Hernandez RN  Outcome:

## 2025-07-03 NOTE — PROGRESS NOTES
Nutrition Assessment     Type and Reason for Visit: Reassess    Nutrition Recommendations/Plan:   Continue current POC  Follow for labs     Malnutrition Assessment:  Malnutrition Status: Severe malnutrition    Nutrition Assessment:  Pt's po intake remains the same.  Doing better with breakfast now that Rice Krispies have been reordered.  Weight has increased in the past week.  Weight is now 216Kg -- an increase of 48#.  Edema only trace.  Aware left pump on bed--so current pt weight includes pump weight.    Estimated Daily Nutrient Needs:  Energy (kcal):  1096-6057 kcals (8-11 kcals/kg) Weight Used for Energy Requirements: Current     Protein (g):  145g Weight Used for Protein Requirements: Ideal        Fluid (ml/day):  3326-0697 ml Method Used for Fluid Requirements: 1 ml/kcal    Nutrition Related Findings:   trace generalized and BLE edema Wound Type: Pressure Injury (laceration)    Current Nutrition Therapies:    ADULT DIET; Regular; 3 carb choices (45 gm/meal); Low Sodium (2 gm); SEND RICE KRISPIES AT BREAKFAST. NO OATMEAL    Anthropometric Measures:  Height: 175.3 cm (5' 9.02\")  Current Body Wt: 193.9 kg (427 lb 7.6 oz)   BMI: 63.1        Nutrition Diagnosis:   Inadequate oral intake, Altered nutrition-related lab values related to limited food acceptance, increase demand for energy/nutrients, renal dysfunction as evidenced by intake 0-25%, wounds, lab values    Nutrition Interventions:   Food and/or Nutrient Delivery: Continue Current Diet  Nutrition Education/Counseling: Survival skills/brief education completed  Coordination of Nutrition Care: Continue to monitor while inpatient  Plan of Care discussed with: pt    Goals:  Goals: Meet at least 75% of estimated needs, PO intake 50% or greater, Maintain adequate nutrition status  Type of Goal: Continue current goal  Previous Goal Met: No Progress toward Goal(s)    Nutrition Monitoring and Evaluation:   Behavioral-Environmental Outcomes: Readiness for

## 2025-07-03 NOTE — PLAN OF CARE
Problem: Chronic Conditions and Co-morbidities  Goal: Patient's chronic conditions and co-morbidity symptoms are monitored and maintained or improved  Outcome: Progressing  Flowsheets (Taken 7/3/2025 0725)  Care Plan - Patient's Chronic Conditions and Co-Morbidity Symptoms are Monitored and Maintained or Improved:   Monitor and assess patient's chronic conditions and comorbid symptoms for stability, deterioration, or improvement   Collaborate with multidisciplinary team to address chronic and comorbid conditions and prevent exacerbation or deterioration   Update acute care plan with appropriate goals if chronic or comorbid symptoms are exacerbated and prevent overall improvement and discharge     Problem: Discharge Planning  Goal: Discharge to home or other facility with appropriate resources  Outcome: Progressing  Flowsheets (Taken 7/3/2025 0725)  Discharge to home or other facility with appropriate resources:   Identify barriers to discharge with patient and caregiver   Arrange for needed discharge resources and transportation as appropriate   Identify discharge learning needs (meds, wound care, etc)   Refer to discharge planning if patient needs post-hospital services based on physician order or complex needs related to functional status, cognitive ability or social support system     Problem: Pain  Goal: Verbalizes/displays adequate comfort level or baseline comfort level  Outcome: Progressing     Problem: Safety - Adult  Goal: Free from fall injury  Outcome: Progressing     Problem: ABCDS Injury Assessment  Goal: Absence of physical injury  Outcome: Progressing     Problem: Skin/Tissue Integrity  Goal: Skin integrity remains intact  Description: 1.  Monitor for areas of redness and/or skin breakdown2.  Assess vascular access sites hourly3.  Every 4-6 hours minimum:  Change oxygen saturation probe site4.  Every 4-6 hours:  If on nasal continuous positive airway pressure, respiratory therapy assess nares and

## 2025-07-04 LAB
ALBUMIN SERPL-MCNC: 2.9 G/DL (ref 3.5–5.2)
ALP SERPL-CCNC: 125 U/L (ref 40–129)
ALT SERPL-CCNC: <5 U/L (ref 10–50)
ANION GAP SERPL CALCULATED.3IONS-SCNC: 11 MMOL/L (ref 8–16)
AST SERPL-CCNC: 7 U/L (ref 10–50)
BACTERIA URNS QL MICRO: ABNORMAL /HPF
BILIRUB SERPL-MCNC: 0.9 MG/DL (ref 0.2–1.2)
BILIRUB UR QL STRIP: NEGATIVE
BUN SERPL-MCNC: 34 MG/DL (ref 8–23)
CALCIUM SERPL-MCNC: 8.6 MG/DL (ref 8.8–10.2)
CHLORIDE SERPL-SCNC: 109 MMOL/L (ref 98–107)
CLARITY UR: ABNORMAL
CO2 SERPL-SCNC: 23 MMOL/L (ref 22–29)
COLOR UR: ABNORMAL
CREAT SERPL-MCNC: 3.3 MG/DL (ref 0.7–1.2)
CRYSTALS URNS MICRO: ABNORMAL /HPF
EPI CELLS #/AREA URNS AUTO: 1 /HPF (ref 0–5)
ERYTHROCYTE [DISTWIDTH] IN BLOOD BY AUTOMATED COUNT: 16.3 % (ref 11.5–14.5)
GLUCOSE BLD-MCNC: 118 MG/DL (ref 70–99)
GLUCOSE BLD-MCNC: 126 MG/DL (ref 70–99)
GLUCOSE BLD-MCNC: 136 MG/DL (ref 70–99)
GLUCOSE BLD-MCNC: 146 MG/DL (ref 70–99)
GLUCOSE SERPL-MCNC: 154 MG/DL (ref 70–99)
GLUCOSE UR STRIP.AUTO-MCNC: NEGATIVE MG/DL
HCT VFR BLD AUTO: 39.4 % (ref 42–52)
HGB BLD-MCNC: 11.9 G/DL (ref 14–18)
HGB UR STRIP.AUTO-MCNC: ABNORMAL MG/L
HYALINE CASTS #/AREA URNS AUTO: 3 /HPF (ref 0–8)
KETONES UR STRIP.AUTO-MCNC: NEGATIVE MG/DL
LEUKOCYTE ESTERASE UR QL STRIP.AUTO: ABNORMAL
MCH RBC QN AUTO: 29.3 PG (ref 27–31)
MCHC RBC AUTO-ENTMCNC: 30.2 G/DL (ref 33–37)
MCV RBC AUTO: 97 FL (ref 80–94)
NITRITE UR QL STRIP.AUTO: POSITIVE
PERFORMED ON: ABNORMAL
PH UR STRIP.AUTO: 8.5 [PH] (ref 5–8)
PLATELET # BLD AUTO: 170 K/UL (ref 130–400)
PMV BLD AUTO: 12.6 FL (ref 9.4–12.4)
POTASSIUM SERPL-SCNC: 3.8 MMOL/L (ref 3.5–5)
PROT SERPL-MCNC: 6 G/DL (ref 6.4–8.3)
PROT UR STRIP.AUTO-MCNC: 100 MG/DL
RBC # BLD AUTO: 4.06 M/UL (ref 4.7–6.1)
RBC #/AREA URNS AUTO: >900 /HPF (ref 0–4)
SODIUM SERPL-SCNC: 143 MMOL/L (ref 136–145)
SP GR UR STRIP.AUTO: 1.01 (ref 1–1.03)
UROBILINOGEN UR STRIP.AUTO-MCNC: 1 E.U./DL
WBC # BLD AUTO: 6.6 K/UL (ref 4.8–10.8)
WBC #/AREA URNS AUTO: 9 /HPF (ref 0–5)

## 2025-07-04 PROCEDURE — 85027 COMPLETE CBC AUTOMATED: CPT

## 2025-07-04 PROCEDURE — 2700000000 HC OXYGEN THERAPY PER DAY

## 2025-07-04 PROCEDURE — 6370000000 HC RX 637 (ALT 250 FOR IP)

## 2025-07-04 PROCEDURE — 80053 COMPREHEN METABOLIC PANEL: CPT

## 2025-07-04 PROCEDURE — 82962 GLUCOSE BLOOD TEST: CPT

## 2025-07-04 PROCEDURE — 94761 N-INVAS EAR/PLS OXIMETRY MLT: CPT

## 2025-07-04 PROCEDURE — 36415 COLL VENOUS BLD VENIPUNCTURE: CPT

## 2025-07-04 PROCEDURE — 6370000000 HC RX 637 (ALT 250 FOR IP): Performed by: STUDENT IN AN ORGANIZED HEALTH CARE EDUCATION/TRAINING PROGRAM

## 2025-07-04 PROCEDURE — 6370000000 HC RX 637 (ALT 250 FOR IP): Performed by: NURSE PRACTITIONER

## 2025-07-04 PROCEDURE — 94660 CPAP INITIATION&MGMT: CPT

## 2025-07-04 PROCEDURE — 81001 URINALYSIS AUTO W/SCOPE: CPT

## 2025-07-04 PROCEDURE — 94640 AIRWAY INHALATION TREATMENT: CPT

## 2025-07-04 PROCEDURE — 94150 VITAL CAPACITY TEST: CPT

## 2025-07-04 PROCEDURE — 2500000003 HC RX 250 WO HCPCS

## 2025-07-04 PROCEDURE — 1200000000 HC SEMI PRIVATE

## 2025-07-04 PROCEDURE — 6360000002 HC RX W HCPCS: Performed by: NURSE PRACTITIONER

## 2025-07-04 PROCEDURE — 6360000002 HC RX W HCPCS

## 2025-07-04 RX ADMIN — ENOXAPARIN SODIUM 60 MG: 100 INJECTION SUBCUTANEOUS at 07:13

## 2025-07-04 RX ADMIN — SODIUM BICARBONATE 325 MG: 650 TABLET ORAL at 07:12

## 2025-07-04 RX ADMIN — ENOXAPARIN SODIUM 60 MG: 100 INJECTION SUBCUTANEOUS at 21:30

## 2025-07-04 RX ADMIN — MICONAZOLE NITRATE: 2 POWDER TOPICAL at 21:55

## 2025-07-04 RX ADMIN — ONDANSETRON 4 MG: 2 INJECTION, SOLUTION INTRAMUSCULAR; INTRAVENOUS at 05:12

## 2025-07-04 RX ADMIN — GUAIFENESIN 600 MG: 600 TABLET, EXTENDED RELEASE ORAL at 07:12

## 2025-07-04 RX ADMIN — PRAVASTATIN SODIUM 20 MG: 20 TABLET ORAL at 21:28

## 2025-07-04 RX ADMIN — IPRATROPIUM BROMIDE 0.5 MG: 0.5 SOLUTION RESPIRATORY (INHALATION) at 07:04

## 2025-07-04 RX ADMIN — ASPIRIN 81 MG: 81 TABLET, COATED ORAL at 07:12

## 2025-07-04 RX ADMIN — IPRATROPIUM BROMIDE 0.5 MG: 0.5 SOLUTION RESPIRATORY (INHALATION) at 10:44

## 2025-07-04 RX ADMIN — SODIUM CHLORIDE, PRESERVATIVE FREE 10 ML: 5 INJECTION INTRAVENOUS at 07:12

## 2025-07-04 RX ADMIN — IPRATROPIUM BROMIDE 0.5 MG: 0.5 SOLUTION RESPIRATORY (INHALATION) at 14:51

## 2025-07-04 RX ADMIN — CETIRIZINE HYDROCHLORIDE 10 MG: 10 TABLET ORAL at 07:12

## 2025-07-04 RX ADMIN — GUAIFENESIN 600 MG: 600 TABLET, EXTENDED RELEASE ORAL at 21:29

## 2025-07-04 RX ADMIN — ALLOPURINOL 300 MG: 300 TABLET ORAL at 07:12

## 2025-07-04 RX ADMIN — SODIUM CHLORIDE, PRESERVATIVE FREE 10 ML: 5 INJECTION INTRAVENOUS at 22:02

## 2025-07-04 RX ADMIN — IPRATROPIUM BROMIDE 0.5 MG: 0.5 SOLUTION RESPIRATORY (INHALATION) at 19:23

## 2025-07-04 RX ADMIN — INSULIN GLARGINE 30 UNITS: 100 INJECTION, SOLUTION SUBCUTANEOUS at 21:28

## 2025-07-04 RX ADMIN — METOPROLOL SUCCINATE 25 MG: 25 TABLET, FILM COATED, EXTENDED RELEASE ORAL at 07:12

## 2025-07-04 RX ADMIN — PANTOPRAZOLE SODIUM 40 MG: 40 TABLET, DELAYED RELEASE ORAL at 05:11

## 2025-07-04 RX ADMIN — MONTELUKAST SODIUM 10 MG: 10 TABLET, FILM COATED ORAL at 21:28

## 2025-07-04 RX ADMIN — METOPROLOL SUCCINATE 25 MG: 25 TABLET, FILM COATED, EXTENDED RELEASE ORAL at 21:28

## 2025-07-04 NOTE — PLAN OF CARE
Problem: Chronic Conditions and Co-morbidities  Goal: Patient's chronic conditions and co-morbidity symptoms are monitored and maintained or improved  7/4/2025 0942 by Shira Salinas RN  Outcome: Progressing  7/3/2025 2316 by Amie Hernandez RN  Outcome: Progressing     Problem: Discharge Planning  Goal: Discharge to home or other facility with appropriate resources  7/4/2025 0942 by Shira Salinas RN  Outcome: Progressing  7/3/2025 2316 by Amie Hernandez RN  Outcome: Progressing     Problem: Pain  Goal: Verbalizes/displays adequate comfort level or baseline comfort level  7/4/2025 0942 by Shira Salinas RN  Outcome: Progressing  7/3/2025 2316 by Amie Hernandez RN  Outcome: Progressing     Problem: Safety - Adult  Goal: Free from fall injury  7/4/2025 0942 by Shira Salinas RN  Outcome: Progressing  7/3/2025 2316 by Amie Hernandez RN  Outcome: Progressing     Problem: ABCDS Injury Assessment  Goal: Absence of physical injury  7/4/2025 0942 by Shira Salinas RN  Outcome: Progressing  7/3/2025 2316 by Amie Hernandez RN  Outcome: Progressing     Problem: Skin/Tissue Integrity  Goal: Skin integrity remains intact  Description: 1.  Monitor for areas of redness and/or skin breakdown2.  Assess vascular access sites hourly3.  Every 4-6 hours minimum:  Change oxygen saturation probe site4.  Every 4-6 hours:  If on nasal continuous positive airway pressure, respiratory therapy assess nares and determine need for appliance change or resting period  7/4/2025 0942 by Shira Salinas RN  Outcome: Progressing  7/3/2025 2316 by Amie Hernandez RN  Outcome: Progressing     Problem: Nutrition Deficit:  Goal: Optimize nutritional status  7/4/2025 0942 by Shira Salinas RN  Outcome: Progressing  7/3/2025 2316 by Amie Hernandez RN  Outcome: Progressing     Problem: Neurosensory - Adult  Goal: Achieves stable or improved neurological status  7/4/2025 0942 by Shira Salinas RN  Outcome: Progressing  7/3/2025 2316 by Amie Hernandez

## 2025-07-04 NOTE — PROGRESS NOTES
Aliza Iniguez called to get updates on patient status.  I was told someone would call each shift for updates until a bed becomes available.

## 2025-07-04 NOTE — PROGRESS NOTES
Received a call from [facility] to informed us that they are no longer accepting the patient.

## 2025-07-04 NOTE — PROGRESS NOTES
Zinc    Medicines:  Current Facility-Administered Medications   Medication Dose Route Frequency Provider Last Rate Last Admin    sodium bicarbonate tablet 325 mg  325 mg Oral Daily Rhys Pacheco MD   325 mg at 07/04/25 0712    lactulose (CHRONULAC) 10 GM/15ML solution 20 g  20 g Oral Daily Rhys Pacheco MD   20 g at 07/03/25 0929    loperamide (IMODIUM) capsule 2 mg  2 mg Oral 4x Daily PRN Cody Howell MD   2 mg at 06/16/25 1738    insulin lispro (HUMALOG,ADMELOG) injection vial 0-4 Units  0-4 Units SubCUTAneous 4x Daily AC & HS Cody Howell MD        [Held by provider] bumetanide (BUMEX) tablet 1 mg  1 mg Oral BID Jori Haji MD   1 mg at 06/09/25 1752    [Held by provider] polyethylene glycol (GLYCOLAX) packet 17 g  17 g Oral BID Juan Nicolas APRN - CNP   17 g at 06/09/25 0923    metoprolol succinate (TOPROL XL) extended release tablet 25 mg  25 mg Oral BID Juan Nicolas APRN - CNP   25 mg at 07/04/25 0712    ipratropium (ATROVENT) 0.02 % nebulizer solution 0.5 mg  0.5 mg Nebulization 4x Daily RT Ashleigh Jamil APRN   0.5 mg at 07/04/25 1044    [Held by provider] senna (SENOKOT) tablet 17.2 mg  2 tablet Oral BID Ashleigh Jamil APRN   17.2 mg at 06/10/25 0911    montelukast (SINGULAIR) tablet 10 mg  10 mg Oral Nightly Ashleigh Jamil APRN   10 mg at 07/03/25 2224    sodium chloride (OCEAN, BABY AYR) 0.65 % nasal spray 1 spray  1 spray Each Nostril Q2H PRN Ashleigh Jamil APRN   1 spray at 05/19/25 1118    tetrahydrozoline 0.05 % ophthalmic solution 1 drop  1 drop Both Eyes TID PRN Ashleigh Jamil APRN        lubrifresh P.M. (artificial tears) ophthalmic ointment   Both Eyes PRN Ruben Partida APRN - CNP   Given at 05/17/25 1222    cetirizine (ZYRTEC) tablet 10 mg  10 mg Oral Daily Ruben Partida APRN - CNP   10 mg at 07/04/25 0712    guaiFENesin (MUCINEX) extended release tablet 600 mg  600 mg Oral BID Ruben Partida APRN - CNP   600 mg at  This is within the normal range. Resting TBI is 1.88.     Result Date: 6/29/2025    Right side findings: This is within the normal range. Resting TBI is 1.56.   Left side findings: Resting SHEILA is 1.62. This is within the normal range. Resting TBI is 1.88.     XR FOOT LEFT (2 VIEWS)  Result Date: 6/28/2025  EXAM: 2 VIEWS OF THE LEFT FOOT.  HISTORY: left foot wound  COMPARISON: None.  FINDINGS: There is no acute fracture or dislocation. No definite osseous erosions are seen. Joint spaces and alignment are maintained.  Diffuse soft swelling of the foot is seen.       No acute osseous abnormality.  Soft tissue swelling of the foot.   ______________________________________ Electronically signed by: MOIRA ROSE M.D. Date:     06/28/2025 Time:    15:16     XR CHEST PORTABLE  Result Date: 6/27/2025  EXAM: CHEST RADIOGRAPH  TECHNIQUE: Single frontal chest radiograph.  HISTORY: Shortness of air  COMPARISON: 05/19/2025  FINDINGS:  Patient is rotated to the left. Continued low lung volumes cardiomegaly and central pulmonary vascular congestion with diffuse increased interstitial markings and alveolar opacities. Limited diagnostic sensitivity due to rotation, body habitus sand technique. Continued suggestion of left lower lobe consolidation. No definite pneumothorax.      1.  Continued low lung volumes, cardiomegaly, central pulmonary vascular congestion and interstitial edema consistent with congestive failure or fluid overload. 2. Left basilar consolidation and probable dependent left pleural effusion.    ______________________________________ Electronically signed by: SHANNAN SAUCEDA M.D. Date:     06/27/2025 Time:    10:41     US RENAL COMPLETE  Result Date: 6/11/2025  ULTRASOUND RENAL COMPLETE  INDICATION: 65-year-old male being seen with acute kidney injury  COMPARISON: None  TECHNIQUE: Sonography of the kidneys and urinary bladder was performed. All 3 plane dimensions are given in length by height by width or

## 2025-07-04 NOTE — PLAN OF CARE
Problem: Chronic Conditions and Co-morbidities  Goal: Patient's chronic conditions and co-morbidity symptoms are monitored and maintained or improved  7/3/2025 2316 by Amie Hernandez RN  Outcome: Progressing  7/3/2025 1547 by Gregg Chapman LPN  Outcome: Progressing  Flowsheets (Taken 7/3/2025 0725)  Care Plan - Patient's Chronic Conditions and Co-Morbidity Symptoms are Monitored and Maintained or Improved:   Monitor and assess patient's chronic conditions and comorbid symptoms for stability, deterioration, or improvement   Collaborate with multidisciplinary team to address chronic and comorbid conditions and prevent exacerbation or deterioration   Update acute care plan with appropriate goals if chronic or comorbid symptoms are exacerbated and prevent overall improvement and discharge     Problem: Discharge Planning  Goal: Discharge to home or other facility with appropriate resources  7/3/2025 2316 by Amie Hernandez RN  Outcome: Progressing  7/3/2025 1547 by Gregg Chapman LPN  Outcome: Progressing  Flowsheets (Taken 7/3/2025 0725)  Discharge to home or other facility with appropriate resources:   Identify barriers to discharge with patient and caregiver   Arrange for needed discharge resources and transportation as appropriate   Identify discharge learning needs (meds, wound care, etc)   Refer to discharge planning if patient needs post-hospital services based on physician order or complex needs related to functional status, cognitive ability or social support system     Problem: Pain  Goal: Verbalizes/displays adequate comfort level or baseline comfort level  7/3/2025 2316 by Amie Hernandez RN  Outcome: Progressing  7/3/2025 1547 by Gregg Chapman LPN  Outcome: Progressing     Problem: Safety - Adult  Goal: Free from fall injury  7/3/2025 2316 by Amie Hernandez RN  Outcome: Progressing  7/3/2025 1547 by Gregg Chapman LPN  Outcome: Progressing     Problem: ABCDS Injury

## 2025-07-04 NOTE — PROGRESS NOTES
Daily Progress Note    Date:2025  Patient: Daljit Elizondo  : 1960  MRN:[id number]  CODE:Full Code No additional code details  PCP:Jessie Helms APRN - CNP    Admit Date: 2025 12:50 PM   LOS: 51 days       Subjective:    He developed some gross hematuria since yesterday afternoon with some genitourinary symptoms with dysuria reported yesterday.  No fevers or chills noted.  No worsening dyspnea since yesterday.  He does report having a bowel movement yesterday        Summary: 65-year-old male with morbid obesity, restrictive lung disease, sleep apnea, chronic hypoxic respiratory failure on supplemental O2 and CPAP, chronic systolic heart failure (EF 26-30% by echo 2024), diabetes with chronic venous stasis with lower extremity wounds, prolonged hospital course originally admitted  for volume overload, decompensated heart failure treated with IV loop diuretics, improved then transition to oral diuretics, due to significant debility considered for SNF placement but was declined by multiple facilities due to concern for the level of care he would require and inconsistently worked with PT (reported baseline mobility includes standing only to transfer from bed to scooter/chair), plan was then to discharge home with home health however he appealed his discharge multiple times.  He later developed a change in urine output noting dark brown urine with repeat labs showing acute renal failure with creatinine trending above 3, which did not improve despite IV fluid trial, consistent with acute tubular necrosis.  Followed in-house by nephrology, discussed possibility of dialysis which he declined.          Review of Systems  Negative      Objective:      Vital signs in last 24 hours:  Patient Vitals for the past 24 hrs:   BP Temp Temp src Pulse Resp SpO2   25 1044 -- -- -- -- -- 95 %   25 0752 108/76 97.2 °F (36.2 °C) Temporal 98 18 96 %   25 0704 -- -- -- (!) 102 20 91 %   25 0417

## 2025-07-05 PROBLEM — J96.22 ACUTE ON CHRONIC RESPIRATORY FAILURE WITH HYPOXIA AND HYPERCAPNIA (HCC): Status: ACTIVE | Noted: 2025-07-05

## 2025-07-05 PROBLEM — J96.21 ACUTE ON CHRONIC RESPIRATORY FAILURE WITH HYPOXIA AND HYPERCAPNIA (HCC): Status: ACTIVE | Noted: 2025-07-05

## 2025-07-05 LAB
ALBUMIN SERPL-MCNC: 2.7 G/DL (ref 3.5–5.2)
ALP SERPL-CCNC: 119 U/L (ref 40–129)
ALT SERPL-CCNC: <5 U/L (ref 10–50)
ANION GAP SERPL CALCULATED.3IONS-SCNC: 11 MMOL/L (ref 8–16)
AST SERPL-CCNC: 9 U/L (ref 10–50)
BILIRUB SERPL-MCNC: 0.8 MG/DL (ref 0.2–1.2)
BUN SERPL-MCNC: 33 MG/DL (ref 8–23)
CALCIUM SERPL-MCNC: 8.2 MG/DL (ref 8.8–10.2)
CHLORIDE SERPL-SCNC: 107 MMOL/L (ref 98–107)
CO2 SERPL-SCNC: 23 MMOL/L (ref 22–29)
CREAT SERPL-MCNC: 3.1 MG/DL (ref 0.7–1.2)
ERYTHROCYTE [DISTWIDTH] IN BLOOD BY AUTOMATED COUNT: 16.5 % (ref 11.5–14.5)
GLUCOSE BLD-MCNC: 124 MG/DL (ref 70–99)
GLUCOSE BLD-MCNC: 125 MG/DL (ref 70–99)
GLUCOSE BLD-MCNC: 130 MG/DL (ref 70–99)
GLUCOSE BLD-MCNC: 144 MG/DL (ref 70–99)
GLUCOSE SERPL-MCNC: 143 MG/DL (ref 70–99)
HCT VFR BLD AUTO: 38.7 % (ref 42–52)
HGB BLD-MCNC: 11.4 G/DL (ref 14–18)
MCH RBC QN AUTO: 28.6 PG (ref 27–31)
MCHC RBC AUTO-ENTMCNC: 29.5 G/DL (ref 33–37)
MCV RBC AUTO: 97 FL (ref 80–94)
PERFORMED ON: ABNORMAL
PLATELET # BLD AUTO: 164 K/UL (ref 130–400)
PMV BLD AUTO: 11.8 FL (ref 9.4–12.4)
POTASSIUM SERPL-SCNC: 3.9 MMOL/L (ref 3.5–5)
POTASSIUM SERPL-SCNC: 3.9 MMOL/L (ref 3.5–5.1)
PROT SERPL-MCNC: 5.9 G/DL (ref 6.4–8.3)
RBC # BLD AUTO: 3.99 M/UL (ref 4.7–6.1)
SODIUM SERPL-SCNC: 141 MMOL/L (ref 136–145)
WBC # BLD AUTO: 6.1 K/UL (ref 4.8–10.8)

## 2025-07-05 PROCEDURE — 94660 CPAP INITIATION&MGMT: CPT

## 2025-07-05 PROCEDURE — 6370000000 HC RX 637 (ALT 250 FOR IP)

## 2025-07-05 PROCEDURE — 6370000000 HC RX 637 (ALT 250 FOR IP): Performed by: STUDENT IN AN ORGANIZED HEALTH CARE EDUCATION/TRAINING PROGRAM

## 2025-07-05 PROCEDURE — 80053 COMPREHEN METABOLIC PANEL: CPT

## 2025-07-05 PROCEDURE — 94640 AIRWAY INHALATION TREATMENT: CPT

## 2025-07-05 PROCEDURE — 6360000002 HC RX W HCPCS

## 2025-07-05 PROCEDURE — 82962 GLUCOSE BLOOD TEST: CPT

## 2025-07-05 PROCEDURE — 36415 COLL VENOUS BLD VENIPUNCTURE: CPT

## 2025-07-05 PROCEDURE — 6360000002 HC RX W HCPCS: Performed by: NURSE PRACTITIONER

## 2025-07-05 PROCEDURE — 94150 VITAL CAPACITY TEST: CPT

## 2025-07-05 PROCEDURE — 1200000000 HC SEMI PRIVATE

## 2025-07-05 PROCEDURE — 85027 COMPLETE CBC AUTOMATED: CPT

## 2025-07-05 PROCEDURE — 6370000000 HC RX 637 (ALT 250 FOR IP): Performed by: NURSE PRACTITIONER

## 2025-07-05 PROCEDURE — 2500000003 HC RX 250 WO HCPCS

## 2025-07-05 PROCEDURE — 2700000000 HC OXYGEN THERAPY PER DAY

## 2025-07-05 RX ORDER — CIPROFLOXACIN 500 MG/1
500 TABLET, FILM COATED ORAL EVERY 12 HOURS SCHEDULED
Status: DISCONTINUED | OUTPATIENT
Start: 2025-07-05 | End: 2025-07-09

## 2025-07-05 RX ADMIN — MICONAZOLE NITRATE: 2 POWDER TOPICAL at 08:08

## 2025-07-05 RX ADMIN — PANTOPRAZOLE SODIUM 40 MG: 40 TABLET, DELAYED RELEASE ORAL at 06:42

## 2025-07-05 RX ADMIN — CETIRIZINE HYDROCHLORIDE 10 MG: 10 TABLET ORAL at 08:07

## 2025-07-05 RX ADMIN — IPRATROPIUM BROMIDE 0.5 MG: 0.5 SOLUTION RESPIRATORY (INHALATION) at 14:29

## 2025-07-05 RX ADMIN — ENOXAPARIN SODIUM 60 MG: 100 INJECTION SUBCUTANEOUS at 08:07

## 2025-07-05 RX ADMIN — GUAIFENESIN 600 MG: 600 TABLET, EXTENDED RELEASE ORAL at 08:07

## 2025-07-05 RX ADMIN — IPRATROPIUM BROMIDE 0.5 MG: 0.5 SOLUTION RESPIRATORY (INHALATION) at 10:27

## 2025-07-05 RX ADMIN — INSULIN GLARGINE 30 UNITS: 100 INJECTION, SOLUTION SUBCUTANEOUS at 20:36

## 2025-07-05 RX ADMIN — PRAVASTATIN SODIUM 20 MG: 20 TABLET ORAL at 20:39

## 2025-07-05 RX ADMIN — ACETAMINOPHEN 650 MG: 325 TABLET ORAL at 02:16

## 2025-07-05 RX ADMIN — METOPROLOL SUCCINATE 25 MG: 25 TABLET, FILM COATED, EXTENDED RELEASE ORAL at 20:40

## 2025-07-05 RX ADMIN — ALLOPURINOL 300 MG: 300 TABLET ORAL at 08:07

## 2025-07-05 RX ADMIN — GUAIFENESIN 600 MG: 600 TABLET, EXTENDED RELEASE ORAL at 20:39

## 2025-07-05 RX ADMIN — CIPROFLOXACIN HYDROCHLORIDE 500 MG: 500 TABLET, FILM COATED ORAL at 20:39

## 2025-07-05 RX ADMIN — IPRATROPIUM BROMIDE 0.5 MG: 0.5 SOLUTION RESPIRATORY (INHALATION) at 19:13

## 2025-07-05 RX ADMIN — IPRATROPIUM BROMIDE 0.5 MG: 0.5 SOLUTION RESPIRATORY (INHALATION) at 06:58

## 2025-07-05 RX ADMIN — MICONAZOLE NITRATE: 2 POWDER TOPICAL at 20:40

## 2025-07-05 RX ADMIN — SODIUM BICARBONATE 325 MG: 650 TABLET ORAL at 08:07

## 2025-07-05 RX ADMIN — CIPROFLOXACIN HYDROCHLORIDE 500 MG: 500 TABLET, FILM COATED ORAL at 08:12

## 2025-07-05 RX ADMIN — ENOXAPARIN SODIUM 60 MG: 100 INJECTION SUBCUTANEOUS at 20:38

## 2025-07-05 RX ADMIN — SODIUM CHLORIDE, PRESERVATIVE FREE 10 ML: 5 INJECTION INTRAVENOUS at 22:54

## 2025-07-05 RX ADMIN — SODIUM CHLORIDE, PRESERVATIVE FREE 10 ML: 5 INJECTION INTRAVENOUS at 08:09

## 2025-07-05 RX ADMIN — ASPIRIN 81 MG: 81 TABLET, COATED ORAL at 08:07

## 2025-07-05 RX ADMIN — MONTELUKAST SODIUM 10 MG: 10 TABLET, FILM COATED ORAL at 20:39

## 2025-07-05 RX ADMIN — METOPROLOL SUCCINATE 25 MG: 25 TABLET, FILM COATED, EXTENDED RELEASE ORAL at 08:07

## 2025-07-05 ASSESSMENT — PAIN DESCRIPTION - LOCATION: LOCATION: HEAD

## 2025-07-05 ASSESSMENT — PAIN SCALES - GENERAL: PAINLEVEL_OUTOF10: 7

## 2025-07-05 ASSESSMENT — PAIN DESCRIPTION - ORIENTATION: ORIENTATION: RIGHT

## 2025-07-05 ASSESSMENT — PAIN DESCRIPTION - DESCRIPTORS: DESCRIPTORS: ACHING

## 2025-07-05 NOTE — PROGRESS NOTES
Daily Progress Note    Date:2025  Patient: Daljit Elizondo  : 1960  MRN:[id number]  CODE:Full Code No additional code details  PCP:Jessie Helms APRN - CNP    Admit Date: 2025 12:50 PM   LOS: 52 days       Subjective:    Reports recent dysuria but no fevers or chills.  Has not had any persistent or recurrent large-volume gross hematuria since yesterday.  Denies any worsening dyspnea from his baseline.  Continues on BiPAP.  He is having good urine output and renal function on labs showing some improvement.      Summary: 65-year-old male with morbid obesity, restrictive lung disease, sleep apnea, chronic hypoxic and hypercapnic respiratory failure on supplemental O2 and BiPAP support, chronic systolic heart failure (EF 26-30% by echo 2024), diabetes with chronic venous stasis with lower extremity wounds, prolonged hospital course originally admitted  for volume overload, decompensated heart failure treated with IV loop diuretics, improved then transition to oral diuretics, due to significant debility considered for SNF placement but was declined by multiple facilities due to concern for the level of care he would require and inconsistently worked with PT (reported baseline mobility includes standing only to transfer from bed to scooter/chair), plan was then to discharge home with home health however he appealed his discharge multiple times.  He later developed a change in urine output noting dark brown urine with repeat labs showing acute renal failure with creatinine trending above 3, which did not improve despite IV fluid trial, consistent with acute tubular necrosis.  Followed in-house by nephrology, discussed possibility of dialysis.  However he eventually demonstrated some renal recovery.     started treatment for UTI after he developed some genitourinary symptoms with hematuria.          Review of Systems  Negative      Objective:      Vital signs in last 24 hours:  Patient Vitals for

## 2025-07-05 NOTE — PROGRESS NOTES
nephrology ([facility]) Progress Note    Patient:  Daljit Elizondo  YOB: 1960  Date of Service: 7/5/2025  MRN: [id number]   Acct: [id number]   Primary Care Physician: Jessie Helms APRN - CNP  Advance Directive: Full Code  Admit Date: 5/14/2025       Hospital Day: 52  Referring Provider: Rhys Pacheco MD    Patient independently seen and examined, Chart, Consults, Notes, Operative notes, Labs, Cardiology, and Radiology studies reviewed as available.    Chief complaint: Abnormal labs.    Subjective:  Daljit Elizondo is a 65 y.o. male for whom we were consulted for evaluation and treatment of acute kidney injury.  Patient denies any history of chronic kidney disease.  He has been in the hospital for the last month, poor historian and has never seen nephrology in the past.  Patient has severe abdominal obesity, hypertension, sleep apnea, type 2 diabetes, chronic diastolic CHF, sleep apnea.  Presented with increasing shortness of breath/dyspnea on exertion.  Hospital course remarkable for treatment with intravenous diuretics for the treatment of volume overload, CHF exacerbation.  His serum creatinine was 0.9 mg with estimated GFR more than 90 mL on admission.  Patient has significant drop in GFR and nephrology is consulted.  He was initially treated with IV fluid with minimal to no improvement of renal function.  Patient now agreed to proceed with dialysis if needed.  I have consulted vascular surgery, unfortunately permacatheter cannot be done here as he is much heavier for operating room table in this institute.    This morning patient feels well.  He has good urine output.  Loop diuretics are currently on hold and he has slow improvement of GFR/serum creatinine.    Allergies:  Penicillamine, Silvadene [silver sulfadiazine], Aerohist [chlorpheniramine-methscop er], Cephalosporins, Hemp seed oil, Neosporin [neomycin-polymyxin-gramicidin], Penicillins, and

## 2025-07-05 NOTE — PLAN OF CARE
Problem: Chronic Conditions and Co-morbidities  Goal: Patient's chronic conditions and co-morbidity symptoms are monitored and maintained or improved  7/5/2025 1218 by Shira Salinas RN  Outcome: Progressing  7/4/2025 2349 by Jarrod Piper RN  Outcome: Progressing     Problem: Discharge Planning  Goal: Discharge to home or other facility with appropriate resources  7/5/2025 1218 by Shira Salinas RN  Outcome: Progressing  7/4/2025 2349 by Jarrod Piper RN  Outcome: Progressing     Problem: Pain  Goal: Verbalizes/displays adequate comfort level or baseline comfort level  7/5/2025 1218 by Shira Salinas RN  Outcome: Progressing  7/4/2025 2349 by Jarrod Piper RN  Outcome: Progressing     Problem: Safety - Adult  Goal: Free from fall injury  7/5/2025 1218 by Shira Salinas RN  Outcome: Progressing  7/4/2025 2349 by Jarrod Piper RN  Outcome: Progressing     Problem: ABCDS Injury Assessment  Goal: Absence of physical injury  7/5/2025 1218 by Shira Salinas RN  Outcome: Progressing  7/4/2025 2349 by Jarrod Piper RN  Outcome: Progressing     Problem: Skin/Tissue Integrity  Goal: Skin integrity remains intact  Description: 1.  Monitor for areas of redness and/or skin breakdown2.  Assess vascular access sites hourly3.  Every 4-6 hours minimum:  Change oxygen saturation probe site4.  Every 4-6 hours:  If on nasal continuous positive airway pressure, respiratory therapy assess nares and determine need for appliance change or resting period  7/5/2025 1218 by Shira Salinas RN  Outcome: Progressing  7/4/2025 2349 by Jarrod Piper RN  Outcome: Progressing     Problem: Nutrition Deficit:  Goal: Optimize nutritional status  7/5/2025 1218 by Shira Salinas RN  Outcome: Progressing  7/4/2025 2349 by Jarrod Piper RN  Outcome: Progressing     Problem: Neurosensory - Adult  Goal: Achieves stable or improved neurological status  7/5/2025 1218 by Shira Salinas RN  Outcome: Progressing  7/4/2025 2349 by Feliz

## 2025-07-06 ENCOUNTER — APPOINTMENT (OUTPATIENT)
Dept: GENERAL RADIOLOGY | Age: 65
DRG: 291 | End: 2025-07-06
Payer: MEDICARE

## 2025-07-06 LAB
ALBUMIN SERPL-MCNC: 2.7 G/DL (ref 3.5–5.2)
ALLENS TEST: ABNORMAL
ALP SERPL-CCNC: 119 U/L (ref 40–129)
ALT SERPL-CCNC: <5 U/L (ref 10–50)
ANION GAP SERPL CALCULATED.3IONS-SCNC: 10 MMOL/L (ref 8–16)
AST SERPL-CCNC: 13 U/L (ref 10–50)
BASE EXCESS ARTERIAL: -2.1 MMOL/L (ref -2–2)
BASOPHILS # BLD: 0 K/UL (ref 0–0.2)
BASOPHILS NFR BLD: 0.6 % (ref 0–1)
BI-LEVEL POS AIRWAY PRESSURE(EXPIRATORY): 11
BI-LEVEL POS AIRWAY PRESSURE(INSPIRATORY): 20
BILIRUB SERPL-MCNC: 1 MG/DL (ref 0.2–1.2)
BUN SERPL-MCNC: 34 MG/DL (ref 8–23)
CALCIUM SERPL-MCNC: 8.7 MG/DL (ref 8.8–10.2)
CARBOXYHEMOGLOBIN ARTERIAL: 2.7 % (ref 0–5)
CHLORIDE SERPL-SCNC: 108 MMOL/L (ref 98–107)
CO2 SERPL-SCNC: 24 MMOL/L (ref 22–29)
CREAT SERPL-MCNC: 3.3 MG/DL (ref 0.7–1.2)
EOSINOPHIL # BLD: 0.1 K/UL (ref 0–0.6)
EOSINOPHIL NFR BLD: 1.2 % (ref 0–5)
ERYTHROCYTE [DISTWIDTH] IN BLOOD BY AUTOMATED COUNT: 16.3 % (ref 11.5–14.5)
GLUCOSE BLD-MCNC: 104 MG/DL (ref 70–99)
GLUCOSE BLD-MCNC: 87 MG/DL (ref 70–99)
GLUCOSE BLD-MCNC: 98 MG/DL (ref 70–99)
GLUCOSE SERPL-MCNC: 117 MG/DL (ref 70–99)
HCO3 ARTERIAL: 25 MMOL/L (ref 22–26)
HCT VFR BLD AUTO: 38.3 % (ref 42–52)
HEMOGLOBIN, ART, EXTENDED: 11.9 G/DL (ref 14–18)
HGB BLD-MCNC: 11.5 G/DL (ref 14–18)
IMM GRANULOCYTES # BLD: 0.1 K/UL
LYMPHOCYTES # BLD: 0.4 K/UL (ref 1.1–4.5)
LYMPHOCYTES NFR BLD: 8.5 % (ref 20–40)
MCH RBC QN AUTO: 29.2 PG (ref 27–31)
MCHC RBC AUTO-ENTMCNC: 30 G/DL (ref 33–37)
MCV RBC AUTO: 97.2 FL (ref 80–94)
MECHANICAL RATE IN BPM: 22
METHEMOGLOBIN ARTERIAL: 1.1 %
MODE: ABNORMAL
MONOCYTES # BLD: 0.6 K/UL (ref 0–0.9)
MONOCYTES NFR BLD: 11.5 % (ref 0–10)
NEUTROPHILS # BLD: 3.8 K/UL (ref 1.5–7.5)
NEUTS SEG NFR BLD: 77.2 % (ref 50–65)
O2 CONTENT ARTERIAL: 15.8 ML/DL
O2 SAT, ARTERIAL: 94 %
O2 THERAPY: ABNORMAL
OXYGEN FLOW: 5
PCO2 ARTERIAL: 52 MMHG (ref 35–45)
PERFORMED ON: ABNORMAL
PERFORMED ON: NORMAL
PERFORMED ON: NORMAL
PH ARTERIAL: 7.29 (ref 7.35–7.45)
PLATELET # BLD AUTO: 133 K/UL (ref 130–400)
PMV BLD AUTO: 11.9 FL (ref 9.4–12.4)
PO2 ARTERIAL: 76 MMHG (ref 80–100)
POTASSIUM BLD-SCNC: 3.7 MMOL/L
POTASSIUM SERPL-SCNC: 4.4 MMOL/L (ref 3.5–5)
POTASSIUM SERPL-SCNC: 4.4 MMOL/L (ref 3.5–5.1)
PROT SERPL-MCNC: 5.7 G/DL (ref 6.4–8.3)
RBC # BLD AUTO: 3.94 M/UL (ref 4.7–6.1)
SAMPLE SOURCE: ABNORMAL
SODIUM SERPL-SCNC: 142 MMOL/L (ref 136–145)
WBC # BLD AUTO: 4.9 K/UL (ref 4.8–10.8)

## 2025-07-06 PROCEDURE — 6360000002 HC RX W HCPCS: Performed by: NURSE PRACTITIONER

## 2025-07-06 PROCEDURE — 94761 N-INVAS EAR/PLS OXIMETRY MLT: CPT

## 2025-07-06 PROCEDURE — 80053 COMPREHEN METABOLIC PANEL: CPT

## 2025-07-06 PROCEDURE — 87086 URINE CULTURE/COLONY COUNT: CPT

## 2025-07-06 PROCEDURE — 2500000003 HC RX 250 WO HCPCS

## 2025-07-06 PROCEDURE — 6370000000 HC RX 637 (ALT 250 FOR IP)

## 2025-07-06 PROCEDURE — 71045 X-RAY EXAM CHEST 1 VIEW: CPT

## 2025-07-06 PROCEDURE — 1200000000 HC SEMI PRIVATE

## 2025-07-06 PROCEDURE — 87077 CULTURE AEROBIC IDENTIFY: CPT

## 2025-07-06 PROCEDURE — 94640 AIRWAY INHALATION TREATMENT: CPT

## 2025-07-06 PROCEDURE — 6360000002 HC RX W HCPCS

## 2025-07-06 PROCEDURE — 36415 COLL VENOUS BLD VENIPUNCTURE: CPT

## 2025-07-06 PROCEDURE — 82803 BLOOD GASES ANY COMBINATION: CPT

## 2025-07-06 PROCEDURE — 82962 GLUCOSE BLOOD TEST: CPT

## 2025-07-06 PROCEDURE — 6370000000 HC RX 637 (ALT 250 FOR IP): Performed by: STUDENT IN AN ORGANIZED HEALTH CARE EDUCATION/TRAINING PROGRAM

## 2025-07-06 PROCEDURE — 85025 COMPLETE CBC W/AUTO DIFF WBC: CPT

## 2025-07-06 PROCEDURE — 6370000000 HC RX 637 (ALT 250 FOR IP): Performed by: NURSE PRACTITIONER

## 2025-07-06 PROCEDURE — 36600 WITHDRAWAL OF ARTERIAL BLOOD: CPT

## 2025-07-06 PROCEDURE — 2700000000 HC OXYGEN THERAPY PER DAY

## 2025-07-06 PROCEDURE — 6360000002 HC RX W HCPCS: Performed by: STUDENT IN AN ORGANIZED HEALTH CARE EDUCATION/TRAINING PROGRAM

## 2025-07-06 PROCEDURE — 87186 SC STD MICRODIL/AGAR DIL: CPT

## 2025-07-06 PROCEDURE — 94660 CPAP INITIATION&MGMT: CPT

## 2025-07-06 RX ORDER — BUMETANIDE 0.25 MG/ML
1 INJECTION, SOLUTION INTRAMUSCULAR; INTRAVENOUS 2 TIMES DAILY
Status: DISCONTINUED | OUTPATIENT
Start: 2025-07-06 | End: 2025-07-09

## 2025-07-06 RX ADMIN — MICONAZOLE NITRATE: 2 POWDER TOPICAL at 08:02

## 2025-07-06 RX ADMIN — METOPROLOL SUCCINATE 25 MG: 25 TABLET, FILM COATED, EXTENDED RELEASE ORAL at 08:01

## 2025-07-06 RX ADMIN — METOPROLOL SUCCINATE 25 MG: 25 TABLET, FILM COATED, EXTENDED RELEASE ORAL at 20:34

## 2025-07-06 RX ADMIN — CIPROFLOXACIN HYDROCHLORIDE 500 MG: 500 TABLET, FILM COATED ORAL at 20:34

## 2025-07-06 RX ADMIN — IPRATROPIUM BROMIDE 0.5 MG: 0.5 SOLUTION RESPIRATORY (INHALATION) at 19:15

## 2025-07-06 RX ADMIN — CIPROFLOXACIN HYDROCHLORIDE 500 MG: 500 TABLET, FILM COATED ORAL at 08:01

## 2025-07-06 RX ADMIN — MONTELUKAST SODIUM 10 MG: 10 TABLET, FILM COATED ORAL at 20:34

## 2025-07-06 RX ADMIN — ENOXAPARIN SODIUM 60 MG: 100 INJECTION SUBCUTANEOUS at 20:34

## 2025-07-06 RX ADMIN — PANTOPRAZOLE SODIUM 40 MG: 40 TABLET, DELAYED RELEASE ORAL at 06:01

## 2025-07-06 RX ADMIN — PRAVASTATIN SODIUM 20 MG: 20 TABLET ORAL at 20:35

## 2025-07-06 RX ADMIN — IPRATROPIUM BROMIDE 0.5 MG: 0.5 SOLUTION RESPIRATORY (INHALATION) at 07:07

## 2025-07-06 RX ADMIN — ENOXAPARIN SODIUM 60 MG: 100 INJECTION SUBCUTANEOUS at 08:02

## 2025-07-06 RX ADMIN — GUAIFENESIN 600 MG: 600 TABLET, EXTENDED RELEASE ORAL at 20:34

## 2025-07-06 RX ADMIN — IPRATROPIUM BROMIDE 0.5 MG: 0.5 SOLUTION RESPIRATORY (INHALATION) at 10:33

## 2025-07-06 RX ADMIN — ASPIRIN 81 MG: 81 TABLET, COATED ORAL at 08:01

## 2025-07-06 RX ADMIN — BUMETANIDE 1 MG: 0.25 INJECTION INTRAMUSCULAR; INTRAVENOUS at 17:43

## 2025-07-06 RX ADMIN — GUAIFENESIN 600 MG: 600 TABLET, EXTENDED RELEASE ORAL at 08:01

## 2025-07-06 RX ADMIN — BUMETANIDE 1 MG: 0.25 INJECTION INTRAMUSCULAR; INTRAVENOUS at 11:56

## 2025-07-06 RX ADMIN — CETIRIZINE HYDROCHLORIDE 10 MG: 10 TABLET ORAL at 08:01

## 2025-07-06 RX ADMIN — SODIUM CHLORIDE, PRESERVATIVE FREE 5 ML: 5 INJECTION INTRAVENOUS at 20:41

## 2025-07-06 RX ADMIN — MICONAZOLE NITRATE: 2 POWDER TOPICAL at 20:35

## 2025-07-06 RX ADMIN — IPRATROPIUM BROMIDE 0.5 MG: 0.5 SOLUTION RESPIRATORY (INHALATION) at 14:12

## 2025-07-06 RX ADMIN — SODIUM BICARBONATE 325 MG: 650 TABLET ORAL at 08:01

## 2025-07-06 RX ADMIN — ALLOPURINOL 300 MG: 300 TABLET ORAL at 08:01

## 2025-07-06 NOTE — PLAN OF CARE
Problem: Chronic Conditions and Co-morbidities  Goal: Patient's chronic conditions and co-morbidity symptoms are monitored and maintained or improved  7/5/2025 2157 by Nohelia Gordillo LPN  Outcome: Progressing  7/5/2025 1218 by Shira Salinas RN  Outcome: Progressing     Problem: Discharge Planning  Goal: Discharge to home or other facility with appropriate resources  7/5/2025 2157 by Nohelia Gordillo LPN  Outcome: Progressing  7/5/2025 1218 by Shira Salinas RN  Outcome: Progressing     Problem: Pain  Goal: Verbalizes/displays adequate comfort level or baseline comfort level  7/5/2025 2157 by Nohelia Gordillo LPN  Outcome: Progressing  7/5/2025 1218 by Shira Salinas RN  Outcome: Progressing     Problem: Safety - Adult  Goal: Free from fall injury  7/5/2025 2157 by Nohelia Gordillo LPN  Outcome: Progressing  7/5/2025 1218 by Shira Salinas RN  Outcome: Progressing     Problem: ABCDS Injury Assessment  Goal: Absence of physical injury  7/5/2025 2157 by Nohelia Gordillo LPN  Outcome: Progressing  7/5/2025 1218 by Shira Salinas RN  Outcome: Progressing     Problem: Skin/Tissue Integrity  Goal: Skin integrity remains intact  Description: 1.  Monitor for areas of redness and/or skin breakdown2.  Assess vascular access sites hourly3.  Every 4-6 hours minimum:  Change oxygen saturation probe site4.  Every 4-6 hours:  If on nasal continuous positive airway pressure, respiratory therapy assess nares and determine need for appliance change or resting period  7/5/2025 2157 by Nohelia Gordillo LPN  Outcome: Progressing  7/5/2025 1218 by Shira Salinas RN  Outcome: Progressing     Problem: Nutrition Deficit:  Goal: Optimize nutritional status  7/5/2025 2157 by Nohelia Gordillo LPN  Outcome: Progressing  7/5/2025 1218 by Shira Salinas RN  Outcome: Progressing     Problem: Neurosensory - Adult  Goal: Achieves stable or improved neurological status  7/5/2025 2157 by Nohelia Gordillo LPN  Outcome: Progressing  7/5/2025

## 2025-07-06 NOTE — PROGRESS NOTES
nephrology ([facility]) Progress Note    Patient:  Daljit Elizondo  YOB: 1960  Date of Service: 7/6/2025  MRN: [id number]   Acct: [id number]   Primary Care Physician: Jessie Helms APRN - CNP  Advance Directive: Full Code  Admit Date: 5/14/2025       Hospital Day: 53  Referring Provider: Rhys Pacheco MD    Patient independently seen and examined, Chart, Consults, Notes, Operative notes, Labs, Cardiology, and Radiology studies reviewed as available.    Chief complaint: Abnormal labs.    Subjective:  Daljit Elizondo is a 65 y.o. male for whom we were consulted for evaluation and treatment of acute kidney injury.  Patient denies any history of chronic kidney disease.  He has been in the hospital for the last month, poor historian and has never seen nephrology in the past.  Patient has severe abdominal obesity, hypertension, sleep apnea, type 2 diabetes, chronic diastolic CHF, sleep apnea.  Presented with increasing shortness of breath/dyspnea on exertion.  Hospital course remarkable for treatment with intravenous diuretics for the treatment of volume overload, CHF exacerbation.  His serum creatinine was 0.9 mg with estimated GFR more than 90 mL on admission.  Patient has significant drop in GFR and nephrology is consulted.  He was initially treated with IV fluid with minimal to no improvement of renal function.  Patient now agreed to proceed with dialysis if needed.  I have consulted vascular surgery, unfortunately permacatheter cannot be done here as he is much heavier for operating room table in this institute.    This morning patient is complaining of increasing shortness of breath despite increasing oxygen supplement.  He would be needing BiPAP.  Likely cause of worsening of shortness of breath is pulmonary edema.    Allergies:  Penicillamine, Silvadene [silver sulfadiazine], Aerohist [chlorpheniramine-methscop er], Cephalosporins, Hemp seed oil, Neosporin

## 2025-07-06 NOTE — PROGRESS NOTES
Per drs orders: Placed pt on our bipap 22/11 bur 22 fio2 10L due to sats 83-84%.    Once sats increased >92 I was able to titrated fio2 to 5L.

## 2025-07-06 NOTE — PROGRESS NOTES
Daily Progress Note    Date:2025  Patient: Daljit Elizondo  : 1960  MRN:[id number]  CODE:Full Code No additional code details  PCP:Jessie Helms APRN - CNP    Admit Date: 2025 12:50 PM   LOS: 53 days       Subjective:    This morning noted to have persistent O2 desaturation, placed back on BiPAP and required increase in high flow supplemental O2.  During my evaluation patient is easily arousable alert and able to answer questions and denies feeling any more short of breath.        Summary: 65-year-old male with morbid obesity, restrictive lung disease, sleep apnea, chronic hypoxic and hypercapnic respiratory failure on supplemental O2 and BiPAP support, chronic systolic heart failure (EF 26-30% by echo 2024), diabetes with chronic venous stasis with lower extremity wounds, prolonged hospital course originally admitted  for volume overload, decompensated heart failure treated with IV loop diuretics, improved then transition to oral diuretics, due to significant debility considered for SNF placement but was declined by multiple facilities due to concern for the level of care he would require and inconsistently worked with PT (reported baseline mobility includes standing only to transfer from bed to scooter/chair), plan was then to discharge home with home health however he appealed his discharge multiple times.  He later developed a change in urine output noting dark brown urine with repeat labs showing acute renal failure with creatinine trending above 3, which did not improve despite IV fluid trial, consistent with acute tubular necrosis.  Followed in-house by nephrology, discussed possibility of dialysis.  However he eventually demonstrated some renal recovery.     started treatment for UTI after he developed some genitourinary symptoms with hematuria.          Review of Systems  Negative      Objective:      Vital signs in last 24 hours:  Patient Vitals for the past 24 hrs:   BP Temp Temp  MATTIE, Renay Thorne APRN - CNP      Assessment/plan  Principal Problem:    Acute on chronic systolic (congestive) heart failure (HCC)  Active Problems:    Diabetes (HCC)    Essential hypertension    GERD (gastroesophageal reflux disease)    Obesity, morbid (more than 100 lbs over ideal weight or BMI > 40) (HCC)    Hyperlipemia    Palliative care patient    Acute on chronic respiratory failure with hypoxia and hypercapnia (HCC)  Resolved Problems:    * No resolved hospital problems. *    Acute on Chronic hypoxic and hypercapnic respiratory failure due to pulmonary edema with acute heart failure, restrictive lung disease, obesity hypoventilation syndrome with sleep apnea  --- Noted interval worsening this a.m., resume NIPPV support with BiPAP settings adjusted discussed with RT with settings 22/11, in addition to a high flow supplemental O2 support to maintain adequate gas exchange, keep SpO2 at least 90%  -- Will resume diuretics for heart failure with IV Bumex this a.m.  -- Monitor respiratory status closely, will repeat ABG with further adjustments as needed    Acute renal failure due to ATN  --Has recently demonstrated some renal recovery, creatinine back up slightly to 3.3 today  - Will monitor with resumption of diuretics IV Bumex  - Continue to follow urine output  - Avoiding nephrotoxins  - Continue to hold losartan for now    Acute on chronic systolic heart failure with volume overload  EF 26/30% by echo in 2024-reviewed  Echo this hospitalization very limited study  -GDMT continue metoprolol succinate  - Resume IV Bumex today to attempt optimize volume and respiratory status    Acute cystitis with gross hematuria  - Reported antibiotic allergies to penicillins and cephalosporins  - Continue Cipro  - Follow-up urine culture  --Monitor for any further gross hematuria    Metabolic acidemia associated with renal dysfunction --- treated and resolved, will hold off further bicarbonate    Diabetes mellitus with

## 2025-07-07 LAB
ALBUMIN SERPL-MCNC: 2.6 G/DL (ref 3.5–5.2)
ALP SERPL-CCNC: 105 U/L (ref 40–129)
ALT SERPL-CCNC: <5 U/L (ref 10–50)
ANION GAP SERPL CALCULATED.3IONS-SCNC: 10 MMOL/L (ref 8–16)
AST SERPL-CCNC: 12 U/L (ref 10–50)
BILIRUB SERPL-MCNC: 0.8 MG/DL (ref 0.2–1.2)
BUN SERPL-MCNC: 37 MG/DL (ref 8–23)
CALCIUM SERPL-MCNC: 8.3 MG/DL (ref 8.8–10.2)
CHLORIDE SERPL-SCNC: 107 MMOL/L (ref 98–107)
CO2 SERPL-SCNC: 23 MMOL/L (ref 22–29)
CREAT SERPL-MCNC: 3.4 MG/DL (ref 0.7–1.2)
ERYTHROCYTE [DISTWIDTH] IN BLOOD BY AUTOMATED COUNT: 16.3 % (ref 11.5–14.5)
GLUCOSE BLD-MCNC: 119 MG/DL (ref 70–99)
GLUCOSE BLD-MCNC: 131 MG/DL (ref 70–99)
GLUCOSE BLD-MCNC: 137 MG/DL (ref 70–99)
GLUCOSE BLD-MCNC: 86 MG/DL (ref 70–99)
GLUCOSE SERPL-MCNC: 94 MG/DL (ref 70–99)
HCT VFR BLD AUTO: 35.8 % (ref 42–52)
HGB BLD-MCNC: 10.7 G/DL (ref 14–18)
MCH RBC QN AUTO: 29.6 PG (ref 27–31)
MCHC RBC AUTO-ENTMCNC: 29.9 G/DL (ref 33–37)
MCV RBC AUTO: 98.9 FL (ref 80–94)
PERFORMED ON: ABNORMAL
PERFORMED ON: NORMAL
PLATELET # BLD AUTO: 130 K/UL (ref 130–400)
PMV BLD AUTO: 13 FL (ref 9.4–12.4)
POTASSIUM SERPL-SCNC: 4 MMOL/L (ref 3.5–5)
POTASSIUM SERPL-SCNC: 4 MMOL/L (ref 3.5–5.1)
PROT SERPL-MCNC: 5.3 G/DL (ref 6.4–8.3)
RBC # BLD AUTO: 3.62 M/UL (ref 4.7–6.1)
SODIUM SERPL-SCNC: 140 MMOL/L (ref 136–145)
WBC # BLD AUTO: 6.4 K/UL (ref 4.8–10.8)

## 2025-07-07 PROCEDURE — 94660 CPAP INITIATION&MGMT: CPT

## 2025-07-07 PROCEDURE — 6360000002 HC RX W HCPCS: Performed by: NURSE PRACTITIONER

## 2025-07-07 PROCEDURE — 94150 VITAL CAPACITY TEST: CPT

## 2025-07-07 PROCEDURE — 85027 COMPLETE CBC AUTOMATED: CPT

## 2025-07-07 PROCEDURE — 6370000000 HC RX 637 (ALT 250 FOR IP)

## 2025-07-07 PROCEDURE — 2700000000 HC OXYGEN THERAPY PER DAY

## 2025-07-07 PROCEDURE — 2500000003 HC RX 250 WO HCPCS

## 2025-07-07 PROCEDURE — 82962 GLUCOSE BLOOD TEST: CPT

## 2025-07-07 PROCEDURE — 6360000002 HC RX W HCPCS

## 2025-07-07 PROCEDURE — 6370000000 HC RX 637 (ALT 250 FOR IP): Performed by: NURSE PRACTITIONER

## 2025-07-07 PROCEDURE — 1200000000 HC SEMI PRIVATE

## 2025-07-07 PROCEDURE — 6360000002 HC RX W HCPCS: Performed by: STUDENT IN AN ORGANIZED HEALTH CARE EDUCATION/TRAINING PROGRAM

## 2025-07-07 PROCEDURE — 97530 THERAPEUTIC ACTIVITIES: CPT

## 2025-07-07 PROCEDURE — 36415 COLL VENOUS BLD VENIPUNCTURE: CPT

## 2025-07-07 PROCEDURE — 97535 SELF CARE MNGMENT TRAINING: CPT

## 2025-07-07 PROCEDURE — 80053 COMPREHEN METABOLIC PANEL: CPT

## 2025-07-07 PROCEDURE — 94761 N-INVAS EAR/PLS OXIMETRY MLT: CPT

## 2025-07-07 PROCEDURE — 6370000000 HC RX 637 (ALT 250 FOR IP): Performed by: STUDENT IN AN ORGANIZED HEALTH CARE EDUCATION/TRAINING PROGRAM

## 2025-07-07 PROCEDURE — 94640 AIRWAY INHALATION TREATMENT: CPT

## 2025-07-07 RX ADMIN — CIPROFLOXACIN HYDROCHLORIDE 500 MG: 500 TABLET, FILM COATED ORAL at 21:25

## 2025-07-07 RX ADMIN — GUAIFENESIN 600 MG: 600 TABLET, EXTENDED RELEASE ORAL at 21:24

## 2025-07-07 RX ADMIN — IPRATROPIUM BROMIDE 0.5 MG: 0.5 SOLUTION RESPIRATORY (INHALATION) at 10:57

## 2025-07-07 RX ADMIN — METOPROLOL SUCCINATE 25 MG: 25 TABLET, FILM COATED, EXTENDED RELEASE ORAL at 08:57

## 2025-07-07 RX ADMIN — ASPIRIN 81 MG: 81 TABLET, COATED ORAL at 08:58

## 2025-07-07 RX ADMIN — PRAVASTATIN SODIUM 20 MG: 20 TABLET ORAL at 21:25

## 2025-07-07 RX ADMIN — SODIUM CHLORIDE, PRESERVATIVE FREE 10 ML: 5 INJECTION INTRAVENOUS at 22:00

## 2025-07-07 RX ADMIN — PANTOPRAZOLE SODIUM 40 MG: 40 TABLET, DELAYED RELEASE ORAL at 06:14

## 2025-07-07 RX ADMIN — SODIUM CHLORIDE, PRESERVATIVE FREE 10 ML: 5 INJECTION INTRAVENOUS at 08:58

## 2025-07-07 RX ADMIN — MONTELUKAST SODIUM 10 MG: 10 TABLET, FILM COATED ORAL at 21:25

## 2025-07-07 RX ADMIN — INSULIN GLARGINE 15 UNITS: 100 INJECTION, SOLUTION SUBCUTANEOUS at 21:24

## 2025-07-07 RX ADMIN — MICONAZOLE NITRATE: 2 POWDER TOPICAL at 22:15

## 2025-07-07 RX ADMIN — MICONAZOLE NITRATE: 2 POWDER TOPICAL at 08:58

## 2025-07-07 RX ADMIN — IPRATROPIUM BROMIDE 0.5 MG: 0.5 SOLUTION RESPIRATORY (INHALATION) at 06:37

## 2025-07-07 RX ADMIN — BUMETANIDE 1 MG: 0.25 INJECTION INTRAMUSCULAR; INTRAVENOUS at 17:48

## 2025-07-07 RX ADMIN — ENOXAPARIN SODIUM 60 MG: 100 INJECTION SUBCUTANEOUS at 08:58

## 2025-07-07 RX ADMIN — GUAIFENESIN 600 MG: 600 TABLET, EXTENDED RELEASE ORAL at 08:58

## 2025-07-07 RX ADMIN — BUMETANIDE 1 MG: 0.25 INJECTION INTRAMUSCULAR; INTRAVENOUS at 08:58

## 2025-07-07 RX ADMIN — METOPROLOL SUCCINATE 25 MG: 25 TABLET, FILM COATED, EXTENDED RELEASE ORAL at 22:15

## 2025-07-07 RX ADMIN — IPRATROPIUM BROMIDE 0.5 MG: 0.5 SOLUTION RESPIRATORY (INHALATION) at 19:05

## 2025-07-07 RX ADMIN — ALLOPURINOL 300 MG: 300 TABLET ORAL at 08:57

## 2025-07-07 RX ADMIN — CETIRIZINE HYDROCHLORIDE 10 MG: 10 TABLET ORAL at 08:57

## 2025-07-07 RX ADMIN — IPRATROPIUM BROMIDE 0.5 MG: 0.5 SOLUTION RESPIRATORY (INHALATION) at 14:27

## 2025-07-07 RX ADMIN — ENOXAPARIN SODIUM 60 MG: 100 INJECTION SUBCUTANEOUS at 21:25

## 2025-07-07 RX ADMIN — CIPROFLOXACIN HYDROCHLORIDE 500 MG: 500 TABLET, FILM COATED ORAL at 08:58

## 2025-07-07 NOTE — PROGRESS NOTES
Daily Progress Note    Date:2025  Patient: Daljit Elizondo  : 1960  MRN:[id number]  CODE:Full Code No additional code details  PCP:Jessie Helms APRN - CNP    Admit Date: 2025 12:50 PM   LOS: 54 days       Subjective:   Since yesterday has had good urine output with diuresis.  Overall looking better with less O2 requirement, supplemental O2 down to 3 L, wore BiPAP overnight.  Denies any worsening shortness of breath.  Alert and answering questions appropriately.        Summary: 65-year-old male with morbid obesity, restrictive lung disease, sleep apnea, chronic hypoxic and hypercapnic respiratory failure on supplemental O2 and BiPAP support, chronic systolic heart failure (EF 26-30% by echo 2024), diabetes with chronic venous stasis with lower extremity wounds, prolonged hospital course originally admitted  for volume overload, decompensated heart failure treated with IV loop diuretics, improved then transition to oral diuretics, due to significant debility considered for SNF placement but was declined by multiple facilities due to concern for the level of care he would require and inconsistently worked with PT (reported baseline mobility includes standing only to transfer from bed to scooter/chair), plan was then to discharge home with home health however he appealed his discharge multiple times.  He later developed a change in urine output noting dark brown urine with repeat labs showing acute renal failure with creatinine trending above 3, which did not improve despite IV fluid trial, consistent with acute tubular necrosis.  Followed in-house by nephrology, discussed possibility of dialysis.  However he eventually demonstrated some renal recovery.     started treatment for UTI after he developed some genitourinary symptoms with hematuria.          Review of Systems  Negative      Objective:      Vital signs in last 24 hours:  Patient Vitals for the past 24 hrs:   BP Temp Temp src Pulse  I's and O's, daily weights, low-sodium diet    Acute cystitis with gross hematuria, UTI with Proteus mirabilis isolated from urine culture  - Greater than 100,000 CFU Proteus mirabilis noted from urine culture on review today  - Reported antibiotic allergies to penicillins and cephalosporins  - Continue Cipro pending culture susceptibilities  --Monitor for any further gross hematuria    Metabolic acidemia associated with renal dysfunction --- treated and resolved, discontinue bicarb    Diabetes mellitus with hyperglycemia  - Reviewed glucose readings past 24 hours  - Continue POC glucose monitoring with current dose of basal insulin nightly and sliding scale insulin correction as warranted  - Hypoglycemia protocol in place    GERD  - PPI    Cardiovascular disease  - Aspirin, statin    Constipation --improved  --Continue bowel regimen including lactulose    Severe debility  - Continue PT OT    DVT prophylaxis Lovenox    Status and plan of care discussed with nursing staff and social work    Multiple complex medical problems  Morbidity and mortality high risk  Medical decision making high complexity    Disposition: TBD, plan eventual facility placement if able      Rhys Pacheco MD 7/7/2025 12:30 PM

## 2025-07-07 NOTE — PLAN OF CARE
Problem: Nutrition Deficit:  Goal: Optimize nutritional status  7/7/2025 1225 by Miriam Maldonado, MS, RD, LD  Outcome: Not Progressing  Flowsheets (Taken 7/7/2025 1217)  Nutrient intake appropriate for improving, restoring, or maintaining nutritional needs:   Assess nutritional status and recommend course of action   Monitor oral intake, labs, and treatment plans   Recommend appropriate diets, oral nutritional supplements, and vitamin/mineral supplements  7/6/2025 1864 by Lexi Ceballos, RN  Outcome: Progressing

## 2025-07-07 NOTE — PROGRESS NOTES
Nutrition Assessment     Type and Reason for Visit: Reassess    Nutrition Recommendations/Plan:   Continue current POC     Malnutrition Assessment:  Malnutrition Status: At risk for malnutrition    Nutrition Assessment:  Patient's po intake remains poor with intake usually 0-25%.  Weight continues increase.  IB-Bumex has been started.  Edema remains the same    Estimated Daily Nutrient Needs:  Energy (kcal):  1213-8336 kcals (8-11 kcals) Weight Used for Energy Requirements: Current     Protein (g):  145g Weight Used for Protein Requirements: Ideal        Fluid (ml/day):  3610-4828 ml Method Used for Fluid Requirements: 1 ml/kcal    Nutrition Related Findings:   trace generalized and BLE edema Wound Type: Pressure Injury (lacerations)    Current Nutrition Therapies:    ADULT DIET; Regular; 3 carb choices (45 gm/meal); Low Sodium (2 gm); SEND RICE KRISPIES AT BREAKFAST. NO OATMEAL. Likes salads    Anthropometric Measures:  Height: 175.3 cm (5' 9.02\")  Current Body Wt: 216 kg (476 lb 3.1 oz)   BMI: 70.3        Nutrition Diagnosis:   Inadequate oral intake, Altered nutrition-related lab values related to limited food acceptance, increase demand for energy/nutrients, renal dysfunction, impaired respiratory function as evidenced by intake 0-25%, wounds    Nutrition Interventions:   Food and/or Nutrient Delivery: Continue Current Diet  Nutrition Education/Counseling: Survival skills/brief education completed  Coordination of Nutrition Care: Continue to monitor while inpatient  Plan of Care discussed with: pt    Goals:  Goals: Meet at least 75% of estimated needs, PO intake 50% or greater, Maintain adequate nutrition status  Type of Goal: Continue current goal  Previous Goal Met: No Progress toward Goal(s)    Nutrition Monitoring and Evaluation:   Behavioral-Environmental Outcomes: Readiness for Change  Food/Nutrient Intake Outcomes: Food and Nutrient Intake  Physical Signs/Symptoms Outcomes: Biochemical Data, Fluid Status

## 2025-07-07 NOTE — PROGRESS NOTES
Nephrology ([facility]) Progress Note    Patient:  Daljit Elizondo  YOB: 1960  Date of Service: 7/7/2025  MRN: [id number]   Acct: [id number]   Primary Care Physician: Jessie Helms APRN - CNP  Advance Directive: Full Code  Admit Date: 5/14/2025       Hospital Day: 54  Referring Provider: Rhys Pacheco MD    Patient independently seen and examined, Chart, Consults, Notes, Operative notes, Labs, Cardiology, and Radiology studies reviewed as available.    Subjective:  Daljit Elizondo is a 65 y.o. male for whom we were consulted for evaluation and treatment of acute kidney injury.  We were consulted on hospital day 28 for evaluation of acute kidney injury.  Patient was a poor historian but recalled no prior nephrologic evaluations or problems.  He appears to have had another kidney injury back in 2011 by our hospital records.  More recently for the first 24 days or so with his hospitalization his renal function had been fairly stable but increased on 6/9 after previously at baseline on 6/7.  It has remained there for the next 2 days and we were consulted on 6/11 for further evaluation.  Chart review demonstrates a 64 kg weight loss since admission (but still had a weight of 458 pounds on consult) with only approximately 12 L net negative.  He had been on a Bumex infusion and did better with that than the IV bolus dosing.  He was then transitioned to oral Bumex dosing.  He denied specific complaints upon questioning.  He denied current chest pain, shortness of air at rest, nausea vomiting, dysuria hematuria.  He denied rash or hemoptysis as well.  Since that time, he had approached the need for dialysis but was unable to be initiated due to being overweight for the available OR tables.  Fortunately, his renal function improved a bit since that time.    Today, no overnight events.  Patient denies new complaints upon questioning.  Overall feels breathing has improved a bit over

## 2025-07-07 NOTE — PROGRESS NOTES
Pt refused his nightly 30u lantus. Glucose 87. States he \"has bottomed out too many times\".    Electronically signed by Lexi Ceballos RN on 7/6/2025 at 8:36 PM

## 2025-07-07 NOTE — PROGRESS NOTES
Occupational Therapy ReAssessment  Date: 2025   Patient Name: Daljit Elizondo  MRN: [id number]     : 1960    Date of Service: 2025    Discharge Recommendations:  Patient would benefit from continued therapy after discharge       Assessment   Assessment: OOB with skylift so the patient can perform functional tasks safely in short sit.  He was unable to attempt standing due to dizziness/spinning.  Treatment Diagnosis: Acute congestive heart failure    Activity Tolerance  Activity Tolerance Comments: Has dizziness/spinning after rolling and upon upright sitting.  Not hypotensive           Patient Diagnosis(es): The primary encounter diagnosis was Hypervolemia, unspecified hypervolemia type. Diagnoses of Cardiac arrhythmia, unspecified cardiac arrhythmia type, Community acquired pneumonia, unspecified laterality, Shortness of breath, Bilateral lower extremity edema, Cellulitis and abscess of left lower extremity, and Non-pressure chronic ulcer of left calf with fat layer exposed (HCC) were also pertinent to this visit.   has a past medical history of Abrasion, Anxiety, Asthma, Bell's palsy, Cataracts, bilateral, Cellulitis, Diabetes (HCC), Edema extremities, GERD (gastroesophageal reflux disease), Gout, H/O tracheostomy, H/O urinary retention, Hernia, hiatal, History of panniculitis, History of shingles, Hyperlipemia, Hypertension, Morbid obesity (HCC), Non-ST elevated myocardial infarction (HCC), Obesity, Palliative care patient, Paraphimosis, Pulmonary hypertension (HCC), Renal failure, Respiratory failure (HCC), Shoulder pain, Sleep apnea, Testosterone deficiency, Ulcer, Ulcer of calf (HCC), and Venous stasis of both lower extremities.   has a past surgical history that includes Testicle surgery (10/1970); Foot surgery (Left, LONG AGO ); Tracheostomy tube placement; Foot surgery (Right); Mouth surgery; Appendectomy; Colonoscopy; and Endoscopy, colon, diagnostic.    Treatment Diagnosis: Acute

## 2025-07-07 NOTE — PLAN OF CARE
Problem: Chronic Conditions and Co-morbidities  Goal: Patient's chronic conditions and co-morbidity symptoms are monitored and maintained or improved  7/6/2025 2250 by Lexi Ceballos RN  Outcome: Progressing  7/6/2025 1453 by Shira Salinas RN  Outcome: Progressing     Problem: Discharge Planning  Goal: Discharge to home or other facility with appropriate resources  7/6/2025 2250 by Lexi Ceballos RN  Outcome: Progressing  7/6/2025 1453 by Shira Salinas RN  Outcome: Progressing     Problem: Pain  Goal: Verbalizes/displays adequate comfort level or baseline comfort level  7/6/2025 2250 by Lexi Ceballos RN  Outcome: Progressing  7/6/2025 1453 by Shira Salinas RN  Outcome: Progressing     Problem: Safety - Adult  Goal: Free from fall injury  7/6/2025 2250 by Lexi Ceballos RN  Outcome: Progressing  7/6/2025 1453 by Shira Salinas RN  Outcome: Progressing     Problem: ABCDS Injury Assessment  Goal: Absence of physical injury  7/6/2025 2250 by Lexi Ceballos RN  Outcome: Progressing  7/6/2025 1453 by Shira Salinas RN  Outcome: Progressing     Problem: Skin/Tissue Integrity  Goal: Skin integrity remains intact  Description: 1.  Monitor for areas of redness and/or skin breakdown2.  Assess vascular access sites hourly3.  Every 4-6 hours minimum:  Change oxygen saturation probe site4.  Every 4-6 hours:  If on nasal continuous positive airway pressure, respiratory therapy assess nares and determine need for appliance change or resting period  7/6/2025 2250 by Lexi Ceballos RN  Outcome: Progressing  7/6/2025 1453 by Shira Salinas RN  Outcome: Progressing     Problem: Nutrition Deficit:  Goal: Optimize nutritional status  7/6/2025 2250 by Lexi Ceballos RN  Outcome: Progressing  7/6/2025 1453 by Shira Salinas RN  Outcome: Progressing     Problem: Neurosensory - Adult  Goal: Achieves stable or improved neurological status  7/6/2025 2250 by Lexi Ceballos RN  Outcome: Progressing  7/6/2025 1453 by Rita  KATARZYNA Silva  Outcome: Progressing     Problem: Respiratory - Adult  Goal: Achieves optimal ventilation and oxygenation  7/6/2025 2250 by Lexi Ceballos RN  Outcome: Progressing  7/6/2025 1453 by Shira Salinas RN  Outcome: Progressing     Problem: Cardiovascular - Adult  Goal: Maintains optimal cardiac output and hemodynamic stability  7/6/2025 2250 by Lexi Ceballos RN  Outcome: Progressing  7/6/2025 1453 by Shira Salinas RN  Outcome: Progressing     Problem: Skin/Tissue Integrity - Adult  Goal: Skin integrity remains intact  Description: 1.  Monitor for areas of redness and/or skin breakdown2.  Assess vascular access sites hourly3.  Every 4-6 hours minimum:  Change oxygen saturation probe site4.  Every 4-6 hours:  If on nasal continuous positive airway pressure, respiratory therapy assess nares and determine need for appliance change or resting period  7/6/2025 2250 by Lexi Ceballos RN  Outcome: Progressing  7/6/2025 1453 by Shira Salinas RN  Outcome: Progressing  Goal: Incisions, wounds, or drain sites healing without S/S of infection  7/6/2025 2250 by Lexi Ceballos RN  Outcome: Progressing  7/6/2025 1453 by Shira Salinas RN  Outcome: Progressing  Goal: Oral mucous membranes remain intact  7/6/2025 2250 by Lexi Ceballos RN  Outcome: Progressing  7/6/2025 1453 by Shira Salinas RN  Outcome: Progressing     Problem: Musculoskeletal - Adult  Goal: Return mobility to safest level of function  7/6/2025 2250 by Lxei Ceballos RN  Outcome: Progressing  7/6/2025 1453 by Shira Salinas RN  Outcome: Progressing  Goal: Maintain proper alignment of affected body part  7/6/2025 2250 by Lexi Ceballos RN  Outcome: Progressing  7/6/2025 1453 by Shira Salinas RN  Outcome: Progressing  Goal: Return ADL status to a safe level of function  7/6/2025 2250 by Lexi Ceballos RN  Outcome: Progressing  7/6/2025 1453 by Shira Salinas RN  Outcome: Progressing     Problem: Gastrointestinal - Adult  Goal: Minimal or

## 2025-07-07 NOTE — PROGRESS NOTES
Physical Therapy  Facility/Department: [facility] 3 DUNIA/VAS/MED  Physical Therapy Initial Assessment    Name: Daljit Elizondo  : 1960  MRN: [id number]  Date of Service: 2025    Discharge Recommendations:  (P) Continue to assess pending progress, Patient would benefit from continued therapy after discharge          Patient Diagnosis(es): The primary encounter diagnosis was Hypervolemia, unspecified hypervolemia type. Diagnoses of Cardiac arrhythmia, unspecified cardiac arrhythmia type, Community acquired pneumonia, unspecified laterality, Shortness of breath, Bilateral lower extremity edema, Cellulitis and abscess of left lower extremity, and Non-pressure chronic ulcer of left calf with fat layer exposed (HCC) were also pertinent to this visit.  Past Medical History:  has a past medical history of Abrasion, Anxiety, Asthma, Bell's palsy, Cataracts, bilateral, Cellulitis, Diabetes (HCC), Edema extremities, GERD (gastroesophageal reflux disease), Gout, H/O tracheostomy, H/O urinary retention, Hernia, hiatal, History of panniculitis, History of shingles, Hyperlipemia, Hypertension, Morbid obesity (HCC), Non-ST elevated myocardial infarction (HCC), Obesity, Palliative care patient, Paraphimosis, Pulmonary hypertension (HCC), Renal failure, Respiratory failure (HCC), Shoulder pain, Sleep apnea, Testosterone deficiency, Ulcer, Ulcer of calf (HCC), and Venous stasis of both lower extremities.  Past Surgical History:  has a past surgical history that includes Testicle surgery (10/1970); Foot surgery (Left, LONG AGO ); Tracheostomy tube placement; Foot surgery (Right); Mouth surgery; Appendectomy; Colonoscopy; and Endoscopy, colon, diagnostic.    Assessment  Body Structures, Functions, Activity Limitations Requiring Skilled Therapeutic Intervention: Decreased functional mobility ;Decreased ADL status;Decreased ROM;Decreased strength;Decreased balance;Decreased endurance;Decreased sensation;Decreased

## 2025-07-07 NOTE — PROGRESS NOTES
Physical Therapy  Facility/Department: [facility] 3 DUNIA/VAS/MED  Physical Therapy 14 DAY Reassessment    Name: Daljit Elizondo  : 1960  MRN: [id number]  Date of Service: 2025    Discharge Recommendations:  Continue to assess pending progress, Patient would benefit from continued therapy after discharge          Patient Diagnosis(es): The primary encounter diagnosis was Hypervolemia, unspecified hypervolemia type. Diagnoses of Cardiac arrhythmia, unspecified cardiac arrhythmia type, Community acquired pneumonia, unspecified laterality, Shortness of breath, Bilateral lower extremity edema, Cellulitis and abscess of left lower extremity, and Non-pressure chronic ulcer of left calf with fat layer exposed (HCC) were also pertinent to this visit.  Past Medical History:  has a past medical history of Abrasion, Anxiety, Asthma, Bell's palsy, Cataracts, bilateral, Cellulitis, Diabetes (HCC), Edema extremities, GERD (gastroesophageal reflux disease), Gout, H/O tracheostomy, H/O urinary retention, Hernia, hiatal, History of panniculitis, History of shingles, Hyperlipemia, Hypertension, Morbid obesity (HCC), Non-ST elevated myocardial infarction (HCC), Obesity, Palliative care patient, Paraphimosis, Pulmonary hypertension (HCC), Renal failure, Respiratory failure (HCC), Shoulder pain, Sleep apnea, Testosterone deficiency, Ulcer, Ulcer of calf (HCC), and Venous stasis of both lower extremities.  Past Surgical History:  has a past surgical history that includes Testicle surgery (10/1970); Foot surgery (Left, LONG AGO ); Tracheostomy tube placement; Foot surgery (Right); Mouth surgery; Appendectomy; Colonoscopy; and Endoscopy, colon, diagnostic.    Assessment  Body Structures, Functions, Activity Limitations Requiring Skilled Therapeutic Intervention: Decreased functional mobility ;Decreased ADL status;Decreased ROM;Decreased strength;Decreased balance;Decreased endurance;Decreased sensation;Decreased posture  Assessment:                AM-PAC - Mobility              Tinneti Score       Goals  Short Term Goals  Time Frame for Short Term Goals: 2 wks  Short Term Goal 1: ROLL R/L, IND USING BEDRAILS  Short Term Goal 2: SUP<>SIT, MIN/CGA  Short Term Goal 3: TFS TO RECLINER, MIN A 2  Short Term Goal 4: IND WITH HEP SITTING IN RECLINER  Short Term Goal 5: SIT <>STAND, MIN A  Patient Goals   Patient Goals : get better       Education  Patient Education  Education Given To: Patient;Family  Education Provided: Role of Therapy;Transfer Training;Mobility Training  Education Provided Comments: HEP  Education Method: Demonstration;Verbal  Barriers to Learning: None  Education Outcome: Verbalized understanding      Therapy Time   Individual Concurrent Group Co-treatment   Time In           Time Out           Minutes                   Miriam Nicholson PT     Electronically signed by Miriam Nicholson PT on 7/7/2025 at 2:21 PM

## 2025-07-07 NOTE — PLAN OF CARE
Problem: Chronic Conditions and Co-morbidities  Goal: Patient's chronic conditions and co-morbidity symptoms are monitored and maintained or improved  Outcome: Progressing  Flowsheets (Taken 7/7/2025 1150)  Care Plan - Patient's Chronic Conditions and Co-Morbidity Symptoms are Monitored and Maintained or Improved:   Monitor and assess patient's chronic conditions and comorbid symptoms for stability, deterioration, or improvement   Collaborate with multidisciplinary team to address chronic and comorbid conditions and prevent exacerbation or deterioration   Update acute care plan with appropriate goals if chronic or comorbid symptoms are exacerbated and prevent overall improvement and discharge     Problem: Discharge Planning  Goal: Discharge to home or other facility with appropriate resources  Outcome: Progressing  Flowsheets (Taken 7/7/2025 1150)  Discharge to home or other facility with appropriate resources:   Identify barriers to discharge with patient and caregiver   Arrange for needed discharge resources and transportation as appropriate   Identify discharge learning needs (meds, wound care, etc)   Arrange for interpreters to assist at discharge as needed   Refer to discharge planning if patient needs post-hospital services based on physician order or complex needs related to functional status, cognitive ability or social support system     Problem: Pain  Goal: Verbalizes/displays adequate comfort level or baseline comfort level  Outcome: Progressing     Problem: Safety - Adult  Goal: Free from fall injury  Outcome: Progressing     Problem: ABCDS Injury Assessment  Goal: Absence of physical injury  Outcome: Progressing     Problem: Skin/Tissue Integrity  Goal: Skin integrity remains intact  Description: 1.  Monitor for areas of redness and/or skin breakdown2.  Assess vascular access sites hourly3.  Every 4-6 hours minimum:  Change oxygen saturation probe site4.  Every 4-6 hours:  If on nasal continuous

## 2025-07-08 LAB
ANION GAP SERPL CALCULATED.3IONS-SCNC: 11 MMOL/L (ref 8–16)
BUN SERPL-MCNC: 39 MG/DL (ref 8–23)
CALCIUM SERPL-MCNC: 8.3 MG/DL (ref 8.8–10.2)
CHLORIDE SERPL-SCNC: 106 MMOL/L (ref 98–107)
CO2 SERPL-SCNC: 24 MMOL/L (ref 22–29)
CREAT SERPL-MCNC: 3.3 MG/DL (ref 0.7–1.2)
GLUCOSE BLD-MCNC: 128 MG/DL (ref 70–99)
GLUCOSE BLD-MCNC: 133 MG/DL (ref 70–99)
GLUCOSE BLD-MCNC: 142 MG/DL (ref 70–99)
GLUCOSE BLD-MCNC: 92 MG/DL (ref 70–99)
GLUCOSE SERPL-MCNC: 111 MG/DL (ref 70–99)
MAGNESIUM SERPL-MCNC: 1.5 MG/DL (ref 1.6–2.4)
PERFORMED ON: ABNORMAL
PERFORMED ON: NORMAL
POTASSIUM SERPL-SCNC: 3.5 MMOL/L (ref 3.5–5)
SODIUM SERPL-SCNC: 141 MMOL/L (ref 136–145)

## 2025-07-08 PROCEDURE — 6360000002 HC RX W HCPCS

## 2025-07-08 PROCEDURE — 80048 BASIC METABOLIC PNL TOTAL CA: CPT

## 2025-07-08 PROCEDURE — 83735 ASSAY OF MAGNESIUM: CPT

## 2025-07-08 PROCEDURE — 94640 AIRWAY INHALATION TREATMENT: CPT

## 2025-07-08 PROCEDURE — 2700000000 HC OXYGEN THERAPY PER DAY

## 2025-07-08 PROCEDURE — 94761 N-INVAS EAR/PLS OXIMETRY MLT: CPT

## 2025-07-08 PROCEDURE — 6360000002 HC RX W HCPCS: Performed by: STUDENT IN AN ORGANIZED HEALTH CARE EDUCATION/TRAINING PROGRAM

## 2025-07-08 PROCEDURE — 6370000000 HC RX 637 (ALT 250 FOR IP): Performed by: NURSE PRACTITIONER

## 2025-07-08 PROCEDURE — 6370000000 HC RX 637 (ALT 250 FOR IP)

## 2025-07-08 PROCEDURE — 36415 COLL VENOUS BLD VENIPUNCTURE: CPT

## 2025-07-08 PROCEDURE — 1200000000 HC SEMI PRIVATE

## 2025-07-08 PROCEDURE — 94660 CPAP INITIATION&MGMT: CPT

## 2025-07-08 PROCEDURE — 2500000003 HC RX 250 WO HCPCS

## 2025-07-08 PROCEDURE — 6360000002 HC RX W HCPCS: Performed by: NURSE PRACTITIONER

## 2025-07-08 PROCEDURE — 6370000000 HC RX 637 (ALT 250 FOR IP): Performed by: STUDENT IN AN ORGANIZED HEALTH CARE EDUCATION/TRAINING PROGRAM

## 2025-07-08 PROCEDURE — 82962 GLUCOSE BLOOD TEST: CPT

## 2025-07-08 RX ORDER — POTASSIUM CHLORIDE 1500 MG/1
20 TABLET, EXTENDED RELEASE ORAL ONCE
Status: COMPLETED | OUTPATIENT
Start: 2025-07-08 | End: 2025-07-08

## 2025-07-08 RX ORDER — LACTULOSE 10 G/15ML
20 SOLUTION ORAL DAILY PRN
Status: DISCONTINUED | OUTPATIENT
Start: 2025-07-08 | End: 2025-07-09

## 2025-07-08 RX ADMIN — ALLOPURINOL 300 MG: 300 TABLET ORAL at 08:31

## 2025-07-08 RX ADMIN — CIPROFLOXACIN HYDROCHLORIDE 500 MG: 500 TABLET, FILM COATED ORAL at 20:08

## 2025-07-08 RX ADMIN — IPRATROPIUM BROMIDE 0.5 MG: 0.5 SOLUTION RESPIRATORY (INHALATION) at 14:30

## 2025-07-08 RX ADMIN — INSULIN GLARGINE 30 UNITS: 100 INJECTION, SOLUTION SUBCUTANEOUS at 20:13

## 2025-07-08 RX ADMIN — IPRATROPIUM BROMIDE 0.5 MG: 0.5 SOLUTION RESPIRATORY (INHALATION) at 10:52

## 2025-07-08 RX ADMIN — ENOXAPARIN SODIUM 60 MG: 100 INJECTION SUBCUTANEOUS at 20:08

## 2025-07-08 RX ADMIN — MONTELUKAST SODIUM 10 MG: 10 TABLET, FILM COATED ORAL at 20:08

## 2025-07-08 RX ADMIN — ASPIRIN 81 MG: 81 TABLET, COATED ORAL at 08:31

## 2025-07-08 RX ADMIN — IPRATROPIUM BROMIDE 0.5 MG: 0.5 SOLUTION RESPIRATORY (INHALATION) at 07:08

## 2025-07-08 RX ADMIN — PRAVASTATIN SODIUM 20 MG: 20 TABLET ORAL at 20:08

## 2025-07-08 RX ADMIN — CIPROFLOXACIN HYDROCHLORIDE 500 MG: 500 TABLET, FILM COATED ORAL at 08:31

## 2025-07-08 RX ADMIN — METOPROLOL SUCCINATE 25 MG: 25 TABLET, FILM COATED, EXTENDED RELEASE ORAL at 20:08

## 2025-07-08 RX ADMIN — SODIUM CHLORIDE, PRESERVATIVE FREE 10 ML: 5 INJECTION INTRAVENOUS at 20:36

## 2025-07-08 RX ADMIN — MICONAZOLE NITRATE: 2 POWDER TOPICAL at 20:16

## 2025-07-08 RX ADMIN — ENOXAPARIN SODIUM 60 MG: 100 INJECTION SUBCUTANEOUS at 08:31

## 2025-07-08 RX ADMIN — IPRATROPIUM BROMIDE 0.5 MG: 0.5 SOLUTION RESPIRATORY (INHALATION) at 19:06

## 2025-07-08 RX ADMIN — SODIUM CHLORIDE, PRESERVATIVE FREE 10 ML: 5 INJECTION INTRAVENOUS at 08:31

## 2025-07-08 RX ADMIN — GUAIFENESIN 600 MG: 600 TABLET, EXTENDED RELEASE ORAL at 08:31

## 2025-07-08 RX ADMIN — MICONAZOLE NITRATE: 2 POWDER TOPICAL at 08:30

## 2025-07-08 RX ADMIN — BUMETANIDE 1 MG: 0.25 INJECTION INTRAMUSCULAR; INTRAVENOUS at 10:38

## 2025-07-08 RX ADMIN — BUMETANIDE 1 MG: 0.25 INJECTION INTRAMUSCULAR; INTRAVENOUS at 17:57

## 2025-07-08 RX ADMIN — CETIRIZINE HYDROCHLORIDE 10 MG: 10 TABLET ORAL at 08:31

## 2025-07-08 RX ADMIN — METOPROLOL SUCCINATE 25 MG: 25 TABLET, FILM COATED, EXTENDED RELEASE ORAL at 08:31

## 2025-07-08 RX ADMIN — POTASSIUM CHLORIDE 20 MEQ: 1500 TABLET, EXTENDED RELEASE ORAL at 08:31

## 2025-07-08 RX ADMIN — PANTOPRAZOLE SODIUM 40 MG: 40 TABLET, DELAYED RELEASE ORAL at 05:51

## 2025-07-08 RX ADMIN — GUAIFENESIN 600 MG: 600 TABLET, EXTENDED RELEASE ORAL at 20:08

## 2025-07-08 NOTE — PROGRESS NOTES
[facility]        lubrifresh P.M. (artificial tears) ophthalmic ointment   Both Eyes PRN Ruben Partida APRN - CNP   Given at 05/17/25 1222    cetirizine (ZYRTEC) tablet 10 mg  10 mg Oral Daily Ruben Partida APRN - CNP   10 mg at 07/08/25 0831    guaiFENesin (MUCINEX) extended release tablet 600 mg  600 mg Oral BID Ruben Partida APRN - CNP   600 mg at 07/08/25 0831    miconazole (MICOTIN) 2 % powder   Topical BID Lambert Ramirez MD   Given at 07/08/25 0830    albuterol (PROVENTIL) (2.5 MG/3ML) 0.083% nebulizer solution 2.5 mg  2.5 mg Nebulization Q6H PRN Renay Thorne APRN - CNP        allopurinol (ZYLOPRIM) tablet 300 mg  300 mg Oral Daily Renay Thorne APRN - CNP   300 mg at 07/08/25 0831    aspirin EC tablet 81 mg  81 mg Oral Daily Renay Thorne APRN - CNP   81 mg at 07/08/25 0831    [Held by provider] losartan (COZAAR) tablet 50 mg  50 mg Oral Daily Renay Thorne APRN - CNP   50 mg at 05/17/25 0951    pantoprazole (PROTONIX) tablet 40 mg  40 mg Oral QAM AC Renay Thorne APRN - CNP   40 mg at 07/08/25 0551    pravastatin (PRAVACHOL) tablet 20 mg  20 mg Oral Nightly Renay Thorne APRN - CNP   20 mg at 07/07/25 2125    sodium chloride flush 0.9 % injection 5-40 mL  5-40 mL IntraVENous 2 times per day Renay Thorne APRN - CNP   10 mL at 07/08/25 0831    sodium chloride flush 0.9 % injection 5-40 mL  5-40 mL IntraVENous PRN Renay Thorne APRN - CNP        0.9 % sodium chloride infusion   IntraVENous PRN Renay Thorne APRN - CNP 25 mL/hr at 07/03/25 0939 New Bag at 07/03/25 0939    ondansetron (ZOFRAN-ODT) disintegrating tablet 4 mg  4 mg Oral Q8H PRN Renay Thorne APRN - CNP   4 mg at 07/03/25 1834    Or    ondansetron (ZOFRAN) injection 4 mg  4 mg IntraVENous Q6H PRN Renay Thorne APRN - CNP   4 mg at 07/04/25 0512    polyethylene glycol (GLYCOLAX) packet 17 g  17 g Oral Daily PRN Rhsy Pacheco MD   17 g at 06/07/25 0618    acetaminophen (TYLENOL) tablet 650 mg  650 mg  Oral Q6H PRN Renay Thorne APRN - CNP   650 mg at 07/05/25 0216    Or    acetaminophen (TYLENOL) suppository 650 mg  650 mg Rectal Q6H PRN Renay Thorne APRN - CNP        enoxaparin (LOVENOX) injection 60 mg  60 mg SubCUTAneous BID Renay Thorne APRN - CNP   60 mg at 07/08/25 0831    potassium chloride (KLOR-CON M) extended release tablet 40 mEq  40 mEq Oral PRN Renay Thorne APRN - CNP        Or    potassium bicarb-citric acid (EFFER-K) effervescent tablet 40 mEq  40 mEq Oral PRN Renay Thorne APRN - CNP        Or    potassium chloride 10 mEq/100 mL IVPB (Peripheral Line)  10 mEq IntraVENous PRN Renay Thorne APRN - CNP        magnesium sulfate 2000 mg in 50 mL IVPB premix  2,000 mg IntraVENous PRN Renay Thorne APRN - CNP        sulfur hexafluoride microspheres (LUMASON) 60.7-25 MG injection 2 mL  2 mL IntraVENous ONCE PRN Renay Thorne APRN - CNP        insulin glargine (LANTUS) injection vial 30 Units  30 Units SubCUTAneous Nightly Renay Thorne APRN - CNP   15 Units at 07/07/25 2124    glucose chewable tablet 16 g  4 tablet Oral PRN Renay Thorne APRN - CNP        dextrose bolus 10% 125 mL  125 mL IntraVENous PRN Renay Thorne APRN - CNP   Stopped at 06/28/25 0806    Or    dextrose bolus 10% 250 mL  250 mL IntraVENous PRN Renay Thoren APRN - CNP        glucagon injection 1 mg  1 mg IntraMUSCular PRN Renay Thorne APRN - CNP        dextrose 10 % infusion   IntraVENous Continuous PRN Renay Thorne APRN - CNP           Past Medical History:  Past Medical History:   Diagnosis Date    Abrasion     Anxiety     Asthma     Bell's palsy     Cataracts, bilateral     Cellulitis     Diabetes (HCC)     Edema extremities     GERD (gastroesophageal reflux disease)     Gout     H/O tracheostomy     2015    H/O urinary retention     Hernia, hiatal     History of panniculitis     History of shingles     Hyperlipemia     Hypertension     Morbid obesity (HCC)     Non-ST elevated

## 2025-07-08 NOTE — PROGRESS NOTES
Daily Progress Note    Date:2025  Patient: Daljit Elizondo  : 1960  MRN:[id number]  CODE:Full Code No additional code details  PCP:Jessie Helms APRN - CNP    Admit Date: 2025 12:50 PM   LOS: 55 days       Subjective: He had good urine output with diuresis noted yesterday.  Overnight did have some intermittent O2 desaturation but improved after mask on BiPAP adjusted.  Otherwise has not had any worsening shortness of breath reported.  He is resting comfortably today.  No fevers or chills.  Only complaint was not sleeping well overnight.        Summary: 65-year-old male with morbid obesity, restrictive lung disease, sleep apnea, chronic hypoxic and hypercapnic respiratory failure on supplemental O2 and BiPAP support, chronic systolic heart failure (EF 26-30% by echo 2024), diabetes with chronic venous stasis with lower extremity wounds, prolonged hospital course originally admitted  for volume overload, decompensated heart failure treated with IV loop diuretics, improved then transition to oral diuretics, due to significant debility considered for SNF placement but was declined by multiple facilities due to concern for the level of care he would require and inconsistently worked with PT (reported baseline mobility includes standing only to transfer from bed to scooter/chair), plan was then to discharge home with home health however he appealed his discharge multiple times.  He later developed a change in urine output noting dark brown urine with repeat labs showing acute renal failure with creatinine trending above 3, which did not improve despite IV fluid trial, consistent with acute tubular necrosis.  Followed in-house by nephrology, discussed possibility of dialysis.  However he eventually demonstrated some renal recovery.     started treatment for UTI after he developed some genitourinary symptoms with hematuria.          Review of Systems  Negative      Objective:      Vital signs in

## 2025-07-08 NOTE — PLAN OF CARE
Problem: Chronic Conditions and Co-morbidities  Goal: Patient's chronic conditions and co-morbidity symptoms are monitored and maintained or improved  7/8/2025 1052 by Fely Randle LPN  Outcome: Progressing  Flowsheets (Taken 7/8/2025 0831)  Care Plan - Patient's Chronic Conditions and Co-Morbidity Symptoms are Monitored and Maintained or Improved:   Monitor and assess patient's chronic conditions and comorbid symptoms for stability, deterioration, or improvement   Collaborate with multidisciplinary team to address chronic and comorbid conditions and prevent exacerbation or deterioration   Update acute care plan with appropriate goals if chronic or comorbid symptoms are exacerbated and prevent overall improvement and discharge  7/8/2025 0017 by Anders Moy, RN  Outcome: Progressing     Problem: Discharge Planning  Goal: Discharge to home or other facility with appropriate resources  7/8/2025 1052 by Fely Randle LPN  Outcome: Progressing  Flowsheets (Taken 7/8/2025 0831)  Discharge to home or other facility with appropriate resources:   Identify barriers to discharge with patient and caregiver   Arrange for needed discharge resources and transportation as appropriate   Arrange for interpreters to assist at discharge as needed   Identify discharge learning needs (meds, wound care, etc)   Refer to discharge planning if patient needs post-hospital services based on physician order or complex needs related to functional status, cognitive ability or social support system  7/8/2025 0017 by Anders Moy, RN  Outcome: Progressing     Problem: Pain  Goal: Verbalizes/displays adequate comfort level or baseline comfort level  7/8/2025 1052 by Fely Randle LPN  Outcome: Progressing  7/8/2025 0017 by Anders Moy, RN  Outcome: Progressing     Problem: Safety - Adult  Goal: Free from fall injury  7/8/2025 1052 by Fely Randle LPN  Outcome:

## 2025-07-08 NOTE — PLAN OF CARE
Problem: Chronic Conditions and Co-morbidities  Goal: Patient's chronic conditions and co-morbidity symptoms are monitored and maintained or improved  7/8/2025 0017 by Anders Moy RN  Outcome: Progressing  7/7/2025 1312 by Fely Randle LPN  Outcome: Progressing  Flowsheets (Taken 7/7/2025 1150)  Care Plan - Patient's Chronic Conditions and Co-Morbidity Symptoms are Monitored and Maintained or Improved:   Monitor and assess patient's chronic conditions and comorbid symptoms for stability, deterioration, or improvement   Collaborate with multidisciplinary team to address chronic and comorbid conditions and prevent exacerbation or deterioration   Update acute care plan with appropriate goals if chronic or comorbid symptoms are exacerbated and prevent overall improvement and discharge     Problem: Discharge Planning  Goal: Discharge to home or other facility with appropriate resources  7/8/2025 0017 by Anders Moy RN  Outcome: Progressing  7/7/2025 1312 by Fely Randle LPN  Outcome: Progressing  Flowsheets (Taken 7/7/2025 1150)  Discharge to home or other facility with appropriate resources:   Identify barriers to discharge with patient and caregiver   Arrange for needed discharge resources and transportation as appropriate   Identify discharge learning needs (meds, wound care, etc)   Arrange for interpreters to assist at discharge as needed   Refer to discharge planning if patient needs post-hospital services based on physician order or complex needs related to functional status, cognitive ability or social support system     Problem: Pain  Goal: Verbalizes/displays adequate comfort level or baseline comfort level  7/8/2025 0017 by Anders Moy RN  Outcome: Progressing  7/7/2025 1312 by Fely Randle LPN  Outcome: Progressing     Problem: Safety - Adult  Goal: Free from fall injury  7/8/2025 0017 by Anders Moy RN  Outcome: Progressing  7/7/2025 1312 by  percentage of each meal consumed   Identify factors contributing to decreased intake, treat as appropriate   Assist with meals as needed   Monitor intake and output, weight and lab values   Obtain nutritional consult as needed     Problem: Genitourinary - Adult  Goal: Absence of urinary retention  7/8/2025 0017 by Anders Moy RN  Outcome: Progressing  7/7/2025 1312 by Fely Randle LPN  Outcome: Progressing  Flowsheets (Taken 7/7/2025 1150)  Absence of urinary retention:   Assess patient’s ability to void and empty bladder   Monitor intake/output and perform bladder scan as needed   Place urinary catheter per Licensed Independent Practitioner order if needed   Discuss with Licensed Independent Practitioner  medications to alleviate retention as needed   Discuss catheterization for long term situations as appropriate     Problem: Infection - Adult  Goal: Absence of infection at discharge  7/8/2025 0017 by Anders Moy RN  Outcome: Progressing  7/7/2025 1312 by Fely Randle LPN  Outcome: Progressing  Flowsheets (Taken 7/7/2025 1150)  Absence of infection at discharge:   Assess and monitor for signs and symptoms of infection   Monitor lab/diagnostic results   Monitor all insertion sites i.e., indwelling lines, tubes and drains   Monitor endotracheal (as able) and nasal secretions for changes in amount and color   Administer medications as ordered   Ermine appropriate cooling/warming therapies per order   Instruct and encourage patient and family to use good hand hygiene technique   Identify and instruct in appropriate isolation precautions for identified infection/condition  Goal: Absence of infection during hospitalization  7/8/2025 0017 by Anders Moy RN  Outcome: Progressing  7/7/2025 1312 by Fely Randle LPN  Outcome: Progressing  Flowsheets (Taken 7/7/2025 1150)  Absence of infection during hospitalization:   Assess and monitor for signs and symptoms of

## 2025-07-09 LAB
ANION GAP SERPL CALCULATED.3IONS-SCNC: 9 MMOL/L (ref 8–16)
BACTERIA UR CULT: ABNORMAL
BACTERIA UR CULT: ABNORMAL
BASOPHILS # BLD: 0 K/UL (ref 0–0.2)
BASOPHILS NFR BLD: 0.5 % (ref 0–1)
BUN SERPL-MCNC: 38 MG/DL (ref 8–23)
CALCIUM SERPL-MCNC: 8.5 MG/DL (ref 8.8–10.2)
CHLORIDE SERPL-SCNC: 107 MMOL/L (ref 98–107)
CO2 SERPL-SCNC: 26 MMOL/L (ref 22–29)
CREAT SERPL-MCNC: 3.3 MG/DL (ref 0.7–1.2)
EOSINOPHIL # BLD: 0.3 K/UL (ref 0–0.6)
EOSINOPHIL NFR BLD: 5.2 % (ref 0–5)
ERYTHROCYTE [DISTWIDTH] IN BLOOD BY AUTOMATED COUNT: 16.3 % (ref 11.5–14.5)
GLUCOSE BLD-MCNC: 102 MG/DL (ref 70–99)
GLUCOSE BLD-MCNC: 133 MG/DL (ref 70–99)
GLUCOSE BLD-MCNC: 144 MG/DL (ref 70–99)
GLUCOSE BLD-MCNC: 147 MG/DL (ref 70–99)
GLUCOSE SERPL-MCNC: 128 MG/DL (ref 70–99)
HCT VFR BLD AUTO: 35.9 % (ref 42–52)
HGB BLD-MCNC: 11.1 G/DL (ref 14–18)
IMM GRANULOCYTES # BLD: 0 K/UL
LYMPHOCYTES # BLD: 0.8 K/UL (ref 1.1–4.5)
LYMPHOCYTES NFR BLD: 13.6 % (ref 20–40)
MAGNESIUM SERPL-MCNC: 1.5 MG/DL (ref 1.6–2.4)
MAGNESIUM SERPL-MCNC: 1.8 MG/DL (ref 1.6–2.4)
MCH RBC QN AUTO: 29.7 PG (ref 27–31)
MCHC RBC AUTO-ENTMCNC: 30.9 G/DL (ref 33–37)
MCV RBC AUTO: 96 FL (ref 80–94)
MONOCYTES # BLD: 0.8 K/UL (ref 0–0.9)
MONOCYTES NFR BLD: 14.1 % (ref 0–10)
NEUTROPHILS # BLD: 3.9 K/UL (ref 1.5–7.5)
NEUTS SEG NFR BLD: 65.9 % (ref 50–65)
ORGANISM: ABNORMAL
PERFORMED ON: ABNORMAL
PLATELET # BLD AUTO: 149 K/UL (ref 130–400)
PMV BLD AUTO: 12.7 FL (ref 9.4–12.4)
POTASSIUM SERPL-SCNC: 3.5 MMOL/L (ref 3.5–5)
RBC # BLD AUTO: 3.74 M/UL (ref 4.7–6.1)
SODIUM SERPL-SCNC: 142 MMOL/L (ref 136–145)
WBC # BLD AUTO: 6 K/UL (ref 4.8–10.8)

## 2025-07-09 PROCEDURE — 6360000002 HC RX W HCPCS: Performed by: STUDENT IN AN ORGANIZED HEALTH CARE EDUCATION/TRAINING PROGRAM

## 2025-07-09 PROCEDURE — 97530 THERAPEUTIC ACTIVITIES: CPT

## 2025-07-09 PROCEDURE — 85025 COMPLETE CBC W/AUTO DIFF WBC: CPT

## 2025-07-09 PROCEDURE — 36415 COLL VENOUS BLD VENIPUNCTURE: CPT

## 2025-07-09 PROCEDURE — 2580000003 HC RX 258

## 2025-07-09 PROCEDURE — 83735 ASSAY OF MAGNESIUM: CPT

## 2025-07-09 PROCEDURE — 80048 BASIC METABOLIC PNL TOTAL CA: CPT

## 2025-07-09 PROCEDURE — 82962 GLUCOSE BLOOD TEST: CPT

## 2025-07-09 PROCEDURE — 92522 EVALUATE SPEECH PRODUCTION: CPT

## 2025-07-09 PROCEDURE — 94761 N-INVAS EAR/PLS OXIMETRY MLT: CPT

## 2025-07-09 PROCEDURE — 97535 SELF CARE MNGMENT TRAINING: CPT

## 2025-07-09 PROCEDURE — 1200000000 HC SEMI PRIVATE

## 2025-07-09 PROCEDURE — 94640 AIRWAY INHALATION TREATMENT: CPT

## 2025-07-09 PROCEDURE — 6370000000 HC RX 637 (ALT 250 FOR IP): Performed by: STUDENT IN AN ORGANIZED HEALTH CARE EDUCATION/TRAINING PROGRAM

## 2025-07-09 PROCEDURE — 94660 CPAP INITIATION&MGMT: CPT

## 2025-07-09 PROCEDURE — 6370000000 HC RX 637 (ALT 250 FOR IP): Performed by: NURSE PRACTITIONER

## 2025-07-09 PROCEDURE — 6360000002 HC RX W HCPCS: Performed by: NURSE PRACTITIONER

## 2025-07-09 PROCEDURE — 2700000000 HC OXYGEN THERAPY PER DAY

## 2025-07-09 PROCEDURE — 6370000000 HC RX 637 (ALT 250 FOR IP)

## 2025-07-09 PROCEDURE — 6360000002 HC RX W HCPCS

## 2025-07-09 PROCEDURE — 2500000003 HC RX 250 WO HCPCS: Performed by: HOSPITALIST

## 2025-07-09 PROCEDURE — 2500000003 HC RX 250 WO HCPCS

## 2025-07-09 PROCEDURE — 2580000003 HC RX 258: Performed by: STUDENT IN AN ORGANIZED HEALTH CARE EDUCATION/TRAINING PROGRAM

## 2025-07-09 PROCEDURE — 92610 EVALUATE SWALLOWING FUNCTION: CPT

## 2025-07-09 PROCEDURE — 97110 THERAPEUTIC EXERCISES: CPT

## 2025-07-09 RX ORDER — LACTULOSE 10 G/15ML
20 SOLUTION ORAL DAILY
Status: DISCONTINUED | OUTPATIENT
Start: 2025-07-09 | End: 2025-07-26 | Stop reason: HOSPADM

## 2025-07-09 RX ORDER — BUMETANIDE 1 MG/1
1 TABLET ORAL 2 TIMES DAILY
Status: DISCONTINUED | OUTPATIENT
Start: 2025-07-09 | End: 2025-07-11

## 2025-07-09 RX ORDER — PSEUDOEPHEDRINE HCL 30 MG/1
60 TABLET, FILM COATED ORAL EVERY 6 HOURS PRN
Status: DISCONTINUED | OUTPATIENT
Start: 2025-07-09 | End: 2025-07-26 | Stop reason: HOSPADM

## 2025-07-09 RX ORDER — PSEUDOEPHEDRINE HCL 30 MG/1
60 TABLET, FILM COATED ORAL ONCE
Status: COMPLETED | OUTPATIENT
Start: 2025-07-09 | End: 2025-07-09

## 2025-07-09 RX ADMIN — PRAVASTATIN SODIUM 20 MG: 20 TABLET ORAL at 20:25

## 2025-07-09 RX ADMIN — POTASSIUM CHLORIDE 40 MEQ: 1500 TABLET, EXTENDED RELEASE ORAL at 04:04

## 2025-07-09 RX ADMIN — ENOXAPARIN SODIUM 60 MG: 100 INJECTION SUBCUTANEOUS at 08:11

## 2025-07-09 RX ADMIN — MEROPENEM 1000 MG: 1 INJECTION INTRAVENOUS at 08:00

## 2025-07-09 RX ADMIN — SODIUM CHLORIDE, PRESERVATIVE FREE 10 ML: 5 INJECTION INTRAVENOUS at 08:02

## 2025-07-09 RX ADMIN — BUMETANIDE 1 MG: 1 TABLET ORAL at 18:07

## 2025-07-09 RX ADMIN — IPRATROPIUM BROMIDE 0.5 MG: 0.5 SOLUTION RESPIRATORY (INHALATION) at 18:31

## 2025-07-09 RX ADMIN — BUMETANIDE 1 MG: 1 TABLET ORAL at 08:11

## 2025-07-09 RX ADMIN — METOPROLOL SUCCINATE 25 MG: 25 TABLET, FILM COATED, EXTENDED RELEASE ORAL at 08:11

## 2025-07-09 RX ADMIN — MICONAZOLE NITRATE: 2 POWDER TOPICAL at 08:11

## 2025-07-09 RX ADMIN — CETIRIZINE HYDROCHLORIDE 10 MG: 10 TABLET ORAL at 08:11

## 2025-07-09 RX ADMIN — POVIDONE-IODINE: 10 OINTMENT TOPICAL at 21:11

## 2025-07-09 RX ADMIN — MONTELUKAST SODIUM 10 MG: 10 TABLET, FILM COATED ORAL at 20:24

## 2025-07-09 RX ADMIN — ASPIRIN 81 MG: 81 TABLET, COATED ORAL at 08:11

## 2025-07-09 RX ADMIN — IPRATROPIUM BROMIDE 0.5 MG: 0.5 SOLUTION RESPIRATORY (INHALATION) at 07:08

## 2025-07-09 RX ADMIN — GUAIFENESIN 600 MG: 600 TABLET, EXTENDED RELEASE ORAL at 20:24

## 2025-07-09 RX ADMIN — INSULIN GLARGINE 30 UNITS: 100 INJECTION, SOLUTION SUBCUTANEOUS at 20:22

## 2025-07-09 RX ADMIN — SODIUM CHLORIDE, PRESERVATIVE FREE 10 ML: 5 INJECTION INTRAVENOUS at 23:50

## 2025-07-09 RX ADMIN — ALLOPURINOL 300 MG: 300 TABLET ORAL at 08:11

## 2025-07-09 RX ADMIN — IPRATROPIUM BROMIDE 0.5 MG: 0.5 SOLUTION RESPIRATORY (INHALATION) at 14:38

## 2025-07-09 RX ADMIN — MAGNESIUM SULFATE HEPTAHYDRATE 2000 MG: 40 INJECTION, SOLUTION INTRAVENOUS at 04:16

## 2025-07-09 RX ADMIN — METOPROLOL SUCCINATE 25 MG: 25 TABLET, FILM COATED, EXTENDED RELEASE ORAL at 20:24

## 2025-07-09 RX ADMIN — IPRATROPIUM BROMIDE 0.5 MG: 0.5 SOLUTION RESPIRATORY (INHALATION) at 10:35

## 2025-07-09 RX ADMIN — MICONAZOLE NITRATE: 2 POWDER TOPICAL at 20:26

## 2025-07-09 RX ADMIN — PANTOPRAZOLE SODIUM 40 MG: 40 TABLET, DELAYED RELEASE ORAL at 08:11

## 2025-07-09 RX ADMIN — PSEUDOEPHEDRINE HCL 60 MG: 30 TABLET, FILM COATED ORAL at 18:07

## 2025-07-09 RX ADMIN — SODIUM CHLORIDE: 0.9 INJECTION, SOLUTION INTRAVENOUS at 04:08

## 2025-07-09 RX ADMIN — GUAIFENESIN 600 MG: 600 TABLET, EXTENDED RELEASE ORAL at 08:11

## 2025-07-09 RX ADMIN — MEROPENEM 1000 MG: 1 INJECTION INTRAVENOUS at 20:31

## 2025-07-09 RX ADMIN — ENOXAPARIN SODIUM 60 MG: 100 INJECTION SUBCUTANEOUS at 20:22

## 2025-07-09 RX ADMIN — LACTULOSE 20 G: 20 SOLUTION ORAL at 18:07

## 2025-07-09 NOTE — PROGRESS NOTES
07/09/25 1438   Subjective   Subjective Patient in bed agrees to therapy  (Denise Mata, OT also present.)   Pain C/O some pain in LE's.  Does not rate.   Cognition   Overall Cognitive Status WFL   Orientation   Overall Orientation Status WFL   Vitals   Pulse 90   Respirations 20   SpO2 96 %   O2 Device Nasal cannula   Bed Mobility Training   Rolling Minimal assistance  (For pad placement)   Balance   Sitting   (Sat in recliner 25-30 minutes to work on pre-gait activites & sitting upright .)   Transfer Training   Transfer Training Yes   Sit to Stand   (Attempt X 2 from recliner pulling on BR.    Patient could not stand.)   PT Exercises   A/AROM Exercises BL LE EX sitting in recliner X 5-10 reps.   Patient Education   Education Given To Patient   Education Provided Role of Therapy;Plan of Care;Transfer Training;Mobility Training;Fall Prevention Strategies;Home Exercise Program   Education Provided Comments Use of call light and staff assist.   Education Method Demonstration;Verbal   Barriers to Learning None   Education Outcome Verbalized understanding;Demonstrated understanding;Continued education needed   Other Specialty Interventions   Other Treatments/Modalities BTB call light all needs in reach O2 on & male brown to suction.   Assessment   Activity Tolerance Patient tolerated treatment well   PT Plan of Care   Wednesday X   Oxygen Therapy   O2 Flow Rate (L/min) 4 L/min       Electronically signed by Kalyan Contreras PTA on 7/9/2025 at 4:31 PM

## 2025-07-09 NOTE — PROGRESS NOTES
Nephrology ([facility]) Progress Note    Patient:  Daljit Elizondo  YOB: 1960  Date of Service: 7/9/2025  MRN: [id number]   Acct: [id number]   Primary Care Physician: Jessie Helms APRN - CNP  Advance Directive: Full Code  Admit Date: 5/14/2025       Hospital Day: 56  Referring Provider: Rhys Pacheco MD    Patient independently seen and examined, Chart, Consults, Notes, Operative notes, Labs, Cardiology, and Radiology studies reviewed as available.    Subjective:  Daljit Elizondo is a 65 y.o. male for whom we were consulted for evaluation and treatment of acute kidney injury.  We were consulted on hospital day 28 for evaluation of acute kidney injury.  Patient was a poor historian but recalled no prior nephrologic evaluations or problems.  He appears to have had another kidney injury back in 2011 by our hospital records.  More recently for the first 24 days or so with his hospitalization his renal function had been fairly stable but increased on 6/9 after previously at baseline on 6/7.  It has remained there for the next 2 days and we were consulted on 6/11 for further evaluation.  Chart review demonstrates a 64 kg weight loss since admission (but still had a weight of 458 pounds on consult) with only approximately 12 L net negative.  He had been on a Bumex infusion and did better with that than the IV bolus dosing.  He was then transitioned to oral Bumex dosing.  He denied specific complaints upon questioning.  He denied current chest pain, shortness of air at rest, nausea vomiting, dysuria hematuria.  He denied rash or hemoptysis as well.  Since that time, he had approached the need for dialysis but was unable to be initiated due to being overweight for the available OR tables.  Fortunately, his renal function improved a bit since that time.    Today, no overnight events  Patient denies new complaints upon questioning.  Overall feels breathing has improved a bit over

## 2025-07-09 NOTE — PROGRESS NOTES
Daily Progress Note    Date:2025  Patient: Daljit Elizondo  : 1960  MRN:[id number]  CODE:Full Code No additional code details  PCP:Jessie Helms APRN - CNP    Admit Date: 2025 12:50 PM   LOS: 56 days       Subjective:    No acute events overnight.  No worsening dyspnea.  Has had good urine output with diuresis.  Complaining of nasal congestion and no recent BM.        Summary: 65-year-old male with morbid obesity, restrictive lung disease, sleep apnea, chronic hypoxic and hypercapnic respiratory failure on supplemental O2 and BiPAP support, chronic systolic heart failure (EF 26-30% by echo 2024), diabetes with chronic venous stasis with lower extremity wounds, prolonged hospital course originally admitted  for volume overload, decompensated heart failure treated with IV loop diuretics, improved then transition to oral diuretics, due to significant debility considered for SNF placement but was declined by multiple facilities due to concern for the level of care he would require and inconsistently worked with PT (reported baseline mobility includes standing only to transfer from bed to scooter/chair), plan was then to discharge home with home health however he appealed his discharge multiple times.  He later developed a change in urine output noting dark brown urine with repeat labs showing acute renal failure with creatinine trending above 3, which did not improve despite IV fluid trial, consistent with acute tubular necrosis.  Followed in-house by nephrology, discussed possibility of dialysis.  However he eventually demonstrated some renal recovery.     started treatment for UTI after he developed some genitourinary symptoms with hematuria.          Review of Systems  Negative      Objective:      Vital signs in last 24 hours:  Patient Vitals for the past 24 hrs:   BP Temp Temp src Pulse Resp SpO2   25 1553 135/84 97.9 °F (36.6 °C) Temporal (!) 106 16 97 %   25 1438 -- -- -- 90  mg/dL    Creatinine 3.3 (H) 0.7 - 1.2 mg/dL    Est, Glom Filt Rate 20 (A) >60    Calcium 8.5 (L) 8.8 - 10.2 mg/dL   Magnesium    Collection Time: 07/09/25  3:18 AM   Result Value Ref Range    Magnesium 1.5 (L) 1.6 - 2.4 mg/dL   POCT Glucose    Collection Time: 07/09/25  7:28 AM   Result Value Ref Range    POC Glucose 102 (H) 70 - 99 mg/dl    Performed on AccuChek    Magnesium    Collection Time: 07/09/25  8:08 AM   Result Value Ref Range    Magnesium 1.8 1.6 - 2.4 mg/dL   POCT Glucose    Collection Time: 07/09/25 11:43 AM   Result Value Ref Range    POC Glucose 144 (H) 70 - 99 mg/dl    Performed on AccuChek    POCT Glucose    Collection Time: 07/09/25  3:51 PM   Result Value Ref Range    POC Glucose 133 (H) 70 - 99 mg/dl    Performed on AccuChek        I/O last 3 completed shifts:  In: 600 [P.O.:600]  Out: 2000 [Urine:2000]  I/O this shift:  In: 90.6 [IV Piggyback:90.6]  Out: 1100 [Urine:1100]      Current Facility-Administered Medications:     bumetanide (BUMEX) tablet 1 mg, 1 mg, Oral, BID, Rhys Pacheco MD, 1 mg at 07/09/25 0811    meropenem (MERREM) 1,000 mg in sodium chloride 0.9 % 100 mL IVPB (Rumo9Ouf), 1,000 mg, IntraVENous, Q12H, Rhys Pacheco MD    lactulose (CHRONULAC) 10 GM/15ML solution 20 g, 20 g, Oral, Daily, Rhys Pacheco MD    pseudoephedrine (SUDAFED) tablet 60 mg, 60 mg, Oral, Once, Rhys Pacheco MD    pseudoephedrine (SUDAFED) tablet 60 mg, 60 mg, Oral, Q6H PRN, Rhys Pacheco MD    [Held by provider] sodium bicarbonate tablet 325 mg, 325 mg, Oral, Daily, Rhys Pahceco MD, 325 mg at 07/06/25 0801    loperamide (IMODIUM) capsule 2 mg, 2 mg, Oral, 4x Daily PRN, Cody Howell MD, 2 mg at 06/16/25 3330    insulin lispro (HUMALOG,ADMELOG) injection vial 0-4 Units, 0-4 Units, SubCUTAneous, 4x Daily AC & HS, Cody Howell MD    [Held by provider] polyethylene glycol (GLYCOLAX) packet 17 g, 17 g, Oral, BID, Juan Nicolas, APRN - CNP, 17 g at 06/09/25 9267

## 2025-07-09 NOTE — CARE COORDINATION
Per Dr. Pacheco, we are no longer looking at acute care transfer as Pt's renal function has been improving slightly. Initially transfer for higher LOC re: Pt's need for dialysis had been approached and he was too overweight for this facility's OR tables.     Pt still working with PT/OT, however, still needing a lift to get out of bed, and this has been a deterrent on finding Pt placement re: SNF/PC facility as they don't have equipment that can manage his weight.     PT/OT are still proving tx's with the goal being working on sit-to-stand from recliner (after being placed there with lift) and taking steps, thus either being able to go to a SNF and not need a lift or being able to go home.     Electronically signed by JODI Garcia on 7/9/2025 at 10:40 AM

## 2025-07-09 NOTE — PROGRESS NOTES
Pharmacy Adjustment per [facility] protocol    Daljit Elizondo is a 65 y.o. male. Pharmacy has adjusted medications per [facility] protocol.    Recent Labs     07/08/25  0337 07/09/25  0318   BUN 39* 38*       Recent Labs     07/08/25  0337 07/09/25  0318   CREATININE 3.3* 3.3*       Estimated Creatinine Clearance: 40 mL/min (A) (based on SCr of 3.3 mg/dL (H)).    Height:   Ht Readings from Last 1 Encounters:   07/07/25 1.753 m (5' 9.02\")     Weight:  Wt Readings from Last 1 Encounters:   07/07/25 (!) 216 kg (476 lb 3.1 oz)    BMI:  BMI Readings from Last 1 Encounters:   07/07/25 70.29 kg/m²         Plan: Adjust the following medications based on [facility] protocol:           Meropenem to 1000 mg IV once over 30 minutes followed by 1000 mg IV every 12 hours extended infusion over 180 minutes     Electronically signed by Tex Miramontes RPH on 7/9/2025 at 7:16 AM

## 2025-07-09 NOTE — PLAN OF CARE
Problem: Chronic Conditions and Co-morbidities  Goal: Patient's chronic conditions and co-morbidity symptoms are monitored and maintained or improved  Outcome: Progressing  Flowsheets (Taken 7/9/2025 0811)  Care Plan - Patient's Chronic Conditions and Co-Morbidity Symptoms are Monitored and Maintained or Improved:   Monitor and assess patient's chronic conditions and comorbid symptoms for stability, deterioration, or improvement   Collaborate with multidisciplinary team to address chronic and comorbid conditions and prevent exacerbation or deterioration   Update acute care plan with appropriate goals if chronic or comorbid symptoms are exacerbated and prevent overall improvement and discharge     Problem: Discharge Planning  Goal: Discharge to home or other facility with appropriate resources  Outcome: Progressing  Flowsheets (Taken 7/9/2025 0811)  Discharge to home or other facility with appropriate resources:   Identify barriers to discharge with patient and caregiver   Arrange for needed discharge resources and transportation as appropriate   Identify discharge learning needs (meds, wound care, etc)   Refer to discharge planning if patient needs post-hospital services based on physician order or complex needs related to functional status, cognitive ability or social support system   Arrange for interpreters to assist at discharge as needed     Problem: Pain  Goal: Verbalizes/displays adequate comfort level or baseline comfort level  Outcome: Progressing     Problem: Safety - Adult  Goal: Free from fall injury  Outcome: Progressing     Problem: ABCDS Injury Assessment  Goal: Absence of physical injury  Outcome: Progressing     Problem: Skin/Tissue Integrity  Goal: Skin integrity remains intact  Description: 1.  Monitor for areas of redness and/or skin breakdown2.  Assess vascular access sites hourly3.  Every 4-6 hours minimum:  Change oxygen saturation probe site4.  Every 4-6 hours:  If on nasal continuous

## 2025-07-09 NOTE — PROGRESS NOTES
Facility/Department: [facility] DUNIA/VAS/MED   CLINICAL BEDSIDE SWALLOW EVALUATION  SPEECH PRODUCTION EVALUATION     NAME: Daljit Elizondo  : 1960  MRN: [id number]    ADMISSION DATE: 2025  ADMITTING DIAGNOSIS: has Anxiety; Diabetes (HCC); Essential hypertension; Morbid obesity (HCC); GERD (gastroesophageal reflux disease); Limited mobility in bed; History of pressure ulcer; Vitamin D deficiency; Obesity, morbid (more than 100 lbs over ideal weight or BMI > 40) ([facility]); Bell's palsy; Chronic acquired lymphedema; Non-pressure chronic ulcer of left calf with fat layer exposed ([facility]); Venous stasis of both lower extremities; Lymphorrhea; Cellulitis and abscess of left lower extremity; Acute on chronic systolic (congestive) heart failure ([facility]); Hyperlipemia; Palliative care patient; and Acute on chronic respiratory failure with hypoxia and hypercapnia ([facility]) on their problem list.    Date of Eval: 2025    Evaluating Therapist: JAY Block    Reason for Referral  Daljit Elizondo was referred for a bedside swallow evaluation to assess the efficiency of his swallow function, identify signs and symptoms of aspiration and make recommendations regarding safe dietary consistencies, effective compensatory strategies, and safe eating environment.    Current Diet level:  Regular solid consistency with thin liquids    Pain:  Pain Level: 7  Darling-Baker Pain Rating: No hurt  Pain Location: Head  Pain Orientation: Right  Pain Type: Acute pain  Pain Descriptors: Aching  Non-Pharmaceutical Pain Intervention(s): Other (Comment) (oral care)    Impression  Assessed patient's swallowing function. Patient exhibited decreased oral prep of more solid consistencies, fast oral transit and suspected swallow delay with thin liquids, and sluggish, decreased laryngeal elevation for swallow airway protection. No outward S/S penetration/aspiration was noted with any regular solid consistency trial presented during evaluation this date.

## 2025-07-09 NOTE — PLAN OF CARE
Problem: Chronic Conditions and Co-morbidities  Goal: Patient's chronic conditions and co-morbidity symptoms are monitored and maintained or improved  7/8/2025 2120 by Amie Hernandez RN  Outcome: Progressing  7/8/2025 1052 by Fely Randle LPN  Outcome: Progressing  Flowsheets (Taken 7/8/2025 0831)  Care Plan - Patient's Chronic Conditions and Co-Morbidity Symptoms are Monitored and Maintained or Improved:   Monitor and assess patient's chronic conditions and comorbid symptoms for stability, deterioration, or improvement   Collaborate with multidisciplinary team to address chronic and comorbid conditions and prevent exacerbation or deterioration   Update acute care plan with appropriate goals if chronic or comorbid symptoms are exacerbated and prevent overall improvement and discharge     Problem: Discharge Planning  Goal: Discharge to home or other facility with appropriate resources  7/8/2025 2120 by Amie Hernandez RN  Outcome: Progressing  7/8/2025 1052 by Fely Randle LPN  Outcome: Progressing  Flowsheets (Taken 7/8/2025 0831)  Discharge to home or other facility with appropriate resources:   Identify barriers to discharge with patient and caregiver   Arrange for needed discharge resources and transportation as appropriate   Arrange for interpreters to assist at discharge as needed   Identify discharge learning needs (meds, wound care, etc)   Refer to discharge planning if patient needs post-hospital services based on physician order or complex needs related to functional status, cognitive ability or social support system     Problem: Pain  Goal: Verbalizes/displays adequate comfort level or baseline comfort level  7/8/2025 2120 by Amie Hernandez RN  Outcome: Progressing  7/8/2025 1052 by Fely Randle LPN  Outcome: Progressing     Problem: Safety - Adult  Goal: Free from fall injury  7/8/2025 2120 by Amie Hernandez RN  Outcome: Progressing  7/8/2025 1052 by Jer  breakdown2.  Assess vascular access sites hourly3.  Every 4-6 hours minimum:  Change oxygen saturation probe site4.  Every 4-6 hours:  If on nasal continuous positive airway pressure, respiratory therapy assess nares and determine need for appliance change or resting period  7/8/2025 2120 by Amie Hernandez RN  Outcome: Progressing  7/8/2025 1052 by Fely Randle LPN  Outcome: Progressing  Flowsheets (Taken 7/8/2025 0831)  Skin Integrity Remains Intact:   Monitor for areas of redness and/or skin breakdown   Assess vascular access sites hourly   Every 4-6 hours minimum:  Change oxygen saturation probe site   Every 4-6 hours:  If on nasal continuous positive airway pressure, assess nares and determine need for appliance change or resting period   Turn and reposition as indicated   Assess need for specialty bed   Positioning devices   Pressure redistribution bed/mattress (bed type)   Check visual cues for pain   Monitor skin under medical devices  Goal: Incisions, wounds, or drain sites healing without S/S of infection  7/8/2025 2120 by Amie Hernandez RN  Outcome: Progressing  7/8/2025 1052 by Fely Randle LPN  Outcome: Progressing  Flowsheets (Taken 7/8/2025 0831)  Incisions, Wounds, or Drain Sites Healing Without Sign and Symptoms of Infection:   ADMISSION and DAILY: Assess and document risk factors for pressure ulcer development   TWICE DAILY: Assess and document skin integrity   Implement wound care per orders   TWICE DAILY: Assess and document dressing/incision, wound bed, drain sites and surrounding tissue   Initiate isolation precautions as appropriate   Initiate pressure ulcer prevention bundle as indicated  Goal: Oral mucous membranes remain intact  7/8/2025 2120 by Amie Hernandez RN  Outcome: Progressing  7/8/2025 1052 by Fely Randle LPN  Outcome: Progressing  Flowsheets (Taken 7/8/2025 0831)  Oral Mucous Membranes Remain Intact:   Assess oral mucosa and hygiene

## 2025-07-10 LAB
ANION GAP SERPL CALCULATED.3IONS-SCNC: 11 MMOL/L (ref 8–16)
BUN SERPL-MCNC: 39 MG/DL (ref 8–23)
CALCIUM SERPL-MCNC: 8.4 MG/DL (ref 8.8–10.2)
CHLORIDE SERPL-SCNC: 104 MMOL/L (ref 98–107)
CO2 SERPL-SCNC: 25 MMOL/L (ref 22–29)
CREAT SERPL-MCNC: 3.4 MG/DL (ref 0.7–1.2)
GLUCOSE BLD-MCNC: 130 MG/DL (ref 70–99)
GLUCOSE BLD-MCNC: 148 MG/DL (ref 70–99)
GLUCOSE BLD-MCNC: 172 MG/DL (ref 70–99)
GLUCOSE BLD-MCNC: 175 MG/DL (ref 70–99)
GLUCOSE SERPL-MCNC: 150 MG/DL (ref 70–99)
MAGNESIUM SERPL-MCNC: 1.6 MG/DL (ref 1.6–2.4)
PERFORMED ON: ABNORMAL
POTASSIUM SERPL-SCNC: 4 MMOL/L (ref 3.5–5)
SODIUM SERPL-SCNC: 140 MMOL/L (ref 136–145)

## 2025-07-10 PROCEDURE — 6360000002 HC RX W HCPCS: Performed by: NURSE PRACTITIONER

## 2025-07-10 PROCEDURE — 6370000000 HC RX 637 (ALT 250 FOR IP): Performed by: STUDENT IN AN ORGANIZED HEALTH CARE EDUCATION/TRAINING PROGRAM

## 2025-07-10 PROCEDURE — 94150 VITAL CAPACITY TEST: CPT

## 2025-07-10 PROCEDURE — 6360000002 HC RX W HCPCS: Performed by: STUDENT IN AN ORGANIZED HEALTH CARE EDUCATION/TRAINING PROGRAM

## 2025-07-10 PROCEDURE — 94660 CPAP INITIATION&MGMT: CPT

## 2025-07-10 PROCEDURE — 6370000000 HC RX 637 (ALT 250 FOR IP)

## 2025-07-10 PROCEDURE — 80048 BASIC METABOLIC PNL TOTAL CA: CPT

## 2025-07-10 PROCEDURE — 2580000003 HC RX 258: Performed by: STUDENT IN AN ORGANIZED HEALTH CARE EDUCATION/TRAINING PROGRAM

## 2025-07-10 PROCEDURE — 97530 THERAPEUTIC ACTIVITIES: CPT

## 2025-07-10 PROCEDURE — 94761 N-INVAS EAR/PLS OXIMETRY MLT: CPT

## 2025-07-10 PROCEDURE — 6360000002 HC RX W HCPCS

## 2025-07-10 PROCEDURE — 83735 ASSAY OF MAGNESIUM: CPT

## 2025-07-10 PROCEDURE — 82962 GLUCOSE BLOOD TEST: CPT

## 2025-07-10 PROCEDURE — 1200000000 HC SEMI PRIVATE

## 2025-07-10 PROCEDURE — 97535 SELF CARE MNGMENT TRAINING: CPT

## 2025-07-10 PROCEDURE — 6370000000 HC RX 637 (ALT 250 FOR IP): Performed by: NURSE PRACTITIONER

## 2025-07-10 PROCEDURE — 2700000000 HC OXYGEN THERAPY PER DAY

## 2025-07-10 PROCEDURE — 2500000003 HC RX 250 WO HCPCS

## 2025-07-10 PROCEDURE — 36415 COLL VENOUS BLD VENIPUNCTURE: CPT

## 2025-07-10 PROCEDURE — 94640 AIRWAY INHALATION TREATMENT: CPT

## 2025-07-10 RX ADMIN — MEROPENEM 1000 MG: 1 INJECTION INTRAVENOUS at 21:39

## 2025-07-10 RX ADMIN — INSULIN GLARGINE 30 UNITS: 100 INJECTION, SOLUTION SUBCUTANEOUS at 21:40

## 2025-07-10 RX ADMIN — IPRATROPIUM BROMIDE 0.5 MG: 0.5 SOLUTION RESPIRATORY (INHALATION) at 07:01

## 2025-07-10 RX ADMIN — Medication: at 09:23

## 2025-07-10 RX ADMIN — IPRATROPIUM BROMIDE 0.5 MG: 0.5 SOLUTION RESPIRATORY (INHALATION) at 14:05

## 2025-07-10 RX ADMIN — PRAVASTATIN SODIUM 20 MG: 20 TABLET ORAL at 21:40

## 2025-07-10 RX ADMIN — METOPROLOL SUCCINATE 25 MG: 25 TABLET, FILM COATED, EXTENDED RELEASE ORAL at 09:21

## 2025-07-10 RX ADMIN — MICONAZOLE NITRATE: 2 POWDER TOPICAL at 09:23

## 2025-07-10 RX ADMIN — PANTOPRAZOLE SODIUM 40 MG: 40 TABLET, DELAYED RELEASE ORAL at 06:31

## 2025-07-10 RX ADMIN — SODIUM CHLORIDE, PRESERVATIVE FREE 10 ML: 5 INJECTION INTRAVENOUS at 21:56

## 2025-07-10 RX ADMIN — CETIRIZINE HYDROCHLORIDE 10 MG: 10 TABLET ORAL at 09:21

## 2025-07-10 RX ADMIN — MONTELUKAST SODIUM 10 MG: 10 TABLET, FILM COATED ORAL at 21:40

## 2025-07-10 RX ADMIN — GUAIFENESIN 600 MG: 600 TABLET, EXTENDED RELEASE ORAL at 09:21

## 2025-07-10 RX ADMIN — IPRATROPIUM BROMIDE 0.5 MG: 0.5 SOLUTION RESPIRATORY (INHALATION) at 10:40

## 2025-07-10 RX ADMIN — MEROPENEM 1000 MG: 1 INJECTION INTRAVENOUS at 06:30

## 2025-07-10 RX ADMIN — SODIUM CHLORIDE, PRESERVATIVE FREE 10 ML: 5 INJECTION INTRAVENOUS at 09:23

## 2025-07-10 RX ADMIN — METOPROLOL SUCCINATE 25 MG: 25 TABLET, FILM COATED, EXTENDED RELEASE ORAL at 21:40

## 2025-07-10 RX ADMIN — GUAIFENESIN 600 MG: 600 TABLET, EXTENDED RELEASE ORAL at 21:40

## 2025-07-10 RX ADMIN — MICONAZOLE NITRATE: 2 POWDER TOPICAL at 21:57

## 2025-07-10 RX ADMIN — ASPIRIN 81 MG: 81 TABLET, COATED ORAL at 09:21

## 2025-07-10 RX ADMIN — ALLOPURINOL 300 MG: 300 TABLET ORAL at 09:21

## 2025-07-10 RX ADMIN — ENOXAPARIN SODIUM 60 MG: 100 INJECTION SUBCUTANEOUS at 09:21

## 2025-07-10 RX ADMIN — POVIDONE-IODINE: 10 OINTMENT TOPICAL at 16:14

## 2025-07-10 RX ADMIN — BUMETANIDE 1 MG: 1 TABLET ORAL at 09:21

## 2025-07-10 RX ADMIN — ENOXAPARIN SODIUM 60 MG: 100 INJECTION SUBCUTANEOUS at 21:40

## 2025-07-10 RX ADMIN — IPRATROPIUM BROMIDE 0.5 MG: 0.5 SOLUTION RESPIRATORY (INHALATION) at 18:34

## 2025-07-10 NOTE — PROGRESS NOTES
Physical Therapy     07/10/25 1300   Restrictions/Precautions   Restrictions/Precautions Fall Risk;Contact Precautions   Position Activity Restriction   Other Position/Activity Restrictions ESBL, MRSA   General   Diagnosis hypervolemia, cardiac arrythmia, CAP, SOB, morbid obesity   Subjective   Subjective pt agreeable to tx   Bed mobility   Rolling to Left Minimal assistance   Rolling to Right Minimal assistance   Bed Mobility Comments rolling for anne lift pad placement and clean up prior to transfer. positioned in chair to improve overall lung function with monitoring of BP. anne lift back to bed from chair.   Transfers   Comment attempted STS from recliner to RW x 3 with pt limited in forward weight shift due to habitus.   Short Term Goals   Time Frame for Short Term Goals 2 wks   Short Term Goal 1 ROLL R/L, IND USING BEDRAILS   Short Term Goal 2 SUP<>SIT, MIN/CGA   Short Term Goal 3 TFS TO RECLINER, MIN A 2   Short Term Goal 4 IND WITH HEP SITTING IN RECLINER   Short Term Goal 5 SIT <>STAND, MIN A   Activity Tolerance   Activity Tolerance Patient tolerated treatment well   Assessment   Assessment nursing okayed pt to be up to chair in tall sitting without BLE elevated to improve overall posture and lung function for up to 30 minutes. post 30 min sitting up pt attempted 3 STS from recliner <>RW with pt unable to clear bottom from chair due to physical blocking of forward weight shift due to habitus. returned to bed with anne lift and left with all needs in reach.   PT Plan of Care   Thursday X   Safety Devices   Type of Devices Call light within reach;Gait belt;Left in bed;Nurse notified     Electronically signed by Haim Nicholson PTA on 7/10/2025 at 3:02 PM

## 2025-07-10 NOTE — PROGRESS NOTES
Nephrology ([facility]) Progress Note    Patient:  Daljit Elizondo  YOB: 1960  Date of Service: 7/10/2025  MRN: [id number]   Acct: [id number]   Primary Care Physician: Jessie Helms APRN - CNP  Advance Directive: Full Code  Admit Date: 5/14/2025       Hospital Day: 57  Referring Provider: Rhys Pacheco MD    Patient independently seen and examined, Chart, Consults, Notes, Operative notes, Labs, Cardiology, and Radiology studies reviewed as available.    Subjective:  Daljit Elizondo is a 65 y.o. male for whom we were consulted for evaluation and treatment of acute kidney injury.  We were consulted on hospital day 28 for evaluation of acute kidney injury.  Patient was a poor historian but recalled no prior nephrologic evaluations or problems.  He appears to have had another kidney injury back in 2011 by our hospital records.  More recently for the first 24 days or so with his hospitalization his renal function had been fairly stable but increased on 6/9 after previously at baseline on 6/7.  It has remained there for the next 2 days and we were consulted on 6/11 for further evaluation.  Chart review demonstrates a 64 kg weight loss since admission (but still had a weight of 458 pounds on consult) with only approximately 12 L net negative.  He had been on a Bumex infusion and did better with that than the IV bolus dosing.  He was then transitioned to oral Bumex dosing.  He denied specific complaints upon questioning.  He denied current chest pain, shortness of air at rest, nausea vomiting, dysuria hematuria.  He denied rash or hemoptysis as well.  Since that time, he had approached the need for dialysis but was unable to be initiated due to being overweight for the available OR tables.  Fortunately, his renal function improved a bit since that time.    Today, no overnight events  Patient denies new complaints upon questioning.  Overall feels breathing has improved a bit over

## 2025-07-10 NOTE — PLAN OF CARE
Problem: Chronic Conditions and Co-morbidities  Goal: Patient's chronic conditions and co-morbidity symptoms are monitored and maintained or improved  7/9/2025 2123 by Amie Hernandez RN  Outcome: Progressing  7/9/2025 1404 by Fely Randle LPN  Outcome: Progressing  Flowsheets (Taken 7/9/2025 0811)  Care Plan - Patient's Chronic Conditions and Co-Morbidity Symptoms are Monitored and Maintained or Improved:   Monitor and assess patient's chronic conditions and comorbid symptoms for stability, deterioration, or improvement   Collaborate with multidisciplinary team to address chronic and comorbid conditions and prevent exacerbation or deterioration   Update acute care plan with appropriate goals if chronic or comorbid symptoms are exacerbated and prevent overall improvement and discharge     Problem: Discharge Planning  Goal: Discharge to home or other facility with appropriate resources  7/9/2025 2123 by Amie Hernandez RN  Outcome: Progressing  7/9/2025 1404 by Fely Randle LPN  Outcome: Progressing  Flowsheets (Taken 7/9/2025 0811)  Discharge to home or other facility with appropriate resources:   Identify barriers to discharge with patient and caregiver   Arrange for needed discharge resources and transportation as appropriate   Identify discharge learning needs (meds, wound care, etc)   Refer to discharge planning if patient needs post-hospital services based on physician order or complex needs related to functional status, cognitive ability or social support system   Arrange for interpreters to assist at discharge as needed     Problem: Pain  Goal: Verbalizes/displays adequate comfort level or baseline comfort level  7/9/2025 2123 by Amie Hernandez RN  Outcome: Progressing  7/9/2025 1404 by Fely Randle LPN  Outcome: Progressing     Problem: Safety - Adult  Goal: Free from fall injury  7/9/2025 2123 by Amie Hernandez RN  Outcome: Progressing  7/9/2025 1404 by Jer  Implement preventative oral hygiene regimen   Implement oral medicated treatments as ordered     Problem: Musculoskeletal - Adult  Goal: Return mobility to safest level of function  7/9/2025 2123 by Amie Hernandez RN  Outcome: Progressing  7/9/2025 1404 by Fely Randle LPN  Outcome: Progressing  Flowsheets (Taken 7/9/2025 0811)  Return Mobility to Safest Level of Function:   Assess patient stability and activity tolerance for standing, transferring and ambulating with or without assistive devices   Assist with transfers and ambulation using safe patient handling equipment as needed   Ensure adequate protection for wounds/incisions during mobilization   Obtain physical therapy/occupational therapy consults as needed   Instruct patient/family in ordered activity level   Apply continuous passive motion per provider or physical therapy orders to increase flexion toward goal  Goal: Maintain proper alignment of affected body part  7/9/2025 2123 by Amie Hernandez RN  Outcome: Progressing  7/9/2025 1404 by Fely Randle LPN  Outcome: Progressing  Flowsheets (Taken 7/9/2025 0811)  Maintain proper alignment of affected body part:   Support and protect limb and body alignment per provider's orders   Instruct and reinforce with patient and family use of appropriate assistive device and precautions (e.g. spinal or hip dislocation precautions)  Goal: Return ADL status to a safe level of function  7/9/2025 2123 by Amie Hernandez RN  Outcome: Progressing  7/9/2025 1404 by Fely Randle LPN  Outcome: Progressing  Flowsheets (Taken 7/9/2025 0811)  Return ADL Status to a Safe Level of Function:   Administer medication as ordered   Assess activities of daily living deficits and provide assistive devices as needed   Obtain physical therapy/occupational therapy consults as needed   Assist and instruct patient to increase activity and self care as tolerated     Problem: Gastrointestinal -

## 2025-07-10 NOTE — PROGRESS NOTES
Nutrition Assessment     Type and Reason for Visit: Reassess    Nutrition Recommendations/Plan:   Continue current POC     Malnutrition Assessment:  Malnutrition Status: At risk for malnutrition    Nutrition Assessment:  Pt still napping at time of visit with BiPap on.  Cereal was still sitting on bedside table.  Late documented po intake for breakfast was %.  New weight not available.  Did have BM 7/9    Estimated Daily Nutrient Needs:  Energy (kcal):  6511-8643 kcals (8-11 kcals) Weight Used for Energy Requirements: Current     Protein (g):  145g Weight Used for Protein Requirements: Ideal        Fluid (ml/day):  7978-6308 ml Method Used for Fluid Requirements: 1 ml/kcal    Nutrition Related Findings:   trace generalized & BLE edema Wound Type: Pressure Injury (lacerations)    Current Nutrition Therapies:    ADULT DIET; Regular; 3 carb choices (45 gm/meal); Low Sodium (2 gm); SEND RICE KRISPIES AT BREAKFAST. NO OATMEAL. Likes salads    Anthropometric Measures:  Height: 175.3 cm (5' 9.02\")  Current Body Wt: 216 kg (476 lb 3.1 oz)   BMI: 70.3    Nutrition Diagnosis:   Inadequate oral intake, Altered nutrition-related lab values related to limited food acceptance, increase demand for energy/nutrients, renal dysfunction, endocrine dysfunction, impaired respiratory function, acute injury/trauma as evidenced by intake 0-25%, intake 51-75%, wounds, lab values, constipation    Nutrition Interventions:   Food and/or Nutrient Delivery: Continue Current Diet  Nutrition Education/Counseling: Survival skills/brief education completed, No recommendation at this time  Coordination of Nutrition Care: Continue to monitor while inpatient  Plan of Care discussed with: pt    Goals:  Goals: Meet at least 75% of estimated needs, PO intake 50% or greater, Maintain adequate nutrition status  Type of Goal: Continue current goal  Previous Goal Met: Progressing toward Goal(s)    Nutrition Monitoring and Evaluation:

## 2025-07-10 NOTE — PROGRESS NOTES
Daily Progress Note    Date:7/10/2025  Patient: Daljit Elizondo  : 1960  MRN:[id number]  CODE:Full Code No additional code details  PCP:Jessie Helms APRN - CNP    Admit Date: 2025 12:50 PM   LOS: 57 days       Subjective:    No acute events overnight.  No worsening dyspnea.  Continues to have good urine output on diuretics.  Still has some mild nasal congestion but better than yesterday.  No fevers or chills.  He has had some intermittent dysuria over the last few days.        Summary: 65-year-old male with morbid obesity, restrictive lung disease, sleep apnea, chronic hypoxic and hypercapnic respiratory failure on supplemental O2 and BiPAP support, chronic systolic heart failure (EF 26-30% by echo 2024), diabetes with chronic venous stasis with lower extremity wounds, prolonged hospital course originally admitted  for volume overload, decompensated heart failure treated with IV loop diuretics, improved then transition to oral diuretics, due to significant debility considered for SNF placement but was declined by multiple facilities due to concern for the level of care he would require and inconsistently worked with PT (reported baseline mobility includes standing only to transfer from bed to scooter/chair), plan was then to discharge home with home health however he appealed his discharge multiple times.  He later developed a change in urine output noting dark brown urine with repeat labs showing acute renal failure with creatinine trending above 3, which did not improve despite IV fluid trial, consistent with acute tubular necrosis.  Followed in-house by nephrology, discussed possibility of dialysis.  However he eventually demonstrated some renal recovery.     started treatment for UTI after he developed some genitourinary symptoms with hematuria.          Review of Systems  Negative      Objective:      Vital signs in last 24 hours:  Patient Vitals for the past 24 hrs:   BP Temp Temp src

## 2025-07-10 NOTE — PROGRESS NOTES
Facility/Department: [facility] DUNIA/VAS/MED  Daily Treatment Note  NAME: Daljit Elizondo  : 1960  MRN: [id number]    Date of Service: 7/10/2025    Discharge Recommendations:  Patient would benefit from continued therapy after discharge       Assessment   Assessment: OOB to chair with skylift to perform functional tasks in short sit.  Not successful with sit to stand from recliner.  Treatment Diagnosis: Acute congestive heart failure            Patient Diagnosis(es): The primary encounter diagnosis was Hypervolemia, unspecified hypervolemia type. Diagnoses of Cardiac arrhythmia, unspecified cardiac arrhythmia type, Community acquired pneumonia, unspecified laterality, Shortness of breath, Bilateral lower extremity edema, Cellulitis and abscess of left lower extremity, and Non-pressure chronic ulcer of left calf with fat layer exposed (HCC) were also pertinent to this visit.    has a past medical history of Abrasion, Anxiety, Asthma, Bell's palsy, Cataracts, bilateral, Cellulitis, Diabetes (HCC), Edema extremities, GERD (gastroesophageal reflux disease), Gout, H/O tracheostomy, H/O urinary retention, Hernia, hiatal, History of panniculitis, History of shingles, Hyperlipemia, Hypertension, Morbid obesity (HCC), Non-ST elevated myocardial infarction (HCC), Obesity, Palliative care patient, Paraphimosis, Pulmonary hypertension (HCC), Renal failure, Respiratory failure (HCC), Shoulder pain, Sleep apnea, Testosterone deficiency, Ulcer, Ulcer of calf (HCC), and Venous stasis of both lower extremities.   has a past surgical history that includes Testicle surgery (10/1970); Foot surgery (Left, LONG AGO ); Tracheostomy tube placement; Foot surgery (Right); Mouth surgery; Appendectomy; Colonoscopy; and Endoscopy, colon, diagnostic.    Restrictions  Restrictions/Precautions  Restrictions/Precautions: Fall Risk, Contact Precautions  Position Activity Restriction  Other Position/Activity Restrictions: ESBL,

## 2025-07-11 LAB
ANION GAP SERPL CALCULATED.3IONS-SCNC: 11 MMOL/L (ref 8–16)
BUN SERPL-MCNC: 42 MG/DL (ref 8–23)
CALCIUM SERPL-MCNC: 8.2 MG/DL (ref 8.8–10.2)
CHLORIDE SERPL-SCNC: 103 MMOL/L (ref 98–107)
CO2 SERPL-SCNC: 25 MMOL/L (ref 22–29)
CREAT SERPL-MCNC: 3.6 MG/DL (ref 0.7–1.2)
GLUCOSE BLD-MCNC: 119 MG/DL (ref 70–99)
GLUCOSE BLD-MCNC: 157 MG/DL (ref 70–99)
GLUCOSE BLD-MCNC: 157 MG/DL (ref 70–99)
GLUCOSE BLD-MCNC: 99 MG/DL (ref 70–99)
GLUCOSE SERPL-MCNC: 124 MG/DL (ref 70–99)
PERFORMED ON: ABNORMAL
PERFORMED ON: NORMAL
POTASSIUM SERPL-SCNC: 3.7 MMOL/L (ref 3.5–5)
SODIUM SERPL-SCNC: 139 MMOL/L (ref 136–145)

## 2025-07-11 PROCEDURE — 2580000003 HC RX 258: Performed by: STUDENT IN AN ORGANIZED HEALTH CARE EDUCATION/TRAINING PROGRAM

## 2025-07-11 PROCEDURE — 6360000002 HC RX W HCPCS: Performed by: NURSE PRACTITIONER

## 2025-07-11 PROCEDURE — 94660 CPAP INITIATION&MGMT: CPT

## 2025-07-11 PROCEDURE — 6360000002 HC RX W HCPCS

## 2025-07-11 PROCEDURE — 94761 N-INVAS EAR/PLS OXIMETRY MLT: CPT

## 2025-07-11 PROCEDURE — 6360000002 HC RX W HCPCS: Performed by: STUDENT IN AN ORGANIZED HEALTH CARE EDUCATION/TRAINING PROGRAM

## 2025-07-11 PROCEDURE — 97535 SELF CARE MNGMENT TRAINING: CPT

## 2025-07-11 PROCEDURE — 6370000000 HC RX 637 (ALT 250 FOR IP)

## 2025-07-11 PROCEDURE — 1200000000 HC SEMI PRIVATE

## 2025-07-11 PROCEDURE — 97530 THERAPEUTIC ACTIVITIES: CPT

## 2025-07-11 PROCEDURE — 2700000000 HC OXYGEN THERAPY PER DAY

## 2025-07-11 PROCEDURE — 36415 COLL VENOUS BLD VENIPUNCTURE: CPT

## 2025-07-11 PROCEDURE — 6370000000 HC RX 637 (ALT 250 FOR IP): Performed by: STUDENT IN AN ORGANIZED HEALTH CARE EDUCATION/TRAINING PROGRAM

## 2025-07-11 PROCEDURE — 2580000003 HC RX 258

## 2025-07-11 PROCEDURE — 6370000000 HC RX 637 (ALT 250 FOR IP): Performed by: NURSE PRACTITIONER

## 2025-07-11 PROCEDURE — 82962 GLUCOSE BLOOD TEST: CPT

## 2025-07-11 PROCEDURE — 80048 BASIC METABOLIC PNL TOTAL CA: CPT

## 2025-07-11 PROCEDURE — 2500000003 HC RX 250 WO HCPCS

## 2025-07-11 PROCEDURE — 94640 AIRWAY INHALATION TREATMENT: CPT

## 2025-07-11 RX ORDER — SALIVA STIMULANT COMB. NO.3
SPRAY, NON-AEROSOL (ML) MUCOUS MEMBRANE DAILY
Status: DISCONTINUED | OUTPATIENT
Start: 2025-07-11 | End: 2025-07-26 | Stop reason: HOSPADM

## 2025-07-11 RX ORDER — BUMETANIDE 1 MG/1
1 TABLET ORAL DAILY
Status: DISCONTINUED | OUTPATIENT
Start: 2025-07-12 | End: 2025-07-11

## 2025-07-11 RX ORDER — BUMETANIDE 1 MG/1
1 TABLET ORAL DAILY
Status: DISCONTINUED | OUTPATIENT
Start: 2025-07-12 | End: 2025-07-14

## 2025-07-11 RX ADMIN — GUAIFENESIN 600 MG: 600 TABLET, EXTENDED RELEASE ORAL at 21:04

## 2025-07-11 RX ADMIN — SODIUM CHLORIDE, PRESERVATIVE FREE 10 ML: 5 INJECTION INTRAVENOUS at 08:02

## 2025-07-11 RX ADMIN — METOPROLOL SUCCINATE 25 MG: 25 TABLET, FILM COATED, EXTENDED RELEASE ORAL at 09:17

## 2025-07-11 RX ADMIN — GUAIFENESIN 600 MG: 600 TABLET, EXTENDED RELEASE ORAL at 09:17

## 2025-07-11 RX ADMIN — CETIRIZINE HYDROCHLORIDE 10 MG: 10 TABLET ORAL at 09:17

## 2025-07-11 RX ADMIN — INSULIN GLARGINE 30 UNITS: 100 INJECTION, SOLUTION SUBCUTANEOUS at 21:04

## 2025-07-11 RX ADMIN — ALLOPURINOL 300 MG: 300 TABLET ORAL at 09:17

## 2025-07-11 RX ADMIN — METOPROLOL SUCCINATE 25 MG: 25 TABLET, FILM COATED, EXTENDED RELEASE ORAL at 21:04

## 2025-07-11 RX ADMIN — MONTELUKAST SODIUM 10 MG: 10 TABLET, FILM COATED ORAL at 21:04

## 2025-07-11 RX ADMIN — ASPIRIN 81 MG: 81 TABLET, COATED ORAL at 09:17

## 2025-07-11 RX ADMIN — ENOXAPARIN SODIUM 60 MG: 100 INJECTION SUBCUTANEOUS at 09:16

## 2025-07-11 RX ADMIN — MICONAZOLE NITRATE: 2 POWDER TOPICAL at 21:04

## 2025-07-11 RX ADMIN — PANTOPRAZOLE SODIUM 40 MG: 40 TABLET, DELAYED RELEASE ORAL at 06:04

## 2025-07-11 RX ADMIN — MEROPENEM 1000 MG: 1 INJECTION INTRAVENOUS at 08:03

## 2025-07-11 RX ADMIN — POVIDONE-IODINE: 10 OINTMENT TOPICAL at 09:18

## 2025-07-11 RX ADMIN — MEROPENEM 1000 MG: 1 INJECTION INTRAVENOUS at 19:53

## 2025-07-11 RX ADMIN — SODIUM CHLORIDE: 0.9 INJECTION, SOLUTION INTRAVENOUS at 19:53

## 2025-07-11 RX ADMIN — IPRATROPIUM BROMIDE 0.5 MG: 0.5 SOLUTION RESPIRATORY (INHALATION) at 14:15

## 2025-07-11 RX ADMIN — ENOXAPARIN SODIUM 60 MG: 100 INJECTION SUBCUTANEOUS at 21:04

## 2025-07-11 RX ADMIN — IPRATROPIUM BROMIDE 0.5 MG: 0.5 SOLUTION RESPIRATORY (INHALATION) at 18:46

## 2025-07-11 RX ADMIN — SODIUM CHLORIDE, PRESERVATIVE FREE 10 ML: 5 INJECTION INTRAVENOUS at 23:25

## 2025-07-11 RX ADMIN — IPRATROPIUM BROMIDE 0.5 MG: 0.5 SOLUTION RESPIRATORY (INHALATION) at 10:43

## 2025-07-11 RX ADMIN — Medication: at 16:03

## 2025-07-11 RX ADMIN — IPRATROPIUM BROMIDE 0.5 MG: 0.5 SOLUTION RESPIRATORY (INHALATION) at 07:12

## 2025-07-11 RX ADMIN — PRAVASTATIN SODIUM 20 MG: 20 TABLET ORAL at 21:04

## 2025-07-11 RX ADMIN — MICONAZOLE NITRATE: 2 POWDER TOPICAL at 09:18

## 2025-07-11 NOTE — PLAN OF CARE
Problem: Chronic Conditions and Co-morbidities  Goal: Patient's chronic conditions and co-morbidity symptoms are monitored and maintained or improved  7/11/2025 1127 by Magdalena Lowe RN  Outcome: Progressing  Flowsheets (Taken 7/11/2025 0917)  Care Plan - Patient's Chronic Conditions and Co-Morbidity Symptoms are Monitored and Maintained or Improved: Monitor and assess patient's chronic conditions and comorbid symptoms for stability, deterioration, or improvement  7/11/2025 0408 by Dickson Ariza RN  Outcome: Progressing  Flowsheets (Taken 7/10/2025 2000)  Care Plan - Patient's Chronic Conditions and Co-Morbidity Symptoms are Monitored and Maintained or Improved: Monitor and assess patient's chronic conditions and comorbid symptoms for stability, deterioration, or improvement     Problem: Discharge Planning  Goal: Discharge to home or other facility with appropriate resources  7/11/2025 1127 by Magdalena Lowe RN  Outcome: Progressing  Flowsheets (Taken 7/11/2025 0917)  Discharge to home or other facility with appropriate resources: Identify barriers to discharge with patient and caregiver  7/11/2025 0408 by Dickson Ariza RN  Outcome: Progressing  Flowsheets (Taken 7/10/2025 2000)  Discharge to home or other facility with appropriate resources: Identify barriers to discharge with patient and caregiver     Problem: Pain  Goal: Verbalizes/displays adequate comfort level or baseline comfort level  7/11/2025 1127 by Magdalena Lowe RN  Outcome: Progressing  7/11/2025 0408 by Dickson Ariza RN  Outcome: Progressing     Problem: Safety - Adult  Goal: Free from fall injury  7/11/2025 1127 by Magdalena Lowe RN  Outcome: Progressing  7/11/2025 0408 by Dickson Ariza RN  Outcome: Progressing     Problem: ABCDS Injury Assessment  Goal: Absence of physical injury  7/11/2025 1127 by Magdalena Lowe RN  Outcome: Progressing  7/11/2025 0408 by Dickson Ariza RN  Outcome: Progressing     Problem: Skin/Tissue

## 2025-07-11 NOTE — PROGRESS NOTES
Facility/Department: [facility] DUNIA/VAS/MED  Daily Treatment Note  NAME: Daljit Elizondo  : 1960  MRN: [id number]    Date of Service: 2025    Discharge Recommendations:  Patient would benefit from continued therapy after discharge       Assessment      Activity Tolerance  Activity Tolerance: Patient Tolerated treatment well         Patient Diagnosis(es): The primary encounter diagnosis was Hypervolemia, unspecified hypervolemia type. Diagnoses of Cardiac arrhythmia, unspecified cardiac arrhythmia type, Community acquired pneumonia, unspecified laterality, Shortness of breath, Bilateral lower extremity edema, Cellulitis and abscess of left lower extremity, and Non-pressure chronic ulcer of left calf with fat layer exposed (HCC) were also pertinent to this visit.    has a past medical history of Abrasion, Anxiety, Asthma, Bell's palsy, Cataracts, bilateral, Cellulitis, Diabetes (HCC), Edema extremities, GERD (gastroesophageal reflux disease), Gout, H/O tracheostomy, H/O urinary retention, Hernia, hiatal, History of panniculitis, History of shingles, Hyperlipemia, Hypertension, Morbid obesity (HCC), Non-ST elevated myocardial infarction (HCC), Obesity, Palliative care patient, Paraphimosis, Pulmonary hypertension (HCC), Renal failure, Respiratory failure (HCC), Shoulder pain, Sleep apnea, Testosterone deficiency, Ulcer, Ulcer of calf (HCC), and Venous stasis of both lower extremities.   has a past surgical history that includes Testicle surgery (10/1970); Foot surgery (Left, LONG AGO ); Tracheostomy tube placement; Foot surgery (Right); Mouth surgery; Appendectomy; Colonoscopy; and Endoscopy, colon, diagnostic.    Restrictions  Restrictions/Precautions  Restrictions/Precautions: Fall Risk, Contact Precautions  Position Activity Restriction  Other Position/Activity Restrictions: ESBL, MRSA    Subjective   General  Chart Reviewed: Yes  Patient assessed for rehabilitation services?: Yes  Family / Caregiver

## 2025-07-11 NOTE — PROGRESS NOTES
Nephrology ([facility]) Progress Note    Patient:  Daljit Elizondo  YOB: 1960  Date of Service: 7/11/2025  MRN: [id number]   Acct: [id number]   Primary Care Physician: Jessie Helms APRN - CNP  Advance Directive: Full Code  Admit Date: 5/14/2025       Hospital Day: 58  Referring Provider: Rhys Pacheco MD    Patient independently seen and examined, Chart, Consults, Notes, Operative notes, Labs, Cardiology, and Radiology studies reviewed as available.    Subjective:  Daljit Elizondo is a 65 y.o. male for whom we were consulted for evaluation and treatment of acute kidney injury.  We were consulted on hospital day 28 for evaluation of acute kidney injury.  Patient was a poor historian but recalled no prior nephrologic evaluations or problems.  He appears to have had another kidney injury back in 2011 by our hospital records.  More recently for the first 24 days or so with his hospitalization his renal function had been fairly stable but increased on 6/9 after previously at baseline on 6/7.  It has remained there for the next 2 days and we were consulted on 6/11 for further evaluation.  Chart review demonstrates a 64 kg weight loss since admission (but still had a weight of 458 pounds on consult) with only approximately 12 L net negative.  He had been on a Bumex infusion and did better with that than the IV bolus dosing.  He was then transitioned to oral Bumex dosing.  He denied specific complaints upon questioning.  He denied current chest pain, shortness of air at rest, nausea vomiting, dysuria hematuria.  He denied rash or hemoptysis as well.  Since that time, he had approached the need for dialysis but was unable to be initiated due to being overweight for the available OR tables.  Fortunately, his renal function improved a bit since that time.    Today, no overnight events  Patient denies new complaints upon questioning.  Overall feels breathing has improved a bit over  96 %   07/11/25 0521 110/77 97.5 °F (36.4 °C) Temporal 66 16 96 %   07/10/25 2132 115/76 -- -- (!) 117 -- --   07/10/25 2015 111/75 98.1 °F (36.7 °C) Temporal (!) 112 16 96 %       Intake/Output Summary (Last 24 hours) at 7/11/2025 1833  Last data filed at 7/11/2025 1634  Gross per 24 hour   Intake --   Output 1625 ml   Net -1625 ml     General: awake/alert   Chest:  clear to auscultation bilaterally  CVS: regular rate and rhythm  Abdominal: soft, nontender, normal bowel sounds  Extremities: no cyanosis, ble edema  Skin: warm and dry with stasis changes and wounds as documented    Labs:  BMP:   Recent Labs     07/09/25  0318 07/10/25  0325 07/11/25  0244    140 139   K 3.5 4.0 3.7    104 103   CO2 26 25 25   BUN 38* 39* 42*   CREATININE 3.3* 3.4* 3.6*   CALCIUM 8.5* 8.4* 8.2*     CBC:   Recent Labs     07/09/25 0318   WBC 6.0   HGB 11.1*   HCT 35.9*   MCV 96.0*        LIVER PROFILE:   No results for input(s): \"AST\", \"ALT\", \"LIPASE\", \"AMYLASE\", \"BILIDIR\", \"BILITOT\", \"ALKPHOS\" in the last 72 hours.    Invalid input(s): \"ALB\"    PT/INR: No results for input(s): \"PROTIME\", \"INR\" in the last 72 hours.  APTT: No results for input(s): \"APTT\" in the last 72 hours.  BNP:  No results for input(s): \"BNP\" in the last 72 hours.  Ionized Calcium:Invalid input(s): \"IONCA\"  Magnesium:  Recent Labs     07/09/25 0318 07/09/25  0808 07/10/25  0325   MG 1.5* 1.8 1.6     Phosphorus:No results for input(s): \"PHOS\" in the last 72 hours.  HgbA1C: No results for input(s): \"LABA1C\" in the last 72 hours.  Hepatic:   No results for input(s): \"ALKPHOS\", \"ALT\", \"AST\", \"BILITOT\", \"BILIDIR\", \"LABALBU\" in the last 72 hours.    Invalid input(s): \"PROT\"    Lactic Acid: No results for input(s): \"LACTA\" in the last 72 hours.  Troponin: No results for input(s): \"CKTOTAL\", \"CKMB\", \"TROPONINT\" in the last 72 hours.  ABGs:   Lab Results   Component Value Date/Time    PHART 7.290 07/06/2025 12:53 PM    PO2ART 76.0 07/06/2025 12:53 PM

## 2025-07-11 NOTE — PROGRESS NOTES
Daily Progress Note    Date:2025  Patient: Daljit Elizondo  : 1960  MRN:[id number]  CODE:Full Code No additional code details  PCP:Jessie Helms APRN - CNP    Admit Date: 2025 12:50 PM   LOS: 58 days       Subjective:    No acute events overnight.  Complains of dry mouth.  Otherwise no worsening dyspnea from baseline.  He is up in chair working with therapy today.          Summary: 65-year-old male with morbid obesity, restrictive lung disease, sleep apnea, chronic hypoxic and hypercapnic respiratory failure on supplemental O2 and BiPAP support, chronic systolic heart failure (EF 26-30% by echo 2024), diabetes with chronic venous stasis with lower extremity wounds, prolonged hospital course originally admitted  for volume overload, decompensated heart failure treated with IV loop diuretics, improved then transition to oral diuretics, due to significant debility considered for SNF placement but was declined by multiple facilities due to concern for the level of care he would require and inconsistently worked with PT (reported baseline mobility includes standing only to transfer from bed to scooter/chair), plan was then to discharge home with home health however he appealed his discharge multiple times.  He later developed a change in urine output noting dark brown urine with repeat labs showing acute renal failure with creatinine trending above 3, which did not improve despite IV fluid trial, consistent with acute tubular necrosis.  Followed in-house by nephrology, discussed possibility of dialysis.  However he eventually demonstrated some renal recovery.     started treatment for UTI after he developed some genitourinary symptoms with hematuria.          Objective:      Vital signs in last 24 hours:  Patient Vitals for the past 24 hrs:   BP Temp Temp src Pulse Resp SpO2   25 1147 93/62 97.2 °F (36.2 °C) Temporal (!) 104 20 96 %   25 0738 115/70 98.4 °F (36.9 °C) Temporal 94 20

## 2025-07-11 NOTE — PROGRESS NOTES
Physical Therapy     07/11/25 1100   Restrictions/Precautions   Restrictions/Precautions Fall Risk;Contact Precautions   Position Activity Restriction   Other Position/Activity Restrictions ESBL, MRSA   General   Diagnosis hypervolemia, cardiac arrythmia, CAP, SOB, morbid obesity   Subjective   Subjective pt agreeable to tx   Bed mobility   Rolling to Left Minimal assistance   Rolling to Right Minimal assistance   Bed Mobility Comments bilat rolling for application and removal of anne lift pad for transfer to chair. pt able to use trapeze and bilat rails for task with Shahbaz.   Transfers   Comment attempted STS from recliner to RW x 2 with pt unable to clear bottom from bed. blocked by habitus and too low surface. pt performed multiple mini tricep pushups with limited strength/ROM.   Short Term Goals   Time Frame for Short Term Goals 2 wks   Short Term Goal 1 ROLL R/L, IND USING BEDRAILS   Short Term Goal 2 SUP<>SIT, MIN/CGA   Short Term Goal 3 TFS TO RECLINER, MIN A 2   Short Term Goal 4 IND WITH HEP SITTING IN RECLINER   Short Term Goal 5 SIT <>STAND, MIN A   Activity Tolerance   Activity Tolerance Patient limited by fatigue;Patient limited by endurance;Other (comment)  (limited by body habitus and equipment)   Assessment   Assessment pt able to tolerate positional changes during bilat rolling with decreased dizziness. able to tolereate 30 min in tall sitting with lumbar support in recliner before anne lift transfer back to bed with OT. pt performed mini tricep pushups at recliner to improve pt ability to push up from chair instead of his current method of attempting to pull up to RW. left in bed with nursing notified of open wound on L foot under toes.   PT Plan of Care   Friday X   Safety Devices   Type of Devices Call light within reach;Gait belt;Left in bed;Nurse notified     Electronically signed by Haim Nicholson PTA on 7/11/2025 at 11:56 AM

## 2025-07-12 LAB
ANION GAP SERPL CALCULATED.3IONS-SCNC: 10 MMOL/L (ref 8–16)
BASOPHILS # BLD: 0 K/UL (ref 0–0.2)
BASOPHILS NFR BLD: 0.4 % (ref 0–1)
BUN SERPL-MCNC: 42 MG/DL (ref 8–23)
CALCIUM SERPL-MCNC: 8.5 MG/DL (ref 8.8–10.2)
CHLORIDE SERPL-SCNC: 104 MMOL/L (ref 98–107)
CO2 SERPL-SCNC: 27 MMOL/L (ref 22–29)
CREAT SERPL-MCNC: 3.8 MG/DL (ref 0.7–1.2)
EOSINOPHIL # BLD: 0.4 K/UL (ref 0–0.6)
EOSINOPHIL NFR BLD: 4.8 % (ref 0–5)
ERYTHROCYTE [DISTWIDTH] IN BLOOD BY AUTOMATED COUNT: 16 % (ref 11.5–14.5)
GLUCOSE BLD-MCNC: 132 MG/DL (ref 70–99)
GLUCOSE BLD-MCNC: 139 MG/DL (ref 70–99)
GLUCOSE SERPL-MCNC: 106 MG/DL (ref 70–99)
HCT VFR BLD AUTO: 36.9 % (ref 42–52)
HGB BLD-MCNC: 10.9 G/DL (ref 14–18)
IMM GRANULOCYTES # BLD: 0.1 K/UL
LYMPHOCYTES # BLD: 1.1 K/UL (ref 1.1–4.5)
LYMPHOCYTES NFR BLD: 14.4 % (ref 20–40)
MCH RBC QN AUTO: 29 PG (ref 27–31)
MCHC RBC AUTO-ENTMCNC: 29.5 G/DL (ref 33–37)
MCV RBC AUTO: 98.1 FL (ref 80–94)
MONOCYTES # BLD: 0.9 K/UL (ref 0–0.9)
MONOCYTES NFR BLD: 12.2 % (ref 0–10)
NEUTROPHILS # BLD: 5.1 K/UL (ref 1.5–7.5)
NEUTS SEG NFR BLD: 67.1 % (ref 50–65)
PERFORMED ON: ABNORMAL
PERFORMED ON: ABNORMAL
PLATELET # BLD AUTO: 153 K/UL (ref 130–400)
PMV BLD AUTO: 12.6 FL (ref 9.4–12.4)
POTASSIUM SERPL-SCNC: 4 MMOL/L (ref 3.5–5)
RBC # BLD AUTO: 3.76 M/UL (ref 4.7–6.1)
SODIUM SERPL-SCNC: 141 MMOL/L (ref 136–145)
WBC # BLD AUTO: 7.5 K/UL (ref 4.8–10.8)

## 2025-07-12 PROCEDURE — 1200000000 HC SEMI PRIVATE

## 2025-07-12 PROCEDURE — 6370000000 HC RX 637 (ALT 250 FOR IP): Performed by: STUDENT IN AN ORGANIZED HEALTH CARE EDUCATION/TRAINING PROGRAM

## 2025-07-12 PROCEDURE — 94761 N-INVAS EAR/PLS OXIMETRY MLT: CPT

## 2025-07-12 PROCEDURE — 2700000000 HC OXYGEN THERAPY PER DAY

## 2025-07-12 PROCEDURE — 36415 COLL VENOUS BLD VENIPUNCTURE: CPT

## 2025-07-12 PROCEDURE — 85025 COMPLETE CBC W/AUTO DIFF WBC: CPT

## 2025-07-12 PROCEDURE — 80048 BASIC METABOLIC PNL TOTAL CA: CPT

## 2025-07-12 PROCEDURE — 6370000000 HC RX 637 (ALT 250 FOR IP): Performed by: NURSE PRACTITIONER

## 2025-07-12 PROCEDURE — 2500000003 HC RX 250 WO HCPCS

## 2025-07-12 PROCEDURE — 6360000002 HC RX W HCPCS

## 2025-07-12 PROCEDURE — 2580000003 HC RX 258: Performed by: STUDENT IN AN ORGANIZED HEALTH CARE EDUCATION/TRAINING PROGRAM

## 2025-07-12 PROCEDURE — 94150 VITAL CAPACITY TEST: CPT

## 2025-07-12 PROCEDURE — 6360000002 HC RX W HCPCS: Performed by: NURSE PRACTITIONER

## 2025-07-12 PROCEDURE — 6360000002 HC RX W HCPCS: Performed by: STUDENT IN AN ORGANIZED HEALTH CARE EDUCATION/TRAINING PROGRAM

## 2025-07-12 PROCEDURE — 94640 AIRWAY INHALATION TREATMENT: CPT

## 2025-07-12 PROCEDURE — 6370000000 HC RX 637 (ALT 250 FOR IP)

## 2025-07-12 PROCEDURE — 94660 CPAP INITIATION&MGMT: CPT

## 2025-07-12 PROCEDURE — 82962 GLUCOSE BLOOD TEST: CPT

## 2025-07-12 RX ADMIN — MICONAZOLE NITRATE: 2 POWDER TOPICAL at 19:29

## 2025-07-12 RX ADMIN — BUMETANIDE 1 MG: 1 TABLET ORAL at 09:22

## 2025-07-12 RX ADMIN — GUAIFENESIN 600 MG: 600 TABLET, EXTENDED RELEASE ORAL at 09:22

## 2025-07-12 RX ADMIN — METOPROLOL SUCCINATE 25 MG: 25 TABLET, FILM COATED, EXTENDED RELEASE ORAL at 19:29

## 2025-07-12 RX ADMIN — LACTULOSE 20 G: 20 SOLUTION ORAL at 09:23

## 2025-07-12 RX ADMIN — CETIRIZINE HYDROCHLORIDE 10 MG: 10 TABLET ORAL at 09:22

## 2025-07-12 RX ADMIN — MEROPENEM 1000 MG: 1 INJECTION INTRAVENOUS at 19:26

## 2025-07-12 RX ADMIN — IPRATROPIUM BROMIDE 0.5 MG: 0.5 SOLUTION RESPIRATORY (INHALATION) at 19:06

## 2025-07-12 RX ADMIN — PANTOPRAZOLE SODIUM 40 MG: 40 TABLET, DELAYED RELEASE ORAL at 05:35

## 2025-07-12 RX ADMIN — IPRATROPIUM BROMIDE 0.5 MG: 0.5 SOLUTION RESPIRATORY (INHALATION) at 15:05

## 2025-07-12 RX ADMIN — Medication: at 20:18

## 2025-07-12 RX ADMIN — GUAIFENESIN 600 MG: 600 TABLET, EXTENDED RELEASE ORAL at 19:29

## 2025-07-12 RX ADMIN — IPRATROPIUM BROMIDE 0.5 MG: 0.5 SOLUTION RESPIRATORY (INHALATION) at 11:00

## 2025-07-12 RX ADMIN — MONTELUKAST SODIUM 10 MG: 10 TABLET, FILM COATED ORAL at 19:30

## 2025-07-12 RX ADMIN — IPRATROPIUM BROMIDE 0.5 MG: 0.5 SOLUTION RESPIRATORY (INHALATION) at 07:08

## 2025-07-12 RX ADMIN — ENOXAPARIN SODIUM 60 MG: 100 INJECTION SUBCUTANEOUS at 09:22

## 2025-07-12 RX ADMIN — MICONAZOLE NITRATE: 2 POWDER TOPICAL at 09:23

## 2025-07-12 RX ADMIN — SODIUM CHLORIDE, PRESERVATIVE FREE 10 ML: 5 INJECTION INTRAVENOUS at 09:23

## 2025-07-12 RX ADMIN — ENOXAPARIN SODIUM 60 MG: 100 INJECTION SUBCUTANEOUS at 20:30

## 2025-07-12 RX ADMIN — POVIDONE-IODINE: 10 OINTMENT TOPICAL at 12:16

## 2025-07-12 RX ADMIN — ASPIRIN 81 MG: 81 TABLET, COATED ORAL at 09:22

## 2025-07-12 RX ADMIN — MEROPENEM 1000 MG: 1 INJECTION INTRAVENOUS at 05:36

## 2025-07-12 RX ADMIN — SODIUM CHLORIDE, PRESERVATIVE FREE 10 ML: 5 INJECTION INTRAVENOUS at 19:29

## 2025-07-12 RX ADMIN — ALLOPURINOL 300 MG: 300 TABLET ORAL at 09:22

## 2025-07-12 RX ADMIN — PRAVASTATIN SODIUM 20 MG: 20 TABLET ORAL at 19:29

## 2025-07-12 NOTE — PROGRESS NOTES
Daily Progress Note    Date:2025  Patient: Daljit Elizondo  : 1960  MRN:[id number]  CODE:Full Code No additional code details  PCP:Jessie Helms APRN - CNP    Admit Date: 2025 12:50 PM   LOS: 59 days       Subjective:    No acute overnight events.  He is resting with no new complaints today.  Denies any worsening dyspnea.  No fevers or chills.  No further genitourinary symptoms.  On labs creatinine slowly rising last few days      Summary: 65-year-old male with morbid obesity, restrictive lung disease, sleep apnea, chronic hypoxic and hypercapnic respiratory failure on supplemental O2 and BiPAP support, chronic systolic heart failure (EF 26-30% by echo 2024), diabetes with chronic venous stasis with lower extremity wounds, prolonged hospital course originally admitted  for volume overload, decompensated heart failure treated with IV loop diuretics, improved then transition to oral diuretics, due to significant debility considered for SNF placement but was declined by multiple facilities due to concern for the level of care he would require and inconsistently worked with PT (reported baseline mobility includes standing only to transfer from bed to scooter/chair), plan was then to discharge home with home health however he appealed his discharge multiple times.  He later developed a change in urine output noting dark brown urine with repeat labs showing acute renal failure with creatinine trending above 3, which did not improve despite IV fluid trial, consistent with acute tubular necrosis.  Followed in-house by nephrology, discussed possibility of dialysis.  However he eventually demonstrated some renal recovery.     started treatment for UTI after he developed some genitourinary symptoms with hematuria.          Objective:      Vital signs in last 24 hours:  Patient Vitals for the past 24 hrs:   BP Temp Temp src Pulse Resp SpO2   25 1100 -- -- -- -- -- 94 %   25 0739 97/63 --  past 24 hours  - Continue POC glucose monitoring with current dose of basal insulin nightly and sliding scale insulin correction as indicated  - Hypoglycemia protocol in place    GERD  - PPI    Cardiovascular disease  - Aspirin, statin    Constipation   --- Monitor BMs, laxatives as needed    Chronic lower extremity diabetic toe wounds, dry gangrene  - Continue wound care, apply Betadine    Nasal congestion  - Continue saline nasal spray  - Sudafed as needed    Dry mouth  - Biotene     Severe debility  - Continue with PT OT, case discussed with physical and Occupational Therapy    DVT prophylaxis Lovenox    Status and plan of care discussed with nursing staff     Follow-up with social work/case management    Disposition: Continue working on facility placement      Rhys Pacheco MD 7/12/2025 12:26 PM

## 2025-07-12 NOTE — PROGRESS NOTES
Nephrology ([facility]) Progress Note    Patient:  Daljit Elizondo  YOB: 1960  Date of Service: 7/12/2025  MRN: [id number]   Acct: [id number]   Primary Care Physician: Jessie Helms APRN - CNP  Advance Directive: Full Code  Admit Date: 5/14/2025       Hospital Day: 59  Referring Provider: Rhys Pacheco MD    Patient independently seen and examined, Chart, Consults, Notes, Operative notes, Labs, Cardiology, and Radiology studies reviewed as available.    Subjective:  Daljit Elizondo is a 65 y.o. male for whom we were consulted for evaluation and treatment of acute kidney injury.  We were consulted on hospital day 28 for evaluation of acute kidney injury.  Patient was a poor historian but recalled no prior nephrologic evaluations or problems.  He appears to have had another kidney injury back in 2011 by our hospital records.  More recently for the first 24 days or so with his hospitalization his renal function had been fairly stable but increased on 6/9 after previously at baseline on 6/7.  It has remained there for the next 2 days and we were consulted on 6/11 for further evaluation.  Chart review demonstrates a 64 kg weight loss since admission (but still had a weight of 458 pounds on consult) with only approximately 12 L net negative.  He had been on a Bumex infusion and did better with that than the IV bolus dosing.  He was then transitioned to oral Bumex dosing.  He denied specific complaints upon questioning.  He denied current chest pain, shortness of air at rest, nausea vomiting, dysuria hematuria.  He denied rash or hemoptysis as well.  Since that time, he had approached the need for dialysis but was unable to be initiated due to being overweight for the available OR tables.  Fortunately, his renal function improved a bit since that time.    Today, no overnight events  Patient denies new complaints upon questioning.  Overall feels breathing has improved a bit over  94 18 95 %   07/11/25 2027 104/68 97.7 °F (36.5 °C) Temporal (!) 112 16 96 %   07/11/25 1846 -- -- -- -- -- 94 %   07/11/25 1630 99/69 97.5 °F (36.4 °C) Temporal (!) 112 20 95 %   07/11/25 1147 93/62 97.2 °F (36.2 °C) Temporal (!) 104 20 96 %       Intake/Output Summary (Last 24 hours) at 7/12/2025 1113  Last data filed at 7/12/2025 0828  Gross per 24 hour   Intake 532.73 ml   Output 1775 ml   Net -1242.27 ml     General: awake/alert   Chest:  clear to auscultation bilaterally  CVS: regular rate and rhythm  Abdominal: soft, nontender, normal bowel sounds  Extremities: no cyanosis, ble edema  Skin: warm and dry with stasis changes and wounds as documented    Labs:  BMP:   Recent Labs     07/10/25  0325 07/11/25  0244 07/12/25 0228    139 141   K 4.0 3.7 4.0    103 104   CO2 25 25 27   BUN 39* 42* 42*   CREATININE 3.4* 3.6* 3.8*   CALCIUM 8.4* 8.2* 8.5*     CBC:   Recent Labs     07/12/25 0228   WBC 7.5   HGB 10.9*   HCT 36.9*   MCV 98.1*        LIVER PROFILE:   No results for input(s): \"AST\", \"ALT\", \"LIPASE\", \"AMYLASE\", \"BILIDIR\", \"BILITOT\", \"ALKPHOS\" in the last 72 hours.    Invalid input(s): \"ALB\"    PT/INR: No results for input(s): \"PROTIME\", \"INR\" in the last 72 hours.  APTT: No results for input(s): \"APTT\" in the last 72 hours.  BNP:  No results for input(s): \"BNP\" in the last 72 hours.  Ionized Calcium:Invalid input(s): \"IONCA\"  Magnesium:  Recent Labs     07/10/25  0325   MG 1.6     Phosphorus:No results for input(s): \"PHOS\" in the last 72 hours.  HgbA1C: No results for input(s): \"LABA1C\" in the last 72 hours.  Hepatic:   No results for input(s): \"ALKPHOS\", \"ALT\", \"AST\", \"BILITOT\", \"BILIDIR\", \"LABALBU\" in the last 72 hours.    Invalid input(s): \"PROT\"    Lactic Acid: No results for input(s): \"LACTA\" in the last 72 hours.  Troponin: No results for input(s): \"CKTOTAL\", \"CKMB\", \"TROPONINT\" in the last 72 hours.  ABGs:   Lab Results   Component Value Date/Time    PHART 7.290 07/06/2025 12:53

## 2025-07-12 NOTE — PLAN OF CARE
Problem: Chronic Conditions and Co-morbidities  Goal: Patient's chronic conditions and co-morbidity symptoms are monitored and maintained or improved  7/11/2025 2036 by Amie Hernandez RN  Outcome: Progressing  7/11/2025 1127 by Magdalena Lowe RN  Outcome: Progressing  Flowsheets (Taken 7/11/2025 0917)  Care Plan - Patient's Chronic Conditions and Co-Morbidity Symptoms are Monitored and Maintained or Improved: Monitor and assess patient's chronic conditions and comorbid symptoms for stability, deterioration, or improvement     Problem: Discharge Planning  Goal: Discharge to home or other facility with appropriate resources  7/11/2025 2036 by Amie Hernandez RN  Outcome: Progressing  7/11/2025 1127 by Magdalena Lowe RN  Outcome: Progressing  Flowsheets (Taken 7/11/2025 0917)  Discharge to home or other facility with appropriate resources: Identify barriers to discharge with patient and caregiver     Problem: Pain  Goal: Verbalizes/displays adequate comfort level or baseline comfort level  7/11/2025 2036 by Amie Hernandez RN  Outcome: Progressing  7/11/2025 1127 by Magdalena Lowe RN  Outcome: Progressing     Problem: Safety - Adult  Goal: Free from fall injury  7/11/2025 2036 by Amie Hernandez RN  Outcome: Progressing  7/11/2025 1127 by Magdalena Lowe RN  Outcome: Progressing     Problem: ABCDS Injury Assessment  Goal: Absence of physical injury  7/11/2025 2036 by Amie Hernandez RN  Outcome: Progressing  7/11/2025 1127 by Magdalena Lowe RN  Outcome: Progressing     Problem: Skin/Tissue Integrity  Goal: Skin integrity remains intact  Description: 1.  Monitor for areas of redness and/or skin breakdown2.  Assess vascular access sites hourly3.  Every 4-6 hours minimum:  Change oxygen saturation probe site4.  Every 4-6 hours:  If on nasal continuous positive airway pressure, respiratory therapy assess nares and determine need for appliance change or resting period  7/11/2025 2036 by Amie Hernandez RN  Outcome:  function  7/11/2025 2036 by Amie Hernandez RN  Outcome: Progressing  7/11/2025 1127 by Magdalena Lowe RN  Outcome: Progressing  Flowsheets (Taken 7/11/2025 0917)  Maintains or returns to baseline bowel function: Assess bowel function  Goal: Maintains adequate nutritional intake  7/11/2025 2036 by Amie Hernandez RN  Outcome: Progressing  7/11/2025 1127 by Magdalena Lowe RN  Outcome: Progressing  Flowsheets (Taken 7/11/2025 0917)  Maintains adequate nutritional intake: Monitor percentage of each meal consumed     Problem: Genitourinary - Adult  Goal: Absence of urinary retention  7/11/2025 2036 by Amie Hernandez RN  Outcome: Progressing  7/11/2025 1127 by Magdalena Lowe RN  Outcome: Progressing  Flowsheets (Taken 7/11/2025 0917)  Absence of urinary retention: Assess patient’s ability to void and empty bladder     Problem: Infection - Adult  Goal: Absence of infection at discharge  7/11/2025 2036 by Amie Hernandez RN  Outcome: Progressing  7/11/2025 1127 by Magdalena Lowe RN  Outcome: Progressing  Flowsheets (Taken 7/11/2025 0917)  Absence of infection at discharge: Assess and monitor for signs and symptoms of infection  Goal: Absence of infection during hospitalization  7/11/2025 2036 by Amie Hernandez RN  Outcome: Progressing  7/11/2025 1127 by Magdalena Lowe RN  Outcome: Progressing  Flowsheets (Taken 7/11/2025 0917)  Absence of infection during hospitalization: Assess and monitor for signs and symptoms of infection  Goal: Absence of fever/infection during anticipated neutropenic period  7/11/2025 2036 by Amie Hernandez RN  Outcome: Progressing  7/11/2025 1127 by Magdalena Lowe RN  Outcome: Progressing  Flowsheets (Taken 7/11/2025 0917)  Absence of fever/infection during anticipated neutropenic period: Monitor white blood cell count     Problem: Metabolic/Fluid and Electrolytes - Adult  Goal: Electrolytes maintained within normal limits  7/11/2025 2036 by Amie Hernandez RN  Outcome: Progressing  7/11/2025 1127

## 2025-07-13 LAB
ANION GAP SERPL CALCULATED.3IONS-SCNC: 10 MMOL/L (ref 8–16)
BUN SERPL-MCNC: 42 MG/DL (ref 8–23)
CALCIUM SERPL-MCNC: 8.3 MG/DL (ref 8.8–10.2)
CHLORIDE SERPL-SCNC: 102 MMOL/L (ref 98–107)
CO2 SERPL-SCNC: 27 MMOL/L (ref 22–29)
CREAT SERPL-MCNC: 3.7 MG/DL (ref 0.7–1.2)
GLUCOSE BLD-MCNC: 103 MG/DL (ref 70–99)
GLUCOSE BLD-MCNC: 113 MG/DL (ref 70–99)
GLUCOSE BLD-MCNC: 132 MG/DL (ref 70–99)
GLUCOSE BLD-MCNC: 149 MG/DL (ref 70–99)
GLUCOSE SERPL-MCNC: 131 MG/DL (ref 70–99)
MAGNESIUM SERPL-MCNC: 1.6 MG/DL (ref 1.6–2.4)
PERFORMED ON: ABNORMAL
POTASSIUM SERPL-SCNC: 3.5 MMOL/L (ref 3.5–5)
SODIUM SERPL-SCNC: 139 MMOL/L (ref 136–145)

## 2025-07-13 PROCEDURE — 2700000000 HC OXYGEN THERAPY PER DAY

## 2025-07-13 PROCEDURE — 94660 CPAP INITIATION&MGMT: CPT

## 2025-07-13 PROCEDURE — 6370000000 HC RX 637 (ALT 250 FOR IP): Performed by: NURSE PRACTITIONER

## 2025-07-13 PROCEDURE — 94640 AIRWAY INHALATION TREATMENT: CPT

## 2025-07-13 PROCEDURE — 6370000000 HC RX 637 (ALT 250 FOR IP): Performed by: STUDENT IN AN ORGANIZED HEALTH CARE EDUCATION/TRAINING PROGRAM

## 2025-07-13 PROCEDURE — 36415 COLL VENOUS BLD VENIPUNCTURE: CPT

## 2025-07-13 PROCEDURE — 94761 N-INVAS EAR/PLS OXIMETRY MLT: CPT

## 2025-07-13 PROCEDURE — 94150 VITAL CAPACITY TEST: CPT

## 2025-07-13 PROCEDURE — 6360000002 HC RX W HCPCS: Performed by: NURSE PRACTITIONER

## 2025-07-13 PROCEDURE — 1200000000 HC SEMI PRIVATE

## 2025-07-13 PROCEDURE — 2500000003 HC RX 250 WO HCPCS

## 2025-07-13 PROCEDURE — 6370000000 HC RX 637 (ALT 250 FOR IP)

## 2025-07-13 PROCEDURE — 82962 GLUCOSE BLOOD TEST: CPT

## 2025-07-13 PROCEDURE — 2580000003 HC RX 258: Performed by: STUDENT IN AN ORGANIZED HEALTH CARE EDUCATION/TRAINING PROGRAM

## 2025-07-13 PROCEDURE — 80048 BASIC METABOLIC PNL TOTAL CA: CPT

## 2025-07-13 PROCEDURE — 6360000002 HC RX W HCPCS

## 2025-07-13 PROCEDURE — 83735 ASSAY OF MAGNESIUM: CPT

## 2025-07-13 PROCEDURE — 6360000002 HC RX W HCPCS: Performed by: STUDENT IN AN ORGANIZED HEALTH CARE EDUCATION/TRAINING PROGRAM

## 2025-07-13 RX ADMIN — LACTULOSE 20 G: 20 SOLUTION ORAL at 08:33

## 2025-07-13 RX ADMIN — MICONAZOLE NITRATE: 2 POWDER TOPICAL at 19:31

## 2025-07-13 RX ADMIN — SODIUM CHLORIDE, PRESERVATIVE FREE 10 ML: 5 INJECTION INTRAVENOUS at 08:36

## 2025-07-13 RX ADMIN — MEROPENEM 1000 MG: 1 INJECTION INTRAVENOUS at 19:30

## 2025-07-13 RX ADMIN — ENOXAPARIN SODIUM 60 MG: 100 INJECTION SUBCUTANEOUS at 20:30

## 2025-07-13 RX ADMIN — POTASSIUM BICARBONATE 40 MEQ: 782 TABLET, EFFERVESCENT ORAL at 13:16

## 2025-07-13 RX ADMIN — METOPROLOL SUCCINATE 25 MG: 25 TABLET, FILM COATED, EXTENDED RELEASE ORAL at 08:33

## 2025-07-13 RX ADMIN — MICONAZOLE NITRATE: 2 POWDER TOPICAL at 08:36

## 2025-07-13 RX ADMIN — POLYETHYLENE GLYCOL 3350 17 G: 17 POWDER, FOR SOLUTION ORAL at 19:32

## 2025-07-13 RX ADMIN — IPRATROPIUM BROMIDE 0.5 MG: 0.5 SOLUTION RESPIRATORY (INHALATION) at 07:04

## 2025-07-13 RX ADMIN — GUAIFENESIN 600 MG: 600 TABLET, EXTENDED RELEASE ORAL at 08:34

## 2025-07-13 RX ADMIN — GUAIFENESIN 600 MG: 600 TABLET, EXTENDED RELEASE ORAL at 19:32

## 2025-07-13 RX ADMIN — PRAVASTATIN SODIUM 20 MG: 20 TABLET ORAL at 19:32

## 2025-07-13 RX ADMIN — Medication: at 13:16

## 2025-07-13 RX ADMIN — SODIUM CHLORIDE, PRESERVATIVE FREE 10 ML: 5 INJECTION INTRAVENOUS at 19:32

## 2025-07-13 RX ADMIN — MAGNESIUM SULFATE HEPTAHYDRATE 2000 MG: 40 INJECTION, SOLUTION INTRAVENOUS at 04:31

## 2025-07-13 RX ADMIN — ASPIRIN 81 MG: 81 TABLET, COATED ORAL at 08:34

## 2025-07-13 RX ADMIN — CETIRIZINE HYDROCHLORIDE 10 MG: 10 TABLET ORAL at 08:33

## 2025-07-13 RX ADMIN — IPRATROPIUM BROMIDE 0.5 MG: 0.5 SOLUTION RESPIRATORY (INHALATION) at 14:47

## 2025-07-13 RX ADMIN — ENOXAPARIN SODIUM 60 MG: 100 INJECTION SUBCUTANEOUS at 08:34

## 2025-07-13 RX ADMIN — MEROPENEM 1000 MG: 1 INJECTION INTRAVENOUS at 06:31

## 2025-07-13 RX ADMIN — PANTOPRAZOLE SODIUM 40 MG: 40 TABLET, DELAYED RELEASE ORAL at 06:32

## 2025-07-13 RX ADMIN — IPRATROPIUM BROMIDE 0.5 MG: 0.5 SOLUTION RESPIRATORY (INHALATION) at 10:33

## 2025-07-13 RX ADMIN — MONTELUKAST SODIUM 10 MG: 10 TABLET, FILM COATED ORAL at 19:32

## 2025-07-13 RX ADMIN — POTASSIUM CHLORIDE 40 MEQ: 1500 TABLET, EXTENDED RELEASE ORAL at 19:32

## 2025-07-13 RX ADMIN — ALLOPURINOL 300 MG: 300 TABLET ORAL at 08:33

## 2025-07-13 RX ADMIN — METOPROLOL SUCCINATE 25 MG: 25 TABLET, FILM COATED, EXTENDED RELEASE ORAL at 19:32

## 2025-07-13 NOTE — PROGRESS NOTES
Daily Progress Note    Date:2025  Patient: Daljit Elizondo  : 1960  MRN:[id number]  CODE:Full Code No additional code details  PCP:Jessie Helms APRN - CNP    Admit Date: 2025 12:50 PM   LOS: 60 days       Subjective:    Seen and examined this a.m., wearing BiPAP, no worsening dyspnea.  No acute events overnight.  No complaints.        Summary: 65-year-old male with morbid obesity, restrictive lung disease, sleep apnea, chronic hypoxic and hypercapnic respiratory failure on supplemental O2 and BiPAP support, chronic systolic heart failure (EF 26-30% by echo 2024), diabetes with chronic venous stasis with lower extremity wounds, prolonged hospital course originally admitted  for volume overload, decompensated heart failure treated with IV loop diuretics, improved then transition to oral diuretics, due to significant debility considered for SNF placement but was declined by multiple facilities due to concern for the level of care he would require and inconsistently worked with PT (reported baseline mobility includes standing only to transfer from bed to scooter/chair), plan was then to discharge home with home health however he appealed his discharge multiple times.  He later developed a change in urine output noting dark brown urine with repeat labs showing acute renal failure with creatinine trending above 3, which did not improve despite IV fluid trial, consistent with acute tubular necrosis.  Followed in-house by nephrology, discussed possibility of dialysis.  However he eventually demonstrated some renal recovery.     started treatment for UTI after he developed some genitourinary symptoms with hematuria.          Objective:      Vital signs in last 24 hours:  Patient Vitals for the past 24 hrs:   BP Temp Temp src Pulse Resp SpO2   25 1129 117/76 97.9 °F (36.6 °C) Axillary 96 18 92 %   25 1035 -- -- -- 95 22 100 %   25 1033 -- -- -- -- -- 100 %   25 0833 118/77

## 2025-07-13 NOTE — PLAN OF CARE
Problem: Chronic Conditions and Co-morbidities  Goal: Patient's chronic conditions and co-morbidity symptoms are monitored and maintained or improved  Outcome: Progressing     Problem: Discharge Planning  Goal: Discharge to home or other facility with appropriate resources  Outcome: Progressing     Problem: Pain  Goal: Verbalizes/displays adequate comfort level or baseline comfort level  Outcome: Progressing     Problem: Safety - Adult  Goal: Free from fall injury  Outcome: Progressing     Problem: ABCDS Injury Assessment  Goal: Absence of physical injury  Outcome: Progressing     Problem: Skin/Tissue Integrity  Goal: Skin integrity remains intact  Description: 1.  Monitor for areas of redness and/or skin breakdown2.  Assess vascular access sites hourly3.  Every 4-6 hours minimum:  Change oxygen saturation probe site4.  Every 4-6 hours:  If on nasal continuous positive airway pressure, respiratory therapy assess nares and determine need for appliance change or resting period  Outcome: Progressing  Flowsheets (Taken 7/12/2025 2000)  Skin Integrity Remains Intact: Monitor for areas of redness and/or skin breakdown     Problem: Nutrition Deficit:  Goal: Optimize nutritional status  Outcome: Progressing     Problem: Neurosensory - Adult  Goal: Achieves stable or improved neurological status  Outcome: Progressing  Flowsheets (Taken 7/12/2025 2000)  Achieves stable or improved neurological status: Assess for and report changes in neurological status     Problem: Respiratory - Adult  Goal: Achieves optimal ventilation and oxygenation  Outcome: Progressing  Flowsheets (Taken 7/12/2025 2000)  Achieves optimal ventilation and oxygenation: Assess for changes in respiratory status     Problem: Cardiovascular - Adult  Goal: Maintains optimal cardiac output and hemodynamic stability  Outcome: Progressing  Flowsheets (Taken 7/12/2025 2000)  Maintains optimal cardiac output and hemodynamic stability: Monitor blood pressure and

## 2025-07-13 NOTE — PROGRESS NOTES
1955    ondansetron (ZOFRAN-ODT) disintegrating tablet 4 mg  4 mg Oral Q8H PRN Renay Thorne APRN - CNP   4 mg at 07/03/25 1834    Or    ondansetron (ZOFRAN) injection 4 mg  4 mg IntraVENous Q6H PRN Renay Thorne APRN - CNP   4 mg at 07/04/25 0512    polyethylene glycol (GLYCOLAX) packet 17 g  17 g Oral Daily PRN Rhys Pacheco MD   17 g at 06/07/25 0618    acetaminophen (TYLENOL) tablet 650 mg  650 mg Oral Q6H PRN Renay Thorne APRN - CNP   650 mg at 07/05/25 0216    Or    acetaminophen (TYLENOL) suppository 650 mg  650 mg Rectal Q6H PRN Renay Thorne APRN - CNP        enoxaparin (LOVENOX) injection 60 mg  60 mg SubCUTAneous BID Renay Thorne APRN - CNP   60 mg at 07/13/25 0834    potassium chloride (KLOR-CON M) extended release tablet 40 mEq  40 mEq Oral PRN Renay Thorne APRN - CNP   40 mEq at 07/09/25 0404    Or    potassium bicarb-citric acid (EFFER-K) effervescent tablet 40 mEq  40 mEq Oral PRN Renay Thorne APRN - CNP        Or    potassium chloride 10 mEq/100 mL IVPB (Peripheral Line)  10 mEq IntraVENous PRN Renay Thorne APRN - CNP        magnesium sulfate 2000 mg in 50 mL IVPB premix  2,000 mg IntraVENous PRN Renay Thorne APRN - CNP   Stopped at 07/13/25 0632    sulfur hexafluoride microspheres (LUMASON) 60.7-25 MG injection 2 mL  2 mL IntraVENous ONCE PRN Renay Thorne APRN - CNP        insulin glargine (LANTUS) injection vial 30 Units  30 Units SubCUTAneous Nightly Renay Thorne APRN - CNP   30 Units at 07/11/25 2104    glucose chewable tablet 16 g  4 tablet Oral PRN Renay Thorne APRN - CNP        dextrose bolus 10% 125 mL  125 mL IntraVENous PRN Renay Thorne APRN - CNP   Stopped at 06/28/25 0806    Or    dextrose bolus 10% 250 mL  250 mL IntraVENous PRN Renay Thorne APRN - CNP        glucagon injection 1 mg  1 mg IntraMUSCular PRN Renay Thorne APRN - CNP        dextrose 10 % infusion   IntraVENous Continuous PRN Renay Thorne APRN - CNP      12:53 PM    PO2ART 76.0 07/06/2025 12:53 PM    MOA1VQD 52.0 07/06/2025 12:53 PM     CRP:  No results for input(s): \"CRP\" in the last 72 hours.  Sed Rate:  No results for input(s): \"SEDRATE\" in the last 72 hours.    Culture Results:   Blood Culture Recent: No results for input(s): \"BC\" in the last 720 hours.    Urine Culture Recent :   Recent Labs     07/06/25  1200   LABURIN >100,000 CFU/ml*  Moderate growth  Multiple Resistant Drug Organism  CONTACT PRECAUTIONS INDICATED  *       Radiology reports as per the Radiologist  Radiology: XR CHEST PORTABLE  Result Date: 7/6/2025  EXAM: CHEST RADIOGRAPH  TECHNIQUE: Single frontal chest radiograph.  HISTORY: Increasing shortness of breath.  COMPARISON: None.  FINDINGS:  Limited examination because of the patient's body habitus.  The patient is mildly leaning and rotated to the left. Pulmonary venous congestion, bilateral ground-glass, and consolidation of the left base, progressed. Equivocal small bilateral pleural effusions.  No visible pneumothorax. Stable enlargement of the cardiac silhouette. No acute displaced rib fractures are identified.        1.  Limited portable chest as described. 2.  Stable enlargement of the cardiac silhouette, with progression of the pulmonary edema.  Equivocal small bilateral pleural effusions.  Superimposed pneumonia cannot be excluded.    ______________________________________ Electronically signed by: SABAS DOMINGUEZ M.D. Date:     07/06/2025 Time:    14:54     Vascular ankle brachial index (SHEILA) PROCEDURE DONE IN [facility]  Addendum Date: 6/29/2025    Right side findings: This is within the normal range. Resting TBI is 1.56.   Left side findings: Resting SHEILA is 1.62. This is within the normal range. Resting TBI is 1.88.     Result Date: 6/29/2025    Right side findings: This is within the normal range. Resting TBI is 1.56.   Left side findings: Resting SHEILA is 1.62. This is within the normal range. Resting TBI is 1.88.     XR FOOT LEFT (2

## 2025-07-13 NOTE — PLAN OF CARE
Problem: Chronic Conditions and Co-morbidities  Goal: Patient's chronic conditions and co-morbidity symptoms are monitored and maintained or improved  7/13/2025 1030 by Kaley Guillory RN  Outcome: Progressing  7/13/2025 0306 by Diya Bender RN  Outcome: Progressing     Problem: Discharge Planning  Goal: Discharge to home or other facility with appropriate resources  7/13/2025 1030 by Kaley Guillory RN  Outcome: Progressing  7/13/2025 0306 by Diya Bender RN  Outcome: Progressing     Problem: Pain  Goal: Verbalizes/displays adequate comfort level or baseline comfort level  7/13/2025 1030 by Kaley Guillory RN  Outcome: Progressing  7/13/2025 0306 by Diya Bender RN  Outcome: Progressing     Problem: Safety - Adult  Goal: Free from fall injury  7/13/2025 1030 by Kaley Guillory RN  Outcome: Progressing  7/13/2025 0306 by Diya Bender RN  Outcome: Progressing     Problem: ABCDS Injury Assessment  Goal: Absence of physical injury  7/13/2025 1030 by Kaley Guillory RN  Outcome: Progressing  7/13/2025 0306 by Diya Bender RN  Outcome: Progressing     Problem: Skin/Tissue Integrity  Goal: Skin integrity remains intact  Description: 1.  Monitor for areas of redness and/or skin breakdown2.  Assess vascular access sites hourly3.  Every 4-6 hours minimum:  Change oxygen saturation probe site4.  Every 4-6 hours:  If on nasal continuous positive airway pressure, respiratory therapy assess nares and determine need for appliance change or resting period  7/13/2025 1030 by Kaley Guillory RN  Outcome: Progressing  7/13/2025 0306 by Diya Bender RN  Outcome: Progressing  Flowsheets (Taken 7/12/2025 2000)  Skin Integrity Remains Intact: Monitor for areas of redness and/or skin breakdown     Problem: Nutrition Deficit:  Goal: Optimize nutritional status  7/13/2025 1030 by Kaley Guillory RN  Outcome: Progressing  7/13/2025 0306 by

## 2025-07-14 PROBLEM — E43 SEVERE MALNUTRITION: Status: ACTIVE | Noted: 2025-07-14

## 2025-07-14 LAB
ALBUMIN SERPL-MCNC: 2.4 G/DL (ref 3.5–5.2)
ALP SERPL-CCNC: 95 U/L (ref 40–129)
ALT SERPL-CCNC: <5 U/L (ref 10–50)
ANION GAP SERPL CALCULATED.3IONS-SCNC: 10 MMOL/L (ref 8–16)
AST SERPL-CCNC: 13 U/L (ref 10–50)
BILIRUB DIRECT SERPL-MCNC: 0.5 MG/DL (ref 0–0.3)
BILIRUB INDIRECT SERPL-MCNC: 0.2 MG/DL (ref 0–1)
BILIRUB SERPL-MCNC: 0.7 MG/DL (ref 0.2–1.2)
BUN SERPL-MCNC: 43 MG/DL (ref 8–23)
CALCIUM SERPL-MCNC: 8.3 MG/DL (ref 8.8–10.2)
CHLORIDE SERPL-SCNC: 105 MMOL/L (ref 98–107)
CO2 SERPL-SCNC: 26 MMOL/L (ref 22–29)
CREAT SERPL-MCNC: 3.6 MG/DL (ref 0.7–1.2)
GLUCOSE BLD-MCNC: 122 MG/DL (ref 70–99)
GLUCOSE BLD-MCNC: 145 MG/DL (ref 70–99)
GLUCOSE BLD-MCNC: 149 MG/DL (ref 70–99)
GLUCOSE BLD-MCNC: 162 MG/DL (ref 70–99)
GLUCOSE SERPL-MCNC: 152 MG/DL (ref 70–99)
PERFORMED ON: ABNORMAL
POTASSIUM SERPL-SCNC: 4.2 MMOL/L (ref 3.5–5)
PROT SERPL-MCNC: 5.3 G/DL (ref 6.4–8.3)
SODIUM SERPL-SCNC: 141 MMOL/L (ref 136–145)

## 2025-07-14 PROCEDURE — 6370000000 HC RX 637 (ALT 250 FOR IP): Performed by: NURSE PRACTITIONER

## 2025-07-14 PROCEDURE — 1200000000 HC SEMI PRIVATE

## 2025-07-14 PROCEDURE — 6370000000 HC RX 637 (ALT 250 FOR IP): Performed by: STUDENT IN AN ORGANIZED HEALTH CARE EDUCATION/TRAINING PROGRAM

## 2025-07-14 PROCEDURE — 80048 BASIC METABOLIC PNL TOTAL CA: CPT

## 2025-07-14 PROCEDURE — 2500000003 HC RX 250 WO HCPCS

## 2025-07-14 PROCEDURE — 94150 VITAL CAPACITY TEST: CPT

## 2025-07-14 PROCEDURE — 6360000002 HC RX W HCPCS: Performed by: STUDENT IN AN ORGANIZED HEALTH CARE EDUCATION/TRAINING PROGRAM

## 2025-07-14 PROCEDURE — 94640 AIRWAY INHALATION TREATMENT: CPT

## 2025-07-14 PROCEDURE — 82962 GLUCOSE BLOOD TEST: CPT

## 2025-07-14 PROCEDURE — 6360000002 HC RX W HCPCS: Performed by: NURSE PRACTITIONER

## 2025-07-14 PROCEDURE — 2580000003 HC RX 258: Performed by: STUDENT IN AN ORGANIZED HEALTH CARE EDUCATION/TRAINING PROGRAM

## 2025-07-14 PROCEDURE — 2700000000 HC OXYGEN THERAPY PER DAY

## 2025-07-14 PROCEDURE — 80076 HEPATIC FUNCTION PANEL: CPT

## 2025-07-14 PROCEDURE — 6360000002 HC RX W HCPCS

## 2025-07-14 PROCEDURE — 94761 N-INVAS EAR/PLS OXIMETRY MLT: CPT

## 2025-07-14 PROCEDURE — 94660 CPAP INITIATION&MGMT: CPT

## 2025-07-14 PROCEDURE — 36415 COLL VENOUS BLD VENIPUNCTURE: CPT

## 2025-07-14 PROCEDURE — 6370000000 HC RX 637 (ALT 250 FOR IP)

## 2025-07-14 RX ORDER — BUMETANIDE 1 MG/1
1 TABLET ORAL
Status: DISCONTINUED | OUTPATIENT
Start: 2025-07-14 | End: 2025-07-26 | Stop reason: HOSPADM

## 2025-07-14 RX ADMIN — METOPROLOL SUCCINATE 25 MG: 25 TABLET, FILM COATED, EXTENDED RELEASE ORAL at 19:48

## 2025-07-14 RX ADMIN — POVIDONE-IODINE: 10 OINTMENT TOPICAL at 11:40

## 2025-07-14 RX ADMIN — BUMETANIDE 1 MG: 1 TABLET ORAL at 08:34

## 2025-07-14 RX ADMIN — ALLOPURINOL 300 MG: 300 TABLET ORAL at 08:34

## 2025-07-14 RX ADMIN — METOPROLOL SUCCINATE 25 MG: 25 TABLET, FILM COATED, EXTENDED RELEASE ORAL at 08:34

## 2025-07-14 RX ADMIN — MICONAZOLE NITRATE: 2 POWDER TOPICAL at 08:34

## 2025-07-14 RX ADMIN — ENOXAPARIN SODIUM 60 MG: 100 INJECTION SUBCUTANEOUS at 20:30

## 2025-07-14 RX ADMIN — MEROPENEM 1000 MG: 1 INJECTION INTRAVENOUS at 07:43

## 2025-07-14 RX ADMIN — IPRATROPIUM BROMIDE 0.5 MG: 0.5 SOLUTION RESPIRATORY (INHALATION) at 15:32

## 2025-07-14 RX ADMIN — INSULIN GLARGINE 30 UNITS: 100 INJECTION, SOLUTION SUBCUTANEOUS at 19:48

## 2025-07-14 RX ADMIN — ASPIRIN 81 MG: 81 TABLET, COATED ORAL at 08:33

## 2025-07-14 RX ADMIN — GUAIFENESIN 600 MG: 600 TABLET, EXTENDED RELEASE ORAL at 08:34

## 2025-07-14 RX ADMIN — PANTOPRAZOLE SODIUM 40 MG: 40 TABLET, DELAYED RELEASE ORAL at 08:33

## 2025-07-14 RX ADMIN — IPRATROPIUM BROMIDE 0.5 MG: 0.5 SOLUTION RESPIRATORY (INHALATION) at 18:35

## 2025-07-14 RX ADMIN — ENOXAPARIN SODIUM 60 MG: 100 INJECTION SUBCUTANEOUS at 08:34

## 2025-07-14 RX ADMIN — LACTULOSE 20 G: 20 SOLUTION ORAL at 11:39

## 2025-07-14 RX ADMIN — MICONAZOLE NITRATE: 2 POWDER TOPICAL at 19:48

## 2025-07-14 RX ADMIN — IPRATROPIUM BROMIDE 0.5 MG: 0.5 SOLUTION RESPIRATORY (INHALATION) at 10:34

## 2025-07-14 RX ADMIN — IPRATROPIUM BROMIDE 0.5 MG: 0.5 SOLUTION RESPIRATORY (INHALATION) at 07:06

## 2025-07-14 RX ADMIN — CETIRIZINE HYDROCHLORIDE 10 MG: 10 TABLET ORAL at 08:33

## 2025-07-14 RX ADMIN — MONTELUKAST SODIUM 10 MG: 10 TABLET, FILM COATED ORAL at 19:48

## 2025-07-14 RX ADMIN — PRAVASTATIN SODIUM 20 MG: 20 TABLET ORAL at 19:48

## 2025-07-14 RX ADMIN — SODIUM CHLORIDE, PRESERVATIVE FREE 10 ML: 5 INJECTION INTRAVENOUS at 19:49

## 2025-07-14 RX ADMIN — GUAIFENESIN 600 MG: 600 TABLET, EXTENDED RELEASE ORAL at 19:48

## 2025-07-14 ASSESSMENT — PAIN SCALES - WONG BAKER
WONGBAKER_NUMERICALRESPONSE: NO HURT
WONGBAKER_NUMERICALRESPONSE: NO HURT

## 2025-07-14 ASSESSMENT — PAIN SCALES - GENERAL
PAINLEVEL_OUTOF10: 0

## 2025-07-14 NOTE — PROGRESS NOTES
Nephrology ([facility]) Progress Note    Patient:  Daljit Elizondo  YOB: 1960  Date of Service: 7/14/2025  MRN: [id number]   Acct: [id number]   Primary Care Physician: Jessie Helms APRN - CNP  Advance Directive: Full Code  Admit Date: 5/14/2025       Hospital Day: 61  Referring Provider: Rhys Pacheco MD    Patient independently seen and examined, Chart, Consults, Notes, Operative notes, Labs, Cardiology, and Radiology studies reviewed as available.    Subjective:  Daljit Elizondo is a 65 y.o. male for whom we were consulted for evaluation and treatment of acute kidney injury.  We were consulted on hospital day 28 for evaluation of acute kidney injury.  Patient was a poor historian but recalled no prior nephrologic evaluations or problems.  He appears to have had another kidney injury back in 2011 by our hospital records.  More recently for the first 24 days or so with his hospitalization his renal function had been fairly stable but increased on 6/9 after previously at baseline on 6/7.  It has remained there for the next 2 days and we were consulted on 6/11 for further evaluation.  Chart review demonstrates a 64 kg weight loss since admission (but still had a weight of 458 pounds on consult) with only approximately 12 L net negative.  He had been on a Bumex infusion and did better with that than the IV bolus dosing.  He was then transitioned to oral Bumex dosing.  He denied specific complaints upon questioning.  He denied current chest pain, shortness of air at rest, nausea vomiting, dysuria hematuria.  He denied rash or hemoptysis as well.  Since that time, he had approached the need for dialysis but was unable to be initiated due to being overweight for the available OR tables.  Fortunately, his renal function improved a bit since that time.    Today, no overnight events  Patient denies new complaints upon questioning.  Overall feels breathing has improved a bit over

## 2025-07-14 NOTE — PROGRESS NOTES
Comprehensive Nutrition Assessment    Type and Reason for Visit:  Reassess    Nutrition Recommendations/Plan:   Continue to encourage to pt to eat     Malnutrition Assessment:  Malnutrition Status:  Severe malnutrition (07/14/25 1236)    Context:  Acute Illness     Findings of the 6 clinical characteristics of malnutrition:  Energy Intake:  50% or less of estimated energy requirements for 5 or more days  Weight Loss:  Greater than 2% over 1 week     Body Fat Loss:  No body fat loss     Muscle Mass Loss:  No muscle mass loss    Fluid Accumulation:  Moderate to Severe Extremities, Generalized   Strength:  Not Performed    Nutrition Assessment:    Patient watching MASH at time of visit.  Breakfast had not been startd on yet.  PO intake still variable.  Did take % of L yesterday.  Weight has decreased by 42# or 8.8% of UBW in the past week.  Aware of diuresis.  Continues to wear BiPaP.    Nutrition Related Findings:    +3 BLE edema., trace generalized edema Wound Type: Pressure Injury (lacerations)       Current Nutrition Intake & Therapies:    Average Meal Intake: 1-25%, %  Average Supplements Intake: None Ordered, Refusing to take  ADULT DIET; Regular; 3 carb choices (45 gm/meal); Low Sodium (2 gm); SEND RICE KRISPIES AT BREAKFAST. NO OATMEAL. Likes salads    Anthropometric Measures:  Height: 175.3 cm (5' 9.02\")  Ideal Body Weight (IBW): 160 lbs (73 kg)    Admission Body Weight: 259.5 kg (572 lb)  Current Body Weight: 197 kg (434 lb 4.9 oz), 271.4 % IBW. Weight Source: Bed scale  Current BMI (kg/m2): 64.1  Usual Body Weight: 263 kg (579 lb 13 oz)  % Weight Change (Calculated): -25.1  Weight Adjustment For: No Adjustment  BMI Categories: Obese Class 3 (BMI 40.0 or greater)    Estimated Daily Nutrient Needs:  Energy Requirements Based On: Kcal/kg  Weight Used for Energy Requirements: Current  Energy (kcal/day): 6583-5161 kcals (8-11 kcals/kg)  Weight Used for Protein Requirements: Ideal  Protein (g/day):

## 2025-07-14 NOTE — PROGRESS NOTES
Occupational Therapy      Declines tx today due to laxitive taking effect.  Electronically signed by Denise Kingsley OT on 7/14/2025 at 3:07 PM

## 2025-07-14 NOTE — PLAN OF CARE
Problem: Chronic Conditions and Co-morbidities  Goal: Patient's chronic conditions and co-morbidity symptoms are monitored and maintained or improved  Outcome: Progressing     Problem: Discharge Planning  Goal: Discharge to home or other facility with appropriate resources  Outcome: Progressing     Problem: Pain  Goal: Verbalizes/displays adequate comfort level or baseline comfort level  Outcome: Progressing     Problem: Safety - Adult  Goal: Free from fall injury  Outcome: Progressing     Problem: ABCDS Injury Assessment  Goal: Absence of physical injury  Outcome: Progressing     Problem: Skin/Tissue Integrity  Goal: Skin integrity remains intact  Description: 1.  Monitor for areas of redness and/or skin breakdown2.  Assess vascular access sites hourly3.  Every 4-6 hours minimum:  Change oxygen saturation probe site4.  Every 4-6 hours:  If on nasal continuous positive airway pressure, respiratory therapy assess nares and determine need for appliance change or resting period  Outcome: Progressing  Flowsheets (Taken 7/13/2025 2000)  Skin Integrity Remains Intact: Monitor for areas of redness and/or skin breakdown     Problem: Nutrition Deficit:  Goal: Optimize nutritional status  Outcome: Progressing     Problem: Neurosensory - Adult  Goal: Achieves stable or improved neurological status  Outcome: Progressing  Flowsheets (Taken 7/13/2025 2000)  Achieves stable or improved neurological status: Assess for and report changes in neurological status     Problem: Respiratory - Adult  Goal: Achieves optimal ventilation and oxygenation  Outcome: Progressing     Problem: Cardiovascular - Adult  Goal: Maintains optimal cardiac output and hemodynamic stability  Outcome: Progressing     Problem: Skin/Tissue Integrity - Adult  Goal: Skin integrity remains intact  Description: 1.  Monitor for areas of redness and/or skin breakdown2.  Assess vascular access sites hourly3.  Every 4-6 hours minimum:  Change oxygen saturation probe

## 2025-07-14 NOTE — PROGRESS NOTES
Daily Progress Note    Date:2025  Patient: Daljit Elizondo  : 1960  MRN:[id number]  CODE:Full Code No additional code details  PCP:Jessie Helms APRN - CNP    Admit Date: 2025 12:50 PM   LOS: 61 days       Subjective:    No acute events overnight.  Feels about the same with no new complaint.  No worsening dyspnea.        Summary: 65-year-old male with morbid obesity, restrictive lung disease, sleep apnea, chronic hypoxic and hypercapnic respiratory failure on supplemental O2 and BiPAP support, chronic systolic heart failure (EF 26-30% by echo 2024), diabetes with chronic venous stasis with lower extremity wounds, prolonged hospital course originally admitted  for volume overload, decompensated heart failure treated with IV loop diuretics, improved then transition to oral diuretics, due to significant debility considered for SNF placement but was declined by multiple facilities due to concern for the level of care he would require and inconsistently worked with PT (reported baseline mobility includes standing only to transfer from bed to scooter/chair), plan was then to discharge home with home health however he appealed his discharge multiple times.  He later developed a change in urine output noting dark brown urine with repeat labs showing acute renal failure with creatinine trending above 3, which did not improve despite IV fluid trial, consistent with acute tubular necrosis.  Followed in-house by nephrology, discussed possibility of dialysis.  However he eventually demonstrated some renal recovery.     started treatment for UTI after he developed some genitourinary symptoms with hematuria.          Objective:      Vital signs in last 24 hours:  Patient Vitals for the past 24 hrs:   BP Temp Temp src Pulse Resp SpO2 Height Weight   25 1229 -- -- -- -- -- -- 1.753 m (5' 9.02\") --   25 0733 121/78 97.5 °F (36.4 °C) Axillary 88 18 95 % -- --   25 0707 -- -- -- -- -- 94 %

## 2025-07-14 NOTE — PLAN OF CARE
Problem: Chronic Conditions and Co-morbidities  Goal: Patient's chronic conditions and co-morbidity symptoms are monitored and maintained or improved  7/14/2025 1436 by Lydia Kwok RN  Outcome: Progressing  7/14/2025 0120 by Diya Bender RN  Outcome: Progressing     Problem: Discharge Planning  Goal: Discharge to home or other facility with appropriate resources  7/14/2025 1436 by Lydia Kwok RN  Outcome: Progressing  7/14/2025 0120 by Diya Bender RN  Outcome: Progressing     Problem: Pain  Goal: Verbalizes/displays adequate comfort level or baseline comfort level  7/14/2025 1436 by Lydia Kwok RN  Outcome: Progressing  7/14/2025 0120 by Diya Bender RN  Outcome: Progressing     Problem: Safety - Adult  Goal: Free from fall injury  7/14/2025 1436 by Lydia Kwok RN  Outcome: Progressing  7/14/2025 0120 by Diya Bender RN  Outcome: Progressing     Problem: ABCDS Injury Assessment  Goal: Absence of physical injury  7/14/2025 1436 by Lydia Kwok RN  Outcome: Progressing  7/14/2025 0120 by Diya Bender RN  Outcome: Progressing     Problem: Skin/Tissue Integrity  Goal: Skin integrity remains intact  Description: 1.  Monitor for areas of redness and/or skin breakdown2.  Assess vascular access sites hourly3.  Every 4-6 hours minimum:  Change oxygen saturation probe site4.  Every 4-6 hours:  If on nasal continuous positive airway pressure, respiratory therapy assess nares and determine need for appliance change or resting period  7/14/2025 1436 by Lydia Kwok RN  Outcome: Progressing  7/14/2025 0120 by Diya Bender RN  Outcome: Progressing  Flowsheets (Taken 7/13/2025 2000)  Skin Integrity Remains Intact: Monitor for areas of redness and/or skin breakdown     Problem: Nutrition Deficit:  Goal: Optimize nutritional status  7/14/2025 1436 by Lydia Kwok RN  Outcome: Progressing  7/14/2025 1237 by Miriam Maldonado, MS, RD, LD  Outcome: Progressing  Flowsheets (Taken

## 2025-07-15 LAB
ANION GAP SERPL CALCULATED.3IONS-SCNC: 8 MMOL/L (ref 8–16)
BASOPHILS # BLD: 0.1 K/UL (ref 0–0.2)
BASOPHILS NFR BLD: 0.6 % (ref 0–1)
BUN SERPL-MCNC: 44 MG/DL (ref 8–23)
CALCIUM SERPL-MCNC: 8.4 MG/DL (ref 8.8–10.2)
CHLORIDE SERPL-SCNC: 104 MMOL/L (ref 98–107)
CO2 SERPL-SCNC: 28 MMOL/L (ref 22–29)
CREAT SERPL-MCNC: 3.7 MG/DL (ref 0.7–1.2)
EOSINOPHIL # BLD: 0.3 K/UL (ref 0–0.6)
EOSINOPHIL NFR BLD: 4.3 % (ref 0–5)
ERYTHROCYTE [DISTWIDTH] IN BLOOD BY AUTOMATED COUNT: 15.8 % (ref 11.5–14.5)
GLUCOSE BLD-MCNC: 119 MG/DL (ref 70–99)
GLUCOSE BLD-MCNC: 131 MG/DL (ref 70–99)
GLUCOSE BLD-MCNC: 147 MG/DL (ref 70–99)
GLUCOSE BLD-MCNC: 99 MG/DL (ref 70–99)
GLUCOSE SERPL-MCNC: 129 MG/DL (ref 70–99)
HCT VFR BLD AUTO: 36.1 % (ref 42–52)
HGB BLD-MCNC: 11.1 G/DL (ref 14–18)
IMM GRANULOCYTES # BLD: 0.1 K/UL
LYMPHOCYTES # BLD: 1.2 K/UL (ref 1.1–4.5)
LYMPHOCYTES NFR BLD: 14.9 % (ref 20–40)
MCH RBC QN AUTO: 29.6 PG (ref 27–31)
MCHC RBC AUTO-ENTMCNC: 30.7 G/DL (ref 33–37)
MCV RBC AUTO: 96.3 FL (ref 80–94)
MONOCYTES # BLD: 0.8 K/UL (ref 0–0.9)
MONOCYTES NFR BLD: 9.8 % (ref 0–10)
NEUTROPHILS # BLD: 5.5 K/UL (ref 1.5–7.5)
NEUTS SEG NFR BLD: 69 % (ref 50–65)
PERFORMED ON: ABNORMAL
PERFORMED ON: NORMAL
PLATELET # BLD AUTO: 179 K/UL (ref 130–400)
PMV BLD AUTO: 12.1 FL (ref 9.4–12.4)
POTASSIUM SERPL-SCNC: 4.2 MMOL/L (ref 3.5–5)
RBC # BLD AUTO: 3.75 M/UL (ref 4.7–6.1)
SODIUM SERPL-SCNC: 140 MMOL/L (ref 136–145)
WBC # BLD AUTO: 7.9 K/UL (ref 4.8–10.8)

## 2025-07-15 PROCEDURE — 6370000000 HC RX 637 (ALT 250 FOR IP): Performed by: STUDENT IN AN ORGANIZED HEALTH CARE EDUCATION/TRAINING PROGRAM

## 2025-07-15 PROCEDURE — 94640 AIRWAY INHALATION TREATMENT: CPT

## 2025-07-15 PROCEDURE — 6370000000 HC RX 637 (ALT 250 FOR IP): Performed by: NURSE PRACTITIONER

## 2025-07-15 PROCEDURE — 1200000000 HC SEMI PRIVATE

## 2025-07-15 PROCEDURE — 6360000002 HC RX W HCPCS

## 2025-07-15 PROCEDURE — 85025 COMPLETE CBC W/AUTO DIFF WBC: CPT

## 2025-07-15 PROCEDURE — 2500000003 HC RX 250 WO HCPCS

## 2025-07-15 PROCEDURE — 6370000000 HC RX 637 (ALT 250 FOR IP)

## 2025-07-15 PROCEDURE — 2700000000 HC OXYGEN THERAPY PER DAY

## 2025-07-15 PROCEDURE — 97530 THERAPEUTIC ACTIVITIES: CPT

## 2025-07-15 PROCEDURE — 97535 SELF CARE MNGMENT TRAINING: CPT

## 2025-07-15 PROCEDURE — 6360000002 HC RX W HCPCS: Performed by: NURSE PRACTITIONER

## 2025-07-15 PROCEDURE — 94660 CPAP INITIATION&MGMT: CPT

## 2025-07-15 PROCEDURE — 94150 VITAL CAPACITY TEST: CPT

## 2025-07-15 PROCEDURE — 36415 COLL VENOUS BLD VENIPUNCTURE: CPT

## 2025-07-15 PROCEDURE — 82962 GLUCOSE BLOOD TEST: CPT

## 2025-07-15 PROCEDURE — 97110 THERAPEUTIC EXERCISES: CPT

## 2025-07-15 PROCEDURE — 94761 N-INVAS EAR/PLS OXIMETRY MLT: CPT

## 2025-07-15 PROCEDURE — 80048 BASIC METABOLIC PNL TOTAL CA: CPT

## 2025-07-15 RX ADMIN — ENOXAPARIN SODIUM 60 MG: 100 INJECTION SUBCUTANEOUS at 09:47

## 2025-07-15 RX ADMIN — MONTELUKAST SODIUM 10 MG: 10 TABLET, FILM COATED ORAL at 19:52

## 2025-07-15 RX ADMIN — SODIUM CHLORIDE, PRESERVATIVE FREE 10 ML: 5 INJECTION INTRAVENOUS at 09:47

## 2025-07-15 RX ADMIN — SODIUM CHLORIDE, PRESERVATIVE FREE 10 ML: 5 INJECTION INTRAVENOUS at 19:53

## 2025-07-15 RX ADMIN — METOPROLOL SUCCINATE 25 MG: 25 TABLET, FILM COATED, EXTENDED RELEASE ORAL at 19:52

## 2025-07-15 RX ADMIN — IPRATROPIUM BROMIDE 0.5 MG: 0.5 SOLUTION RESPIRATORY (INHALATION) at 19:10

## 2025-07-15 RX ADMIN — ALLOPURINOL 300 MG: 300 TABLET ORAL at 09:47

## 2025-07-15 RX ADMIN — PANTOPRAZOLE SODIUM 40 MG: 40 TABLET, DELAYED RELEASE ORAL at 09:54

## 2025-07-15 RX ADMIN — SALINE NASAL SPRAY 1 SPRAY: 1.5 SOLUTION NASAL at 09:47

## 2025-07-15 RX ADMIN — GUAIFENESIN 600 MG: 600 TABLET, EXTENDED RELEASE ORAL at 19:52

## 2025-07-15 RX ADMIN — ASPIRIN 81 MG: 81 TABLET, COATED ORAL at 09:47

## 2025-07-15 RX ADMIN — MICONAZOLE NITRATE: 2 POWDER TOPICAL at 09:46

## 2025-07-15 RX ADMIN — INSULIN GLARGINE 30 UNITS: 100 INJECTION, SOLUTION SUBCUTANEOUS at 19:52

## 2025-07-15 RX ADMIN — PRAVASTATIN SODIUM 20 MG: 20 TABLET ORAL at 19:52

## 2025-07-15 RX ADMIN — CETIRIZINE HYDROCHLORIDE 10 MG: 10 TABLET ORAL at 09:47

## 2025-07-15 RX ADMIN — IPRATROPIUM BROMIDE 0.5 MG: 0.5 SOLUTION RESPIRATORY (INHALATION) at 07:28

## 2025-07-15 RX ADMIN — POVIDONE-IODINE: 10 OINTMENT TOPICAL at 09:48

## 2025-07-15 RX ADMIN — METOPROLOL SUCCINATE 25 MG: 25 TABLET, FILM COATED, EXTENDED RELEASE ORAL at 09:47

## 2025-07-15 RX ADMIN — ENOXAPARIN SODIUM 60 MG: 100 INJECTION SUBCUTANEOUS at 20:30

## 2025-07-15 RX ADMIN — GUAIFENESIN 600 MG: 600 TABLET, EXTENDED RELEASE ORAL at 09:47

## 2025-07-15 RX ADMIN — Medication: at 19:53

## 2025-07-15 RX ADMIN — IPRATROPIUM BROMIDE 0.5 MG: 0.5 SOLUTION RESPIRATORY (INHALATION) at 14:31

## 2025-07-15 RX ADMIN — IPRATROPIUM BROMIDE 0.5 MG: 0.5 SOLUTION RESPIRATORY (INHALATION) at 10:58

## 2025-07-15 RX ADMIN — MICONAZOLE NITRATE: 2 POWDER TOPICAL at 19:52

## 2025-07-15 ASSESSMENT — PAIN SCALES - GENERAL: PAINLEVEL_OUTOF10: 0

## 2025-07-15 NOTE — PROGRESS NOTES
Daily Progress Note    Date:7/15/2025  Patient: Daljit Elizonod  : 1960  MRN:[id number]  CODE:Full Code No additional code details  PCP:Jessie Helms APRN - CNP    Admit Date: 2025 12:50 PM   LOS: 62 days       Subjective:    No acute events overnight.  Feels about the same with no new complaints.  No worsening dyspnea.  Compliant with wearing BiPAP overnight.        Summary:   65-year-old male with morbid obesity, restrictive lung disease, sleep apnea, chronic hypoxic and hypercapnic respiratory failure on supplemental O2 and BiPAP support, chronic systolic heart failure (EF 26-30% by echo 2024), diabetes with chronic venous stasis with lower extremity wounds, prolonged hospital course originally admitted  for volume overload, decompensated heart failure treated with IV loop diuretics, improved then transition to oral diuretics, due to significant debility considered for SNF placement but was declined by multiple facilities due to concern for the level of care he would require and inconsistently worked with PT (reported baseline mobility includes standing only to transfer from bed to scooter/chair), plan was then to discharge home with home health however he appealed his discharge multiple times.  He later developed a change in urine output noting dark brown urine with repeat labs showing acute renal failure with creatinine trending above 3, which did not improve despite IV fluid trial, consistent with acute tubular necrosis.  Followed in-house by nephrology, discussed possibility of dialysis.  However he eventually demonstrated some renal recovery.     started treatment for UTI after he developed some genitourinary symptoms with hematuria.  Culture grew multidrug-resistant Proteus, given multiple antibiotic allergies, treated with course of meropenem.  Diuretic therapy on Bumex was resumed and adjusted over hospital course.  Social work following, trying to arrange for nursing facility

## 2025-07-15 NOTE — PROGRESS NOTES
Facility/Department: [facility]/MED  Daily Treatment Note  NAME: Daljit Elizondo  : 1960  MRN: [id number]    Date of Service: 7/15/2025    Discharge Recommendations:  Patient would benefit from continued therapy after discharge       Assessment   Assessment: The patient was able to get himself to a sitting position EOB but was unable to stand from a raised bed height.  Treatment Diagnosis: Acute congestive heart failure  Activity Tolerance  Activity Tolerance: Patient Tolerated treatment well         Patient Diagnosis(es): The primary encounter diagnosis was Hypervolemia, unspecified hypervolemia type. Diagnoses of Cardiac arrhythmia, unspecified cardiac arrhythmia type, Community acquired pneumonia, unspecified laterality, Shortness of breath, Bilateral lower extremity edema, Cellulitis and abscess of left lower extremity, and Non-pressure chronic ulcer of left calf with fat layer exposed (HCC) were also pertinent to this visit.    has a past medical history of Abrasion, Anxiety, Asthma, Bell's palsy, Cataracts, bilateral, Cellulitis, Diabetes (HCC), Edema extremities, GERD (gastroesophageal reflux disease), Gout, H/O tracheostomy, H/O urinary retention, Hernia, hiatal, History of panniculitis, History of shingles, Hyperlipemia, Hypertension, Morbid obesity (HCC), Non-ST elevated myocardial infarction (HCC), Obesity, Palliative care patient, Paraphimosis, Pulmonary hypertension (HCC), Renal failure, Respiratory failure (HCC), Shoulder pain, Sleep apnea, Testosterone deficiency, Ulcer, Ulcer of calf (HCC), and Venous stasis of both lower extremities.   has a past surgical history that includes Testicle surgery (10/1970); Foot surgery (Left, LONG AGO ); Tracheostomy tube placement; Foot surgery (Right); Mouth surgery; Appendectomy; Colonoscopy; and Endoscopy, colon, diagnostic.    Restrictions  Restrictions/Precautions  Restrictions/Precautions: Fall Risk, Contact Precautions  Position Activity

## 2025-07-15 NOTE — PLAN OF CARE
Problem: Chronic Conditions and Co-morbidities  Goal: Patient's chronic conditions and co-morbidity symptoms are monitored and maintained or improved  7/14/2025 2312 by Diya Bender RN  Outcome: Progressing  7/14/2025 1436 by Lydia Kwok RN  Outcome: Progressing     Problem: Discharge Planning  Goal: Discharge to home or other facility with appropriate resources  7/14/2025 2312 by Diya Bender RN  Outcome: Progressing  7/14/2025 1436 by Lydia Kwok RN  Outcome: Progressing     Problem: Pain  Goal: Verbalizes/displays adequate comfort level or baseline comfort level  7/14/2025 2312 by Diya Bender RN  Outcome: Progressing  7/14/2025 1436 by Lydia Kwok RN  Outcome: Progressing     Problem: Safety - Adult  Goal: Free from fall injury  7/14/2025 2312 by Diya Bender RN  Outcome: Progressing  7/14/2025 1436 by Lydia Kwok RN  Outcome: Progressing     Problem: ABCDS Injury Assessment  Goal: Absence of physical injury  7/14/2025 2312 by Diya Bender RN  Outcome: Progressing  7/14/2025 1436 by Lydia Kwok RN  Outcome: Progressing     Problem: Skin/Tissue Integrity  Goal: Skin integrity remains intact  Description: 1.  Monitor for areas of redness and/or skin breakdown2.  Assess vascular access sites hourly3.  Every 4-6 hours minimum:  Change oxygen saturation probe site4.  Every 4-6 hours:  If on nasal continuous positive airway pressure, respiratory therapy assess nares and determine need for appliance change or resting period  7/14/2025 2312 by Diya Bender RN  Outcome: Progressing  7/14/2025 1436 by Lydia Kwok RN  Outcome: Progressing     Problem: Nutrition Deficit:  Goal: Optimize nutritional status  7/14/2025 2312 by Diya Bender RN  Outcome: Progressing  7/14/2025 1436 by Lydia Kwok RN  Outcome: Progressing  7/14/2025 1237 by Miriam Maldonado, MS, RD, LD  Outcome: Progressing  Flowsheets (Taken 7/14/2025 1229)  Nutrient intake appropriate for improving,  adequate hydration  7/14/2025 1436 by Lydia Kwok RN  Outcome: Progressing  Goal: Maintains or returns to baseline bowel function  7/14/2025 2312 by Diya Bender RN  Outcome: Progressing  Flowsheets (Taken 7/14/2025 2000)  Maintains or returns to baseline bowel function: Assess bowel function  7/14/2025 1436 by Lydia Kwok RN  Outcome: Progressing  Goal: Maintains adequate nutritional intake  7/14/2025 2312 by Diya Bender RN  Outcome: Progressing  Flowsheets (Taken 7/14/2025 2000)  Maintains adequate nutritional intake: Monitor percentage of each meal consumed  7/14/2025 1436 by Lydia Kwok RN  Outcome: Progressing     Problem: Genitourinary - Adult  Goal: Absence of urinary retention  7/14/2025 2312 by Diya Bender RN  Outcome: Progressing  Flowsheets (Taken 7/14/2025 2000)  Absence of urinary retention: Assess patient’s ability to void and empty bladder  7/14/2025 1436 by Lydia Kwok RN  Outcome: Progressing     Problem: Infection - Adult  Goal: Absence of infection at discharge  7/14/2025 2312 by Diya Bender RN  Outcome: Progressing  7/14/2025 1436 by Lydia Kwok RN  Outcome: Progressing  Goal: Absence of infection during hospitalization  7/14/2025 2312 by Diya Bender RN  Outcome: Progressing  7/14/2025 1436 by Lydia Kwok RN  Outcome: Progressing  Goal: Absence of fever/infection during anticipated neutropenic period  7/14/2025 2312 by Diya Bender RN  Outcome: Progressing  7/14/2025 1436 by Lydia Kwok RN  Outcome: Progressing     Problem: Metabolic/Fluid and Electrolytes - Adult  Goal: Electrolytes maintained within normal limits  7/14/2025 2312 by Diya Bender RN  Outcome: Progressing  7/14/2025 1436 by Lydia Kwok RN  Outcome: Progressing  Goal: Hemodynamic stability and optimal renal function maintained  7/14/2025 2312 by Diya Bender RN  Outcome: Progressing  7/14/2025 1436 by Lydia Kwok RN  Outcome: Progressing  Goal: Glucose

## 2025-07-15 NOTE — PROGRESS NOTES
07/15/25 1500   Subjective   Subjective Patient sitting EOB with Denise Mata OT & PCA when this therapist arrived.   Pain No C/O pain   Cognition   Overall Cognitive Status WFL   Orientation   Overall Orientation Status WFL   Vitals   O2 Device Nasal cannula   Bed Mobility Training   Bed Mobility Training Yes   Sit to Supine Substantial/Maximal assistance;2 Person assistance  (Help with LE's)   Balance   Sitting   (Sat EOB 15-20 minutes SBA/CGA cues to keep SHLD. FRWD.)   Transfer Training   Transfer Training Yes   Sit to Stand Contact guard assistance   Stand to Sit Contact guard assistance;2 Person assistance  (Patient could not lift hips off bed.  Patient did show good static sitting EOB.)   Patient Education   Education Given To Patient   Education Provided Role of Therapy;Plan of Care;Transfer Training;Mobility Training;Fall Prevention Strategies;Home Exercise Program   Education Provided Comments Use of call light and staff assist.   Education Method Demonstration;Verbal   Barriers to Learning None   Education Outcome Verbalized understanding;Demonstrated understanding;Continued education needed   Other Specialty Interventions   Other Treatments/Modalities BTB call light all needs in reach O2 on & male brown to suction.   Assessment   Activity Tolerance Patient tolerated treatment well   PT Plan of Care   Tuesday X       Electronically signed by Kalyan Contreras PTA on 7/15/2025 at 3:59 PM

## 2025-07-15 NOTE — PROGRESS NOTES
Nephrology ([facility]) Progress Note    Patient:  Daljit Elizondo  YOB: 1960  Date of Service: 7/15/2025  MRN: [id number]   Acct: [id number]   Primary Care Physician: Jessie Helms APRN - CNP  Advance Directive: Full Code  Admit Date: 5/14/2025       Hospital Day: 62  Referring Provider: Rhys Pacheco MD    Patient independently seen and examined, Chart, Consults, Notes, Operative notes, Labs, Cardiology, and Radiology studies reviewed as available.    Subjective:  Daljit Elizondo is a 65 y.o. male for whom we were consulted for evaluation and treatment of acute kidney injury.  We were consulted on hospital day 28 for evaluation of acute kidney injury.  Patient was a poor historian but recalled no prior nephrologic evaluations or problems.  He appears to have had another kidney injury back in 2011 by our hospital records.  More recently for the first 24 days or so with his hospitalization his renal function had been fairly stable but increased on 6/9 after previously at baseline on 6/7.  It has remained there for the next 2 days and we were consulted on 6/11 for further evaluation.  Chart review demonstrates a 64 kg weight loss since admission (but still had a weight of 458 pounds on consult) with only approximately 12 L net negative.  He had been on a Bumex infusion and did better with that than the IV bolus dosing.  He was then transitioned to oral Bumex dosing.  He denied specific complaints upon questioning.  He denied current chest pain, shortness of air at rest, nausea vomiting, dysuria hematuria.  He denied rash or hemoptysis as well.  Since that time, he had approached the need for dialysis but was unable to be initiated due to being overweight for the available OR tables.  Fortunately, his renal function improved a bit since that time.    Today, he had no overnight events  Patient denies new complaints upon questioning.  Overall feels breathing has improved a bit

## 2025-07-16 LAB
ANION GAP SERPL CALCULATED.3IONS-SCNC: 7 MMOL/L (ref 8–16)
BUN SERPL-MCNC: 45 MG/DL (ref 8–23)
CALCIUM SERPL-MCNC: 8.6 MG/DL (ref 8.8–10.2)
CHLORIDE SERPL-SCNC: 104 MMOL/L (ref 98–107)
CO2 SERPL-SCNC: 27 MMOL/L (ref 22–29)
CREAT SERPL-MCNC: 3.7 MG/DL (ref 0.7–1.2)
GLUCOSE BLD-MCNC: 103 MG/DL (ref 70–99)
GLUCOSE BLD-MCNC: 117 MG/DL (ref 70–99)
GLUCOSE BLD-MCNC: 163 MG/DL (ref 70–99)
GLUCOSE BLD-MCNC: 164 MG/DL (ref 70–99)
GLUCOSE SERPL-MCNC: 119 MG/DL (ref 70–99)
PERFORMED ON: ABNORMAL
POTASSIUM SERPL-SCNC: 3.9 MMOL/L (ref 3.5–5)
SODIUM SERPL-SCNC: 138 MMOL/L (ref 136–145)

## 2025-07-16 PROCEDURE — 6370000000 HC RX 637 (ALT 250 FOR IP): Performed by: STUDENT IN AN ORGANIZED HEALTH CARE EDUCATION/TRAINING PROGRAM

## 2025-07-16 PROCEDURE — 6370000000 HC RX 637 (ALT 250 FOR IP): Performed by: NURSE PRACTITIONER

## 2025-07-16 PROCEDURE — 6370000000 HC RX 637 (ALT 250 FOR IP)

## 2025-07-16 PROCEDURE — 1200000000 HC SEMI PRIVATE

## 2025-07-16 PROCEDURE — 6360000002 HC RX W HCPCS: Performed by: NURSE PRACTITIONER

## 2025-07-16 PROCEDURE — 80048 BASIC METABOLIC PNL TOTAL CA: CPT

## 2025-07-16 PROCEDURE — 82962 GLUCOSE BLOOD TEST: CPT

## 2025-07-16 PROCEDURE — 94640 AIRWAY INHALATION TREATMENT: CPT

## 2025-07-16 PROCEDURE — 36415 COLL VENOUS BLD VENIPUNCTURE: CPT

## 2025-07-16 PROCEDURE — 2500000003 HC RX 250 WO HCPCS

## 2025-07-16 PROCEDURE — 6360000002 HC RX W HCPCS

## 2025-07-16 PROCEDURE — 2700000000 HC OXYGEN THERAPY PER DAY

## 2025-07-16 PROCEDURE — 94660 CPAP INITIATION&MGMT: CPT

## 2025-07-16 PROCEDURE — 94761 N-INVAS EAR/PLS OXIMETRY MLT: CPT

## 2025-07-16 RX ADMIN — MONTELUKAST SODIUM 10 MG: 10 TABLET, FILM COATED ORAL at 21:50

## 2025-07-16 RX ADMIN — GUAIFENESIN 600 MG: 600 TABLET, EXTENDED RELEASE ORAL at 09:05

## 2025-07-16 RX ADMIN — BUMETANIDE 1 MG: 1 TABLET ORAL at 09:05

## 2025-07-16 RX ADMIN — METOPROLOL SUCCINATE 25 MG: 25 TABLET, FILM COATED, EXTENDED RELEASE ORAL at 09:05

## 2025-07-16 RX ADMIN — ENOXAPARIN SODIUM 60 MG: 100 INJECTION SUBCUTANEOUS at 09:05

## 2025-07-16 RX ADMIN — IPRATROPIUM BROMIDE 0.5 MG: 0.5 SOLUTION RESPIRATORY (INHALATION) at 15:12

## 2025-07-16 RX ADMIN — IPRATROPIUM BROMIDE 0.5 MG: 0.5 SOLUTION RESPIRATORY (INHALATION) at 18:51

## 2025-07-16 RX ADMIN — PANTOPRAZOLE SODIUM 40 MG: 40 TABLET, DELAYED RELEASE ORAL at 09:05

## 2025-07-16 RX ADMIN — POVIDONE-IODINE: 10 OINTMENT TOPICAL at 09:06

## 2025-07-16 RX ADMIN — IPRATROPIUM BROMIDE 0.5 MG: 0.5 SOLUTION RESPIRATORY (INHALATION) at 06:36

## 2025-07-16 RX ADMIN — ALLOPURINOL 300 MG: 300 TABLET ORAL at 09:08

## 2025-07-16 RX ADMIN — IPRATROPIUM BROMIDE 0.5 MG: 0.5 SOLUTION RESPIRATORY (INHALATION) at 10:32

## 2025-07-16 RX ADMIN — MICONAZOLE NITRATE: 2 POWDER TOPICAL at 09:06

## 2025-07-16 RX ADMIN — INSULIN GLARGINE 30 UNITS: 100 INJECTION, SOLUTION SUBCUTANEOUS at 21:51

## 2025-07-16 RX ADMIN — SODIUM CHLORIDE, PRESERVATIVE FREE 10 ML: 5 INJECTION INTRAVENOUS at 21:53

## 2025-07-16 RX ADMIN — GUAIFENESIN 600 MG: 600 TABLET, EXTENDED RELEASE ORAL at 21:52

## 2025-07-16 RX ADMIN — ASPIRIN 81 MG: 81 TABLET, COATED ORAL at 09:05

## 2025-07-16 RX ADMIN — METOPROLOL SUCCINATE 25 MG: 25 TABLET, FILM COATED, EXTENDED RELEASE ORAL at 21:50

## 2025-07-16 RX ADMIN — SODIUM CHLORIDE, PRESERVATIVE FREE 10 ML: 5 INJECTION INTRAVENOUS at 09:07

## 2025-07-16 RX ADMIN — PRAVASTATIN SODIUM 20 MG: 20 TABLET ORAL at 21:50

## 2025-07-16 RX ADMIN — MICONAZOLE NITRATE: 2 POWDER TOPICAL at 21:52

## 2025-07-16 RX ADMIN — CETIRIZINE HYDROCHLORIDE 10 MG: 10 TABLET ORAL at 09:05

## 2025-07-16 RX ADMIN — ENOXAPARIN SODIUM 60 MG: 100 INJECTION SUBCUTANEOUS at 21:51

## 2025-07-16 RX ADMIN — LACTULOSE 20 G: 20 SOLUTION ORAL at 09:05

## 2025-07-16 ASSESSMENT — PAIN SCALES - GENERAL
PAINLEVEL_OUTOF10: 0
PAINLEVEL_OUTOF10: 0

## 2025-07-16 NOTE — PLAN OF CARE
Problem: Discharge Planning  Goal: Discharge to home or other facility with appropriate resources  7/16/2025 0003 by Diya Bender RN  Outcome: Not Progressing  7/15/2025 1707 by Anayeli Cox LPN  Outcome: Progressing     Problem: Discharge Planning  Goal: Discharge to home or other facility with appropriate resources  7/16/2025 0003 by Diya Bender, RN  Outcome: Not Progressing  7/15/2025 1707 by Anayeli Cox LPN  Outcome: Progressing

## 2025-07-16 NOTE — PROGRESS NOTES
[facility]    Patient:  Daljit Elizondo  YOB: 1960  Date of Service: 7/16/2025  MRN: [id number]  Acct: [id number]   Primary Care Physician: Jessie Helms APRN - CNP  Advance Directive: Full Code  Admit Date: 5/14/2025       Hospital Day: 63     NOTE: Portions of this note have been copied forward, however, changes made to reflect the most current clinical status of this patient.    CHIEF COMPLAINT:     Chief Complaint   Patient presents with    Shortness of Breath     Since Jan 1    Scrotal Pain       7/16/2025 7:33 AM  Subjective / Interval History:   07/16/2025  Patient seen and examined this a.m.   No new complaints.    No acute changes or acute overnight event reported.   Laying comfortably in bed in no apparent acute distress.    Denies any acute complaints or distress.    Endorses overall improvement.        Review of Systems:   Review of Systems  ROS: 14 point review of systems is negative except as specifically addressed above.    ADULT DIET; Regular; 3 carb choices (45 gm/meal); Low Sodium (2 gm); SEND RICE KRISPIES AT BREAKFAST. NO OATMEAL. Likes salads    Intake/Output Summary (Last 24 hours) at 7/16/2025 0733  Last data filed at 7/15/2025 1728  Gross per 24 hour   Intake --   Output 800 ml   Net -800 ml       Medications:   sodium chloride 15 mL/hr at 07/11/25 1953    dextrose       Current Facility-Administered Medications   Medication Dose Route Frequency Provider Last Rate Last Admin    bumetanide (BUMEX) tablet 1 mg  1 mg Oral Once per day on Monday Wednesday Friday Rhys Pacheco MD   1 mg at 07/14/25 0834    saliva substitute (BIOTENE/MOUTH KOTE) liquid   Oral Daily Rhys Pacheco MD   Given at 07/15/25 1953    lactulose (CHRONULAC) 10 GM/15ML solution 20 g  20 g Oral Daily Rhys Pacheco MD   20 g at 07/14/25 1139    pseudoephedrine (SUDAFED) tablet 60 mg  60 mg Oral Q6H PRN Rhys Pacheco MD        povidone-iodine (BETADINE) 10 % ointment    \"UROBILINOGEN\", \"BILIRUBINUR\", \"BLOODU\", \"GLUCOSEU\", \"AMORPHOUS\" in the last 72 hours.    Invalid input(s): \"KETONESU\"          Culture Results:    No results for input(s): \"CXSURG\" in the last 720 hours.    Blood Culture Recent:   No results for input(s): \"BC\" in the last 720 hours.      No results for input(s): \"BC\", \"BLOODCULT2\", \"ORG\" in the last 72 hours.          Cultures:   No results for input(s): \"CULTURE\" in the last 72 hours.  No results for input(s): \"BC\", \"BLOODCULT2\" in the last 72 hours.  No results for input(s): \"CXSURG\" in the last 72 hours.      No results for input(s): \"MG\", \"PHOS\" in the last 72 hours.  Recent Labs     07/14/25  0155   AST 13   ALT <5*   BILITOT 0.7   ALKPHOS 95           RAD:   XR CHEST PORTABLE  Result Date: 7/6/2025  EXAM: CHEST RADIOGRAPH  TECHNIQUE: Single frontal chest radiograph.  HISTORY: Increasing shortness of breath.  COMPARISON: None.  FINDINGS:  Limited examination because of the patient's body habitus.  The patient is mildly leaning and rotated to the left. Pulmonary venous congestion, bilateral ground-glass, and consolidation of the left base, progressed. Equivocal small bilateral pleural effusions.  No visible pneumothorax. Stable enlargement of the cardiac silhouette. No acute displaced rib fractures are identified.        1.  Limited portable chest as described. 2.  Stable enlargement of the cardiac silhouette, with progression of the pulmonary edema.  Equivocal small bilateral pleural effusions.  Superimposed pneumonia cannot be excluded.    ______________________________________ Electronically signed by: SABAS DOMINGUEZ M.D. Date:     07/06/2025 Time:    14:54     Vascular ankle brachial index (SHEILA) PROCEDURE DONE IN [facility]  Addendum Date: 6/29/2025    Right side findings: This is within the normal range. Resting TBI is 1.56.   Left side findings: Resting SHEILA is 1.62. This is within the normal range. Resting TBI is 1.88.     Result Date: 6/29/2025    Right side

## 2025-07-16 NOTE — PLAN OF CARE
Problem: Discharge Planning  Goal: Discharge to home or other facility with appropriate resources  7/16/2025 0956 by Lizzette Whipple LPN  Outcome: Progressing  7/16/2025 0003 by Diya Bender RN  Outcome: Not Progressing

## 2025-07-16 NOTE — PROGRESS NOTES
Nephrology ([facility]) Progress Note    Patient:  Daljit Elizondo  YOB: 1960  Date of Service: 7/16/2025  MRN: [id number]   Acct: [id number]   Primary Care Physician: Jessie Helms APRN - CNP  Advance Directive: Full Code  Admit Date: 5/14/2025       Hospital Day: 63  Referring Provider: Cody Howell MD    Patient independently seen and examined, Chart, Consults, Notes, Operative notes, Labs, Cardiology, and Radiology studies reviewed as available.    Subjective:  Daljit Elizondo is a 65 y.o. male for whom we were consulted for evaluation and treatment of acute kidney injury.  We were consulted on hospital day 28 for evaluation of acute kidney injury.  Patient was a poor historian but recalled no prior nephrologic evaluations or problems.  He appears to have had another kidney injury back in 2011 by our hospital records.  More recently for the first 24 days or so with his hospitalization his renal function had been fairly stable but increased on 6/9 after previously at baseline on 6/7.  It has remained there for the next 2 days and we were consulted on 6/11 for further evaluation.  Chart review demonstrates a 64 kg weight loss since admission (but still had a weight of 458 pounds on consult) with only approximately 12 L net negative.  He had been on a Bumex infusion and did better with that than the IV bolus dosing.  He was then transitioned to oral Bumex dosing.  He denied specific complaints upon questioning.  He denied current chest pain, shortness of air at rest, nausea vomiting, dysuria hematuria.  He denied rash or hemoptysis as well.  Since that time, he had approached the need for dialysis but was unable to be initiated due to being overweight for the available OR tables.  Fortunately, his renal function improved a bit since that time.    Today, he had no overnight events  Patient denies new complaints in particular dysuria and

## 2025-07-17 LAB
ANION GAP SERPL CALCULATED.3IONS-SCNC: 8 MMOL/L (ref 8–16)
BUN SERPL-MCNC: 45 MG/DL (ref 8–23)
CALCIUM SERPL-MCNC: 8.5 MG/DL (ref 8.8–10.2)
CHLORIDE SERPL-SCNC: 104 MMOL/L (ref 98–107)
CO2 SERPL-SCNC: 28 MMOL/L (ref 22–29)
CREAT SERPL-MCNC: 3.8 MG/DL (ref 0.7–1.2)
GLUCOSE BLD-MCNC: 104 MG/DL (ref 70–99)
GLUCOSE BLD-MCNC: 133 MG/DL (ref 70–99)
GLUCOSE BLD-MCNC: 175 MG/DL (ref 70–99)
GLUCOSE BLD-MCNC: 93 MG/DL (ref 70–99)
GLUCOSE SERPL-MCNC: 113 MG/DL (ref 70–99)
PERFORMED ON: ABNORMAL
PERFORMED ON: NORMAL
POTASSIUM SERPL-SCNC: 4.2 MMOL/L (ref 3.5–5)
SODIUM SERPL-SCNC: 140 MMOL/L (ref 136–145)

## 2025-07-17 PROCEDURE — 36415 COLL VENOUS BLD VENIPUNCTURE: CPT

## 2025-07-17 PROCEDURE — 6370000000 HC RX 637 (ALT 250 FOR IP)

## 2025-07-17 PROCEDURE — 80048 BASIC METABOLIC PNL TOTAL CA: CPT

## 2025-07-17 PROCEDURE — 82962 GLUCOSE BLOOD TEST: CPT

## 2025-07-17 PROCEDURE — 97535 SELF CARE MNGMENT TRAINING: CPT

## 2025-07-17 PROCEDURE — 2700000000 HC OXYGEN THERAPY PER DAY

## 2025-07-17 PROCEDURE — 94640 AIRWAY INHALATION TREATMENT: CPT

## 2025-07-17 PROCEDURE — 1200000000 HC SEMI PRIVATE

## 2025-07-17 PROCEDURE — 94761 N-INVAS EAR/PLS OXIMETRY MLT: CPT

## 2025-07-17 PROCEDURE — 6360000002 HC RX W HCPCS

## 2025-07-17 PROCEDURE — 2500000003 HC RX 250 WO HCPCS

## 2025-07-17 PROCEDURE — 6370000000 HC RX 637 (ALT 250 FOR IP): Performed by: STUDENT IN AN ORGANIZED HEALTH CARE EDUCATION/TRAINING PROGRAM

## 2025-07-17 PROCEDURE — 6370000000 HC RX 637 (ALT 250 FOR IP): Performed by: NURSE PRACTITIONER

## 2025-07-17 PROCEDURE — 94150 VITAL CAPACITY TEST: CPT

## 2025-07-17 PROCEDURE — 6360000002 HC RX W HCPCS: Performed by: NURSE PRACTITIONER

## 2025-07-17 PROCEDURE — 97530 THERAPEUTIC ACTIVITIES: CPT

## 2025-07-17 PROCEDURE — 94660 CPAP INITIATION&MGMT: CPT

## 2025-07-17 RX ADMIN — SODIUM CHLORIDE, PRESERVATIVE FREE 10 ML: 5 INJECTION INTRAVENOUS at 09:19

## 2025-07-17 RX ADMIN — LACTULOSE 20 G: 20 SOLUTION ORAL at 09:19

## 2025-07-17 RX ADMIN — PANTOPRAZOLE SODIUM 40 MG: 40 TABLET, DELAYED RELEASE ORAL at 06:11

## 2025-07-17 RX ADMIN — MICONAZOLE NITRATE: 2 POWDER TOPICAL at 09:18

## 2025-07-17 RX ADMIN — GUAIFENESIN 600 MG: 600 TABLET, EXTENDED RELEASE ORAL at 09:19

## 2025-07-17 RX ADMIN — ENOXAPARIN SODIUM 60 MG: 100 INJECTION SUBCUTANEOUS at 09:19

## 2025-07-17 RX ADMIN — CETIRIZINE HYDROCHLORIDE 10 MG: 10 TABLET ORAL at 09:19

## 2025-07-17 RX ADMIN — IPRATROPIUM BROMIDE 0.5 MG: 0.5 SOLUTION RESPIRATORY (INHALATION) at 15:27

## 2025-07-17 RX ADMIN — POVIDONE-IODINE: 10 OINTMENT TOPICAL at 09:20

## 2025-07-17 RX ADMIN — ENOXAPARIN SODIUM 60 MG: 100 INJECTION SUBCUTANEOUS at 21:35

## 2025-07-17 RX ADMIN — IPRATROPIUM BROMIDE 0.5 MG: 0.5 SOLUTION RESPIRATORY (INHALATION) at 06:04

## 2025-07-17 RX ADMIN — MICONAZOLE NITRATE: 2 POWDER TOPICAL at 21:35

## 2025-07-17 RX ADMIN — SODIUM CHLORIDE, PRESERVATIVE FREE 10 ML: 5 INJECTION INTRAVENOUS at 21:35

## 2025-07-17 RX ADMIN — ALLOPURINOL 300 MG: 300 TABLET ORAL at 09:19

## 2025-07-17 RX ADMIN — IPRATROPIUM BROMIDE 0.5 MG: 0.5 SOLUTION RESPIRATORY (INHALATION) at 10:00

## 2025-07-17 RX ADMIN — ASPIRIN 81 MG: 81 TABLET, COATED ORAL at 09:19

## 2025-07-17 RX ADMIN — INSULIN GLARGINE 30 UNITS: 100 INJECTION, SOLUTION SUBCUTANEOUS at 21:35

## 2025-07-17 RX ADMIN — PRAVASTATIN SODIUM 20 MG: 20 TABLET ORAL at 21:35

## 2025-07-17 RX ADMIN — METOPROLOL SUCCINATE 25 MG: 25 TABLET, FILM COATED, EXTENDED RELEASE ORAL at 09:19

## 2025-07-17 RX ADMIN — IPRATROPIUM BROMIDE 0.5 MG: 0.5 SOLUTION RESPIRATORY (INHALATION) at 18:31

## 2025-07-17 RX ADMIN — MONTELUKAST SODIUM 10 MG: 10 TABLET, FILM COATED ORAL at 21:35

## 2025-07-17 RX ADMIN — GUAIFENESIN 600 MG: 600 TABLET, EXTENDED RELEASE ORAL at 21:35

## 2025-07-17 RX ADMIN — METOPROLOL SUCCINATE 25 MG: 25 TABLET, FILM COATED, EXTENDED RELEASE ORAL at 21:35

## 2025-07-17 RX ADMIN — Medication: at 21:34

## 2025-07-17 NOTE — PROGRESS NOTES
Facility/Department: [facility] 3 DUNIA/VAS/MED  Daily Treatment Note  NAME: Daljit Elizondo  : 1960  MRN: [id number]    Date of Service: 2025    Discharge Recommendations:  Patient would benefit from continued therapy after discharge       Assessment   Assessment: OOB to chair and BTB using anne lift.  Patient stayed in short sit vs recline to allow trunk control and weight shifting options  Treatment Diagnosis: Acute congestive heart failure  Activity Tolerance  Activity Tolerance: Patient Tolerated treatment well  Activity Tolerance Comments: complains of dizziness with rolling.  Complained of dizziness in sitting.  BP taken and was within normal parameters         Patient Diagnosis(es): The primary encounter diagnosis was Hypervolemia, unspecified hypervolemia type. Diagnoses of Cardiac arrhythmia, unspecified cardiac arrhythmia type, Community acquired pneumonia, unspecified laterality, Shortness of breath, Bilateral lower extremity edema, Cellulitis and abscess of left lower extremity, and Non-pressure chronic ulcer of left calf with fat layer exposed (HCC) were also pertinent to this visit.    has a past medical history of Abrasion, Anxiety, Asthma, Bell's palsy, Cataracts, bilateral, Cellulitis, Diabetes (HCC), Edema extremities, GERD (gastroesophageal reflux disease), Gout, H/O tracheostomy, H/O urinary retention, Hernia, hiatal, History of panniculitis, History of shingles, Hyperlipemia, Hypertension, Morbid obesity (HCC), Non-ST elevated myocardial infarction (HCC), Obesity, Palliative care patient, Paraphimosis, Pulmonary hypertension (HCC), Renal failure, Respiratory failure (HCC), Shoulder pain, Sleep apnea, Testosterone deficiency, Ulcer, Ulcer of calf (HCC), and Venous stasis of both lower extremities.   has a past surgical history that includes Testicle surgery (10/1970); Foot surgery (Left, LONG AGO ); Tracheostomy tube placement; Foot surgery (Right); Mouth surgery; Appendectomy;

## 2025-07-17 NOTE — PLAN OF CARE
Problem: Chronic Conditions and Co-morbidities  Goal: Patient's chronic conditions and co-morbidity symptoms are monitored and maintained or improved  7/17/2025 1335 by Lizzette Whipple LPN  Outcome: Progressing  7/17/2025 0408 by Mindi Petit RN  Outcome: Progressing     Problem: Discharge Planning  Goal: Discharge to home or other facility with appropriate resources  7/17/2025 1335 by Lizzette Whipple LPN  Outcome: Progressing  7/17/2025 0408 by Mindi Petit RN  Outcome: Progressing     Problem: Pain  Goal: Verbalizes/displays adequate comfort level or baseline comfort level  7/17/2025 1335 by Lizzette Whipple LPN  Outcome: Progressing  7/17/2025 0408 by Mindi Petit RN  Outcome: Progressing     Problem: Safety - Adult  Goal: Free from fall injury  7/17/2025 1335 by Lizzette Whipple LPN  Outcome: Progressing  7/17/2025 0408 by Mindi Petit RN  Outcome: Progressing     Problem: ABCDS Injury Assessment  Goal: Absence of physical injury  7/17/2025 1335 by Lizzette Whipple LPN  Outcome: Progressing  7/17/2025 0408 by Mindi Petit RN  Outcome: Progressing     Problem: Skin/Tissue Integrity  Goal: Skin integrity remains intact  Description: 1.  Monitor for areas of redness and/or skin breakdown2.  Assess vascular access sites hourly3.  Every 4-6 hours minimum:  Change oxygen saturation probe site4.  Every 4-6 hours:  If on nasal continuous positive airway pressure, respiratory therapy assess nares and determine need for appliance change or resting period  7/17/2025 1335 by Lizzette Whipple LPN  Outcome: Progressing  7/17/2025 0408 by Mindi Petit, RN  Outcome: Progressing     Problem: Nutrition Deficit:  Goal: Optimize nutritional status  7/17/2025 1335 by Lizzette Whipple LPN  Outcome: Progressing  7/17/2025 0408 by Mindi Petit RN  Outcome: Progressing     Problem: Neurosensory - Adult  Goal: Achieves stable or improved neurological status  7/17/2025 1335 by Sarabjit

## 2025-07-17 NOTE — PROGRESS NOTES
miconazole (MICOTIN) 2 % powder   Topical BID Lambert Ramirez MD   Given at 07/16/25 2152    albuterol (PROVENTIL) (2.5 MG/3ML) 0.083% nebulizer solution 2.5 mg  2.5 mg Nebulization Q6H PRN Renay Thorne APRN - CNP        allopurinol (ZYLOPRIM) tablet 300 mg  300 mg Oral Daily Renay Thorne APRN - CNP   300 mg at 07/16/25 0908    aspirin EC tablet 81 mg  81 mg Oral Daily Renay Thorne APRN - CNP   81 mg at 07/16/25 0905    pantoprazole (PROTONIX) tablet 40 mg  40 mg Oral QAM AC Renay Thorne APRN - CNP   40 mg at 07/17/25 0611    pravastatin (PRAVACHOL) tablet 20 mg  20 mg Oral Nightly Renay Thorne APRN - CNP   20 mg at 07/16/25 2150    sodium chloride flush 0.9 % injection 5-40 mL  5-40 mL IntraVENous 2 times per day Renay Thorne APRN - CNP   10 mL at 07/16/25 2153    sodium chloride flush 0.9 % injection 5-40 mL  5-40 mL IntraVENous PRN Renay Thorne APRN - CNP        0.9 % sodium chloride infusion   IntraVENous PRN Renay Thorne APRN - CNP 15 mL/hr at 07/11/25 1953 New Bag at 07/11/25 1953    ondansetron (ZOFRAN-ODT) disintegrating tablet 4 mg  4 mg Oral Q8H PRN Renay Thorne APRN - CNP   4 mg at 07/03/25 1834    Or    ondansetron (ZOFRAN) injection 4 mg  4 mg IntraVENous Q6H PRN Renay Thorne APRN - CNP   4 mg at 07/04/25 0512    polyethylene glycol (GLYCOLAX) packet 17 g  17 g Oral Daily PRN Rhys Pacheco MD   17 g at 07/13/25 1932    acetaminophen (TYLENOL) tablet 650 mg  650 mg Oral Q6H PRN Renay Thorne APRN - CNP   650 mg at 07/05/25 0216    Or    acetaminophen (TYLENOL) suppository 650 mg  650 mg Rectal Q6H PRN Renay Thorne APRN - CNP        enoxaparin (LOVENOX) injection 60 mg  60 mg SubCUTAneous BID Renay Thorne APRN - CNP   60 mg at 07/16/25 2151    potassium chloride (KLOR-CON M) extended release tablet 40 mEq  40 mEq Oral PRN Renay Thorne APRN - CNP   40 mEq at 07/13/25 1932    Or    potassium bicarb-citric acid (EFFER-K) effervescent tablet 40 mEq  --> 3.70 (07/16/2025)   Gentle IV hydration as needed  Renal Ultrasound Complete (06/10/2025): Mild parenchymal thinning of the left kidney. Kidneys otherwise normal. The urinary bladder was not visualized.  Avoid hypotension & Nephrotoxins   Nephrology on board-appreciate recommendation  Further workup and management as per nephrology     Acute on chronic diastolic heart failure  Continue Low Na diet  Monitor  Is/Os, daily weights  Chest x-ray -06/27/2025 \"Continued low lung volumes, cardiomegaly, central pulmonary vascular congestion and interstitial edema consistent with congestive failure or fluid overload. Left basilar consolidation and probable dependent left pleural effusion.\"  Elevated ProBNP level 11,537 (06/27/2025)   Continue diureses;   Continue intermittent BiPAP   Strict I's and O  Fluid restriction  Continue optimized medical management.    Diabetes Mellitus II, with Hyperglycemia  Hemoglobin A1C: 6.7% (05/15/2025)  Inpatient Regimens to include;  - Insulin Glargine (Lantus) 30 units subcu nightly  - Insulin Lispro (Humalog) on a low dose sliding scale  Monitor POC glucose, and adjust insulin regimen accordingly based on daily insulin requirement.     HTN  Toprol Xl     GERD  Protonix     Morbid obesity  Calorie restriction     REGI  Obesity hypoventilation syndrome  CPAP at night     Hyperkalemia- resolved  Monitor BMP    Constipation   Monitor BMs, laxatives as needed     Chronic lower extremity diabetic toe wounds, dry gangrene  Continue wound care, apply Betadine     Nasal congestion  Continue saline nasal spray  Sudafed as needed     Severe debility  Continue with ongoing PT OT      Continue management of other chronic medical conditions - see above and orders.          Advance Directive: Full Code    ADULT DIET; Regular; 3 carb choices (45 gm/meal); Low Sodium (2 gm); SEND RICE KRISPIES AT BREAKFAST. NO OATMEAL. Likes salads         Consults Made:   IP CONSULT TO HEART FAILURE

## 2025-07-17 NOTE — PROGRESS NOTES
Nephrology ([facility]) Progress Note    Patient:  Daljit Elizondo  YOB: 1960  Date of Service: 7/17/2025  MRN: [id number]   Acct: [id number]   Primary Care Physician: Jessie Helms APRN - CNP  Advance Directive: Full Code  Admit Date: 5/14/2025       Hospital Day: 64  Referring Provider: Cody Howell MD    Patient independently seen and examined, Chart, Consults, Notes, Operative notes, Labs, Cardiology, and Radiology studies reviewed as available.    Subjective:  Daljit Elizondo is a 65 y.o. male for whom we were consulted for evaluation and treatment of acute kidney injury.  We were consulted on hospital day 28 for evaluation of acute kidney injury.  Patient was a poor historian but recalled no prior nephrologic evaluations or problems.  He appears to have had another kidney injury back in 2011 by our hospital records.  More recently for the first 24 days or so with his hospitalization his renal function had been fairly stable but increased on 6/9 after previously at baseline on 6/7.  It has remained there for the next 2 days and we were consulted on 6/11 for further evaluation.  Chart review demonstrates a 64 kg weight loss since admission (but still had a weight of 458 pounds on consult) with only approximately 12 L net negative.  He had been on a Bumex infusion and did better with that than the IV bolus dosing.  He was then transitioned to oral Bumex dosing.  He denied specific complaints upon questioning.  He denied current chest pain, shortness of air at rest, nausea vomiting, dysuria hematuria.  He denied rash or hemoptysis as well.  Since that time, he had approached the need for dialysis but was unable to be initiated due to being overweight for the available OR tables.  Fortunately, his renal function improved a bit since that time.    Today, he had no overnight events  Patient denies new complaints in particular dysuria and  in the last 720 hours.    Urine Culture Recent :   Recent Labs     07/06/25  1200   LABURIN >100,000 CFU/ml*  Moderate growth  Multiple Resistant Drug Organism  CONTACT PRECAUTIONS INDICATED  *       Radiology reports as per the Radiologist  Radiology: XR CHEST PORTABLE  Result Date: 7/6/2025  EXAM: CHEST RADIOGRAPH  TECHNIQUE: Single frontal chest radiograph.  HISTORY: Increasing shortness of breath.  COMPARISON: None.  FINDINGS:  Limited examination because of the patient's body habitus.  The patient is mildly leaning and rotated to the left. Pulmonary venous congestion, bilateral ground-glass, and consolidation of the left base, progressed. Equivocal small bilateral pleural effusions.  No visible pneumothorax. Stable enlargement of the cardiac silhouette. No acute displaced rib fractures are identified.        1.  Limited portable chest as described. 2.  Stable enlargement of the cardiac silhouette, with progression of the pulmonary edema.  Equivocal small bilateral pleural effusions.  Superimposed pneumonia cannot be excluded.    ______________________________________ Electronically signed by: SABAS DOMINGUEZ M.D. Date:     07/06/2025 Time:    14:54     Vascular ankle brachial index (SHEILA) PROCEDURE DONE IN [facility]  Addendum Date: 6/29/2025    Right side findings: This is within the normal range. Resting TBI is 1.56.   Left side findings: Resting SHEILA is 1.62. This is within the normal range. Resting TBI is 1.88.     Result Date: 6/29/2025    Right side findings: This is within the normal range. Resting TBI is 1.56.   Left side findings: Resting SHEILA is 1.62. This is within the normal range. Resting TBI is 1.88.     XR FOOT LEFT (2 VIEWS)  Result Date: 6/28/2025  EXAM: 2 VIEWS OF THE LEFT FOOT.  HISTORY: left foot wound  COMPARISON: None.  FINDINGS: There is no acute fracture or dislocation. No definite osseous erosions are seen. Joint spaces and alignment are maintained.  Diffuse soft swelling of the foot is seen.

## 2025-07-17 NOTE — PROGRESS NOTES
Physical Therapy    Name: Daljit Elizondo  MRN: [id number]  Date of service: 7/17/2025 07/17/25 1300   Restrictions/Precautions   Restrictions/Precautions Fall Risk;Contact Precautions   Position Activity Restriction   Other Position/Activity Restrictions ESBL, MRSA   General   Diagnosis hypervolemia, cardiac arrythmia, CAP, SOB, morbid obesity   General   General Comments Pt in bed   Subjective   Subjective agreeable to chair today   Pain   Pre-Pain   (Hemorrhoid pain)   Bed mobility   Rolling to Left Minimal assistance   Rolling to Right Minimal assistance   Bed Mobility Comments to place/remove lift sheet   Transfers   Comment Lifted to chair with skylift- pt unable to stand from EOB or chair at this time   PT Exercises   Exercise Treatment Pt pulls trunk off bed with trapeze bar. Able to lift LEs to place pillows for elevating heels   Short Term Goals   Time Frame for Short Term Goals 2 wks   Short Term Goal 1 ROLL R/L, IND USING BEDRAILS   Short Term Goal 2 SUP<>SIT, MIN/CGA   Short Term Goal 3 TFS TO RECLINER, MIN A 2   Short Term Goal 4 IND WITH HEP SITTING IN RECLINER   Short Term Goal 5 SIT <>STAND, MIN A   Activity Tolerance   Activity Tolerance Patient tolerated treatment well   Assessment   Assessment Pt did well with transfereing to chair using skylift. Able to actively participate in therapy.   Discharge Recommendations Continue to assess pending progress;Patient would benefit from continued therapy after discharge   Physical Therapy Plan   General Plan 5-7 times per week   Therapy Duration 2 Weeks   Current Treatment Recommendations Strengthening;Balance training;Functional mobility training;Transfer training;Safety education & training;Positioning;Pain management;Patient/Caregiver education & training   Additional Comments CO TREAT WITH OT. USE LIFT TO PLACE IN RECLINER AND THEN WORK ON SIT TO STAND AND TAKING STEPS. PROGRESS TO TF'S WITHOUT LIFT AS INDICATED   PT Plan of Care   Thursday X

## 2025-07-18 LAB
ANION GAP SERPL CALCULATED.3IONS-SCNC: 9 MMOL/L (ref 8–16)
BASOPHILS # BLD: 0.1 K/UL (ref 0–0.2)
BASOPHILS NFR BLD: 0.6 % (ref 0–1)
BUN SERPL-MCNC: 44 MG/DL (ref 8–23)
CALCIUM SERPL-MCNC: 8.7 MG/DL (ref 8.8–10.2)
CHLORIDE SERPL-SCNC: 103 MMOL/L (ref 98–107)
CO2 SERPL-SCNC: 27 MMOL/L (ref 22–29)
CREAT SERPL-MCNC: 3.5 MG/DL (ref 0.7–1.2)
EOSINOPHIL # BLD: 0.3 K/UL (ref 0–0.6)
EOSINOPHIL NFR BLD: 3.5 % (ref 0–5)
ERYTHROCYTE [DISTWIDTH] IN BLOOD BY AUTOMATED COUNT: 15.3 % (ref 11.5–14.5)
GLUCOSE BLD-MCNC: 120 MG/DL (ref 70–99)
GLUCOSE BLD-MCNC: 138 MG/DL (ref 70–99)
GLUCOSE BLD-MCNC: 145 MG/DL (ref 70–99)
GLUCOSE BLD-MCNC: 77 MG/DL (ref 70–99)
GLUCOSE SERPL-MCNC: 116 MG/DL (ref 70–99)
HCT VFR BLD AUTO: 33.8 % (ref 42–52)
HGB BLD-MCNC: 10.4 G/DL (ref 14–18)
IMM GRANULOCYTES # BLD: 0.1 K/UL
LYMPHOCYTES # BLD: 1.2 K/UL (ref 1.1–4.5)
LYMPHOCYTES NFR BLD: 15 % (ref 20–40)
MCH RBC QN AUTO: 29.2 PG (ref 27–31)
MCHC RBC AUTO-ENTMCNC: 30.8 G/DL (ref 33–37)
MCV RBC AUTO: 94.9 FL (ref 80–94)
MONOCYTES # BLD: 0.6 K/UL (ref 0–0.9)
MONOCYTES NFR BLD: 7.9 % (ref 0–10)
NEUTROPHILS # BLD: 5.8 K/UL (ref 1.5–7.5)
NEUTS SEG NFR BLD: 72.3 % (ref 50–65)
PERFORMED ON: ABNORMAL
PERFORMED ON: NORMAL
PLATELET # BLD AUTO: 174 K/UL (ref 130–400)
PMV BLD AUTO: 12.2 FL (ref 9.4–12.4)
POTASSIUM SERPL-SCNC: 3.9 MMOL/L (ref 3.5–5)
RBC # BLD AUTO: 3.56 M/UL (ref 4.7–6.1)
SODIUM SERPL-SCNC: 139 MMOL/L (ref 136–145)
WBC # BLD AUTO: 8 K/UL (ref 4.8–10.8)

## 2025-07-18 PROCEDURE — 80048 BASIC METABOLIC PNL TOTAL CA: CPT

## 2025-07-18 PROCEDURE — 6360000002 HC RX W HCPCS

## 2025-07-18 PROCEDURE — 6370000000 HC RX 637 (ALT 250 FOR IP)

## 2025-07-18 PROCEDURE — 85025 COMPLETE CBC W/AUTO DIFF WBC: CPT

## 2025-07-18 PROCEDURE — 2500000003 HC RX 250 WO HCPCS

## 2025-07-18 PROCEDURE — 82962 GLUCOSE BLOOD TEST: CPT

## 2025-07-18 PROCEDURE — 6370000000 HC RX 637 (ALT 250 FOR IP): Performed by: NURSE PRACTITIONER

## 2025-07-18 PROCEDURE — 2700000000 HC OXYGEN THERAPY PER DAY

## 2025-07-18 PROCEDURE — 1200000000 HC SEMI PRIVATE

## 2025-07-18 PROCEDURE — 6370000000 HC RX 637 (ALT 250 FOR IP): Performed by: STUDENT IN AN ORGANIZED HEALTH CARE EDUCATION/TRAINING PROGRAM

## 2025-07-18 PROCEDURE — 94640 AIRWAY INHALATION TREATMENT: CPT

## 2025-07-18 PROCEDURE — 94761 N-INVAS EAR/PLS OXIMETRY MLT: CPT

## 2025-07-18 PROCEDURE — 94150 VITAL CAPACITY TEST: CPT

## 2025-07-18 PROCEDURE — 36415 COLL VENOUS BLD VENIPUNCTURE: CPT

## 2025-07-18 PROCEDURE — 6360000002 HC RX W HCPCS: Performed by: NURSE PRACTITIONER

## 2025-07-18 PROCEDURE — 94660 CPAP INITIATION&MGMT: CPT

## 2025-07-18 RX ADMIN — BUMETANIDE 1 MG: 1 TABLET ORAL at 09:00

## 2025-07-18 RX ADMIN — IPRATROPIUM BROMIDE 0.5 MG: 0.5 SOLUTION RESPIRATORY (INHALATION) at 14:17

## 2025-07-18 RX ADMIN — METOPROLOL SUCCINATE 25 MG: 25 TABLET, FILM COATED, EXTENDED RELEASE ORAL at 09:00

## 2025-07-18 RX ADMIN — Medication: at 22:44

## 2025-07-18 RX ADMIN — ASPIRIN 81 MG: 81 TABLET, COATED ORAL at 09:00

## 2025-07-18 RX ADMIN — ALLOPURINOL 300 MG: 300 TABLET ORAL at 09:00

## 2025-07-18 RX ADMIN — ENOXAPARIN SODIUM 60 MG: 100 INJECTION SUBCUTANEOUS at 20:18

## 2025-07-18 RX ADMIN — POVIDONE-IODINE: 10 OINTMENT TOPICAL at 09:05

## 2025-07-18 RX ADMIN — SODIUM CHLORIDE, PRESERVATIVE FREE 10 ML: 5 INJECTION INTRAVENOUS at 09:01

## 2025-07-18 RX ADMIN — MICONAZOLE NITRATE: 2 POWDER TOPICAL at 09:02

## 2025-07-18 RX ADMIN — GUAIFENESIN 600 MG: 600 TABLET, EXTENDED RELEASE ORAL at 20:18

## 2025-07-18 RX ADMIN — METOPROLOL SUCCINATE 25 MG: 25 TABLET, FILM COATED, EXTENDED RELEASE ORAL at 20:19

## 2025-07-18 RX ADMIN — SODIUM CHLORIDE, PRESERVATIVE FREE 10 ML: 5 INJECTION INTRAVENOUS at 22:44

## 2025-07-18 RX ADMIN — CETIRIZINE HYDROCHLORIDE 10 MG: 10 TABLET ORAL at 09:00

## 2025-07-18 RX ADMIN — PRAVASTATIN SODIUM 20 MG: 20 TABLET ORAL at 20:19

## 2025-07-18 RX ADMIN — MONTELUKAST SODIUM 10 MG: 10 TABLET, FILM COATED ORAL at 20:18

## 2025-07-18 RX ADMIN — ENOXAPARIN SODIUM 60 MG: 100 INJECTION SUBCUTANEOUS at 09:01

## 2025-07-18 RX ADMIN — INSULIN GLARGINE 30 UNITS: 100 INJECTION, SOLUTION SUBCUTANEOUS at 20:18

## 2025-07-18 RX ADMIN — IPRATROPIUM BROMIDE 0.5 MG: 0.5 SOLUTION RESPIRATORY (INHALATION) at 18:41

## 2025-07-18 RX ADMIN — MICONAZOLE NITRATE: 2 POWDER TOPICAL at 22:39

## 2025-07-18 RX ADMIN — GUAIFENESIN 600 MG: 600 TABLET, EXTENDED RELEASE ORAL at 09:00

## 2025-07-18 RX ADMIN — IPRATROPIUM BROMIDE 0.5 MG: 0.5 SOLUTION RESPIRATORY (INHALATION) at 07:06

## 2025-07-18 RX ADMIN — IPRATROPIUM BROMIDE 0.5 MG: 0.5 SOLUTION RESPIRATORY (INHALATION) at 11:02

## 2025-07-18 RX ADMIN — PANTOPRAZOLE SODIUM 40 MG: 40 TABLET, DELAYED RELEASE ORAL at 09:00

## 2025-07-18 ASSESSMENT — PAIN SCALES - GENERAL: PAINLEVEL_OUTOF10: 0

## 2025-07-18 NOTE — PROGRESS NOTES
[facility] Progress Note    Patient:  Daljit Elizondo  YOB: 1960  Date of Service: 7/18/2025  MRN: [id number]  Acct: [id number]   Primary Care Physician: Jessie Helms APRN - CNP  Advance Directive: Full Code  Admit Date: 5/14/2025       Hospital Day: 65     NOTE: Portions of this note have been copied forward, however, changes made to reflect the most current clinical status of this patient.    CHIEF COMPLAINT:     Chief Complaint   Patient presents with    Shortness of Breath     Since Jan 1    Scrotal Pain       7/18/2025 8:52 AM  Subjective / Interval History:   07/18/2025  Patient seen and examined this a.m.   No new complaints.  No acute changes or acute overnight event reported.   Laying comfortably in bed in no apparent acute distress.    Denies any acute complaints or distress.    Endorses overall improvement.        07/17/2025  Patient seen and examined this a.m.   No new complaints.    No acute changes or acute overnight event reported.   Laying comfortably in bed in no apparent acute distress.    Denies any acute complaints or distress.    Endorses overall improvement.        07/16/2025  Patient seen and examined this a.m.   No new complaints.    No acute changes or acute overnight event reported.   Laying comfortably in bed in no apparent acute distress.    Denies any acute complaints or distress.    Endorses overall improvement.        Review of Systems:   Review of Systems  ROS: 14 point review of systems is negative except as specifically addressed above.    ADULT DIET; Regular; 3 carb choices (45 gm/meal); Low Sodium (2 gm); SEND RICE KRISPIES AT BREAKFAST. NO OATMEAL. Likes salads  No intake or output data in the 24 hours ending 07/18/25 0852      Medications:   sodium chloride 15 mL/hr at 07/11/25 1953    dextrose       Current Facility-Administered Medications   Medication Dose Route Frequency Provider Last Rate Last Admin    bumetanide (BUMEX) tablet 1 mg  1 mg Oral

## 2025-07-18 NOTE — PLAN OF CARE
Problem: Chronic Conditions and Co-morbidities  Goal: Patient's chronic conditions and co-morbidity symptoms are monitored and maintained or improved  7/18/2025 1201 by Magdalena Lowe RN  Outcome: Progressing  7/18/2025 0011 by Brigido Garcia RN  Outcome: Progressing     Problem: Discharge Planning  Goal: Discharge to home or other facility with appropriate resources  7/18/2025 1201 by Magdalena Lowe RN  Outcome: Progressing  7/18/2025 0011 by Brigido Garcia RN  Outcome: Progressing     Problem: Pain  Goal: Verbalizes/displays adequate comfort level or baseline comfort level  7/18/2025 1201 by Magdalena Lowe RN  Outcome: Progressing  7/18/2025 0011 by Brigido Garcia RN  Outcome: Progressing     Problem: Safety - Adult  Goal: Free from fall injury  7/18/2025 1201 by Magdalena Lowe RN  Outcome: Progressing  7/18/2025 0011 by Brigido Garcia RN  Outcome: Progressing     Problem: Skin/Tissue Integrity  Goal: Skin integrity remains intact  Description: 1.  Monitor for areas of redness and/or skin breakdown2.  Assess vascular access sites hourly3.  Every 4-6 hours minimum:  Change oxygen saturation probe site4.  Every 4-6 hours:  If on nasal continuous positive airway pressure, respiratory therapy assess nares and determine need for appliance change or resting period  7/18/2025 1201 by Magdalena Lowe RN  Outcome: Progressing  7/18/2025 0011 by Brigido Garcia RN  Outcome: Progressing     Problem: Nutrition Deficit:  Goal: Optimize nutritional status  7/18/2025 1201 by Magdalena Lowe RN  Outcome: Progressing  7/18/2025 0011 by Brigido Garcia RN  Outcome: Progressing     Problem: Skin/Tissue Integrity - Adult  Goal: Skin integrity remains intact  Description: 1.  Monitor for areas of redness and/or skin breakdown2.  Assess vascular access sites hourly3.  Every 4-6 hours minimum:  Change oxygen saturation probe site4.  Every 4-6 hours:  If on nasal continuous positive airway pressure, respiratory therapy assess  nares and determine need for appliance change or resting period  7/18/2025 1201 by Magdalena Lowe RN  Outcome: Progressing  7/18/2025 0011 by Brigido Garcia RN  Outcome: Progressing  Goal: Incisions, wounds, or drain sites healing without S/S of infection  7/18/2025 1201 by Magdalena Lowe RN  Outcome: Progressing  7/18/2025 0011 by Brigido Garcia RN  Outcome: Progressing  Goal: Oral mucous membranes remain intact  7/18/2025 1201 by Magdalena Lowe RN  Outcome: Progressing  7/18/2025 0011 by Brigido Garcia RN  Outcome: Progressing

## 2025-07-18 NOTE — PROGRESS NOTES
Nutrition Assessment     Type and Reason for Visit: Reassess    Nutrition Recommendations/Plan:   Continue current POC     Malnutrition Assessment:  Malnutrition Status: Severe malnutrition    Nutrition Assessment:  PO intake is showing more above 50% meals.  Weight increased again since last obtained weight.--inreased approx 11#.  Edema has decreased in BLE .  Accuchek's     Estimated Daily Nutrient Needs:  Energy (kcal):  1642-1033 kcals (8-11 kcals/kg) Weight Used for Energy Requirements: Current     Protein (g):  145g Weight Used for Protein Requirements: Ideal        Fluid (ml/day):  3003-7233 ml Method Used for Fluid Requirements: 1 ml/kcal    Nutrition Related Findings:   +2 BLE edema Wound Type: Pressure Injury (laceration)    Current Nutrition Therapies:    ADULT DIET; Regular; 3 carb choices (45 gm/meal); Low Sodium (2 gm); SEND RICE KRISPIES AT BREAKFAST. NO OATMEAL. Likes salads    Anthropometric Measures:  Height: 175.3 cm (5' 9.02\")  Current Body Wt: 202.1 kg (445 lb 8.8 oz)   BMI: 65.8        Nutrition Diagnosis:   Inadequate oral intake, Altered nutrition-related lab values, Overweight/obese related to limited food acceptance, increase demand for energy/nutrients, renal dysfunction, endocrine dysfunction as evidenced by intake 0-25%, intake 51-75%, wounds, lab values    Nutrition Interventions:   Food and/or Nutrient Delivery: Continue Current Diet  Nutrition Education/Counseling: No recommendation at this time  Coordination of Nutrition Care: Continue to monitor while inpatient  Plan of Care discussed with: pt    Goals:  Goals: Meet at least 75% of estimated needs, Maintain adequate nutrition status  Type of Goal: Continue current goal  Previous Goal Met: Progressing toward Goal(s)    Nutrition Monitoring and Evaluation:   Behavioral-Environmental Outcomes: Readiness for Change  Food/Nutrient Intake Outcomes: Food and Nutrient Intake  Physical Signs/Symptoms Outcomes: Biochemical Data, Fluid

## 2025-07-18 NOTE — PLAN OF CARE
Problem: Chronic Conditions and Co-morbidities  Goal: Patient's chronic conditions and co-morbidity symptoms are monitored and maintained or improved  7/18/2025 0011 by Brigido Garcia RN  Outcome: Progressing  7/17/2025 1335 by Lizzette Whipple LPN  Outcome: Progressing     Problem: Discharge Planning  Goal: Discharge to home or other facility with appropriate resources  7/18/2025 0011 by Brigido Garcia RN  Outcome: Progressing  7/17/2025 1335 by Lizzette Whipple LPN  Outcome: Progressing     Problem: Pain  Goal: Verbalizes/displays adequate comfort level or baseline comfort level  7/18/2025 0011 by Brigido Garcia RN  Outcome: Progressing  7/17/2025 1335 by Lizzette Whipple LPN  Outcome: Progressing     Problem: Safety - Adult  Goal: Free from fall injury  7/18/2025 0011 by Brigido Garcia RN  Outcome: Progressing  7/17/2025 1335 by Lizzette Whipple LPN  Outcome: Progressing     Problem: ABCDS Injury Assessment  Goal: Absence of physical injury  7/18/2025 0011 by Brigido Garcia RN  Outcome: Progressing  7/17/2025 1335 by Lizzette Whipple LPN  Outcome: Progressing     Problem: Skin/Tissue Integrity  Goal: Skin integrity remains intact  Description: 1.  Monitor for areas of redness and/or skin breakdown2.  Assess vascular access sites hourly3.  Every 4-6 hours minimum:  Change oxygen saturation probe site4.  Every 4-6 hours:  If on nasal continuous positive airway pressure, respiratory therapy assess nares and determine need for appliance change or resting period  7/18/2025 0011 by Brigido Garcia RN  Outcome: Progressing  7/17/2025 1335 by Lizzette Whipple LPN  Outcome: Progressing     Problem: Nutrition Deficit:  Goal: Optimize nutritional status  7/18/2025 0011 by Brigido Garcia RN  Outcome: Progressing  7/17/2025 1335 by Lizzette Whipple LPN  Outcome: Progressing     Problem: Neurosensory - Adult  Goal: Achieves stable or improved neurological status  7/18/2025 0011 by Brigido Garcia RN  Outcome:  no (get order)

## 2025-07-19 LAB
ALBUMIN SERPL-MCNC: 2.3 G/DL (ref 3.5–5.2)
ALP SERPL-CCNC: 88 U/L (ref 40–129)
ALT SERPL-CCNC: <5 U/L (ref 10–50)
ANION GAP SERPL CALCULATED.3IONS-SCNC: 9 MMOL/L (ref 8–16)
AST SERPL-CCNC: 17 U/L (ref 10–50)
BILIRUB SERPL-MCNC: 0.5 MG/DL (ref 0.2–1.2)
BUN SERPL-MCNC: 44 MG/DL (ref 8–23)
CALCIUM SERPL-MCNC: 8.2 MG/DL (ref 8.8–10.2)
CHLORIDE SERPL-SCNC: 104 MMOL/L (ref 98–107)
CO2 SERPL-SCNC: 27 MMOL/L (ref 22–29)
CREAT SERPL-MCNC: 3.4 MG/DL (ref 0.7–1.2)
ERYTHROCYTE [DISTWIDTH] IN BLOOD BY AUTOMATED COUNT: 15.4 % (ref 11.5–14.5)
GLUCOSE BLD-MCNC: 114 MG/DL (ref 70–99)
GLUCOSE BLD-MCNC: 141 MG/DL (ref 70–99)
GLUCOSE BLD-MCNC: 145 MG/DL (ref 70–99)
GLUCOSE BLD-MCNC: 162 MG/DL (ref 70–99)
GLUCOSE SERPL-MCNC: 132 MG/DL (ref 70–99)
HCT VFR BLD AUTO: 32.6 % (ref 42–52)
HGB BLD-MCNC: 9.9 G/DL (ref 14–18)
MCH RBC QN AUTO: 29.3 PG (ref 27–31)
MCHC RBC AUTO-ENTMCNC: 30.4 G/DL (ref 33–37)
MCV RBC AUTO: 96.4 FL (ref 80–94)
PERFORMED ON: ABNORMAL
PLATELET # BLD AUTO: 160 K/UL (ref 130–400)
PMV BLD AUTO: 12.3 FL (ref 9.4–12.4)
POTASSIUM SERPL-SCNC: 4 MMOL/L (ref 3.5–5)
POTASSIUM SERPL-SCNC: 4 MMOL/L (ref 3.5–5.1)
PROT SERPL-MCNC: 5.2 G/DL (ref 6.4–8.3)
RBC # BLD AUTO: 3.38 M/UL (ref 4.7–6.1)
SODIUM SERPL-SCNC: 140 MMOL/L (ref 136–145)
WBC # BLD AUTO: 7.5 K/UL (ref 4.8–10.8)

## 2025-07-19 PROCEDURE — 6370000000 HC RX 637 (ALT 250 FOR IP): Performed by: NURSE PRACTITIONER

## 2025-07-19 PROCEDURE — 6360000002 HC RX W HCPCS

## 2025-07-19 PROCEDURE — 2700000000 HC OXYGEN THERAPY PER DAY

## 2025-07-19 PROCEDURE — 6370000000 HC RX 637 (ALT 250 FOR IP)

## 2025-07-19 PROCEDURE — 94660 CPAP INITIATION&MGMT: CPT

## 2025-07-19 PROCEDURE — 6360000002 HC RX W HCPCS: Performed by: NURSE PRACTITIONER

## 2025-07-19 PROCEDURE — 85027 COMPLETE CBC AUTOMATED: CPT

## 2025-07-19 PROCEDURE — 94640 AIRWAY INHALATION TREATMENT: CPT

## 2025-07-19 PROCEDURE — 36415 COLL VENOUS BLD VENIPUNCTURE: CPT

## 2025-07-19 PROCEDURE — 80053 COMPREHEN METABOLIC PANEL: CPT

## 2025-07-19 PROCEDURE — 1200000000 HC SEMI PRIVATE

## 2025-07-19 PROCEDURE — 6370000000 HC RX 637 (ALT 250 FOR IP): Performed by: STUDENT IN AN ORGANIZED HEALTH CARE EDUCATION/TRAINING PROGRAM

## 2025-07-19 PROCEDURE — 2500000003 HC RX 250 WO HCPCS

## 2025-07-19 PROCEDURE — 94150 VITAL CAPACITY TEST: CPT

## 2025-07-19 PROCEDURE — 82962 GLUCOSE BLOOD TEST: CPT

## 2025-07-19 RX ADMIN — MICONAZOLE NITRATE: 2 POWDER TOPICAL at 10:27

## 2025-07-19 RX ADMIN — PRAVASTATIN SODIUM 20 MG: 20 TABLET ORAL at 20:17

## 2025-07-19 RX ADMIN — METOPROLOL SUCCINATE 25 MG: 25 TABLET, FILM COATED, EXTENDED RELEASE ORAL at 10:26

## 2025-07-19 RX ADMIN — IPRATROPIUM BROMIDE 0.5 MG: 0.5 SOLUTION RESPIRATORY (INHALATION) at 18:06

## 2025-07-19 RX ADMIN — GUAIFENESIN 600 MG: 600 TABLET, EXTENDED RELEASE ORAL at 20:17

## 2025-07-19 RX ADMIN — METOPROLOL SUCCINATE 25 MG: 25 TABLET, FILM COATED, EXTENDED RELEASE ORAL at 20:17

## 2025-07-19 RX ADMIN — ASPIRIN 81 MG: 81 TABLET, COATED ORAL at 10:26

## 2025-07-19 RX ADMIN — GUAIFENESIN 600 MG: 600 TABLET, EXTENDED RELEASE ORAL at 10:26

## 2025-07-19 RX ADMIN — MONTELUKAST SODIUM 10 MG: 10 TABLET, FILM COATED ORAL at 20:17

## 2025-07-19 RX ADMIN — IPRATROPIUM BROMIDE 0.5 MG: 0.5 SOLUTION RESPIRATORY (INHALATION) at 10:33

## 2025-07-19 RX ADMIN — SODIUM CHLORIDE, PRESERVATIVE FREE 10 ML: 5 INJECTION INTRAVENOUS at 21:00

## 2025-07-19 RX ADMIN — IPRATROPIUM BROMIDE 0.5 MG: 0.5 SOLUTION RESPIRATORY (INHALATION) at 14:26

## 2025-07-19 RX ADMIN — PANTOPRAZOLE SODIUM 40 MG: 40 TABLET, DELAYED RELEASE ORAL at 05:13

## 2025-07-19 RX ADMIN — ENOXAPARIN SODIUM 60 MG: 100 INJECTION SUBCUTANEOUS at 10:25

## 2025-07-19 RX ADMIN — INSULIN GLARGINE 30 UNITS: 100 INJECTION, SOLUTION SUBCUTANEOUS at 20:16

## 2025-07-19 RX ADMIN — ALLOPURINOL 300 MG: 300 TABLET ORAL at 10:26

## 2025-07-19 RX ADMIN — IPRATROPIUM BROMIDE 0.5 MG: 0.5 SOLUTION RESPIRATORY (INHALATION) at 06:40

## 2025-07-19 RX ADMIN — ENOXAPARIN SODIUM 60 MG: 100 INJECTION SUBCUTANEOUS at 20:18

## 2025-07-19 RX ADMIN — CETIRIZINE HYDROCHLORIDE 10 MG: 10 TABLET ORAL at 10:26

## 2025-07-19 RX ADMIN — SODIUM CHLORIDE, PRESERVATIVE FREE 10 ML: 5 INJECTION INTRAVENOUS at 10:30

## 2025-07-19 RX ADMIN — LACTULOSE 20 G: 20 SOLUTION ORAL at 10:26

## 2025-07-19 RX ADMIN — MICONAZOLE NITRATE: 2 POWDER TOPICAL at 20:20

## 2025-07-19 NOTE — PLAN OF CARE
Problem: Chronic Conditions and Co-morbidities  Goal: Patient's chronic conditions and co-morbidity symptoms are monitored and maintained or improved  7/18/2025 2256 by Leslee Cain RN  Outcome: Progressing  7/18/2025 1201 by Magdalena Lowe RN  Outcome: Progressing     Problem: Discharge Planning  Goal: Discharge to home or other facility with appropriate resources  7/18/2025 2256 by Leslee Cain RN  Outcome: Progressing  7/18/2025 1201 by Magdalena Lowe RN  Outcome: Progressing     Problem: Pain  Goal: Verbalizes/displays adequate comfort level or baseline comfort level  7/18/2025 2256 by Leslee Cain RN  Outcome: Progressing  7/18/2025 1201 by Magdalena Lowe RN  Outcome: Progressing     Problem: Safety - Adult  Goal: Free from fall injury  7/18/2025 2256 by Leslee Cain RN  Outcome: Progressing  7/18/2025 1201 by Magdalena Lowe RN  Outcome: Progressing     Problem: ABCDS Injury Assessment  Goal: Absence of physical injury  7/18/2025 2256 by Leslee Cain RN  Outcome: Progressing  7/18/2025 1201 by Magdalena Lowe RN  Outcome: Progressing     Problem: Skin/Tissue Integrity  Goal: Skin integrity remains intact  Description: 1.  Monitor for areas of redness and/or skin breakdown2.  Assess vascular access sites hourly3.  Every 4-6 hours minimum:  Change oxygen saturation probe site4.  Every 4-6 hours:  If on nasal continuous positive airway pressure, respiratory therapy assess nares and determine need for appliance change or resting period  7/18/2025 2256 by Leslee Cain RN  Outcome: Progressing  7/18/2025 1201 by Magdalena Lowe RN  Outcome: Progressing     Problem: Nutrition Deficit:  Goal: Optimize nutritional status  7/18/2025 2256 by Leslee Cain RN  Outcome: Progressing  7/18/2025 1342 by Miriam Maldonado, MS, RD, LD  Outcome: Progressing  Flowsheets (Taken 7/18/2025 1334)  Nutrient intake appropriate for improving, restoring, or maintaining

## 2025-07-19 NOTE — PROGRESS NOTES
nephrology ([facility]) Progress Note    Patient:  Daljit Elizondo  YOB: 1960  Date of Service: 7/19/2025  MRN: [id number]   Acct: [id number]   Primary Care Physician: Jessie Helms APRN - CNP  Advance Directive: Full Code  Admit Date: 5/14/2025       Hospital Day: 66  Referring Provider: Cody Howell MD    Patient independently seen and examined, Chart, Consults, Notes, Operative notes, Labs, Cardiology, and Radiology studies reviewed as available.    Chief complaint: Abnormal labs.    Subjective:  Daljit Elizondo is a 65 y.o. male for whom we were consulted for evaluation and treatment of acute kidney injury.  Patient denies any history of chronic kidney disease.  He has been in the hospital for the last month, poor historian and has never seen nephrology in the past.  Patient has severe abdominal obesity, hypertension, sleep apnea, type 2 diabetes, chronic diastolic CHF, sleep apnea.  Presented with increasing shortness of breath/dyspnea on exertion.  Hospital course remarkable for treatment with intravenous diuretics for the treatment of volume overload, CHF exacerbation.  His serum creatinine was 0.9 mg with estimated GFR more than 90 mL on admission.  Patient has significant drop in GFR and nephrology is consulted.  He was initially treated with IV fluid with minimal to no improvement of renal function.  Patient now agreed to proceed with dialysis if needed.  I have consulted vascular surgery, unfortunately permacatheter cannot be done here as he is much heavier for operating room table in this institute.    This morning patient is feeling well.  He denies any shortness of breath.  He has good urine output and has no improvement of renal function    Allergies:  Penicillamine, Silvadene [silver sulfadiazine], Aerohist [chlorpheniramine-methscop er], Cephalosporins, Hemp seed oil, Neosporin [neomycin-polymyxin-gramicidin], Penicillins, and

## 2025-07-19 NOTE — PROGRESS NOTES
[facility] Progress Note    Patient:  Daljit Elizondo  YOB: 1960  Date of Service: 7/19/2025  MRN: [id number]  Acct: [id number]   Primary Care Physician: Jessie Helms APRN - CNP  Advance Directive: Full Code  Admit Date: 5/14/2025       Hospital Day: 66     NOTE: Portions of this note have been copied forward, however, changes made to reflect the most current clinical status of this patient.    CHIEF COMPLAINT:     Chief Complaint   Patient presents with    Shortness of Breath     Since Jan 1    Scrotal Pain       7/19/2025 7:25 AM  Subjective / Interval History:   07/19/2025  Patient seen and examined this a.m.   No new complaints.  No acute changes or acute overnight event reported.   Laying comfortably in bed in no apparent acute distress.  Denies any acute complaints or distress.    Endorses overall improvement.        Review of Systems:   Review of Systems  ROS: 14 point review of systems is negative except as specifically addressed above.    ADULT DIET; Regular; 3 carb choices (45 gm/meal); Low Sodium (2 gm); SEND RICE KRISPIES AT BREAKFAST. NO OATMEAL. Likes salads    Intake/Output Summary (Last 24 hours) at 7/19/2025 0725  Last data filed at 7/19/2025 0530  Gross per 24 hour   Intake 360 ml   Output 1500 ml   Net -1140 ml         Medications:   sodium chloride 15 mL/hr at 07/11/25 1953    dextrose       Current Facility-Administered Medications   Medication Dose Route Frequency Provider Last Rate Last Admin    bumetanide (BUMEX) tablet 1 mg  1 mg Oral Once per day on Monday Wednesday Friday Rhys Pacheco MD   1 mg at 07/18/25 0900    saliva substitute (BIOTENE/MOUTH KOTE) liquid   Oral Daily Rhys Pacheco MD   Given at 07/18/25 2244    lactulose (CHRONULAC) 10 GM/15ML solution 20 g  20 g Oral Daily Rhys Pacheco MD   20 g at 07/17/25 0919    pseudoephedrine (SUDAFED) tablet 60 mg  60 mg Oral Q6H PRN Rhys Pacheco MD        povidone-iodine (BETADINE) 10 % ointment

## 2025-07-19 NOTE — PLAN OF CARE
Problem: Chronic Conditions and Co-morbidities  Goal: Patient's chronic conditions and co-morbidity symptoms are monitored and maintained or improved  7/19/2025 0301 by Ashli Craig RN  Outcome: Progressing  Flowsheets (Taken 7/19/2025 0131)  Care Plan - Patient's Chronic Conditions and Co-Morbidity Symptoms are Monitored and Maintained or Improved:   Monitor and assess patient's chronic conditions and comorbid symptoms for stability, deterioration, or improvement   Collaborate with multidisciplinary team to address chronic and comorbid conditions and prevent exacerbation or deterioration   Update acute care plan with appropriate goals if chronic or comorbid symptoms are exacerbated and prevent overall improvement and discharge  7/18/2025 2256 by Leslee Cain RN  Outcome: Progressing     Problem: Discharge Planning  Goal: Discharge to home or other facility with appropriate resources  7/19/2025 0301 by Ashli Craig RN  Outcome: Progressing  Flowsheets (Taken 7/19/2025 0131)  Discharge to home or other facility with appropriate resources:   Identify barriers to discharge with patient and caregiver   Arrange for needed discharge resources and transportation as appropriate   Identify discharge learning needs (meds, wound care, etc)   Arrange for interpreters to assist at discharge as needed   Refer to discharge planning if patient needs post-hospital services based on physician order or complex needs related to functional status, cognitive ability or social support system  7/18/2025 2256 by Leslee Cain RN  Outcome: Progressing     Problem: Pain  Goal: Verbalizes/displays adequate comfort level or baseline comfort level  7/19/2025 0301 by Ashli Craig RN  Outcome: Progressing  7/18/2025 2256 by Leslee Cain, RN  Outcome: Progressing     Problem: Safety - Adult  Goal: Free from fall injury  7/19/2025 0301 by Ashli Craig RN  Outcome: Progressing  7/18/2025 2256 by Leslee Cain,  therapies per order   Administer medications as ordered   Instruct and encourage patient and family to use good hand hygiene technique   Identify and instruct in appropriate isolation precautions for identified infection/condition  7/18/2025 2256 by Leslee Cain RN  Outcome: Progressing  Goal: Absence of infection during hospitalization  7/19/2025 0301 by Ashli Craig RN  Outcome: Progressing  Flowsheets (Taken 7/19/2025 0131)  Absence of infection during hospitalization:   Assess and monitor for signs and symptoms of infection   Monitor lab/diagnostic results   Monitor all insertion sites i.e., indwelling lines, tubes and drains   Monitor endotracheal (as able) and nasal secretions for changes in amount and color   [place] appropriate cooling/warming therapies per order   Administer medications as ordered   Instruct and encourage patient and family to use good hand hygiene technique   Identify and instruct in appropriate isolation precautions for identified infection/condition  7/18/2025 2256 by Leslee Cain RN  Outcome: Progressing  Goal: Absence of fever/infection during anticipated neutropenic period  7/19/2025 0301 by Ashli Craig RN  Outcome: Progressing  Flowsheets (Taken 7/19/2025 0131)  Absence of fever/infection during anticipated neutropenic period:   Monitor white blood cell count   Administer growth factors as ordered   Implement neutropenic guidelines  7/18/2025 2256 by Leslee Cain RN  Outcome: Progressing     Problem: Metabolic/Fluid and Electrolytes - Adult  Goal: Electrolytes maintained within normal limits  7/19/2025 0301 by Ashli Craig RN  Outcome: Progressing  Flowsheets (Taken 7/19/2025 0131)  Electrolytes maintained within normal limits:   Monitor labs and assess patient for signs and symptoms of electrolyte imbalances   Administer electrolyte replacement as ordered  7/18/2025 2256 by Leslee Cain RN  Outcome: Progressing  Goal: Hemodynamic stability and

## 2025-07-20 LAB
ALBUMIN SERPL-MCNC: 2.6 G/DL (ref 3.5–5.2)
ALP SERPL-CCNC: 90 U/L (ref 40–129)
ALT SERPL-CCNC: 6 U/L (ref 10–50)
ANION GAP SERPL CALCULATED.3IONS-SCNC: 9 MMOL/L (ref 8–16)
AST SERPL-CCNC: 20 U/L (ref 10–50)
BILIRUB SERPL-MCNC: 0.7 MG/DL (ref 0.2–1.2)
BUN SERPL-MCNC: 44 MG/DL (ref 8–23)
CALCIUM SERPL-MCNC: 8.4 MG/DL (ref 8.8–10.2)
CHLORIDE SERPL-SCNC: 103 MMOL/L (ref 98–107)
CO2 SERPL-SCNC: 27 MMOL/L (ref 22–29)
CREAT SERPL-MCNC: 3.3 MG/DL (ref 0.7–1.2)
ERYTHROCYTE [DISTWIDTH] IN BLOOD BY AUTOMATED COUNT: 15.2 % (ref 11.5–14.5)
GLUCOSE BLD-MCNC: 114 MG/DL (ref 70–99)
GLUCOSE BLD-MCNC: 135 MG/DL (ref 70–99)
GLUCOSE BLD-MCNC: 172 MG/DL (ref 70–99)
GLUCOSE BLD-MCNC: 95 MG/DL (ref 70–99)
GLUCOSE SERPL-MCNC: 127 MG/DL (ref 70–99)
HCT VFR BLD AUTO: 32.9 % (ref 42–52)
HGB BLD-MCNC: 10.2 G/DL (ref 14–18)
MCH RBC QN AUTO: 29.8 PG (ref 27–31)
MCHC RBC AUTO-ENTMCNC: 31 G/DL (ref 33–37)
MCV RBC AUTO: 96.2 FL (ref 80–94)
PERFORMED ON: ABNORMAL
PERFORMED ON: NORMAL
PLATELET # BLD AUTO: 169 K/UL (ref 130–400)
PMV BLD AUTO: 11.9 FL (ref 9.4–12.4)
POTASSIUM SERPL-SCNC: 4.1 MMOL/L (ref 3.5–5)
POTASSIUM SERPL-SCNC: 4.1 MMOL/L (ref 3.5–5.1)
PROT SERPL-MCNC: 5.5 G/DL (ref 6.4–8.3)
RBC # BLD AUTO: 3.42 M/UL (ref 4.7–6.1)
SODIUM SERPL-SCNC: 139 MMOL/L (ref 136–145)
WBC # BLD AUTO: 8.2 K/UL (ref 4.8–10.8)

## 2025-07-20 PROCEDURE — 1200000000 HC SEMI PRIVATE

## 2025-07-20 PROCEDURE — 82962 GLUCOSE BLOOD TEST: CPT

## 2025-07-20 PROCEDURE — 6360000002 HC RX W HCPCS: Performed by: NURSE PRACTITIONER

## 2025-07-20 PROCEDURE — 94640 AIRWAY INHALATION TREATMENT: CPT

## 2025-07-20 PROCEDURE — 6370000000 HC RX 637 (ALT 250 FOR IP): Performed by: NURSE PRACTITIONER

## 2025-07-20 PROCEDURE — 2700000000 HC OXYGEN THERAPY PER DAY

## 2025-07-20 PROCEDURE — 80053 COMPREHEN METABOLIC PANEL: CPT

## 2025-07-20 PROCEDURE — 6370000000 HC RX 637 (ALT 250 FOR IP): Performed by: STUDENT IN AN ORGANIZED HEALTH CARE EDUCATION/TRAINING PROGRAM

## 2025-07-20 PROCEDURE — 36415 COLL VENOUS BLD VENIPUNCTURE: CPT

## 2025-07-20 PROCEDURE — 6360000002 HC RX W HCPCS

## 2025-07-20 PROCEDURE — 85027 COMPLETE CBC AUTOMATED: CPT

## 2025-07-20 PROCEDURE — 94761 N-INVAS EAR/PLS OXIMETRY MLT: CPT

## 2025-07-20 PROCEDURE — 6370000000 HC RX 637 (ALT 250 FOR IP)

## 2025-07-20 RX ADMIN — IPRATROPIUM BROMIDE 0.5 MG: 0.5 SOLUTION RESPIRATORY (INHALATION) at 14:45

## 2025-07-20 RX ADMIN — ENOXAPARIN SODIUM 60 MG: 100 INJECTION SUBCUTANEOUS at 08:15

## 2025-07-20 RX ADMIN — ALLOPURINOL 300 MG: 300 TABLET ORAL at 08:16

## 2025-07-20 RX ADMIN — POVIDONE-IODINE: 10 OINTMENT TOPICAL at 08:28

## 2025-07-20 RX ADMIN — ENOXAPARIN SODIUM 60 MG: 100 INJECTION SUBCUTANEOUS at 21:11

## 2025-07-20 RX ADMIN — MONTELUKAST SODIUM 10 MG: 10 TABLET, FILM COATED ORAL at 21:11

## 2025-07-20 RX ADMIN — INSULIN GLARGINE 30 UNITS: 100 INJECTION, SOLUTION SUBCUTANEOUS at 21:11

## 2025-07-20 RX ADMIN — GUAIFENESIN 600 MG: 600 TABLET, EXTENDED RELEASE ORAL at 08:16

## 2025-07-20 RX ADMIN — METOPROLOL SUCCINATE 25 MG: 25 TABLET, FILM COATED, EXTENDED RELEASE ORAL at 08:16

## 2025-07-20 RX ADMIN — PANTOPRAZOLE SODIUM 40 MG: 40 TABLET, DELAYED RELEASE ORAL at 08:16

## 2025-07-20 RX ADMIN — PRAVASTATIN SODIUM 20 MG: 20 TABLET ORAL at 21:11

## 2025-07-20 RX ADMIN — CETIRIZINE HYDROCHLORIDE 10 MG: 10 TABLET ORAL at 08:16

## 2025-07-20 RX ADMIN — LACTULOSE 20 G: 20 SOLUTION ORAL at 08:15

## 2025-07-20 RX ADMIN — IPRATROPIUM BROMIDE 0.5 MG: 0.5 SOLUTION RESPIRATORY (INHALATION) at 10:51

## 2025-07-20 RX ADMIN — IPRATROPIUM BROMIDE 0.5 MG: 0.5 SOLUTION RESPIRATORY (INHALATION) at 06:45

## 2025-07-20 RX ADMIN — MICONAZOLE NITRATE: 2 POWDER TOPICAL at 08:17

## 2025-07-20 RX ADMIN — METOPROLOL SUCCINATE 25 MG: 25 TABLET, FILM COATED, EXTENDED RELEASE ORAL at 21:11

## 2025-07-20 RX ADMIN — GUAIFENESIN 600 MG: 600 TABLET, EXTENDED RELEASE ORAL at 21:11

## 2025-07-20 RX ADMIN — ASPIRIN 81 MG: 81 TABLET, COATED ORAL at 08:16

## 2025-07-20 RX ADMIN — MICONAZOLE NITRATE: 2 POWDER TOPICAL at 21:00

## 2025-07-20 RX ADMIN — IPRATROPIUM BROMIDE 0.5 MG: 0.5 SOLUTION RESPIRATORY (INHALATION) at 18:59

## 2025-07-20 ASSESSMENT — PAIN SCALES - GENERAL: PAINLEVEL_OUTOF10: 0

## 2025-07-20 NOTE — PROGRESS NOTES
nephrology ([facility]) Progress Note    Patient:  Daljit Elizondo  YOB: 1960  Date of Service: 7/20/2025  MRN: [id number]   Acct: [id number]   Primary Care Physician: Jessie Helms APRN - CNP  Advance Directive: Full Code  Admit Date: 5/14/2025       Hospital Day: 67  Referring Provider: Cody Howell MD    Patient independently seen and examined, Chart, Consults, Notes, Operative notes, Labs, Cardiology, and Radiology studies reviewed as available.    Chief complaint: Abnormal labs.    Subjective:  Daljit Elizondo is a 65 y.o. male for whom we were consulted for evaluation and treatment of acute kidney injury.  Patient denies any history of chronic kidney disease.  He has been in the hospital for the last month, poor historian and has never seen nephrology in the past.  Patient has severe abdominal obesity, hypertension, sleep apnea, type 2 diabetes, chronic diastolic CHF, sleep apnea.  Presented with increasing shortness of breath/dyspnea on exertion.  Hospital course remarkable for treatment with intravenous diuretics for the treatment of volume overload, CHF exacerbation.  His serum creatinine was 0.9 mg with estimated GFR more than 90 mL on admission.  Patient has significant drop in GFR and nephrology is consulted.  He was initially treated with IV fluid with minimal to no improvement of renal function.  Patient initially agreed to proceed with dialysis.  However he was too heavy for operating room table.  Meanwhile his renal function improved as well.  He has borderline GFR    This morning patient is more alert and awake.  He denies any shortness of breath.    Allergies:  Penicillamine, Silvadene [silver sulfadiazine], Aerohist [chlorpheniramine-methscop er], Cephalosporins, Hemp seed oil, Neosporin [neomycin-polymyxin-gramicidin], Penicillins, and Zinc    Medicines:  Current Facility-Administered Medications   Medication Dose Route Frequency Provider Last Rate

## 2025-07-20 NOTE — PROGRESS NOTES
[facility] Progress Note    Patient:  Daljit Elizondo  YOB: 1960  Date of Service: 7/20/2025  MRN: [id number]  Acct: [id number]   Primary Care Physician: Jessie Helms APRN - CNP  Advance Directive: Full Code  Admit Date: 5/14/2025       Hospital Day: 67     NOTE: Portions of this note have been copied forward, however, changes made to reflect the most current clinical status of this patient.    CHIEF COMPLAINT:     Chief Complaint   Patient presents with    Shortness of Breath     Since Jan 1    Scrotal Pain       7/20/2025 7:33 AM  Subjective / Interval History:   07/20/2025  Patient seen and examined this a.m.   No new complaints.    No acute changes or acute overnight event reported.   Laying comfortably in bed in no apparent acute distress.    Denies any acute complaints or distress.    Endorses overall improvement.        07/19/2025  Patient seen and examined this a.m.   No new complaints.  No acute changes or acute overnight event reported.   Laying comfortably in bed in no apparent acute distress.  Denies any acute complaints or distress.    Endorses overall improvement.        Review of Systems:   Review of Systems  ROS: 14 point review of systems is negative except as specifically addressed above.    ADULT DIET; Regular; 3 carb choices (45 gm/meal); Low Sodium (2 gm); SEND RICE KRISPIES AT BREAKFAST. NO OATMEAL. Likes salads    Intake/Output Summary (Last 24 hours) at 7/20/2025 0733  Last data filed at 7/20/2025 0443  Gross per 24 hour   Intake 480 ml   Output 700 ml   Net -220 ml         Medications:   sodium chloride 15 mL/hr at 07/11/25 1953    dextrose       Current Facility-Administered Medications   Medication Dose Route Frequency Provider Last Rate Last Admin    bumetanide (BUMEX) tablet 1 mg  1 mg Oral Once per day on Monday Wednesday Friday Rhys Pacheco MD   1 mg at 07/18/25 0900    saliva substitute (BIOTENE/MOUTH KOTE) liquid   Oral Daily Rhys Pacheco MD    ([facility])  Active Problems:    Diabetes ([facility])    Essential hypertension    GERD (gastroesophageal reflux disease)    Obesity, morbid (more than 100 lbs over ideal weight or BMI > 40) ([facility])    Hyperlipemia    Palliative care patient    Acute on chronic respiratory failure with hypoxia and hypercapnia ([facility])    Severe malnutrition  Resolved Problems:    * No resolved hospital problems. *          Brief History/Summary:     Mr Elizondo, a \"65-year-old male with morbid obesity, restrictive lung disease, sleep apnea, chronic hypoxic and hypercapnic respiratory failure on supplemental O2 and BiPAP support, chronic systolic heart failure (EF 26-30% by echo June 2024), diabetes with chronic venous stasis with lower extremity wounds, prolonged hospital course originally admitted 5/14 for volume overload, decompensated heart failure treated with IV loop diuretics, improved then transition to oral diuretics, due to significant debility considered for SNF placement but was declined by multiple facilities due to concern for the level of care he would require and inconsistently worked with PT (reported baseline mobility includes standing only to transfer from bed to scooter/chair), plan was then to discharge home with home health however he appealed his discharge multiple times.  He later developed a change in urine output noting dark brown urine with repeat labs showing acute renal failure with creatinine trending above 3, which did not improve despite IV fluid trial, consistent with acute tubular necrosis.  Followed in-house by nephrology, discussed possibility of dialysis.  However he eventually demonstrated some renal recovery.    7/5 started treatment for UTI after he developed some genitourinary symptoms with hematuria.  Culture grew multidrug-resistant Proteus, given multiple antibiotic allergies, treated with course of meropenem.  Diuretic therapy on Bumex was resumed and adjusted over hospital course.  Social work following,

## 2025-07-21 LAB
ALBUMIN SERPL-MCNC: 2.6 G/DL (ref 3.5–5.2)
ALP SERPL-CCNC: 92 U/L (ref 40–129)
ALT SERPL-CCNC: 6 U/L (ref 10–50)
ANION GAP SERPL CALCULATED.3IONS-SCNC: 9 MMOL/L (ref 8–16)
AST SERPL-CCNC: 19 U/L (ref 10–50)
BASOPHILS # BLD: 0.1 K/UL (ref 0–0.2)
BASOPHILS NFR BLD: 0.8 % (ref 0–1)
BILIRUB SERPL-MCNC: 0.6 MG/DL (ref 0.2–1.2)
BUN SERPL-MCNC: 45 MG/DL (ref 8–23)
CALCIUM SERPL-MCNC: 8.4 MG/DL (ref 8.8–10.2)
CHLORIDE SERPL-SCNC: 104 MMOL/L (ref 98–107)
CO2 SERPL-SCNC: 27 MMOL/L (ref 22–29)
CREAT SERPL-MCNC: 3.4 MG/DL (ref 0.7–1.2)
EOSINOPHIL # BLD: 0.3 K/UL (ref 0–0.6)
EOSINOPHIL NFR BLD: 3.7 % (ref 0–5)
ERYTHROCYTE [DISTWIDTH] IN BLOOD BY AUTOMATED COUNT: 15.2 % (ref 11.5–14.5)
GLUCOSE BLD-MCNC: 100 MG/DL (ref 70–99)
GLUCOSE BLD-MCNC: 114 MG/DL (ref 70–99)
GLUCOSE BLD-MCNC: 158 MG/DL (ref 70–99)
GLUCOSE BLD-MCNC: 168 MG/DL (ref 70–99)
GLUCOSE SERPL-MCNC: 140 MG/DL (ref 70–99)
HCT VFR BLD AUTO: 32.8 % (ref 42–52)
HGB BLD-MCNC: 10.2 G/DL (ref 14–18)
IMM GRANULOCYTES # BLD: 0 K/UL
LYMPHOCYTES # BLD: 1.1 K/UL (ref 1.1–4.5)
LYMPHOCYTES NFR BLD: 15.5 % (ref 20–40)
MCH RBC QN AUTO: 29.7 PG (ref 27–31)
MCHC RBC AUTO-ENTMCNC: 31.1 G/DL (ref 33–37)
MCV RBC AUTO: 95.6 FL (ref 80–94)
MONOCYTES # BLD: 0.5 K/UL (ref 0–0.9)
MONOCYTES NFR BLD: 7 % (ref 0–10)
NEUTROPHILS # BLD: 5.3 K/UL (ref 1.5–7.5)
NEUTS SEG NFR BLD: 72.5 % (ref 50–65)
PERFORMED ON: ABNORMAL
PLATELET # BLD AUTO: 172 K/UL (ref 130–400)
PMV BLD AUTO: 12.2 FL (ref 9.4–12.4)
POTASSIUM SERPL-SCNC: 4.1 MMOL/L (ref 3.5–5)
POTASSIUM SERPL-SCNC: 4.1 MMOL/L (ref 3.5–5.1)
PROT SERPL-MCNC: 5.7 G/DL (ref 6.4–8.3)
RBC # BLD AUTO: 3.43 M/UL (ref 4.7–6.1)
SODIUM SERPL-SCNC: 140 MMOL/L (ref 136–145)
WBC # BLD AUTO: 7.3 K/UL (ref 4.8–10.8)

## 2025-07-21 PROCEDURE — 94660 CPAP INITIATION&MGMT: CPT

## 2025-07-21 PROCEDURE — 85025 COMPLETE CBC W/AUTO DIFF WBC: CPT

## 2025-07-21 PROCEDURE — 6370000000 HC RX 637 (ALT 250 FOR IP): Performed by: STUDENT IN AN ORGANIZED HEALTH CARE EDUCATION/TRAINING PROGRAM

## 2025-07-21 PROCEDURE — 94761 N-INVAS EAR/PLS OXIMETRY MLT: CPT

## 2025-07-21 PROCEDURE — 6370000000 HC RX 637 (ALT 250 FOR IP)

## 2025-07-21 PROCEDURE — 6370000000 HC RX 637 (ALT 250 FOR IP): Performed by: NURSE PRACTITIONER

## 2025-07-21 PROCEDURE — 1200000000 HC SEMI PRIVATE

## 2025-07-21 PROCEDURE — 82962 GLUCOSE BLOOD TEST: CPT

## 2025-07-21 PROCEDURE — 80053 COMPREHEN METABOLIC PANEL: CPT

## 2025-07-21 PROCEDURE — 36415 COLL VENOUS BLD VENIPUNCTURE: CPT

## 2025-07-21 PROCEDURE — 2500000003 HC RX 250 WO HCPCS

## 2025-07-21 PROCEDURE — 2700000000 HC OXYGEN THERAPY PER DAY

## 2025-07-21 PROCEDURE — 6360000002 HC RX W HCPCS

## 2025-07-21 PROCEDURE — 94150 VITAL CAPACITY TEST: CPT

## 2025-07-21 PROCEDURE — 94640 AIRWAY INHALATION TREATMENT: CPT

## 2025-07-21 PROCEDURE — 6360000002 HC RX W HCPCS: Performed by: NURSE PRACTITIONER

## 2025-07-21 RX ADMIN — ENOXAPARIN SODIUM 60 MG: 100 INJECTION SUBCUTANEOUS at 08:17

## 2025-07-21 RX ADMIN — METOPROLOL SUCCINATE 25 MG: 25 TABLET, FILM COATED, EXTENDED RELEASE ORAL at 21:19

## 2025-07-21 RX ADMIN — METOPROLOL SUCCINATE 25 MG: 25 TABLET, FILM COATED, EXTENDED RELEASE ORAL at 08:18

## 2025-07-21 RX ADMIN — SODIUM CHLORIDE, PRESERVATIVE FREE 10 ML: 5 INJECTION INTRAVENOUS at 21:29

## 2025-07-21 RX ADMIN — SODIUM CHLORIDE, PRESERVATIVE FREE 10 ML: 5 INJECTION INTRAVENOUS at 08:20

## 2025-07-21 RX ADMIN — ENOXAPARIN SODIUM 60 MG: 100 INJECTION SUBCUTANEOUS at 21:20

## 2025-07-21 RX ADMIN — GUAIFENESIN 600 MG: 600 TABLET, EXTENDED RELEASE ORAL at 08:18

## 2025-07-21 RX ADMIN — MICONAZOLE NITRATE: 2 POWDER TOPICAL at 08:19

## 2025-07-21 RX ADMIN — GUAIFENESIN 600 MG: 600 TABLET, EXTENDED RELEASE ORAL at 21:19

## 2025-07-21 RX ADMIN — BUMETANIDE 1 MG: 1 TABLET ORAL at 08:18

## 2025-07-21 RX ADMIN — INSULIN GLARGINE 30 UNITS: 100 INJECTION, SOLUTION SUBCUTANEOUS at 21:20

## 2025-07-21 RX ADMIN — IPRATROPIUM BROMIDE 0.5 MG: 0.5 SOLUTION RESPIRATORY (INHALATION) at 14:01

## 2025-07-21 RX ADMIN — MONTELUKAST SODIUM 10 MG: 10 TABLET, FILM COATED ORAL at 21:19

## 2025-07-21 RX ADMIN — MICONAZOLE NITRATE: 2 POWDER TOPICAL at 21:22

## 2025-07-21 RX ADMIN — PRAVASTATIN SODIUM 20 MG: 20 TABLET ORAL at 21:19

## 2025-07-21 RX ADMIN — IPRATROPIUM BROMIDE 0.5 MG: 0.5 SOLUTION RESPIRATORY (INHALATION) at 11:15

## 2025-07-21 RX ADMIN — IPRATROPIUM BROMIDE 0.5 MG: 0.5 SOLUTION RESPIRATORY (INHALATION) at 07:07

## 2025-07-21 RX ADMIN — POVIDONE-IODINE: 10 OINTMENT TOPICAL at 08:19

## 2025-07-21 RX ADMIN — Medication: at 21:24

## 2025-07-21 RX ADMIN — IPRATROPIUM BROMIDE 0.5 MG: 0.5 SOLUTION RESPIRATORY (INHALATION) at 19:04

## 2025-07-21 RX ADMIN — CETIRIZINE HYDROCHLORIDE 10 MG: 10 TABLET ORAL at 08:18

## 2025-07-21 RX ADMIN — ALLOPURINOL 300 MG: 300 TABLET ORAL at 08:18

## 2025-07-21 RX ADMIN — PANTOPRAZOLE SODIUM 40 MG: 40 TABLET, DELAYED RELEASE ORAL at 07:03

## 2025-07-21 RX ADMIN — ASPIRIN 81 MG: 81 TABLET, COATED ORAL at 08:18

## 2025-07-21 NOTE — PLAN OF CARE
Problem: Chronic Conditions and Co-morbidities  Goal: Patient's chronic conditions and co-morbidity symptoms are monitored and maintained or improved  7/21/2025 1113 by Magdalena Lowe RN  Outcome: Progressing  Flowsheets (Taken 7/21/2025 0818)  Care Plan - Patient's Chronic Conditions and Co-Morbidity Symptoms are Monitored and Maintained or Improved: Monitor and assess patient's chronic conditions and comorbid symptoms for stability, deterioration, or improvement  7/20/2025 2153 by Anders Moy RN  Outcome: Progressing     Problem: Discharge Planning  Goal: Discharge to home or other facility with appropriate resources  7/21/2025 1113 by Magdalena Lowe RN  Outcome: Progressing  Flowsheets (Taken 7/21/2025 0818)  Discharge to home or other facility with appropriate resources: Identify barriers to discharge with patient and caregiver  7/20/2025 2153 by Anders Moy RN  Outcome: Progressing     Problem: Pain  Goal: Verbalizes/displays adequate comfort level or baseline comfort level  7/21/2025 1113 by Magdalena Lowe RN  Outcome: Progressing  7/20/2025 2153 by Anders Moy RN  Outcome: Progressing     Problem: Safety - Adult  Goal: Free from fall injury  7/21/2025 1113 by Magdalena Lowe RN  Outcome: Progressing  7/20/2025 2153 by Anders Moy RN  Outcome: Progressing     Problem: Skin/Tissue Integrity  Goal: Skin integrity remains intact  Description: 1.  Monitor for areas of redness and/or skin breakdown2.  Assess vascular access sites hourly3.  Every 4-6 hours minimum:  Change oxygen saturation probe site4.  Every 4-6 hours:  If on nasal continuous positive airway pressure, respiratory therapy assess nares and determine need for appliance change or resting period  7/21/2025 1113 by Magdalena Lowe RN  Outcome: Progressing  Flowsheets (Taken 7/21/2025 0818)  Skin Integrity Remains Intact: Monitor for areas of redness and/or skin breakdown  7/20/2025 2153 by Anders Moy RN  Outcome:

## 2025-07-21 NOTE — PROGRESS NOTES
nephrology ([facility]) Progress Note    Patient:  Daljit Elizondo  YOB: 1960  Date of Service: 7/21/2025  MRN: [id number]   Acct: [id number]   Primary Care Physician: Jessie Helms APRN - CNP  Advance Directive: Full Code  Admit Date: 5/14/2025       Hospital Day: 68  Referring Provider: Cody Howell MD    Patient independently seen and examined, Chart, Consults, Notes, Operative notes, Labs, Cardiology, and Radiology studies reviewed as available.    Chief complaint: Abnormal labs.    Subjective:  Daljit Elizondo is a 65 y.o. male for whom we were consulted for evaluation and treatment of acute kidney injury.  Patient denies any history of chronic kidney disease.  He has been in the hospital for the last month, poor historian and has never seen nephrology in the past.  Patient has severe abdominal obesity, hypertension, sleep apnea, type 2 diabetes, chronic diastolic CHF, sleep apnea.  Presented with increasing shortness of breath/dyspnea on exertion.  Hospital course remarkable for treatment with intravenous diuretics for the treatment of volume overload, CHF exacerbation.  His serum creatinine was 0.9 mg with estimated GFR more than 90 mL on admission.  Patient has significant drop in GFR and nephrology is consulted.  He was initially treated with IV fluid with minimal to no improvement of renal function.  Patient initially agreed to proceed with dialysis.  However he was too heavy for operating room table.  Meanwhile his renal function improved as well.  He has borderline GFR    This morning patient feels well.  He is hoping to go to SNF for long-term rehabilitation before he goes home.  His serum creatinine remained stable/borderline GFR    Allergies:  Penicillamine, Silvadene [silver sulfadiazine], Aerohist [chlorpheniramine-methscop er], Cephalosporins, Hemp seed oil, Neosporin [neomycin-polymyxin-gramicidin], Penicillins, and Zinc    Medicines:  Current  Facility-Administered Medications   Medication Dose Route Frequency Provider Last Rate Last Admin    bumetanide (BUMEX) tablet 1 mg  1 mg Oral Once per day on Monday Wednesday Friday Rhys Pacheco MD   1 mg at 07/21/25 0818    saliva substitute (BIOTENE/MOUTH KOTE) liquid   Oral Daily Rhys Pacheco MD   Given at 07/18/25 2244    lactulose (CHRONULAC) 10 GM/15ML solution 20 g  20 g Oral Daily Rhys Pacheco MD   20 g at 07/20/25 0815    pseudoephedrine (SUDAFED) tablet 60 mg  60 mg Oral Q6H PRN Rhys Pacheco MD        povidone-iodine (BETADINE) 10 % ointment   Topical Daily Rhys Pacheco MD   Given at 07/21/25 0819    [Held by provider] sodium bicarbonate tablet 325 mg  325 mg Oral Daily Rhys Pacheco MD   325 mg at 07/06/25 0801    loperamide (IMODIUM) capsule 2 mg  2 mg Oral 4x Daily PRN Cody Howell MD   2 mg at 06/16/25 1738    insulin lispro (HUMALOG,ADMELOG) injection vial 0-4 Units  0-4 Units SubCUTAneous 4x Daily AC & HS Cody Howell MD        [Held by provider] polyethylene glycol (GLYCOLAX) packet 17 g  17 g Oral BID Juan Nicolas APRN - CNP   17 g at 06/09/25 0923    metoprolol succinate (TOPROL XL) extended release tablet 25 mg  25 mg Oral BID Rhys Pacheco MD   25 mg at 07/21/25 0818    ipratropium (ATROVENT) 0.02 % nebulizer solution 0.5 mg  0.5 mg Nebulization 4x Daily RT Ashleigh Jamil APRN   0.5 mg at 07/21/25 1115    [Held by provider] senna (SENOKOT) tablet 17.2 mg  2 tablet Oral BID Ashleigh Jamil APRN   17.2 mg at 06/10/25 0911    montelukast (SINGULAIR) tablet 10 mg  10 mg Oral Nightly Ashleigh Jamil APRN   10 mg at 07/20/25 2111    sodium chloride (OCEAN, BABY AYR) 0.65 % nasal spray 1 spray  1 spray Each Nostril Q2H PRN Ashleigh Jamil APRN   1 spray at 07/15/25 0947    tetrahydrozoline 0.05 % ophthalmic solution 1 drop  1 drop Both Eyes TID PRN Ashleigh Jamil APRN        lubrifresh P.M. (artificial tears) ophthalmic

## 2025-07-21 NOTE — PLAN OF CARE
Problem: Chronic Conditions and Co-morbidities  Goal: Patient's chronic conditions and co-morbidity symptoms are monitored and maintained or improved  7/20/2025 2153 by Anders Moy RN  Outcome: Progressing  7/20/2025 1526 by Sherrell Cui RN  Outcome: Progressing     Problem: Discharge Planning  Goal: Discharge to home or other facility with appropriate resources  7/20/2025 2153 by Anders Moy RN  Outcome: Progressing  7/20/2025 1526 by Sherrell Cui RN  Outcome: Progressing     Problem: Pain  Goal: Verbalizes/displays adequate comfort level or baseline comfort level  7/20/2025 2153 by Anders Moy RN  Outcome: Progressing  7/20/2025 1526 by Sherrell Cui RN  Outcome: Progressing     Problem: Safety - Adult  Goal: Free from fall injury  7/20/2025 2153 by Anders Moy RN  Outcome: Progressing  7/20/2025 1526 by Sherrell Cui RN  Outcome: Progressing     Problem: ABCDS Injury Assessment  Goal: Absence of physical injury  7/20/2025 2153 by Anders Moy RN  Outcome: Progressing  7/20/2025 1526 by Sherrell Cui RN  Outcome: Progressing     Problem: Skin/Tissue Integrity  Goal: Skin integrity remains intact  Description: 1.  Monitor for areas of redness and/or skin breakdown2.  Assess vascular access sites hourly3.  Every 4-6 hours minimum:  Change oxygen saturation probe site4.  Every 4-6 hours:  If on nasal continuous positive airway pressure, respiratory therapy assess nares and determine need for appliance change or resting period  7/20/2025 2153 by Anders Moy RN  Outcome: Progressing  7/20/2025 1526 by Sherrell Cui RN  Outcome: Progressing     Problem: Nutrition Deficit:  Goal: Optimize nutritional status  7/20/2025 2153 by Anders Moy RN  Outcome: Progressing  7/20/2025 1526 by Sherrell Cui RN  Outcome: Progressing     Problem: Neurosensory - Adult  Goal: Achieves stable or improved neurological status  7/20/2025 2153 by Anders Moy RN  Outcome: Progressing  7/20/2025 1526 by Sherrell Cui  and vomiting  7/20/2025 2153 by Anders Moy RN  Outcome: Progressing  7/20/2025 1526 by Sherrell Cui RN  Outcome: Progressing  Goal: Maintains or returns to baseline bowel function  7/20/2025 2153 by Anders Moy RN  Outcome: Progressing  7/20/2025 1526 by Sherrell Cui RN  Outcome: Progressing  Goal: Maintains adequate nutritional intake  7/20/2025 2153 by Anders Moy RN  Outcome: Progressing  7/20/2025 1526 by Sherrell Cui RN  Outcome: Progressing     Problem: Genitourinary - Adult  Goal: Absence of urinary retention  7/20/2025 2153 by Anders Moy RN  Outcome: Progressing  7/20/2025 1526 by Sherrell Cui RN  Outcome: Progressing     Problem: Infection - Adult  Goal: Absence of infection at discharge  7/20/2025 2153 by Anders Moy RN  Outcome: Progressing  7/20/2025 1526 by Sherrell Cui RN  Outcome: Progressing  Goal: Absence of infection during hospitalization  7/20/2025 2153 by Anders Moy RN  Outcome: Progressing  7/20/2025 1526 by Sherrell Cui RN  Outcome: Progressing  Goal: Absence of fever/infection during anticipated neutropenic period  7/20/2025 2153 by Anders Moy RN  Outcome: Progressing  7/20/2025 1526 by Sherrell Cui RN  Outcome: Progressing     Problem: Metabolic/Fluid and Electrolytes - Adult  Goal: Electrolytes maintained within normal limits  7/20/2025 2153 by Anders Moy RN  Outcome: Progressing  7/20/2025 1526 by Sherrell Cui RN  Outcome: Progressing  Goal: Hemodynamic stability and optimal renal function maintained  7/20/2025 2153 by Anders Moy RN  Outcome: Progressing  7/20/2025 1526 by Sherrell Cui RN  Outcome: Progressing  Goal: Glucose maintained within prescribed range  7/20/2025 2153 by Anders Moy RN  Outcome: Progressing  7/20/2025 1526 by Sherrell Cui RN  Outcome: Progressing     Problem: Hematologic - Adult  Goal: Maintains hematologic stability  7/20/2025 2153 by Anders Moy RN  Outcome: Progressing  7/20/2025 1526 by Sherrell Cui, KATARZYNA  Outcome:

## 2025-07-21 NOTE — PROGRESS NOTES
[facility]    Patient:  Daljit Elizondo  YOB: 1960  Date of Service: 7/21/2025  MRN: [id number]  Acct: [id number]   Primary Care Physician: Jessie Helms APRN - CNP  Advance Directive: Full Code  Admit Date: 5/14/2025       Hospital Day: 68     NOTE: Portions of this note have been copied forward, however, changes made to reflect the most current clinical status of this patient.    CHIEF COMPLAINT:     Chief Complaint   Patient presents with    Shortness of Breath     Since Jan 1    Scrotal Pain       7/21/2025 8:08 AM  Subjective / Interval History:   07/21/2025  Patient seen and examined this a.m.   No new complaints.    No acute changes or acute overnight event reported.   Laying comfortably in bed in no apparent acute distress.    Denies any acute complaints or distress.   Endorses overall improvement.        Review of Systems:   Review of Systems  ROS: 14 point review of systems is negative except as specifically addressed above.    ADULT DIET; Regular; 3 carb choices (45 gm/meal); Low Sodium (2 gm); SEND RICE KRISPIES AT BREAKFAST. NO OATMEAL. Likes salads    Intake/Output Summary (Last 24 hours) at 7/21/2025 0808  Last data filed at 7/20/2025 1812  Gross per 24 hour   Intake 240 ml   Output 300 ml   Net -60 ml         Medications:   sodium chloride 15 mL/hr at 07/11/25 1953    dextrose       Current Facility-Administered Medications   Medication Dose Route Frequency Provider Last Rate Last Admin    bumetanide (BUMEX) tablet 1 mg  1 mg Oral Once per day on Monday Wednesday Friday Rhys Pacheco MD   1 mg at 07/18/25 0900    saliva substitute (BIOTENE/MOUTH KOTE) liquid   Oral Daily Rhys Pacheco MD   Given at 07/18/25 2244    lactulose (CHRONULAC) 10 GM/15ML solution 20 g  20 g Oral Daily Rhys Pacheco MD   20 g at 07/20/25 0815    pseudoephedrine (SUDAFED) tablet 60 mg  60 mg Oral Q6H PRN Rhys Pacheco MD        povidone-iodine (BETADINE) 10 % ointment  APRN - CNP   Stopped at 07/13/25 0632    sulfur hexafluoride microspheres (LUMASON) 60.7-25 MG injection 2 mL  2 mL IntraVENous ONCE PRN Renay Thorne APRN - CNP        insulin glargine (LANTUS) injection vial 30 Units  30 Units SubCUTAneous Nightly Renay Thorne APRN - CNP   30 Units at 07/20/25 2111    glucose chewable tablet 16 g  4 tablet Oral PRN Renay Thorne APRN - CNP        dextrose bolus 10% 125 mL  125 mL IntraVENous PRN Renay Thorne APRN - CNP   Stopped at 06/28/25 0806    Or    dextrose bolus 10% 250 mL  250 mL IntraVENous PRN Renay Thorne APRN - CNP        glucagon injection 1 mg  1 mg IntraMUSCular PRN Renay Thorne APRN - CNP        dextrose 10 % infusion   IntraVENous Continuous PRN Renay Thorne APRN - CNP             sodium chloride 15 mL/hr at 07/11/25 1953    dextrose        bumetanide  1 mg Oral Once per day on Monday Wednesday Friday    saliva substitute   Oral Daily    lactulose  20 g Oral Daily    povidone-iodine   Topical Daily    [Held by provider] sodium bicarbonate  325 mg Oral Daily    insulin lispro  0-4 Units SubCUTAneous 4x Daily AC & HS    [Held by provider] polyethylene glycol  17 g Oral BID    metoprolol succinate  25 mg Oral BID    ipratropium  0.5 mg Nebulization 4x Daily RT    [Held by provider] senna  2 tablet Oral BID    montelukast  10 mg Oral Nightly    cetirizine  10 mg Oral Daily    guaiFENesin  600 mg Oral BID    miconazole   Topical BID    allopurinol  300 mg Oral Daily    aspirin EC  81 mg Oral Daily    pantoprazole  40 mg Oral QAM AC    pravastatin  20 mg Oral Nightly    sodium chloride flush  5-40 mL IntraVENous 2 times per day    enoxaparin  60 mg SubCUTAneous BID    insulin glargine  30 Units SubCUTAneous Nightly     pseudoephedrine, loperamide, sodium chloride, tetrahydrozoline, artificial tears, albuterol, sodium chloride flush, sodium chloride, ondansetron **OR** ondansetron, polyethylene glycol, acetaminophen **OR** acetaminophen,

## 2025-07-22 LAB
ALBUMIN SERPL-MCNC: 2.7 G/DL (ref 3.5–5.2)
ALP SERPL-CCNC: 90 U/L (ref 40–129)
ALT SERPL-CCNC: 6 U/L (ref 10–50)
ANION GAP SERPL CALCULATED.3IONS-SCNC: 9 MMOL/L (ref 8–16)
AST SERPL-CCNC: 19 U/L (ref 10–50)
BILIRUB SERPL-MCNC: 0.6 MG/DL (ref 0.2–1.2)
BUN SERPL-MCNC: 49 MG/DL (ref 8–23)
CALCIUM SERPL-MCNC: 9 MG/DL (ref 8.8–10.2)
CHLORIDE SERPL-SCNC: 104 MMOL/L (ref 98–107)
CO2 SERPL-SCNC: 28 MMOL/L (ref 22–29)
CREAT SERPL-MCNC: 3.7 MG/DL (ref 0.7–1.2)
ERYTHROCYTE [DISTWIDTH] IN BLOOD BY AUTOMATED COUNT: 15.1 % (ref 11.5–14.5)
GLUCOSE BLD-MCNC: 107 MG/DL (ref 70–99)
GLUCOSE BLD-MCNC: 128 MG/DL (ref 70–99)
GLUCOSE BLD-MCNC: 167 MG/DL (ref 70–99)
GLUCOSE BLD-MCNC: 93 MG/DL (ref 70–99)
GLUCOSE SERPL-MCNC: 115 MG/DL (ref 70–99)
HCT VFR BLD AUTO: 33.8 % (ref 42–52)
HGB BLD-MCNC: 10.3 G/DL (ref 14–18)
MCH RBC QN AUTO: 29.6 PG (ref 27–31)
MCHC RBC AUTO-ENTMCNC: 30.5 G/DL (ref 33–37)
MCV RBC AUTO: 97.1 FL (ref 80–94)
PERFORMED ON: ABNORMAL
PERFORMED ON: NORMAL
PLATELET # BLD AUTO: 181 K/UL (ref 130–400)
PMV BLD AUTO: 12.5 FL (ref 9.4–12.4)
POTASSIUM SERPL-SCNC: 4.4 MMOL/L (ref 3.5–5)
POTASSIUM SERPL-SCNC: 4.4 MMOL/L (ref 3.5–5.1)
PROT SERPL-MCNC: 5.6 G/DL (ref 6.4–8.3)
RBC # BLD AUTO: 3.48 M/UL (ref 4.7–6.1)
SODIUM SERPL-SCNC: 141 MMOL/L (ref 136–145)
WBC # BLD AUTO: 7.5 K/UL (ref 4.8–10.8)

## 2025-07-22 PROCEDURE — 6370000000 HC RX 637 (ALT 250 FOR IP)

## 2025-07-22 PROCEDURE — 2500000003 HC RX 250 WO HCPCS

## 2025-07-22 PROCEDURE — 94761 N-INVAS EAR/PLS OXIMETRY MLT: CPT

## 2025-07-22 PROCEDURE — 80053 COMPREHEN METABOLIC PANEL: CPT

## 2025-07-22 PROCEDURE — 2700000000 HC OXYGEN THERAPY PER DAY

## 2025-07-22 PROCEDURE — 6360000002 HC RX W HCPCS: Performed by: NURSE PRACTITIONER

## 2025-07-22 PROCEDURE — 6360000002 HC RX W HCPCS

## 2025-07-22 PROCEDURE — 6370000000 HC RX 637 (ALT 250 FOR IP): Performed by: NURSE PRACTITIONER

## 2025-07-22 PROCEDURE — 82962 GLUCOSE BLOOD TEST: CPT

## 2025-07-22 PROCEDURE — 6370000000 HC RX 637 (ALT 250 FOR IP): Performed by: STUDENT IN AN ORGANIZED HEALTH CARE EDUCATION/TRAINING PROGRAM

## 2025-07-22 PROCEDURE — 94640 AIRWAY INHALATION TREATMENT: CPT

## 2025-07-22 PROCEDURE — 94150 VITAL CAPACITY TEST: CPT

## 2025-07-22 PROCEDURE — 1200000000 HC SEMI PRIVATE

## 2025-07-22 PROCEDURE — 36415 COLL VENOUS BLD VENIPUNCTURE: CPT

## 2025-07-22 PROCEDURE — 85027 COMPLETE CBC AUTOMATED: CPT

## 2025-07-22 PROCEDURE — 94660 CPAP INITIATION&MGMT: CPT

## 2025-07-22 RX ADMIN — PRAVASTATIN SODIUM 20 MG: 20 TABLET ORAL at 21:33

## 2025-07-22 RX ADMIN — IPRATROPIUM BROMIDE 0.5 MG: 0.5 SOLUTION RESPIRATORY (INHALATION) at 10:32

## 2025-07-22 RX ADMIN — METOPROLOL SUCCINATE 25 MG: 25 TABLET, FILM COATED, EXTENDED RELEASE ORAL at 21:33

## 2025-07-22 RX ADMIN — ALLOPURINOL 300 MG: 300 TABLET ORAL at 08:04

## 2025-07-22 RX ADMIN — GUAIFENESIN 600 MG: 600 TABLET, EXTENDED RELEASE ORAL at 08:04

## 2025-07-22 RX ADMIN — SODIUM CHLORIDE, PRESERVATIVE FREE 10 ML: 5 INJECTION INTRAVENOUS at 08:04

## 2025-07-22 RX ADMIN — ENOXAPARIN SODIUM 60 MG: 100 INJECTION SUBCUTANEOUS at 08:03

## 2025-07-22 RX ADMIN — IPRATROPIUM BROMIDE 0.5 MG: 0.5 SOLUTION RESPIRATORY (INHALATION) at 07:32

## 2025-07-22 RX ADMIN — MICONAZOLE NITRATE: 2 POWDER TOPICAL at 08:07

## 2025-07-22 RX ADMIN — METOPROLOL SUCCINATE 25 MG: 25 TABLET, FILM COATED, EXTENDED RELEASE ORAL at 08:04

## 2025-07-22 RX ADMIN — ASPIRIN 81 MG: 81 TABLET, COATED ORAL at 08:04

## 2025-07-22 RX ADMIN — INSULIN GLARGINE 30 UNITS: 100 INJECTION, SOLUTION SUBCUTANEOUS at 21:33

## 2025-07-22 RX ADMIN — GUAIFENESIN 600 MG: 600 TABLET, EXTENDED RELEASE ORAL at 21:33

## 2025-07-22 RX ADMIN — IPRATROPIUM BROMIDE 0.5 MG: 0.5 SOLUTION RESPIRATORY (INHALATION) at 18:26

## 2025-07-22 RX ADMIN — CETIRIZINE HYDROCHLORIDE 10 MG: 10 TABLET ORAL at 08:04

## 2025-07-22 RX ADMIN — SODIUM CHLORIDE, PRESERVATIVE FREE 10 ML: 5 INJECTION INTRAVENOUS at 21:30

## 2025-07-22 RX ADMIN — PANTOPRAZOLE SODIUM 40 MG: 40 TABLET, DELAYED RELEASE ORAL at 06:19

## 2025-07-22 RX ADMIN — MICONAZOLE NITRATE: 2 POWDER TOPICAL at 21:30

## 2025-07-22 RX ADMIN — MONTELUKAST SODIUM 10 MG: 10 TABLET, FILM COATED ORAL at 21:32

## 2025-07-22 RX ADMIN — IPRATROPIUM BROMIDE 0.5 MG: 0.5 SOLUTION RESPIRATORY (INHALATION) at 15:00

## 2025-07-22 RX ADMIN — ENOXAPARIN SODIUM 60 MG: 100 INJECTION SUBCUTANEOUS at 21:33

## 2025-07-22 RX ADMIN — POVIDONE-IODINE: 10 OINTMENT TOPICAL at 08:07

## 2025-07-22 NOTE — CARE COORDINATION
Spoke with pt in room, along with pts sister via telephone. Updated them on the latest dc plan.  [facility] is unable to accept. [facility] is still following and will hopefully have an answer soon on if they are able to rent a lift for the facility.   Also discussed with pt the possibility he will need to call and look for private paid caregivers for home as well. Will give him a list of sitters/agencies. Will cont to follow.  Electronically signed by Shira Tomlin RN on 7/22/2025 at 3:31 PM

## 2025-07-22 NOTE — PROGRESS NOTES
[facility]    Patient:  Daljit Elizondo  YOB: 1960  Date of Service: 7/22/2025  MRN: [id number]  Acct: [id number]   Primary Care Physician: Jessie Helms APRN - CNP  Advance Directive: Full Code  Admit Date: 5/14/2025       Hospital Day: 69     NOTE: Portions of this note have been copied forward, however, changes made to reflect the most current clinical status of this patient.    CHIEF COMPLAINT:     Chief Complaint   Patient presents with    Shortness of Breath     Since Jan 1    Scrotal Pain       7/22/2025 7:39 AM  Subjective / Interval History:   07/22/2025  Patient seen and examined this a.m.   No new complaints.    No acute changes or acute overnight event reported.   Laying comfortably in bed in no apparent acute distress.    Denies any acute complaints or distress.   Creatinine trended up from 3.4 to 3.7 this a.m.     Review of Systems:   Review of Systems  ROS: 14 point review of systems is negative except as specifically addressed above.    ADULT DIET; Regular; 3 carb choices (45 gm/meal); Low Sodium (2 gm); SEND RICE KRISPIES AT BREAKFAST. NO OATMEAL. Likes salads    Intake/Output Summary (Last 24 hours) at 7/22/2025 0739  Last data filed at 7/22/2025 0443  Gross per 24 hour   Intake 680 ml   Output 2150 ml   Net -1470 ml         Medications:   sodium chloride 15 mL/hr at 07/11/25 1953    dextrose       Current Facility-Administered Medications   Medication Dose Route Frequency Provider Last Rate Last Admin    bumetanide (BUMEX) tablet 1 mg  1 mg Oral Once per day on Monday Wednesday Friday Rhys Pacheco MD   1 mg at 07/21/25 0818    saliva substitute (BIOTENE/MOUTH KOTE) liquid   Oral Daily Rhys Pacheco MD   Given at 07/21/25 2124    lactulose (CHRONULAC) 10 GM/15ML solution 20 g  20 g Oral Daily Rhys Pacheco MD   20 g at 07/20/25 0815    pseudoephedrine (SUDAFED) tablet 60 mg  60 mg Oral Q6H PRN Rhys Pacheco MD        povidone-iodine

## 2025-07-22 NOTE — PROGRESS NOTES
nephrology ([facility]) Progress Note    Patient:  Daljit Elizondo  YOB: 1960  Date of Service: 7/22/2025  MRN: [id number]   Acct: [id number]   Primary Care Physician: Jessie Helms APRN - CNP  Advance Directive: Full Code  Admit Date: 5/14/2025       Hospital Day: 69  Referring Provider: Cody Howell MD    Patient independently seen and examined, Chart, Consults, Notes, Operative notes, Labs, Cardiology, and Radiology studies reviewed as available.    Chief complaint: Abnormal labs.    Subjective:  Daljit Elizondo is a 65 y.o. male for whom we were consulted for evaluation and treatment of acute kidney injury.  Patient denies any history of chronic kidney disease.  He has been in the hospital for the last month, poor historian and has never seen nephrology in the past.  Patient has severe abdominal obesity, hypertension, sleep apnea, type 2 diabetes, chronic diastolic CHF, sleep apnea.  Presented with increasing shortness of breath/dyspnea on exertion.  Hospital course remarkable for treatment with intravenous diuretics for the treatment of volume overload, CHF exacerbation.  His serum creatinine was 0.9 mg with estimated GFR more than 90 mL on admission.  Patient has significant drop in GFR and nephrology is consulted.  He was initially treated with IV fluid with minimal to no improvement of renal function.  Patient initially agreed to proceed with dialysis.  However he was too heavy for operating room table.  Meanwhile his renal function improved as well.  He has borderline GFR    This morning patient remained bedridden.  He is hoping to go to SNF for long-term rehabilitation.  His renal function is started worsening again.    Allergies:  Penicillamine, Silvadene [silver sulfadiazine], Aerohist [chlorpheniramine-methscop er], Cephalosporins, Hemp seed oil, Neosporin [neomycin-polymyxin-gramicidin], Penicillins, and Zinc    Medicines:  Current Facility-Administered

## 2025-07-22 NOTE — PLAN OF CARE
Problem: Chronic Conditions and Co-morbidities  Goal: Patient's chronic conditions and co-morbidity symptoms are monitored and maintained or improved  7/22/2025 0924 by Magdalena Lowe RN  Outcome: Progressing  Flowsheets (Taken 7/22/2025 0804)  Care Plan - Patient's Chronic Conditions and Co-Morbidity Symptoms are Monitored and Maintained or Improved: Monitor and assess patient's chronic conditions and comorbid symptoms for stability, deterioration, or improvement  7/21/2025 2306 by Amie Hernandez RN  Outcome: Progressing     Problem: Discharge Planning  Goal: Discharge to home or other facility with appropriate resources  7/22/2025 0924 by Magdalena Lowe RN  Outcome: Progressing  Flowsheets (Taken 7/22/2025 0804)  Discharge to home or other facility with appropriate resources: Identify barriers to discharge with patient and caregiver  7/21/2025 2306 by Amie Hernandez RN  Outcome: Progressing     Problem: Pain  Goal: Verbalizes/displays adequate comfort level or baseline comfort level  7/22/2025 0924 by Magdalena Lowe RN  Outcome: Progressing  7/21/2025 2306 by Amie Hernandez RN  Outcome: Progressing     Problem: Safety - Adult  Goal: Free from fall injury  7/22/2025 0924 by Magdalena Lowe RN  Outcome: Progressing  7/21/2025 2306 by Amie Hernandez RN  Outcome: Progressing     Problem: Skin/Tissue Integrity  Goal: Skin integrity remains intact  Description: 1.  Monitor for areas of redness and/or skin breakdown2.  Assess vascular access sites hourly3.  Every 4-6 hours minimum:  Change oxygen saturation probe site4.  Every 4-6 hours:  If on nasal continuous positive airway pressure, respiratory therapy assess nares and determine need for appliance change or resting period  7/22/2025 0924 by Magdalena Lowe RN  Outcome: Progressing  Flowsheets (Taken 7/22/2025 0804)  Skin Integrity Remains Intact: Monitor for areas of redness and/or skin breakdown  7/21/2025 2306 by Amie Hernandez RN  Outcome: Progressing

## 2025-07-22 NOTE — PLAN OF CARE
Problem: Chronic Conditions and Co-morbidities  Goal: Patient's chronic conditions and co-morbidity symptoms are monitored and maintained or improved  7/21/2025 2306 by Amie Hernandez RN  Outcome: Progressing  7/21/2025 1113 by Magdalena Lowe RN  Outcome: Progressing  Flowsheets (Taken 7/21/2025 0818)  Care Plan - Patient's Chronic Conditions and Co-Morbidity Symptoms are Monitored and Maintained or Improved: Monitor and assess patient's chronic conditions and comorbid symptoms for stability, deterioration, or improvement     Problem: Discharge Planning  Goal: Discharge to home or other facility with appropriate resources  7/21/2025 2306 by Amie Hernandez RN  Outcome: Progressing  7/21/2025 1113 by Magdalena Lowe RN  Outcome: Progressing  Flowsheets (Taken 7/21/2025 0818)  Discharge to home or other facility with appropriate resources: Identify barriers to discharge with patient and caregiver     Problem: Pain  Goal: Verbalizes/displays adequate comfort level or baseline comfort level  7/21/2025 2306 by Amie Hernandez RN  Outcome: Progressing  7/21/2025 1113 by Magdalena Lowe RN  Outcome: Progressing     Problem: Safety - Adult  Goal: Free from fall injury  7/21/2025 2306 by Amie Hernandez RN  Outcome: Progressing  7/21/2025 1113 by Magdalena Lowe RN  Outcome: Progressing     Problem: ABCDS Injury Assessment  Goal: Absence of physical injury  7/21/2025 2306 by Amie Hernandez RN  Outcome: Progressing  7/21/2025 1113 by Magdalena Lowe RN  Outcome: Progressing     Problem: Skin/Tissue Integrity  Goal: Skin integrity remains intact  Description: 1.  Monitor for areas of redness and/or skin breakdown2.  Assess vascular access sites hourly3.  Every 4-6 hours minimum:  Change oxygen saturation probe site4.  Every 4-6 hours:  If on nasal continuous positive airway pressure, respiratory therapy assess nares and determine need for appliance change or resting period  7/21/2025 2306 by Amie Hernandez RN  Outcome:

## 2025-07-23 LAB
ALBUMIN SERPL-MCNC: 2.5 G/DL (ref 3.5–5.2)
ALP SERPL-CCNC: 84 U/L (ref 40–129)
ALT SERPL-CCNC: 6 U/L (ref 10–50)
ANION GAP SERPL CALCULATED.3IONS-SCNC: 9 MMOL/L (ref 8–16)
AST SERPL-CCNC: 17 U/L (ref 10–50)
BILIRUB SERPL-MCNC: 0.7 MG/DL (ref 0.2–1.2)
BUN SERPL-MCNC: 49 MG/DL (ref 8–23)
CALCIUM SERPL-MCNC: 8.8 MG/DL (ref 8.8–10.2)
CHLORIDE SERPL-SCNC: 103 MMOL/L (ref 98–107)
CO2 SERPL-SCNC: 27 MMOL/L (ref 22–29)
CREAT SERPL-MCNC: 3.7 MG/DL (ref 0.7–1.2)
ERYTHROCYTE [DISTWIDTH] IN BLOOD BY AUTOMATED COUNT: 14.7 % (ref 11.5–14.5)
GLUCOSE BLD-MCNC: 158 MG/DL (ref 70–99)
GLUCOSE BLD-MCNC: 169 MG/DL (ref 70–99)
GLUCOSE BLD-MCNC: 70 MG/DL (ref 70–99)
GLUCOSE BLD-MCNC: 96 MG/DL (ref 70–99)
GLUCOSE SERPL-MCNC: 72 MG/DL (ref 70–99)
HCT VFR BLD AUTO: 30.7 % (ref 42–52)
HGB BLD-MCNC: 9.5 G/DL (ref 14–18)
MCH RBC QN AUTO: 29.4 PG (ref 27–31)
MCHC RBC AUTO-ENTMCNC: 30.9 G/DL (ref 33–37)
MCV RBC AUTO: 95 FL (ref 80–94)
PERFORMED ON: ABNORMAL
PERFORMED ON: ABNORMAL
PERFORMED ON: NORMAL
PERFORMED ON: NORMAL
PLATELET # BLD AUTO: 167 K/UL (ref 130–400)
PMV BLD AUTO: 11.8 FL (ref 9.4–12.4)
POTASSIUM SERPL-SCNC: 4.1 MMOL/L (ref 3.5–5.1)
PROT SERPL-MCNC: 5.3 G/DL (ref 6.4–8.3)
RBC # BLD AUTO: 3.23 M/UL (ref 4.7–6.1)
SODIUM SERPL-SCNC: 139 MMOL/L (ref 136–145)
WBC # BLD AUTO: 7.1 K/UL (ref 4.8–10.8)

## 2025-07-23 PROCEDURE — 1200000000 HC SEMI PRIVATE

## 2025-07-23 PROCEDURE — 94761 N-INVAS EAR/PLS OXIMETRY MLT: CPT

## 2025-07-23 PROCEDURE — 6360000002 HC RX W HCPCS

## 2025-07-23 PROCEDURE — 6360000002 HC RX W HCPCS: Performed by: NURSE PRACTITIONER

## 2025-07-23 PROCEDURE — 94660 CPAP INITIATION&MGMT: CPT

## 2025-07-23 PROCEDURE — 6370000000 HC RX 637 (ALT 250 FOR IP)

## 2025-07-23 PROCEDURE — 6370000000 HC RX 637 (ALT 250 FOR IP): Performed by: NURSE PRACTITIONER

## 2025-07-23 PROCEDURE — 82962 GLUCOSE BLOOD TEST: CPT

## 2025-07-23 PROCEDURE — 94150 VITAL CAPACITY TEST: CPT

## 2025-07-23 PROCEDURE — 80053 COMPREHEN METABOLIC PANEL: CPT

## 2025-07-23 PROCEDURE — 6370000000 HC RX 637 (ALT 250 FOR IP): Performed by: STUDENT IN AN ORGANIZED HEALTH CARE EDUCATION/TRAINING PROGRAM

## 2025-07-23 PROCEDURE — 2500000003 HC RX 250 WO HCPCS

## 2025-07-23 PROCEDURE — 36415 COLL VENOUS BLD VENIPUNCTURE: CPT

## 2025-07-23 PROCEDURE — 85027 COMPLETE CBC AUTOMATED: CPT

## 2025-07-23 PROCEDURE — 2700000000 HC OXYGEN THERAPY PER DAY

## 2025-07-23 PROCEDURE — 94640 AIRWAY INHALATION TREATMENT: CPT

## 2025-07-23 RX ADMIN — GUAIFENESIN 600 MG: 600 TABLET, EXTENDED RELEASE ORAL at 20:47

## 2025-07-23 RX ADMIN — MICONAZOLE NITRATE: 2 POWDER TOPICAL at 20:47

## 2025-07-23 RX ADMIN — PANTOPRAZOLE SODIUM 40 MG: 40 TABLET, DELAYED RELEASE ORAL at 06:07

## 2025-07-23 RX ADMIN — GUAIFENESIN 600 MG: 600 TABLET, EXTENDED RELEASE ORAL at 08:42

## 2025-07-23 RX ADMIN — METOPROLOL SUCCINATE 25 MG: 25 TABLET, FILM COATED, EXTENDED RELEASE ORAL at 20:47

## 2025-07-23 RX ADMIN — IPRATROPIUM BROMIDE 0.5 MG: 0.5 SOLUTION RESPIRATORY (INHALATION) at 18:47

## 2025-07-23 RX ADMIN — ENOXAPARIN SODIUM 60 MG: 100 INJECTION SUBCUTANEOUS at 20:47

## 2025-07-23 RX ADMIN — ALLOPURINOL 300 MG: 300 TABLET ORAL at 08:42

## 2025-07-23 RX ADMIN — BUMETANIDE 1 MG: 1 TABLET ORAL at 08:42

## 2025-07-23 RX ADMIN — ENOXAPARIN SODIUM 60 MG: 100 INJECTION SUBCUTANEOUS at 08:42

## 2025-07-23 RX ADMIN — SODIUM CHLORIDE, PRESERVATIVE FREE 10 ML: 5 INJECTION INTRAVENOUS at 21:49

## 2025-07-23 RX ADMIN — PRAVASTATIN SODIUM 20 MG: 20 TABLET ORAL at 20:47

## 2025-07-23 RX ADMIN — MICONAZOLE NITRATE: 2 POWDER TOPICAL at 08:43

## 2025-07-23 RX ADMIN — MONTELUKAST SODIUM 10 MG: 10 TABLET, FILM COATED ORAL at 20:47

## 2025-07-23 RX ADMIN — IPRATROPIUM BROMIDE 0.5 MG: 0.5 SOLUTION RESPIRATORY (INHALATION) at 07:15

## 2025-07-23 RX ADMIN — IPRATROPIUM BROMIDE 0.5 MG: 0.5 SOLUTION RESPIRATORY (INHALATION) at 10:45

## 2025-07-23 RX ADMIN — ASPIRIN 81 MG: 81 TABLET, COATED ORAL at 08:42

## 2025-07-23 RX ADMIN — METOPROLOL SUCCINATE 25 MG: 25 TABLET, FILM COATED, EXTENDED RELEASE ORAL at 08:42

## 2025-07-23 RX ADMIN — POVIDONE-IODINE: 10 OINTMENT TOPICAL at 08:43

## 2025-07-23 RX ADMIN — SODIUM CHLORIDE, PRESERVATIVE FREE 10 ML: 5 INJECTION INTRAVENOUS at 08:42

## 2025-07-23 RX ADMIN — IPRATROPIUM BROMIDE 0.5 MG: 0.5 SOLUTION RESPIRATORY (INHALATION) at 14:42

## 2025-07-23 RX ADMIN — INSULIN GLARGINE 30 UNITS: 100 INJECTION, SOLUTION SUBCUTANEOUS at 20:47

## 2025-07-23 RX ADMIN — CETIRIZINE HYDROCHLORIDE 10 MG: 10 TABLET ORAL at 08:42

## 2025-07-23 NOTE — PROGRESS NOTES
Nutrition Assessment     Type and Reason for Visit: Reassess    Nutrition Recommendations/Plan:   Continue current POC     Malnutrition Assessment:  Malnutrition Status: Severe malnutrition    Nutrition Assessment:  Patient sound asleep at time of visit.  Nursing walking in with morning meds and to wake up patient as I was leaving.  Pt did have his favorite breakfast sitting on bedside table.  New weight not available.  PO intake has continues to be better with intake 50% or better for at least 2 meals a day  Glucose .  Accuchek's 128-167    Estimated Daily Nutrient Needs:  Energy (kcal):  4194-7695 kcals (8-11 kcals/kg) Weight Used for Energy Requirements: Current     Protein (g):  145g Weight Used for Protein Requirements: Ideal        Fluid (ml/day):  1120-9786 ml Method Used for Fluid Requirements: 1 ml/kcal    Nutrition Related Findings:   +2 BLE edema.,  nonpitting generalized edema Wound Type: Pressure Injury (laceration)    Current Nutrition Therapies:    ADULT DIET; Regular; 3 carb choices (45 gm/meal); Low Sodium (2 gm); SEND RICE KRISPIES AT BREAKFAST. NO OATMEAL. Likes salads    Anthropometric Measures:  Height: 175.3 cm (5' 9.02\")  Current Body Wt: 202.1 kg (445 lb 8.8 oz)   BMI: 65.8        Nutrition Diagnosis:   Inadequate oral intake, Altered nutrition-related lab values, Overweight/obese related to limited food acceptance, increase demand for energy/nutrients, renal dysfunction, endocrine dysfunction as evidenced by intake 51-75%, wounds, lab values    Nutrition Interventions:   Food and/or Nutrient Delivery: Continue Current Diet  Nutrition Education/Counseling: No recommendation at this time  Coordination of Nutrition Care: Continue to monitor while inpatient  Plan of Care discussed with: pt    Goals:  Goals: Meet at least 75% of estimated needs, PO intake 50% or greater, Maintain adequate nutrition status  Type of Goal: Continue current goal  Previous Goal Met: Progressing toward

## 2025-07-23 NOTE — PROGRESS NOTES
IntraVENous 2 times per day    enoxaparin  60 mg SubCUTAneous BID    insulin glargine  30 Units SubCUTAneous Nightly     pseudoephedrine, loperamide, sodium chloride, tetrahydrozoline, artificial tears, albuterol, sodium chloride flush, sodium chloride, ondansetron **OR** ondansetron, polyethylene glycol, acetaminophen **OR** acetaminophen, potassium chloride **OR** potassium alternative oral replacement **OR** potassium chloride, magnesium sulfate, sulfur hexafluoride microspheres, glucose, dextrose bolus **OR** dextrose bolus, glucagon (rDNA), dextrose  ADULT DIET; Regular; 3 carb choices (45 gm/meal); Low Sodium (2 gm); SEND RICE KRISPIES AT BREAKFAST. NO OATMEAL. Likes salads       Labs:   CBC with DIFF:   Recent Labs     07/21/25 0306 07/22/25 0241 07/23/25  0250   WBC 7.3 7.5 7.1   RBC 3.43* 3.48* 3.23*   HGB 10.2* 10.3* 9.5*   HCT 32.8* 33.8* 30.7*   MCV 95.6* 97.1* 95.0*   MCH 29.7 29.6 29.4   MCHC 31.1* 30.5* 30.9*   RDW 15.2* 15.1* 14.7*    181 167   MPV 12.2 12.5* 11.8   NEUTOPHILPCT 72.5*  --   --    LYMPHOPCT 15.5*  --   --    MONOPCT 7.0  --   --    BASOPCT 0.8  --   --    NEUTROABS 5.3  --   --    LYMPHSABS 1.1  --   --    MONOSABS 0.50  --   --    EOSABS 0.30  --   --    BASOSABS 0.10  --   --        CMP/BMP:   Recent Labs     07/21/25 0306 07/22/25 0241 07/23/25  0250    141 139   K 4.1  4.1 4.4  4.4 4.1    104 103   CO2 27 28 27   ANIONGAP 9 9 9   GLUCOSE 140* 115* 72   BUN 45* 49* 49*   CREATININE 3.4* 3.7* 3.7*   LABGLOM 19* 17* 17*   CALCIUM 8.4* 9.0 8.8   BILITOT 0.6 0.6 0.7   ALKPHOS 92 90 84   ALT 6* 6* 6*   AST 19 19 17         CRP:    No results for input(s): \"CRP\" in the last 72 hours.    Sed Rate:    No results for input(s): \"SEDRATE\" in the last 72 hours.        HgBA1c:  No components found for: \"HGBA1C\"  FLP:    Lab Results   Component Value Date/Time    TRIG 87 05/15/2025 02:24 AM    HDL 29 05/15/2025 02:24 AM     TSH:    Lab Results   Component Value Date/Time  described. 2.  Stable enlargement of the cardiac silhouette, with progression of the pulmonary edema.  Equivocal small bilateral pleural effusions.  Superimposed pneumonia cannot be excluded.    ______________________________________ Electronically signed by: SABAS DOMINGUEZ M.D. Date:     07/06/2025 Time:    14:54     Vascular ankle brachial index (SHEILA) PROCEDURE DONE IN [facility]  Addendum Date: 6/29/2025    Right side findings: This is within the normal range. Resting TBI is 1.56.   Left side findings: Resting SHEILA is 1.62. This is within the normal range. Resting TBI is 1.88.     Result Date: 6/29/2025    Right side findings: This is within the normal range. Resting TBI is 1.56.   Left side findings: Resting SHEILA is 1.62. This is within the normal range. Resting TBI is 1.88.     XR FOOT LEFT (2 VIEWS)  Result Date: 6/28/2025  EXAM: 2 VIEWS OF THE LEFT FOOT.  HISTORY: left foot wound  COMPARISON: None.  FINDINGS: There is no acute fracture or dislocation. No definite osseous erosions are seen. Joint spaces and alignment are maintained.  Diffuse soft swelling of the foot is seen.       No acute osseous abnormality.  Soft tissue swelling of the foot.   ______________________________________ Electronically signed by: MOIRA ROSE M.D. Date:     06/28/2025 Time:    15:16     XR CHEST PORTABLE  Result Date: 6/27/2025  EXAM: CHEST RADIOGRAPH  TECHNIQUE: Single frontal chest radiograph.  HISTORY: Shortness of air  COMPARISON: 05/19/2025  FINDINGS:  Patient is rotated to the left. Continued low lung volumes cardiomegaly and central pulmonary vascular congestion with diffuse increased interstitial markings and alveolar opacities. Limited diagnostic sensitivity due to rotation, body habitus sand technique. Continued suggestion of left lower lobe consolidation. No definite pneumothorax.      1.  Continued low lung volumes, cardiomegaly, central pulmonary vascular congestion and interstitial edema consistent with congestive failure

## 2025-07-23 NOTE — PLAN OF CARE
Problem: Chronic Conditions and Co-morbidities  Goal: Patient's chronic conditions and co-morbidity symptoms are monitored and maintained or improved  Outcome: Progressing     Problem: Discharge Planning  Goal: Discharge to home or other facility with appropriate resources  Outcome: Progressing     Problem: Pain  Goal: Verbalizes/displays adequate comfort level or baseline comfort level  Outcome: Progressing     Problem: Safety - Adult  Goal: Free from fall injury  Outcome: Progressing     Problem: ABCDS Injury Assessment  Goal: Absence of physical injury  Outcome: Progressing     Problem: Skin/Tissue Integrity  Goal: Skin integrity remains intact  Description: 1.  Monitor for areas of redness and/or skin breakdown2.  Assess vascular access sites hourly3.  Every 4-6 hours minimum:  Change oxygen saturation probe site4.  Every 4-6 hours:  If on nasal continuous positive airway pressure, respiratory therapy assess nares and determine need for appliance change or resting period  Outcome: Progressing     Problem: Nutrition Deficit:  Goal: Optimize nutritional status  7/23/2025 1545 by Sherrell Cui, RN  Outcome: Progressing  7/23/2025 1115 by Miriam Maldonado, MS, RD, LD  Outcome: Progressing  Flowsheets (Taken 7/23/2025 1103)  Nutrient intake appropriate for improving, restoring, or maintaining nutritional needs:   Assess nutritional status and recommend course of action   Monitor oral intake, labs, and treatment plans   Recommend appropriate diets, oral nutritional supplements, and vitamin/mineral supplements     Problem: Neurosensory - Adult  Goal: Achieves stable or improved neurological status  Outcome: Progressing     Problem: Respiratory - Adult  Goal: Achieves optimal ventilation and oxygenation  Outcome: Progressing     Problem: Cardiovascular - Adult  Goal: Maintains optimal cardiac output and hemodynamic stability  Outcome: Progressing     Problem: Skin/Tissue Integrity - Adult  Goal: Skin integrity remains

## 2025-07-23 NOTE — PLAN OF CARE
Problem: Chronic Conditions and Co-morbidities  Goal: Patient's chronic conditions and co-morbidity symptoms are monitored and maintained or improved  7/22/2025 2101 by Anders Moy RN  Outcome: Progressing  7/22/2025 0924 by Magdalena Lowe RN  Outcome: Progressing  Flowsheets (Taken 7/22/2025 0804)  Care Plan - Patient's Chronic Conditions and Co-Morbidity Symptoms are Monitored and Maintained or Improved: Monitor and assess patient's chronic conditions and comorbid symptoms for stability, deterioration, or improvement     Problem: Discharge Planning  Goal: Discharge to home or other facility with appropriate resources  7/22/2025 2101 by Anders Moy RN  Outcome: Progressing  7/22/2025 0924 by Magadlena Lowe RN  Outcome: Progressing  Flowsheets (Taken 7/22/2025 0804)  Discharge to home or other facility with appropriate resources: Identify barriers to discharge with patient and caregiver     Problem: Pain  Goal: Verbalizes/displays adequate comfort level or baseline comfort level  7/22/2025 2101 by Anders Moy RN  Outcome: Progressing  7/22/2025 0924 by Magdalena Lowe RN  Outcome: Progressing     Problem: Safety - Adult  Goal: Free from fall injury  7/22/2025 2101 by Anders Moy RN  Outcome: Progressing  7/22/2025 0924 by Magdalena Lowe RN  Outcome: Progressing     Problem: ABCDS Injury Assessment  Goal: Absence of physical injury  7/22/2025 2101 by Anders Moy RN  Outcome: Progressing  7/22/2025 0924 by Magdalena Lowe RN  Outcome: Progressing     Problem: Skin/Tissue Integrity  Goal: Skin integrity remains intact  Description: 1.  Monitor for areas of redness and/or skin breakdown2.  Assess vascular access sites hourly3.  Every 4-6 hours minimum:  Change oxygen saturation probe site4.  Every 4-6 hours:  If on nasal continuous positive airway pressure, respiratory therapy assess nares and determine need for appliance change or resting period  7/22/2025 2101 by Anders Moy

## 2025-07-23 NOTE — PROGRESS NOTES
nephrology ([facility]) Progress Note    Patient:  Daljit Elizondo  YOB: 1960  Date of Service: 7/23/2025  MRN: [id number]   Acct: [id number]   Primary Care Physician: Jessie Helms APRN - CNP  Advance Directive: Full Code  Admit Date: 5/14/2025       Hospital Day: 70  Referring Provider: Cody Howell MD    Patient independently seen and examined, Chart, Consults, Notes, Operative notes, Labs, Cardiology, and Radiology studies reviewed as available.    Chief complaint: Abnormal labs.    Subjective:  Daljit Elizondo is a 65 y.o. male for whom we were consulted for evaluation and treatment of acute kidney injury.  Patient denies any history of chronic kidney disease.  He has been in the hospital for the last month, poor historian and has never seen nephrology in the past.  Patient has severe abdominal obesity, hypertension, sleep apnea, type 2 diabetes, chronic diastolic CHF, sleep apnea.  Presented with increasing shortness of breath/dyspnea on exertion.  Hospital course remarkable for treatment with intravenous diuretics for the treatment of volume overload, CHF exacerbation.  His serum creatinine was 0.9 mg with estimated GFR more than 90 mL on admission.  Patient has significant drop in GFR and nephrology is consulted.  He was initially treated with IV fluid with minimal to no improvement of renal function.  Patient initially agreed to proceed with dialysis.  However he was too heavy for operating room table.  Meanwhile his renal function improved as well.  He has borderline GFR    This morning patient remained bedridden.  He denies any shortness of breath.  His renal function is slowly deteriorating.    Allergies:  Penicillamine, Silvadene [silver sulfadiazine], Aerohist [chlorpheniramine-methscop er], Cephalosporins, Hemp seed oil, Neosporin [neomycin-polymyxin-gramicidin], Penicillins, and Zinc    Medicines:  Current Facility-Administered Medications   Medication  PORTABLE  Result Date: 6/27/2025  EXAM: CHEST RADIOGRAPH  TECHNIQUE: Single frontal chest radiograph.  HISTORY: Shortness of air  COMPARISON: 05/19/2025  FINDINGS:  Patient is rotated to the left. Continued low lung volumes cardiomegaly and central pulmonary vascular congestion with diffuse increased interstitial markings and alveolar opacities. Limited diagnostic sensitivity due to rotation, body habitus sand technique. Continued suggestion of left lower lobe consolidation. No definite pneumothorax.      1.  Continued low lung volumes, cardiomegaly, central pulmonary vascular congestion and interstitial edema consistent with congestive failure or fluid overload. 2. Left basilar consolidation and probable dependent left pleural effusion.    ______________________________________ Electronically signed by: SHANNAN SAUCEDA M.D. Date:     06/27/2025 Time:    10:41        Assessment   1.  Acute kidney injury/stable  2.  Falsely low Fena/acute tubular necrosis.  3.  Severe abdominal obesity.  4.  Acute on chronic diastolic CHF.  5.  Obstructive sleep apnea syndrome.  6.  Type 2 diabetes/poorly controlled  7.  Clinically volume overloaded.      Plan:  1.  Patient has nonoliguric acute tubular necrosis in the setting of normal renal function in the past.  He has slow decline in renal function with borderline serum GFR.  Continue intermittent doses of loop diuretics.  2.  Pending disposition to SNF.  3.  Continue to monitor renal function closely.      Jose Kamara MD  07/23/25  12:06 PM

## 2025-07-24 LAB
ALBUMIN SERPL-MCNC: 2.6 G/DL (ref 3.5–5.2)
ALP SERPL-CCNC: 92 U/L (ref 40–129)
ALT SERPL-CCNC: 7 U/L (ref 10–50)
ANION GAP SERPL CALCULATED.3IONS-SCNC: 10 MMOL/L (ref 8–16)
AST SERPL-CCNC: 17 U/L (ref 10–50)
BILIRUB SERPL-MCNC: 0.8 MG/DL (ref 0.2–1.2)
BUN SERPL-MCNC: 51 MG/DL (ref 8–23)
CALCIUM SERPL-MCNC: 8.6 MG/DL (ref 8.8–10.2)
CHLORIDE SERPL-SCNC: 105 MMOL/L (ref 98–107)
CO2 SERPL-SCNC: 24 MMOL/L (ref 22–29)
CREAT SERPL-MCNC: 3.7 MG/DL (ref 0.7–1.2)
ERYTHROCYTE [DISTWIDTH] IN BLOOD BY AUTOMATED COUNT: 14.6 % (ref 11.5–14.5)
GLUCOSE BLD-MCNC: 129 MG/DL (ref 70–99)
GLUCOSE BLD-MCNC: 191 MG/DL (ref 70–99)
GLUCOSE BLD-MCNC: 192 MG/DL (ref 70–99)
GLUCOSE BLD-MCNC: 193 MG/DL (ref 70–99)
GLUCOSE BLD-MCNC: 202 MG/DL (ref 70–99)
GLUCOSE BLD-MCNC: 205 MG/DL (ref 70–99)
GLUCOSE SERPL-MCNC: 157 MG/DL (ref 70–99)
HCT VFR BLD AUTO: 30.6 % (ref 42–52)
HGB BLD-MCNC: 9.4 G/DL (ref 14–18)
LACTATE BLDV-SCNC: 2.7 MMOL/L (ref 0.5–1.9)
MCH RBC QN AUTO: 29.4 PG (ref 27–31)
MCHC RBC AUTO-ENTMCNC: 30.7 G/DL (ref 33–37)
MCV RBC AUTO: 95.6 FL (ref 80–94)
PERFORMED ON: ABNORMAL
PLATELET # BLD AUTO: 245 K/UL (ref 130–400)
PMV BLD AUTO: 12.2 FL (ref 9.4–12.4)
POTASSIUM SERPL-SCNC: 4.6 MMOL/L (ref 3.5–5.1)
PROCALCITONIN: 0.67 NG/ML (ref 0–0.09)
PROT SERPL-MCNC: 5.7 G/DL (ref 6.4–8.3)
RBC # BLD AUTO: 3.2 M/UL (ref 4.7–6.1)
SODIUM SERPL-SCNC: 139 MMOL/L (ref 136–145)
WBC # BLD AUTO: 13.7 K/UL (ref 4.8–10.8)

## 2025-07-24 PROCEDURE — 6370000000 HC RX 637 (ALT 250 FOR IP)

## 2025-07-24 PROCEDURE — 6360000002 HC RX W HCPCS: Performed by: INTERNAL MEDICINE

## 2025-07-24 PROCEDURE — 82962 GLUCOSE BLOOD TEST: CPT

## 2025-07-24 PROCEDURE — 1200000000 HC SEMI PRIVATE

## 2025-07-24 PROCEDURE — 6370000000 HC RX 637 (ALT 250 FOR IP): Performed by: INTERNAL MEDICINE

## 2025-07-24 PROCEDURE — 6370000000 HC RX 637 (ALT 250 FOR IP): Performed by: STUDENT IN AN ORGANIZED HEALTH CARE EDUCATION/TRAINING PROGRAM

## 2025-07-24 PROCEDURE — 2700000000 HC OXYGEN THERAPY PER DAY

## 2025-07-24 PROCEDURE — 85027 COMPLETE CBC AUTOMATED: CPT

## 2025-07-24 PROCEDURE — 94640 AIRWAY INHALATION TREATMENT: CPT

## 2025-07-24 PROCEDURE — 94660 CPAP INITIATION&MGMT: CPT

## 2025-07-24 PROCEDURE — 83605 ASSAY OF LACTIC ACID: CPT

## 2025-07-24 PROCEDURE — 6370000000 HC RX 637 (ALT 250 FOR IP): Performed by: NURSE PRACTITIONER

## 2025-07-24 PROCEDURE — 87040 BLOOD CULTURE FOR BACTERIA: CPT

## 2025-07-24 PROCEDURE — 6360000002 HC RX W HCPCS

## 2025-07-24 PROCEDURE — 6360000002 HC RX W HCPCS: Performed by: NURSE PRACTITIONER

## 2025-07-24 PROCEDURE — 84145 PROCALCITONIN (PCT): CPT

## 2025-07-24 PROCEDURE — 80053 COMPREHEN METABOLIC PANEL: CPT

## 2025-07-24 PROCEDURE — 2500000003 HC RX 250 WO HCPCS: Performed by: HOSPITALIST

## 2025-07-24 PROCEDURE — 36415 COLL VENOUS BLD VENIPUNCTURE: CPT

## 2025-07-24 PROCEDURE — 2500000003 HC RX 250 WO HCPCS

## 2025-07-24 PROCEDURE — 51798 US URINE CAPACITY MEASURE: CPT

## 2025-07-24 PROCEDURE — 2580000003 HC RX 258: Performed by: INTERNAL MEDICINE

## 2025-07-24 PROCEDURE — 94761 N-INVAS EAR/PLS OXIMETRY MLT: CPT

## 2025-07-24 RX ORDER — MAGNESIUM HYDROXIDE/ALUMINUM HYDROXICE/SIMETHICONE 120; 1200; 1200 MG/30ML; MG/30ML; MG/30ML
30 SUSPENSION ORAL EVERY 6 HOURS PRN
Status: DISCONTINUED | OUTPATIENT
Start: 2025-07-24 | End: 2025-07-26 | Stop reason: HOSPADM

## 2025-07-24 RX ORDER — OXYCODONE AND ACETAMINOPHEN 5; 325 MG/1; MG/1
1 TABLET ORAL ONCE
Refills: 0 | Status: COMPLETED | OUTPATIENT
Start: 2025-07-24 | End: 2025-07-24

## 2025-07-24 RX ADMIN — ACETAMINOPHEN 650 MG: 325 TABLET ORAL at 00:16

## 2025-07-24 RX ADMIN — IPRATROPIUM BROMIDE 0.5 MG: 0.5 SOLUTION RESPIRATORY (INHALATION) at 19:11

## 2025-07-24 RX ADMIN — ALLOPURINOL 300 MG: 300 TABLET ORAL at 08:35

## 2025-07-24 RX ADMIN — ASPIRIN 81 MG: 81 TABLET, COATED ORAL at 08:36

## 2025-07-24 RX ADMIN — VANCOMYCIN HYDROCHLORIDE 2500 MG: 10 INJECTION, POWDER, LYOPHILIZED, FOR SOLUTION INTRAVENOUS at 20:25

## 2025-07-24 RX ADMIN — IPRATROPIUM BROMIDE 0.5 MG: 0.5 SOLUTION RESPIRATORY (INHALATION) at 11:14

## 2025-07-24 RX ADMIN — POVIDONE-IODINE: 10 OINTMENT TOPICAL at 09:56

## 2025-07-24 RX ADMIN — MONTELUKAST SODIUM 10 MG: 10 TABLET, FILM COATED ORAL at 20:22

## 2025-07-24 RX ADMIN — MICONAZOLE NITRATE: 2 POWDER TOPICAL at 20:22

## 2025-07-24 RX ADMIN — IPRATROPIUM BROMIDE 0.5 MG: 0.5 SOLUTION RESPIRATORY (INHALATION) at 14:30

## 2025-07-24 RX ADMIN — MICONAZOLE NITRATE: 2 POWDER TOPICAL at 15:17

## 2025-07-24 RX ADMIN — INSULIN LISPRO 1 UNITS: 100 INJECTION, SOLUTION INTRAVENOUS; SUBCUTANEOUS at 16:44

## 2025-07-24 RX ADMIN — METOPROLOL SUCCINATE 25 MG: 25 TABLET, FILM COATED, EXTENDED RELEASE ORAL at 20:22

## 2025-07-24 RX ADMIN — INSULIN GLARGINE 30 UNITS: 100 INJECTION, SOLUTION SUBCUTANEOUS at 20:22

## 2025-07-24 RX ADMIN — PRAVASTATIN SODIUM 20 MG: 20 TABLET ORAL at 20:22

## 2025-07-24 RX ADMIN — INSULIN LISPRO 1 UNITS: 100 INJECTION, SOLUTION INTRAVENOUS; SUBCUTANEOUS at 08:36

## 2025-07-24 RX ADMIN — GUAIFENESIN 600 MG: 600 TABLET, EXTENDED RELEASE ORAL at 20:22

## 2025-07-24 RX ADMIN — METOPROLOL SUCCINATE 25 MG: 25 TABLET, FILM COATED, EXTENDED RELEASE ORAL at 08:36

## 2025-07-24 RX ADMIN — PANTOPRAZOLE SODIUM 40 MG: 40 TABLET, DELAYED RELEASE ORAL at 05:06

## 2025-07-24 RX ADMIN — OXYCODONE HYDROCHLORIDE AND ACETAMINOPHEN 1 TABLET: 5; 325 TABLET ORAL at 09:53

## 2025-07-24 RX ADMIN — MEROPENEM 1000 MG: 1 INJECTION INTRAVENOUS at 19:42

## 2025-07-24 RX ADMIN — CETIRIZINE HYDROCHLORIDE 10 MG: 10 TABLET ORAL at 08:36

## 2025-07-24 RX ADMIN — ENOXAPARIN SODIUM 60 MG: 100 INJECTION SUBCUTANEOUS at 20:23

## 2025-07-24 RX ADMIN — INSULIN LISPRO 1 UNITS: 100 INJECTION, SOLUTION INTRAVENOUS; SUBCUTANEOUS at 20:22

## 2025-07-24 RX ADMIN — IPRATROPIUM BROMIDE 0.5 MG: 0.5 SOLUTION RESPIRATORY (INHALATION) at 06:36

## 2025-07-24 RX ADMIN — ACETAMINOPHEN 650 MG: 325 TABLET ORAL at 20:22

## 2025-07-24 RX ADMIN — INSULIN LISPRO 1 UNITS: 100 INJECTION, SOLUTION INTRAVENOUS; SUBCUTANEOUS at 12:38

## 2025-07-24 RX ADMIN — ENOXAPARIN SODIUM 60 MG: 100 INJECTION SUBCUTANEOUS at 08:36

## 2025-07-24 RX ADMIN — GUAIFENESIN 600 MG: 600 TABLET, EXTENDED RELEASE ORAL at 08:36

## 2025-07-24 RX ADMIN — SODIUM CHLORIDE, PRESERVATIVE FREE 10 ML: 5 INJECTION INTRAVENOUS at 08:36

## 2025-07-24 ASSESSMENT — PAIN DESCRIPTION - DESCRIPTORS
DESCRIPTORS: ACHING
DESCRIPTORS: THROBBING
DESCRIPTORS: CRAMPING

## 2025-07-24 ASSESSMENT — PAIN SCALES - GENERAL
PAINLEVEL_OUTOF10: 3
PAINLEVEL_OUTOF10: 8
PAINLEVEL_OUTOF10: 10

## 2025-07-24 ASSESSMENT — PAIN DESCRIPTION - LOCATION
LOCATION: ABDOMEN

## 2025-07-24 ASSESSMENT — PAIN - FUNCTIONAL ASSESSMENT
PAIN_FUNCTIONAL_ASSESSMENT: ACTIVITIES ARE NOT PREVENTED
PAIN_FUNCTIONAL_ASSESSMENT: ACTIVITIES ARE NOT PREVENTED

## 2025-07-24 ASSESSMENT — PAIN DESCRIPTION - ORIENTATION: ORIENTATION: RIGHT;MID

## 2025-07-24 NOTE — PROGRESS NOTES
[facility] Progress Note    Patient:  Dajlit Elizondo  YOB: 1960  Date of Service: 7/24/2025  MRN: [id number]  Acct: [id number]   Primary Care Physician: Jessie Helms APRN - CNP  Advance Directive: Full Code  Admit Date: 5/14/2025       Hospital Day: 71     NOTE: Portions of this note have been copied forward, however, changes made to reflect the most current clinical status of this patient.    CHIEF COMPLAINT:     Chief Complaint   Patient presents with    Shortness of Breath     Since Jan 1    Scrotal Pain       7/24/2025 1:45 PM  Subjective / Interval History:   07/24/2025  Patient seen and examined this a.m.   Patient endorses nonspecific abdominal pain overnight and 2 episodes of diarrhea, as well as right scrotal/testicular pain.  No acute changes or acute overnight event reported.   Laying comfortably in bed in no apparent acute distress.    Denies any acute complaints or distress.    Endorses overall improvement.    Creatinine trend: 3.4 --> 3.7 -->  3.7 --> 3.7    07/23/2025  Patient seen and examined this a.m.   Reported bleeding episode from left foot wound, now in clean dressing.  No acute changes or acute overnight event reported. Laying comfortably in bed in no apparent acute distress.    Denies any acute complaints or distress.    Creatinine trended up from 3.4 --> 3.7 -->  3.7       07/22/2025  Patient seen and examined this a.m.   No new complaints.    No acute changes or acute overnight event reported.   Laying comfortably in bed in no apparent acute distress.    Denies any acute complaints or distress.   Creatinine trended up from 3.4 to 3.7 this a.m.     Review of Systems:   Review of Systems  ROS: 14 point review of systems is negative except as specifically addressed above.    ADULT DIET; Regular; 3 carb choices (45 gm/meal); Low Sodium (2 gm); SEND RICE KRISPIES AT BREAKFAST. NO OATMEAL. Likes salads    Intake/Output Summary (Last 24 hours) at 7/24/2025 1345  Last

## 2025-07-24 NOTE — PROGRESS NOTES
Pharmacy Renal Adjustment    Daljit Elizondo is a 65 y.o. male. Pharmacy has renally adjusted medications per protocol.    Recent Labs     07/23/25  0250 07/24/25  0232   BUN 49* 51*       Recent Labs     07/23/25  0250 07/24/25  0232   CREATININE 3.7* 3.7*       Estimated Creatinine Clearance: 34 mL/min (A) (based on SCr of 3.7 mg/dL (H)).    Height:   Ht Readings from Last 1 Encounters:   07/18/25 1.753 m (5' 9.02\")     Weight:  Wt Readings from Last 1 Encounters:   07/18/25 (!) 202.1 kg (445 lb 8.8 oz)         Plan: Adjust the following medications based on renal function:           Meropenem to 1000 mg IV once over 30 minutes followed by 1000 mg IV every 12 hours extended infusion over 180 minutes      Electronically signed by Pastora Burton RPH on 7/24/2025 at 6:44 PM

## 2025-07-24 NOTE — PROGRESS NOTES
nephrology ([facility]) Progress Note    Patient:  Daljit Elizondo  YOB: 1960  Date of Service: 7/24/2025  MRN: [id number]   Acct: [id number]   Primary Care Physician: Jessie Helms APRN - CNP  Advance Directive: Full Code  Admit Date: 5/14/2025       Hospital Day: 71  Referring Provider: Cody Howell MD    Patient independently seen and examined, Chart, Consults, Notes, Operative notes, Labs, Cardiology, and Radiology studies reviewed as available.    Chief complaint: Abnormal labs.    Subjective:  Daljit Elizondo is a 65 y.o. male for whom we were consulted for evaluation and treatment of acute kidney injury.  Patient denies any history of chronic kidney disease.  He has been in the hospital for the last month, poor historian and has never seen nephrology in the past.  Patient has severe abdominal obesity, hypertension, sleep apnea, type 2 diabetes, chronic diastolic CHF, sleep apnea.  Presented with increasing shortness of breath/dyspnea on exertion.  Hospital course remarkable for treatment with intravenous diuretics for the treatment of volume overload, CHF exacerbation.  His serum creatinine was 0.9 mg with estimated GFR more than 90 mL on admission.  Patient has significant drop in GFR and nephrology is consulted.  He was initially treated with IV fluid with minimal to no improvement of renal function.  Patient initially agreed to proceed with dialysis.  However he was too heavy for operating room table.  Meanwhile his renal function improved as well.  He has borderline GFR    This morning patient feels well.  He is complaining of right testicular pain and requesting testicular ultrasound.    Allergies:  Penicillamine, Silvadene [silver sulfadiazine], Aerohist [chlorpheniramine-methscop er], Cephalosporins, Hemp seed oil, Neosporin [neomycin-polymyxin-gramicidin], Penicillins, and Zinc    Medicines:  Current Facility-Administered Medications   Medication Dose  acetaminophen (TYLENOL) suppository 650 mg  650 mg Rectal Q6H PRN Renay Thorne APRN - CNP        enoxaparin (LOVENOX) injection 60 mg  60 mg SubCUTAneous BID Renay Thorne APRN - CNP   60 mg at 07/24/25 0836    potassium chloride (KLOR-CON M) extended release tablet 40 mEq  40 mEq Oral PRN Renay Thorne APRN - CNP   40 mEq at 07/13/25 1932    Or    potassium bicarb-citric acid (EFFER-K) effervescent tablet 40 mEq  40 mEq Oral PRN Renay Thorne APRN - CNP        Or    potassium chloride 10 mEq/100 mL IVPB (Peripheral Line)  10 mEq IntraVENous PRN Renay Thorne APRN - CNP        magnesium sulfate 2000 mg in 50 mL IVPB premix  2,000 mg IntraVENous PRN Renay Thorne APRN - CNP   Stopped at 07/13/25 0632    sulfur hexafluoride microspheres (LUMASON) 60.7-25 MG injection 2 mL  2 mL IntraVENous ONCE PRN Renay Thorne APRN - CNP        insulin glargine (LANTUS) injection vial 30 Units  30 Units SubCUTAneous Nightly Renay Thorne APRN - CNP   30 Units at 07/23/25 2047    glucose chewable tablet 16 g  4 tablet Oral PRN Renay Thorne APRN - CNP        dextrose bolus 10% 125 mL  125 mL IntraVENous PRN Renay Thorne APRN - CNP   Stopped at 06/28/25 0806    Or    dextrose bolus 10% 250 mL  250 mL IntraVENous PRN Renay Thorne APRN - CNP        glucagon injection 1 mg  1 mg IntraMUSCular PRN Renay Thorne APRN - CNP        dextrose 10 % infusion   IntraVENous Continuous PRN Renay Thorne APRN - CNP           Past Medical History:  Past Medical History:   Diagnosis Date    Abrasion     Anxiety     Asthma     Bell's palsy     Cataracts, bilateral     Cellulitis     Diabetes (HCC)     Edema extremities     GERD (gastroesophageal reflux disease)     Gout     H/O tracheostomy     2015    H/O urinary retention     Hernia, hiatal     History of panniculitis     History of shingles     Hyperlipemia     Hypertension     Morbid obesity (HCC)     Non-ST elevated myocardial infarction (HCC)     11/2015

## 2025-07-24 NOTE — PLAN OF CARE
Problem: Chronic Conditions and Co-morbidities  Goal: Patient's chronic conditions and co-morbidity symptoms are monitored and maintained or improved  7/24/2025 0948 by Hetal Cole RN  Outcome: Progressing  7/23/2025 2203 by Anders Moy RN  Outcome: Progressing     Problem: Discharge Planning  Goal: Discharge to home or other facility with appropriate resources  7/24/2025 0948 by Hetal Cole RN  Outcome: Progressing  7/23/2025 2203 by Anders Moy RN  Outcome: Progressing     Problem: Pain  Goal: Verbalizes/displays adequate comfort level or baseline comfort level  7/24/2025 0948 by Hetal Cole RN  Outcome: Progressing  7/23/2025 2203 by Anders Moy RN  Outcome: Progressing     Problem: Safety - Adult  Goal: Free from fall injury  7/24/2025 0948 by Hetal Cole RN  Outcome: Progressing  7/23/2025 2203 by Anders Moy RN  Outcome: Progressing     Problem: ABCDS Injury Assessment  Goal: Absence of physical injury  7/24/2025 0948 by Hetal Cole RN  Outcome: Progressing  7/23/2025 2203 by Anders Moy RN  Outcome: Progressing     Problem: Skin/Tissue Integrity  Goal: Skin integrity remains intact  Description: 1.  Monitor for areas of redness and/or skin breakdown2.  Assess vascular access sites hourly3.  Every 4-6 hours minimum:  Change oxygen saturation probe site4.  Every 4-6 hours:  If on nasal continuous positive airway pressure, respiratory therapy assess nares and determine need for appliance change or resting period  7/24/2025 0948 by Hetal Cole RN  Outcome: Progressing  7/23/2025 2203 by Anders Moy RN  Outcome: Progressing     Problem: Nutrition Deficit:  Goal: Optimize nutritional status  7/24/2025 0948 by Hetal Cole RN  Outcome: Progressing  7/23/2025 2203 by Anders Moy RN  Outcome: Progressing     Problem: Neurosensory - Adult  Goal: Achieves stable or improved neurological status  7/24/2025 0948 by Hetal Cole RN  Outcome: Progressing  7/23/2025 2203 by Farzaneh  absence of nausea and vomiting  7/24/2025 0948 by Hetal Cole RN  Outcome: Progressing  7/23/2025 2203 by Anders Moy RN  Outcome: Progressing  Goal: Maintains or returns to baseline bowel function  7/24/2025 0948 by Hetal Cole RN  Outcome: Progressing  7/23/2025 2203 by Anders Moy RN  Outcome: Progressing  Goal: Maintains adequate nutritional intake  7/24/2025 0948 by Hetal Cole RN  Outcome: Progressing  7/23/2025 2203 by Anders Moy RN  Outcome: Progressing     Problem: Genitourinary - Adult  Goal: Absence of urinary retention  7/24/2025 0948 by Hetal Cole RN  Outcome: Progressing  7/23/2025 2203 by Anders Moy RN  Outcome: Progressing     Problem: Infection - Adult  Goal: Absence of infection at discharge  7/24/2025 0948 by Hetal Cole RN  Outcome: Progressing  7/23/2025 2203 by Anders Moy RN  Outcome: Progressing  Goal: Absence of infection during hospitalization  7/24/2025 0948 by eHtal Cole RN  Outcome: Progressing  7/23/2025 2203 by Anders Moy RN  Outcome: Progressing  Goal: Absence of fever/infection during anticipated neutropenic period  7/24/2025 0948 by Hetal Cole RN  Outcome: Progressing  7/23/2025 2203 by Anders Moy RN  Outcome: Progressing     Problem: Metabolic/Fluid and Electrolytes - Adult  Goal: Electrolytes maintained within normal limits  7/24/2025 0948 by Hetal Cole RN  Outcome: Progressing  7/23/2025 2203 by Anders Moy RN  Outcome: Progressing  Goal: Hemodynamic stability and optimal renal function maintained  7/24/2025 0948 by Hetal Cole RN  Outcome: Progressing  7/23/2025 2203 by Anders Moy RN  Outcome: Progressing  Goal: Glucose maintained within prescribed range  7/24/2025 0948 by Hetal Cole RN  Outcome: Progressing  7/23/2025 2203 by Anders Moy RN  Outcome: Progressing     Problem: Hematologic - Adult  Goal: Maintains hematologic stability  7/24/2025 0948 by [place], Hetal, RN  Outcome: Progressing  7/23/2025 2203 by

## 2025-07-24 NOTE — PLAN OF CARE
Problem: Chronic Conditions and Co-morbidities  Goal: Patient's chronic conditions and co-morbidity symptoms are monitored and maintained or improved  7/23/2025 2203 by Anders Moy RN  Outcome: Progressing  7/23/2025 1545 by Sherrell Cui RN  Outcome: Progressing     Problem: Discharge Planning  Goal: Discharge to home or other facility with appropriate resources  7/23/2025 2203 by Anders Moy RN  Outcome: Progressing  7/23/2025 1545 by Sherrell Cui RN  Outcome: Progressing     Problem: Pain  Goal: Verbalizes/displays adequate comfort level or baseline comfort level  7/23/2025 2203 by Anders Moy RN  Outcome: Progressing  7/23/2025 1545 by Sherrell Cui RN  Outcome: Progressing     Problem: Safety - Adult  Goal: Free from fall injury  7/23/2025 2203 by Anders Moy RN  Outcome: Progressing  7/23/2025 1545 by Sherrell Cui RN  Outcome: Progressing     Problem: ABCDS Injury Assessment  Goal: Absence of physical injury  7/23/2025 2203 by Anders Moy RN  Outcome: Progressing  7/23/2025 1545 by Sherrell Cui RN  Outcome: Progressing     Problem: Skin/Tissue Integrity  Goal: Skin integrity remains intact  Description: 1.  Monitor for areas of redness and/or skin breakdown2.  Assess vascular access sites hourly3.  Every 4-6 hours minimum:  Change oxygen saturation probe site4.  Every 4-6 hours:  If on nasal continuous positive airway pressure, respiratory therapy assess nares and determine need for appliance change or resting period  7/23/2025 2203 by Anders Moy RN  Outcome: Progressing  7/23/2025 1545 by Sherrell Cui RN  Outcome: Progressing     Problem: Nutrition Deficit:  Goal: Optimize nutritional status  7/23/2025 2203 by Anders Moy RN  Outcome: Progressing  7/23/2025 1545 by Sherrell Cui RN  Outcome: Progressing  7/23/2025 1115 by Miriam Maldonado MS, RD, LD  Outcome: Progressing  Flowsheets (Taken 7/23/2025 1103)  Nutrient intake appropriate for improving, restoring, or maintaining

## 2025-07-25 ENCOUNTER — APPOINTMENT (OUTPATIENT)
Dept: GENERAL RADIOLOGY | Age: 65
DRG: 291 | End: 2025-07-25
Payer: MEDICARE

## 2025-07-25 PROBLEM — E87.70 HYPERVOLEMIA: Status: ACTIVE | Noted: 2025-07-25

## 2025-07-25 LAB
ADV 40+41 DNA STL QL NAA+NON-PROBE: NOT DETECTED
ALBUMIN SERPL-MCNC: 2.6 G/DL (ref 3.5–5.2)
ALP SERPL-CCNC: 95 U/L (ref 40–129)
ALT SERPL-CCNC: 40 U/L (ref 10–50)
ANION GAP SERPL CALCULATED.3IONS-SCNC: 13 MMOL/L (ref 8–16)
AST SERPL-CCNC: 143 U/L (ref 10–50)
B PARAP IS1001 DNA NPH QL NAA+NON-PROBE: NOT DETECTED
B PERT.PT PRMT NPH QL NAA+NON-PROBE: NOT DETECTED
BACTERIA BLD CULT ORG #2: NORMAL
BACTERIA BLD CULT: NORMAL
BASOPHILS # BLD: 0 K/UL (ref 0–0.2)
BASOPHILS NFR BLD: 0.2 % (ref 0–1)
BILIRUB SERPL-MCNC: 0.7 MG/DL (ref 0.2–1.2)
BNP BLD-MCNC: ABNORMAL PG/ML (ref 0–124)
BUN SERPL-MCNC: 67 MG/DL (ref 8–23)
C CAYETANENSIS DNA STL QL NAA+NON-PROBE: NOT DETECTED
C COLI+JEJ+UPSA DNA STL QL NAA+NON-PROBE: NOT DETECTED
C DIFF TOX A+B STL QL IA: NORMAL
C PNEUM DNA NPH QL NAA+NON-PROBE: NOT DETECTED
CALCIUM SERPL-MCNC: 8.6 MG/DL (ref 8.8–10.2)
CHLORIDE SERPL-SCNC: 101 MMOL/L (ref 98–107)
CO2 SERPL-SCNC: 24 MMOL/L (ref 22–29)
CREAT SERPL-MCNC: 4.5 MG/DL (ref 0.7–1.2)
CRYPTOSP DNA STL QL NAA+NON-PROBE: NOT DETECTED
E HISTOLYT DNA STL QL NAA+NON-PROBE: NOT DETECTED
EAEC PAA PLAS AGGR+AATA ST NAA+NON-PRB: NOT DETECTED
EC STX1+STX2 GENES STL QL NAA+NON-PROBE: NOT DETECTED
EOSINOPHIL # BLD: 0 K/UL (ref 0–0.6)
EOSINOPHIL NFR BLD: 0.1 % (ref 0–5)
EPEC EAE GENE STL QL NAA+NON-PROBE: NOT DETECTED
ERYTHROCYTE [DISTWIDTH] IN BLOOD BY AUTOMATED COUNT: 15.1 % (ref 11.5–14.5)
ETEC LTA+ST1A+ST1B TOX ST NAA+NON-PROBE: NOT DETECTED
FLUAV RNA NPH QL NAA+NON-PROBE: NOT DETECTED
FLUBV RNA NPH QL NAA+NON-PROBE: NOT DETECTED
G LAMBLIA DNA STL QL NAA+NON-PROBE: NOT DETECTED
GI PATH DNA+RNA PNL STL NAA+NON-PROBE: NOT DETECTED
GLUCOSE BLD-MCNC: 160 MG/DL (ref 70–99)
GLUCOSE BLD-MCNC: 161 MG/DL (ref 70–99)
GLUCOSE BLD-MCNC: 166 MG/DL (ref 70–99)
GLUCOSE BLD-MCNC: 176 MG/DL (ref 70–99)
GLUCOSE SERPL-MCNC: 154 MG/DL (ref 70–99)
HADV DNA NPH QL NAA+NON-PROBE: NOT DETECTED
HCOV 229E RNA NPH QL NAA+NON-PROBE: NOT DETECTED
HCOV HKU1 RNA NPH QL NAA+NON-PROBE: NOT DETECTED
HCOV NL63 RNA NPH QL NAA+NON-PROBE: NOT DETECTED
HCOV OC43 RNA NPH QL NAA+NON-PROBE: NOT DETECTED
HCT VFR BLD AUTO: 22.3 % (ref 42–52)
HCT VFR BLD AUTO: 23.1 % (ref 42–52)
HCT VFR BLD AUTO: 24.8 % (ref 42–52)
HEMOCCULT SP1 STL QL: NORMAL
HGB BLD-MCNC: 7.3 G/DL (ref 14–18)
HGB BLD-MCNC: 7.6 G/DL (ref 14–18)
HGB BLD-MCNC: 7.6 G/DL (ref 14–18)
HMPV RNA NPH QL NAA+NON-PROBE: NOT DETECTED
HPIV1 RNA NPH QL NAA+NON-PROBE: NOT DETECTED
HPIV2 RNA NPH QL NAA+NON-PROBE: NOT DETECTED
HPIV3 RNA NPH QL NAA+NON-PROBE: NOT DETECTED
HPIV4 RNA NPH QL NAA+NON-PROBE: NOT DETECTED
IMM GRANULOCYTES # BLD: 0.2 K/UL
LACTATE BLDV-SCNC: 1.7 MMOL/L (ref 0.5–1.9)
LACTATE BLDV-SCNC: 1.9 MMOL/L (ref 0.5–1.9)
LYMPHOCYTES # BLD: 1.2 K/UL (ref 1.1–4.5)
LYMPHOCYTES NFR BLD: 6.7 % (ref 20–40)
M PNEUMO DNA NPH QL NAA+NON-PROBE: NOT DETECTED
MCH RBC QN AUTO: 29.5 PG (ref 27–31)
MCHC RBC AUTO-ENTMCNC: 30.6 G/DL (ref 33–37)
MCV RBC AUTO: 96.1 FL (ref 80–94)
MONOCYTES # BLD: 1.5 K/UL (ref 0–0.9)
MONOCYTES NFR BLD: 8 % (ref 0–10)
NEUTROPHILS # BLD: 15.4 K/UL (ref 1.5–7.5)
NEUTS SEG NFR BLD: 84.1 % (ref 50–65)
NOROVIRUS GI+II RNA STL QL NAA+NON-PROBE: NOT DETECTED
P SHIGELLOIDES DNA STL QL NAA+NON-PROBE: NOT DETECTED
PERFORMED ON: ABNORMAL
PLATELET # BLD AUTO: 245 K/UL (ref 130–400)
PMV BLD AUTO: 12.8 FL (ref 9.4–12.4)
POTASSIUM SERPL-SCNC: 5.4 MMOL/L (ref 3.5–5)
PROT SERPL-MCNC: 5.6 G/DL (ref 6.4–8.3)
RBC # BLD AUTO: 2.58 M/UL (ref 4.7–6.1)
RSV RNA NPH QL NAA+NON-PROBE: NOT DETECTED
RV+EV RNA NPH QL NAA+NON-PROBE: NOT DETECTED
RVA RNA STL QL NAA+NON-PROBE: NOT DETECTED
S ENT+BONG DNA STL QL NAA+NON-PROBE: NOT DETECTED
SAPO I+II+IV+V RNA STL QL NAA+NON-PROBE: NOT DETECTED
SARS-COV-2 RNA NPH QL NAA+NON-PROBE: NOT DETECTED
SHIGELLA SP+EIEC IPAH ST NAA+NON-PROBE: NOT DETECTED
SODIUM SERPL-SCNC: 138 MMOL/L (ref 136–145)
T4 SERPL-MCNC: 7.1 UG/DL (ref 4.5–11.7)
TSH SERPL DL<=0.005 MIU/L-ACNC: 9.5 UIU/ML (ref 0.27–4.2)
V CHOL+PARA+VUL DNA STL QL NAA+NON-PROBE: NOT DETECTED
V CHOLERAE DNA STL QL NAA+NON-PROBE: NOT DETECTED
WBC # BLD AUTO: 18.3 K/UL (ref 4.8–10.8)
Y ENTEROCOL DNA STL QL NAA+NON-PROBE: NOT DETECTED

## 2025-07-25 PROCEDURE — 99223 1ST HOSP IP/OBS HIGH 75: CPT | Performed by: INTERNAL MEDICINE

## 2025-07-25 PROCEDURE — 94150 VITAL CAPACITY TEST: CPT

## 2025-07-25 PROCEDURE — 6370000000 HC RX 637 (ALT 250 FOR IP): Performed by: NURSE PRACTITIONER

## 2025-07-25 PROCEDURE — 2500000003 HC RX 250 WO HCPCS: Performed by: INTERNAL MEDICINE

## 2025-07-25 PROCEDURE — 6360000002 HC RX W HCPCS: Performed by: INTERNAL MEDICINE

## 2025-07-25 PROCEDURE — 6370000000 HC RX 637 (ALT 250 FOR IP)

## 2025-07-25 PROCEDURE — 6370000000 HC RX 637 (ALT 250 FOR IP): Performed by: STUDENT IN AN ORGANIZED HEALTH CARE EDUCATION/TRAINING PROGRAM

## 2025-07-25 PROCEDURE — 6360000002 HC RX W HCPCS

## 2025-07-25 PROCEDURE — 94761 N-INVAS EAR/PLS OXIMETRY MLT: CPT

## 2025-07-25 PROCEDURE — 2580000003 HC RX 258: Performed by: INTERNAL MEDICINE

## 2025-07-25 PROCEDURE — 2060000000 HC ICU INTERMEDIATE R&B

## 2025-07-25 PROCEDURE — 82270 OCCULT BLOOD FECES: CPT

## 2025-07-25 PROCEDURE — 2000000000 HC ICU R&B

## 2025-07-25 PROCEDURE — 2700000000 HC OXYGEN THERAPY PER DAY

## 2025-07-25 PROCEDURE — 85014 HEMATOCRIT: CPT

## 2025-07-25 PROCEDURE — 94660 CPAP INITIATION&MGMT: CPT

## 2025-07-25 PROCEDURE — 87324 CLOSTRIDIUM AG IA: CPT

## 2025-07-25 PROCEDURE — 83880 ASSAY OF NATRIURETIC PEPTIDE: CPT

## 2025-07-25 PROCEDURE — 6360000002 HC RX W HCPCS: Performed by: NURSE PRACTITIONER

## 2025-07-25 PROCEDURE — 84443 ASSAY THYROID STIM HORMONE: CPT

## 2025-07-25 PROCEDURE — 87449 NOS EACH ORGANISM AG IA: CPT

## 2025-07-25 PROCEDURE — 71045 X-RAY EXAM CHEST 1 VIEW: CPT

## 2025-07-25 PROCEDURE — 94640 AIRWAY INHALATION TREATMENT: CPT

## 2025-07-25 PROCEDURE — 2580000003 HC RX 258

## 2025-07-25 PROCEDURE — 2500000003 HC RX 250 WO HCPCS

## 2025-07-25 PROCEDURE — 85025 COMPLETE CBC W/AUTO DIFF WBC: CPT

## 2025-07-25 PROCEDURE — 87507 IADNA-DNA/RNA PROBE TQ 12-25: CPT

## 2025-07-25 PROCEDURE — 80053 COMPREHEN METABOLIC PANEL: CPT

## 2025-07-25 PROCEDURE — 83605 ASSAY OF LACTIC ACID: CPT

## 2025-07-25 PROCEDURE — 85018 HEMOGLOBIN: CPT

## 2025-07-25 PROCEDURE — 0202U NFCT DS 22 TRGT SARS-COV-2: CPT

## 2025-07-25 PROCEDURE — 2500000003 HC RX 250 WO HCPCS: Performed by: HOSPITALIST

## 2025-07-25 PROCEDURE — 84436 ASSAY OF TOTAL THYROXINE: CPT

## 2025-07-25 PROCEDURE — 6370000000 HC RX 637 (ALT 250 FOR IP): Performed by: INTERNAL MEDICINE

## 2025-07-25 PROCEDURE — 82962 GLUCOSE BLOOD TEST: CPT

## 2025-07-25 PROCEDURE — 36415 COLL VENOUS BLD VENIPUNCTURE: CPT

## 2025-07-25 RX ORDER — SODIUM CHLORIDE 9 MG/ML
INJECTION, SOLUTION INTRAVENOUS CONTINUOUS
Status: ACTIVE | OUTPATIENT
Start: 2025-07-25 | End: 2025-07-25

## 2025-07-25 RX ORDER — MIDODRINE HYDROCHLORIDE 10 MG/1
10 TABLET ORAL
Status: DISCONTINUED | OUTPATIENT
Start: 2025-07-25 | End: 2025-07-26 | Stop reason: HOSPADM

## 2025-07-25 RX ADMIN — IPRATROPIUM BROMIDE 0.5 MG: 0.5 SOLUTION RESPIRATORY (INHALATION) at 10:28

## 2025-07-25 RX ADMIN — ALLOPURINOL 300 MG: 300 TABLET ORAL at 08:22

## 2025-07-25 RX ADMIN — MICONAZOLE NITRATE: 2 POWDER TOPICAL at 21:28

## 2025-07-25 RX ADMIN — NOREPINEPHRINE BITARTRATE 5 MCG/MIN: 1 INJECTION, SOLUTION, CONCENTRATE INTRAVENOUS at 18:36

## 2025-07-25 RX ADMIN — MEROPENEM 1000 MG: 1 INJECTION INTRAVENOUS at 20:55

## 2025-07-25 RX ADMIN — CETIRIZINE HYDROCHLORIDE 10 MG: 10 TABLET ORAL at 08:22

## 2025-07-25 RX ADMIN — MEROPENEM 1000 MG: 1 INJECTION INTRAVENOUS at 08:29

## 2025-07-25 RX ADMIN — IPRATROPIUM BROMIDE 0.5 MG: 0.5 SOLUTION RESPIRATORY (INHALATION) at 18:54

## 2025-07-25 RX ADMIN — IPRATROPIUM BROMIDE 0.5 MG: 0.5 SOLUTION RESPIRATORY (INHALATION) at 06:45

## 2025-07-25 RX ADMIN — SODIUM CHLORIDE: 0.9 INJECTION, SOLUTION INTRAVENOUS at 09:30

## 2025-07-25 RX ADMIN — BUMETANIDE 1 MG: 1 TABLET ORAL at 08:21

## 2025-07-25 RX ADMIN — GUAIFENESIN 600 MG: 600 TABLET, EXTENDED RELEASE ORAL at 08:22

## 2025-07-25 RX ADMIN — PANTOPRAZOLE SODIUM 40 MG: 40 TABLET, DELAYED RELEASE ORAL at 08:21

## 2025-07-25 RX ADMIN — ASPIRIN 81 MG: 81 TABLET, COATED ORAL at 08:22

## 2025-07-25 RX ADMIN — POVIDONE-IODINE: 10 OINTMENT TOPICAL at 12:06

## 2025-07-25 RX ADMIN — GUAIFENESIN 600 MG: 600 TABLET, EXTENDED RELEASE ORAL at 20:13

## 2025-07-25 RX ADMIN — ENOXAPARIN SODIUM 60 MG: 100 INJECTION SUBCUTANEOUS at 20:13

## 2025-07-25 RX ADMIN — PRAVASTATIN SODIUM 20 MG: 20 TABLET ORAL at 20:13

## 2025-07-25 RX ADMIN — SODIUM CHLORIDE, PRESERVATIVE FREE 10 ML: 5 INJECTION INTRAVENOUS at 12:06

## 2025-07-25 RX ADMIN — SODIUM ZIRCONIUM CYCLOSILICATE 5 G: 5 POWDER, FOR SUSPENSION ORAL at 18:32

## 2025-07-25 RX ADMIN — VANCOMYCIN HYDROCHLORIDE 1000 MG: 1 INJECTION, POWDER, LYOPHILIZED, FOR SOLUTION INTRAVENOUS at 20:12

## 2025-07-25 RX ADMIN — MIDODRINE HYDROCHLORIDE 10 MG: 10 TABLET ORAL at 18:32

## 2025-07-25 RX ADMIN — NOREPINEPHRINE BITARTRATE 5 MCG/MIN: 1 INJECTION, SOLUTION, CONCENTRATE INTRAVENOUS at 20:14

## 2025-07-25 RX ADMIN — INSULIN GLARGINE 30 UNITS: 100 INJECTION, SOLUTION SUBCUTANEOUS at 21:21

## 2025-07-25 RX ADMIN — IPRATROPIUM BROMIDE 0.5 MG: 0.5 SOLUTION RESPIRATORY (INHALATION) at 14:48

## 2025-07-25 RX ADMIN — MONTELUKAST SODIUM 10 MG: 10 TABLET, FILM COATED ORAL at 20:13

## 2025-07-25 RX ADMIN — METOPROLOL SUCCINATE 25 MG: 25 TABLET, FILM COATED, EXTENDED RELEASE ORAL at 20:13

## 2025-07-25 RX ADMIN — SODIUM CHLORIDE, PRESERVATIVE FREE 40 MG: 5 INJECTION INTRAVENOUS at 11:16

## 2025-07-25 NOTE — PROGRESS NOTES
Attempted to use pediatric brown to get accurate urine output. Unable to insert catheter, attempted by 2 RN's.

## 2025-07-25 NOTE — PROGRESS NOTES
Spoke with Dr. Howell regarding patient elevated cr, no urine output since 8pm yesterday. Unable to place brown catheter due to anatomy. No urology available until Tuesday. Unable to get accurate bladder scan due to body habitus. Patient also hypotensive, levo order per Dr. Howell. Patient difficult stick and has a midline that does not have blood return. Called nephro on call to notify them of no urine output, waiting on call back.

## 2025-07-25 NOTE — CONSULTS
Consult Note            Date:7/25/2025        Patient Name:Daljit Elizondo     YOB: 1960     Age:65 y.o.    Reason for consult: Drop in hemoglobin in the setting of reported rectal bleeding intermittently.    History of Present Illness   This is a 65-year-old morbidly obese male (BMI 65) with a prolonged hospitalization initially admitted May 14, 2025 with dyspnea and was found to have heart failure exacerbation.  Over the last several days his hemoglobin has declined yesterday it was 9.4 and today it is 7.6.  There are reports of some intermittent rectal bleeding but otherwise no other sources of large blood loss acutely.  We have been asked to further evaluate.    His hospital stay has been complicated by placement issues and urinary tract and infection with acute tubular necrosis.  His ejection fraction is 25 to 30%.  He also subsequently developed an infectious like picture and is on empiric antibiotics with meropenem and vancomycin.    He denies any past history of GI bleeding.  He states he had a colonoscopy at the age of 48 and cannot recall if polyps were found at that time.  In hospital he is on Lovenox 60 mg subcutaneous twice daily which is weight-based for DVT prophylaxis.  Also on aspirin monotherapy.  And he has been on oral pantoprazole 40 mg daily for GI prophylaxis.    Nursing reports he has been having diarrhea which has seemed to slow down today.  Patient states that this is unusual for him.    Past Medical History     Past Medical History:   Diagnosis Date    Abrasion     Anxiety     Asthma     Bell's palsy     Cataracts, bilateral     Cellulitis     Diabetes (HCC)     Edema extremities     GERD (gastroesophageal reflux disease)     Gout     H/O tracheostomy     2015    H/O urinary retention     Hernia, hiatal     History of panniculitis     History of shingles     Hyperlipemia     Hypertension     Morbid obesity (HCC)     Non-ST elevated myocardial infarction (HCC)     11/2015     (ASTELIN) 0.1 % nasal spray 2 sprays by Nasal route 2 times daily Use in each nostril as directed 11/16/16   Rafia Brink APRN   fluticasone (FLONASE) 50 MCG/ACT nasal spray 1 spray by Nasal route daily 11/16/16   Rafia Brink APRN   albuterol sulfate HFA (VENTOLIN HFA) 108 (90 BASE) MCG/ACT inhaler Inhale 2 puffs into the lungs every 6 hours as needed for Wheezing 1/27/16   Rafia Brink APRN   albuterol (PROVENTIL) (2.5 MG/3ML) 0.083% nebulizer solution Take 3 mLs by nebulization every 6 hours as needed for Wheezing 1/27/16   Rafia Brink APRN   Misc. Devices (WALKER WHEELS) MISC 1 each by Does not apply route once for 1 dose. HEAVY DUTY walker wheels x2 7/3/14 4/16/18  Rafia Brink APRN        pantoprazole (PROTONIX) 40 mg in sodium chloride (PF) 0.9 % 10 mL injection, Daily  aluminum & magnesium hydroxide-simethicone (MAALOX PLUS) 200-200-20 MG/5ML suspension 30 mL, Q6H PRN  meropenem (MERREM) 1,000 mg in sodium chloride 0.9 % 100 mL IVPB ([id number]), Q12H  vancomycin (VANCOCIN) intermittent dosing (placeholder), RX Placeholder  vancomycin (VANCOCIN) 1,000 mg in sodium chloride 0.9 % 250 mL IVPB (Beoz5Cxp), Q24H  bumetanide (BUMEX) tablet 1 mg, Once per day on Monday Wednesday Friday  saliva substitute (BIOTENE/MOUTH KOTE) liquid, Daily  lactulose (CHRONULAC) 10 GM/15ML solution 20 g, Daily  pseudoephedrine (SUDAFED) tablet 60 mg, Q6H PRN  povidone-iodine (BETADINE) 10 % ointment, Daily  [Held by provider] sodium bicarbonate tablet 325 mg, Daily  loperamide (IMODIUM) capsule 2 mg, 4x Daily PRN  insulin lispro (HUMALOG,ADMELOG) injection vial 0-4 Units, 4x Daily AC & HS  [Held by provider] polyethylene glycol (GLYCOLAX) packet 17 g, BID  metoprolol succinate (TOPROL XL) extended release tablet 25 mg, BID  ipratropium (ATROVENT) 0.02 % nebulizer solution 0.5 mg, 4x Daily RT  [Held by provider] senna (SENOKOT) tablet 17.2 mg, BID  montelukast (SINGULAIR) tablet 10 mg, Nightly  sodium chloride (OCEAN, BABY AYR) 0.65 %

## 2025-07-25 NOTE — PLAN OF CARE
Problem: Chronic Conditions and Co-morbidities  Goal: Patient's chronic conditions and co-morbidity symptoms are monitored and maintained or improved  7/24/2025 2003 by Anders Moy RN  Outcome: Progressing  7/24/2025 0948 by Hetal Cole RN  Outcome: Progressing     Problem: Discharge Planning  Goal: Discharge to home or other facility with appropriate resources  7/24/2025 2003 by Anders Moy RN  Outcome: Progressing  7/24/2025 0948 by Hetal Cole RN  Outcome: Progressing     Problem: Pain  Goal: Verbalizes/displays adequate comfort level or baseline comfort level  7/24/2025 2003 by Anders Moy RN  Outcome: Progressing  7/24/2025 0948 by Hetal Cole RN  Outcome: Progressing     Problem: Safety - Adult  Goal: Free from fall injury  7/24/2025 2003 by Anders Moy RN  Outcome: Progressing  7/24/2025 0948 by Hetal Cole RN  Outcome: Progressing     Problem: ABCDS Injury Assessment  Goal: Absence of physical injury  7/24/2025 2003 by Anders Moy RN  Outcome: Progressing  7/24/2025 0948 by Hetal Cole RN  Outcome: Progressing     Problem: Skin/Tissue Integrity  Goal: Skin integrity remains intact  Description: 1.  Monitor for areas of redness and/or skin breakdown2.  Assess vascular access sites hourly3.  Every 4-6 hours minimum:  Change oxygen saturation probe site4.  Every 4-6 hours:  If on nasal continuous positive airway pressure, respiratory therapy assess nares and determine need for appliance change or resting period  7/24/2025 2003 by Anders Moy RN  Outcome: Progressing  7/24/2025 0948 by Hetal Cole RN  Outcome: Progressing     Problem: Nutrition Deficit:  Goal: Optimize nutritional status  7/24/2025 2003 by Anders Moy RN  Outcome: Progressing  7/24/2025 0948 by Hetal Cole RN  Outcome: Progressing     Problem: Neurosensory - Adult  Goal: Achieves stable or improved neurological status  7/24/2025 2003 by Anders Moy RN  Outcome: Progressing  7/24/2025 0948 by Douglas  absence of nausea and vomiting  7/24/2025 2003 by Anders Moy RN  Outcome: Progressing  7/24/2025 0948 by Hetal Cole RN  Outcome: Progressing  Goal: Maintains or returns to baseline bowel function  7/24/2025 2003 by Anders Moy RN  Outcome: Progressing  7/24/2025 0948 by Hetal Cole RN  Outcome: Progressing  Goal: Maintains adequate nutritional intake  7/24/2025 2003 by Anders Moy RN  Outcome: Progressing  7/24/2025 0948 by Hetal Cole RN  Outcome: Progressing     Problem: Genitourinary - Adult  Goal: Absence of urinary retention  7/24/2025 2003 by Anders Moy RN  Outcome: Progressing  7/24/2025 0948 by Hetal Cole RN  Outcome: Progressing     Problem: Infection - Adult  Goal: Absence of infection at discharge  7/24/2025 2003 by Anders Moy RN  Outcome: Progressing  7/24/2025 0948 by Hetal Cole RN  Outcome: Progressing  Goal: Absence of infection during hospitalization  7/24/2025 2003 by Anders Moy RN  Outcome: Progressing  7/24/2025 0948 by Hetal Cole RN  Outcome: Progressing  Goal: Absence of fever/infection during anticipated neutropenic period  7/24/2025 2003 by Anders Moy RN  Outcome: Progressing  7/24/2025 0948 by Hetal Cole RN  Outcome: Progressing     Problem: Metabolic/Fluid and Electrolytes - Adult  Goal: Electrolytes maintained within normal limits  7/24/2025 2003 by Anders Moy RN  Outcome: Progressing  7/24/2025 0948 by Hetal Cole RN  Outcome: Progressing  Goal: Hemodynamic stability and optimal renal function maintained  7/24/2025 2003 by Anders Moy RN  Outcome: Progressing  7/24/2025 0948 by Hetal Cole RN  Outcome: Progressing  Goal: Glucose maintained within prescribed range  7/24/2025 2003 by Anders Moy RN  Outcome: Progressing  7/24/2025 0948 by Hetal Cole RN  Outcome: Progressing     Problem: Hematologic - Adult  Goal: Maintains hematologic stability  7/24/2025 2003 by Anders Moy RN  Outcome: Progressing  7/24/2025 0948

## 2025-07-25 NOTE — PROGRESS NOTES
Occupational Therapy  The patient declines therapy today due to pain and illness.  Nurse was notified and reported an overall medical decline.  Electronically signed by Denise Kingsley OT on 7/25/2025 at 3:13 PM

## 2025-07-25 NOTE — PROGRESS NOTES
[facility]    Patient:  Daljit Elizondo  YOB: 1960  Date of Service: 7/25/2025  MRN: [id number]  Acct: [id number]   Primary Care Physician: Jessie Helms APRN - CNP  Advance Directive: Full Code  Admit Date: 5/14/2025       Hospital Day: 72     NOTE: Portions of this note have been copied forward, however, changes made to reflect the most current clinical status of this patient.    CHIEF COMPLAINT:     Chief Complaint   Patient presents with    Shortness of Breath     Since Jan 1    Scrotal Pain       7/25/2025 9:49 AM  Subjective / Interval History:   07/25/2025  Patient seen and examined this a.m.   No new complaints.    No acute changes or acute overnight event reported.     Patient also endorsed 2 episodes of vomiting overnight.    Patient continues to endorse episodes of diarrhea.  No acute changes or acute overnight event reported.   Patient with hypotension and tachycardia as well.  Lactic acid of 2.7, which trended down to 1.7 yesterday.  Cultures pending and empiric antibiotic ordered yesterday.  Leukocytosis, with WBC trending up from 13.7, to 18.3 this a.m.  Hemoglobin dropped from 9.4, to 7.6 this a.m.      07/24/2025  Patient seen and examined this a.m.   Patient endorses nonspecific abdominal pain overnight and 2 episodes of diarrhea, as well as right scrotal/testicular pain.  No acute changes or acute overnight event reported.   Laying comfortably in bed in no apparent acute distress.    Denies any acute complaints or distress.    Endorses overall improvement.    Creatinine trend: 3.4 --> 3.7 -->  3.7 --> 3.7    07/23/2025  Patient seen and examined this a.m.   Reported bleeding episode from left foot wound, now in clean dressing.  No acute changes or acute overnight event reported. Laying comfortably in bed in no apparent acute distress.    Denies any acute complaints or distress.    Creatinine trended up from 3.4 --> 3.7 -->  3.7       07/22/2025  Patient

## 2025-07-25 NOTE — PROGRESS NOTES
nephrology ([facility]) Progress Note    Patient:  Daljit Elizondo  YOB: 1960  Date of Service: 7/25/2025  MRN: [id number]   Acct: [id number]   Primary Care Physician: Jessie Helms APRN - CNP  Advance Directive: Full Code  Admit Date: 5/14/2025       Hospital Day: 72  Referring Provider: Cody Howell MD    Patient independently seen and examined, Chart, Consults, Notes, Operative notes, Labs, Cardiology, and Radiology studies reviewed as available.    Chief complaint: Abnormal labs.    Subjective:  Daljit Elizondo is a 65 y.o. male for whom we were consulted for evaluation and treatment of acute kidney injury.  Patient denies any history of chronic kidney disease.  He has been in the hospital for the last month, poor historian and has never seen nephrology in the past.  Patient has severe abdominal obesity, hypertension, sleep apnea, type 2 diabetes, chronic diastolic CHF, sleep apnea.  Presented with increasing shortness of breath/dyspnea on exertion.  Hospital course remarkable for treatment with intravenous diuretics for the treatment of volume overload, CHF exacerbation.  His serum creatinine was 0.9 mg with estimated GFR more than 90 mL on admission.  Patient has significant drop in GFR and nephrology is consulted.  He was initially treated with IV fluid with minimal to no improvement of renal function.  Patient initially agreed to proceed with dialysis.  However he was too heavy for operating room table.  Meanwhile his renal function improved as well.  He has borderline GFR    Patient was having upset stomach and has recurrent emesis overnight.  He is on IV fluids now.    Allergies:  Penicillamine, Silvadene [silver sulfadiazine], Aerohist [chlorpheniramine-methscop er], Cephalosporins, Hemp seed oil, Neosporin [neomycin-polymyxin-gramicidin], Penicillins, and Zinc    Medicines:  Current Facility-Administered Medications   Medication Dose Route Frequency

## 2025-07-26 ENCOUNTER — APPOINTMENT (OUTPATIENT)
Dept: GENERAL RADIOLOGY | Age: 65
DRG: 291 | End: 2025-07-26
Payer: MEDICARE

## 2025-07-26 ENCOUNTER — APPOINTMENT (OUTPATIENT)
Dept: ULTRASOUND IMAGING | Age: 65
DRG: 291 | End: 2025-07-26
Payer: MEDICARE

## 2025-07-26 VITALS
HEART RATE: 123 BPM | RESPIRATION RATE: 24 BRPM | WEIGHT: 315 LBS | BODY MASS INDEX: 46.65 KG/M2 | SYSTOLIC BLOOD PRESSURE: 90 MMHG | DIASTOLIC BLOOD PRESSURE: 56 MMHG | TEMPERATURE: 97.3 F | HEIGHT: 69 IN | OXYGEN SATURATION: 90 %

## 2025-07-26 LAB
ABO/RH: NORMAL
ALBUMIN SERPL-MCNC: 2.8 G/DL (ref 3.5–5.2)
ALP SERPL-CCNC: 122 U/L (ref 40–129)
ALT SERPL-CCNC: 40 U/L (ref 10–50)
ANION GAP SERPL CALCULATED.3IONS-SCNC: 15 MMOL/L (ref 8–16)
ANISOCYTOSIS BLD QL SMEAR: ABNORMAL
ANTIBODY SCREEN: NORMAL
AST SERPL-CCNC: 133 U/L (ref 10–50)
BASOPHILS # BLD: 0 K/UL (ref 0–0.2)
BASOPHILS NFR BLD: 0 % (ref 0–1)
BILIRUB SERPL-MCNC: 0.8 MG/DL (ref 0.2–1.2)
BLOOD BANK DISPENSE STATUS: NORMAL
BLOOD BANK DISPENSE STATUS: NORMAL
BLOOD BANK PRODUCT CODE: NORMAL
BLOOD BANK PRODUCT CODE: NORMAL
BPU ID: NORMAL
BPU ID: NORMAL
BUN SERPL-MCNC: 71 MG/DL (ref 8–23)
CALCIUM SERPL-MCNC: 8.6 MG/DL (ref 8.8–10.2)
CHLORIDE SERPL-SCNC: 100 MMOL/L (ref 98–107)
CO2 SERPL-SCNC: 23 MMOL/L (ref 22–29)
CREAT SERPL-MCNC: 4.7 MG/DL (ref 0.7–1.2)
DESCRIPTION BLOOD BANK: NORMAL
DESCRIPTION BLOOD BANK: NORMAL
EKG P AXIS: NORMAL DEGREES
EKG P-R INTERVAL: NORMAL MS
EKG Q-T INTERVAL: 366 MS
EKG QRS DURATION: 102 MS
EKG QTC CALCULATION (BAZETT): 457 MS
EKG T AXIS: 117 DEGREES
EOSINOPHIL # BLD: 0 K/UL (ref 0–0.6)
EOSINOPHIL NFR BLD: 0 % (ref 0–5)
ERYTHROCYTE [DISTWIDTH] IN BLOOD BY AUTOMATED COUNT: 15.7 % (ref 11.5–14.5)
GLUCOSE BLD-MCNC: 149 MG/DL (ref 70–99)
GLUCOSE BLD-MCNC: 190 MG/DL (ref 70–99)
GLUCOSE SERPL-MCNC: 182 MG/DL (ref 70–99)
HCT VFR BLD AUTO: 21.1 % (ref 42–52)
HCT VFR BLD AUTO: 22 % (ref 42–52)
HCT VFR BLD AUTO: 26 % (ref 42–52)
HGB BLD-MCNC: 6.7 G/DL (ref 14–18)
HGB BLD-MCNC: 6.9 G/DL (ref 14–18)
HGB BLD-MCNC: 8 G/DL (ref 14–18)
HYPOCHROMIA BLD QL SMEAR: ABNORMAL
IMM GRANULOCYTES # BLD: 1.1 K/UL
LYMPHOCYTES # BLD: 0.8 K/UL (ref 1.1–4.5)
LYMPHOCYTES NFR BLD: 2 % (ref 20–40)
MACROCYTES BLD QL SMEAR: ABNORMAL
MCH RBC QN AUTO: 30.4 PG (ref 27–31)
MCHC RBC AUTO-ENTMCNC: 31.4 G/DL (ref 33–37)
MCV RBC AUTO: 96.9 FL (ref 80–94)
MONOCYTES # BLD: 0.8 K/UL (ref 0–0.9)
MONOCYTES NFR BLD: 2 % (ref 0–10)
NEUTROPHILS # BLD: 36.4 K/UL (ref 1.5–7.5)
NEUTS BAND NFR BLD MANUAL: 2 % (ref 0–5)
NEUTS SEG NFR BLD: 94 % (ref 50–65)
PERFORMED ON: ABNORMAL
PERFORMED ON: ABNORMAL
PLATELET # BLD AUTO: 316 K/UL (ref 130–400)
PLATELET SLIDE REVIEW: ADEQUATE
PMV BLD AUTO: 12.5 FL (ref 9.4–12.4)
POLYCHROMASIA BLD QL SMEAR: ABNORMAL
POTASSIUM SERPL-SCNC: 5.3 MMOL/L (ref 3.5–5)
PROCALCITONIN: 1.37 NG/ML (ref 0–0.09)
PROT SERPL-MCNC: 5.9 G/DL (ref 6.4–8.3)
RBC # BLD AUTO: 2.27 M/UL (ref 4.7–6.1)
SODIUM SERPL-SCNC: 138 MMOL/L (ref 136–145)
WBC # BLD AUTO: 37.9 K/UL (ref 4.8–10.8)

## 2025-07-26 PROCEDURE — 76700 US EXAM ABDOM COMPLETE: CPT

## 2025-07-26 PROCEDURE — 85014 HEMATOCRIT: CPT

## 2025-07-26 PROCEDURE — 6360000002 HC RX W HCPCS: Performed by: SURGERY

## 2025-07-26 PROCEDURE — 86900 BLOOD TYPING SEROLOGIC ABO: CPT

## 2025-07-26 PROCEDURE — 90945 DIALYSIS ONE EVALUATION: CPT

## 2025-07-26 PROCEDURE — 6370000000 HC RX 637 (ALT 250 FOR IP): Performed by: NURSE PRACTITIONER

## 2025-07-26 PROCEDURE — 6360000002 HC RX W HCPCS: Performed by: NURSE PRACTITIONER

## 2025-07-26 PROCEDURE — 2700000000 HC OXYGEN THERAPY PER DAY

## 2025-07-26 PROCEDURE — 2580000003 HC RX 258: Performed by: INTERNAL MEDICINE

## 2025-07-26 PROCEDURE — 86923 COMPATIBILITY TEST ELECTRIC: CPT

## 2025-07-26 PROCEDURE — 6360000002 HC RX W HCPCS: Performed by: INTERNAL MEDICINE

## 2025-07-26 PROCEDURE — 84145 PROCALCITONIN (PCT): CPT

## 2025-07-26 PROCEDURE — 80053 COMPREHEN METABOLIC PANEL: CPT

## 2025-07-26 PROCEDURE — 6370000000 HC RX 637 (ALT 250 FOR IP): Performed by: STUDENT IN AN ORGANIZED HEALTH CARE EDUCATION/TRAINING PROGRAM

## 2025-07-26 PROCEDURE — 5A1D90Z PERFORMANCE OF URINARY FILTRATION, CONTINUOUS, GREATER THAN 18 HOURS PER DAY: ICD-10-PCS | Performed by: INTERNAL MEDICINE

## 2025-07-26 PROCEDURE — 30233N1 TRANSFUSION OF NONAUTOLOGOUS RED BLOOD CELLS INTO PERIPHERAL VEIN, PERCUTANEOUS APPROACH: ICD-10-PCS | Performed by: INTERNAL MEDICINE

## 2025-07-26 PROCEDURE — 86850 RBC ANTIBODY SCREEN: CPT

## 2025-07-26 PROCEDURE — 2500000003 HC RX 250 WO HCPCS: Performed by: INTERNAL MEDICINE

## 2025-07-26 PROCEDURE — 6360000002 HC RX W HCPCS

## 2025-07-26 PROCEDURE — 94640 AIRWAY INHALATION TREATMENT: CPT

## 2025-07-26 PROCEDURE — 71045 X-RAY EXAM CHEST 1 VIEW: CPT

## 2025-07-26 PROCEDURE — 36430 TRANSFUSION BLD/BLD COMPNT: CPT

## 2025-07-26 PROCEDURE — 86901 BLOOD TYPING SEROLOGIC RH(D): CPT

## 2025-07-26 PROCEDURE — 8090000000 HC CONTINUOUS VENOVENOUS HEMOFIL

## 2025-07-26 PROCEDURE — 94760 N-INVAS EAR/PLS OXIMETRY 1: CPT

## 2025-07-26 PROCEDURE — P9016 RBC LEUKOCYTES REDUCED: HCPCS

## 2025-07-26 PROCEDURE — 85025 COMPLETE CBC W/AUTO DIFF WBC: CPT

## 2025-07-26 PROCEDURE — 85018 HEMOGLOBIN: CPT

## 2025-07-26 PROCEDURE — 02HV33Z INSERTION OF INFUSION DEVICE INTO SUPERIOR VENA CAVA, PERCUTANEOUS APPROACH: ICD-10-PCS | Performed by: SURGERY

## 2025-07-26 PROCEDURE — 6370000000 HC RX 637 (ALT 250 FOR IP): Performed by: INTERNAL MEDICINE

## 2025-07-26 PROCEDURE — 36415 COLL VENOUS BLD VENIPUNCTURE: CPT

## 2025-07-26 PROCEDURE — 82962 GLUCOSE BLOOD TEST: CPT

## 2025-07-26 PROCEDURE — 6370000000 HC RX 637 (ALT 250 FOR IP)

## 2025-07-26 PROCEDURE — 94660 CPAP INITIATION&MGMT: CPT

## 2025-07-26 RX ORDER — SODIUM CHLORIDE 9 MG/ML
INJECTION, SOLUTION INTRAVENOUS PRN
Status: DISCONTINUED | OUTPATIENT
Start: 2025-07-26 | End: 2025-07-26 | Stop reason: HOSPADM

## 2025-07-26 RX ORDER — ENOXAPARIN SODIUM 100 MG/ML
60 INJECTION SUBCUTANEOUS DAILY
Status: DISCONTINUED | OUTPATIENT
Start: 2025-07-27 | End: 2025-07-26 | Stop reason: HOSPADM

## 2025-07-26 RX ORDER — HEPARIN SODIUM 1000 [USP'U]/ML
INJECTION, SOLUTION INTRAVENOUS; SUBCUTANEOUS PRN
Status: DISCONTINUED | OUTPATIENT
Start: 2025-07-26 | End: 2025-07-26 | Stop reason: HOSPADM

## 2025-07-26 RX ORDER — MORPHINE SULFATE 2 MG/ML
1 INJECTION, SOLUTION INTRAMUSCULAR; INTRAVENOUS EVERY 6 HOURS PRN
Status: DISCONTINUED | OUTPATIENT
Start: 2025-07-26 | End: 2025-07-26 | Stop reason: HOSPADM

## 2025-07-26 RX ADMIN — SODIUM CHLORIDE, PRESERVATIVE FREE 40 MG: 5 INJECTION INTRAVENOUS at 08:38

## 2025-07-26 RX ADMIN — IPRATROPIUM BROMIDE 0.5 MG: 0.5 SOLUTION RESPIRATORY (INHALATION) at 14:37

## 2025-07-26 RX ADMIN — GUAIFENESIN 600 MG: 600 TABLET, EXTENDED RELEASE ORAL at 08:39

## 2025-07-26 RX ADMIN — Medication 5 ML: at 19:35

## 2025-07-26 RX ADMIN — METOPROLOL SUCCINATE 25 MG: 25 TABLET, FILM COATED, EXTENDED RELEASE ORAL at 08:38

## 2025-07-26 RX ADMIN — MONTELUKAST SODIUM 10 MG: 10 TABLET, FILM COATED ORAL at 20:19

## 2025-07-26 RX ADMIN — INSULIN GLARGINE 30 UNITS: 100 INJECTION, SOLUTION SUBCUTANEOUS at 20:19

## 2025-07-26 RX ADMIN — ENOXAPARIN SODIUM 60 MG: 100 INJECTION SUBCUTANEOUS at 08:38

## 2025-07-26 RX ADMIN — HEPARIN SODIUM 1400 UNITS: 1000 INJECTION INTRAVENOUS; SUBCUTANEOUS at 11:56

## 2025-07-26 RX ADMIN — MEROPENEM 1000 MG: 1 INJECTION INTRAVENOUS at 19:28

## 2025-07-26 RX ADMIN — MEROPENEM 1000 MG: 1 INJECTION INTRAVENOUS at 05:45

## 2025-07-26 RX ADMIN — MORPHINE SULFATE 1 MG: 2 INJECTION, SOLUTION INTRAMUSCULAR; INTRAVENOUS at 14:45

## 2025-07-26 RX ADMIN — IPRATROPIUM BROMIDE 0.5 MG: 0.5 SOLUTION RESPIRATORY (INHALATION) at 06:44

## 2025-07-26 RX ADMIN — VANCOMYCIN HYDROCHLORIDE 1000 MG: 1 INJECTION, POWDER, LYOPHILIZED, FOR SOLUTION INTRAVENOUS at 20:25

## 2025-07-26 RX ADMIN — ONDANSETRON 4 MG: 2 INJECTION, SOLUTION INTRAMUSCULAR; INTRAVENOUS at 14:52

## 2025-07-26 RX ADMIN — LACTULOSE 20 G: 20 SOLUTION ORAL at 08:37

## 2025-07-26 RX ADMIN — MICONAZOLE NITRATE: 2 POWDER TOPICAL at 21:05

## 2025-07-26 RX ADMIN — CETIRIZINE HYDROCHLORIDE 10 MG: 10 TABLET ORAL at 08:39

## 2025-07-26 RX ADMIN — GUAIFENESIN 600 MG: 600 TABLET, EXTENDED RELEASE ORAL at 20:19

## 2025-07-26 RX ADMIN — ACETAMINOPHEN 650 MG: 325 TABLET ORAL at 20:45

## 2025-07-26 RX ADMIN — MICONAZOLE NITRATE: 2 POWDER TOPICAL at 08:38

## 2025-07-26 RX ADMIN — PRAVASTATIN SODIUM 20 MG: 20 TABLET ORAL at 20:19

## 2025-07-26 RX ADMIN — ASPIRIN 81 MG: 81 TABLET, COATED ORAL at 08:39

## 2025-07-26 RX ADMIN — IPRATROPIUM BROMIDE 0.5 MG: 0.5 SOLUTION RESPIRATORY (INHALATION) at 18:55

## 2025-07-26 RX ADMIN — Medication: at 13:17

## 2025-07-26 RX ADMIN — MIDODRINE HYDROCHLORIDE 10 MG: 10 TABLET ORAL at 08:38

## 2025-07-26 RX ADMIN — ALLOPURINOL 300 MG: 300 TABLET ORAL at 08:39

## 2025-07-26 RX ADMIN — IPRATROPIUM BROMIDE 0.5 MG: 0.5 SOLUTION RESPIRATORY (INHALATION) at 10:36

## 2025-07-26 RX ADMIN — INSULIN LISPRO 2 UNITS: 100 INJECTION, SOLUTION INTRAVENOUS; SUBCUTANEOUS at 09:01

## 2025-07-26 ASSESSMENT — ENCOUNTER SYMPTOMS
GASTROINTESTINAL NEGATIVE: 1
DIARRHEA: 0
EYES NEGATIVE: 1
SHORTNESS OF BREATH: 0
NAUSEA: 0
RESPIRATORY NEGATIVE: 1
VOMITING: 0

## 2025-07-26 ASSESSMENT — PAIN SCALES - GENERAL
PAINLEVEL_OUTOF10: 7
PAINLEVEL_OUTOF10: 5
PAINLEVEL_OUTOF10: 7
PAINLEVEL_OUTOF10: 3
PAINLEVEL_OUTOF10: 4
PAINLEVEL_OUTOF10: 9

## 2025-07-26 ASSESSMENT — PAIN DESCRIPTION - PAIN TYPE: TYPE: ACUTE PAIN

## 2025-07-26 ASSESSMENT — PAIN DESCRIPTION - FREQUENCY: FREQUENCY: CONTINUOUS

## 2025-07-26 ASSESSMENT — PAIN DESCRIPTION - LOCATION
LOCATION: ABDOMEN

## 2025-07-26 ASSESSMENT — PAIN DESCRIPTION - ONSET: ONSET: ON-GOING

## 2025-07-26 ASSESSMENT — PAIN DESCRIPTION - DESCRIPTORS: DESCRIPTORS: STABBING

## 2025-07-26 NOTE — PROGRESS NOTES
Called 3rd floor to verify that patient had no urine output since 2000 last night. PCA verifies this is true.

## 2025-07-26 NOTE — OP NOTE
07/27/25    Preoperative Diagnosis:    Acute renal failure  CHF  MORBID obesity    Postoperative Diagnosis:  Same    Operative procedure:    1.  Ultrasound guided access of right internal jugular vein  2.  Placement of a triple-lumen non-tunneled right internal jugular vein dialysis catheter (Bard Trialysis)    Surgeon:  SHANNAN LUCERO MD    Anesthesia:  Local    EBL:  Less than 50 ml    Findings:    1.  The vein was patent per ultrasound.    2.  The catheter was placed without resistance.    3.  Both ports aspirated and flushed easily.  The catheter is ready for use.    Procedure in Detail:    After informed consent was obtained, the patient's right neck was prepped and draped in the usual sterile fashion.  Skin and subcutaneous tissues over the internal jugular vein were anesthetized with lidocaine.  Under ultrasound guidance, the internal jugular vein was cannulated with a micropuncture needle.   An 0.018 wire was placed through the micropuncture needle and then the needle was replaced with the micropuncture catheter.  A 0.035 j wire was then placed through the micropuncture catheter.  The micropuncture catheter was removed and a small incision made over the wire with an 11 blade.  Sequential dilators were passed over the wire without resistance and finally the catheter was placed over the wire without resistance. The wire and wire guide were removed from the catheter.  Both ports aspirated and flushed easily with saline.    Appropriate caps were placed.  The catheter was secured to the skin with two 2-0 silk sutures.  A biopatch was placed at the exit site and sterile dressings were applied.

## 2025-07-26 NOTE — CONSULTS
[facility]  Cardiology Consult      Requesting MD:  Cody Howell MD   Admit Status:         History obtained from:   [] Patient  [] Other (specify):     Patient:  Daljit Elizondo  [id number]     Chief Complaint:   Chief Complaint   Patient presents with    Shortness of Breath     Since Jan 1    Scrotal Pain         HPI: Mr. Elizondo is a 65 y.o. male no prior cardiac history admitted 5/14/2025 increased dyspnea.  Also has lower extremity weakness and edema.  He has been bedbound since admission extreme morbid obesity.  Normally ambulates with a walker at home.  No definite significant chest discomfort.  Echocardiogram attempted but technically unable to get any adequate readings but echocardiogram from 24 indicated ejection fraction estimated 25 to 30%.  History of obstructive sleep apnea diabetes reflux disease gout previous tracheostomy 2015 hyperlipidemia hypertension.  N-terminal proBNP elevated greater than 11,000 on multiple readings.  Venous stasis changes bilateral lower extremities.  I do not believe he has ever had any type of invasive cardiac assessment.  Cardiac monitor placed on 7/25/2025 presently in sinus rhythm.  He was reported to have atrial fibrillation by EKG tracing.  At the time I saw him I was told that he was back in a normal sinus rhythm.  No other issues reported.    Review of Systems:  Review of Systems   Constitutional: Negative.  Negative for chills, fever and unexpected weight change.   HENT: Negative.     Eyes: Negative.    Respiratory: Negative.  Negative for shortness of breath.    Cardiovascular: Negative.  Negative for chest pain.   Gastrointestinal: Negative.  Negative for diarrhea, nausea and vomiting.   Endocrine: Negative.    Genitourinary: Negative.    Musculoskeletal: Negative.    Skin: Negative.    Neurological: Negative.    All other systems reviewed and are negative.      Cardiac Specific Data:  Specialty Problems          Cardiology

## 2025-07-26 NOTE — PLAN OF CARE
Problem: Chronic Conditions and Co-morbidities  Goal: Patient's chronic conditions and co-morbidity symptoms are monitored and maintained or improved  7/26/2025 1442 by Silvana Self, RN  Outcome: Progressing  7/26/2025 0611 by Melina Labmert, RN  Outcome: Adequate for Discharge

## 2025-07-26 NOTE — PROGRESS NOTES
Patient is awaiting transfer from [facility] to [facility]. Transfer Info is [place] [facility], 12th floor, Rm. 216.    Called report to Lakia at [facility] at [phone]. [phone]  Spoke with sister Gabriella and Yakelin, updated on patient bed information and awaiting EMS transfer. 1640 & 1645. They both asked for nurse to call and let them know when he leaves this evening.    Rosie from EMS called at 1700. No truck available until at 7pm. She will discuss with manager at 7pm and call back with updated information on when a truck will be available for transfer.    Patient is tolerating CRRT with Levo at 10.     Radiology brought disc with images for transfer. Transfer report is printed. Face sheet faxed to [facility].     Electronically signed by Silvana Self RN on 7/26/2025 at 5:45 PM

## 2025-07-26 NOTE — PLAN OF CARE
Transfered to ICU overnight for hypotension with the need for Levophed. WBC significantly elevated. HB dropped. No nitin bleeding. Some stool but no blood.     This is a 65-year-old morbidly obese male (BMI 65) with a prolonged hospitalization initially admitted May 14, 2025 with dyspnea and was found to have heart failure exacerbation.  Over the last several days his hemoglobin has declined yesterday it was 9.4 and today it is 7.6.  There are reports of some intermittent rectal bleeding but otherwise no other sources of large blood loss acutely.  We have been asked to further evaluate.     Drop in HB.  No blood on my rectal exam. No blood in stool noted on my exam and by nursing overnight.   Transfuse to target goals.  Would proceed with abdominal imaging to assess for intraperitoneal or retroperitoneal causes. Other than some abdominal wall hematomas (from Lovenox), nothing obvious on my exam (no retroperitoneal hematoma).   Discussed with Dr. Howell today.   No plan for endoscopic intervention at this time.      Diarrhea, will need to rule out infectious etiology. Stool panel for infection is negative.  Follow up C. Diff.     SIRS, low temp and leukocytosis. Possible sepsis.   Repeat infectious work up.  On empiric antibiotics.

## 2025-07-26 NOTE — CONSULTS
Vascular Surgery Consultation     Reason for Consultation    Request for non-tunneled dialysis catheter placement    HPI    Daljit Whitehead is a 65-year-old  man with history of morbid obesity, diabetes mellitus, essential hypertension, gastroesophageal reflux disease, who was admitted with acute on chronic congestive heart failure, who is languish over the last several weeks.  He is in the intensive care unit and does not have good peripheral intravenous access and is now requiring pressors.  Additionally, the patient has acute renal failure and Dr. Sun from nephrology is requesting a nontunneled hemodialysis catheter to initiate CRRT.      Lab Results   Component Value Date    CREATININE 4.7 (H) 07/26/2025    BUN 71 (H) 07/26/2025     07/26/2025    K 5.3 (H) 07/26/2025     07/26/2025    CO2 23 07/26/2025         History    Daljit Elizondo is a 65 y.o. male with the [facility]:  Patient Active Problem List    Diagnosis Date Noted    Hypervolemia 07/25/2025    Severe malnutrition 07/14/2025    Acute on chronic respiratory failure with hypoxia and hypercapnia ([facility]) 07/05/2025    Palliative care patient 06/30/2025    Acute on chronic systolic (congestive) heart failure ([facility]) 05/14/2025    Hyperlipemia     Cellulitis and abscess of left lower extremity 05/10/2018    Lymphorrhea 01/08/2018    Venous stasis of both lower extremities     Chronic acquired lymphedema 08/03/2017    Non-pressure chronic ulcer of left calf with fat layer exposed ([facility]) 08/03/2017    Bell's palsy     Limited mobility in bed 06/24/2015    History of pressure ulcer 06/24/2015    Vitamin D deficiency 06/24/2015    Obesity, morbid (more than 100 lbs over ideal weight or BMI > 40) ([facility]) 06/24/2015    Morbid obesity ([facility])     GERD (gastroesophageal reflux disease)     Anxiety 06/06/2014    Diabetes ([facility]) 06/06/2014    Essential hypertension 06/06/2014     Current

## 2025-07-26 NOTE — PLAN OF CARE
Problem: Chronic Conditions and Co-morbidities  Goal: Patient's chronic conditions and co-morbidity symptoms are monitored and maintained or improved  7/26/2025 1442 by Silvana Self RN  Outcome: Progressing  7/26/2025 1442 by Silvana Self RN  Outcome: Progressing  7/26/2025 0611 by Melina Lambert RN  Outcome: Adequate for Discharge

## 2025-07-26 NOTE — CONSENT
Informed Consent for Blood Component Transfusion Note    I have discussed with the patient the rationale for blood component transfusion; its benefits in treating or preventing fatigue, organ damage, or death; and its risk which includes mild transfusion reactions, rare risk of blood borne infection, or more serious but rare reactions. I have discussed the alternatives to transfusion, including the risk and consequences of not receiving transfusion. The patient had an opportunity to ask questions and had agreed to proceed with transfusion of blood components.    Electronically signed by Cody Howell MD on 7/26/25 at 9:22 AM CDT

## 2025-07-26 NOTE — PROGRESS NOTES
nephrology ([facility]) Progress Note    Patient:  Daljit Elizondo  YOB: 1960  Date of Service: 7/26/2025  MRN: [id number]   Acct: [id number]   Primary Care Physician: Jessie Helms APRN - CNP  Advance Directive: Full Code  Admit Date: 5/14/2025       Hospital Day: 73  Referring Provider: Cody Howell MD    Patient independently seen and examined, Chart, Consults, Notes, Operative notes, Labs, Cardiology, and Radiology studies reviewed as available.    Chief complaint: Abnormal labs.    Subjective:  Daljit Elizondo is a 65 y.o. male for whom we were consulted for evaluation and treatment of acute kidney injury.  Patient denies any history of chronic kidney disease.  He has been in the hospital for the last month, poor historian and has never seen nephrology in the past.  Patient has severe abdominal obesity, hypertension, sleep apnea, type 2 diabetes, chronic diastolic CHF, sleep apnea.  Presented with increasing shortness of breath/dyspnea on exertion.  Hospital course remarkable for treatment with intravenous diuretics for the treatment of volume overload, CHF exacerbation.  His serum creatinine was 0.9 mg with estimated GFR more than 90 mL on admission.  Patient has significant drop in GFR and nephrology is consulted.  He was initially treated with IV fluid with minimal to no improvement of renal function.  Patient initially agreed to proceed with dialysis.  However he was too heavy for operating room table.  Meanwhile his renal function improved as well.  He has borderline GFR    Patient was having upset stomach and has recurrent emesis.  He then became more hypotensive with a picture of septic shock.  He has elevated white blood cell count.  He was having loose stools.  He was upgraded to ICU for close monitoring.  He then required to started pressors.  GI remains on the case.  He was kept on empiric intravenous antibiotics.  Patient in more dyspneic and requiring      Assessment   1.  Acute kidney injury-worsening due to ATN  2.  Hypotension-septic shock  3.  Severe abdominal obesity.  4.  Acute on chronic diastolic CHF.  5.  Obstructive sleep apnea syndrome  6.  Acute on chronic hypoxemic respiratory failure  7.  Type 2 diabetes/poorly controlled  8.  Clinically volume overloaded.  9.  Right testicular pain.  10.  Rule out severe colitis versus bowel perforation      Plan:  Agree with IV hydration and intravenous pressors for blood pressure support.  Patient is currently in multiorgan failure.  The etiology is likely some abdominal sepsis.  Agree with current antibiotic therapy.  Reviewed the GI notes.  I also suggest ID and general surgery consultation.  Case was discussed with patient and his sister who is the next of kin.  We explained worsening kidney function and the current need for renal replacement therapy.  Risks, benefits and alternatives of central line placement and hemodialysis versus CRRT were reviewed with the sister who agreed to proceed.  We will consult vascular surgery to establish the dialysis line.  Case was also discussed with ICU and dialysis nursing.    Critical care time spent was 40 minutes.        Maciej Sun MD  07/26/25  10:43 AM

## 2025-07-26 NOTE — PLAN OF CARE
Problem: Chronic Conditions and Co-morbidities  Goal: Patient's chronic conditions and co-morbidity symptoms are monitored and maintained or improved  7/25/2025 1602 by Cliff De Souza, RN  Outcome: Adequate for Discharge     Problem: Safety - Adult  Goal: Free from fall injury  7/25/2025 1602 by Cliff De Souza, RN  Outcome: Adequate for Discharge

## 2025-07-26 NOTE — PROGRESS NOTES
Pharmacy Automatic Dose Adjustment                Subcutaneous Anticoagulant for Prophylaxis    Daljit Elizondo is a 65 y.o. male.     Recent Labs     07/25/25  1627 07/26/25  0117   CREATININE 4.5* 4.7*       Estimated Creatinine Clearance: 27 mL/min (A) (based on SCr of 4.7 mg/dL (H)).    Weight:  Wt Readings from Last 1 Encounters:   07/18/25 (!) 202.1 kg (445 lb 8.8 oz)           Pharmacy has adjusted subcutaneous anticoagulant for prophylaxis to Enoxaparin 60 mg SC daily based on the patient's weight and estimated CrCl per [facility] policy.             Electronically signed by Keke Yanes RPh, BCPS, 7/26/2025,11:26 AM

## 2025-07-26 NOTE — PROGRESS NOTES
CHEST RADIOGRAPH  TECHNIQUE: Single frontal chest radiograph.  HISTORY: Increasing shortness of breath.  COMPARISON: None.  FINDINGS:  Limited examination because of the patient's body habitus.  The patient is mildly leaning and rotated to the left. Pulmonary venous congestion, bilateral ground-glass, and consolidation of the left base, progressed. Equivocal small bilateral pleural effusions.  No visible pneumothorax. Stable enlargement of the cardiac silhouette. No acute displaced rib fractures are identified.        1.  Limited portable chest as described. 2.  Stable enlargement of the cardiac silhouette, with progression of the pulmonary edema.  Equivocal small bilateral pleural effusions.  Superimposed pneumonia cannot be excluded.    ______________________________________ Electronically signed by: SABAS DOMINGUEZ M.D. Date:     07/06/2025 Time:    14:54     Vascular ankle brachial index (SHEILA) PROCEDURE DONE IN [facility]  Addendum Date: 6/29/2025    Right side findings: This is within the normal range. Resting TBI is 1.56.   Left side findings: Resting SHEILA is 1.62. This is within the normal range. Resting TBI is 1.88.     Result Date: 6/29/2025    Right side findings: This is within the normal range. Resting TBI is 1.56.   Left side findings: Resting SHEILA is 1.62. This is within the normal range. Resting TBI is 1.88.     XR FOOT LEFT (2 VIEWS)  Result Date: 6/28/2025  EXAM: 2 VIEWS OF THE LEFT FOOT.  HISTORY: left foot wound  COMPARISON: None.  FINDINGS: There is no acute fracture or dislocation. No definite osseous erosions are seen. Joint spaces and alignment are maintained.  Diffuse soft swelling of the foot is seen.       No acute osseous abnormality.  Soft tissue swelling of the foot.   ______________________________________ Electronically signed by: MOIRA ROSE M.D. Date:     06/28/2025 Time:    15:16     XR CHEST PORTABLE  Result Date: 6/27/2025  EXAM: CHEST RADIOGRAPH  TECHNIQUE: Single frontal chest  ill-appearing, toxic-appearing or diaphoretic.   HENT:      Head: Normocephalic and atraumatic.      Right Ear: External ear normal.      Left Ear: External ear normal.      Nose: Nose normal. No congestion or rhinorrhea.      Mouth/Throat:      Mouth: Mucous membranes are moist.      Pharynx: Oropharynx is clear. No oropharyngeal exudate or posterior oropharyngeal erythema.   Eyes:      General: No scleral icterus.        Right eye: No discharge.         Left eye: No discharge.      Extraocular Movements: Extraocular movements intact.      Conjunctiva/sclera: Conjunctivae normal.      Pupils: Pupils are equal, round, and reactive to light.   Cardiovascular:      Rate and Rhythm: Tachycardia present. Rhythm irregular.      Heart sounds: Normal heart sounds. No murmur heard.     No friction rub. No gallop.   Pulmonary:      Effort: Pulmonary effort is normal. No respiratory distress.      Breath sounds: No stridor. No wheezing, rhonchi or rales.   Chest:      Chest wall: No tenderness.   Abdominal:      General: Bowel sounds are normal. There is no distension.      Palpations: Abdomen is soft.      Tenderness: There is no abdominal tenderness. There is no guarding or rebound.   Musculoskeletal:         General: No swelling, tenderness, deformity or signs of injury. Normal range of motion.      Cervical back: Normal range of motion and neck supple. No rigidity. No muscular tenderness.      Right lower leg: No edema.      Left lower leg: No edema.      Comments: Left foot wound   Skin:     General: Skin is warm and dry.      Capillary Refill: Capillary refill takes less than 2 seconds.      Coloration: Skin is not jaundiced or pale.      Findings: No bruising, erythema, lesion or rash.   Neurological:      General: No focal deficit present.      Mental Status: He is alert and oriented to person, place, and time.      Cranial Nerves: No cranial nerve deficit.      Sensory: No sensory deficit.      Motor: No weakness.

## 2025-07-26 NOTE — DISCHARGE SUMMARY
[address] [place] [address]  [facility]    DISCHARGE SUMMARY:        Daljit Elizondo  :  1960  MRN:  [id number]    Admit date:  2025  Discharge date:  2025      Admitting Physician:  Cody Howell MD    Advance Directive: Full Code    Consults Made:   IP CONSULT TO HEART FAILURE NURSE/COORDINATOR  IP CONSULT TO HOME CARE NEEDS  IP CONSULT TO NEPHROLOGY  IP CONSULT TO VASCULAR SURGERY  IP CONSULT TO VASCULAR ACCESS TEAM  IP CONSULT TO PALLIATIVE CARE  IP CONSULT TO SOCIAL WORK  PHARMACY TO DOSE VANCOMYCIN  IP CONSULT TO GI  IP CONSULT TO CARDIOLOGY  IP CONSULT TO VASCULAR SURGERY      Primary Care Physician:  Jessie Helms, DURAN - CNP    Discharge Diagnoses:  Principal Problem:    Acute on chronic systolic (congestive) heart failure (HCC)  Active Problems:    Diabetes (HCC)    Essential hypertension    GERD (gastroesophageal reflux disease)    Obesity, morbid (more than 100 lbs over ideal weight or BMI > 40) (HCC)    Hyperlipemia    Palliative care patient    Acute on chronic respiratory failure with hypoxia and hypercapnia (HCC)    Severe malnutrition    Hypervolemia  Resolved Problems:    * No resolved hospital problems. *          Pertinent Labs:  CBC with DIFF:  Recent Labs     25  0232 25  0141 25  0824 25  1627 25  0117   WBC 13.7* 18.3*  --   --  37.9*   RBC 3.20* 2.58*  --   --  2.27*   HGB 9.4* 7.6* 7.3* 7.6* 6.9*   HCT 30.6* 24.8* 23.1* 22.3* 22.0*   MCV 95.6* 96.1*  --   --  96.9*   MCH 29.4 29.5  --   --  30.4   MCHC 30.7* 30.6*  --   --  31.4*   RDW 14.6* 15.1*  --   --  15.7*    245  --   --  316   MPV 12.2 12.8*  --   --  12.5*   NEUTOPHILPCT  --  84.1*  --   --  94.0*   LYMPHOPCT  --  6.7*  --   --  2.0*   MONOPCT  --  8.0  --   --  2.0   BASOPCT  --  0.2  --   --  0.0   NEUTROABS  --  15.4*  --   --  36.4*   LYMPHSABS  --  1.2  --   --  0.8*   MONOSABS  --  1.50*  --   --  0.80   EOSABS  --  0.00  --   --   Appointments Scheduled at Time of Discharge:  No future appointments.       Diet: Diet NPO        DISCHARGE STATUS:    Condition on Discharge: stable    Disposition: Patient is medically stable for transfer to       Extended Emergency Contact Information  Primary Emergency Contact: Gabriella Urbano   [facility]  Home Phone: [phone]  Relation: Brother/Sister  Secondary Emergency Contact: Yakelin Ritter   [facility]  Home Phone: [phone]  Relation: Brother/Sister         Time Spent on discharge is  40 mins in the examination, evaluation, counseling and review of medications and discharge plan.     Electronically signed by   Cody Howell MD,   Internal Medicine Hospitalist   7/26/2025 11:04 AM      Thank you Jessie Helms APRN - CNP for the opportunity to be involved in this patient's care. If you have any questions or concerns please feel free to contact me at [phone]        EMR Dragon/Transcription disclaimer:   Much of this encounter note is an electronic transcription/translation of spoken language to printed text. The electronic translation of spoken language may permit erroneous, or at times, nonsensical words or phrases to be inadvertently transcribed; although attempts have made to review the note for such errors, some may still exist.

## 2025-07-27 NOTE — PROGRESS NOTES
One bag of levophed (8mg/250ml) was pulled from [facility] and given to EMS for transport.    Electronically signed by Lambert Servin RN on 7/26/2025 at 8:52 PM

## 2025-07-27 NOTE — PROGRESS NOTES
Patient left the ICU with EMS. All patients belongings went with him. VSS    Electronically signed by DEE GUTIERREZ RN on 7/26/2025 at 9:04 PM

## 2025-07-27 NOTE — PROGRESS NOTES
Dialysis was broke down with no complications.    Electronically signed by DEE GUTIERREZ RN on 7/26/2025 at 8:59 PM

## 2025-07-27 NOTE — DIALYSIS
[facility]  DIALYSIS TREATMENT SUMMARY      Note: Consult with the attending physician for patient treatment orders, this document is not a physician order.      Patient Information   Patient Daljit Elizondo   Date of Birth June 07, 1960   Chart Number [id number]   Location [facility]   Location MRN [id number]   Gender Male   SSN (last 4)    Treatment Information   Treatment Type CRRT   Treatment Id [id number]   Start Time July 26, 2025 13:17   End Time July 26, 2025 19:45   Acutal Duration 06:28   Facility Information   Facility Information   [facility]   Treatment Start Date 07/26/2025   Treatment Start Time 13:17   Ordering MD Sun   Account/Finance Number [id number]   Admission Status InPatient   Location ICU   Room # 146   Bed # 1   Stat Treatment No   Staff Report Received Yes   Hospital Nurse Silvana Self RN   Time 13:10   Notes verbal at bedside.   Patient Type New patient to dialysis with diagnosis of “Acute Kidney Injury”   Patient Chronic Unit [facility]   Patient Home Unit TBD   Code Status Full Code   Diagnosis   Diagnosis Hypervolemia   Isolation Information   Isolation Required? Y   If yes, Explain Other   Isolation Type Contact   Other MDRO   Completed by   General Tx information Entered by Alondra Evans RN   Signing   Signed By Alondra Evans RN   Pre Focused Assessment   AV Access   Access Type Central Venous Catheter   Central Venous Catheter   Access Type Catheter - Non-Tunneled   Date Central Venous Catheter Placed 07/26/2025   Access Location Neck - R   Current/New [facility] Patient Yes   Attending Nephrologist Dr Sun   Surgeon Wyatt Malave MD   1st Use Catheter Verified by MD Order   Catheter Care Completed per Policy Yes   Type of Dressing Film Biopatch/CHG   Dressing Changed No   Date Changed 07/26/2025   If No select Reason Dressing Change Not Indicated per Policy   CVC Line Education Provided Yes - Dressing Change

## 2025-07-29 LAB
BACTERIA BLD CULT ORG #2: NORMAL
BACTERIA BLD CULT: NORMAL